# Patient Record
Sex: MALE | Race: WHITE | NOT HISPANIC OR LATINO
[De-identification: names, ages, dates, MRNs, and addresses within clinical notes are randomized per-mention and may not be internally consistent; named-entity substitution may affect disease eponyms.]

---

## 2017-03-29 ENCOUNTER — APPOINTMENT (OUTPATIENT)
Dept: ORTHOPEDIC SURGERY | Facility: CLINIC | Age: 72
End: 2017-03-29

## 2017-03-29 DIAGNOSIS — M75.52 BURSITIS OF LEFT SHOULDER: ICD-10-CM

## 2017-03-29 RX ORDER — HYALURONATE SODIUM 20 MG/2 ML
20 SYRINGE (ML) INTRAARTICULAR
Refills: 0 | Status: COMPLETED | OUTPATIENT
Start: 2017-03-29

## 2017-03-29 RX ADMIN — Medication 1 MG/2ML: at 00:00

## 2017-04-05 ENCOUNTER — APPOINTMENT (OUTPATIENT)
Dept: ORTHOPEDIC SURGERY | Facility: CLINIC | Age: 72
End: 2017-04-05

## 2017-04-05 RX ORDER — HYALURONATE SODIUM 20 MG/2 ML
20 SYRINGE (ML) INTRAARTICULAR
Refills: 0 | Status: COMPLETED | OUTPATIENT
Start: 2017-04-05

## 2017-04-05 RX ORDER — DEXAMETHASONE SODIUM PHOSPHATE 4 MG/ML
120 INJECTION, SOLUTION INTRA-ARTICULAR; INTRALESIONAL; INTRAMUSCULAR; INTRAVENOUS; SOFT TISSUE
Refills: 0 | Status: COMPLETED | OUTPATIENT
Start: 2017-04-05

## 2017-04-05 RX ADMIN — DEXAMETHASONE SODIUM PHOSPHATE 1 MG/30ML: 4 INJECTION, SOLUTION INTRA-ARTICULAR; INTRALESIONAL; INTRAMUSCULAR; INTRAVENOUS; SOFT TISSUE at 00:00

## 2017-04-05 RX ADMIN — Medication 1 MG/2ML: at 00:00

## 2017-04-12 ENCOUNTER — APPOINTMENT (OUTPATIENT)
Dept: ORTHOPEDIC SURGERY | Facility: CLINIC | Age: 72
End: 2017-04-12

## 2017-04-12 DIAGNOSIS — M25.551 PAIN IN RIGHT HIP: ICD-10-CM

## 2017-04-12 RX ORDER — HYALURONATE SODIUM 20 MG/2 ML
20 SYRINGE (ML) INTRAARTICULAR
Refills: 0 | Status: COMPLETED | OUTPATIENT
Start: 2017-04-12

## 2017-04-12 RX ADMIN — Medication 1 MG/2ML: at 00:00

## 2018-04-23 ENCOUNTER — FORM ENCOUNTER (OUTPATIENT)
Age: 73
End: 2018-04-23

## 2018-04-24 ENCOUNTER — OUTPATIENT (OUTPATIENT)
Dept: OUTPATIENT SERVICES | Facility: HOSPITAL | Age: 73
LOS: 1 days | End: 2018-04-24
Payer: MEDICARE

## 2018-04-24 ENCOUNTER — APPOINTMENT (OUTPATIENT)
Dept: ORTHOPEDIC SURGERY | Facility: CLINIC | Age: 73
End: 2018-04-24
Payer: MEDICARE

## 2018-04-24 VITALS — HEIGHT: 68 IN | BODY MASS INDEX: 25.76 KG/M2 | WEIGHT: 170 LBS

## 2018-04-24 PROCEDURE — 99213 OFFICE O/P EST LOW 20 MIN: CPT | Mod: 25

## 2018-04-24 PROCEDURE — 73564 X-RAY EXAM KNEE 4 OR MORE: CPT

## 2018-04-24 PROCEDURE — 73564 X-RAY EXAM KNEE 4 OR MORE: CPT | Mod: 26,LT

## 2018-04-24 PROCEDURE — 20610 DRAIN/INJ JOINT/BURSA W/O US: CPT | Mod: LT

## 2018-04-24 RX ADMIN — Medication 1 MG/2ML: at 00:00

## 2018-05-01 ENCOUNTER — APPOINTMENT (OUTPATIENT)
Dept: ORTHOPEDIC SURGERY | Facility: CLINIC | Age: 73
End: 2018-05-01
Payer: MEDICARE

## 2018-05-01 DIAGNOSIS — S76.112A STRAIN OF LEFT QUADRICEPS MUSCLE, FASCIA AND TENDON, INITIAL ENCOUNTER: ICD-10-CM

## 2018-05-01 PROCEDURE — 20610 DRAIN/INJ JOINT/BURSA W/O US: CPT | Mod: LT

## 2018-05-01 PROCEDURE — 99213 OFFICE O/P EST LOW 20 MIN: CPT | Mod: 25

## 2018-05-10 ENCOUNTER — APPOINTMENT (OUTPATIENT)
Dept: ORTHOPEDIC SURGERY | Facility: CLINIC | Age: 73
End: 2018-05-10
Payer: MEDICARE

## 2018-05-10 PROCEDURE — 20610 DRAIN/INJ JOINT/BURSA W/O US: CPT | Mod: LT

## 2018-05-11 RX ADMIN — Medication 1 MG/2ML: at 00:00

## 2019-04-18 ENCOUNTER — MEDICATION RENEWAL (OUTPATIENT)
Age: 74
End: 2019-04-18

## 2019-05-02 ENCOUNTER — APPOINTMENT (OUTPATIENT)
Dept: ORTHOPEDIC SURGERY | Facility: CLINIC | Age: 74
End: 2019-05-02

## 2019-05-09 ENCOUNTER — APPOINTMENT (OUTPATIENT)
Dept: ORTHOPEDIC SURGERY | Facility: CLINIC | Age: 74
End: 2019-05-09

## 2019-05-23 ENCOUNTER — APPOINTMENT (OUTPATIENT)
Dept: ORTHOPEDIC SURGERY | Facility: CLINIC | Age: 74
End: 2019-05-23

## 2019-05-27 ENCOUNTER — FORM ENCOUNTER (OUTPATIENT)
Age: 74
End: 2019-05-27

## 2019-05-28 ENCOUNTER — APPOINTMENT (OUTPATIENT)
Dept: ORTHOPEDIC SURGERY | Facility: CLINIC | Age: 74
End: 2019-05-28

## 2019-05-28 ENCOUNTER — APPOINTMENT (OUTPATIENT)
Dept: ORTHOPEDIC SURGERY | Facility: CLINIC | Age: 74
End: 2019-05-28
Payer: MEDICARE

## 2019-05-28 ENCOUNTER — OUTPATIENT (OUTPATIENT)
Dept: OUTPATIENT SERVICES | Facility: HOSPITAL | Age: 74
LOS: 1 days | End: 2019-05-28
Payer: MEDICARE
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
## 2019-05-28 PROCEDURE — 20610 DRAIN/INJ JOINT/BURSA W/O US: CPT | Mod: LT

## 2019-05-28 PROCEDURE — 73564 X-RAY EXAM KNEE 4 OR MORE: CPT

## 2019-05-28 PROCEDURE — 73564 X-RAY EXAM KNEE 4 OR MORE: CPT | Mod: 26,50

## 2019-05-28 PROCEDURE — 99214 OFFICE O/P EST MOD 30 MIN: CPT | Mod: 25

## 2019-05-28 NOTE — DISCUSSION/SUMMARY
[de-identified] : Mr. Blair is a 73 y/o M with left knee DJD. On physical exam, he has radicular pain with straight leg raise so I informed him that I believe some of his pain and symptoms come from his spine. I encouraged him to continue seeking therapy/mental health support.\par Follow up for 2/3 Eufflexa injection.

## 2019-05-28 NOTE — END OF VISIT
[FreeTextEntry3] : All medical record entries made by Alondra Patterson acting as a scribe for the performing provider (Daniel Retana MD and/or LISA Hector) on 05/28/2019. All entries were dictated to me by the performing medical provider. In signing this record, the medical provider affirms that they have personally performed the history, physical exam, assessment and plan and have also directed, reviewed and agreed to the documentation in the chart.
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
## 2019-05-28 NOTE — PHYSICAL EXAM
[de-identified] : Well appearing. NAD. Alert and oriented x 3. No respiratory distress.\par Bilateral upper extremities: Skin intact. No deformity. Painless active ROM without evident restriction.\par Cervical, thoracic and lumbar spine: No visible deformity. Painless active ROM without evident restriction. (+) radicular pain on passive straight leg raise on the left.\par \par Gait: Normal.\par Ambulatory assist devices: None.\par \par Bilateral Hips: No swelling or deformity. Painless and unrestricted range of motion. No crepitation. \par \par Right Knee:\par Skin intact. No surgical scars. No erythema or ecchymosis. No swelling or effusion. No deformity. No focal tenderness.\par Painless ROM from full extension to 135-140 degrees of flexion.\par Central patellar tracking. No crepitation. Good stability.\par \par Left Knee:\par Skin intact. No surgical scars. No erythema or ecchymosis. No swelling or effusion. No deformity.\par (+) mild tenderness.\par Painless ROM from full extension to 140 degrees of flexion.\par Central patellar tracking. No crepitation. No instability.\par \par Neurological: Intact distal crude touch sensation. Normal distal motor power. \par Cardiovascular: Lower extremities warm and well perfused. No peripheral edema. [de-identified] : X-ray imaging of the left knee done here today demonstrates mild DJD of the medial compartment, unchanged from prior imaging

## 2019-05-28 NOTE — PROCEDURE
[Injection] : Injection [Left] : of the left [Effusion] : Effusion [Knee Joint] : knee joint [Osteoarthritis] : Osteoarthritis [Inflammation] : Inflammation [Joint Pain] : Joint pain [Patient] : patient [Risk] : risk [Benefits] : benefits [Bleeding] : bleeding [Alternatives] : alternatives [Infection] : infection [Allergic Reaction] : allergic reaction [Verbal Consent Obtained] : verbal consent was obtained prior to the procedure [Alcohol] : Alcohol [Betadine] : Betadine [Ethyl Chloride Spray] : ethyl chloride spray was used as a topical anesthetic [Lateral] : lateral [1.5  inch] : 1.5 inch needle [25] : a 25-gauge [2 mL Euflexxa___(lot #)] : 2mL Euflexxa ~Ulot# [unfilled] [Bandage Applied] : a bandage [Tolerated Well] : The patient tolerated the procedure well [No Strenuous Activity___day(s)] : avoid strenuous activity for [unfilled] day(s) [None] : none [PRN] : as needed [de-identified] : Euflexxa # 1 of 3 \par left knee joint\par Lot.No: [id number]\par Exp.Date: 2020-04-28\par Man: Ferring\par
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
## 2019-05-28 NOTE — PHYSICAL EXAM
[de-identified] : Well appearing. NAD. Alert and oriented x 3. No respiratory distress.\par Bilateral upper extremities: Skin intact. No deformity. Painless active ROM without evident restriction.\par Cervical, thoracic and lumbar spine: No visible deformity. Painless active ROM without evident restriction. (+) radicular pain on passive straight leg raise on the left.\par \par Gait: Normal.\par Ambulatory assist devices: None.\par \par Bilateral Hips: No swelling or deformity. Painless and unrestricted range of motion. No crepitation. \par \par Right Knee:\par Skin intact. No surgical scars. No erythema or ecchymosis. No swelling or effusion. No deformity. No focal tenderness.\par Painless ROM from full extension to 135-140 degrees of flexion.\par Central patellar tracking. No crepitation. Good stability.\par \par Left Knee:\par Skin intact. No surgical scars. No erythema or ecchymosis. No swelling or effusion. No deformity.\par (+) mild tenderness.\par Painless ROM from full extension to 140 degrees of flexion.\par Central patellar tracking. No crepitation. No instability.\par \par Neurological: Intact distal crude touch sensation. Normal distal motor power. \par Cardiovascular: Lower extremities warm and well perfused. No peripheral edema. [de-identified] : X-ray imaging of the left knee done here today demonstrates mild DJD of the medial compartment, unchanged from prior imaging

## 2019-05-28 NOTE — DISCUSSION/SUMMARY
[de-identified] : Mr. Blair is a 73 y/o M with left knee DJD. On physical exam, he has radicular pain with straight leg raise so I informed him that I believe some of his pain and symptoms come from his spine. I encouraged him to continue seeking therapy/mental health support.\par Follow up for 2/3 Eufflexa injection.

## 2019-05-28 NOTE — END OF VISIT
[FreeTextEntry3] : All medical record entries made by Alondar Patterson acting as a scribe for the performing provider (Daniel Retana MD and/or LISA Hector) on 05/28/2019. All entries were dictated to me by the performing medical provider. In signing this record, the medical provider affirms that they have personally performed the history, physical exam, assessment and plan and have also directed, reviewed and agreed to the documentation in the chart.

## 2019-05-28 NOTE — PROCEDURE
[Injection] : Injection [Left] : of the left [Knee Joint] : knee joint [Effusion] : Effusion [Osteoarthritis] : Osteoarthritis [Inflammation] : Inflammation [Joint Pain] : Joint pain [Patient] : patient [Risk] : risk [Benefits] : benefits [Bleeding] : bleeding [Alternatives] : alternatives [Infection] : infection [Allergic Reaction] : allergic reaction [Verbal Consent Obtained] : verbal consent was obtained prior to the procedure [Alcohol] : Alcohol [Betadine] : Betadine [Ethyl Chloride Spray] : ethyl chloride spray was used as a topical anesthetic [Lateral] : lateral [25] : a 25-gauge [1.5  inch] : 1.5 inch needle [2 mL Euflexxa___(lot #)] : 2mL Euflexxa ~Ulot# [unfilled] [Bandage Applied] : a bandage [Tolerated Well] : The patient tolerated the procedure well [None] : none [No Strenuous Activity___day(s)] : avoid strenuous activity for [unfilled] day(s) [PRN] : as needed [de-identified] : Euflexxa # 1 of 3 \par left knee joint\par Lot.No: [id number]\par Exp.Date: 2020-04-28\par Man: Ferring\par

## 2019-06-04 ENCOUNTER — APPOINTMENT (OUTPATIENT)
Dept: ORTHOPEDIC SURGERY | Facility: CLINIC | Age: 74
End: 2019-06-04
Payer: MEDICARE

## 2019-06-04 PROCEDURE — 20610 DRAIN/INJ JOINT/BURSA W/O US: CPT | Mod: LT

## 2019-06-04 RX ADMIN — Medication 1 MG/2ML: at 00:00

## 2019-06-04 NOTE — ADDENDUM
[FreeTextEntry1] : Dr. Retana was physically present during the key portions the encounter, provided assistance as needed, and formulated the assessment and plan as documented above.\par \par

## 2019-06-04 NOTE — PROCEDURE
[Injection] : Injection [Left] : of the left [Knee Joint] : knee joint [Osteoarthritis] : Osteoarthritis [Patient] : patient [Joint Pain] : Joint pain [Benefits] : benefits [Risk] : risk [Alternatives] : alternatives [Bleeding] : bleeding [Infection] : infection [Allergic Reaction] : allergic reaction [Verbal Consent Obtained] : verbal consent was obtained prior to the procedure [Betadine] : Betadine [Alcohol] : Alcohol [Lateral] : lateral [Ethyl Chloride Spray] : ethyl chloride spray was used as a topical anesthetic [20] : a 20-gauge [2 mL Euflexxa___(lot #)] : 2mL Euflexxa ~Ulot# [unfilled] [1.5  inch] : 1.5 inch needle [Bandage Applied] : a bandage [Tolerated Well] : The patient tolerated the procedure well [None] : none [No Strenuous Activity___day(s)] : avoid strenuous activity for [unfilled] day(s) [___ Week(s)] : in [unfilled] week(s) [de-identified] : Euflexxa # 2 of 3\par Left knee joint\par Lot.No: [id number]\par Exp.Date: 2020-04-28

## 2019-06-11 ENCOUNTER — APPOINTMENT (OUTPATIENT)
Dept: ORTHOPEDIC SURGERY | Facility: CLINIC | Age: 74
End: 2019-06-11
Payer: MEDICARE

## 2019-06-11 PROCEDURE — 20610 DRAIN/INJ JOINT/BURSA W/O US: CPT | Mod: LT

## 2019-06-11 RX ADMIN — Medication 1 MG/2ML: at 00:00

## 2019-06-11 NOTE — PROCEDURE
[de-identified] : Euflexxa # 3 of 3\par Left knee joint\par Lot.No: [id number]\par Exp.Date: 2020-04-28\par \par \par Procedure: Injection of the left knee joint. \par Indication:  Osteoarthritis and Joint pain. \par Risk, benefits and alternatives were discussed with the patient. Potential complications include bleeding, infection and allergic reaction. Verbal consent was obtained prior to the procedure. \par Alcohol and Betadine was used to prep the area. ethyl chloride spray was used as a topical anesthetic. Using sterile technique, the aspiration/injection needle was then directed from a lateral aspect. A 20-gauge and 1.5 inch needle was used to inject 2mL Euflexxa lot# [id number]. A bandage was applied. The patient tolerated the procedure well. \par Complications: none. \par Patient instructed to avoid strenuous activity for 2 day(s). \par Follow-up in the office as needed.

## 2019-06-11 NOTE — ADDENDUM
[FreeTextEntry1] : Dr. Rteana was physically present during the key portions the encounter, provided assistance as needed, and formulated the assessment and plan as documented above.\par \par

## 2019-08-28 ENCOUNTER — FORM ENCOUNTER (OUTPATIENT)
Age: 74
End: 2019-08-28

## 2019-08-29 ENCOUNTER — OUTPATIENT (OUTPATIENT)
Dept: OUTPATIENT SERVICES | Facility: HOSPITAL | Age: 74
LOS: 1 days | End: 2019-08-29
Payer: MEDICARE

## 2019-08-29 ENCOUNTER — APPOINTMENT (OUTPATIENT)
Dept: ORTHOPEDIC SURGERY | Facility: CLINIC | Age: 74
End: 2019-08-29
Payer: MEDICARE

## 2019-08-29 PROCEDURE — 20610 DRAIN/INJ JOINT/BURSA W/O US: CPT | Mod: LT

## 2019-08-29 PROCEDURE — 99213 OFFICE O/P EST LOW 20 MIN: CPT | Mod: 25

## 2019-08-29 PROCEDURE — 73564 X-RAY EXAM KNEE 4 OR MORE: CPT

## 2019-08-29 PROCEDURE — 73564 X-RAY EXAM KNEE 4 OR MORE: CPT | Mod: 26,LT

## 2019-08-29 RX ORDER — DICLOFENAC SODIUM 10 MG/G
1 GEL TOPICAL
Qty: 1 | Refills: 2 | Status: ACTIVE | COMMUNITY
Start: 2017-04-12 | End: 1900-01-01

## 2019-08-29 RX ADMIN — TRIAMCINOLONE ACETONIDE 1 MG/ML: 40 INJECTION, SUSPENSION INTRA-ARTICULAR; INTRAMUSCULAR at 00:00

## 2019-08-29 NOTE — PROCEDURE
[Injection] : Injection [Osteoarthritis] : Osteoarthritis [Knee Joint] : knee joint [Risk] : risk [Joint Pain] : Joint pain [Patient] : patient [Alternatives] : alternatives [Benefits] : benefits [Infection] : infection [Bleeding] : bleeding [Verbal Consent Obtained] : verbal consent was obtained prior to the procedure [Allergic Reaction] : allergic reaction [Betadine] : Betadine [Alcohol] : Alcohol [Ethyl Chloride Spray] : ethyl chloride spray was used as a topical anesthetic [Lateral] : lateral [1% Lidocaine___(mL)] : [unfilled] mL of 1% Lidocaine [1.5  inch] : 1.5 inch needle [20] : a 20-gauge [Triamcinolone 40mg/mL___(mL)] : [unfilled] ~UmL of 40mg/mL triamcinolone [Tolerated Well] : The patient tolerated the procedure well [Bandage Applied] : a bandage [No Strenuous Activity___day(s)] : avoid strenuous activity for [unfilled] day(s) [None] : none [PRN] : as needed

## 2019-08-29 NOTE — HISTORY OF PRESENT ILLNESS
[de-identified] : Patient presents for follow up of left knee pain. He states his pain is severe and he is only able to walk with a walker. He has pain with standing and walking for prolonged periods. He follows with pain management and takes Norco for pain. Unable to take NSAIDs due to history of gastric ulcer. He notes wife passed away a few months ago and his daughter recently went away to college. He has a difficult time leaving the house due to pain and has been very sedentary in recent months.

## 2019-08-29 NOTE — PHYSICAL EXAM
[de-identified] : Constitutional: Well appearing.  No acute distress.\par Mental Status: Alert & oriented to person, place and time. Normal affect.\par Pulmonary: No respiratory distress. Normal chest excursion.\par Abdomen: Soft and not distended.\par Gait: Antalgic.\par Ambulatory assist devices: Walker.\par \par Cervical spine: Skin intact. No visible deformity.  Painless active ROM without evident restriction.\par Bilateral upper extremities: Skin intact. No deformity. Painless active ROM without evident restriction.\par Thoracolumbar spine: No deformity. No tenderness. No radicular pain on passive straight leg raise bilaterally.\par Pelvis: No pelvic obliquity. No tenderness.\par Leg lengths: Equal.\par \par Bilateral Hips: No swelling or deformity. No focal tenderness. Painless and unrestricted range of motion.  No crepitation. Hip flexor and abductor power 5/5.\par \par Left Knee:\par Skin intact.\par No surgical scars.\par No erythema or ecchymosis.\par No swelling or effusion.\par No deformity.\par Severe lateral joint line tenderness.\par Painful ROM from full extension to 120 degrees of flexion.\par Central patellar tracking.\par No crepitation.\par No instability.\par Quadriceps power 4/5.\par \par Neurological: Intact distal crude touch sensation. Normal distal motor power. Symmetric knee jerk and ankle jerk reflexes bilaterally.\par Cardiovascular: Palpable dorsalis pedis and posterior tibialis pulses. Brisk capillary refill. No peripheral edema.\par Lymphatics:  No peripheral adenopathy appreciated.\par \par  [de-identified] : X-rays taken today demonstrate left knee with moderate joint space narrowing, no acute fracture, no change from previous imaging in 5/19

## 2019-08-29 NOTE — DISCUSSION/SUMMARY
[de-identified] : Diagnosis, prognosis and treatment options discussed. Conservative management including activity modification, therapeutic exercise with PT, topical and oral antiinflammatories, steroid and HA injections discussed. Discussed importance of physical activity and leaving the home. Advised to ambulate with walker as much as possible. He was given a steroid injection today and referred for home PT to help increase his ambulation and mobility. He may follow up prn.\par

## 2019-11-01 ENCOUNTER — OTHER (OUTPATIENT)
Age: 74
End: 2019-11-01

## 2019-11-20 ENCOUNTER — APPOINTMENT (OUTPATIENT)
Dept: PHYSICAL MEDICINE AND REHAB | Facility: CLINIC | Age: 74
End: 2019-11-20
Payer: MEDICARE

## 2019-11-20 VITALS — HEIGHT: 67 IN | HEART RATE: 100 BPM | BODY MASS INDEX: 28.25 KG/M2 | OXYGEN SATURATION: 93 % | WEIGHT: 180 LBS

## 2019-11-20 DIAGNOSIS — M25.551 PAIN IN RIGHT HIP: ICD-10-CM

## 2019-11-20 DIAGNOSIS — M25.552 PAIN IN RIGHT HIP: ICD-10-CM

## 2019-11-20 PROCEDURE — 99203 OFFICE O/P NEW LOW 30 MIN: CPT

## 2019-11-26 PROBLEM — M25.551 BILATERAL HIP PAIN: Status: ACTIVE | Noted: 2019-11-01

## 2019-11-26 NOTE — HISTORY OF PRESENT ILLNESS
[FreeTextEntry1] : Mr. DOM CASTAÑEDA is a very pleasant 74 year male who comes in for evaluation of bilateral hip pain  that has been ongoing for 2-3 months without any specific injury or inciting event. The pain is located primarily on bilateral hips intermittent in nature and described as severe occasional pain . The pain is rated as 8/10 during today's visit, and ranges from 10/10. The patient's symptoms are aggravated by walking, sitting, and standing and alleviated by nothing . The patient denies any night pain, numbness/tingling, weakness, or bowel/bladder dysfunction. The patient has no other complaints at this time.\par \par \par

## 2019-11-26 NOTE — ASSESSMENT
[FreeTextEntry1] : Impression:\par 1. Gluteal Pain at Iliac Crest\par \par Plan: After review of the history, physical examination, and imaging, the patient's symptoms are consistent with gluteal pain at the iliac crest. The pain is focal to the iliac crests and there does not seem to be intrinsic hip or spine etiology. The imaging results and diagnosis were discussed in detail with the patient. We discussed all the potential treatment options including physical therapy, oral medication, ultrasound guided injections, and surgery; as well as alternative therapeutics such as acupuncture and massage. We also discussed the importance of weight loss, ergonomics, and posture in the long term management of the condition. At this time I am recommending that the patient work with his physical therapist to see if he can achieve improvement. The patient will return to the office for followup in 2 months. The patient expressed verbal understanding and is in agreement with the plan of care. All of the patient's questions and concerns were addressed during today's visit.

## 2019-11-26 NOTE — DATA REVIEWED
[FreeTextEntry1] : MR LS 6/2019: L5-S1 intradiscal fusion with anterior fusion hardware. The left-sided screw in S1 lies in close apposition to the left S1 nerve root with nerve root enlargement. L4-5 disc degeneration, retrolisthesis, facet osteoarthritis and bony impingement on the left L4 nerve root, with disc extrusion in the left foramen, compressing the left L4 nerve root. L3-4 central canal stenosis with impingement on the left L4 nerve root. L2-3 asymmetric bulge to the left with impingement on the left L3 nerve root. L1-2 disc degeneration, retrolisthesis and impingement on the right L2 nerve root by disc extrusion.\par \par XR Pelvis 6/2019: Mild Degenerative changes bilateral hips.

## 2019-11-26 NOTE — PHYSICAL EXAM
[FreeTextEntry1] : GEN: AAOx3, NAD.\par PSYCH: Depressed mood and affect. Responds appropriately to commands.\par EYES: Sclerae Anicteric. No discharge. EOMI.\par RESP: Breathing unlabored.\par CV: DP pulses 2+ and equal. No varicosities noted.\par EXT: No C/C/E.\par SKIN: No lesions noted.\par STRENGTH: 4/5 bilateral hip flexors, knee extensors, knee flexors, ankle dorsiflexors, long toe extensors, ankle plantar flexors, hip extensors, hip abductors.\par TONE: Normal, No clonus.\par REFLEXES: Symmetric patella, medial hamstring, achilles. Plantars downgoing bilaterally.\par SENS: Grossly intact to light touch bilateral lower extremities.\par INSP: Spine alignment is midline, with no evidence of scoliosis.\par STANCE: No Trendelenburg with single leg stance.\par GAIT: (+) antalgic, normal reciprocating heel to toe\par LUMBAR ROM: Flexion, extension, side-bending, rotation, oblique extension all limited with axial Sx. \par HIP ROM: Full and pain free bilaterally.\par PALP: (+) TTP B-Iliac Crests. There is no tenderness over the midline spinous processes, paravertebral muscles, SIJ, or greater trochanters bilaterally.\par SPECIAL: SLR and Slump test negative bilaterally. FADIR, HASEEB negative bilaterally.

## 2020-11-18 VITALS
DIASTOLIC BLOOD PRESSURE: 86 MMHG | HEIGHT: 68 IN | HEART RATE: 92 BPM | OXYGEN SATURATION: 97 % | SYSTOLIC BLOOD PRESSURE: 137 MMHG | WEIGHT: 160.06 LBS | TEMPERATURE: 98 F | RESPIRATION RATE: 18 BRPM

## 2020-11-18 LAB
ALBUMIN SERPL ELPH-MCNC: 3.8 G/DL — SIGNIFICANT CHANGE UP (ref 3.3–5)
ALP SERPL-CCNC: 116 U/L — SIGNIFICANT CHANGE UP (ref 40–120)
ALT FLD-CCNC: 9 U/L — LOW (ref 10–45)
ANION GAP SERPL CALC-SCNC: 11 MMOL/L — SIGNIFICANT CHANGE UP (ref 5–17)
AST SERPL-CCNC: 16 U/L — SIGNIFICANT CHANGE UP (ref 10–40)
BASOPHILS # BLD AUTO: 0.05 K/UL — SIGNIFICANT CHANGE UP (ref 0–0.2)
BASOPHILS NFR BLD AUTO: 0.4 % — SIGNIFICANT CHANGE UP (ref 0–2)
BILIRUB SERPL-MCNC: 0.5 MG/DL — SIGNIFICANT CHANGE UP (ref 0.2–1.2)
BUN SERPL-MCNC: 21 MG/DL — SIGNIFICANT CHANGE UP (ref 7–23)
CALCIUM SERPL-MCNC: 9.1 MG/DL — SIGNIFICANT CHANGE UP (ref 8.4–10.5)
CHLORIDE SERPL-SCNC: 98 MMOL/L — SIGNIFICANT CHANGE UP (ref 96–108)
CO2 SERPL-SCNC: 26 MMOL/L — SIGNIFICANT CHANGE UP (ref 22–31)
CREAT SERPL-MCNC: 0.83 MG/DL — SIGNIFICANT CHANGE UP (ref 0.5–1.3)
EOSINOPHIL # BLD AUTO: 0.08 K/UL — SIGNIFICANT CHANGE UP (ref 0–0.5)
EOSINOPHIL NFR BLD AUTO: 0.6 % — SIGNIFICANT CHANGE UP (ref 0–6)
GLUCOSE SERPL-MCNC: 144 MG/DL — HIGH (ref 70–99)
HCT VFR BLD CALC: 38.6 % — LOW (ref 39–50)
HGB BLD-MCNC: 11.8 G/DL — LOW (ref 13–17)
IMM GRANULOCYTES NFR BLD AUTO: 0.3 % — SIGNIFICANT CHANGE UP (ref 0–1.5)
LIDOCAIN IGE QN: 11 U/L — SIGNIFICANT CHANGE UP (ref 7–60)
LYMPHOCYTES # BLD AUTO: 1.57 K/UL — SIGNIFICANT CHANGE UP (ref 1–3.3)
LYMPHOCYTES # BLD AUTO: 11 % — LOW (ref 13–44)
MAGNESIUM SERPL-MCNC: 1.9 MG/DL — SIGNIFICANT CHANGE UP (ref 1.6–2.6)
MCHC RBC-ENTMCNC: 28.1 PG — SIGNIFICANT CHANGE UP (ref 27–34)
MCHC RBC-ENTMCNC: 30.6 GM/DL — LOW (ref 32–36)
MCV RBC AUTO: 91.9 FL — SIGNIFICANT CHANGE UP (ref 80–100)
MONOCYTES # BLD AUTO: 0.85 K/UL — SIGNIFICANT CHANGE UP (ref 0–0.9)
MONOCYTES NFR BLD AUTO: 6 % — SIGNIFICANT CHANGE UP (ref 2–14)
NEUTROPHILS # BLD AUTO: 11.64 K/UL — HIGH (ref 1.8–7.4)
NEUTROPHILS NFR BLD AUTO: 81.7 % — HIGH (ref 43–77)
NRBC # BLD: 0 /100 WBCS — SIGNIFICANT CHANGE UP (ref 0–0)
PLATELET # BLD AUTO: 310 K/UL — SIGNIFICANT CHANGE UP (ref 150–400)
POTASSIUM SERPL-MCNC: 4.7 MMOL/L — SIGNIFICANT CHANGE UP (ref 3.5–5.3)
POTASSIUM SERPL-SCNC: 4.7 MMOL/L — SIGNIFICANT CHANGE UP (ref 3.5–5.3)
PROT SERPL-MCNC: 7 G/DL — SIGNIFICANT CHANGE UP (ref 6–8.3)
RBC # BLD: 4.2 M/UL — SIGNIFICANT CHANGE UP (ref 4.2–5.8)
RBC # FLD: 15.3 % — HIGH (ref 10.3–14.5)
SODIUM SERPL-SCNC: 135 MMOL/L — SIGNIFICANT CHANGE UP (ref 135–145)
WBC # BLD: 14.23 K/UL — HIGH (ref 3.8–10.5)
WBC # FLD AUTO: 14.23 K/UL — HIGH (ref 3.8–10.5)

## 2020-11-18 RX ORDER — SODIUM CHLORIDE 9 MG/ML
1000 INJECTION INTRAMUSCULAR; INTRAVENOUS; SUBCUTANEOUS ONCE
Refills: 0 | Status: COMPLETED | OUTPATIENT
Start: 2020-11-18 | End: 2020-11-18

## 2020-11-18 RX ORDER — IOHEXOL 300 MG/ML
30 INJECTION, SOLUTION INTRAVENOUS ONCE
Refills: 0 | Status: COMPLETED | OUTPATIENT
Start: 2020-11-18 | End: 2020-11-18

## 2020-11-18 RX ORDER — ONDANSETRON 8 MG/1
4 TABLET, FILM COATED ORAL ONCE
Refills: 0 | Status: COMPLETED | OUTPATIENT
Start: 2020-11-18 | End: 2020-11-18

## 2020-11-18 RX ADMIN — IOHEXOL 30 MILLILITER(S): 300 INJECTION, SOLUTION INTRAVENOUS at 23:39

## 2020-11-18 RX ADMIN — ONDANSETRON 4 MILLIGRAM(S): 8 TABLET, FILM COATED ORAL at 23:31

## 2020-11-18 RX ADMIN — SODIUM CHLORIDE 1000 MILLILITER(S): 9 INJECTION INTRAMUSCULAR; INTRAVENOUS; SUBCUTANEOUS at 23:31

## 2020-11-18 NOTE — ED PROVIDER NOTE - PMH
Depression    High cholesterol     Depression    GERD (gastroesophageal reflux disease)    High cholesterol

## 2020-11-18 NOTE — ED PROVIDER NOTE - CROS ED CARDIOVAS ALL NEG
Daughter Jesús Whitehead for pt [place] [facility]: pt has finished antibioitics given 3/13/20 for facial cellulitis, but now the cellulitis is worse with underlying swelling. Calling to ask to see the doctor. After consulting with Dr Grzegorz Hart, pt was advised to return to ER. Daughter voiced understanding and said she would take her mother to ER.
Message noted.
negative...

## 2020-11-18 NOTE — ED PROVIDER NOTE - OBJECTIVE STATEMENT
75M hx high chol, depression, c/o loose stools for past few days. pt states also feeling nauseated and decreased appetite. pt recently had back surgery and has been home from rehab for past week. no fevers. no sick contacts. no recent travel. pt states he chronically takes dilaudid. states recently feels lightheaded and nauseous after taking dilaudid. 75M hx high chol, depression, GERD c/o loose stools for past few days. pt states also feeling nauseated and decreased appetite. pt recently had back surgery and has been home from rehab for past week. no fevers. no sick contacts. no recent travel. pt states he chronically takes dilaudid. states recently feels lightheaded and nauseous after taking dilaudid. 75M hx high chol, depression, GERD c/o loose stools for past few days. pt states also feeling nauseated and decreased appetite. pt recently had back surgery and has been home from rehab for past week. no fevers. no sick contacts. no recent travel. pt states he chronically takes dilaudid. states recently feels lightheaded and nauseous after taking dilaudid.  pt states he has chronic back pain, no new focal numbness/weakness. uses walker. 75M hx high chol, depression, GERD c/o loose stools for past few days. pt states also feeling nauseated and decreased appetite. pt recently had back surgery a month ago at [facility] and has been home from rehab for past week. no fevers. no sick contacts. no recent travel. pt states he chronically takes dilaudid. states recently feels lightheaded and nauseous after taking dilaudid.  pt states he has chronic back pain, no new focal numbness/weakness. uses walker.

## 2020-11-18 NOTE — ED PROVIDER NOTE - PROGRESS NOTE DETAILS
pt returned from CT, now with rigors - rectal temp 100.8. will give tylenol, send lactate and cultures preliminary CT results negative for intraabd path, pt with very elevated lactate - will continued ivf, will get US, given zosyn NSG consulted for spine no acute intervention by NSG.  repeat lactate greatly improved. will admit to medicine for FUO

## 2020-11-18 NOTE — ED PROVIDER NOTE - CLINICAL SUMMARY MEDICAL DECISION MAKING FREE TEXT BOX
diarrhea, nausea, decreased appetite, diffuse pain on exam  -check labs, ekg  -ivf, zofran  -CT a/p  -covid swab

## 2020-11-18 NOTE — ED ADULT NURSE NOTE - OBJECTIVE STATEMENT
pt received into spot 2 A&Ox2 to person and place not time ambulatory with walker arrives via walk in triage with family member for eval of abd pain and loose stools x 3 days with blood noted and generalized weakness dizziness. Denies CP SOB cough runny nose fever chills abd soft slightly distended denies pain burning with urination states he has been on Dilaudid for chronic back pain as of later. 20G placed to RAC labs drawn and sent pt in nad

## 2020-11-18 NOTE — ED ADULT TRIAGE NOTE - CHIEF COMPLAINT QUOTE
I'd been having loose stools  ( 4x ) since  this morning, with nausea, dizziness, and weakness ; S/P back surgery Oct 5th (still with back pain) denies abdominal pain or fever

## 2020-11-18 NOTE — ED ADULT NURSE NOTE - SUICIDE SCREENING QUESTION 1
History   Chief Complaint  Arm pain    The history is provided by the patient.      Pramod Harris is a 64 year old male who presents for evaluation of mod to severe nonrad left upper arm and shoulder pain that started yesterday after tripping and falling on the stairs. The pain is exacerbated by movement and alleviated by use of Tylenol. He was seen at [facility] where he was put in a sling and sent here for further evaluation. Here in the ED, he denies any loss of consciousness, headache, back pain, chest pain, shortness of breath, or leg pain. Of note, his last Tdap was in 2019.    Allergies:  No Known Drug Allergies    Medications:    Escitalopram  Ferrous sulfate  Metformin  Omeprazole    Past Medical History:    Subdural hematoma  Subarachnoid hemorrhage  Iron deficiency anemia  Color deficiency  Alcohol abuse    Past Surgical History:    Colonoscopy  Tonsillectomy and Adenoidectomy  EGD combined    Family History:    History reviewed. No pertinent family history.     Social History:  Smoking Status: Former smoker  Smokeless Tobacco: Never used  Alcohol Use: Yes, Beer 2-3 times a week  Drug Use: No  Marital Status:     Review of Systems   Respiratory: Negative for shortness of breath.    Cardiovascular: Negative for chest pain.   Musculoskeletal: Positive for arthralgias (left shoulder). Negative for back pain.   Neurological: Negative for syncope and headaches.   All other systems reviewed and are negative.    Physical Exam     Patient Vitals for the past 24 hrs:   BP Temp Temp src Pulse Resp SpO2   07/27/20 1304 114/79 98.2  F (36.8  C) Oral 76 18 100 %     Physical Exam  Vital signs reviewed.  Nursing note reviewed.  Constitutional: Well-developed, Well-nourished.  Non-diaphoretic.  Mild distress    HENT: No evidence of head trauma.  No pain or instability to palpation of bony orbits, mandible, maxilla.  Good jaw opening.  No malocclusion.  No nasal septal hematoma.  OP  clear and moist.    EYES:  PERRL.  EOMI.  NECK:  No Cspine tenderness.  Trachea midline.  Full ROM.  Supple. No stridor.  CARDIAC:  RRR.   CHEST:  No external evidence of chest trauma  PULM: Effort  Normal.  Breath sounds clear and equal bilat  ABD:  Soft, NT/ND,  No guarding, no rebound.  No external evidence of abdominal trauma  BACK:  No T or L spinous process tenderness.  Pelvis stable.  EXT:  Full ROM.  No tenderness, edema, crepitus or obvious deformity other than that noted below   LUE: limited ROM 2ndary to pain, no obvious deformity, tenderness to proximal humerus, distal CMS intact  NEURO:  Alert, Oriented.  5/5 UE and LE, proximal and distal.  Sensation intact to LT.   SKIN :  Warm.  Dry.   No erythema.  No rash  PSYCH.:  Normal judgment.  Normal affect.      Emergency Department Course     Imaging:  Radiology findings were communicated with the patient who voiced understanding of the findings.    XR Humerus 2 views, left:   1. Left shoulder: There is a transverse slightly comminuted fracture   of the proximal humeral neck. This may be associated with a greater   tuberosity fracture. There is up to 1 cm anterior displacement and   moderate dorsal angulation. Glenohumeral alignment remains normal.   Moderate hypertrophic degenerative change at the acromioclavicular   joint.     2. Left humerus: No additional bony abnormality. Small rounded soft   tissue calcifications in the mid to distal arm are likely phleboliths   within veins.  as per radiology.     XR Shoulder 3 views, left:   1. Left shoulder: There is a transverse slightly comminuted fracture   of the proximal humeral neck. This may be associated with a greater   tuberosity fracture. There is up to 1 cm anterior displacement and   moderate dorsal angulation. Glenohumeral alignment remains normal.   Moderate hypertrophic degenerative change at the acromioclavicular   joint.     2. Left humerus: No additional bony abnormality. Small rounded soft    tissue calcifications in the mid to distal arm are likely phleboliths   within veins.  as per radiology.     Interventions:  1419 Roxicodone 10 mg oral  1419 Tylenol 1000 mg oral    Emergency Department Course:  Past medical records, nursing notes, and vitals reviewed.    1355 I physically examined the patient as documented above.     The patient was sent for radiographs while in the emergency department, results above.     1508 I rechecked the patient and discussed the findings of their workup thus far.     Findings and plan explained to the Patient. Patient discharged home with instructions regarding supportive care, medications, and reasons to return. The importance of close follow-up was reviewed. The patient was prescribed Roxicodone and Senna S.     I personally reviewed the imaging results with the Patient and answered all related questions prior to discharge.     Impression & Plan     Medical Decision Makin year old male presenting w/ L shoulder pain s/p fall     DDx includes fracture, contusion, sprain, tendinitis, dislocation, extremity pain NOS.  Doubt vascular etiology, septic arthritis given history and physical exam.  No evidence of compartment syndrome on exam.  CMS is intact distally in the extremity.   The patients head to toe trauma exam is otherwise normal at this time and no further trauma workup is needed as I believe there is no signs of serious head, neck, chest, spinal, extremity or abdominal injuries warranting this.  Imaging sig for proximal humerus fracture.  Interventions as noted above with improvement in symptoms.  Given fracture morphology, I do not believe reduction is necessary at this time.  A shoulder immobiliizer was placed as noted above.  CMS intact s/p placement/  There is no indication for ortho consultation from the ED. Rather, close follow-up is indicated in the next days.  At this time I feel the patient is safe for discharge.  RICE instructions given for home  Recommendations given regarding follow up with Orthopedics and return to the emergency department as needed for new or worsening symptoms.  Pt counseled on all results, diagnosis and disposition.  They are understanding and agreeable to plan. Patient discharged in stable condition.      Diagnosis:    ICD-10-CM    1. Closed fracture of proximal end of left humerus, unspecified fracture morphology, initial encounter  S42.202A    2. Abrasion of left forearm, initial encounter  S50.812A        Disposition:  Discharged to home.    Discharge Medications:  New Prescriptions    OXYCODONE (ROXICODONE) 5 MG TABLET    Take 1-2 tablets (5-10 mg) by mouth every 6 hours as needed for pain    SENNA-DOCUSATE SODIUM (SENNA S) 8.6-50 MG TABLET    Take 1-2 tablets by mouth 2 times daily as needed (if taking oxycodone)       Scribe Disclosure:  I, Ayo Steel, am serving as a scribe at 1:32 PM on 7/27/2020 to document services personally performed by Maverick Howard MD based on my observations and the provider's statements to me.      Maverick Howard MD  07/28/20 1137     No

## 2020-11-19 ENCOUNTER — TRANSCRIPTION ENCOUNTER (OUTPATIENT)
Age: 75
End: 2020-11-19

## 2020-11-19 ENCOUNTER — INPATIENT (INPATIENT)
Facility: HOSPITAL | Age: 75
LOS: 4 days | Discharge: HOME HEALTH | DRG: 872 | End: 2020-11-24
Attending: HOSPITALIST | Admitting: HOSPITALIST
Payer: MEDICARE

## 2020-11-19 DIAGNOSIS — C67.9 MALIGNANT NEOPLASM OF BLADDER, UNSPECIFIED: ICD-10-CM

## 2020-11-19 DIAGNOSIS — A41.9 SEPSIS, UNSPECIFIED ORGANISM: ICD-10-CM

## 2020-11-19 DIAGNOSIS — R09.89 OTHER SPECIFIED SYMPTOMS AND SIGNS INVOLVING THE CIRCULATORY AND RESPIRATORY SYSTEMS: ICD-10-CM

## 2020-11-19 DIAGNOSIS — R63.8 OTHER SYMPTOMS AND SIGNS CONCERNING FOOD AND FLUID INTAKE: ICD-10-CM

## 2020-11-19 DIAGNOSIS — F32.9 MAJOR DEPRESSIVE DISORDER, SINGLE EPISODE, UNSPECIFIED: ICD-10-CM

## 2020-11-19 DIAGNOSIS — Z98.890 OTHER SPECIFIED POSTPROCEDURAL STATES: ICD-10-CM

## 2020-11-19 DIAGNOSIS — I82.629 ACUTE EMBOLISM AND THROMBOSIS OF DEEP VEINS OF UNSPECIFIED UPPER EXTREMITY: ICD-10-CM

## 2020-11-19 DIAGNOSIS — K21.9 GASTRO-ESOPHAGEAL REFLUX DISEASE WITHOUT ESOPHAGITIS: ICD-10-CM

## 2020-11-19 DIAGNOSIS — R31.9 HEMATURIA, UNSPECIFIED: ICD-10-CM

## 2020-11-19 DIAGNOSIS — R09.02 HYPOXEMIA: ICD-10-CM

## 2020-11-19 DIAGNOSIS — Z98.890 OTHER SPECIFIED POSTPROCEDURAL STATES: Chronic | ICD-10-CM

## 2020-11-19 LAB
ALBUMIN SERPL ELPH-MCNC: 2.7 G/DL — LOW (ref 3.3–5)
ALP SERPL-CCNC: 87 U/L — SIGNIFICANT CHANGE UP (ref 40–120)
ALT FLD-CCNC: 10 U/L — SIGNIFICANT CHANGE UP (ref 10–45)
ANION GAP SERPL CALC-SCNC: 10 MMOL/L — SIGNIFICANT CHANGE UP (ref 5–17)
ANISOCYTOSIS BLD QL: SLIGHT — SIGNIFICANT CHANGE UP
APPEARANCE UR: CLEAR — SIGNIFICANT CHANGE UP
APTT BLD: 36.1 SEC — HIGH (ref 27.5–35.5)
AST SERPL-CCNC: 20 U/L — SIGNIFICANT CHANGE UP (ref 10–40)
BACTERIA # UR AUTO: PRESENT /HPF
BASOPHILS # BLD AUTO: 0 K/UL — SIGNIFICANT CHANGE UP (ref 0–0.2)
BASOPHILS NFR BLD AUTO: 0 % — SIGNIFICANT CHANGE UP (ref 0–2)
BILIRUB SERPL-MCNC: 0.4 MG/DL — SIGNIFICANT CHANGE UP (ref 0.2–1.2)
BILIRUB UR-MCNC: NEGATIVE — SIGNIFICANT CHANGE UP
BUN SERPL-MCNC: 16 MG/DL — SIGNIFICANT CHANGE UP (ref 7–23)
CALCIUM SERPL-MCNC: 8 MG/DL — LOW (ref 8.4–10.5)
CHLORIDE SERPL-SCNC: 108 MMOL/L — SIGNIFICANT CHANGE UP (ref 96–108)
CK SERPL-CCNC: 68 U/L — SIGNIFICANT CHANGE UP (ref 30–200)
CO2 SERPL-SCNC: 22 MMOL/L — SIGNIFICANT CHANGE UP (ref 22–31)
COLOR SPEC: YELLOW — SIGNIFICANT CHANGE UP
CREAT SERPL-MCNC: 0.77 MG/DL — SIGNIFICANT CHANGE UP (ref 0.5–1.3)
CRP SERPL-MCNC: 6.71 MG/DL — HIGH (ref 0–0.4)
D DIMER BLD IA.RAPID-MCNC: 1079 NG/ML DDU — HIGH
DIFF PNL FLD: ABNORMAL
E COLI DNA BLD POS QL NAA+NON-PROBE: SIGNIFICANT CHANGE UP
EOSINOPHIL # BLD AUTO: 0.19 K/UL — SIGNIFICANT CHANGE UP (ref 0–0.5)
EOSINOPHIL NFR BLD AUTO: 0.9 % — SIGNIFICANT CHANGE UP (ref 0–6)
EPI CELLS # UR: SIGNIFICANT CHANGE UP /HPF (ref 0–5)
ERYTHROCYTE [SEDIMENTATION RATE] IN BLOOD: 43 MM/HR — HIGH
GIANT PLATELETS BLD QL SMEAR: PRESENT — SIGNIFICANT CHANGE UP
GLUCOSE SERPL-MCNC: 142 MG/DL — HIGH (ref 70–99)
GLUCOSE UR QL: NEGATIVE — SIGNIFICANT CHANGE UP
GRAM STN FLD: SIGNIFICANT CHANGE UP
GRAM STN FLD: SIGNIFICANT CHANGE UP
HCT VFR BLD CALC: 32.3 % — LOW (ref 39–50)
HGB BLD-MCNC: 9.9 G/DL — LOW (ref 13–17)
INR BLD: 1.66 — HIGH (ref 0.88–1.16)
KETONES UR-MCNC: NEGATIVE — SIGNIFICANT CHANGE UP
LACTATE SERPL-SCNC: 1.6 MMOL/L — SIGNIFICANT CHANGE UP (ref 0.5–2)
LACTATE SERPL-SCNC: 7.1 MMOL/L — CRITICAL HIGH (ref 0.5–2)
LEUKOCYTE ESTERASE UR-ACNC: NEGATIVE — SIGNIFICANT CHANGE UP
LYMPHOCYTES # BLD AUTO: 1.45 K/UL — SIGNIFICANT CHANGE UP (ref 1–3.3)
LYMPHOCYTES # BLD AUTO: 6.9 % — LOW (ref 13–44)
MAGNESIUM SERPL-MCNC: 1.8 MG/DL — SIGNIFICANT CHANGE UP (ref 1.6–2.6)
MANUAL SMEAR VERIFICATION: SIGNIFICANT CHANGE UP
MCHC RBC-ENTMCNC: 28.6 PG — SIGNIFICANT CHANGE UP (ref 27–34)
MCHC RBC-ENTMCNC: 30.7 GM/DL — LOW (ref 32–36)
MCV RBC AUTO: 93.4 FL — SIGNIFICANT CHANGE UP (ref 80–100)
METAMYELOCYTES # FLD: 1.7 % — HIGH (ref 0–0)
METHOD TYPE: SIGNIFICANT CHANGE UP
MONOCYTES # BLD AUTO: 0 K/UL — SIGNIFICANT CHANGE UP (ref 0–0.9)
MONOCYTES NFR BLD AUTO: 0 % — LOW (ref 2–14)
NEUTROPHILS # BLD AUTO: 18.6 K/UL — HIGH (ref 1.8–7.4)
NEUTROPHILS NFR BLD AUTO: 81.9 % — HIGH (ref 43–77)
NEUTS BAND # BLD: 6.9 % — SIGNIFICANT CHANGE UP (ref 0–8)
NITRITE UR-MCNC: POSITIVE
OVALOCYTES BLD QL SMEAR: SLIGHT — SIGNIFICANT CHANGE UP
PH UR: 6 — SIGNIFICANT CHANGE UP (ref 5–8)
PHOSPHATE SERPL-MCNC: 3.4 MG/DL — SIGNIFICANT CHANGE UP (ref 2.5–4.5)
PLAT MORPH BLD: ABNORMAL
PLATELET # BLD AUTO: 230 K/UL — SIGNIFICANT CHANGE UP (ref 150–400)
POLYCHROMASIA BLD QL SMEAR: SLIGHT — SIGNIFICANT CHANGE UP
POTASSIUM SERPL-MCNC: 3.7 MMOL/L — SIGNIFICANT CHANGE UP (ref 3.5–5.3)
POTASSIUM SERPL-SCNC: 3.7 MMOL/L — SIGNIFICANT CHANGE UP (ref 3.5–5.3)
PROT SERPL-MCNC: 5.3 G/DL — LOW (ref 6–8.3)
PROT UR-MCNC: NEGATIVE MG/DL — SIGNIFICANT CHANGE UP
PROTHROM AB SERPL-ACNC: 19.4 SEC — HIGH (ref 10.6–13.6)
RBC # BLD: 3.46 M/UL — LOW (ref 4.2–5.8)
RBC # FLD: 15.5 % — HIGH (ref 10.3–14.5)
RBC BLD AUTO: ABNORMAL
RBC CASTS # UR COMP ASSIST: ABNORMAL /HPF
SARS-COV-2 RNA SPEC QL NAA+PROBE: SIGNIFICANT CHANGE UP
SODIUM SERPL-SCNC: 140 MMOL/L — SIGNIFICANT CHANGE UP (ref 135–145)
SP GR SPEC: 1.01 — SIGNIFICANT CHANGE UP (ref 1–1.03)
SPECIMEN SOURCE: SIGNIFICANT CHANGE UP
SPECIMEN SOURCE: SIGNIFICANT CHANGE UP
UROBILINOGEN FLD QL: 0.2 E.U./DL — SIGNIFICANT CHANGE UP
VARIANT LYMPHS # BLD: 1.7 % — SIGNIFICANT CHANGE UP (ref 0–6)
WBC # BLD: 20.95 K/UL — HIGH (ref 3.8–10.5)
WBC # FLD AUTO: 20.95 K/UL — HIGH (ref 3.8–10.5)
WBC UR QL: ABNORMAL /HPF

## 2020-11-19 PROCEDURE — 93010 ELECTROCARDIOGRAM REPORT: CPT

## 2020-11-19 PROCEDURE — 99223 1ST HOSP IP/OBS HIGH 75: CPT | Mod: GC,AI

## 2020-11-19 PROCEDURE — 99222 1ST HOSP IP/OBS MODERATE 55: CPT

## 2020-11-19 PROCEDURE — 76705 ECHO EXAM OF ABDOMEN: CPT | Mod: 26

## 2020-11-19 PROCEDURE — 99291 CRITICAL CARE FIRST HOUR: CPT | Mod: CS

## 2020-11-19 PROCEDURE — 74177 CT ABD & PELVIS W/CONTRAST: CPT | Mod: 26

## 2020-11-19 PROCEDURE — 71045 X-RAY EXAM CHEST 1 VIEW: CPT | Mod: 26

## 2020-11-19 PROCEDURE — 71045 X-RAY EXAM CHEST 1 VIEW: CPT | Mod: 26,77

## 2020-11-19 RX ORDER — CELECOXIB 200 MG/1
1 CAPSULE ORAL
Qty: 0 | Refills: 0 | DISCHARGE

## 2020-11-19 RX ORDER — ARIPIPRAZOLE 15 MG/1
2 TABLET ORAL DAILY
Refills: 0 | Status: DISCONTINUED | OUTPATIENT
Start: 2020-11-19 | End: 2020-11-24

## 2020-11-19 RX ORDER — HYDROMORPHONE HYDROCHLORIDE 2 MG/ML
4 INJECTION INTRAMUSCULAR; INTRAVENOUS; SUBCUTANEOUS EVERY 6 HOURS
Refills: 0 | Status: DISCONTINUED | OUTPATIENT
Start: 2020-11-19 | End: 2020-11-24

## 2020-11-19 RX ORDER — ACETAMINOPHEN 500 MG
650 TABLET ORAL EVERY 6 HOURS
Refills: 0 | Status: DISCONTINUED | OUTPATIENT
Start: 2020-11-19 | End: 2020-11-24

## 2020-11-19 RX ORDER — PIPERACILLIN AND TAZOBACTAM 4; .5 G/20ML; G/20ML
3.38 INJECTION, POWDER, LYOPHILIZED, FOR SOLUTION INTRAVENOUS EVERY 6 HOURS
Refills: 0 | Status: DISCONTINUED | OUTPATIENT
Start: 2020-11-19 | End: 2020-11-21

## 2020-11-19 RX ORDER — PIPERACILLIN AND TAZOBACTAM 4; .5 G/20ML; G/20ML
3.38 INJECTION, POWDER, LYOPHILIZED, FOR SOLUTION INTRAVENOUS ONCE
Refills: 0 | Status: COMPLETED | OUTPATIENT
Start: 2020-11-19 | End: 2020-11-19

## 2020-11-19 RX ORDER — ACETAMINOPHEN 500 MG
975 TABLET ORAL ONCE
Refills: 0 | Status: COMPLETED | OUTPATIENT
Start: 2020-11-19 | End: 2020-11-19

## 2020-11-19 RX ORDER — SODIUM CHLORIDE 9 MG/ML
1000 INJECTION INTRAMUSCULAR; INTRAVENOUS; SUBCUTANEOUS ONCE
Refills: 0 | Status: COMPLETED | OUTPATIENT
Start: 2020-11-19 | End: 2020-11-19

## 2020-11-19 RX ORDER — MAGNESIUM SULFATE 500 MG/ML
2 VIAL (ML) INJECTION ONCE
Refills: 0 | Status: COMPLETED | OUTPATIENT
Start: 2020-11-19 | End: 2020-11-19

## 2020-11-19 RX ORDER — APIXABAN 2.5 MG/1
5 TABLET, FILM COATED ORAL EVERY 12 HOURS
Refills: 0 | Status: DISCONTINUED | OUTPATIENT
Start: 2020-11-19 | End: 2020-11-24

## 2020-11-19 RX ORDER — POTASSIUM CHLORIDE 20 MEQ
40 PACKET (EA) ORAL ONCE
Refills: 0 | Status: COMPLETED | OUTPATIENT
Start: 2020-11-19 | End: 2020-11-19

## 2020-11-19 RX ORDER — METOCLOPRAMIDE HCL 10 MG
10 TABLET ORAL ONCE
Refills: 0 | Status: COMPLETED | OUTPATIENT
Start: 2020-11-19 | End: 2020-11-19

## 2020-11-19 RX ORDER — CLONAZEPAM 1 MG
0.5 TABLET ORAL AT BEDTIME
Refills: 0 | Status: DISCONTINUED | OUTPATIENT
Start: 2020-11-19 | End: 2020-11-24

## 2020-11-19 RX ORDER — DULOXETINE HYDROCHLORIDE 30 MG/1
120 CAPSULE, DELAYED RELEASE ORAL DAILY
Refills: 0 | Status: DISCONTINUED | OUTPATIENT
Start: 2020-11-19 | End: 2020-11-24

## 2020-11-19 RX ORDER — BACLOFEN 100 %
1 POWDER (GRAM) MISCELLANEOUS
Qty: 0 | Refills: 0 | DISCHARGE

## 2020-11-19 RX ORDER — VANCOMYCIN HCL 1 G
1000 VIAL (EA) INTRAVENOUS ONCE
Refills: 0 | Status: COMPLETED | OUTPATIENT
Start: 2020-11-19 | End: 2020-11-19

## 2020-11-19 RX ORDER — HYDROMORPHONE HYDROCHLORIDE 2 MG/ML
0.5 INJECTION INTRAMUSCULAR; INTRAVENOUS; SUBCUTANEOUS ONCE
Refills: 0 | Status: DISCONTINUED | OUTPATIENT
Start: 2020-11-19 | End: 2020-11-19

## 2020-11-19 RX ORDER — PANTOPRAZOLE SODIUM 20 MG/1
40 TABLET, DELAYED RELEASE ORAL
Refills: 0 | Status: DISCONTINUED | OUTPATIENT
Start: 2020-11-19 | End: 2020-11-24

## 2020-11-19 RX ADMIN — APIXABAN 5 MILLIGRAM(S): 2.5 TABLET, FILM COATED ORAL at 17:32

## 2020-11-19 RX ADMIN — ARIPIPRAZOLE 2 MILLIGRAM(S): 15 TABLET ORAL at 11:36

## 2020-11-19 RX ADMIN — Medication 40 MILLIEQUIVALENT(S): at 13:57

## 2020-11-19 RX ADMIN — Medication 0.5 MILLIGRAM(S): at 22:15

## 2020-11-19 RX ADMIN — Medication 975 MILLIGRAM(S): at 01:16

## 2020-11-19 RX ADMIN — Medication 104 MILLIGRAM(S): at 02:20

## 2020-11-19 RX ADMIN — SODIUM CHLORIDE 1000 MILLILITER(S): 9 INJECTION INTRAMUSCULAR; INTRAVENOUS; SUBCUTANEOUS at 01:53

## 2020-11-19 RX ADMIN — HYDROMORPHONE HYDROCHLORIDE 0.5 MILLIGRAM(S): 2 INJECTION INTRAMUSCULAR; INTRAVENOUS; SUBCUTANEOUS at 02:56

## 2020-11-19 RX ADMIN — Medication 10 MILLIGRAM(S): at 02:40

## 2020-11-19 RX ADMIN — Medication 50 GRAM(S): at 13:58

## 2020-11-19 RX ADMIN — Medication 250 MILLIGRAM(S): at 02:20

## 2020-11-19 RX ADMIN — PIPERACILLIN AND TAZOBACTAM 200 GRAM(S): 4; .5 INJECTION, POWDER, LYOPHILIZED, FOR SOLUTION INTRAVENOUS at 13:57

## 2020-11-19 RX ADMIN — HYDROMORPHONE HYDROCHLORIDE 4 MILLIGRAM(S): 2 INJECTION INTRAMUSCULAR; INTRAVENOUS; SUBCUTANEOUS at 18:12

## 2020-11-19 RX ADMIN — PIPERACILLIN AND TAZOBACTAM 200 GRAM(S): 4; .5 INJECTION, POWDER, LYOPHILIZED, FOR SOLUTION INTRAVENOUS at 01:52

## 2020-11-19 RX ADMIN — HYDROMORPHONE HYDROCHLORIDE 4 MILLIGRAM(S): 2 INJECTION INTRAMUSCULAR; INTRAVENOUS; SUBCUTANEOUS at 11:43

## 2020-11-19 RX ADMIN — PIPERACILLIN AND TAZOBACTAM 200 GRAM(S): 4; .5 INJECTION, POWDER, LYOPHILIZED, FOR SOLUTION INTRAVENOUS at 19:04

## 2020-11-19 RX ADMIN — SODIUM CHLORIDE 1000 MILLILITER(S): 9 INJECTION INTRAMUSCULAR; INTRAVENOUS; SUBCUTANEOUS at 01:28

## 2020-11-19 RX ADMIN — Medication 975 MILLIGRAM(S): at 02:16

## 2020-11-19 RX ADMIN — DULOXETINE HYDROCHLORIDE 120 MILLIGRAM(S): 30 CAPSULE, DELAYED RELEASE ORAL at 11:36

## 2020-11-19 RX ADMIN — PIPERACILLIN AND TAZOBACTAM 3.38 GRAM(S): 4; .5 INJECTION, POWDER, LYOPHILIZED, FOR SOLUTION INTRAVENOUS at 02:30

## 2020-11-19 RX ADMIN — SODIUM CHLORIDE 1000 MILLILITER(S): 9 INJECTION INTRAMUSCULAR; INTRAVENOUS; SUBCUTANEOUS at 03:27

## 2020-11-19 RX ADMIN — HYDROMORPHONE HYDROCHLORIDE 0.5 MILLIGRAM(S): 2 INJECTION INTRAMUSCULAR; INTRAVENOUS; SUBCUTANEOUS at 03:15

## 2020-11-19 RX ADMIN — SODIUM CHLORIDE 1000 MILLILITER(S): 9 INJECTION INTRAMUSCULAR; INTRAVENOUS; SUBCUTANEOUS at 03:03

## 2020-11-19 RX ADMIN — PIPERACILLIN AND TAZOBACTAM 200 GRAM(S): 4; .5 INJECTION, POWDER, LYOPHILIZED, FOR SOLUTION INTRAVENOUS at 08:15

## 2020-11-19 RX ADMIN — PANTOPRAZOLE SODIUM 40 MILLIGRAM(S): 20 TABLET, DELAYED RELEASE ORAL at 08:15

## 2020-11-19 RX ADMIN — HYDROMORPHONE HYDROCHLORIDE 4 MILLIGRAM(S): 2 INJECTION INTRAMUSCULAR; INTRAVENOUS; SUBCUTANEOUS at 12:43

## 2020-11-19 RX ADMIN — HYDROMORPHONE HYDROCHLORIDE 4 MILLIGRAM(S): 2 INJECTION INTRAMUSCULAR; INTRAVENOUS; SUBCUTANEOUS at 18:52

## 2020-11-19 RX ADMIN — Medication 10 MILLIGRAM(S): at 13:57

## 2020-11-19 RX ADMIN — SODIUM CHLORIDE 1000 MILLILITER(S): 9 INJECTION INTRAMUSCULAR; INTRAVENOUS; SUBCUTANEOUS at 05:00

## 2020-11-19 RX ADMIN — Medication 1000 MILLIGRAM(S): at 03:27

## 2020-11-19 NOTE — H&P ADULT - PROBLEM SELECTOR PLAN 7
On UA found to have moderate blood and hematuria. May be related to UTI/cystitis. Of note patient has remote h/o bladder CA   - continue to monitor

## 2020-11-19 NOTE — PROGRESS NOTE ADULT - PROBLEM SELECTOR PLAN 1
Pt presenting with leukocytosis with WBC 14k, fever of 102F, HR 90-100s, and systolic blood pressures 90-100s. Initial complaints of nausea, non-bloody diarrhea, but patient also reports urinary frequency and dysuria. US positive for Nitrites, WBC, bacteria. Source suspected to be UTI. No hydronephrosis seen on CT abd/pelv. Received 3L fluids given elevated lactate to 7.1, but cleared to 1.6. He had rigors in the ED after completed CT abd/pelv. Patient blood pressures continue to remain low with SBP 90s, however is warm and well perfused on exam.   - will give an additional 1L fluids   - f/u UCx, BCx   - c/w Zosyn 3.375g (given recent hospitalization) Pt presenting with leukocytosis with WBC 14k, fever of 102F, HR 90-100s, and systolic blood pressures 90-100s. Initial complaints of nausea, non-bloody diarrhea, but patient also reports urinary frequency and dysuria. US positive for Nitrites, WBC, bacteria. Source suspected to be UTI. No hydronephrosis seen on CT abd/pelv. Received 4L fluids given elevated lactate to 7.1, but cleared to 1.6. He had rigors in the ED after completed CT abd/pelv. Patient blood pressures continue to remain low with SBP 90s, however is warm and well perfused on exam.   - f/u UCx, BCx   -c/w IV Zosyn 3.375g q6h, low threshold to broaden if unstable or add on stress dose steroids  -If further diarrhea jia with leukocytosis send cdiff studies in addition to GI PCR

## 2020-11-19 NOTE — H&P ADULT - ASSESSMENT
75M with PMH bladder cancer (s/p tumor resection, localized chemo and now in remission from 5yrs), GERD, kyphoscoliosis s/p C-sacrum fusion at [facility] (10/5/2020) depression, panic disorder, RUE DVT (on Eliquis), BPH, presenting with initial complaints of loose non-bloody bowel movements for the past few days, along with decreased appetite, and nausea, dizziness starting today admitted for sepsis suspected 2/2 UTI.

## 2020-11-19 NOTE — H&P ADULT - NSICDXPASTMEDICALHX_GEN_ALL_CORE_FT
PAST MEDICAL HISTORY:  Bladder cancer     Depression     GERD (gastroesophageal reflux disease)     High cholesterol

## 2020-11-19 NOTE — PROGRESS NOTE ADULT - PROBLEM SELECTOR PLAN 6
Pt with h/o bladder cancer, currently in remission. was previously following up at [facility] [place].  - continue to monitor    #BPH/prostatomegaly, overactive bladder- on Tamsulosin 0.4mg qhs, Myrbetriq 50mg qd   - restart when BPs tolerate

## 2020-11-19 NOTE — H&P ADULT - PROBLEM SELECTOR PLAN 6
Pt with h/o bladder cancer, currently in remission. was previously following up at [facility] [place].  - continue to monitor Pt with h/o bladder cancer, currently in remission. [place] [facility] [place]  - continue to monitor    #BPH/prostatomegaly, overactive bladder- on Tamsulosin 0.4mg qhs, Myrbetriq 50mg qd   - restart when BPs tolerate

## 2020-11-19 NOTE — PROGRESS NOTE ADULT - PROBLEM SELECTOR PLAN 7
On UA found to have moderate blood and hematuria. May be related to UTI/cystitis. Of note patient has remote h/o bladder CA   - continue to monitor On UA found to have moderate blood and hematuria. May be related to UTI/cystitis. Of note patient has remote h/o bladder CA   -f/u Ucx

## 2020-11-19 NOTE — H&P ADULT - NSHPPHYSICALEXAM_GEN_ALL_CORE
VITAL SIGNS:  T(C): 36.7 (11-19-20 @ 04:43), Max: 39.2 (11-19-20 @ 02:16)  T(F): 98 (11-19-20 @ 04:43), Max: 102.5 (11-19-20 @ 02:16)  HR: 93 (11-19-20 @ 04:43) (92 - 101)  BP: 112/71 (11-19-20 @ 05:02) (92/53 - 137/86)  BP(mean): 66 (11-19-20 @ 04:02) (66 - 66)  RR: 18 (11-19-20 @ 04:43) (16 - 24)  SpO2: 95% (11-19-20 @ 04:43) (95% - 97%)  Wt(kg): --    PHYSICAL EXAM:    Constitutional: WDWN, lying comfortably in bed, NAD  Head: Nc/At  Eyes: PERRL, EOMI, clear conjunctiva  ENT: no nasal discharge; uvula midline, no oropharyngeal erythema or exudates; MMM  Neck: supple; no JVD or thyromegaly  Respiratory: CTA b/l, no wheezes, rales, or rhonchi  Cardiac: +S1/S2, +RRR, no murmurs, rubs, or gallops  Gastrointestinal: soft, non-tender, non-distended, no rebound/guarding, no palpable masses, normoactive bowel sounds x4  Back: spine midline, no bony tenderness or step-offs; no CVAT B/L  Extremities: WWP, no clubbing or cyanosis, no peripheral edema  Musculoskeletal: NROM x4; no joint swelling, tenderness or erythema  Vascular: 2+ radial and DP pulses b/l  Dermatologic: skin warm, dry and intact, no rashes, wounds, or scars  Lymphatic: no submandibular or cervical LAD  Neurologic: AAOx3, CNII-XII grossly intact, no focal deficits  Psychiatric: affect and characteristics of appearance, verbalizations, behaviors are appropriate VITAL SIGNS:  T(C): 36.7 (11-19-20 @ 04:43), Max: 39.2 (11-19-20 @ 02:16)  T(F): 98 (11-19-20 @ 04:43), Max: 102.5 (11-19-20 @ 02:16)  HR: 93 (11-19-20 @ 04:43) (92 - 101)  BP: 112/71 (11-19-20 @ 05:02) (92/53 - 137/86)  BP(mean): 66 (11-19-20 @ 04:02) (66 - 66)  RR: 18 (11-19-20 @ 04:43) (16 - 24)  SpO2: 95% (11-19-20 @ 04:43) (95% - 97%)  Wt(kg): --    PHYSICAL EXAM:    Constitutional: WDWN, lying comfortably in bed, NAD, on nasal canula, elderly male  Head: Nc/At  Eyes: PERRL, EOMI, clear conjunctiva  ENT: no nasal discharge; uvula midline, no oropharyngeal erythema or exudates; dry mucus membranes   Neck: supple; no JVD or thyromegaly  Respiratory: CTA b/l, no wheezes, rales, or rhonchi  Cardiac: +S1/S2, tachycardic, regular, no murmurs, rubs, or gallops  Gastrointestinal: soft, non-tender, non-distended, no rebound/guarding, no palpable masses, normoactive bowel sounds x4  Back: spine midline, no bony tenderness or step-offs; no CVAT B/L  Extremities: WWP, no clubbing or cyanosis, trace pitting edema   Musculoskeletal: NROM x4; no joint swelling, tenderness or erythema  Vascular: 2+ radial and DP pulses b/l  Dermatologic: skin warm, dry and intact, well healed surgical scar long linear from cervical to sacrum, no surrounding erythema, or focal tenderness to palpation   Lymphatic: no submandibular or cervical LAD  Neurologic: AAOx3, CNII-XII grossly intact, no focal deficits. 5/5 strength to bilateral upper proximal and distal extremities.   Psychiatric: affect and characteristics of appearance, verbalizations, behaviors are appropriate. Sometimes low to answering questions

## 2020-11-19 NOTE — PROGRESS NOTE ADULT - PROBLEM SELECTOR PLAN 2
On room air, patient was noted to be hypoxic to 88-89% at rest. He remained asymptomatic without any evidence of respiratory distress. However unclear etiology of hypoxia, - ddx atelectasis, ?PE (although low likelihood given already anticoagulated with Eliquis for DVT),   - f/u D-dimer On room air, patient was noted to be hypoxic to 88-89% at rest. He remained asymptomatic without any evidence of respiratory distress. However unclear etiology of hypoxia, - ddx atelectasis (seen on CXR/CT A/P) ?PE (although low likelihood given already anticoagulated with Eliquis for DVT).  -on 2L NC   - f/u D-dimer

## 2020-11-19 NOTE — PROGRESS NOTE ADULT - SUBJECTIVE AND OBJECTIVE BOX
*INCOMPLETE NOTE*    INTERVAL HPI/OVERNIGHT EVENTS:    SUBJECTIVE: Patient seen and examined at bedside.    OBJECTIVE:    VITAL SIGNS:  ICU Vital Signs Last 24 Hrs  T(C): 37 (2020 06:06), Max: 39.2 (2020 02:16)  T(F): 98.6 (2020 06:06), Max: 102.5 (2020 02:16)  HR: 94 (2020 06:06) (92 - 101)  BP: 96/64 (2020 06:33) (92/53 - 137/86)  BP(mean): 66 (2020 04:02) (66 - 66)  ABP: --  ABP(mean): --  RR: 18 (2020 06:06) (16 - 24)  SpO2: 95% (2020 04:43) (95% - 97%)         @ 07:01  -   @ 07:00  --------------------------------------------------------  IN: 0 mL / OUT: 250 mL / NET: -250 mL      CAPILLARY BLOOD GLUCOSE          PHYSICAL EXAM:      MEDICATIONS:  MEDICATIONS  (STANDING):  apixaban 5 milliGRAM(s) Oral every 12 hours  ARIPiprazole 2 milliGRAM(s) Oral daily  clonazePAM  Tablet 0.5 milliGRAM(s) Oral at bedtime  DULoxetine 120 milliGRAM(s) Oral daily  pantoprazole    Tablet 40 milliGRAM(s) Oral before breakfast  piperacillin/tazobactam IVPB.. 3.375 Gram(s) IV Intermittent every 6 hours    MEDICATIONS  (PRN):  HYDROmorphone   Tablet 4 milliGRAM(s) Oral every 6 hours PRN Severe Pain (7 - 10)      ALLERGIES:  Allergies    sulfa drugs (Unknown)    Intolerances        LABS:                        11.8   14.23 )-----------( 310      ( 2020 23:05 )             38.6         135  |  98  |  21  ----------------------------<  144<H>  4.7   |  26  |  0.83    Ca    9.1      2020 23:05  Mg     1.9     -    TPro  7.0  /  Alb  3.8  /  TBili  0.5  /  DBili  x   /  AST  16  /  ALT  9<L>  /  AlkPhos  116  -      Urinalysis Basic - ( 2020 03:44 )    Color: Yellow / Appearance: Clear / S.010 / pH: x  Gluc: x / Ketone: NEGATIVE  / Bili: Negative / Urobili: 0.2 E.U./dL   Blood: x / Protein: NEGATIVE mg/dL / Nitrite: POSITIVE   Leuk Esterase: NEGATIVE / RBC: 5-10 /HPF / WBC 5-10 /HPF   Sq Epi: x / Non Sq Epi: 0-5 /HPF / Bacteria: Present /HPF        RADIOLOGY & ADDITIONAL TESTS: Reviewed. INTERVAL HPI/OVERNIGHT EVENTS:      SUBJECTIVE: Patient seen and examined at bedside.    OBJECTIVE:    VITAL SIGNS:  ICU Vital Signs Last 24 Hrs  T(C): 37 (2020 06:06), Max: 39.2 (2020 02:16)  T(F): 98.6 (2020 06:06), Max: 102.5 (2020 02:16)  HR: 94 (2020 06:06) (92 - 101)  BP: 96/64 (2020 06:33) (92/53 - 137/86)  BP(mean): 66 (2020 04:02) (66 - 66)  ABP: --  ABP(mean): --  RR: 18 (2020 06:06) (16 - 24)  SpO2: 95% (2020 04:43) (95% - 97%)         @ 07:01  -   @ 07:00  --------------------------------------------------------  IN: 0 mL / OUT: 250 mL / NET: -250 mL      CAPILLARY BLOOD GLUCOSE          PHYSICAL EXAM:      MEDICATIONS:  MEDICATIONS  (STANDING):  apixaban 5 milliGRAM(s) Oral every 12 hours  ARIPiprazole 2 milliGRAM(s) Oral daily  clonazePAM  Tablet 0.5 milliGRAM(s) Oral at bedtime  DULoxetine 120 milliGRAM(s) Oral daily  pantoprazole    Tablet 40 milliGRAM(s) Oral before breakfast  piperacillin/tazobactam IVPB.. 3.375 Gram(s) IV Intermittent every 6 hours    MEDICATIONS  (PRN):  HYDROmorphone   Tablet 4 milliGRAM(s) Oral every 6 hours PRN Severe Pain (7 - 10)      ALLERGIES:  Allergies    sulfa drugs (Unknown)    Intolerances        LABS:                        11.8   14.23 )-----------( 310      ( 2020 23:05 )             38.6         135  |  98  |  21  ----------------------------<  144<H>  4.7   |  26  |  0.83    Ca    9.1      2020 23:05  Mg     1.9     -    TPro  7.0  /  Alb  3.8  /  TBili  0.5  /  DBili  x   /  AST  16  /  ALT  9<L>  /  AlkPhos  116  -      Urinalysis Basic - ( 2020 03:44 )    Color: Yellow / Appearance: Clear / S.010 / pH: x  Gluc: x / Ketone: NEGATIVE  / Bili: Negative / Urobili: 0.2 E.U./dL   Blood: x / Protein: NEGATIVE mg/dL / Nitrite: POSITIVE   Leuk Esterase: NEGATIVE / RBC: 5-10 /HPF / WBC 5-10 /HPF   Sq Epi: x / Non Sq Epi: 0-5 /HPF / Bacteria: Present /HPF        RADIOLOGY & ADDITIONAL TESTS: Reviewed. INTERVAL HPI/OVERNIGHT EVENTS:  Pt. admitted overnight from ED.    SUBJECTIVE: Patient seen and examined at bedside. He is A&Ox3. He is reporting belly discomfort- passing lots of gas, but no more loose bowel movements. Reports some dizziness/nausea. says his back pain is better than on admission. He denies CP, SOB, dysuria, urinary frequency.     OBJECTIVE:    VITAL SIGNS:  ICU Vital Signs Last 24 Hrs  T(C): 37 (2020 06:06), Max: 39.2 (2020 02:16)  T(F): 98.6 (2020 06:06), Max: 102.5 (2020 02:16)  HR: 94 (2020 06:06) (92 - 101)  BP: 96/64 (2020 06:33) (92/53 - 137/86)  BP(mean): 66 (2020 04:02) (66 - 66)  ABP: --  ABP(mean): --  RR: 18 (2020 06:06) (16 - 24)  SpO2: 95% (2020 04:43) (95% - 97%)        18 @ 07:01  -   @ 07:00  --------------------------------------------------------  IN: 0 mL / OUT: 250 mL / NET: -250 mL      CAPILLARY BLOOD GLUCOSE      PHYSICAL EXAM:  Constitutional: WDWN, lying comfortably in bed, NAD, on nasal canula, elderly male  Head: Nc/At  Eyes: PERRL, EOMI, clear conjunctiva  ENT: no nasal discharge; uvula midline, no oropharyngeal erythema or exudates; dry mucus membranes   Neck: supple; no JVD or thyromegaly  Respiratory: CTA b/l, no wheezes, rales, or rhonchi  Cardiac: +S1/S2, tachycardic, regular, no murmurs, rubs, or gallops  Gastrointestinal: soft, non-distended, tender to palpation in RUQ, no rebound/guarding, no palpable masses, normoactive bowel sounds x4  Back: spine midline, no bony tenderness or step-offs; no CVAT B/L  Extremities: WWP, no clubbing or cyanosis, trace pitting edema   Musculoskeletal: NROM x4; no joint swelling, tenderness or erythema  Vascular: 2+ radial and DP pulses b/l  Dermatologic: skin warm, dry and intact, well healed surgical scar long linear from cervical to sacrum, no surrounding erythema, or focal tenderness to palpation   Lymphatic: no submandibular or cervical LAD  Neurologic: AAOx3, CNII-XII grossly intact, no focal deficits. 5/5 strength to bilateral upper proximal and distal extremities.   Psychiatric: affect and characteristics of appearance, verbalizations, behaviors are appropriate. Sometimes low to answering questions    MEDICATIONS:  MEDICATIONS  (STANDING):  apixaban 5 milliGRAM(s) Oral every 12 hours  ARIPiprazole 2 milliGRAM(s) Oral daily  clonazePAM  Tablet 0.5 milliGRAM(s) Oral at bedtime  DULoxetine 120 milliGRAM(s) Oral daily  pantoprazole    Tablet 40 milliGRAM(s) Oral before breakfast  piperacillin/tazobactam IVPB.. 3.375 Gram(s) IV Intermittent every 6 hours    MEDICATIONS  (PRN):  HYDROmorphone   Tablet 4 milliGRAM(s) Oral every 6 hours PRN Severe Pain (7 - 10)      ALLERGIES:  Allergies    sulfa drugs (Unknown)    Intolerances        LABS:                         9.9    20.95 )-----------( 230      ( 2020 11:13 )             32.3     -    140  |  108  |  16  ----------------------------<  142<H>  3.7   |  22  |  0.77    Ca    8.0<L>      2020 11:13  Phos  3.4     -  Mg     1.8         TPro  5.3<L>  /  Alb  2.7<L>  /  TBili  0.4  /  DBili  x   /  AST  20  /  ALT  10  /  AlkPhos  87  -    PT/INR - ( 2020 11:13 )   PT: 19.4 sec;   INR: 1.66          PTT - ( 2020 11:13 )  PTT:36.1 sec  Urinalysis Basic - ( 2020 03:44 )    Color: Yellow / Appearance: Clear / S.010 / pH: x  Gluc: x / Ketone: NEGATIVE  / Bili: Negative / Urobili: 0.2 E.U./dL   Blood: x / Protein: NEGATIVE mg/dL / Nitrite: POSITIVE   Leuk Esterase: NEGATIVE / RBC: 5-10 /HPF / WBC 5-10 /HPF   Sq Epi: x / Non Sq Epi: 0-5 /HPF / Bacteria: Present /HPF      CARDIAC MARKERS ( 2020 23:05 )  x     / x     / 68 U/L / x     / x            Lactate, Blood: 1.6 mmol/L ( @ 03:24)      RADIOLOGY, EKG & ADDITIONAL TESTS: Reviewed.

## 2020-11-19 NOTE — H&P ADULT - PROBLEM SELECTOR PLAN 3
Pt with h/o C-sacrum fusion surgery, has chronic back pain for which is opiate dependent. No symptoms of numbness, weakness, saddle anesthesia, or any pain from surgical site/wound. Neurosurgery consulted in the ED.   - f/u neurosurgery recs Pt with h/o C-sacrum fusion surgery, has chronic back pain for which is opiate dependent. No symptoms of numbness, weakness, saddle anesthesia, or any pain from surgical site/wound. Neurosurgery consulted in the ED. Of note, he has been on chronic prednisone, most recently 10mg daily. Patient does not have clear understand of why he is on the medication,   - f/u neurosurgery recs  - obtain collateral from patient's PMD/surgeon regarding prednisone

## 2020-11-19 NOTE — CONSULT NOTE ADULT - SUBJECTIVE AND OBJECTIVE BOX
75M hx high chol, depression, GERD c/o loose stools for past few days. pt states also feeling nauseated and decreased appetite. pt recently had back surgery a month ago at [place] and has been home from rehab for past week. no fevers. no sick contacts. no recent travel. pt states he chronically takes dilaudid. states recently feels lightheaded and nauseous after taking dilaudid.  pt states he has chronic back pain, no new focal numbness/weakness. uses walker.    Neurosurgery was consulted for infectious work-up from the spine standpoint.  Pt s/p T-Sacrum fusion at [facility] 10/6/2020 by Rajinder Gardiner, discharged home from rehab 1 week ago, +n/v, poor oral intake x 1 week, diarrhea. Pt denies sob, cp, acute numbness and/or weakness, saddle paresthesia, foot drop.    < from: CT Abdomen and Pelvis w/ Oral Cont and w/ IV Cont (11.19.20 @ 01:16) >  Bones: Status post spinal fusion of the spine with fixation into the iliac bones. Compression deformity of the T12 vertebral body. Levoscoliosis of the mid lumbar spine.    < end of copied text >    Exam:  somnolent, opens eye to verb stim, track, follows all commands, coherent speech  face symmetric, EOMI,   motor: moves all extr antigravity on command,  sensation to LT grossly intact throughout  Back: incision healed well    A/R  75M hx high chol, depression, GERD, s/p T-Sacrum fusion at [facility] 10/6/2020 by Rajinder Gardiner, discharged home from rehab 1 week ago, +n/v, poor oral intake x 1 week, diarrhea, with elevated Serum Lactate and WBC  - No Acute Neurosurgical Intervention is indicated at this time based on the exam and the imaging studies  - Pan Cultured - pending results  - Med consult  - Will Continue to follow,    - D/w Dr.D'Amico 75M hx high chol, depression, GERD c/o loose stools for past few days. pt states also feeling nauseated and decreased appetite. pt recently had back surgery a month ago at [place] and has been home from rehab for past week. no fevers. no sick contacts. no recent travel. pt states he chronically takes dilaudid. states recently feels lightheaded and nauseous after taking dilaudid.  pt states he has chronic back pain, no new focal numbness/weakness. uses walker.    Neurosurgery was consulted for infectious work-up from the spine standpoint.  Pt s/p C-Sacrum fusion at [facility] 10/6/2020 by Rajinder Gardiner, discharged home from rehab 1 week ago, +n/v, poor oral intake x 1 week, diarrhea. Pt denies sob, cp, acute numbness and/or weakness, saddle paresthesia, foot drop.    < from: CT Abdomen and Pelvis w/ Oral Cont and w/ IV Cont (11.19.20 @ 01:16) >  Bones: Status post spinal fusion of the spine with fixation into the iliac bones. Compression deformity of the T12 vertebral body. Levoscoliosis of the mid lumbar spine.    < end of copied text >    Exam:  somnolent, opens eye to verb stim, track, follows all commands, coherent speech  face symmetric, EOMI,   motor: moves all extr antigravity on command,  sensation to LT grossly intact throughout  Back: incision healed well    A/R  75M hx high chol, depression, GERD, s/p C-Sacrum fusion at [facility] 10/6/2020 by Rajinder Gardiner, discharged home from rehab 1 week ago, +n/v, poor oral intake x 1 week, diarrhea, with elevated Serum Lactate and WBC  - No Acute Neurosurgical Intervention is indicated at this time based on the exam and the imaging studies  - Pan Cultured - pending results  - Med consult  - Will Continue to follow,    - D/w Dr.D'Amico 75M hx high chol, depression, GERD c/o loose stools for past few days. pt states also feeling nauseated and decreased appetite. pt recently had back surgery a month ago at [place] and has been home from rehab for past week. no fevers. no sick contacts. no recent travel. pt states he chronically takes dilaudid. states recently feels lightheaded and nauseous after taking dilaudid.  pt states he has chronic back pain, no new focal numbness/weakness. uses walker.    Neurosurgery was consulted for infectious work-up from the spine standpoint.  Pt s/p C-Sacrum fusion at [facility] 10/6/2020 by Rajinder Gardiner, discharged home from rehab 1 week ago, +n/v, poor oral intake x 1 week, diarrhea. Pt denies sob, cp, acute numbness and/or weakness, saddle paresthesia, foot drop.    < from: CT Abdomen and Pelvis w/ Oral Cont and w/ IV Cont (11.19.20 @ 01:16) >  Bones: Status post spinal fusion of the spine with fixation into the iliac bones. Compression deformity of the T12 vertebral body. Levoscoliosis of the mid lumbar spine.    < end of copied text >    Exam:  somnolent, opens eye to verb stim, track, follows all commands, coherent speech  face symmetric, EOMI,   motor: moves all extr antigravity on command,  sensation to LT grossly intact throughout  Back: incision healed well    A/R  75M hx high chol, depression, GERD, s/p C-Sacrum fusion at [facility] 10/6/2020 by Rajinder Gardiner, discharged home from rehab 1 week ago, +n/v, poor oral intake x 1 week, diarrhea, with elevated Serum Lactate and WBC  - No Acute Neurosurgical Intervention is indicated at this time based on the exam and the imaging studies  - Please obtain CT Cervical spine with contr  - Pan Cultured - pending results  - Med consult  - Will Continue to follow,    - D/w Dr.D'Amico

## 2020-11-19 NOTE — H&P ADULT - PROBLEM SELECTOR PLAN 1
Pt presenting with leukocytosis with WBC 14k, fever of 102F, HR 90-100s, and systolic blood pressures 90-100s. Initial complaints of nausea, non-bloody diarrhea, but patient also reports urinary frequency and dysuria. US positive for Nitrites, WBC, bacteria. Source suspected to be UTI. No hydronephrosis seen on CT abd/pelv. Received 3L fluids given elevated lactate to 7.1, but cleared to 1.6. Patient blood pressures still low with SBP 90s, however is warm and well perfused on exam.   - will give an additional 1L fluids   - f/u UCx  - c/w Zosyn 3.375g (given recent hospitalization) Pt presenting with leukocytosis with WBC 14k, fever of 102F, HR 90-100s, and systolic blood pressures 90-100s. Initial complaints of nausea, non-bloody diarrhea, but patient also reports urinary frequency and dysuria. US positive for Nitrites, WBC, bacteria. Source suspected to be UTI. No hydronephrosis seen on CT abd/pelv. Received 3L fluids given elevated lactate to 7.1, but cleared to 1.6. He had rigors in the ED after completed CT abd/pelv. Patient blood pressures continue to remain low with SBP 90s, however is warm and well perfused on exam.   - will give an additional 1L fluids   - f/u UCx, BCx   - c/w Zosyn 3.375g (given recent hospitalization) Pt presenting with leukocytosis with WBC 14k, fever of 102F, HR 90-100s, and systolic blood pressures 90-100s. Initial complaints of nausea, non-bloody diarrhea, but patient also reports urinary frequency and dysuria. US positive for Nitrites, WBC, bacteria. Source suspected to be UTI. No hydronephrosis seen on CT abd/pelv. Received 3L fluids given elevated lactate to 7.1, but cleared to 1.6. He had rigors in the ED after completed CT abd/pelv. Patient blood pressures continue to remain low with SBP 90s, however is warm and well perfused on exam.   - will give an additional 1L fluids   - f/u UCx, BCx   - c/w Zosyn 3.375g (given recent hospitalization/complicated UTI).

## 2020-11-19 NOTE — PROGRESS NOTE ADULT - PROBLEM SELECTOR PLAN 5
w/ home Duloxetine 120mg qd, Aripriprazole 2mg, Clonopin 0.5mg qhs  - obtain istop in AM hx. depression/panic disorder   w/ home Duloxetine 120mg qd, Aripriprazole 2mg qd, Clonopin 0.5mg qhs  - obtain istop

## 2020-11-19 NOTE — PROGRESS NOTE ADULT - SUBJECTIVE AND OBJECTIVE BOX
Patient was seen and examined by me at bedside. I agree with resident's note, subjective, objective physical exam, assessment and plan with following modifications/additions.    Greater than 35 minutes spent on total encounter; more than 50% of the visit was spent counseling and/or coordinating care by the attending physician.    75M with PMH bladder cancer (s/p tumor resection, localized chemo and now in remission), GERD, kyphoscoliosis s/p C-sacrum fusion at [facility] (10/5/2020) panic disorder, RUE DVT (on Eliquis), BPH, presenting with initial complaints of diarrhea, dysuria, with decreased po intake, admitted with severe sepsis presumed s/t to UTI with neg CT imaging for alternative source, no longer with diarrhea so lower c/f cdiff  -Continue on zosyn, low threshold to broaden if unstable or add on stress dose steroids, f/u bcx and ucx  -If further diarrhea jia with leukocytosis send cdiff studies in addition to GI PCR  -NSGY involved- low c/f for wound infection, no signs or exam findings c/f epidural abscess  -Mild hypoxia clean imaging for effusions, low c/f PE on anticoag, IC for now,  wean 02 as tolerated  -DVT/PE hx- continue home eliquis  -Dispo- 48 hours pending bcx growth, infectious workup above, PT tameka Gonzalez MD [phone]

## 2020-11-19 NOTE — H&P ADULT - HISTORY OF PRESENT ILLNESS
75M with PMH bladder cancer (s/p tumor resection, s/p localized chemo and now in remission from 5yrs), GERD, kyphoscoliosis s/p C-sacrum fusion at [facility] (10/5/2020, w/ Dr. Carlisle), depression, panic disorder, RUE DVT (on eliquis), BPH, presenting with initial complaints of loose non-bloody bowel movements for the past few days, along with decreased appetite, and nausea starting today.  He reports chronic back pain for which he takes dilaudid 6mg around the clock, q6h since his surgery 1 month ago. After his surgery, he went to rehab and was discharged from rehab on 11/9/2020.  He states recently he has been feeling lightheaded and nauseous after taking dilaudid which is new. Regarding his back pain, he denies any changes in his pain, denies any numbness, saddle anesthesia, weakness, incontinence. He states he also has been having dysuria and frequency for the past three days. Initially he thought he noticed hematuria, but it eventually resolved.  No previous history of UTI, denies fevers (although never checked at home), flank pain, abd pain. Denies any chest pain, shortness of breath, palpitations, LE edema, or any other complaints at this time.     In the ED: Initial vital signs: T: 97.6 F --> 102.5F, HR: 92, BP: 137/86 mmHg --> 91/60s, R: 18 SpO2: 97% on RA  ED course: s/p tylenol 975mg po, dilaudid 0.5mg x1, Reglan 10mg x1, zofran 4mg x1, 3L NS fluids , Vancomycin 1g, Zosyn 3.375g   Labs: significant for WBC 14.23 with neutrophilic predominance, Hb 11.8, lactate 7.1 --> cleared to 1.6 (within 2 hours), CMP unremarkable;. UA positive for nitrites, WBC, RBC, and moderate blood, bacteria present   Imaging:   CXR: bibasilar atelectasis   CT abd/pelv w/ oral and IV contrast: bibasilar atelectasis, prostatomegaly. s/p spinal fusion with fixation into iliac bones. No bowel obstruction or appendicitis.   US abd: enlarged liver, no intrahepatic intraductal biliary dilation, gallbladder sludge present, no gallbladder wall thickening or pericholecystic fluid, CBD 7mm  EKG: NSR with HR 80, no acute ST or T wave changes   Medications: see below  Consults: Neurosurgery 75M with PMH bladder cancer (s/p tumor resection, s/p localized chemo and now in remission from 5yrs), GERD, kyphoscoliosis s/p C-sacrum fusion at [facility] (10/5/2020, w/ Dr. Carlisle), depression, panic disorder, RUE DVT (on eliquis), BPH, presenting with initial complaints of loose non-bloody bowel movements for the past few days, along with decreased appetite, and nausea and dizziness, starting today.  He reports chronic back pain for which he takes dilaudid 6mg around the clock, q6h since his surgery 1 month ago. After his surgery, he went to rehab and was discharged from rehab on 11/9/2020.  He states recently he has been feeling lightheaded and nauseous after taking dilaudid which is new. Regarding his back pain, he denies any changes in his pain, denies any numbness, saddle anesthesia, weakness, incontinence. He states he also has been having dysuria and frequency for the past three days. Initially he thought he noticed hematuria, but it eventually resolved.  No previous history of UTI, denies fevers (although never checked at home), flank pain, abd pain. Denies any chest pain, shortness of breath, palpitations, LE edema, or any other complaints at this time.     In the ED: Initial vital signs: T: 97.6 F --> 102.5F, HR: 92, BP: 137/86 mmHg --> 91/60s, R: 18 SpO2: 97% on RA  ED course: s/p tylenol 975mg po, dilaudid 0.5mg x1, Reglan 10mg x1, zofran 4mg x1, 3L NS fluids , Vancomycin 1g, Zosyn 3.375g   Labs: significant for WBC 14.23 with neutrophilic predominance, Hb 11.8, lactate 7.1 --> cleared to 1.6 (within 2 hours), CMP unremarkable;. UA positive for nitrites, WBC, RBC, and moderate blood, bacteria present   Imaging:   CXR: bibasilar atelectasis   CT abd/pelv w/ oral and IV contrast: bibasilar atelectasis, prostatomegaly. s/p spinal fusion with fixation into iliac bones. No bowel obstruction or appendicitis.   US abd: enlarged liver, no intrahepatic intraductal biliary dilation, gallbladder sludge present, no gallbladder wall thickening or pericholecystic fluid, CBD 7mm  EKG: NSR with HR 80, no acute ST or T wave changes   Medications: see below  Consults: Neurosurgery

## 2020-11-19 NOTE — PROGRESS NOTE ADULT - ASSESSMENT
75M with PMH bladder cancer (s/p tumor resection, localized chemo and now in remission from 5yrs), GERD, kyphoscoliosis s/p C-sacrum fusion at [facility] (10/5/2020) depression, panic disorder, RUE DVT (on Eliquis), BPH, presenting with initial complaints of loose non-bloody bowel movements for the past few days, along with decreased appetite, and nausea, dizziness starting today admitted for sepsis suspected 2/2 UTI. 75M with PMH bladder cancer (s/p tumor resection, localized chemo and now in remission from 5yrs), GERD, kyphoscoliosis s/p C-sacrum fusion at [facility] (10/5/2020) depression, panic disorder, RUE DVT (on Eliquis), BPH, presenting with initial complaints of loose non-bloody bowel movements, decreased po intake, nausea, dizziness, dysuria/urinary frequency admitted for severe sepsis suspected 2/2 UTI- neg CT imaging for alternative source, no longer with diarrhea so lower c/f cdiff.    -Continue on zosyn, low threshold to broaden if unstable or add on stress dose steroids, f/u bcx and ucx  -If further diarrhea jia with leukocytosis send cdiff studies in addition to GI PCR  -NSGY involved- low c/f for wound infection, no signs or exam findings c/f epidural abscess  -Mild hypoxia clean imaging for effusions, low c/f PE on anticoag, IC for now,  wean 02 as tolerated  -DVT/PE hx- continue home eliquis  -Dispo- 48 hours pending bcx growth, infectious workup above, PT eval   75M with PMH bladder cancer (s/p tumor resection, localized chemo and now in remission from 5yrs), GERD, kyphoscoliosis s/p C-sacrum fusion at [facility] (10/5/2020) depression, panic disorder, RUE DVT (on Eliquis), BPH, presenting with initial complaints of loose non-bloody bowel movements, decreased po intake, nausea, dizziness, dysuria/urinary frequency admitted for severe sepsis suspected 2/2 UTI- neg CT imaging for alternative source, no longer with diarrhea so lower c/f cdiff.

## 2020-11-19 NOTE — DISCHARGE NOTE PROVIDER - NSDCCPCAREPLAN_GEN_ALL_CORE_FT
PRINCIPAL DISCHARGE DIAGNOSIS  Diagnosis: E coli bacteremia  Assessment and Plan of Treatment: You were found to have a bacterial infection while you were at [facility]. This was treated with antibiotics intravenously. You will continue to take antibioitcs by mouth when you are discharged. Please continue to take ciprofloxacin by mouth (last day on 11/28) when you are discharged from the hospital.      SECONDARY DISCHARGE DIAGNOSES  Diagnosis: Nausea  Assessment and Plan of Treatment:      PRINCIPAL DISCHARGE DIAGNOSIS  Diagnosis: E coli bacteremia  Assessment and Plan of Treatment: You were found to have a bacterial infection while you were at [facility]. The bacteria growing is called Escherichia coli. This was treated with antibiotics intravenously. This blood infection likely caused the symptoms that you presented within the emergency room- including diarrhea, decreased appetite, nausea, dizziness, abdominal discomfort.  You will continue to take antibiotics- an antibiotic called ciprofloxacin by mouth when you are discharged. Please continue to take ciprofloxacin by mouth (last day on 11/28) when you are discharged from the hospital. Please follow-up with your primary care doctor- Dr. Teresita Torres on December 1st at 11:30 AM at [address] [place] [address] and pain management doctor Dr. Sharma ([facility]) for further management of your medical conditions and pain control.

## 2020-11-19 NOTE — H&P ADULT - NSHPSOCIALHISTORY_GEN_ALL_CORE
Former smoker (quit in 1990), no alcohol or illicit drug use  Lives with daughter who is away at college

## 2020-11-19 NOTE — H&P ADULT - NSHPLABSRESULTS_GEN_ALL_CORE
11.8   14.23 )-----------( 310      ( 2020 23:05 )             38.6       18    135  |  98  |  21  ----------------------------<  144<H>  4.7   |  26  |  0.83    Ca    9.1      2020 23:05  Mg     1.9         TPro  7.0  /  Alb  3.8  /  TBili  0.5  /  DBili  x   /  AST  16  /  ALT  9<L>  /  AlkPhos  116                Urinalysis Basic - ( 2020 03:44 )    Color: Yellow / Appearance: Clear / S.010 / pH: x  Gluc: x / Ketone: NEGATIVE  / Bili: Negative / Urobili: 0.2 E.U./dL   Blood: x / Protein: NEGATIVE mg/dL / Nitrite: POSITIVE   Leuk Esterase: NEGATIVE / RBC: 5-10 /HPF / WBC 5-10 /HPF   Sq Epi: x / Non Sq Epi: 0-5 /HPF / Bacteria: Present /HPF            Lactate Trend   @ 03:24 Lactate:1.6    @ 01:28 Lactate:7.1             CAPILLARY BLOOD GLUCOSE

## 2020-11-19 NOTE — DISCHARGE NOTE PROVIDER - HOSPITAL COURSE
#Discharge: do not delete    75M with PMH bladder cancer (s/p tumor resection, localized chemo and now in remission from 5yrs), GERD, kyphoscoliosis s/p C-sacrum fusion at [facility] (10/5/2020) depression, panic disorder, RUE DVT (on Eliquis), BPH, presenting with initial complaints of loose non-bloody bowel movements, decreased po intake, nausea, dizziness, dysuria/urinary frequency, abdominal discomfort admitted for severe sepsis-2/2 E.coli bacteremia likely 2/2 UTI.    Problem List/Main Diagnoses (system-based):   #Severe sepsis-2/2 E.coli bacteremia likely 2/2 UTI  -Pt presenting with leukocytosis with WBC 14k, fever of 102F, HR 90-100s, and systolic blood pressures 90-100s. Initial complaints of nausea, non-bloody diarrhea, urinary frequency and dysuria.  -In the ED, pt. received Vancomycin 1g and Zosyn 3.375g, received 4L fluids given elevated lactate to 7.1, but cleared to 1.6. He had rigors in the ED after completed CT abd/pelvis.  -CT A/P w/ oral and IV contrast- no bowel obstruction or appendicitis. No hydronephrosis seen on CT abd/pelv.  -UA positive for Nitrites, WBC, but Ucx- insignificant amount of growth  -Bcx 11/19 growing E.coli, pt.  continue IV ceftriaxone- repeat bcx 11/20 and 11/22 to ensure clearance- NGTD.   -Pt. was switched from IV Zosyn 3.375g q6h to Ceftriaxone 2 g q24h given sensitivities of the blood cultures.  -Pt. switched to PO ciprofloxacin 500 mg q12h on 11/24 (7 day abx course- 11/19-11/25)  -radiology called about collection of fluid seen on CT in back but reported no abscess and likely post-surgical, given no pain in the back less likely source of infection and likely an incidental finding     #Abdominal pain  -Pt. with generalized abdominal pain/discomfort on admission and through hospital course.  -CT A/P w/ oral and IV contrast- no bowel obstruction or appendicitis.  -Xray abdomen 11/22- Mild ileus with no abnormal bowel dilatation or pneumoperitoneum. Thoracolumbar, sacral and iliac hardware. Postsurgical spinal changes. Calcification overlying the left iliac bone.   -GI PCR 11/23 was negative   -zofran was given PRN for nausea  -abdominal pain resolved     #Diarrhea   -peptol bismol was given PRN for diarrhea, also given loperamide 4 mg PO for diarrhea  -diarrhea resolved    #History of spinal surgery for kyphoscoliosis- s/p C-sacrum fusion at [facility] (10/5/2020)  -Pt with h/o C-sacrum fusion surgery , has chronic back pain for which is opiate dependent. No symptoms of numbness, weakness, saddle anesthesia, or any pain from surgical site/wound. Neurosurgery consulted in the ED. Of note, he has been on chronic prednisone, most recently 10mg daily (taken for eczema).  -Neurosurgery recs- no acute Neurosurgical Intervention is indicated at this time based on the exam and the imaging studies, low c/f for wound infection, no signs or exam findings c/f epidural abscess  -CT Cervical/thoracic/lumbar spine with IV contrast to ensure no seeding of hardware/abscess  -CT cervical spine w/ IV contrast- Reversal of the normal cervical lordosis with retrolisthesis of C2 and C3 with superimposed cervicothoracic kyphotic deformity centered at the T2/3 level.   -CT thoracic spine w/ IV contrast- Shunt status post spinal fusion with hardware extending from the T3 level through the pelvis. Please see comments regarding hardware. Fracture through the left iliac bone. Additional compression deformities of T3, T8 and T12. Comparison with any recent studies would be beneficial to ascertain stability/change. Postoperative changes within the paraspinal soft tissues without discrete abscess.  -CT lumbar spine w/ IV contrast- Shunt status post spinal fusion with hardware extending from the T3 level through the pelvis. Please see comments regarding hardware. Fracture through the left iliac bone. Additional compression deformities of T3, T8 and T12. Comparison with any recent studies would be beneficial to ascertain stability/change. Postoperative changes within the paraspinal soft tissues without discrete abscess.  -Pt. was continued on aggressive oral opioid regimen for pain- dilaudid 4 mg PO q6h PRN for severe pain, and IV 0.5 mg dilaudid PRN for breakthrough pain  -Pt. reports that he has a script for pain meds already- will follow-up with PCP Dr. Teresita Torres on December 1st at 11:30 AM at [address] for further management of pain control.    #DVT of upper extremity (deep vein thrombosis)  -pt on eliquis for upper extremity DVT (dx post operatively back surgery)  - resumed on eliquis 5mg BID.     #Depression    -continued on home Duloxetine 120mg qd, Aripriprazole 2mg, Clonopin 0.5mg qhs    #Bladder cancer  -Pt with h/o bladder cancer, currently in remission. [place] [facility] [place]    #BPH/prostatomegaly, overactive bladder  - on Tamsulosin 0.4mg qhs, Myrbetriq 50mg qd   -held while inpatient, no issues with incontinence/retention      #GERD (gastroesophageal reflux disease)  -continued on protonix 40mg qd    New medications: Ciprofloxacin, loperamide  Labs to be followed outpatient: none  Exam to be followed outpatient: Pt. to follow-up with PCP Dr. Teresita Torres on December 1st at 11:30 AM at [address]    #Discharge: do not delete    75M with PMH bladder cancer (s/p tumor resection, localized chemo and now in remission from 5yrs), GERD, kyphoscoliosis s/p C-sacrum fusion at [facility] (10/5/2020) depression, panic disorder, RUE DVT (on Eliquis), BPH, presenting with initial complaints of loose non-bloody bowel movements, decreased po intake, nausea, dizziness, dysuria/urinary frequency, abdominal discomfort admitted for severe sepsis-2/2 E.coli bacteremia likely 2/2 UTI.    Problem List/Main Diagnoses (system-based):   #Severe sepsis-2/2 E.coli bacteremia likely 2/2 UTI  -Pt presenting with leukocytosis with WBC 14k, fever of 102F, HR 90-100s, and systolic blood pressures 90-100s. Initial complaints of nausea, non-bloody diarrhea, urinary frequency and dysuria.  -In the ED, pt. received Vancomycin 1g and Zosyn 3.375g, received 4L fluids given elevated lactate to 7.1, but cleared to 1.6. He had rigors in the ED after completed CT abd/pelvis.  -CT A/P w/ oral and IV contrast- no bowel obstruction or appendicitis. No hydronephrosis seen on CT abd/pelvis.  -UA positive for Nitrites, WBC, but Ucx- insignificant amount of growth  -Bcx 11/19 growing E.coli, pt. was initially treated on IV Zosyn 3.375g q6h, and then switched to Ceftriaxone 2 g q24h given sensitivities of the blood cultures.  -Repeat bcx 11/20 and 11/22 to ensure clearance were negative.  -Pt. switched to PO ciprofloxacin 500 mg q12h on 11/24 (10 day abx course- 11/19-11/28)  -radiology called about collection of fluid seen on CT in back but reported no abscess and likely post-surgical, given no pain in the back less likely source of infection and likely an incidental finding     #Abdominal pain  -Pt. with generalized abdominal pain/discomfort on admission and through hospital course.  -CT A/P w/ oral and IV contrast- no bowel obstruction or appendicitis.  -Xray abdomen 11/22- Mild ileus with no abnormal bowel dilatation or pneumoperitoneum. Thoracolumbar, sacral and iliac hardware. Postsurgical spinal changes. Calcification overlying the left iliac bone.   -GI PCR 11/23 was negative   -zofran was given PRN for nausea  -abdominal pain resolved     #Diarrhea   -Diarrhea this visit, noninfectious, loose not watery- GI PCR neg, low c/f cdiff with semiformed stool/afeb/no WBC, immodium PRN at home (has helped him here)  -peptol bismol was given PRN for diarrhea, also given loperamide 4 mg PO for diarrhea  -diarrhea resolved    #History of spinal surgery for kyphoscoliosis- s/p C-sacrum fusion at [facility] (10/5/2020)  -Pt with h/o C-sacrum fusion surgery , has chronic back pain for which is opiate dependent. No symptoms of numbness, weakness, saddle anesthesia, or any pain from surgical site/wound. Neurosurgery consulted in the ED. Of note, he has been on chronic prednisone, most recently 10mg daily (taken for eczema).  -Neurosurgery recs- no acute Neurosurgical Intervention is indicated at this time based on the exam and the imaging studies, low c/f for wound infection, no signs or exam findings c/f epidural abscess  -CT Cervical/thoracic/lumbar spine with IV contrast to ensure no seeding of hardware/abscess  -CT cervical spine w/ IV contrast- Reversal of the normal cervical lordosis with retrolisthesis of C2 and C3 with superimposed cervicothoracic kyphotic deformity centered at the T2/3 level.   -CT thoracic spine w/ IV contrast- Shunt status post spinal fusion with hardware extending from the T3 level through the pelvis. Please see comments regarding hardware. Fracture through the left iliac bone. Additional compression deformities of T3, T8 and T12. Comparison with any recent studies would be beneficial to ascertain stability/change. Postoperative changes within the paraspinal soft tissues without discrete abscess.  -CT lumbar spine w/ IV contrast- Shunt status post spinal fusion with hardware extending from the T3 level through the pelvis. Please see comments regarding hardware. Fracture through the left iliac bone. Additional compression deformities of T3, T8 and T12. Comparison with any recent studies would be beneficial to ascertain stability/change. Postoperative changes within the paraspinal soft tissues without discrete abscess.  -Pt. was continued on aggressive oral opioid regimen for pain- dilaudid 4 mg PO q6h PRN for severe pain, and IV 0.5 mg dilaudid PRN for breakthrough pain  -Pain management was reviewed with patient- he reports that he has a script for pain meds already- will follow-up with PCP Dr. Teresita Torres on December 1st at 11:30 AM at [address] and pain management doctor Dr. Sharma ([facility]) for further management of pain control.    #DVT of upper extremity (deep vein thrombosis)  -pt on eliquis for upper extremity DVT (dx post operatively back surgery)  - resumed on eliquis 5mg BID.     #Depression    -continued on home Duloxetine 120mg qd, Aripriprazole 2mg, Clonopin 0.5mg qhs    #Bladder cancer  -Pt with h/o bladder cancer, currently in remission. was previously following up at [facility] [place].    #BPH/prostatomegaly, overactive bladder  - on Tamsulosin 0.4mg qhs, Myrbetriq 50mg qd   -held while inpatient, no issues with incontinence/retention    #GERD (gastroesophageal reflux disease)  -continued on protonix 40mg qd    New medications: Ciprofloxacin, loperamide  Labs to be followed outpatient: none  Exam to be followed outpatient: Pt. to follow-up with PCP Dr. Teresita Torres on December 1st at 11:30 AM at [address]    #Discharge: do not delete    75M with PMH bladder cancer (s/p tumor resection, localized chemo and now in remission from 5yrs), GERD, kyphoscoliosis s/p C-sacrum fusion at [facility] (10/5/2020) depression, panic disorder, RUE DVT (on Eliquis), BPH, presenting with initial complaints of loose non-bloody bowel movements, decreased po intake, nausea, dizziness, dysuria/urinary frequency, abdominal discomfort admitted for severe sepsis-2/2 E.coli bacteremia likely 2/2 UTI.    Problem List/Main Diagnoses (system-based):   #Severe sepsis-2/2 E.coli bacteremia likely 2/2 UTI  -Pt presenting with leukocytosis with WBC 14k, fever of 102F, HR 90-100s, and systolic blood pressures 90-100s. Initial complaints of nausea, non-bloody diarrhea, urinary frequency and dysuria.  -In the ED, pt. received Vancomycin 1g and Zosyn 3.375g, received 4L fluids given elevated lactate to 7.1, but cleared to 1.6. He had rigors in the ED after completed CT abd/pelvis.  -CT A/P w/ oral and IV contrast- no bowel obstruction or appendicitis. No hydronephrosis seen on CT abd/pelvis.  -UA positive for Nitrites, WBC, but Ucx- insignificant amount of growth  -Bcx 11/19 growing E.coli, pt. was initially treated on IV Zosyn 3.375g q6h, and then switched to Ceftriaxone 2 g q24h given sensitivities of the blood cultures.  -Repeat bcx 11/20 and 11/22 to ensure clearance were negative.  -Pt. switched to PO ciprofloxacin 500 mg q12h on 11/24 (10 day abx course- 11/19-11/28)  -radiology called about collection of fluid seen on CT in back but reported no abscess and likely post-surgical, given no pain in the back less likely source of infection and likely an incidental finding     #Abdominal pain  -Pt. with generalized abdominal pain/discomfort on admission and through hospital course.  -CT A/P w/ oral and IV contrast- no bowel obstruction or appendicitis.  -Xray abdomen 11/22- Mild ileus with no abnormal bowel dilatation or pneumoperitoneum. Thoracolumbar, sacral and iliac hardware. Postsurgical spinal changes. Calcification overlying the left iliac bone.   -GI PCR 11/23 was negative   -zofran was given PRN for nausea  -abdominal pain resolved     #Diarrhea   -Diarrhea this visit, noninfectious, loose not watery- GI PCR neg, low c/f cdiff with semiformed stool/afeb/no WBC, immodium PRN at home (has helped him here)  -peptol bismol was given PRN for diarrhea, also given loperamide 4 mg PO for diarrhea  -diarrhea resolved    #History of spinal surgery for kyphoscoliosis- s/p C-sacrum fusion at [facility] (10/5/2020)  -Pt with h/o C-sacrum fusion surgery , has chronic back pain for which is opiate dependent. No symptoms of numbness, weakness, saddle anesthesia, or any pain from surgical site/wound. Neurosurgery consulted in the ED. Of note, he has been on chronic prednisone, most recently 10mg daily (taken for eczema).  -Neurosurgery recs- no acute Neurosurgical Intervention is indicated at this time based on the exam and the imaging studies, low c/f for wound infection, no signs or exam findings c/f epidural abscess  -CT Cervical/thoracic/lumbar spine with IV contrast to ensure no seeding of hardware/abscess  -CT cervical spine w/ IV contrast- Reversal of the normal cervical lordosis with retrolisthesis of C2 and C3 with superimposed cervicothoracic kyphotic deformity centered at the T2/3 level.   -CT thoracic spine w/ IV contrast- Shunt status post spinal fusion with hardware extending from the T3 level through the pelvis. Please see comments regarding hardware. Fracture through the left iliac bone. Additional compression deformities of T3, T8 and T12. Comparison with any recent studies would be beneficial to ascertain stability/change. Postoperative changes within the paraspinal soft tissues without discrete abscess.  -CT lumbar spine w/ IV contrast- Shunt status post spinal fusion with hardware extending from the T3 level through the pelvis. Please see comments regarding hardware. Fracture through the left iliac bone. Additional compression deformities of T3, T8 and T12. Comparison with any recent studies would be beneficial to ascertain stability/change. Postoperative changes within the paraspinal soft tissues without discrete abscess.  -Pt. was continued on aggressive oral opioid regimen for pain- dilaudid 4 mg PO q6h PRN for severe pain, and IV 0.5 mg dilaudid PRN for breakthrough pain  -Pain management was reviewed with patient- he reports that he has a script for pain meds already- will follow-up with PCP Dr. Teresita Torres on December 1st at 11:30 AM at [address] and pain management doctor Dr. Sharma ([facility]) for further management of pain control.    #DVT of upper extremity (deep vein thrombosis)  -pt on eliquis for upper extremity DVT (dx post operatively back surgery)  - resumed on eliquis 5mg BID.     #Depression    -continued on home Duloxetine 120mg qd, Aripriprazole 2mg, Clonopin 0.5mg qhs    #Bladder cancer  -Pt with h/o bladder cancer, currently in remission. was previously following up at [facility] [place].    #BPH/prostatomegaly, overactive bladder  - on Tamsulosin 0.4mg qhs, Myrbetriq 50mg qd   -held while inpatient, no issues with incontinence/retention    #GERD (gastroesophageal reflux disease)  -continued on protonix 40mg qd    New medications: Ciprofloxacin, loperamide  Labs to be followed outpatient: none  Exam to be followed outpatient: Pt. to follow-up with PCP Dr. Teresita Torres on December 1st at 11:30 AM at [address]     #Discharge: do not delete    75M with PMH bladder cancer (s/p tumor resection, localized chemo and now in remission from 5yrs), GERD, kyphoscoliosis s/p C-sacrum fusion at [facility] (10/5/2020) depression, panic disorder, RUE DVT (on Eliquis), BPH, presenting with initial complaints of loose non-bloody bowel movements, decreased po intake, nausea, dizziness, dysuria/urinary frequency, abdominal discomfort admitted for severe sepsis-2/2 E.coli bacteremia likely 2/2 UTI.    Problem List/Main Diagnoses (system-based):   #Severe sepsis-2/2 E.coli bacteremia likely 2/2 UTI  -Pt presenting with leukocytosis with WBC 14k, fever of 102F, HR 90-100s, and systolic blood pressures 90-100s. Initial complaints of nausea, non-bloody diarrhea, urinary frequency and dysuria.  -In the ED, pt. received Vancomycin 1g and Zosyn 3.375g, received 4L fluids given elevated lactate to 7.1, but cleared to 1.6. He had rigors in the ED after completed CT abd/pelvis.  -CT A/P w/ oral and IV contrast- no bowel obstruction or appendicitis. No hydronephrosis seen on CT abd/pelvis.  -UA positive for Nitrites, WBC, but Ucx- insignificant amount of growth  -Bcx 11/19 growing E.coli, pt. was initially treated on IV Zosyn 3.375g q6h, and then switched to Ceftriaxone 2 g q24h given sensitivities of the blood cultures.  -Repeat bcx 11/20 and 11/22 to ensure clearance were negative.  -Pt. switched to PO ciprofloxacin 500 mg q12h on 11/24 (10 day abx course- 11/19-11/28)  -radiology called about collection of fluid seen on CT in back but reported no abscess and likely post-surgical, given no pain in the back less likely source of infection and likely an incidental finding     #Abdominal pain  -Pt. with generalized abdominal pain/discomfort on admission and through hospital course.  -CT A/P w/ oral and IV contrast- no bowel obstruction or appendicitis.  -Xray abdomen 11/22- Mild ileus with no abnormal bowel dilatation or pneumoperitoneum. Thoracolumbar, sacral and iliac hardware. Postsurgical spinal changes. Calcification overlying the left iliac bone.   -GI PCR 11/23 was negative   -zofran was given PRN for nausea  -abdominal pain resolved     #Diarrhea   -Diarrhea this visit, noninfectious, loose not watery- GI PCR neg, low c/f cdiff with semiformed stool/afeb/no WBC, immodium PRN at home (has helped him here)  -peptol bismol was given PRN for diarrhea, also given loperamide 4 mg PO for diarrhea  -diarrhea resolved    #History of spinal surgery for kyphoscoliosis- s/p C-sacrum fusion at [facility] (10/5/2020)  -Pt with h/o C-sacrum fusion surgery , has chronic back pain for which is opiate dependent. No symptoms of numbness, weakness, saddle anesthesia, or any pain from surgical site/wound. Neurosurgery consulted in the ED. Of note, he has been on chronic prednisone, most recently 10mg daily (taken for eczema).  -Neurosurgery recs- no acute Neurosurgical Intervention is indicated at this time based on the exam and the imaging studies, low c/f for wound infection, no signs or exam findings c/f epidural abscess  -CT Cervical/thoracic/lumbar spine with IV contrast to ensure no seeding of hardware/abscess  -CT cervical spine w/ IV contrast- Reversal of the normal cervical lordosis with retrolisthesis of C2 and C3 with superimposed cervicothoracic kyphotic deformity centered at the T2/3 level.   -CT thoracic spine w/ IV contrast- Shunt status post spinal fusion with hardware extending from the T3 level through the pelvis. Please see comments regarding hardware. Fracture through the left iliac bone. Additional compression deformities of T3, T8 and T12. Comparison with any recent studies would be beneficial to ascertain stability/change. Postoperative changes within the paraspinal soft tissues without discrete abscess.  -CT lumbar spine w/ IV contrast- Shunt status post spinal fusion with hardware extending from the T3 level through the pelvis. Please see comments regarding hardware. Fracture through the left iliac bone. Additional compression deformities of T3, T8 and T12. Comparison with any recent studies would be beneficial to ascertain stability/change. Postoperative changes within the paraspinal soft tissues without discrete abscess.  -Pt. was continued on aggressive oral opioid regimen for pain- dilaudid 4 mg PO q6h PRN for severe pain, and IV 0.5 mg dilaudid PRN for breakthrough pain  -Pain management was reviewed with patient- he reports that he has a script for pain meds already- will follow-up with PCP Dr. Teresita Torres on December 1st at 11:30 AM [address] and pain management doctor Dr. Sharma ([facility]) for further management of pain control.    #DVT of upper extremity (deep vein thrombosis)  -pt on eliquis for upper extremity DVT (dx post operatively back surgery)  - resumed on eliquis 5mg BID.     #Depression    -continued on home Duloxetine 120mg qd, Aripriprazole 2mg, Clonopin 0.5mg qhs    #Bladder cancer  -Pt with h/o bladder cancer, currently in remission. was previously following up at [facility] [place].    #BPH/prostatomegaly, overactive bladder  - on Tamsulosin 0.4mg qhs, Myrbetriq 50mg qd   -held while inpatient, no issues with incontinence/retention    #GERD (gastroesophageal reflux disease)  -continued on protonix 40mg qd    New medications: Ciprofloxacin, loperamide  Labs to be followed outpatient: none  Exam to be followed outpatient: Pt. to follow-up with PCP Dr. Teresita Torres on December 1st at 11:30 AM at [address]

## 2020-11-19 NOTE — PROGRESS NOTE ADULT - PROBLEM SELECTOR PLAN 9
Fluids: s/p 4L NS, no more IVF, encourage PO intake   Electrolytes-replete as needed  Nutrition - DASH/TLC  Code- Full Code  DVT ppx: eliquis 5mg BID  GI ppx: none needed  DIspo: [facility] Fluids: s/p 4L NS, no more IVF, encourage PO intake   Electrolytes-replete as needed  Nutrition - DASH/TLC  Code- Full Code  DVT ppx: eliquis 5mg BID  GI ppx: 40 mg PO protonix qd  DIspo: [facility]

## 2020-11-19 NOTE — H&P ADULT - PROBLEM SELECTOR PLAN 2
On room air, patient was noted to be hypoxic to 88-89% at rest. He remained asymptomatic without any evidence of respiratory distress. However unclear etiology of hypoxia, - ddx atelectasis, ?PE (although low likelihood given already anticoagulated with Eliquis for DVT),   - f/u D-dimer On room air, patient was noted to be hypoxic to 88-89% at rest. He remained asymptomatic without any evidence of respiratory distress. However unclear etiology of hypoxia, - ddx atelectasis, ?PE (although low likelihood given already anticoagulated with Eliquis for DVT), fluid overload.   - f/u D-dimer  - incentive spirometry  - monitor volume status  - consider echocardiogram

## 2020-11-19 NOTE — H&P ADULT - PROBLEM SELECTOR PLAN 4
Progress Note, Physician


History of Present Illness: 


Sensorium still altered, oral intake held, switched to IV meds. 





- Current Medication List


Current Medications: 


Active Medications





Acetaminophen (Tylenol Suppository -)  650 mg NV Q6H PRN


   PRN Reason: FEVER


Atorvastatin Calcium (Lipitor -)  40 mg PO HS [facility]


   Last Admin: 10/31/18 21:44 Dose:  Not Given


Heparin Sodium (Porcine) (Heparin -)  1,000 unit IVPUSH PRN PRN


   PRN Reason: Heparin


Heparin Sodium (Porcine) (Heparin -)  5,000 unit IVPUSH PRN PRN


   PRN Reason: Heparin


Sodium Chloride (Normal Saline -)  1,000 mls @ 125 mls/hr IV ASDIR [facility]


   Last Admin: 11/01/18 00:01 Dose:  125 mls/hr


Ceftriaxone Sodium 1 gm/ (Dextrose)  50 mls @ 100 mls/hr IVPB DAILY [facility]; 

Protocol


   Last Admin: 10/31/18 12:35 Dose:  100 mls/hr


Vancomycin HCl (Vancomycin (Pre-Docked))  1,000 mg in 250 mls @ 166.667 mls/hr 

IVPB Q24H [facility]; Protocol


   Last Admin: 10/31/18 21:43 Dose:  166.667 mls/hr


Heparin Sodium/Dextrose (Heparin Infusion -)  25,000 units in 500 mls @ 16 mls/

hr IVPB TITR [facility]; Protocol


   Last Admin: 11/01/18 07:04 Dose:  800 unit/hr, 16 mls/hr


Metoprolol Tartrate (Lopressor Injection -)  5 mg IVPUSH Q8H [facility]


   Last Admin: 11/01/18 07:11 Dose:  5 mg


Thiamine HCl (Vitamin B1 -)  100 mg PO DAILY [facility]


   Last Admin: 10/31/18 11:36 Dose:  Not Given











- Objective


Vital Signs: 


 Vital Signs











Temperature  97.8 F   11/01/18 08:45


 


Pulse Rate  94 H  11/01/18 08:45


 


Respiratory Rate  20   11/01/18 08:45


 


Blood Pressure  112/53 L  11/01/18 08:45


 


O2 Sat by Pulse Oximetry (%)  94 L  10/31/18 21:00











Constitutional: Yes: No Distress, Calm, Thin


Neck: Yes: Supple


Cardiovascular: Yes: Pulse Irregular, Murmur (2/6 SM)


Respiratory: Yes: Regular, Diminished, On Nasal O2


Gastrointestinal: Yes: Soft, Hypoactive Bowel Sounds


Edema: No


Labs: 


 CBC, BMP





 11/01/18 06:00 





 INR, PTT











INR  1.33  (0.83-1.09)  H  10/30/18  17:02    














- ....Imaging


Ultrasound: Report Reviewed (No hydro)


EKG: Report Reviewed (Tele: Carla dumas YOVANA)





Problem List





- Problems


(1) Atrial fibrillation with RVR


Code(s): I48.91 - UNSPECIFIED ATRIAL FIBRILLATION   





(2) Acute-on-chronic kidney injury


Code(s): N17.9 - ACUTE KIDNEY FAILURE, UNSPECIFIED; N18.9 - CHRONIC KIDNEY 

DISEASE, UNSPECIFIED   


Qualifiers: 


   Acute renal failure type: unspecified   Chronic kidney disease stage: 

unspecified stage   Qualified Code(s): N17.9 - Acute kidney failure, unspecified

; N18.9 - Chronic kidney disease, unspecified   





(3) Demand ischemia


Code(s): I24.8 - OTHER FORMS OF ACUTE ISCHEMIC HEART DISEASE   





(4) Hypercalcemia


Code(s): E83.52 - HYPERCALCEMIA   





(5) Nonadherence to medication


Code(s): Z91.14 - PATIENT'S OTHER NONCOMPLIANCE WITH MEDICATION REGIMEN   





(6) Paraproteinemia


Code(s): D89.2 - HYPERGAMMAGLOBULINEMIA, UNSPECIFIED   





(7) Anticoagulant long-term use


Code(s): Z79.01 - LONG TERM (CURRENT) USE OF ANTICOAGULANTS   





(8) Chronic thromboembolic disease


Code(s): I74.9 - EMBOLISM AND THROMBOSIS OF UNSPECIFIED ARTERY   





(9) Coronary artery disease


Code(s): I25.10 - ATHSCL HEART DISEASE OF NATIVE CORONARY ARTERY W/O ANG PCTRS 

  


Qualifiers: 


   Coronary Disease-Associated Artery/Lesion type: native artery   Native vs. 

transplanted heart: native heart   Associated angina: without angina   

Qualified Code(s): I25.10 - Atherosclerotic heart disease of native coronary 

artery without angina pectoris   





(10) Diastolic dysfunction without heart failure


Code(s): I51.9 - HEART DISEASE, UNSPECIFIED   





(11) HTN (hypertension)


Code(s): I10 - ESSENTIAL (PRIMARY) HYPERTENSION   


Qualifiers: 


   Hypertension type: essential hypertension   Qualified Code(s): I10 - 

Essential (primary) hypertension   





(12) Hypertrophic cardiomyopathy


Code(s): I42.2 - OTHER HYPERTROPHIC CARDIOMYOPATHY   





(13) Hyperlipidemia


Code(s): E78.5 - HYPERLIPIDEMIA, UNSPECIFIED   


Qualifiers: 


   Hyperlipidemia type: pure hypercholesterolemia   Qualified Code(s): E78.00 - 

Pure hypercholesterolemia, unspecified; E78.0 - Pure hypercholesterolemia   





Assessment/Plan


R&LHc at [facility] 1/11/2017 showing nonobstructive CAD, severe LV apical 

hypertrophic cardiomyopathy, mildly elevated right sided pressures, Mynx 

deployed right CFA access site. Study is consistent with apical hypertrophy (

spade-like) variant of hypertrophic cardiomyopathy, planned for optimal medical 

therapy. 


Echocardiogram: 07/11/2018 Mod cLVH, severe DYANA, mod TR RVSP 50-60 mmHg, mod-

severe MR


Echocardiogram: 10/16/2018 Normal LV size with hyperdynamic LVEF 75%, mild BSH, 

normal RV size and fxn, severe LAE, mod-severe MR, mod TR





1. Toxic metabolic encephelopathy, r/o CVA while off NOAC, r/o sepsis


2. Acute on CKD (pre-renal) 


3. Hypercalcemia, paraproteinemia-> possible multiple myeloma


3. History of bilateral SFA occlusion post thrombectomy, referable to embolic 

disease related to persistent atrial fibrillation with [id number] score of 6, 

history of poor compliance with anticoagulation and medical F/U


4. Non obstructive CAD on R&LHc coronary angiography with demand ischemia


5. LV diastolic dysfunction related to apical hypertrophic cardiomyopathy, 

subendocardial ischemia, compensated/euvolemic


6. Persistent atrial fibrillation [id number] score of 6 on Xarelto 20 qd


7. Labile HTN


8. Poor compliance with medical therapy administration and medical F/U


9. Tobacco abuse


10. ETOH dependence





PLAN:





1. Heparin gtt for now while off Xarelto 15 qd (renal dosing)


2. IV Lopressor as hemodynamics tolerate


3. Hematology input, SPEP, UPEP pending


4. Judicious IVF with monitor renal recovery, renal input appreciated, check PTH

, ruled out obstructive uropathy


5. Empiric abx course pending C&S pt on eliquis for upper extremity DVT (dx post operatively back surgery)  - resume eliquis 5mg BID

## 2020-11-19 NOTE — PROGRESS NOTE ADULT - PROBLEM SELECTOR PLAN 3
Pt with h/o C-sacrum fusion surgery, has chronic back pain for which is opiate dependent. No symptoms of numbness, weakness, saddle anesthesia, or any pain from surgical site/wound. Neurosurgery consulted in the ED. Of note, he has been on chronic prednisone, most recently 10mg daily. Patient does not have clear understand of why he is on the medication,   - f/u neurosurgery recs  - obtain collateral from patient's PMD/surgeon regarding prednisone Pt with h/o C-sacrum fusion surgery, has chronic back pain for which is opiate dependent. No symptoms of numbness, weakness, saddle anesthesia, or any pain from surgical site/wound. Neurosurgery consulted in the ED. Of note, he has been on chronic prednisone, most recently 10mg daily. Patient does not have clear understand of why he is on the medication,   - f/u neurosurgery recs- No Acute Neurosurgical Intervention is indicated at this time based on the exam and the imaging studies, low c/f for wound infection, no signs or exam findings c/f epidural abscess  -f/u CT Cervical spine with contr  - obtain collateral from patient's PMD- Dr. Aj/surgeon regarding prednisone  -continue prednisone 10 mg PO q24h

## 2020-11-19 NOTE — DISCHARGE NOTE PROVIDER - NSDCMRMEDTOKEN_GEN_ALL_CORE_FT
acyclovir 400 mg oral tablet: 1 tab(s) orally 3 times a day  Ambien 10 mg oral tablet: 1 tab(s) orally once a day (at bedtime), As Needed  ARIPiprazole 2 mg oral tablet: 1 tab(s) orally once a day  baclofen 5 mg oral tablet: 1 tab(s) orally 3 times a day  celecoxib 100 mg oral capsule: 1 cap(s) orally 2 times a day  clonazePAM 0.5 mg oral tablet: 1 tab(s) orally once a day (at bedtime)  Dilaudid 2 mg oral tablet: 3 tab(s) orally every 6 hours  DULoxetine 60 mg oral delayed release capsule: 2 cap(s) orally once a day  Eliquis 5 mg oral tablet: 1 tab(s) orally 2 times a day  Myrbetriq 50 mg oral tablet, extended release: 1 tab(s) orally once a day  predniSONE 10 mg oral tablet: 1 tab(s) orally once a day  Protonix 40 mg oral delayed release tablet: 1 tab(s) orally once a day  tamsulosin 0.4 mg oral capsule: 1 cap(s) orally once a day   acyclovir 400 mg oral tablet: 1 tab(s) orally 3 times a day  Ambien 10 mg oral tablet: 1 tab(s) orally once a day (at bedtime), As Needed  ARIPiprazole 2 mg oral tablet: 1 tab(s) orally once a day  baclofen 5 mg oral tablet: 1 tab(s) orally 3 times a day  celecoxib 100 mg oral capsule: 1 cap(s) orally 2 times a day  Cipro 500 mg oral tablet: 1 tab(s) orally 2 times a day   clonazePAM 0.5 mg oral tablet: 1 tab(s) orally once a day (at bedtime)  Dilaudid 2 mg oral tablet: 3 tab(s) orally every 6 hours  DULoxetine 60 mg oral delayed release capsule: 2 cap(s) orally once a day  Eliquis 5 mg oral tablet: 1 tab(s) orally 2 times a day  loperamide 2 mg oral tablet: 1 tab(s) orally every 4 hours, As Needed  Myrbetriq 50 mg oral tablet, extended release: 1 tab(s) orally once a day  predniSONE 10 mg oral tablet: 1 tab(s) orally once a day  Protonix 40 mg oral delayed release tablet: 1 tab(s) orally once a day  tamsulosin 0.4 mg oral capsule: 1 cap(s) orally once a day

## 2020-11-19 NOTE — H&P ADULT - PROBLEM SELECTOR PLAN 9
Fluids: s/p 4L NS, no more IVF, encourage PO intake   Electrolytes-replete as needed  Nutrition - DASH/TLC  Code- Full Code  DVT ppx: eliquis 5mg BID  GI ppx: none needed  DIspo: [facility]

## 2020-11-19 NOTE — DISCHARGE NOTE PROVIDER - PROVIDER TOKENS
FREE:[LAST:[fernandez],FIRST:[tejas],PHONE:[(   )    -],FAX:[(   )    -],SCHEDULEDAPPT:[12/01/2020],SCHEDULEDAPPTTIME:[11:30 AM]]

## 2020-11-20 LAB
ALBUMIN SERPL ELPH-MCNC: 2.9 G/DL — LOW (ref 3.3–5)
ALP SERPL-CCNC: 110 U/L — SIGNIFICANT CHANGE UP (ref 40–120)
ALT FLD-CCNC: 10 U/L — SIGNIFICANT CHANGE UP (ref 10–45)
ANION GAP SERPL CALC-SCNC: 12 MMOL/L — SIGNIFICANT CHANGE UP (ref 5–17)
AST SERPL-CCNC: 22 U/L — SIGNIFICANT CHANGE UP (ref 10–40)
BASOPHILS # BLD AUTO: 0.04 K/UL — SIGNIFICANT CHANGE UP (ref 0–0.2)
BASOPHILS NFR BLD AUTO: 0.3 % — SIGNIFICANT CHANGE UP (ref 0–2)
BILIRUB SERPL-MCNC: 0.4 MG/DL — SIGNIFICANT CHANGE UP (ref 0.2–1.2)
BLD GP AB SCN SERPL QL: NEGATIVE — SIGNIFICANT CHANGE UP
BLD GP AB SCN SERPL QL: NEGATIVE — SIGNIFICANT CHANGE UP
BUN SERPL-MCNC: 18 MG/DL — SIGNIFICANT CHANGE UP (ref 7–23)
CALCIUM SERPL-MCNC: 8.4 MG/DL — SIGNIFICANT CHANGE UP (ref 8.4–10.5)
CHLORIDE SERPL-SCNC: 102 MMOL/L — SIGNIFICANT CHANGE UP (ref 96–108)
CO2 SERPL-SCNC: 24 MMOL/L — SIGNIFICANT CHANGE UP (ref 22–31)
CREAT SERPL-MCNC: 0.75 MG/DL — SIGNIFICANT CHANGE UP (ref 0.5–1.3)
CULTURE RESULTS: SIGNIFICANT CHANGE UP
EOSINOPHIL # BLD AUTO: 0.2 K/UL — SIGNIFICANT CHANGE UP (ref 0–0.5)
EOSINOPHIL NFR BLD AUTO: 1.4 % — SIGNIFICANT CHANGE UP (ref 0–6)
GLUCOSE SERPL-MCNC: 86 MG/DL — SIGNIFICANT CHANGE UP (ref 70–99)
GRAM STN FLD: SIGNIFICANT CHANGE UP
GRAM STN FLD: SIGNIFICANT CHANGE UP
HCT VFR BLD CALC: 33.5 % — LOW (ref 39–50)
HCV AB S/CO SERPL IA: 0.1 S/CO — SIGNIFICANT CHANGE UP
HCV AB SERPL-IMP: SIGNIFICANT CHANGE UP
HGB BLD-MCNC: 10.2 G/DL — LOW (ref 13–17)
IMM GRANULOCYTES NFR BLD AUTO: 0.5 % — SIGNIFICANT CHANGE UP (ref 0–1.5)
LYMPHOCYTES # BLD AUTO: 1.44 K/UL — SIGNIFICANT CHANGE UP (ref 1–3.3)
LYMPHOCYTES # BLD AUTO: 10.2 % — LOW (ref 13–44)
MAGNESIUM SERPL-MCNC: 2.2 MG/DL — SIGNIFICANT CHANGE UP (ref 1.6–2.6)
MCHC RBC-ENTMCNC: 28.5 PG — SIGNIFICANT CHANGE UP (ref 27–34)
MCHC RBC-ENTMCNC: 30.4 GM/DL — LOW (ref 32–36)
MCV RBC AUTO: 93.6 FL — SIGNIFICANT CHANGE UP (ref 80–100)
MONOCYTES # BLD AUTO: 0.93 K/UL — HIGH (ref 0–0.9)
MONOCYTES NFR BLD AUTO: 6.6 % — SIGNIFICANT CHANGE UP (ref 2–14)
NEUTROPHILS # BLD AUTO: 11.37 K/UL — HIGH (ref 1.8–7.4)
NEUTROPHILS NFR BLD AUTO: 81 % — HIGH (ref 43–77)
NRBC # BLD: 0 /100 WBCS — SIGNIFICANT CHANGE UP (ref 0–0)
PHOSPHATE SERPL-MCNC: 2.8 MG/DL — SIGNIFICANT CHANGE UP (ref 2.5–4.5)
PLATELET # BLD AUTO: 223 K/UL — SIGNIFICANT CHANGE UP (ref 150–400)
POTASSIUM SERPL-MCNC: 3.8 MMOL/L — SIGNIFICANT CHANGE UP (ref 3.5–5.3)
POTASSIUM SERPL-SCNC: 3.8 MMOL/L — SIGNIFICANT CHANGE UP (ref 3.5–5.3)
PROT SERPL-MCNC: 5.7 G/DL — LOW (ref 6–8.3)
RBC # BLD: 3.58 M/UL — LOW (ref 4.2–5.8)
RBC # FLD: 15.3 % — HIGH (ref 10.3–14.5)
RH IG SCN BLD-IMP: POSITIVE — SIGNIFICANT CHANGE UP
RH IG SCN BLD-IMP: POSITIVE — SIGNIFICANT CHANGE UP
SODIUM SERPL-SCNC: 138 MMOL/L — SIGNIFICANT CHANGE UP (ref 135–145)
SPECIMEN SOURCE: SIGNIFICANT CHANGE UP
WBC # BLD: 14.05 K/UL — HIGH (ref 3.8–10.5)
WBC # FLD AUTO: 14.05 K/UL — HIGH (ref 3.8–10.5)

## 2020-11-20 PROCEDURE — 99233 SBSQ HOSP IP/OBS HIGH 50: CPT | Mod: GC

## 2020-11-20 PROCEDURE — 99232 SBSQ HOSP IP/OBS MODERATE 35: CPT

## 2020-11-20 PROCEDURE — 72126 CT NECK SPINE W/DYE: CPT | Mod: 26

## 2020-11-20 PROCEDURE — 72132 CT LUMBAR SPINE W/DYE: CPT | Mod: 26

## 2020-11-20 PROCEDURE — 72129 CT CHEST SPINE W/DYE: CPT | Mod: 26

## 2020-11-20 RX ORDER — ONDANSETRON 8 MG/1
4 TABLET, FILM COATED ORAL ONCE
Refills: 0 | Status: COMPLETED | OUTPATIENT
Start: 2020-11-20 | End: 2020-11-20

## 2020-11-20 RX ORDER — HYDROMORPHONE HYDROCHLORIDE 2 MG/ML
0.5 INJECTION INTRAMUSCULAR; INTRAVENOUS; SUBCUTANEOUS EVERY 6 HOURS
Refills: 0 | Status: DISCONTINUED | OUTPATIENT
Start: 2020-11-20 | End: 2020-11-24

## 2020-11-20 RX ADMIN — Medication 0.5 MILLIGRAM(S): at 22:05

## 2020-11-20 RX ADMIN — HYDROMORPHONE HYDROCHLORIDE 4 MILLIGRAM(S): 2 INJECTION INTRAMUSCULAR; INTRAVENOUS; SUBCUTANEOUS at 01:14

## 2020-11-20 RX ADMIN — PIPERACILLIN AND TAZOBACTAM 200 GRAM(S): 4; .5 INJECTION, POWDER, LYOPHILIZED, FOR SOLUTION INTRAVENOUS at 07:39

## 2020-11-20 RX ADMIN — HYDROMORPHONE HYDROCHLORIDE 4 MILLIGRAM(S): 2 INJECTION INTRAMUSCULAR; INTRAVENOUS; SUBCUTANEOUS at 19:05

## 2020-11-20 RX ADMIN — APIXABAN 5 MILLIGRAM(S): 2.5 TABLET, FILM COATED ORAL at 06:25

## 2020-11-20 RX ADMIN — PIPERACILLIN AND TAZOBACTAM 200 GRAM(S): 4; .5 INJECTION, POWDER, LYOPHILIZED, FOR SOLUTION INTRAVENOUS at 14:23

## 2020-11-20 RX ADMIN — HYDROMORPHONE HYDROCHLORIDE 4 MILLIGRAM(S): 2 INJECTION INTRAMUSCULAR; INTRAVENOUS; SUBCUTANEOUS at 12:37

## 2020-11-20 RX ADMIN — APIXABAN 5 MILLIGRAM(S): 2.5 TABLET, FILM COATED ORAL at 19:05

## 2020-11-20 RX ADMIN — Medication 10 MILLIGRAM(S): at 14:23

## 2020-11-20 RX ADMIN — PANTOPRAZOLE SODIUM 40 MILLIGRAM(S): 20 TABLET, DELAYED RELEASE ORAL at 06:25

## 2020-11-20 RX ADMIN — DULOXETINE HYDROCHLORIDE 120 MILLIGRAM(S): 30 CAPSULE, DELAYED RELEASE ORAL at 12:38

## 2020-11-20 RX ADMIN — HYDROMORPHONE HYDROCHLORIDE 4 MILLIGRAM(S): 2 INJECTION INTRAMUSCULAR; INTRAVENOUS; SUBCUTANEOUS at 06:25

## 2020-11-20 RX ADMIN — ARIPIPRAZOLE 2 MILLIGRAM(S): 15 TABLET ORAL at 12:38

## 2020-11-20 RX ADMIN — HYDROMORPHONE HYDROCHLORIDE 4 MILLIGRAM(S): 2 INJECTION INTRAMUSCULAR; INTRAVENOUS; SUBCUTANEOUS at 19:45

## 2020-11-20 RX ADMIN — HYDROMORPHONE HYDROCHLORIDE 4 MILLIGRAM(S): 2 INJECTION INTRAMUSCULAR; INTRAVENOUS; SUBCUTANEOUS at 13:30

## 2020-11-20 RX ADMIN — ONDANSETRON 4 MILLIGRAM(S): 8 TABLET, FILM COATED ORAL at 10:06

## 2020-11-20 RX ADMIN — PIPERACILLIN AND TAZOBACTAM 200 GRAM(S): 4; .5 INJECTION, POWDER, LYOPHILIZED, FOR SOLUTION INTRAVENOUS at 00:14

## 2020-11-20 RX ADMIN — HYDROMORPHONE HYDROCHLORIDE 4 MILLIGRAM(S): 2 INJECTION INTRAMUSCULAR; INTRAVENOUS; SUBCUTANEOUS at 00:14

## 2020-11-20 RX ADMIN — PIPERACILLIN AND TAZOBACTAM 200 GRAM(S): 4; .5 INJECTION, POWDER, LYOPHILIZED, FOR SOLUTION INTRAVENOUS at 19:05

## 2020-11-20 NOTE — PROGRESS NOTE ADULT - PROBLEM SELECTOR PLAN 3
Pt with h/o C-sacrum fusion surgery, has chronic back pain for which is opiate dependent. No symptoms of numbness, weakness, saddle anesthesia, or any pain from surgical site/wound. Neurosurgery consulted in the ED. Of note, he has been on chronic prednisone, most recently 10mg daily. Patient does not have clear understand of why he is on the medication,   - f/u neurosurgery recs- No Acute Neurosurgical Intervention is indicated at this time based on the exam and the imaging studies, low c/f for wound infection, no signs or exam findings c/f epidural abscess  -f/u CT Cervical spine with contr  - obtain collateral from patient's PMD- Dr. Aj/surgeon regarding prednisone  -continue prednisone 10 mg PO q24h Pt with h/o C-sacrum fusion surgery, has chronic back pain for which is opiate dependent. No symptoms of numbness, weakness, saddle anesthesia, or any pain from surgical site/wound. Neurosurgery consulted in the ED. Of note, he has been on chronic prednisone, most recently 10mg daily (taken for eczema).  - f/u neurosurgery recs- no acute Neurosurgical Intervention is indicated at this time based on the exam and the imaging studies, low c/f for wound infection, no signs or exam findings c/f epidural abscess  -f/u CT Cervical/thoracic/lumbar spine with IV contrast to ensure no seeding of hardware/abscess, continue aggressive po opioid regimen for pain- dilaudid 4 mg PO q6h PRN for severe pain, (IV PRN for breakthrough pain)  -continue prednisone 10 mg PO q24h Pt with h/o C-sacrum fusion surgery, has chronic back pain for which is opiate dependent. No symptoms of numbness, weakness, saddle anesthesia, or any pain from surgical site/wound. Neurosurgery consulted in the ED. Of note, he has been on chronic prednisone, most recently 10mg daily (taken for eczema).  - f/u neurosurgery recs- no acute Neurosurgical Intervention is indicated at this time based on the exam and the imaging studies, low c/f for wound infection, no signs or exam findings c/f epidural abscess  -f/u CT Cervical/thoracic/lumbar spine with IV contrast to ensure no seeding of hardware/abscess  -CT cervical spine w/ IV contrast- Reversal of the normal cervical lordosis with retrolisthesis of C2 and C3 with superimposed cervicothoracic kyphotic deformity centered at the T2/3 level.   -CT thoracic spine w/ IV contrast- Shunt status post spinal fusion with hardware extending from the T3 level through the pelvis. Please see comments regarding hardware. Fracture through the left iliac bone. Additional compression deformities of T3, T8 and T12. Comparison with any recent studies would be beneficial to ascertain stability/change. Postoperative changes within the paraspinal soft tissues without discrete abscess.  -CT lumbar spine w/ IV contrast- Shunt status post spinal fusion with hardware extending from the T3 level through the pelvis. Please see comments regarding hardware. Fracture through the left iliac bone. Additional compression deformities of T3, T8 and T12. Comparison with any recent studies would be beneficial to ascertain stability/change. Postoperative changes within the paraspinal soft tissues without discrete abscess.  - continue aggressive po opioid regimen for pain- dilaudid 4 mg PO q6h PRN for severe pain, (IV 0.5 mg dilaudid PRN for breakthrough pain)  -continue prednisone 10 mg PO q24h

## 2020-11-20 NOTE — PROGRESS NOTE ADULT - PROBLEM SELECTOR PLAN 6
Pt with h/o bladder cancer, currently in remission. [place] [facility] [place]  - continue to monitor    #BPH/prostatomegaly, overactive bladder- on Tamsulosin 0.4mg qhs, Myrbetriq 50mg qd   - restart when BPs tolerate

## 2020-11-20 NOTE — PROGRESS NOTE ADULT - PROBLEM SELECTOR PLAN 5
hx. depression/panic disorder   w/ home Duloxetine 120mg qd, Aripriprazole 2mg qd, Clonopin 0.5mg qhs  - obtain istop

## 2020-11-20 NOTE — CONSULT NOTE ADULT - ASSESSMENT
per Internal Medicine    74 yo M with PMH bladder cancer (s/p tumor resection, localized chemo and now in remission from 5yrs), GERD, kyphoscoliosis s/p C-sacrum fusion at [facility] (10/5/2020) depression, panic disorder, RUE DVT (on Eliquis), BPH, presenting with initial complaints of loose non-bloody bowel movements, decreased po intake, nausea, dizziness, dysuria/urinary frequency admitted for severe sepsis suspected 2/2 UTI- neg CT imaging for alternative source, no longer with diarrhea so lower c/f cdiff.      Problem/Plan - 1:  ·  Problem: Sepsis secondary to UTI.  Plan: Pt presenting with leukocytosis with WBC 14k, fever of 102F, HR 90-100s, and systolic blood pressures 90-100s. Initial complaints of nausea, non-bloody diarrhea, but patient also reports urinary frequency and dysuria. US positive for Nitrites, WBC, bacteria. Source suspected to be UTI. No hydronephrosis seen on CT abd/pelv. Received 4L fluids given elevated lactate to 7.1, but cleared to 1.6. He had rigors in the ED after completed CT abd/pelv. Patient blood pressures continue to remain low with SBP 90s, however is warm and well perfused on exam.   - f/u UCx, BCx   -c/w IV Zosyn 3.375g q6h, low threshold to broaden if unstable or add on stress dose steroids  -If further diarrhea jia with leukocytosis send cdiff studies in addition to GI PCR.     Problem/Plan - 2:  ·  Problem: Hypoxia.  Plan: On room air, patient was noted to be hypoxic to 88-89% at rest. He remained asymptomatic without any evidence of respiratory distress. However unclear etiology of hypoxia, - ddx atelectasis (seen on CXR/CT A/P) ?PE (although low likelihood given already anticoagulated with Eliquis for DVT).  -on 2L NC   - f/u D-dimer.     Problem/Plan - 3:  ·  Problem: History of spinal surgery.  Plan: Pt with h/o C-sacrum fusion surgery, has chronic back pain for which is opiate dependent. No symptoms of numbness, weakness, saddle anesthesia, or any pain from surgical site/wound. Neurosurgery consulted in the ED. Of note, he has been on chronic prednisone, most recently 10mg daily. Patient does not have clear understand of why he is on the medication,   - f/u neurosurgery recs- No Acute Neurosurgical Intervention is indicated at this time based on the exam and the imaging studies, low c/f for wound infection, no signs or exam findings c/f epidural abscess  -f/u CT Cervical spine with contr  - obtain collateral from patient's PMD- Dr. Aj/surgeon regarding prednisone  -continue prednisone 10 mg PO q24h.     Problem/Plan - 4:  ·  Problem: DVT of upper extremity (deep vein thrombosis).  Plan: pt on eliquis for upper extremity DVT (dx post operatively back surgery)  - resume eliquis 5mg BID.     Problem/Plan - 5:  ·  Problem: Depression.  Plan: hx. depression/panic disorder   w/ home Duloxetine 120mg qd, Aripriprazole 2mg qd, Clonopin 0.5mg qhs  - obtain istop.     Problem/Plan - 6:  Problem: Bladder cancer. Plan: Pt with h/o bladder cancer, currently in remission. was previously following up at [facility] [place].  - continue to monitor    #BPH/prostatomegaly, overactive bladder- on Tamsulosin 0.4mg qhs, Myrbetriq 50mg qd   - restart when BPs tolerate.    Problem/Plan - 7:  ·  Problem: Hematuria.  Plan: On UA found to have moderate blood and hematuria. May be related to UTI/cystitis. Of note patient has remote h/o bladder CA   -f/u Ucx.     Problem/Plan - 8:  ·  Problem: GERD (gastroesophageal reflux disease).  Plan: -c/w protonix 40mg qd.     Problem/Plan - 9:  ·  Problem: Nutrition, metabolism, and development symptoms.  Plan: Fluids: s/p 4L NS, no more IVF, encourage PO intake   Electrolytes-replete as needed  Nutrition - DASH/TLC  Code- Full Code  DVT ppx: eliquis 5mg BID  GI ppx: 40 mg PO protonix qd  DIspo: [facility].

## 2020-11-20 NOTE — CONSULT NOTE ADULT - SUBJECTIVE AND OBJECTIVE BOX
Patient is a 75y old  Male who presents with a chief complaint of sepsis (2020 06:58)       HPI:  75M with PMH bladder cancer (s/p tumor resection, s/p localized chemo and now in remission from 5yrs), GERD, kyphoscoliosis s/p C-sacrum fusion at [facility] (10/5/2020, w/ Dr. Carlisle), depression, panic disorder, RUE DVT (on eliquis), BPH, presenting with initial complaints of loose non-bloody bowel movements for the past few days, along with decreased appetite, and nausea and dizziness, starting today.  He reports chronic back pain for which he takes dilaudid 6mg around the clock, q6h since his surgery 1 month ago. After his surgery, he went to rehab and was discharged from rehab on 2020.  He states recently he has been feeling lightheaded and nauseous after taking dilaudid which is new. Regarding his back pain, he denies any changes in his pain, denies any numbness, saddle anesthesia, weakness, incontinence. He states he also has been having dysuria and frequency for the past three days. Initially he thought he noticed hematuria, but it eventually resolved.  No previous history of UTI, denies fevers (although never checked at home), flank pain, abd pain. Denies any chest pain, shortness of breath, palpitations, LE edema, or any other complaints at this time.     In the ED: Initial vital signs: T: 97.6 F --> 102.5F, HR: 92, BP: 137/86 mmHg --> 91/60s, R: 18 SpO2: 97% on RA  ED course: s/p tylenol 975mg po, dilaudid 0.5mg x1, Reglan 10mg x1, zofran 4mg x1, 3L NS fluids , Vancomycin 1g, Zosyn 3.375g   Labs: significant for WBC 14.23 with neutrophilic predominance, Hb 11.8, lactate 7.1 --> cleared to 1.6 (within 2 hours), CMP unremarkable;. UA positive for nitrites, WBC, RBC, and moderate blood, bacteria present   Imaging:   CXR: bibasilar atelectasis   CT abd/pelv w/ oral and IV contrast: bibasilar atelectasis, prostatomegaly. s/p spinal fusion with fixation into iliac bones. No bowel obstruction or appendicitis.   US abd: enlarged liver, no intrahepatic intraductal biliary dilation, gallbladder sludge present, no gallbladder wall thickening or pericholecystic fluid, CBD 7mm  EKG: NSR with HR 80, no acute ST or T wave changes   Medications: see below  Consults: Neurosurgery   (2020 04:47)      PAST MEDICAL & SURGICAL HISTORY:  Bladder cancer    GERD (gastroesophageal reflux disease)    Depression    High cholesterol    History of back surgery        MEDICATIONS  (STANDING):  apixaban 5 milliGRAM(s) Oral every 12 hours  ARIPiprazole 2 milliGRAM(s) Oral daily  clonazePAM  Tablet 0.5 milliGRAM(s) Oral at bedtime  DULoxetine 120 milliGRAM(s) Oral daily  pantoprazole    Tablet 40 milliGRAM(s) Oral before breakfast  piperacillin/tazobactam IVPB.. 3.375 Gram(s) IV Intermittent every 6 hours  predniSONE   Tablet 10 milliGRAM(s) Oral every 24 hours    MEDICATIONS  (PRN):  acetaminophen   Tablet .. 650 milliGRAM(s) Oral every 6 hours PRN Temp greater or equal to 38C (100.4F), Mild Pain (1 - 3), Moderate Pain (4 - 6)  HYDROmorphone   Tablet 4 milliGRAM(s) Oral every 6 hours PRN Severe Pain (7 - 10)      Social History:           -  Home Living Status: lives alone [address] apartment, has a daughter who attends [employer], was at [facility] d/ced home last week           -  Prior Home Care Services:  has private hire aide 5-8 hrs/ day    Baseline Functional Level Prior to Admission:           - ADL's/ IADL's:    partial assistance           - ambulatory status PTA:  walked with a rolling walker    FAMILY HISTORY:      CBC Full  -  ( 2020 08:08 )  WBC Count : 14.05 K/uL  RBC Count : 3.58 M/uL  Hemoglobin : 10.2 g/dL  Hematocrit : 33.5 %  Platelet Count - Automated : 223 K/uL  Mean Cell Volume : 93.6 fl  Mean Cell Hemoglobin : 28.5 pg  Mean Cell Hemoglobin Concentration : 30.4 gm/dL  Auto Neutrophil # : 11.37 K/uL  Auto Lymphocyte # : 1.44 K/uL  Auto Monocyte # : 0.93 K/uL  Auto Eosinophil # : 0.20 K/uL  Auto Basophil # : 0.04 K/uL  Auto Neutrophil % : 81.0 %  Auto Lymphocyte % : 10.2 %  Auto Monocyte % : 6.6 %  Auto Eosinophil % : 1.4 %  Auto Basophil % : 0.3 %          140  |  108  |  16  ----------------------------<  142<H>  3.7   |  22  |  0.77    Ca    8.0<L>      2020 11:13  Phos  3.4       Mg     1.8         TPro  5.3<L>  /  Alb  2.7<L>  /  TBili  0.4  /  DBili  x   /  AST  20  /  ALT  10  /  AlkPhos  87        Urinalysis Basic - ( 2020 03:44 )    Color: Yellow / Appearance: Clear / S.010 / pH: x  Gluc: x / Ketone: NEGATIVE  / Bili: Negative / Urobili: 0.2 E.U./dL   Blood: x / Protein: NEGATIVE mg/dL / Nitrite: POSITIVE   Leuk Esterase: NEGATIVE / RBC: 5-10 /HPF / WBC 5-10 /HPF   Sq Epi: x / Non Sq Epi: 0-5 /HPF / Bacteria: Present /HPF          Radiology:    < from: Xray Chest 1 View- PORTABLE-Urgent (Xray Chest 1 View- PORTABLE-Urgent .) (20 @ 08:55) >  EXAM:  XR CHEST PORTABLE URGENT 1V                          PROCEDURE DATE:  2020          INTERPRETATION:  Clinical history/reason for exam: Sepsis.    Frontal view.    COMPARISON: 2020 time 1:33 AM.    Findings/  impression: Heart size within normal limits... Left basilar focal atelectasis, unchanged.. Stable bony structures. Thoracolumbar hardware.        < from: CT Abdomen and Pelvis w/ Oral Cont and w/ IV Cont (20 @ 01:16) >  EXAM:  CT ABDOMEN AND PELVIS OC IC                          PROCEDURE DATE:  2020          INTERPRETATION:  CT SCAN OF ABDOMEN AND PELVIS    History: Acute nonlocalized abdominal pain.    Technique: CT scan of abdomen and pelvis was performedfrom lung bases through symphysis pubis. Intravenous and oral contrast material were utilized. Axial, sagittal and coronal reformatted images were reviewed.    Comparison: None.    Findings:    Lower chest: Bibasilar subsegmental atelectasis. Eventration of the left hemidiaphragm.    Liver:  Mild hepatic steatosis.    Gallbladder: Motion artifact obscures evaluation at the gallbladder fossa. No calcified gallstones appreciated. No free fluid appreciated within this region.    Spleen:  Normal.    Pancreas:  Fatty replacement of the pancreatic head.    Adrenal glands:  Normal.    Kidneys: 3.3 cm right renal cyst, 1.7 cm parapelvic and 2.1 cm left renal simple cysts. Subcentimeter hypodense lesion in the left kidney is too small to characterize.    Adenopathy:  No lymphadenopathy in abdomen or pelvis.    Ascites: None.    Gastrointestinal tract: No bowel obstruction. Appendix is normal. No pneumoperitoneum or ascites.    Vessels: Calcific and noncalcific atherosclerosis of the aortoiliac system. 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    Pelvic organs: Prostatomegaly measuring 2.4 x 5.1 x 4.0 cm.    Soft tissues: Small fat-containing umbilical hernia and bilateral fat-containing inguinal hernias. Diastasis of the rectus abdominis. Left thigh lipoma measuring 2.1 cm.    Bones: Status post spinal fusion of the spine with fixation into the iliac bones. Compression deformity of the T12 vertebral body. Levoscoliosis of the mid lumbar spine.    Impression:    No bowel obstruction or appendicitis.            Vital Signs Last 24 Hrs  T(C): 37 (2020 06:13), Max: 37 (2020 06:13)  T(F): 98.6 (2020 06:13), Max: 98.6 (2020 06:13)  HR: 76 (2020 06:13) (76 - 81)  BP: 133/81 (2020 06:13) (99/60 - 133/81)  BP(mean): --  RR: 20 (2020 06:13) (16 - 20)  SpO2: 95% (2020 06:13) (95% - 97%)    REVIEW OF SYSTEMS: as per HPI        Physical Exam: WDWN 76 yo  gentleman lying in bed, no acute complaints    Head: normocephalic, atraumatic    Eyes: PERRLA, EOMI, no nystagmus, sclera anicteric    ENT: nasal discharge, uvula midline, no oropharyngeal erythema/exudate    Neck: supple, negative JVD, negative carotid bruits, no thyromegaly    Chest: CTA bilaterally, neg wheeze/ rhonchi/ rales/ crackles/ egophany    Cardiovascular: regular rate and rhythm, neg murmurs/rubs/gallops    Abdomen: soft, non distended, non tender to palpation in all 4 quadrants, negative rebound/guarding, normal bowel sounds    Extremities: WWP, neg cyanosis/clubbing/edema, negative calf tenderness to palpation, negative Angela's sign    Musculoskeletal:      :     Neurologic Exam:    Alert and oriented to person, place, date/year, speech fluent w/o dysarthria      Motor Exam:    Upper Extremities:     Right:   no focal weakness > 3+/5    Left:    no focal weakness > 3+/5    Lower Extremities:    Right:    no focal weakness > 3+/5    Left :    no focal weakness > 3+/5               Sensation: Intact to light touch x 4 extremities,                                        DTR:            = biceps/     triceps/     brachioradialis                      = patella/   medial hamstring/ankle                     Gait:  not tested        PM&R Impression:    1) deconditioned  2) no focal weakness  3) sepsis/ UTI    Plan:    1) Physical therapy focusing on therapeutic exercises, bed mobility/transfer out of bed evaluation, progressive ambulation with assistive devices prn.    2) Anticipated Disposition Plan/Recs:  pending functional progress

## 2020-11-20 NOTE — PROGRESS NOTE ADULT - PROBLEM SELECTOR PLAN 2
On room air, patient was noted to be hypoxic to 88-89% at rest. He remained asymptomatic without any evidence of respiratory distress. However unclear etiology of hypoxia, - ddx atelectasis (seen on CXR/CT A/P) ?PE (although low likelihood given already anticoagulated with Eliquis for DVT).  -on 2L NC   - f/u D-dimer On admission- pt. in the ED on room air was noted to be hypoxic to 88-89% at rest. He remained asymptomatic without any evidence of respiratory distress. However unclear etiology of hypoxia, - ddx atelectasis (seen on CXR/CT A/P)   -resolved, pt. now on room air

## 2020-11-20 NOTE — PHYSICAL THERAPY INITIAL EVALUATION ADULT - PERTINENT HX OF CURRENT PROBLEM, REHAB EVAL
75M with PMH bladder cancer (s/p tumor resection, s/p localized chemo and now in remission from 5yrs), GERD, kyphoscoliosis s/p C-sacrum fusion at [facility] (10/5/2020, w/ Dr. Carlisle), depression, panic disorder, RUE DVT (on eliquis), BPH, presenting with initial complaints of loose non-bloody bowel movements for the past few days, along with decreased appetite, and nausea and dizziness.

## 2020-11-20 NOTE — PROGRESS NOTE ADULT - SUBJECTIVE AND OBJECTIVE BOX
INTERVAL HPI/OVERNIGHT EVENTS:    SUBJECTIVE: Patient seen and examined at bedside.    OBJECTIVE:    VITAL SIGNS:  ICU Vital Signs Last 24 Hrs  T(C): 37 (2020 06:13), Max: 37 (2020 06:13)  T(F): 98.6 (2020 06:13), Max: 98.6 (2020 06:13)  HR: 76 (2020 06:13) (76 - 87)  BP: 133/81 (2020 06:13) (99/60 - 133/81)  BP(mean): --  ABP: --  ABP(mean): --  RR: 20 (2020 06:13) (16 - 20)  SpO2: 95% (:13) (95% - 97%)        18 @ 07:01  -   @ 07:00  --------------------------------------------------------  IN: 0 mL / OUT: 250 mL / NET: -250 mL     @ 07:01  -   @ 06:58  --------------------------------------------------------  IN: 0 mL / OUT: 1900 mL / NET: -1900 mL      CAPILLARY BLOOD GLUCOSE          PHYSICAL EXAM:      MEDICATIONS:  MEDICATIONS  (STANDING):  apixaban 5 milliGRAM(s) Oral every 12 hours  ARIPiprazole 2 milliGRAM(s) Oral daily  clonazePAM  Tablet 0.5 milliGRAM(s) Oral at bedtime  DULoxetine 120 milliGRAM(s) Oral daily  pantoprazole    Tablet 40 milliGRAM(s) Oral before breakfast  piperacillin/tazobactam IVPB.. 3.375 Gram(s) IV Intermittent every 6 hours  predniSONE   Tablet 10 milliGRAM(s) Oral every 24 hours    MEDICATIONS  (PRN):  acetaminophen   Tablet .. 650 milliGRAM(s) Oral every 6 hours PRN Temp greater or equal to 38C (100.4F), Mild Pain (1 - 3), Moderate Pain (4 - 6)  HYDROmorphone   Tablet 4 milliGRAM(s) Oral every 6 hours PRN Severe Pain (7 - 10)      ALLERGIES:  Allergies    sulfa drugs (Unknown)    Intolerances        LABS:                        9.9    20.95 )-----------( 230      ( 2020 11:13 )             32.3         140  |  108  |  16  ----------------------------<  142<H>  3.7   |  22  |  0.77    Ca    8.0<L>      2020 11:13  Phos  3.4       Mg     1.8         TPro  5.3<L>  /  Alb  2.7<L>  /  TBili  0.4  /  DBili  x   /  AST  20  /  ALT  10  /  AlkPhos  87  11-19    PT/INR - ( 2020 11:13 )   PT: 19.4 sec;   INR: 1.66          PTT - ( 2020 11:13 )  PTT:36.1 sec  Urinalysis Basic - ( 2020 03:44 )    Color: Yellow / Appearance: Clear / S.010 / pH: x  Gluc: x / Ketone: NEGATIVE  / Bili: Negative / Urobili: 0.2 E.U./dL   Blood: x / Protein: NEGATIVE mg/dL / Nitrite: POSITIVE   Leuk Esterase: NEGATIVE / RBC: 5-10 /HPF / WBC 5-10 /HPF   Sq Epi: x / Non Sq Epi: 0-5 /HPF / Bacteria: Present /HPF        RADIOLOGY & ADDITIONAL TESTS: Reviewed. INTERVAL HPI/OVERNIGHT EVENTS:    SUBJECTIVE: Patient seen and examined at bedside.    OBJECTIVE:    VITAL SIGNS:  ICU Vital Signs Last 24 Hrs  T(C): 37 (2020 06:13), Max: 37 (:13)  T(F): 98.6 (:13), Max: 98.6 (:13)  HR: 76 (:) (76 - 87)  BP: 133/81 (:13) (99/60 - 133/81)  BP(mean): --  ABP: --  ABP(mean): --  RR: 20 (:13) (16 - 20)  SpO2: 95% (:) (95% - 97%)        18 @ 07:01  -   @ 07:00  --------------------------------------------------------  IN: 0 mL / OUT: 250 mL / NET: -250 mL     @ 07:01  -   @ 06:58  --------------------------------------------------------  IN: 0 mL / OUT: 1900 mL / NET: -1900 mL      CAPILLARY BLOOD GLUCOSE          PHYSICAL EXAM:  Constitutional: WDWN, lying comfortably in bed, NAD, elderly male, breathing on room air  Head: Nc/At  Eyes: PERRL, EOMI, clear conjunctiva  ENT: no nasal discharge; uvula midline, no oropharyngeal erythema or exudates; dry mucus membranes   Neck: supple; no JVD or thyromegaly  Respiratory: CTA b/l, no wheezes, rales, or rhonchi  Cardiac: +S1/S2, tachycardic, regular, no murmurs, rubs, or gallops  Gastrointestinal: soft, non-distended, tender to palpation in RUQ, no rebound/guarding, no palpable masses, normoactive bowel sounds x4  Back: spine midline, no bony tenderness or step-offs; no CVAT B/L  Extremities: WWP, no clubbing or cyanosis, trace pitting edema   Musculoskeletal: NROM x4; no joint swelling, tenderness or erythema  Vascular: 2+ radial and DP pulses b/l  Dermatologic: skin warm, dry and intact, well healed surgical scar long linear from cervical to sacrum, no surrounding erythema, or focal tenderness to palpation   Lymphatic: no submandibular or cervical LAD  Neurologic: AAOx3, CNII-XII grossly intact, no focal deficits. 5/5 strength to bilateral upper proximal and distal extremities.   Psychiatric: affect and characteristics of appearance, verbalizations, behaviors are appropriate. Sometimes slow to answering questions      MEDICATIONS:  MEDICATIONS  (STANDING):  apixaban 5 milliGRAM(s) Oral every 12 hours  ARIPiprazole 2 milliGRAM(s) Oral daily  clonazePAM  Tablet 0.5 milliGRAM(s) Oral at bedtime  DULoxetine 120 milliGRAM(s) Oral daily  pantoprazole    Tablet 40 milliGRAM(s) Oral before breakfast  piperacillin/tazobactam IVPB.. 3.375 Gram(s) IV Intermittent every 6 hours  predniSONE   Tablet 10 milliGRAM(s) Oral every 24 hours    MEDICATIONS  (PRN):  acetaminophen   Tablet .. 650 milliGRAM(s) Oral every 6 hours PRN Temp greater or equal to 38C (100.4F), Mild Pain (1 - 3), Moderate Pain (4 - 6)  HYDROmorphone   Tablet 4 milliGRAM(s) Oral every 6 hours PRN Severe Pain (7 - 10)      ALLERGIES:  Allergies    sulfa drugs (Unknown)    Intolerances        LABS:                        9.9    20.95 )-----------( 230      ( 2020 11:13 )             32.3     -    140  |  108  |  16  ----------------------------<  142<H>  3.7   |  22  |  0.77    Ca    8.0<L>      2020 11:13  Phos  3.4     -  Mg     1.8         TPro  5.3<L>  /  Alb  2.7<L>  /  TBili  0.4  /  DBili  x   /  AST  20  /  ALT  10  /  AlkPhos  87  -    PT/INR - ( 2020 11:13 )   PT: 19.4 sec;   INR: 1.66          PTT - ( 2020 11:13 )  PTT:36.1 sec  Urinalysis Basic - ( 2020 03:44 )    Color: Yellow / Appearance: Clear / S.010 / pH: x  Gluc: x / Ketone: NEGATIVE  / Bili: Negative / Urobili: 0.2 E.U./dL   Blood: x / Protein: NEGATIVE mg/dL / Nitrite: POSITIVE   Leuk Esterase: NEGATIVE / RBC: 5-10 /HPF / WBC 5-10 /HPF   Sq Epi: x / Non Sq Epi: 0-5 /HPF / Bacteria: Present /HPF        RADIOLOGY & ADDITIONAL TESTS: Reviewed. INTERVAL HPI/OVERNIGHT EVENTS:  No acute events overnight.     SUBJECTIVE: Patient seen and examined at bedside. Pt. still noting nausea, but no vomiting. Reports that his pain control     OBJECTIVE:    VITAL SIGNS:  ICU Vital Signs Last 24 Hrs  T(C): 37 (2020 06:13), Max: 37 (:13)  T(F): 98.6 (:13), Max: 98.6 (:13)  HR: 76 (:) (76 - 87)  BP: 133/81 (:) (99/60 - 133/81)  BP(mean): --  ABP: --  ABP(mean): --  RR: 20 (:) (16 - 20)  SpO2: 95% (:) (95% - 97%)        18 @ 07:01  -   @ 07:00  --------------------------------------------------------  IN: 0 mL / OUT: 250 mL / NET: -250 mL     @ 07:01  -   @ 06:58  --------------------------------------------------------  IN: 0 mL / OUT: 1900 mL / NET: -1900 mL      CAPILLARY BLOOD GLUCOSE    PHYSICAL EXAM:  Constitutional: WDWN, lying comfortably in bed, NAD, elderly male, breathing on room air  Head: Nc/At  Eyes: PERRL, EOMI, clear conjunctiva  ENT: no nasal discharge; uvula midline, no oropharyngeal erythema or exudates; dry mucus membranes   Neck: supple; no JVD or thyromegaly  Respiratory: CTA b/l, no wheezes, rales, or rhonchi  Cardiac: +S1/S2, tachycardic, regular, no murmurs, rubs, or gallops  Gastrointestinal: soft, non-distended, tender to palpation in RUQ, no rebound/guarding, no palpable masses, normoactive bowel sounds x4  Back: spine midline, no bony tenderness or step-offs; no CVAT B/L  Extremities: WWP, no clubbing or cyanosis, trace pitting edema   Musculoskeletal: NROM x4; no joint swelling, tenderness or erythema  Vascular: 2+ radial and DP pulses b/l  Dermatologic: skin warm, dry and intact, well healed surgical scar long linear from cervical to sacrum, no surrounding erythema, or focal tenderness to palpation   Lymphatic: no submandibular or cervical LAD  Neurologic: AAOx3, CNII-XII grossly intact, no focal deficits. 5/5 strength to bilateral upper proximal and distal extremities.   Psychiatric: affect and characteristics of appearance, verbalizations, behaviors are appropriate. Sometimes slow to answering questions      MEDICATIONS:  MEDICATIONS  (STANDING):  apixaban 5 milliGRAM(s) Oral every 12 hours  ARIPiprazole 2 milliGRAM(s) Oral daily  clonazePAM  Tablet 0.5 milliGRAM(s) Oral at bedtime  DULoxetine 120 milliGRAM(s) Oral daily  pantoprazole    Tablet 40 milliGRAM(s) Oral before breakfast  piperacillin/tazobactam IVPB.. 3.375 Gram(s) IV Intermittent every 6 hours  predniSONE   Tablet 10 milliGRAM(s) Oral every 24 hours    MEDICATIONS  (PRN):  acetaminophen   Tablet .. 650 milliGRAM(s) Oral every 6 hours PRN Temp greater or equal to 38C (100.4F), Mild Pain (1 - 3), Moderate Pain (4 - 6)  HYDROmorphone   Tablet 4 milliGRAM(s) Oral every 6 hours PRN Severe Pain (7 - 10)      ALLERGIES:  Allergies    sulfa drugs (Unknown)    Intolerances        LABS:                        9.9    20.95 )-----------( 230      ( 2020 11:13 )             32.3     11-19    140  |  108  |  16  ----------------------------<  142<H>  3.7   |  22  |  0.77    Ca    8.0<L>      2020 11:13  Phos  3.4     11-19  Mg     1.8     11-19    TPro  5.3<L>  /  Alb  2.7<L>  /  TBili  0.4  /  DBili  x   /  AST  20  /  ALT  10  /  AlkPhos  87  11-19    PT/INR - ( 2020 11:13 )   PT: 19.4 sec;   INR: 1.66          PTT - ( 2020 11:13 )  PTT:36.1 sec  Urinalysis Basic - ( 2020 03:44 )    Color: Yellow / Appearance: Clear / S.010 / pH: x  Gluc: x / Ketone: NEGATIVE  / Bili: Negative / Urobili: 0.2 E.U./dL   Blood: x / Protein: NEGATIVE mg/dL / Nitrite: POSITIVE   Leuk Esterase: NEGATIVE / RBC: 5-10 /HPF / WBC 5-10 /HPF   Sq Epi: x / Non Sq Epi: 0-5 /HPF / Bacteria: Present /HPF        RADIOLOGY & ADDITIONAL TESTS: Reviewed.

## 2020-11-20 NOTE — PROGRESS NOTE ADULT - PROBLEM SELECTOR PLAN 1
Pt presenting with leukocytosis with WBC 14k, fever of 102F, HR 90-100s, and systolic blood pressures 90-100s. Initial complaints of nausea, non-bloody diarrhea, but patient also reports urinary frequency and dysuria. US positive for Nitrites, WBC, bacteria. Source suspected to be UTI. No hydronephrosis seen on CT abd/pelv. Received 4L fluids given elevated lactate to 7.1, but cleared to 1.6. He had rigors in the ED after completed CT abd/pelv. Patient blood pressures continue to remain low with SBP 90s, however is warm and well perfused on exam.   - f/u UCx, BCx   -c/w IV Zosyn 3.375g q6h, low threshold to broaden if unstable or add on stress dose steroids  -If further diarrhea jia with leukocytosis send cdiff studies in addition to GI PCR Pt presenting with leukocytosis with WBC 14k, fever of 102F, HR 90-100s, and systolic blood pressures 90-100s. Initial complaints of nausea, non-bloody diarrhea, but patient also reports urinary frequency and dysuria. US positive for Nitrites, WBC, bacteria. Source suspected to be UTI. No hydronephrosis seen on CT abd/pelv. Received 4L fluids given elevated lactate to 7.1, but cleared to 1.6. He had rigors in the ED after completed CT abd/pelv. Patient blood pressures continue to remain low with SBP 90s, however is warm and well perfused on exam.   -Ucx- insignificant amount of growth  -f/u BCx- growing E.coli, susceptibilities to follow, will repeat bcx today 11/20 to ensure clearance. Ff persistent bacteremia would involve ID jia given recent hardware   -c/w IV Zosyn 3.375g q6h, low threshold to broaden if unstable or add on stress dose steroids  -If further diarrhea jia with leukocytosis send cdiff studies in addition to GI PCR

## 2020-11-20 NOTE — PHYSICAL THERAPY INITIAL EVALUATION ADULT - PLANNED THERAPY INTERVENTIONS, PT EVAL
gait training/postural re-education/bed mobility training/strengthening/balance training/transfer training

## 2020-11-20 NOTE — PROGRESS NOTE ADULT - PROBLEM SELECTOR PLAN 9
Fluids: s/p 4L NS, no more IVF, encourage PO intake   Electrolytes-replete as needed  Nutrition - DASH/TLC  Code- Full Code  DVT ppx: eliquis 5mg BID  GI ppx: 40 mg PO protonix qd  DIspo: RMF Fluids: s/p 4L NS, no more IVF, encourage PO intake   Electrolytes-replete as needed  Nutrition - DASH/TLC  Code- Full Code  DVT ppx: eliquis 5mg BID  GI ppx: 40 mg PO protonix qd  DIspo: RMF, pending clearance of bacteremia and sensitivities likely monday, PT eval pending may need MCKENNA

## 2020-11-20 NOTE — PROGRESS NOTE ADULT - PROBLEM SELECTOR PLAN 7
On UA found to have moderate blood and hematuria. May be related to UTI/cystitis. Of note patient has remote h/o bladder CA   -f/u Ucx On UA found to have moderate blood and hematuria. May be related to UTI/cystitis. Of note patient has remote h/o bladder CA   -f/u Ucx- insignificant amount of growth

## 2020-11-20 NOTE — PHYSICAL THERAPY INITIAL EVALUATION ADULT - ADDITIONAL COMMENTS
Pt lives home alone but has helper for few hours per day. Pt was in rehab 2/2 recent spine Sx and was home for approx 1 week prior to admission. Pt has been using RW to ambulate but began weaning himself from device, as per pt.

## 2020-11-20 NOTE — PHYSICAL THERAPY INITIAL EVALUATION ADULT - TRANSFER SAFETY CONCERNS NOTED: SIT/STAND, REHAB EVAL
TODAY, please go to:     CHECK OUT    LAB    Please schedule the following appointments at [address] [place] [address]:  · Complete Physical Exam and lab follow up with Dr. Hannah Alexis in [facility]:  - likely benign    - will wait for urine tests and then update you. The tests can take several days to a week.    - let me know if symptoms are worsening losing balance/decreased weight-shifting ability

## 2020-11-20 NOTE — PROGRESS NOTE ADULT - ASSESSMENT
75M with PMH bladder cancer (s/p tumor resection, localized chemo and now in remission from 5yrs), GERD, kyphoscoliosis s/p C-sacrum fusion at [facility] (10/5/2020) depression, panic disorder, RUE DVT (on Eliquis), BPH, presenting with initial complaints of loose non-bloody bowel movements, decreased po intake, nausea, dizziness, dysuria/urinary frequency admitted for severe sepsis suspected 2/2 UTI- neg CT imaging for alternative source, no longer with diarrhea so lower c/f cdiff.

## 2020-11-20 NOTE — PROGRESS NOTE ADULT - SUBJECTIVE AND OBJECTIVE BOX
Patient was seen and examined by me at bedside. I agree with resident's note, subjective, objective physical exam, assessment and plan with following modifications/additions.    Greater than 35 minutes spent on total encounter; more than 50% of the visit was spent counseling and/or coordinating care by the attending physician.    75M with PMH bladder cancer (s/p tumor resection, localized chemo and now in remission), GERD, kyphoscoliosis s/p recent C-sacrum fusion at [facility] (10/5) panic disorder, RUE DVT (on Eliquis), BPH, presenting with initial complaints of diarrhea and dysuria, admitted with severe sepsis presumed s/t to UTI with ecoli bacteremia neg CT imaging for alternative source/collection   -Continue on zosyn pending sensitivites, repeat bcx today to ensure clearance, if persistent bacteremia would involve ID jia given recent hardware   -NSGY involved- low c/f for wound infection, CT spine C/T/L to ensure no seeding of hardware/abscess, continue aggressive po opioid regimen for pain (IV PRN)  -DVT/PE hx- continue home eliquis  -Dispo- pending clearance of bacteremia and sensitivities likely monday, PT eval pending may need MCKENNA Gonazlez MD [phone]

## 2020-11-21 LAB
-  AMPICILLIN/SULBACTAM: SIGNIFICANT CHANGE UP
-  AMPICILLIN/SULBACTAM: SIGNIFICANT CHANGE UP
-  AMPICILLIN: SIGNIFICANT CHANGE UP
-  AMPICILLIN: SIGNIFICANT CHANGE UP
-  CEFAZOLIN: SIGNIFICANT CHANGE UP
-  CEFAZOLIN: SIGNIFICANT CHANGE UP
-  CEFTRIAXONE: SIGNIFICANT CHANGE UP
-  CEFTRIAXONE: SIGNIFICANT CHANGE UP
-  CIPROFLOXACIN: SIGNIFICANT CHANGE UP
-  CIPROFLOXACIN: SIGNIFICANT CHANGE UP
-  GENTAMICIN: SIGNIFICANT CHANGE UP
-  GENTAMICIN: SIGNIFICANT CHANGE UP
-  PIPERACILLIN/TAZOBACTAM: SIGNIFICANT CHANGE UP
-  PIPERACILLIN/TAZOBACTAM: SIGNIFICANT CHANGE UP
-  TOBRAMYCIN: SIGNIFICANT CHANGE UP
-  TOBRAMYCIN: SIGNIFICANT CHANGE UP
-  TRIMETHOPRIM/SULFAMETHOXAZOLE: SIGNIFICANT CHANGE UP
-  TRIMETHOPRIM/SULFAMETHOXAZOLE: SIGNIFICANT CHANGE UP
ALBUMIN SERPL ELPH-MCNC: 3 G/DL — LOW (ref 3.3–5)
ALP SERPL-CCNC: 85 U/L — SIGNIFICANT CHANGE UP (ref 40–120)
ALT FLD-CCNC: 9 U/L — LOW (ref 10–45)
ANION GAP SERPL CALC-SCNC: 11 MMOL/L — SIGNIFICANT CHANGE UP (ref 5–17)
AST SERPL-CCNC: 16 U/L — SIGNIFICANT CHANGE UP (ref 10–40)
BASOPHILS # BLD AUTO: 0.04 K/UL — SIGNIFICANT CHANGE UP (ref 0–0.2)
BASOPHILS NFR BLD AUTO: 0.4 % — SIGNIFICANT CHANGE UP (ref 0–2)
BILIRUB SERPL-MCNC: 0.3 MG/DL — SIGNIFICANT CHANGE UP (ref 0.2–1.2)
BUN SERPL-MCNC: 14 MG/DL — SIGNIFICANT CHANGE UP (ref 7–23)
CALCIUM SERPL-MCNC: 9 MG/DL — SIGNIFICANT CHANGE UP (ref 8.4–10.5)
CHLORIDE SERPL-SCNC: 100 MMOL/L — SIGNIFICANT CHANGE UP (ref 96–108)
CO2 SERPL-SCNC: 26 MMOL/L — SIGNIFICANT CHANGE UP (ref 22–31)
CREAT SERPL-MCNC: 0.77 MG/DL — SIGNIFICANT CHANGE UP (ref 0.5–1.3)
CULTURE RESULTS: SIGNIFICANT CHANGE UP
CULTURE RESULTS: SIGNIFICANT CHANGE UP
EOSINOPHIL # BLD AUTO: 0.18 K/UL — SIGNIFICANT CHANGE UP (ref 0–0.5)
EOSINOPHIL NFR BLD AUTO: 1.7 % — SIGNIFICANT CHANGE UP (ref 0–6)
GLUCOSE SERPL-MCNC: 84 MG/DL — SIGNIFICANT CHANGE UP (ref 70–99)
HCT VFR BLD CALC: 34.5 % — LOW (ref 39–50)
HGB BLD-MCNC: 10.8 G/DL — LOW (ref 13–17)
IMM GRANULOCYTES NFR BLD AUTO: 0.6 % — SIGNIFICANT CHANGE UP (ref 0–1.5)
LYMPHOCYTES # BLD AUTO: 2.37 K/UL — SIGNIFICANT CHANGE UP (ref 1–3.3)
LYMPHOCYTES # BLD AUTO: 22.9 % — SIGNIFICANT CHANGE UP (ref 13–44)
MAGNESIUM SERPL-MCNC: 2 MG/DL — SIGNIFICANT CHANGE UP (ref 1.6–2.6)
MCHC RBC-ENTMCNC: 28.1 PG — SIGNIFICANT CHANGE UP (ref 27–34)
MCHC RBC-ENTMCNC: 31.3 GM/DL — LOW (ref 32–36)
MCV RBC AUTO: 89.6 FL — SIGNIFICANT CHANGE UP (ref 80–100)
METHOD TYPE: SIGNIFICANT CHANGE UP
METHOD TYPE: SIGNIFICANT CHANGE UP
MONOCYTES # BLD AUTO: 0.97 K/UL — HIGH (ref 0–0.9)
MONOCYTES NFR BLD AUTO: 9.4 % — SIGNIFICANT CHANGE UP (ref 2–14)
NEUTROPHILS # BLD AUTO: 6.73 K/UL — SIGNIFICANT CHANGE UP (ref 1.8–7.4)
NEUTROPHILS NFR BLD AUTO: 65 % — SIGNIFICANT CHANGE UP (ref 43–77)
NRBC # BLD: 0 /100 WBCS — SIGNIFICANT CHANGE UP (ref 0–0)
ORGANISM # SPEC MICROSCOPIC CNT: SIGNIFICANT CHANGE UP
PHOSPHATE SERPL-MCNC: 2.7 MG/DL — SIGNIFICANT CHANGE UP (ref 2.5–4.5)
PLATELET # BLD AUTO: 287 K/UL — SIGNIFICANT CHANGE UP (ref 150–400)
POTASSIUM SERPL-MCNC: 3.8 MMOL/L — SIGNIFICANT CHANGE UP (ref 3.5–5.3)
POTASSIUM SERPL-SCNC: 3.8 MMOL/L — SIGNIFICANT CHANGE UP (ref 3.5–5.3)
PROT SERPL-MCNC: 5.8 G/DL — LOW (ref 6–8.3)
RBC # BLD: 3.85 M/UL — LOW (ref 4.2–5.8)
RBC # FLD: 15.1 % — HIGH (ref 10.3–14.5)
SODIUM SERPL-SCNC: 137 MMOL/L — SIGNIFICANT CHANGE UP (ref 135–145)
SPECIMEN SOURCE: SIGNIFICANT CHANGE UP
SPECIMEN SOURCE: SIGNIFICANT CHANGE UP
WBC # BLD: 10.35 K/UL — SIGNIFICANT CHANGE UP (ref 3.8–10.5)
WBC # FLD AUTO: 10.35 K/UL — SIGNIFICANT CHANGE UP (ref 3.8–10.5)

## 2020-11-21 PROCEDURE — 99233 SBSQ HOSP IP/OBS HIGH 50: CPT | Mod: GC

## 2020-11-21 RX ORDER — CEFTRIAXONE 500 MG/1
2000 INJECTION, POWDER, FOR SOLUTION INTRAMUSCULAR; INTRAVENOUS ONCE
Refills: 0 | Status: COMPLETED | OUTPATIENT
Start: 2020-11-21 | End: 2020-11-21

## 2020-11-21 RX ORDER — CEFTRIAXONE 500 MG/1
INJECTION, POWDER, FOR SOLUTION INTRAMUSCULAR; INTRAVENOUS
Refills: 0 | Status: DISCONTINUED | OUTPATIENT
Start: 2020-11-21 | End: 2020-11-24

## 2020-11-21 RX ORDER — CEFTRIAXONE 500 MG/1
2000 INJECTION, POWDER, FOR SOLUTION INTRAMUSCULAR; INTRAVENOUS EVERY 24 HOURS
Refills: 0 | Status: DISCONTINUED | OUTPATIENT
Start: 2020-11-22 | End: 2020-11-24

## 2020-11-21 RX ADMIN — PIPERACILLIN AND TAZOBACTAM 200 GRAM(S): 4; .5 INJECTION, POWDER, LYOPHILIZED, FOR SOLUTION INTRAVENOUS at 06:31

## 2020-11-21 RX ADMIN — HYDROMORPHONE HYDROCHLORIDE 4 MILLIGRAM(S): 2 INJECTION INTRAMUSCULAR; INTRAVENOUS; SUBCUTANEOUS at 07:02

## 2020-11-21 RX ADMIN — Medication 0.5 MILLIGRAM(S): at 21:58

## 2020-11-21 RX ADMIN — HYDROMORPHONE HYDROCHLORIDE 4 MILLIGRAM(S): 2 INJECTION INTRAMUSCULAR; INTRAVENOUS; SUBCUTANEOUS at 14:01

## 2020-11-21 RX ADMIN — APIXABAN 5 MILLIGRAM(S): 2.5 TABLET, FILM COATED ORAL at 18:00

## 2020-11-21 RX ADMIN — HYDROMORPHONE HYDROCHLORIDE 4 MILLIGRAM(S): 2 INJECTION INTRAMUSCULAR; INTRAVENOUS; SUBCUTANEOUS at 06:47

## 2020-11-21 RX ADMIN — ARIPIPRAZOLE 2 MILLIGRAM(S): 15 TABLET ORAL at 13:08

## 2020-11-21 RX ADMIN — APIXABAN 5 MILLIGRAM(S): 2.5 TABLET, FILM COATED ORAL at 05:40

## 2020-11-21 RX ADMIN — HYDROMORPHONE HYDROCHLORIDE 4 MILLIGRAM(S): 2 INJECTION INTRAMUSCULAR; INTRAVENOUS; SUBCUTANEOUS at 00:31

## 2020-11-21 RX ADMIN — Medication 10 MILLIGRAM(S): at 13:10

## 2020-11-21 RX ADMIN — PIPERACILLIN AND TAZOBACTAM 200 GRAM(S): 4; .5 INJECTION, POWDER, LYOPHILIZED, FOR SOLUTION INTRAVENOUS at 13:08

## 2020-11-21 RX ADMIN — HYDROMORPHONE HYDROCHLORIDE 4 MILLIGRAM(S): 2 INJECTION INTRAMUSCULAR; INTRAVENOUS; SUBCUTANEOUS at 13:08

## 2020-11-21 RX ADMIN — HYDROMORPHONE HYDROCHLORIDE 4 MILLIGRAM(S): 2 INJECTION INTRAMUSCULAR; INTRAVENOUS; SUBCUTANEOUS at 20:07

## 2020-11-21 RX ADMIN — PIPERACILLIN AND TAZOBACTAM 200 GRAM(S): 4; .5 INJECTION, POWDER, LYOPHILIZED, FOR SOLUTION INTRAVENOUS at 18:00

## 2020-11-21 RX ADMIN — CEFTRIAXONE 100 MILLIGRAM(S): 500 INJECTION, POWDER, FOR SOLUTION INTRAMUSCULAR; INTRAVENOUS at 21:57

## 2020-11-21 RX ADMIN — DULOXETINE HYDROCHLORIDE 120 MILLIGRAM(S): 30 CAPSULE, DELAYED RELEASE ORAL at 13:09

## 2020-11-21 RX ADMIN — HYDROMORPHONE HYDROCHLORIDE 4 MILLIGRAM(S): 2 INJECTION INTRAMUSCULAR; INTRAVENOUS; SUBCUTANEOUS at 21:06

## 2020-11-21 RX ADMIN — PANTOPRAZOLE SODIUM 40 MILLIGRAM(S): 20 TABLET, DELAYED RELEASE ORAL at 05:40

## 2020-11-21 RX ADMIN — PIPERACILLIN AND TAZOBACTAM 200 GRAM(S): 4; .5 INJECTION, POWDER, LYOPHILIZED, FOR SOLUTION INTRAVENOUS at 01:11

## 2020-11-21 RX ADMIN — Medication 524 MILLIGRAM(S): at 23:08

## 2020-11-21 NOTE — PROGRESS NOTE ADULT - PROBLEM SELECTOR PLAN 1
-e.coli bacteremia 2/2 likely UTI, no other causes identified and urinary symptoms were presenting complaint  -leukocytosis improved on Zosyn, de-escalated to ceftriaxone today given sensitivities  -radiology called about collection of fluid seen on CT in back but reported no abscess and likely post-surgical, given no pain in the back less likely source of infection and likely an incidental finding

## 2020-11-21 NOTE — PROGRESS NOTE ADULT - ASSESSMENT
75M with PMH bladder cancer (s/p tumor resection, localized chemo and now in remission from 5yrs), GERD, kyphoscoliosis s/p C-sacrum fusion at [facility] (10/5/2020) depression, panic disorder, RUE DVT (on Eliquis), BPH, presenting with initial complaints of loose non-bloody bowel movements, decreased po intake, nausea, dizziness, dysuria/urinary frequency admitted for severe sepsis suspected 2/2 UTI- neg CT imaging for alternative source, no longer with diarrhea so lower c/f cdiff.

## 2020-11-21 NOTE — PROGRESS NOTE ADULT - SUBJECTIVE AND OBJECTIVE BOX
OVERNIGHT EVENTS: No complaints.     SUBJECTIVE / INTERVAL HPI: Patient seen and examined at bedside. Loose stool in AM. Denies urinary symptoms.     VITAL SIGNS:  Vital Signs Last 24 Hrs  T(C): 37 (21 Nov 2020 06:48), Max: 37 (21 Nov 2020 06:48)  T(F): 98.6 (21 Nov 2020 06:48), Max: 98.6 (21 Nov 2020 06:48)  HR: 67 (21 Nov 2020 06:48) (67 - 71)  BP: 136/89 (21 Nov 2020 06:48) (132/- - 136/89)  BP(mean): --  RR: 18 (21 Nov 2020 06:48) (17 - 18)  SpO2: 96% (21 Nov 2020 06:48) (94% - 96%)    PHYSICAL EXAM:    General: frail, elderly, pleasant  HEENT: MM  Cardiovascular: +S1/S2; RRR  Respiratory: R basilar crackles,   Gastrointestinal: soft, NT/ND; no suprapubic tenderness  Extremities: WWP;   Back: midline scar from cervical spine to pelvis, nontender, healed well    MEDICATIONS:  MEDICATIONS  (STANDING):  apixaban 5 milliGRAM(s) Oral every 12 hours  ARIPiprazole 2 milliGRAM(s) Oral daily  clonazePAM  Tablet 0.5 milliGRAM(s) Oral at bedtime  DULoxetine 120 milliGRAM(s) Oral daily  pantoprazole    Tablet 40 milliGRAM(s) Oral before breakfast  piperacillin/tazobactam IVPB.. 3.375 Gram(s) IV Intermittent every 6 hours  predniSONE   Tablet 10 milliGRAM(s) Oral every 24 hours    MEDICATIONS  (PRN):  acetaminophen   Tablet .. 650 milliGRAM(s) Oral every 6 hours PRN Temp greater or equal to 38C (100.4F), Mild Pain (1 - 3), Moderate Pain (4 - 6)  HYDROmorphone   Tablet 4 milliGRAM(s) Oral every 6 hours PRN Severe Pain (7 - 10)  HYDROmorphone  Injectable 0.5 milliGRAM(s) IV Push every 6 hours PRN breakthrough pain      ALLERGIES:  Allergies    sulfa drugs (Unknown)    Intolerances        LABS:                        10.8   10.35 )-----------( 287      ( 21 Nov 2020 07:58 )             34.5     11-21    137  |  100  |  14  ----------------------------<  84  3.8   |  26  |  0.77    Ca    9.0      21 Nov 2020 07:58  Phos  2.7     11-21  Mg     2.0     11-21    TPro  5.8<L>  /  Alb  3.0<L>  /  TBili  0.3  /  DBili  x   /  AST  16  /  ALT  9<L>  /  AlkPhos  85  11-21        CAPILLARY BLOOD GLUCOSE          RADIOLOGY & ADDITIONAL TESTS: Reviewed.    ASSESSMENT:    PLAN:

## 2020-11-22 DIAGNOSIS — R10.9 UNSPECIFIED ABDOMINAL PAIN: ICD-10-CM

## 2020-11-22 LAB
ALBUMIN SERPL ELPH-MCNC: 3.1 G/DL — LOW (ref 3.3–5)
ALP SERPL-CCNC: 81 U/L — SIGNIFICANT CHANGE UP (ref 40–120)
ALT FLD-CCNC: 8 U/L — LOW (ref 10–45)
ANION GAP SERPL CALC-SCNC: 13 MMOL/L — SIGNIFICANT CHANGE UP (ref 5–17)
AST SERPL-CCNC: 12 U/L — SIGNIFICANT CHANGE UP (ref 10–40)
BASOPHILS # BLD AUTO: 0.05 K/UL — SIGNIFICANT CHANGE UP (ref 0–0.2)
BASOPHILS NFR BLD AUTO: 0.7 % — SIGNIFICANT CHANGE UP (ref 0–2)
BILIRUB SERPL-MCNC: 0.2 MG/DL — SIGNIFICANT CHANGE UP (ref 0.2–1.2)
BUN SERPL-MCNC: 10 MG/DL — SIGNIFICANT CHANGE UP (ref 7–23)
CALCIUM SERPL-MCNC: 8.5 MG/DL — SIGNIFICANT CHANGE UP (ref 8.4–10.5)
CHLORIDE SERPL-SCNC: 102 MMOL/L — SIGNIFICANT CHANGE UP (ref 96–108)
CO2 SERPL-SCNC: 24 MMOL/L — SIGNIFICANT CHANGE UP (ref 22–31)
CREAT SERPL-MCNC: 0.77 MG/DL — SIGNIFICANT CHANGE UP (ref 0.5–1.3)
EOSINOPHIL # BLD AUTO: 0.35 K/UL — SIGNIFICANT CHANGE UP (ref 0–0.5)
EOSINOPHIL NFR BLD AUTO: 5 % — SIGNIFICANT CHANGE UP (ref 0–6)
GLUCOSE SERPL-MCNC: 82 MG/DL — SIGNIFICANT CHANGE UP (ref 70–99)
HCT VFR BLD CALC: 32.8 % — LOW (ref 39–50)
HGB BLD-MCNC: 10.4 G/DL — LOW (ref 13–17)
IMM GRANULOCYTES NFR BLD AUTO: 0.6 % — SIGNIFICANT CHANGE UP (ref 0–1.5)
LYMPHOCYTES # BLD AUTO: 2.18 K/UL — SIGNIFICANT CHANGE UP (ref 1–3.3)
LYMPHOCYTES # BLD AUTO: 31.3 % — SIGNIFICANT CHANGE UP (ref 13–44)
MAGNESIUM SERPL-MCNC: 1.9 MG/DL — SIGNIFICANT CHANGE UP (ref 1.6–2.6)
MCHC RBC-ENTMCNC: 27.9 PG — SIGNIFICANT CHANGE UP (ref 27–34)
MCHC RBC-ENTMCNC: 31.7 GM/DL — LOW (ref 32–36)
MCV RBC AUTO: 87.9 FL — SIGNIFICANT CHANGE UP (ref 80–100)
MONOCYTES # BLD AUTO: 0.86 K/UL — SIGNIFICANT CHANGE UP (ref 0–0.9)
MONOCYTES NFR BLD AUTO: 12.4 % — SIGNIFICANT CHANGE UP (ref 2–14)
NEUTROPHILS # BLD AUTO: 3.48 K/UL — SIGNIFICANT CHANGE UP (ref 1.8–7.4)
NEUTROPHILS NFR BLD AUTO: 50 % — SIGNIFICANT CHANGE UP (ref 43–77)
NRBC # BLD: 0 /100 WBCS — SIGNIFICANT CHANGE UP (ref 0–0)
PHOSPHATE SERPL-MCNC: 3.3 MG/DL — SIGNIFICANT CHANGE UP (ref 2.5–4.5)
PLATELET # BLD AUTO: 285 K/UL — SIGNIFICANT CHANGE UP (ref 150–400)
POTASSIUM SERPL-MCNC: 3.7 MMOL/L — SIGNIFICANT CHANGE UP (ref 3.5–5.3)
POTASSIUM SERPL-SCNC: 3.7 MMOL/L — SIGNIFICANT CHANGE UP (ref 3.5–5.3)
PROT SERPL-MCNC: 6 G/DL — SIGNIFICANT CHANGE UP (ref 6–8.3)
RBC # BLD: 3.73 M/UL — LOW (ref 4.2–5.8)
RBC # FLD: 14.9 % — HIGH (ref 10.3–14.5)
SODIUM SERPL-SCNC: 139 MMOL/L — SIGNIFICANT CHANGE UP (ref 135–145)
WBC # BLD: 6.96 K/UL — SIGNIFICANT CHANGE UP (ref 3.8–10.5)
WBC # FLD AUTO: 6.96 K/UL — SIGNIFICANT CHANGE UP (ref 3.8–10.5)

## 2020-11-22 PROCEDURE — 74019 RADEX ABDOMEN 2 VIEWS: CPT | Mod: 26

## 2020-11-22 PROCEDURE — 99233 SBSQ HOSP IP/OBS HIGH 50: CPT | Mod: GC

## 2020-11-22 RX ORDER — ONDANSETRON 8 MG/1
4 TABLET, FILM COATED ORAL EVERY 6 HOURS
Refills: 0 | Status: DISCONTINUED | OUTPATIENT
Start: 2020-11-22 | End: 2020-11-24

## 2020-11-22 RX ORDER — ONDANSETRON 8 MG/1
4 TABLET, FILM COATED ORAL EVERY 6 HOURS
Refills: 0 | Status: DISCONTINUED | OUTPATIENT
Start: 2020-11-22 | End: 2020-11-22

## 2020-11-22 RX ADMIN — Medication 0.5 MILLIGRAM(S): at 21:58

## 2020-11-22 RX ADMIN — APIXABAN 5 MILLIGRAM(S): 2.5 TABLET, FILM COATED ORAL at 09:05

## 2020-11-22 RX ADMIN — Medication 30 MILLILITER(S): at 20:00

## 2020-11-22 RX ADMIN — Medication 10 MILLIGRAM(S): at 12:41

## 2020-11-22 RX ADMIN — HYDROMORPHONE HYDROCHLORIDE 4 MILLIGRAM(S): 2 INJECTION INTRAMUSCULAR; INTRAVENOUS; SUBCUTANEOUS at 10:59

## 2020-11-22 RX ADMIN — HYDROMORPHONE HYDROCHLORIDE 4 MILLIGRAM(S): 2 INJECTION INTRAMUSCULAR; INTRAVENOUS; SUBCUTANEOUS at 17:49

## 2020-11-22 RX ADMIN — HYDROMORPHONE HYDROCHLORIDE 4 MILLIGRAM(S): 2 INJECTION INTRAMUSCULAR; INTRAVENOUS; SUBCUTANEOUS at 02:29

## 2020-11-22 RX ADMIN — ARIPIPRAZOLE 2 MILLIGRAM(S): 15 TABLET ORAL at 12:37

## 2020-11-22 RX ADMIN — APIXABAN 5 MILLIGRAM(S): 2.5 TABLET, FILM COATED ORAL at 17:23

## 2020-11-22 RX ADMIN — Medication 524 MILLIGRAM(S): at 12:29

## 2020-11-22 RX ADMIN — HYDROMORPHONE HYDROCHLORIDE 4 MILLIGRAM(S): 2 INJECTION INTRAMUSCULAR; INTRAVENOUS; SUBCUTANEOUS at 11:45

## 2020-11-22 RX ADMIN — HYDROMORPHONE HYDROCHLORIDE 4 MILLIGRAM(S): 2 INJECTION INTRAMUSCULAR; INTRAVENOUS; SUBCUTANEOUS at 17:22

## 2020-11-22 RX ADMIN — DULOXETINE HYDROCHLORIDE 120 MILLIGRAM(S): 30 CAPSULE, DELAYED RELEASE ORAL at 12:37

## 2020-11-22 RX ADMIN — PANTOPRAZOLE SODIUM 40 MILLIGRAM(S): 20 TABLET, DELAYED RELEASE ORAL at 09:04

## 2020-11-22 RX ADMIN — HYDROMORPHONE HYDROCHLORIDE 4 MILLIGRAM(S): 2 INJECTION INTRAMUSCULAR; INTRAVENOUS; SUBCUTANEOUS at 03:00

## 2020-11-22 NOTE — PROGRESS NOTE ADULT - PROBLEM SELECTOR PLAN 6
Pt with h/o bladder cancer, currently in remission. was previously following up at [facility].  - continue to monitor    #BPH/prostatomegaly, overactive bladder- on Tamsulosin 0.4mg qhs, Myrbetriq 50mg qd   - restart when BPs tolerate hx. depression/panic disorder   w/ home Duloxetine 120mg qd, Aripriprazole 2mg qd, Clonopin 0.5mg qhs  - obtain istop

## 2020-11-22 NOTE — PROGRESS NOTE ADULT - PROBLEM SELECTOR PLAN 3
Pt with h/o C-sacrum fusion surgery, has chronic back pain for which is opiate dependent. No symptoms of numbness, weakness, saddle anesthesia, or any pain from surgical site/wound. Neurosurgery consulted in the ED. Of note, he has been on chronic prednisone, most recently 10mg daily (taken for eczema).  - f/u neurosurgery recs- no acute Neurosurgical Intervention is indicated at this time based on the exam and the imaging studies, low c/f for wound infection, no signs or exam findings c/f epidural abscess  -f/u CT Cervical/thoracic/lumbar spine with IV contrast to ensure no seeding of hardware/abscess  -CT cervical spine w/ IV contrast- Reversal of the normal cervical lordosis with retrolisthesis of C2 and C3 with superimposed cervicothoracic kyphotic deformity centered at the T2/3 level.   -CT thoracic spine w/ IV contrast- Shunt status post spinal fusion with hardware extending from the T3 level through the pelvis. Please see comments regarding hardware. Fracture through the left iliac bone. Additional compression deformities of T3, T8 and T12. Comparison with any recent studies would be beneficial to ascertain stability/change. Postoperative changes within the paraspinal soft tissues without discrete abscess.  -CT lumbar spine w/ IV contrast- Shunt status post spinal fusion with hardware extending from the T3 level through the pelvis. Please see comments regarding hardware. Fracture through the left iliac bone. Additional compression deformities of T3, T8 and T12. Comparison with any recent studies would be beneficial to ascertain stability/change. Postoperative changes within the paraspinal soft tissues without discrete abscess.  - continue aggressive po opioid regimen for pain- dilaudid 4 mg PO q6h PRN for severe pain, (IV 0.5 mg dilaudid PRN for breakthrough pain)  -continue prednisone 10 mg PO q24h

## 2020-11-22 NOTE — PROGRESS NOTE ADULT - PROBLEM SELECTOR PLAN 4
pt on eliquis for upper extremity DVT (dx post operatively back surgery)  - resume eliquis 5mg BID On admission- pt. in the ED on room air was noted to be hypoxic to 88-89% at rest. He remained asymptomatic without any evidence of respiratory distress. However unclear etiology of hypoxia, - ddx atelectasis (seen on CXR/CT A/P)   -resolved, pt. now on room air

## 2020-11-22 NOTE — PROGRESS NOTE ADULT - PROBLEM SELECTOR PLAN 8
-c/w protonix 40mg qd On UA found to have moderate blood and hematuria. May be related to UTI/cystitis. Of note patient has remote h/o bladder CA   -f/u Ucx- insignificant amount of growth

## 2020-11-22 NOTE — PROGRESS NOTE ADULT - PROBLEM SELECTOR PLAN 10
Fluids: s/p 4L NS, no more IVF, encourage PO intake   Electrolytes-replete as needed  Nutrition - DASH/TLC  Code- Full Code  DVT ppx: eliquis 5mg BID  GI ppx: 40 mg PO protonix qd  DIspo: RMF, pending clearance of bacteremia and sensitivities likely monday, PT eval pending may need MCKENNA

## 2020-11-22 NOTE — PROGRESS NOTE ADULT - PROBLEM SELECTOR PLAN 9
Fluids: s/p 4L NS, no more IVF, encourage PO intake   Electrolytes-replete as needed  Nutrition - DASH/TLC  Code- Full Code  DVT ppx: eliquis 5mg BID  GI ppx: 40 mg PO protonix qd  DIspo: RMF, pending clearance of bacteremia and sensitivities likely monday, PT eval pending may need MCKENNA -c/w protonix 40mg qd

## 2020-11-22 NOTE — PROGRESS NOTE ADULT - SUBJECTIVE AND OBJECTIVE BOX
*INCOMPLETE NOTE*    INTERVAL HPI/OVERNIGHT EVENTS:    SUBJECTIVE: Patient seen and examined at bedside.    OBJECTIVE:    VITAL SIGNS:  ICU Vital Signs Last 24 Hrs  T(C): 37.1 (21 Nov 2020 14:00), Max: 37.1 (21 Nov 2020 14:00)  T(F): 98.7 (21 Nov 2020 14:00), Max: 98.7 (21 Nov 2020 14:00)  HR: 78 (21 Nov 2020 14:00) (67 - 78)  BP: 115/74 (21 Nov 2020 14:00) (115/74 - 136/89)  BP(mean): --  ABP: --  ABP(mean): --  RR: 18 (21 Nov 2020 14:00) (18 - 18)  SpO2: 94% (21 Nov 2020 14:00) (94% - 96%)        11-20 @ 07:01  -  11-21 @ 07:00  --------------------------------------------------------  IN: 0 mL / OUT: 800 mL / NET: -800 mL      CAPILLARY BLOOD GLUCOSE          PHYSICAL EXAM:        MEDICATIONS:  MEDICATIONS  (STANDING):  apixaban 5 milliGRAM(s) Oral every 12 hours  ARIPiprazole 2 milliGRAM(s) Oral daily  cefTRIAXone   IVPB      cefTRIAXone   IVPB 2000 milliGRAM(s) IV Intermittent every 24 hours  clonazePAM  Tablet 0.5 milliGRAM(s) Oral at bedtime  DULoxetine 120 milliGRAM(s) Oral daily  pantoprazole    Tablet 40 milliGRAM(s) Oral before breakfast  predniSONE   Tablet 10 milliGRAM(s) Oral every 24 hours    MEDICATIONS  (PRN):  acetaminophen   Tablet .. 650 milliGRAM(s) Oral every 6 hours PRN Temp greater or equal to 38C (100.4F), Mild Pain (1 - 3), Moderate Pain (4 - 6)  HYDROmorphone   Tablet 4 milliGRAM(s) Oral every 6 hours PRN Severe Pain (7 - 10)  HYDROmorphone  Injectable 0.5 milliGRAM(s) IV Push every 6 hours PRN breakthrough pain      ALLERGIES:  Allergies    sulfa drugs (Unknown)    Intolerances        LABS:                        10.4   6.96  )-----------( 285      ( 22 Nov 2020 05:56 )             32.8     11-21    137  |  100  |  14  ----------------------------<  84  3.8   |  26  |  0.77    Ca    9.0      21 Nov 2020 07:58  Phos  2.7     11-21  Mg     2.0     11-21    TPro  5.8<L>  /  Alb  3.0<L>  /  TBili  0.3  /  DBili  x   /  AST  16  /  ALT  9<L>  /  AlkPhos  85  11-21          RADIOLOGY & ADDITIONAL TESTS: Reviewed. INTERVAL HPI/OVERNIGHT EVENTS:  No acute events overnight. Pt. received Pepto Bismol o/n.    SUBJECTIVE: Patient seen and examined at bedside. Pt. reports he had ~10 episodes of diarrhea last night. Reporrting nausea and asking for pepto-bismol. Nausea started this AM. Abdominal exam w/ mild tenderness in epigastrum, will check abdominal x-ray. Anti-emetics- Zofran prn. Pepto-Bismol PRN for diarrhea.     OBJECTIVE:    VITAL SIGNS:  ICU Vital Signs Last 24 Hrs  T(C): 37.1 (21 Nov 2020 14:00), Max: 37.1 (21 Nov 2020 14:00)  T(F): 98.7 (21 Nov 2020 14:00), Max: 98.7 (21 Nov 2020 14:00)  HR: 78 (21 Nov 2020 14:00) (67 - 78)  BP: 115/74 (21 Nov 2020 14:00) (115/74 - 136/89)  BP(mean): --  ABP: --  ABP(mean): --  RR: 18 (21 Nov 2020 14:00) (18 - 18)  SpO2: 94% (21 Nov 2020 14:00) (94% - 96%)        11-20 @ 07:01  -  11-21 @ 07:00  --------------------------------------------------------  IN: 0 mL / OUT: 800 mL / NET: -800 mL      CAPILLARY BLOOD GLUCOSE    PHYSICAL EXAM:  Constitutional: WDWN, lying comfortably in bed, NAD, elderly male, breathing on room air  Head: Nc/At  Eyes: PERRL, EOMI, clear conjunctiva  ENT: no nasal discharge; uvula midline, no oropharyngeal erythema or exudates; dry mucus membranes   Neck: supple; no JVD or thyromegaly  Respiratory: CTA b/l, no wheezes, rales, or rhonchi  Cardiac: +S1/S2, tachycardic, regular, no murmurs, rubs, or gallops  Gastrointestinal: soft, non-distended, tender to palpation in RUQ, no rebound/guarding, no palpable masses, normoactive bowel sounds x4  Back: spine midline, no bony tenderness or step-offs; no CVAT B/L  Extremities: WWP, no clubbing or cyanosis, trace pitting edema   Musculoskeletal: NROM x4; no joint swelling, tenderness or erythema  Vascular: 2+ radial and DP pulses b/l  Dermatologic: skin warm, dry and intact, well healed surgical scar long linear from cervical to sacrum, no surrounding erythema, or focal tenderness to palpation   Lymphatic: no submandibular or cervical LAD  Neurologic: AAOx3, CNII-XII grossly intact, no focal deficits. 5/5 strength to bilateral upper proximal and distal extremities.   Psychiatric: affect and characteristics of appearance, verbalizations, behaviors are appropriate. Sometimes slow to answering questions      MEDICATIONS:  MEDICATIONS  (STANDING):  apixaban 5 milliGRAM(s) Oral every 12 hours  ARIPiprazole 2 milliGRAM(s) Oral daily  cefTRIAXone   IVPB      cefTRIAXone   IVPB 2000 milliGRAM(s) IV Intermittent every 24 hours  clonazePAM  Tablet 0.5 milliGRAM(s) Oral at bedtime  DULoxetine 120 milliGRAM(s) Oral daily  pantoprazole    Tablet 40 milliGRAM(s) Oral before breakfast  predniSONE   Tablet 10 milliGRAM(s) Oral every 24 hours    MEDICATIONS  (PRN):  acetaminophen   Tablet .. 650 milliGRAM(s) Oral every 6 hours PRN Temp greater or equal to 38C (100.4F), Mild Pain (1 - 3), Moderate Pain (4 - 6)  HYDROmorphone   Tablet 4 milliGRAM(s) Oral every 6 hours PRN Severe Pain (7 - 10)  HYDROmorphone  Injectable 0.5 milliGRAM(s) IV Push every 6 hours PRN breakthrough pain      ALLERGIES:  Allergies    sulfa drugs (Unknown)    Intolerances        LABS:                        10.4   6.96  )-----------( 285      ( 22 Nov 2020 05:56 )             32.8     11-21    137  |  100  |  14  ----------------------------<  84  3.8   |  26  |  0.77    Ca    9.0      21 Nov 2020 07:58  Phos  2.7     11-21  Mg     2.0     11-21    TPro  5.8<L>  /  Alb  3.0<L>  /  TBili  0.3  /  DBili  x   /  AST  16  /  ALT  9<L>  /  AlkPhos  85  11-21          RADIOLOGY & ADDITIONAL TESTS: Reviewed.

## 2020-11-22 NOTE — PROGRESS NOTE ADULT - PROBLEM SELECTOR PLAN 7
On UA found to have moderate blood and hematuria. May be related to UTI/cystitis. Of note patient has remote h/o bladder CA   -f/u Ucx- insignificant amount of growth Pt with h/o bladder cancer, currently in remission. was previously following up at [facility].  - continue to monitor    #BPH/prostatomegaly, overactive bladder- on Tamsulosin 0.4mg qhs, Myrbetriq 50mg qd   - restart when BPs tolerate

## 2020-11-22 NOTE — PROGRESS NOTE ADULT - PROBLEM SELECTOR PLAN 5
hx. depression/panic disorder   w/ home Duloxetine 120mg qd, Aripriprazole 2mg qd, Clonopin 0.5mg qhs  - obtain istop pt on eliquis for upper extremity DVT (dx post operatively back surgery)  - resume eliquis 5mg BID

## 2020-11-22 NOTE — PROGRESS NOTE ADULT - ATTENDING COMMENTS
recommended for MCKENNA
Patient seen and examined at bedside. Agree with exam, a/p as above with the following addendum/edits:     C/o nausea and asking for pepto-bismol. Nausea started this AM. Abdominal exam w/ mild tenderness in epigastrum, will check abdominal x-ray. Anti-emetics prn. Switched to ceftriaxone, pending MCKENNA. Repeat cultures negative to date.

## 2020-11-22 NOTE — PROGRESS NOTE ADULT - ASSESSMENT
75M with PMH bladder cancer (s/p tumor resection, localized chemo and now in remission from 5yrs), GERD, kyphoscoliosis s/p C-sacrum fusion at [facility] (10/5/2020) depression, panic disorder, RUE DVT (on Eliquis), BPH, presenting with initial complaints of loose non-bloody bowel movements, decreased po intake, nausea, dizziness, dysuria/urinary frequency admitted for severe sepsis suspected 2/2 UTI- neg CT imaging for alternative source, no longer with diarrhea so lower c/f cdiff.         75M with PMH bladder cancer (s/p tumor resection, localized chemo and now in remission from 5yrs), GERD, kyphoscoliosis s/p C-sacrum fusion at [facility] (10/5/2020) depression, panic disorder, RUE DVT (on Eliquis), BPH, presenting with initial complaints of loose non-bloody bowel movements, decreased po intake, nausea, dizziness, dysuria/urinary frequency admitted for severe sepsis-2/2 E.coli bacteremia likely 2/2 UTI- neg CT imaging for alternative source.

## 2020-11-23 ENCOUNTER — TRANSCRIPTION ENCOUNTER (OUTPATIENT)
Age: 75
End: 2020-11-23

## 2020-11-23 LAB
ALBUMIN SERPL ELPH-MCNC: 3.3 G/DL — SIGNIFICANT CHANGE UP (ref 3.3–5)
ALP SERPL-CCNC: 92 U/L — SIGNIFICANT CHANGE UP (ref 40–120)
ALT FLD-CCNC: 8 U/L — LOW (ref 10–45)
ANION GAP SERPL CALC-SCNC: 15 MMOL/L — SIGNIFICANT CHANGE UP (ref 5–17)
AST SERPL-CCNC: 13 U/L — SIGNIFICANT CHANGE UP (ref 10–40)
BASOPHILS # BLD AUTO: 0.09 K/UL — SIGNIFICANT CHANGE UP (ref 0–0.2)
BASOPHILS NFR BLD AUTO: 1 % — SIGNIFICANT CHANGE UP (ref 0–2)
BILIRUB SERPL-MCNC: 0.3 MG/DL — SIGNIFICANT CHANGE UP (ref 0.2–1.2)
BUN SERPL-MCNC: 9 MG/DL — SIGNIFICANT CHANGE UP (ref 7–23)
CALCIUM SERPL-MCNC: 9.1 MG/DL — SIGNIFICANT CHANGE UP (ref 8.4–10.5)
CHLORIDE SERPL-SCNC: 102 MMOL/L — SIGNIFICANT CHANGE UP (ref 96–108)
CO2 SERPL-SCNC: 23 MMOL/L — SIGNIFICANT CHANGE UP (ref 22–31)
CREAT SERPL-MCNC: 0.76 MG/DL — SIGNIFICANT CHANGE UP (ref 0.5–1.3)
CULTURE RESULTS: SIGNIFICANT CHANGE UP
EOSINOPHIL # BLD AUTO: 0.32 K/UL — SIGNIFICANT CHANGE UP (ref 0–0.5)
EOSINOPHIL NFR BLD AUTO: 3.7 % — SIGNIFICANT CHANGE UP (ref 0–6)
GLUCOSE SERPL-MCNC: 80 MG/DL — SIGNIFICANT CHANGE UP (ref 70–99)
HCT VFR BLD CALC: 40.2 % — SIGNIFICANT CHANGE UP (ref 39–50)
HGB BLD-MCNC: 12.7 G/DL — LOW (ref 13–17)
IMM GRANULOCYTES NFR BLD AUTO: 1 % — SIGNIFICANT CHANGE UP (ref 0–1.5)
LYMPHOCYTES # BLD AUTO: 3.79 K/UL — HIGH (ref 1–3.3)
LYMPHOCYTES # BLD AUTO: 43.3 % — SIGNIFICANT CHANGE UP (ref 13–44)
MAGNESIUM SERPL-MCNC: 2 MG/DL — SIGNIFICANT CHANGE UP (ref 1.6–2.6)
MCHC RBC-ENTMCNC: 28.3 PG — SIGNIFICANT CHANGE UP (ref 27–34)
MCHC RBC-ENTMCNC: 31.6 GM/DL — LOW (ref 32–36)
MCV RBC AUTO: 89.5 FL — SIGNIFICANT CHANGE UP (ref 80–100)
MONOCYTES # BLD AUTO: 0.97 K/UL — HIGH (ref 0–0.9)
MONOCYTES NFR BLD AUTO: 11.1 % — SIGNIFICANT CHANGE UP (ref 2–14)
NEUTROPHILS # BLD AUTO: 3.49 K/UL — SIGNIFICANT CHANGE UP (ref 1.8–7.4)
NEUTROPHILS NFR BLD AUTO: 39.9 % — LOW (ref 43–77)
NRBC # BLD: 0 /100 WBCS — SIGNIFICANT CHANGE UP (ref 0–0)
PHOSPHATE SERPL-MCNC: 3.8 MG/DL — SIGNIFICANT CHANGE UP (ref 2.5–4.5)
PLATELET # BLD AUTO: 373 K/UL — SIGNIFICANT CHANGE UP (ref 150–400)
POTASSIUM SERPL-MCNC: 3.7 MMOL/L — SIGNIFICANT CHANGE UP (ref 3.5–5.3)
POTASSIUM SERPL-SCNC: 3.7 MMOL/L — SIGNIFICANT CHANGE UP (ref 3.5–5.3)
PROT SERPL-MCNC: 7 G/DL — SIGNIFICANT CHANGE UP (ref 6–8.3)
RBC # BLD: 4.49 M/UL — SIGNIFICANT CHANGE UP (ref 4.2–5.8)
RBC # FLD: 14.6 % — HIGH (ref 10.3–14.5)
SODIUM SERPL-SCNC: 140 MMOL/L — SIGNIFICANT CHANGE UP (ref 135–145)
SPECIMEN SOURCE: SIGNIFICANT CHANGE UP
WBC # BLD: 8.75 K/UL — SIGNIFICANT CHANGE UP (ref 3.8–10.5)
WBC # FLD AUTO: 8.75 K/UL — SIGNIFICANT CHANGE UP (ref 3.8–10.5)

## 2020-11-23 PROCEDURE — 99233 SBSQ HOSP IP/OBS HIGH 50: CPT | Mod: GC

## 2020-11-23 RX ORDER — LOPERAMIDE HCL 2 MG
4 TABLET ORAL ONCE
Refills: 0 | Status: COMPLETED | OUTPATIENT
Start: 2020-11-23 | End: 2020-11-23

## 2020-11-23 RX ADMIN — Medication 30 MILLILITER(S): at 08:30

## 2020-11-23 RX ADMIN — CEFTRIAXONE 100 MILLIGRAM(S): 500 INJECTION, POWDER, FOR SOLUTION INTRAMUSCULAR; INTRAVENOUS at 17:57

## 2020-11-23 RX ADMIN — HYDROMORPHONE HYDROCHLORIDE 4 MILLIGRAM(S): 2 INJECTION INTRAMUSCULAR; INTRAVENOUS; SUBCUTANEOUS at 00:17

## 2020-11-23 RX ADMIN — PANTOPRAZOLE SODIUM 40 MILLIGRAM(S): 20 TABLET, DELAYED RELEASE ORAL at 07:25

## 2020-11-23 RX ADMIN — APIXABAN 5 MILLIGRAM(S): 2.5 TABLET, FILM COATED ORAL at 05:52

## 2020-11-23 RX ADMIN — HYDROMORPHONE HYDROCHLORIDE 4 MILLIGRAM(S): 2 INJECTION INTRAMUSCULAR; INTRAVENOUS; SUBCUTANEOUS at 09:25

## 2020-11-23 RX ADMIN — DULOXETINE HYDROCHLORIDE 120 MILLIGRAM(S): 30 CAPSULE, DELAYED RELEASE ORAL at 12:31

## 2020-11-23 RX ADMIN — HYDROMORPHONE HYDROCHLORIDE 0.5 MILLIGRAM(S): 2 INJECTION INTRAMUSCULAR; INTRAVENOUS; SUBCUTANEOUS at 13:53

## 2020-11-23 RX ADMIN — ARIPIPRAZOLE 2 MILLIGRAM(S): 15 TABLET ORAL at 12:31

## 2020-11-23 RX ADMIN — Medication 0.5 MILLIGRAM(S): at 22:17

## 2020-11-23 RX ADMIN — HYDROMORPHONE HYDROCHLORIDE 4 MILLIGRAM(S): 2 INJECTION INTRAMUSCULAR; INTRAVENOUS; SUBCUTANEOUS at 16:51

## 2020-11-23 RX ADMIN — HYDROMORPHONE HYDROCHLORIDE 4 MILLIGRAM(S): 2 INJECTION INTRAMUSCULAR; INTRAVENOUS; SUBCUTANEOUS at 08:30

## 2020-11-23 RX ADMIN — HYDROMORPHONE HYDROCHLORIDE 0.5 MILLIGRAM(S): 2 INJECTION INTRAMUSCULAR; INTRAVENOUS; SUBCUTANEOUS at 14:15

## 2020-11-23 RX ADMIN — Medication 10 MILLIGRAM(S): at 13:02

## 2020-11-23 RX ADMIN — HYDROMORPHONE HYDROCHLORIDE 4 MILLIGRAM(S): 2 INJECTION INTRAMUSCULAR; INTRAVENOUS; SUBCUTANEOUS at 23:12

## 2020-11-23 RX ADMIN — APIXABAN 5 MILLIGRAM(S): 2.5 TABLET, FILM COATED ORAL at 17:57

## 2020-11-23 RX ADMIN — Medication 4 MILLIGRAM(S): at 13:32

## 2020-11-23 NOTE — PROGRESS NOTE ADULT - PROBLEM SELECTOR PLAN 10
Fluids: s/p 4L NS, no more IVF, encourage PO intake   Electrolytes-replete as needed  Nutrition - DASH/TLC  Code- Full Code  DVT ppx: eliquis 5mg BID  GI ppx: 40 mg PO protonix qd  DIspo: RMF, pending clearance of bacteremia and sensitivities likely monday, PT eval pending may need MCKENNA Fluids: s/p 4L NS, no more IVF, encourage PO intake   Electrolytes-replete as needed  Nutrition - DASH/TLC, ensure high protein BID  Code- Full Code  DVT ppx: eliquis 5mg BID  GI ppx: 40 mg PO protonix qd  DIspo: RMF

## 2020-11-23 NOTE — CHART NOTE - TREATMENT: THE FOLLOWING DIET HAS BEEN RECOMMENDED
Diet, DASH/TLC:   Sodium & Cholesterol Restricted (11-19-20 @ 08:12) [Active]    Pt meets ASPEN criteria for mild PCM r/t reported sudden decrease in intake PTA and physical findings: muscle + fat loss.    Goal: prevent further s/s malnutrition to  preserve lean body mass.    Recommendations:  1. Liberalize diet to low sodium to optimize PO intake   2. Provide strong encouragement with PO intake.   3. Provide Ensure High protein BID (320 kcal, 32g pro) to optimize pro/ kcal intake   4. RD to continue to reinforce diet edu @ f/u

## 2020-11-23 NOTE — PROGRESS NOTE ADULT - PROBLEM SELECTOR PLAN 4
On admission- pt. in the ED on room air was noted to be hypoxic to 88-89% at rest. He remained asymptomatic without any evidence of respiratory distress. However unclear etiology of hypoxia, - ddx atelectasis (seen on CXR/CT A/P)   -resolved, pt. now on room air

## 2020-11-23 NOTE — DIETITIAN INITIAL EVALUATION ADULT. - PROBLEM SELECTOR PLAN 9
Fluids: s/p 4L NS, no more IVF, encourage PO intake   Electrolytes-replete as needed  Nutrition - DASH/TLC  Code- Full Code  DVT ppx: eliquis 5mg BID  GI ppx: none needed  DIspo: [facility]

## 2020-11-23 NOTE — DIETITIAN INITIAL EVALUATION ADULT. - ADD RECOMMEND
1. Liberalize diet to low sodium to optimize PO intake 2. Provide strong encouragement with PO intake. 3. Provide Ensure High protein BID (320 kcal, 32g pro) to optimize pro/ kcal intake 4. RD to continue to reinforce diet edu @ f/u

## 2020-11-23 NOTE — PROGRESS NOTE ADULT - PROBLEM SELECTOR PLAN 8
On UA found to have moderate blood and hematuria. May be related to UTI/cystitis. Of note patient has remote h/o bladder CA   -f/u Ucx- insignificant amount of growth

## 2020-11-23 NOTE — DIETITIAN INITIAL EVALUATION ADULT. - CALCULATED TO (ML/KG)
Message   Recorded as Task   Date: 06/26/2017 07:35 PM, Created By: Jaz Dobbins   Task Name: Miscellaneous   Assigned To: SANDRA BARRETT   Regarding Patient: MEI HEBERT, Status: Active   Comment:    Jaz Dobbins - 26 Jun 2017 7:35 PM     TASK CREATED  spoke with mom and advised Mei was scheduled for physical appt. at 6:20 with Dr. Barrett, mom was unaware of appointment. Advised to call back and reschedule when available.     Signatures   Electronically signed by : Jaz Dobbins R.N.; Jun 26 2017  7:35PM CST    
[id number]

## 2020-11-23 NOTE — DIETITIAN INITIAL EVALUATION ADULT. - PROBLEM SELECTOR PLAN 2
On room air, patient was noted to be hypoxic to 88-89% at rest. He remained asymptomatic without any evidence of respiratory distress. However unclear etiology of hypoxia, - ddx atelectasis, ?PE (although low likelihood given already anticoagulated with Eliquis for DVT), fluid overload.   - f/u D-dimer  - incentive spirometry  - monitor volume status  - consider echocardiogram

## 2020-11-23 NOTE — DISCHARGE NOTE NURSING/CASE MANAGEMENT/SOCIAL WORK - PATIENT PORTAL LINK FT
You can access the FollowMyHealth Patient Portal offered by [facility] by registering at the following website: [email or web address]. By joining [employer]’s FollowMyHealth portal, you will also be able to view your health information using other applications (apps) compatible with our system.

## 2020-11-23 NOTE — DIETITIAN INITIAL EVALUATION ADULT. - OTHER INFO
75M with PMH bladder cancer (s/p tumor resection, s/p localized chemo and now in remission from 5yrs), GERD, kyphoscoliosis s/p C-sacrum fusion at [facility] (10/5/2020, w/ Dr. Carlisle), depression, panic disorder, RUE DVT (on eliquis), BPH, presenting with initial complaints of loose non-bloody bowel movements for the past few days, along with decreased appetite, and nausea and dizziness, starting today.  He reports chronic back pain for which he takes dilaudid 6mg around the clock, q6h since his surgery 1 month ago. He states recently he has been feeling lightheaded and nauseous after taking dilaudid which is new.     On assessment, pt seen in room expressing anxiety. He reports decreased intake x 3 days r/t fear of diarrhea (pt reported diarrhea PTA and is anxious that eating will cause reoccurrence). He denies any n/v @ this time. Pt endorses normal theodore/ intake PTA however he reports being a "very picky eater." NKFA. Pt denies any wt changes x 1 year however he is unsure of his UBW @ this time. Skin: [facility] GI: last bm noted 11/21 per flowsheet. RD educated pt on the importance of adequate intake (with emphasis on protein) 2/2 infection and to prevent muscle loss with advanced age. Pt expressing anxiety and was minimally receptive toe education at this time. RD attempted to obtain food preferences to help pt find items on the menu that he likes, however pt was uninterested in RD's assistance @ this time. Pt agreeable to trying Ensure High Protein BID (320 kcal, 32g pro) to optimize intake-- will monitor tolerance. RD to f/u per protocol.

## 2020-11-23 NOTE — DIETITIAN INITIAL EVALUATION ADULT. - OTHER CALCULATIONS
IBW used to estimate needs due to pt's ABW is between 80 and 120% IBW (103%). [facility] standards of care for older adult used adjusted for increased protein needs 2/2 infection and mild PCM.

## 2020-11-23 NOTE — CHART NOTE - FINDINGS AS BASED ON:
Comprehensive nutrition assessment and consultation/Intervention secondary to interdisciplinary rounds

## 2020-11-23 NOTE — PROGRESS NOTE ADULT - PROBLEM SELECTOR PLAN 2
-Pt. with abdominal pain- f/u Xray abdomen 11/22  -abd TTP in RUQ  -If further watery diarrhea jia with leukocytosis send cdiff studies  -peptol bismol PRN for diarrhea   -zofran PRN for nausea -Pt. with abdominal pain- f/u Xray abdomen 11/22- Mild ileus with no abnormal bowel dilatation or pneumoperitoneum. Thoracolumbar, sacral and iliac hardware. Postsurgical spinal changes. Calcification overlying the left iliac bone.   -abd TTP in RUQ  -If further watery diarrhea jia with leukocytosis send cdiff studies  -f/u GI PCR 11/23  -peptol bismol PRN for diarrhea, can also give loperamide 4 mg PO  -zofran PRN for nausea

## 2020-11-23 NOTE — DIETITIAN INITIAL EVALUATION ADULT. - PROBLEM SELECTOR PLAN 6
Pt with h/o bladder cancer, currently in remission. was previously following up at [facility].  - continue to monitor    #BPH/prostatomegaly, overactive bladder- on Tamsulosin 0.4mg qhs, Myrbetriq 50mg qd   - restart when BPs tolerate

## 2020-11-23 NOTE — PROGRESS NOTE ADULT - SUBJECTIVE AND OBJECTIVE BOX
INTERVAL HPI/OVERNIGHT EVENTS:  No acute events. Pt. took peptol bismol for abdominal discomfort.     SUBJECTIVE: Patient seen and examined at bedside. Complains of diarrhea-requesting peptol bismol/a stronger agent for diarrhea treatment this AM. Requesting pain meds- PO dilaudid. Notes that he     OBJECTIVE:    VITAL SIGNS:  ICU Vital Signs Last 24 Hrs  T(C): 36.6 (23 Nov 2020 05:57), Max: 36.8 (22 Nov 2020 20:57)  T(F): 97.9 (23 Nov 2020 05:57), Max: 98.3 (22 Nov 2020 20:57)  HR: 72 (23 Nov 2020 05:57) (70 - 72)  BP: 164/81 (23 Nov 2020 05:57) (114/73 - 164/81)  BP(mean): --  ABP: --  ABP(mean): --  RR: 19 (23 Nov 2020 05:57) (19 - 20)  SpO2: 96% (23 Nov 2020 05:57) (91% - 96%)        CAPILLARY BLOOD GLUCOSE          PHYSICAL EXAM:  Constitutional: WDWN, lying comfortably in bed, NAD, elderly male, breathing on room air  Head: Nc/At  Eyes: PERRL, EOMI, clear conjunctiva  ENT: no nasal discharge; uvula midline, no oropharyngeal erythema or exudates; dry mucus membranes   Neck: supple; no JVD or thyromegaly  Respiratory: CTA b/l, no wheezes, rales, or rhonchi  Cardiac: +S1/S2, tachycardic, regular, no murmurs, rubs, or gallops  Gastrointestinal: soft, non-distended, tender to palpation in RUQ, no rebound/guarding, no palpable masses, normoactive bowel sounds x4  Back: spine midline, no bony tenderness or step-offs; no CVAT B/L  Extremities: WWP, no clubbing or cyanosis, trace pitting edema   Musculoskeletal: NROM x4; no joint swelling, tenderness or erythema  Vascular: 2+ radial and DP pulses b/l  Dermatologic: skin warm, dry and intact, well healed surgical scar long linear from cervical to sacrum, no surrounding erythema, or focal tenderness to palpation   Lymphatic: no submandibular or cervical LAD  Neurologic: AAOx3, CNII-XII grossly intact, no focal deficits. 5/5 strength to bilateral upper proximal and distal extremities.   Psychiatric: affect and characteristics of appearance, verbalizations, behaviors are appropriate. Sometimes slow to answering questions        MEDICATIONS:  MEDICATIONS  (STANDING):  apixaban 5 milliGRAM(s) Oral every 12 hours  ARIPiprazole 2 milliGRAM(s) Oral daily  cefTRIAXone   IVPB      cefTRIAXone   IVPB 2000 milliGRAM(s) IV Intermittent every 24 hours  clonazePAM  Tablet 0.5 milliGRAM(s) Oral at bedtime  DULoxetine 120 milliGRAM(s) Oral daily  pantoprazole    Tablet 40 milliGRAM(s) Oral before breakfast  predniSONE   Tablet 10 milliGRAM(s) Oral every 24 hours    MEDICATIONS  (PRN):  acetaminophen   Tablet .. 650 milliGRAM(s) Oral every 6 hours PRN Temp greater or equal to 38C (100.4F), Mild Pain (1 - 3), Moderate Pain (4 - 6)  bismuth subsalicylate Liquid 30 milliLiter(s) Oral every 6 hours PRN Diarrhea  HYDROmorphone   Tablet 4 milliGRAM(s) Oral every 6 hours PRN Severe Pain (7 - 10)  HYDROmorphone  Injectable 0.5 milliGRAM(s) IV Push every 6 hours PRN breakthrough pain  ondansetron Injectable 4 milliGRAM(s) IV Push every 6 hours PRN Nausea and/or Vomiting      ALLERGIES:  Allergies    sulfa drugs (Unknown)    Intolerances        LABS:                        12.7   8.75  )-----------( 373      ( 23 Nov 2020 06:46 )             40.2     11-23    140  |  102  |  9   ----------------------------<  80  3.7   |  23  |  0.76    Ca    9.1      23 Nov 2020 06:46  Phos  3.8     11-23  Mg     2.0     11-23    TPro  7.0  /  Alb  3.3  /  TBili  0.3  /  DBili  x   /  AST  13  /  ALT  8<L>  /  AlkPhos  92  11-23          RADIOLOGY & ADDITIONAL TESTS: Reviewed. INTERVAL HPI/OVERNIGHT EVENTS:  No acute events. Pt. took peptol bismol for abdominal discomfort.     SUBJECTIVE: Patient seen and examined at bedside. Complains of diarrhea-requesting peptol bismol/a stronger agent for diarrhea treatment this AM. Requesting pain meds- PO dilaudid. Notes that he     OBJECTIVE:    VITAL SIGNS:  ICU Vital Signs Last 24 Hrs  T(C): 36.6 (23 Nov 2020 05:57), Max: 36.8 (22 Nov 2020 20:57)  T(F): 97.9 (23 Nov 2020 05:57), Max: 98.3 (22 Nov 2020 20:57)  HR: 72 (23 Nov 2020 05:57) (70 - 72)  BP: 164/81 (23 Nov 2020 05:57) (114/73 - 164/81)  BP(mean): --  ABP: --  ABP(mean): --  RR: 19 (23 Nov 2020 05:57) (19 - 20)  SpO2: 96% (23 Nov 2020 05:57) (91% - 96%)        CAPILLARY BLOOD GLUCOSE      PHYSICAL EXAM:  Constitutional: WDWN, lying comfortably in bed, NAD, elderly male, breathing on room air  Head: Nc/At  Eyes: PERRL, EOMI, clear conjunctiva  ENT: no nasal discharge; uvula midline, no oropharyngeal erythema or exudates; dry mucus membranes   Neck: supple; no JVD or thyromegaly  Respiratory: CTA b/l, no wheezes, rales, or rhonchi  Cardiac: +S1/S2, tachycardic, regular, no murmurs, rubs, or gallops  Gastrointestinal: soft, non-distended, tender to palpation in RUQ, no rebound/guarding, no palpable masses, normoactive bowel sounds x4  Back: spine midline, no bony tenderness or step-offs; no CVAT B/L  Extremities: WWP, no clubbing or cyanosis, trace pitting edema   Musculoskeletal: NROM x4; no joint swelling, tenderness or erythema  Vascular: 2+ radial and DP pulses b/l  Dermatologic: skin warm, dry and intact, well healed surgical scar long linear from cervical to sacrum, no surrounding erythema, or focal tenderness to palpation   Lymphatic: no submandibular or cervical LAD  Neurologic: AAOx3, CNII-XII grossly intact, no focal deficits. 5/5 strength to bilateral upper proximal and distal extremities.   Psychiatric: affect and characteristics of appearance, verbalizations, behaviors are appropriate. Sometimes slow to answering questions        MEDICATIONS:  MEDICATIONS  (STANDING):  apixaban 5 milliGRAM(s) Oral every 12 hours  ARIPiprazole 2 milliGRAM(s) Oral daily  cefTRIAXone   IVPB      cefTRIAXone   IVPB 2000 milliGRAM(s) IV Intermittent every 24 hours  clonazePAM  Tablet 0.5 milliGRAM(s) Oral at bedtime  DULoxetine 120 milliGRAM(s) Oral daily  pantoprazole    Tablet 40 milliGRAM(s) Oral before breakfast  predniSONE   Tablet 10 milliGRAM(s) Oral every 24 hours    MEDICATIONS  (PRN):  acetaminophen   Tablet .. 650 milliGRAM(s) Oral every 6 hours PRN Temp greater or equal to 38C (100.4F), Mild Pain (1 - 3), Moderate Pain (4 - 6)  bismuth subsalicylate Liquid 30 milliLiter(s) Oral every 6 hours PRN Diarrhea  HYDROmorphone   Tablet 4 milliGRAM(s) Oral every 6 hours PRN Severe Pain (7 - 10)  HYDROmorphone  Injectable 0.5 milliGRAM(s) IV Push every 6 hours PRN breakthrough pain  ondansetron Injectable 4 milliGRAM(s) IV Push every 6 hours PRN Nausea and/or Vomiting      ALLERGIES:  Allergies    sulfa drugs (Unknown)    Intolerances        LABS:                        12.7   8.75  )-----------( 373      ( 23 Nov 2020 06:46 )             40.2     11-23    140  |  102  |  9   ----------------------------<  80  3.7   |  23  |  0.76    Ca    9.1      23 Nov 2020 06:46  Phos  3.8     11-23  Mg     2.0     11-23    TPro  7.0  /  Alb  3.3  /  TBili  0.3  /  DBili  x   /  AST  13  /  ALT  8<L>  /  AlkPhos  92  11-23          RADIOLOGY & ADDITIONAL TESTS: Reviewed. INTERVAL HPI/OVERNIGHT EVENTS:  No acute events. Pt. took peptol bismol for abdominal discomfort.     SUBJECTIVE: Patient seen and examined at bedside. Complains of diarrhea, generalized abdominal discomfort-requesting peptol bismol/a stronger agent for diarrhea treatment this AM. Requesting pain meds- PO dilaudid.     OBJECTIVE:    VITAL SIGNS:  ICU Vital Signs Last 24 Hrs  T(C): 36.6 (23 Nov 2020 05:57), Max: 36.8 (22 Nov 2020 20:57)  T(F): 97.9 (23 Nov 2020 05:57), Max: 98.3 (22 Nov 2020 20:57)  HR: 72 (23 Nov 2020 05:57) (70 - 72)  BP: 164/81 (23 Nov 2020 05:57) (114/73 - 164/81)  BP(mean): --  ABP: --  ABP(mean): --  RR: 19 (23 Nov 2020 05:57) (19 - 20)  SpO2: 96% (23 Nov 2020 05:57) (91% - 96%)        CAPILLARY BLOOD GLUCOSE      PHYSICAL EXAM:  Constitutional: WDWN, lying comfortably in bed, NAD, elderly male, breathing on room air  Head: Nc/At  Eyes: PERRL, EOMI, clear conjunctiva  ENT: no nasal discharge; uvula midline, no oropharyngeal erythema or exudates; dry mucus membranes   Neck: supple; no JVD or thyromegaly  Respiratory: CTA b/l, no wheezes, rales, or rhonchi  Cardiac: +S1/S2, tachycardic, regular, no murmurs, rubs, or gallops  Gastrointestinal: soft, non-distended, tender to palpation in RUQ, no rebound/guarding, no palpable masses, normoactive bowel sounds x4  Back: spine midline, no bony tenderness or step-offs; no CVAT B/L  Extremities: WWP, no clubbing or cyanosis, trace pitting edema   Musculoskeletal: NROM x4; no joint swelling, tenderness or erythema  Vascular: 2+ radial and DP pulses b/l  Dermatologic: skin warm, dry and intact, well healed surgical scar long linear from cervical to sacrum, no surrounding erythema, or focal tenderness to palpation   Lymphatic: no submandibular or cervical LAD  Neurologic: AAOx3, CNII-XII grossly intact, no focal deficits. 5/5 strength to bilateral upper proximal and distal extremities.   Psychiatric: affect and characteristics of appearance, verbalizations, behaviors are appropriate. Sometimes slow to answering questions        MEDICATIONS:  MEDICATIONS  (STANDING):  apixaban 5 milliGRAM(s) Oral every 12 hours  ARIPiprazole 2 milliGRAM(s) Oral daily  cefTRIAXone   IVPB      cefTRIAXone   IVPB 2000 milliGRAM(s) IV Intermittent every 24 hours  clonazePAM  Tablet 0.5 milliGRAM(s) Oral at bedtime  DULoxetine 120 milliGRAM(s) Oral daily  pantoprazole    Tablet 40 milliGRAM(s) Oral before breakfast  predniSONE   Tablet 10 milliGRAM(s) Oral every 24 hours    MEDICATIONS  (PRN):  acetaminophen   Tablet .. 650 milliGRAM(s) Oral every 6 hours PRN Temp greater or equal to 38C (100.4F), Mild Pain (1 - 3), Moderate Pain (4 - 6)  bismuth subsalicylate Liquid 30 milliLiter(s) Oral every 6 hours PRN Diarrhea  HYDROmorphone   Tablet 4 milliGRAM(s) Oral every 6 hours PRN Severe Pain (7 - 10)  HYDROmorphone  Injectable 0.5 milliGRAM(s) IV Push every 6 hours PRN breakthrough pain  ondansetron Injectable 4 milliGRAM(s) IV Push every 6 hours PRN Nausea and/or Vomiting      ALLERGIES:  Allergies    sulfa drugs (Unknown)    Intolerances        LABS:                        12.7   8.75  )-----------( 373      ( 23 Nov 2020 06:46 )             40.2     11-23    140  |  102  |  9   ----------------------------<  80  3.7   |  23  |  0.76    Ca    9.1      23 Nov 2020 06:46  Phos  3.8     11-23  Mg     2.0     11-23    TPro  7.0  /  Alb  3.3  /  TBili  0.3  /  DBili  x   /  AST  13  /  ALT  8<L>  /  AlkPhos  92  11-23          RADIOLOGY & ADDITIONAL TESTS: Reviewed.

## 2020-11-23 NOTE — PROGRESS NOTE ADULT - PROBLEM SELECTOR PLAN 7
Pt with h/o bladder cancer, currently in remission. was previously following up at [facility] [place].  - continue to monitor    #BPH/prostatomegaly, overactive bladder- on Tamsulosin 0.4mg qhs, Myrbetriq 50mg qd   - restart when BPs tolerate

## 2020-11-23 NOTE — DIETITIAN INITIAL EVALUATION ADULT. - PROBLEM SELECTOR PLAN 1
Pt presenting with leukocytosis with WBC 14k, fever of 102F, HR 90-100s, and systolic blood pressures 90-100s. Initial complaints of nausea, non-bloody diarrhea, but patient also reports urinary frequency and dysuria. US positive for Nitrites, WBC, bacteria. Source suspected to be UTI. No hydronephrosis seen on CT abd/pelv. Received 3L fluids given elevated lactate to 7.1, but cleared to 1.6. He had rigors in the ED after completed CT abd/pelv. Patient blood pressures continue to remain low with SBP 90s, however is warm and well perfused on exam.   - will give an additional 1L fluids   - f/u UCx, BCx   - c/w Zosyn 3.375g (given recent hospitalization/complicated UTI).

## 2020-11-23 NOTE — PROGRESS NOTE ADULT - PROBLEM SELECTOR PLAN 1
Pt presenting with leukocytosis with WBC 14k, fever of 102F, HR 90-100s, and systolic blood pressures 90-100s. Initial complaints of nausea, non-bloody diarrhea, but patient also reports urinary frequency and dysuria. US positive for Nitrites, WBC, bacteria. Source suspected to be UTI. No hydronephrosis seen on CT abd/pelv. Received 4L fluids given elevated lactate to 7.1, but cleared to 1.6. He had rigors in the ED after completed CT abd/pelv. Patient blood pressures continue to remain low with SBP 90s, however is warm and well perfused on exam.   -Ucx- insignificant amount of growth  -f/u BCx- growing E.coli, continue IV ceftriaxone- repeat bcx to ensure clearance- NGTD. If persistent bacteremia would involve ID jia given recent hardware   -switched IV Zosyn 3.375g q6h to Ceftriaxone 2 g q24h given sensitivities   -If further watery diarrhea jia with leukocytosis send cdiff studies  -radiology called about collection of fluid seen on CT in back but reported no abscess and likely post-surgical, given no pain in the back less likely source of infection and likely an incidental finding.   -radiology called about collection of fluid seen on CT in back but reported no abscess and likely post-surgical, given no pain in the back less likely source of infection and likely an incidental finding.   -radiology called about collection of fluid seen on CT in back but reported no abscess and likely post-surgical, given no pain in the back less likely source of infection and likely an incidental finding. Pt presenting with leukocytosis with WBC 14k, fever of 102F, HR 90-100s, and systolic blood pressures 90-100s. Initial complaints of nausea, non-bloody diarrhea, but patient also reports urinary frequency and dysuria. US positive for Nitrites, WBC, bacteria. Source suspected to be UTI. No hydronephrosis seen on CT abd/pelv. Received 4L fluids given elevated lactate to 7.1, but cleared to 1.6. He had rigors in the ED after completed CT abd/pelv. Patient blood pressures continue to remain low with SBP 90s, however is warm and well perfused on exam.   -Ucx- insignificant amount of growth  Bcx 11/19 growing E.coli, continue IV ceftriaxone- repeat bcx 11/20 and 11/22 to ensure clearance- NGTD.   -switched IV Zosyn 3.375g q6h to Ceftriaxone 2 g q24h given sensitivities   -will switch to PO ciprofloxacin 500 mg q12h on 11/24 (7 day abx course- 11/19-11/25)  -If further watery diarrhea jia with leukocytosis send cdiff studies  -radiology called about collection of fluid seen on CT in back but reported no abscess and likely post-surgical, given no pain in the back less likely source of infection and likely an incidental finding.   -radiology called about collection of fluid seen on CT in back but reported no abscess and likely post-surgical, given no pain in the back less likely source of infection and likely an incidental finding.   -radiology called about collection of fluid seen on CT in back but reported no abscess and likely post-surgical, given no pain in the back less likely source of infection and likely an incidental finding. Pt presenting with leukocytosis with WBC 14k, fever of 102F, HR 90-100s, and systolic blood pressures 90-100s. Initial complaints of nausea, non-bloody diarrhea, but patient also reports urinary frequency and dysuria. US positive for Nitrites, WBC, bacteria. Source suspected to be UTI. No hydronephrosis seen on CT abd/pelv. Received 4L fluids given elevated lactate to 7.1, but cleared to 1.6. He had rigors in the ED after completed CT abd/pelv. Patient blood pressures continue to remain low with SBP 90s, however is warm and well perfused on exam.   -Ucx- insignificant amount of growth  Bcx 11/19 growing E.coli, continue IV ceftriaxone- repeat bcx 11/20 and 11/22 to ensure clearance- NGTD.   -switched IV Zosyn 3.375g q6h to Ceftriaxone 2 g q24h given sensitivities   -will switch to PO ciprofloxacin 500 mg q12h on 11/24 (7 day abx course- 11/19-11/25)  -If further watery diarrhea jia with leukocytosis send cdiff studies  -radiology called about collection of fluid seen on CT in back but reported no abscess and likely post-surgical, given no pain in the back less likely source of infection and likely an incidental finding.   -radiology called about collection of fluid seen on CT in back but reported no abscess and likely post-surgical, given no pain in the back less likely source of infection and likely an incidental finding.

## 2020-11-23 NOTE — PROGRESS NOTE ADULT - ASSESSMENT
75M with PMH bladder cancer (s/p tumor resection, localized chemo and now in remission from 5yrs), GERD, kyphoscoliosis s/p C-sacrum fusion at [facility] (10/5/2020) depression, panic disorder, RUE DVT (on Eliquis), BPH, presenting with initial complaints of loose non-bloody bowel movements, decreased po intake, nausea, dizziness, dysuria/urinary frequency admitted for severe sepsis-2/2 E.coli bacteremia likely 2/2 UTI- neg CT imaging for alternative source.

## 2020-11-23 NOTE — DIETITIAN INITIAL EVALUATION ADULT. - PROBLEM SELECTOR PLAN 3
Pt with h/o C-sacrum fusion surgery, has chronic back pain for which is opiate dependent. No symptoms of numbness, weakness, saddle anesthesia, or any pain from surgical site/wound. Neurosurgery consulted in the ED. Of note, he has been on chronic prednisone, most recently 10mg daily. Patient does not have clear understand of why he is on the medication,   - f/u neurosurgery recs  - obtain collateral from patient's PMD/surgeon regarding prednisone

## 2020-11-24 VITALS
DIASTOLIC BLOOD PRESSURE: 81 MMHG | RESPIRATION RATE: 18 BRPM | SYSTOLIC BLOOD PRESSURE: 137 MMHG | OXYGEN SATURATION: 92 % | TEMPERATURE: 98 F | HEART RATE: 68 BPM

## 2020-11-24 LAB
ALBUMIN SERPL ELPH-MCNC: 3.1 G/DL — LOW (ref 3.3–5)
ALP SERPL-CCNC: 86 U/L — SIGNIFICANT CHANGE UP (ref 40–120)
ALT FLD-CCNC: 7 U/L — LOW (ref 10–45)
ANION GAP SERPL CALC-SCNC: 11 MMOL/L — SIGNIFICANT CHANGE UP (ref 5–17)
AST SERPL-CCNC: 10 U/L — SIGNIFICANT CHANGE UP (ref 10–40)
BASOPHILS # BLD AUTO: 0.08 K/UL — SIGNIFICANT CHANGE UP (ref 0–0.2)
BASOPHILS NFR BLD AUTO: 0.9 % — SIGNIFICANT CHANGE UP (ref 0–2)
BILIRUB SERPL-MCNC: 0.2 MG/DL — SIGNIFICANT CHANGE UP (ref 0.2–1.2)
BUN SERPL-MCNC: 15 MG/DL — SIGNIFICANT CHANGE UP (ref 7–23)
BURR CELLS BLD QL SMEAR: PRESENT — SIGNIFICANT CHANGE UP
CALCIUM SERPL-MCNC: 8.9 MG/DL — SIGNIFICANT CHANGE UP (ref 8.4–10.5)
CHLORIDE SERPL-SCNC: 102 MMOL/L — SIGNIFICANT CHANGE UP (ref 96–108)
CO2 SERPL-SCNC: 26 MMOL/L — SIGNIFICANT CHANGE UP (ref 22–31)
CREAT SERPL-MCNC: 0.85 MG/DL — SIGNIFICANT CHANGE UP (ref 0.5–1.3)
EOSINOPHIL # BLD AUTO: 0.47 K/UL — SIGNIFICANT CHANGE UP (ref 0–0.5)
EOSINOPHIL NFR BLD AUTO: 5.4 % — SIGNIFICANT CHANGE UP (ref 0–6)
GIANT PLATELETS BLD QL SMEAR: PRESENT — SIGNIFICANT CHANGE UP
GLUCOSE SERPL-MCNC: 78 MG/DL — SIGNIFICANT CHANGE UP (ref 70–99)
HCT VFR BLD CALC: 38.1 % — LOW (ref 39–50)
HGB BLD-MCNC: 11.9 G/DL — LOW (ref 13–17)
LYMPHOCYTES # BLD AUTO: 3.05 K/UL — SIGNIFICANT CHANGE UP (ref 1–3.3)
LYMPHOCYTES # BLD AUTO: 34.8 % — SIGNIFICANT CHANGE UP (ref 13–44)
MAGNESIUM SERPL-MCNC: 2 MG/DL — SIGNIFICANT CHANGE UP (ref 1.6–2.6)
MANUAL SMEAR VERIFICATION: SIGNIFICANT CHANGE UP
MCHC RBC-ENTMCNC: 27.7 PG — SIGNIFICANT CHANGE UP (ref 27–34)
MCHC RBC-ENTMCNC: 31.2 GM/DL — LOW (ref 32–36)
MCV RBC AUTO: 88.8 FL — SIGNIFICANT CHANGE UP (ref 80–100)
MONOCYTES # BLD AUTO: 0.62 K/UL — SIGNIFICANT CHANGE UP (ref 0–0.9)
MONOCYTES NFR BLD AUTO: 7.1 % — SIGNIFICANT CHANGE UP (ref 2–14)
NEUTROPHILS # BLD AUTO: 4.15 K/UL — SIGNIFICANT CHANGE UP (ref 1.8–7.4)
NEUTROPHILS NFR BLD AUTO: 47.3 % — SIGNIFICANT CHANGE UP (ref 43–77)
OVALOCYTES BLD QL SMEAR: SLIGHT — SIGNIFICANT CHANGE UP
PHOSPHATE SERPL-MCNC: 3.8 MG/DL — SIGNIFICANT CHANGE UP (ref 2.5–4.5)
PLAT MORPH BLD: NORMAL — SIGNIFICANT CHANGE UP
PLATELET # BLD AUTO: 384 K/UL — SIGNIFICANT CHANGE UP (ref 150–400)
POTASSIUM SERPL-MCNC: 3.9 MMOL/L — SIGNIFICANT CHANGE UP (ref 3.5–5.3)
POTASSIUM SERPL-SCNC: 3.9 MMOL/L — SIGNIFICANT CHANGE UP (ref 3.5–5.3)
PROT SERPL-MCNC: 6.2 G/DL — SIGNIFICANT CHANGE UP (ref 6–8.3)
RBC # BLD: 4.29 M/UL — SIGNIFICANT CHANGE UP (ref 4.2–5.8)
RBC # FLD: 14.9 % — HIGH (ref 10.3–14.5)
RBC BLD AUTO: ABNORMAL
SODIUM SERPL-SCNC: 139 MMOL/L — SIGNIFICANT CHANGE UP (ref 135–145)
VARIANT LYMPHS # BLD: 4.5 % — SIGNIFICANT CHANGE UP (ref 0–6)
WBC # BLD: 8.77 K/UL — SIGNIFICANT CHANGE UP (ref 3.8–10.5)
WBC # FLD AUTO: 8.77 K/UL — SIGNIFICANT CHANGE UP (ref 3.8–10.5)

## 2020-11-24 PROCEDURE — 74177 CT ABD & PELVIS W/CONTRAST: CPT

## 2020-11-24 PROCEDURE — 76705 ECHO EXAM OF ABDOMEN: CPT

## 2020-11-24 PROCEDURE — 71045 X-RAY EXAM CHEST 1 VIEW: CPT

## 2020-11-24 PROCEDURE — 96368 THER/DIAG CONCURRENT INF: CPT

## 2020-11-24 PROCEDURE — 96361 HYDRATE IV INFUSION ADD-ON: CPT

## 2020-11-24 PROCEDURE — 82550 ASSAY OF CK (CPK): CPT

## 2020-11-24 PROCEDURE — 86901 BLOOD TYPING SEROLOGIC RH(D): CPT

## 2020-11-24 PROCEDURE — 87635 SARS-COV-2 COVID-19 AMP PRB: CPT

## 2020-11-24 PROCEDURE — 84100 ASSAY OF PHOSPHORUS: CPT

## 2020-11-24 PROCEDURE — 83605 ASSAY OF LACTIC ACID: CPT

## 2020-11-24 PROCEDURE — 72129 CT CHEST SPINE W/DYE: CPT

## 2020-11-24 PROCEDURE — 86140 C-REACTIVE PROTEIN: CPT

## 2020-11-24 PROCEDURE — 80053 COMPREHEN METABOLIC PANEL: CPT

## 2020-11-24 PROCEDURE — 83735 ASSAY OF MAGNESIUM: CPT

## 2020-11-24 PROCEDURE — 85730 THROMBOPLASTIN TIME PARTIAL: CPT

## 2020-11-24 PROCEDURE — 85379 FIBRIN DEGRADATION QUANT: CPT

## 2020-11-24 PROCEDURE — 36415 COLL VENOUS BLD VENIPUNCTURE: CPT

## 2020-11-24 PROCEDURE — 93005 ELECTROCARDIOGRAM TRACING: CPT

## 2020-11-24 PROCEDURE — 99239 HOSP IP/OBS DSCHRG MGMT >30: CPT

## 2020-11-24 PROCEDURE — 86803 HEPATITIS C AB TEST: CPT

## 2020-11-24 PROCEDURE — 85652 RBC SED RATE AUTOMATED: CPT

## 2020-11-24 PROCEDURE — 72132 CT LUMBAR SPINE W/DYE: CPT

## 2020-11-24 PROCEDURE — 74019 RADEX ABDOMEN 2 VIEWS: CPT

## 2020-11-24 PROCEDURE — 83690 ASSAY OF LIPASE: CPT

## 2020-11-24 PROCEDURE — 72126 CT NECK SPINE W/DYE: CPT

## 2020-11-24 PROCEDURE — 87150 DNA/RNA AMPLIFIED PROBE: CPT

## 2020-11-24 PROCEDURE — 86900 BLOOD TYPING SEROLOGIC ABO: CPT

## 2020-11-24 PROCEDURE — 97116 GAIT TRAINING THERAPY: CPT

## 2020-11-24 PROCEDURE — 87040 BLOOD CULTURE FOR BACTERIA: CPT

## 2020-11-24 PROCEDURE — 99285 EMERGENCY DEPT VISIT HI MDM: CPT | Mod: 25

## 2020-11-24 PROCEDURE — 87186 SC STD MICRODIL/AGAR DIL: CPT

## 2020-11-24 PROCEDURE — 85025 COMPLETE CBC W/AUTO DIFF WBC: CPT

## 2020-11-24 PROCEDURE — 86850 RBC ANTIBODY SCREEN: CPT

## 2020-11-24 PROCEDURE — 96367 TX/PROPH/DG ADDL SEQ IV INF: CPT

## 2020-11-24 PROCEDURE — 85610 PROTHROMBIN TIME: CPT

## 2020-11-24 PROCEDURE — 96365 THER/PROPH/DIAG IV INF INIT: CPT | Mod: XU

## 2020-11-24 PROCEDURE — 97161 PT EVAL LOW COMPLEX 20 MIN: CPT

## 2020-11-24 PROCEDURE — 87507 IADNA-DNA/RNA PROBE TQ 12-25: CPT

## 2020-11-24 PROCEDURE — 87086 URINE CULTURE/COLONY COUNT: CPT

## 2020-11-24 PROCEDURE — 81001 URINALYSIS AUTO W/SCOPE: CPT

## 2020-11-24 PROCEDURE — 99232 SBSQ HOSP IP/OBS MODERATE 35: CPT

## 2020-11-24 PROCEDURE — 96375 TX/PRO/DX INJ NEW DRUG ADDON: CPT

## 2020-11-24 RX ORDER — LOPERAMIDE HCL 2 MG
1 TABLET ORAL
Qty: 20 | Refills: 0
Start: 2020-11-24

## 2020-11-24 RX ORDER — LOPERAMIDE HCL 2 MG
1 TABLET ORAL
Qty: 0 | Refills: 0 | DISCHARGE

## 2020-11-24 RX ORDER — CIPROFLOXACIN LACTATE 400MG/40ML
1 VIAL (ML) INTRAVENOUS
Qty: 9 | Refills: 0
Start: 2020-11-24

## 2020-11-24 RX ADMIN — HYDROMORPHONE HYDROCHLORIDE 4 MILLIGRAM(S): 2 INJECTION INTRAMUSCULAR; INTRAVENOUS; SUBCUTANEOUS at 12:06

## 2020-11-24 RX ADMIN — APIXABAN 5 MILLIGRAM(S): 2.5 TABLET, FILM COATED ORAL at 05:38

## 2020-11-24 RX ADMIN — PANTOPRAZOLE SODIUM 40 MILLIGRAM(S): 20 TABLET, DELAYED RELEASE ORAL at 05:38

## 2020-11-24 RX ADMIN — DULOXETINE HYDROCHLORIDE 120 MILLIGRAM(S): 30 CAPSULE, DELAYED RELEASE ORAL at 12:06

## 2020-11-24 RX ADMIN — HYDROMORPHONE HYDROCHLORIDE 4 MILLIGRAM(S): 2 INJECTION INTRAMUSCULAR; INTRAVENOUS; SUBCUTANEOUS at 00:12

## 2020-11-24 RX ADMIN — HYDROMORPHONE HYDROCHLORIDE 4 MILLIGRAM(S): 2 INJECTION INTRAMUSCULAR; INTRAVENOUS; SUBCUTANEOUS at 13:02

## 2020-11-24 RX ADMIN — HYDROMORPHONE HYDROCHLORIDE 4 MILLIGRAM(S): 2 INJECTION INTRAMUSCULAR; INTRAVENOUS; SUBCUTANEOUS at 06:46

## 2020-11-24 RX ADMIN — Medication 10 MILLIGRAM(S): at 13:25

## 2020-11-24 RX ADMIN — HYDROMORPHONE HYDROCHLORIDE 4 MILLIGRAM(S): 2 INJECTION INTRAMUSCULAR; INTRAVENOUS; SUBCUTANEOUS at 05:46

## 2020-11-24 RX ADMIN — ARIPIPRAZOLE 2 MILLIGRAM(S): 15 TABLET ORAL at 12:06

## 2020-11-24 NOTE — PROGRESS NOTE ADULT - PROBLEM SELECTOR PLAN 7
Pt with h/o bladder cancer, currently in remission. [place] [facility] [place]  - continue to monitor    #BPH/prostatomegaly, overactive bladder- on Tamsulosin 0.4mg qhs, Myrbetriq 50mg qd   - restart when BPs tolerate

## 2020-11-24 NOTE — PROGRESS NOTE ADULT - ASSESSMENT
per Internal Medicine    74 yo M with PMH bladder cancer (s/p tumor resection, localized chemo and now in remission), GERD, kyphoscoliosis s/p recent C-sacrum fusion at [facility] (10/5) panic disorder, RUE DVT (on Eliquis), BPH, presenting with initial complaints of diarrhea and dysuria, admitted with severe sepsis presumed s/t to UTI with ecoli bacteremia neg CT imaging for alternative source/collection in spine  -Bcx cleared, complete 10 day total abx course for marked bacteremia from presumed UTI dc on cipro (bioavailable, renally dosed)  -NSGY involved- low c/f for wound infection, CT spine C/T/L to ensure no seeding of hardware/abscess, pain management reviewed with patient- has 1 week supply and will f/u with new pain management doctor and PCP after (will setup followup)  -Diarrhea this visit, noninfectious, loose not watery- GI PCR neg, low c/f cdiff with semiformed stool/afeb/no WBC, immodium PRN at home (has helped him here)  -L cluteal pimple, low c/f cellulitis- abx as above and keep area clean and warm compresses  -DVT/PE hx- continue home eliquis  -Psych hx- continue home pysch meds  -Dermatitis- continue home steroids, derm followup

## 2020-11-24 NOTE — PROGRESS NOTE ADULT - PROBLEM SELECTOR PLAN 1
Pt presenting with leukocytosis with WBC 14k, fever of 102F, HR 90-100s, and systolic blood pressures 90-100s. Initial complaints of nausea, non-bloody diarrhea, but patient also reports urinary frequency and dysuria. US positive for Nitrites, WBC, bacteria. Source suspected to be UTI. No hydronephrosis seen on CT abd/pelv. Received 4L fluids given elevated lactate to 7.1, but cleared to 1.6. He had rigors in the ED after completed CT abd/pelv. Patient blood pressures continue to remain low with SBP 90s, however is warm and well perfused on exam.   -Ucx- insignificant amount of growth  Bcx 11/19 growing E.coli, continue IV ceftriaxone- repeat bcx 11/20 and 11/22 to ensure clearance- NGTD.   -switched IV Zosyn 3.375g q6h to Ceftriaxone 2 g q24h given sensitivities   -will switch to PO ciprofloxacin 500 mg q12h on 11/24 (7 day abx course- 11/19-11/25)  -If further watery diarrhea jia with leukocytosis send cdiff studies  -radiology called about collection of fluid seen on CT in back but reported no abscess and likely post-surgical, given no pain in the back less likely source of infection and likely an incidental finding.   -radiology called about collection of fluid seen on CT in back but reported no abscess and likely post-surgical, given no pain in the back less likely source of infection and likely an incidental finding.

## 2020-11-24 NOTE — PROGRESS NOTE ADULT - PROBLEM SELECTOR PROBLEM 2
CARE AFTER TOENAIL PROCEDURE  Dr. Pathak    1) The first 24-48 hours, elevate your foot to decrease bleeding, throbbing & swelling.  2) Leave the bandage clean & dry until tomorrow morning.  3) If you have bleeding through the bandage, do not remove it. Apply more gauze & tape.  4) In the morning remove the bandage. If it sticks, soak until it comes loose.  5) Soap & water cleanse the surgical toe in the shower.  6) Dry foot & apply one to 2 drops of Cortisporin otic solution & a bandage. Apply the Cortisporin otic twice daily. Repeat routine until you see me in 2 weeks.  7) Take Tylenol or ibuprofen as needed for pain.    It is important to allow the nail border to drain. You may see heavier drainage for 1 week. It will slowly decrease. Some pain, redness, and mild swelling are expected. This can be controlled with either Tylenol or ibuprofen. Should you experience a great amount of pain, redness, swelling, or pus (milky colored drainage) before your follow up appointment, contact our office.   Abdominal pain

## 2020-11-24 NOTE — PROGRESS NOTE ADULT - PROBLEM SELECTOR PLAN 10
Fluids: s/p 4L NS, no more IVF, encourage PO intake   Electrolytes-replete as needed  Nutrition - DASH/TLC, ensure high protein BID  Code- Full Code  DVT ppx: eliquis 5mg BID  GI ppx: 40 mg PO protonix qd  DIspo: RMF

## 2020-11-24 NOTE — PROGRESS NOTE ADULT - NUTRITIONAL ASSESSMENT
This patient has been assessed with a concern for Malnutrition and has been determined to have a diagnosis/diagnoses of Mild protein-calorie malnutrition.    This patient is being managed with:   Diet DASH/TLC-  Sodium & Cholesterol Restricted  Supplement Feeding Modality:  Oral  Ensure High Protein Cans or Servings Per Day:  1       Frequency:  Two Times a day  Entered: Nov 23 2020  2:25PM    
This patient has been assessed with a concern for Malnutrition and has been determined to have a diagnosis/diagnoses of Mild protein-calorie malnutrition.    This patient is being managed with:   Diet DASH/TLC-  Sodium & Cholesterol Restricted  Supplement Feeding Modality:  Oral  Ensure High Protein Cans or Servings Per Day:  1       Frequency:  Two Times a day  Entered: Nov 23 2020  2:25PM    
This patient has been assessed with a concern for Malnutrition and has been determined to have a diagnosis/diagnoses of Mild protein-calorie malnutrition.    This patient is being managed with:   Diet DASH/TLC-  Sodium & Cholesterol Restricted  Entered: Nov 19 2020  8:11AM    
This patient has been assessed with a concern for Malnutrition and has been determined to have a diagnosis/diagnoses of Mild protein-calorie malnutrition.    This patient is being managed with:   Diet DASH/TLC-  Sodium & Cholesterol Restricted  Supplement Feeding Modality:  Oral  Ensure High Protein Cans or Servings Per Day:  1       Frequency:  Two Times a day  Entered: Nov 23 2020  2:25PM

## 2020-11-24 NOTE — PROGRESS NOTE ADULT - SUBJECTIVE AND OBJECTIVE BOX
Physical Medicine and Rehabilitation Progress Note:    Patient is a 75y old  Male who presents with a chief complaint of sepsis (24 Nov 2020 12:33)      HPI:  75M with PMH bladder cancer (s/p tumor resection, s/p localized chemo and now in remission from 5yrs), GERD, kyphoscoliosis s/p C-sacrum fusion at [facility] (10/5/2020, w/ Dr. Carlisle), depression, panic disorder, RUE DVT (on eliquis), BPH, presenting with initial complaints of loose non-bloody bowel movements for the past few days, along with decreased appetite, and nausea and dizziness, starting today.  He reports chronic back pain for which he takes dilaudid 6mg around the clock, q6h since his surgery 1 month ago. After his surgery, he went to rehab and was discharged from rehab on 11/9/2020.  He states recently he has been feeling lightheaded and nauseous after taking dilaudid which is new. Regarding his back pain, he denies any changes in his pain, denies any numbness, saddle anesthesia, weakness, incontinence. He states he also has been having dysuria and frequency for the past three days. Initially he thought he noticed hematuria, but it eventually resolved.  No previous history of UTI, denies fevers (although never checked at home), flank pain, abd pain. Denies any chest pain, shortness of breath, palpitations, LE edema, or any other complaints at this time.     In the ED: Initial vital signs: T: 97.6 F --> 102.5F, HR: 92, BP: 137/86 mmHg --> 91/60s, R: 18 SpO2: 97% on RA  ED course: s/p tylenol 975mg po, dilaudid 0.5mg x1, Reglan 10mg x1, zofran 4mg x1, 3L NS fluids , Vancomycin 1g, Zosyn 3.375g   Labs: significant for WBC 14.23 with neutrophilic predominance, Hb 11.8, lactate 7.1 --> cleared to 1.6 (within 2 hours), CMP unremarkable;. UA positive for nitrites, WBC, RBC, and moderate blood, bacteria present   Imaging:   CXR: bibasilar atelectasis   CT abd/pelv w/ oral and IV contrast: bibasilar atelectasis, prostatomegaly. s/p spinal fusion with fixation into iliac bones. No bowel obstruction or appendicitis.   US abd: enlarged liver, no intrahepatic intraductal biliary dilation, gallbladder sludge present, no gallbladder wall thickening or pericholecystic fluid, CBD 7mm  EKG: NSR with HR 80, no acute ST or T wave changes   Medications: see below  Consults: Neurosurgery   (19 Nov 2020 04:47)                            11.9   8.77  )-----------( 384      ( 24 Nov 2020 06:24 )             38.1       11-24    139  |  102  |  15  ----------------------------<  78  3.9   |  26  |  0.85    Ca    8.9      24 Nov 2020 06:24  Phos  3.8     11-24  Mg     2.0     11-24    TPro  6.2  /  Alb  3.1<L>  /  TBili  0.2  /  DBili  x   /  AST  10  /  ALT  7<L>  /  AlkPhos  86  11-24    Vital Signs Last 24 Hrs  T(C): 36.8 (24 Nov 2020 13:41), Max: 37.2 (24 Nov 2020 05:44)  T(F): 98.2 (24 Nov 2020 13:41), Max: 99 (24 Nov 2020 05:44)  HR: 68 (24 Nov 2020 13:41) (62 - 77)  BP: 137/81 (24 Nov 2020 13:41) (98/62 - 148/79)  BP(mean): --  RR: 18 (24 Nov 2020 13:41) (18 - 18)  SpO2: 92% (24 Nov 2020 13:41) (92% - 94%)    MEDICATIONS  (STANDING):  apixaban 5 milliGRAM(s) Oral every 12 hours  ARIPiprazole 2 milliGRAM(s) Oral daily  cefTRIAXone   IVPB      cefTRIAXone   IVPB 2000 milliGRAM(s) IV Intermittent every 24 hours  clonazePAM  Tablet 0.5 milliGRAM(s) Oral at bedtime  DULoxetine 120 milliGRAM(s) Oral daily  pantoprazole    Tablet 40 milliGRAM(s) Oral before breakfast  predniSONE   Tablet 10 milliGRAM(s) Oral every 24 hours    MEDICATIONS  (PRN):  acetaminophen   Tablet .. 650 milliGRAM(s) Oral every 6 hours PRN Temp greater or equal to 38C (100.4F), Mild Pain (1 - 3), Moderate Pain (4 - 6)  bismuth subsalicylate Liquid 30 milliLiter(s) Oral every 6 hours PRN Diarrhea  HYDROmorphone   Tablet 4 milliGRAM(s) Oral every 6 hours PRN Severe Pain (7 - 10)  HYDROmorphone  Injectable 0.5 milliGRAM(s) IV Push every 6 hours PRN breakthrough pain  ondansetron Injectable 4 milliGRAM(s) IV Push every 6 hours PRN Nausea and/or Vomiting    Currently Undergoing Physical/ Occupational Therapy at bedside.    Functional Status Assessment:   11/23/2020          PM&R Impression: as above    Current Disposition Plan Recommendations:   d/c home with home physical therapy

## 2020-11-24 NOTE — PROGRESS NOTE ADULT - SUBJECTIVE AND OBJECTIVE BOX
*INCOMPLETE NOTE*    INTERVAL HPI/OVERNIGHT EVENTS:    SUBJECTIVE: Patient seen and examined at bedside.    OBJECTIVE:    VITAL SIGNS:  ICU Vital Signs Last 24 Hrs  T(C): 37.2 (24 Nov 2020 05:44), Max: 37.2 (24 Nov 2020 05:44)  T(F): 99 (24 Nov 2020 05:44), Max: 99 (24 Nov 2020 05:44)  HR: 62 (24 Nov 2020 05:44) (62 - 77)  BP: 148/79 (24 Nov 2020 05:44) (98/62 - 148/79)  BP(mean): --  ABP: --  ABP(mean): --  RR: 18 (24 Nov 2020 05:44) (18 - 18)  SpO2: 94% (24 Nov 2020 05:44) (94% - 95%)        CAPILLARY BLOOD GLUCOSE          PHYSICAL EXAM:    MEDICATIONS:  MEDICATIONS  (STANDING):  apixaban 5 milliGRAM(s) Oral every 12 hours  ARIPiprazole 2 milliGRAM(s) Oral daily  cefTRIAXone   IVPB 2000 milliGRAM(s) IV Intermittent every 24 hours  cefTRIAXone   IVPB      clonazePAM  Tablet 0.5 milliGRAM(s) Oral at bedtime  DULoxetine 120 milliGRAM(s) Oral daily  pantoprazole    Tablet 40 milliGRAM(s) Oral before breakfast  predniSONE   Tablet 10 milliGRAM(s) Oral every 24 hours    MEDICATIONS  (PRN):  acetaminophen   Tablet .. 650 milliGRAM(s) Oral every 6 hours PRN Temp greater or equal to 38C (100.4F), Mild Pain (1 - 3), Moderate Pain (4 - 6)  bismuth subsalicylate Liquid 30 milliLiter(s) Oral every 6 hours PRN Diarrhea  HYDROmorphone   Tablet 4 milliGRAM(s) Oral every 6 hours PRN Severe Pain (7 - 10)  HYDROmorphone  Injectable 0.5 milliGRAM(s) IV Push every 6 hours PRN breakthrough pain  ondansetron Injectable 4 milliGRAM(s) IV Push every 6 hours PRN Nausea and/or Vomiting      ALLERGIES:  Allergies    sulfa drugs (Unknown)    Intolerances        LABS:                        11.9   8.77  )-----------( 384      ( 24 Nov 2020 06:24 )             38.1     11-23    140  |  102  |  9   ----------------------------<  80  3.7   |  23  |  0.76    Ca    9.1      23 Nov 2020 06:46  Phos  3.8     11-23  Mg     2.0     11-23    TPro  7.0  /  Alb  3.3  /  TBili  0.3  /  DBili  x   /  AST  13  /  ALT  8<L>  /  AlkPhos  92  11-23          RADIOLOGY & ADDITIONAL TESTS: Reviewed.

## 2020-11-24 NOTE — PROGRESS NOTE ADULT - PROBLEM SELECTOR PLAN 2
-Pt. with abdominal pain- f/u Xray abdomen 11/22- Mild ileus with no abnormal bowel dilatation or pneumoperitoneum. Thoracolumbar, sacral and iliac hardware. Postsurgical spinal changes. Calcification overlying the left iliac bone.   -abd TTP in RUQ  -If further watery diarrhea jia with leukocytosis send cdiff studies  -f/u GI PCR 11/23  -peptol bismol PRN for diarrhea, can also give loperamide 4 mg PO  -zofran PRN for nausea

## 2020-11-24 NOTE — PROGRESS NOTE ADULT - ASSESSMENT
75M with PMH bladder cancer (s/p tumor resection, localized chemo and now in remission from 5yrs), GERD, kyphoscoliosis s/p C-sacrum fusion at [facility] (10/5/2020) depression, panic disorder, RUE DVT (on Eliquis), BPH, presenting with initial complaints of loose non-bloody bowel movements, decreased po intake, nausea, dizziness, dysuria/urinary frequency admitted for severe sepsis-2/2 E.coli bacteremia likely 2/2 UTI- neg CT imaging for alternative source.

## 2020-11-24 NOTE — PROGRESS NOTE ADULT - SUBJECTIVE AND OBJECTIVE BOX
Patient seen and examined. Agree with resident's findings, assessment and plan. Agree with discharge diagnoses, as well as with plan of care and goals.  Time spent: 35 minutes for evaluation, plan of care, patient education, medication reconciliation, coordination of care, follow ups and transition to outpatient.     PE: Aox3, lungs clear, CV RRR, abd soft, nt ext wwp no sig le edema, small pustule on buttocks no associated sig erythema/purulence from being in bed     75M with PMH bladder cancer (s/p tumor resection, localized chemo and now in remission), GERD, kyphoscoliosis s/p recent C-sacrum fusion at [facility] (10/5) panic disorder, RUE DVT (on Eliquis), BPH, presenting with initial complaints of diarrhea and dysuria, admitted with severe sepsis presumed s/t to UTI with ecoli bacteremia neg CT imaging for alternative source/collection in spine  -Bcx cleared, complete 10 day total abx course for marked bacteremia from presumed UTI dc on cipro (bioavailable, renally dosed)  -NSGY involved- low c/f for wound infection, CT spine C/T/L to ensure no seeding of hardware/abscess, pain management reviewed with patient- has 1 week supply and will f/u with new pain management doctor and PCP after (will setup followup)  -Diarrhea this visit, noninfectious, loose not watery- GI PCR neg, low c/f cdiff with semiformed stool/afeb/no WBC, immodium PRN at home (has helped him here)  -L cluteal pimple, low c/f cellulitis- abx as above and keep area clean and warm compresses  -DVT/PE hx- continue home eliquis  -Psych hx- continue home pysch meds  -Dermatitis- continue home steroids, derm followup  -Dispo- Home today- refused MCKENNA.       Ryland Gonzalez MD [phone]

## 2020-11-25 LAB
CULTURE RESULTS: SIGNIFICANT CHANGE UP
SPECIMEN SOURCE: SIGNIFICANT CHANGE UP

## 2020-11-27 LAB
CULTURE RESULTS: SIGNIFICANT CHANGE UP
SPECIMEN SOURCE: SIGNIFICANT CHANGE UP

## 2020-11-28 DIAGNOSIS — F41.0 PANIC DISORDER [EPISODIC PAROXYSMAL ANXIETY]: ICD-10-CM

## 2020-11-28 DIAGNOSIS — Z86.718 PERSONAL HISTORY OF OTHER VENOUS THROMBOSIS AND EMBOLISM: ICD-10-CM

## 2020-11-28 DIAGNOSIS — Z85.51 PERSONAL HISTORY OF MALIGNANT NEOPLASM OF BLADDER: ICD-10-CM

## 2020-11-28 DIAGNOSIS — Z92.21 PERSONAL HISTORY OF ANTINEOPLASTIC CHEMOTHERAPY: ICD-10-CM

## 2020-11-28 DIAGNOSIS — E44.1 MILD PROTEIN-CALORIE MALNUTRITION: ICD-10-CM

## 2020-11-28 DIAGNOSIS — M41.9 SCOLIOSIS, UNSPECIFIED: ICD-10-CM

## 2020-11-28 DIAGNOSIS — E78.5 HYPERLIPIDEMIA, UNSPECIFIED: ICD-10-CM

## 2020-11-28 DIAGNOSIS — Z79.891 LONG TERM (CURRENT) USE OF OPIATE ANALGESIC: ICD-10-CM

## 2020-11-28 DIAGNOSIS — K56.7 ILEUS, UNSPECIFIED: ICD-10-CM

## 2020-11-28 DIAGNOSIS — F32.9 MAJOR DEPRESSIVE DISORDER, SINGLE EPISODE, UNSPECIFIED: ICD-10-CM

## 2020-11-28 DIAGNOSIS — N40.0 BENIGN PROSTATIC HYPERPLASIA WITHOUT LOWER URINARY TRACT SYMPTOMS: ICD-10-CM

## 2020-11-28 DIAGNOSIS — A41.51 SEPSIS DUE TO ESCHERICHIA COLI [E. COLI]: ICD-10-CM

## 2020-11-28 DIAGNOSIS — R19.7 DIARRHEA, UNSPECIFIED: ICD-10-CM

## 2020-11-28 DIAGNOSIS — M54.9 DORSALGIA, UNSPECIFIED: ICD-10-CM

## 2020-11-28 DIAGNOSIS — J98.11 ATELECTASIS: ICD-10-CM

## 2020-11-28 DIAGNOSIS — N39.0 URINARY TRACT INFECTION, SITE NOT SPECIFIED: ICD-10-CM

## 2020-11-28 DIAGNOSIS — G89.29 OTHER CHRONIC PAIN: ICD-10-CM

## 2020-11-28 DIAGNOSIS — Z88.2 ALLERGY STATUS TO SULFONAMIDES: ICD-10-CM

## 2020-11-28 DIAGNOSIS — R31.9 HEMATURIA, UNSPECIFIED: ICD-10-CM

## 2020-11-28 DIAGNOSIS — R65.20 SEVERE SEPSIS WITHOUT SEPTIC SHOCK: ICD-10-CM

## 2020-11-28 DIAGNOSIS — K21.9 GASTRO-ESOPHAGEAL REFLUX DISEASE WITHOUT ESOPHAGITIS: ICD-10-CM

## 2021-02-05 NOTE — DISCHARGE NOTE PROVIDER - NSCORESITESY/N_GEN_A_CORE_RD
Emergency Department SIGN OUT NOTE     Patient: Theresa Duque Age: 69 year old Sex: female   MRN: [id number] : 1951 Encounter Date: 2021     Received Sign-Out From:Dr Lucas   Dispo: adm to [facility] with [place] on consult, likely lower gi bleed   History & Course: 69 year old is a female presented with maroon stool, hgb 9.5. She is [place], refuses blood even in case of emergency. Given protonix and ivf. Vitals stable  Pending items: nothing pending, awaiting bed         The patient's evaluation and treatment has been initiated and/or completed by prior team. I have only participated in the care of this patient as noted. Please refer to their documentation for further details regarding their care.     [] This patient's care has been transitioned to the primary admitting or observation team. The patient is currently boarding in the ED, awaiting bed placement in the hospital. Unless otherwise noted, I have not evaluated the patient personally or had any active involvement in the patient's care.         Vitals with min/max:    Vital Last Value 24 Hour Range   Temperature 97.3 °F (36.3 °C) (21 1345) Temp  Min: 97.3 °F (36.3 °C)  Max: 97.3 °F (36.3 °C)   Pulse 80 (21 1345) Pulse  Min: 80  Max: 80   Respiratory 16 (21 1345) Resp  Min: 16  Max: 16   Non-Invasive  Blood Pressure (!) 168/52 (21 1345) BP  Min: 168/52  Max: 168/52   Pulse Oximetry 97 % (21 1345) SpO2  Min: 97 %  Max: 97 %   Arterial   Blood Pressure   No data recorded     Labs:   Results for orders placed or performed during the hospital encounter of 21   Comprehensive Metabolic Panel   Result Value Ref Range    Fasting Status      Sodium 140 135 - 145 mmol/L    Potassium 4.7 3.4 - 5.1 mmol/L    Chloride 110 (H) 98 - 107 mmol/L    Carbon Dioxide 24 21 - 32 mmol/L    Anion Gap 11 10 - 20 mmol/L    Glucose 87 65 - 99 mg/dL    BUN 33 (H) 6 - 20 mg/dL    Creatinine 1.28 (H) 0.51 - 0.95 mg/dL    Glomerular  Filtration Rate 43 (L) >90 mL/min/1.73m2    BUN/ Creatinine Ratio 26 (H) 7 - 25    Calcium 9.5 8.4 - 10.2 mg/dL    Bilirubin, Total 0.3 0.2 - 1.0 mg/dL    GOT/AST 21 <=37 Units/L    GPT/ALT 30 <64 Units/L    Alkaline Phosphatase 66 45 - 117 Units/L    Albumin 3.8 3.6 - 5.1 g/dL    Protein, Total 6.8 6.4 - 8.2 g/dL    Globulin 3.0 2.0 - 4.0 g/dL    A/G Ratio 1.3 1.0 - 2.4   Magnesium   Result Value Ref Range    Magnesium 1.7 1.7 - 2.4 mg/dL   Prothrombin Time   Result Value Ref Range    Prothrombin Time 10.7 9.7 - 11.8 sec    INR 1.0 <=5.0   Partial Thromboplastin Time   Result Value Ref Range    PTT 22 22 - 30 sec   CBC with Automated Differential (performable only)   Result Value Ref Range    WBC 5.1 4.2 - 11.0 K/mcL    RBC 3.38 (L) 4.00 - 5.20 mil/mcL    HGB 9.5 (L) 12.0 - 15.5 g/dL    HCT 30.9 (L) 36.0 - 46.5 %    MCV 91.4 78.0 - 100.0 fl    MCH 28.1 26.0 - 34.0 pg    MCHC 30.7 (L) 32.0 - 36.5 g/dL    RDW-CV 14.0 11.0 - 15.0 %    RDW-SD 46.5 39.0 - 50.0 fL     140 - 450 K/mcL    NRBC 0 <=0 /100 WBC    Neutrophil, Percent 65 %    Lymphocytes, Percent 26 %    Mono, Percent 7 %    Eosinophils, Percent 2 %    Basophils, Percent 0 %    Immature Granulocytes 0 %    Absolute Neutrophils 3.2 1.8 - 7.7 K/mcL    Absolute Lymphocytes 1.3 1.0 - 4.0 K/mcL    Absolute Monocytes 0.4 0.3 - 0.9 K/mcL    Absolute Eosinophils  0.1 0.0 - 0.5 K/mcL    Absolute Basophils 0.0 0.0 - 0.3 K/mcL    Absolute Immmature Granulocytes 0.0 0.0 - 0.2 K/mcL   Rapid SARS-CoV-2 by PCR    Specimen: Nasopharyngeal; Swab   Result Value Ref Range    Rapid SARS-COV-2 by PCR Not Detected Not Detected / Detected / Presumptive Positive / Inhibitors present    Isolation Guidelines      Procedural Comment       Imaging:   XR CHEST PA OR AP 1 VIEW   Final Result      No acute cardiopulmonary process.          Electronically Signed by: DENNIS LAND M.D.    Signed on: 2/5/2021 2:51 PM            EKG:   Encounter Date: 02/05/21   Electrocardiogram  12-Lead   Result Value    Ventricular Rate EKG/Min (BPM) 79    Atrial Rate (BPM) 79    AR-Interval (MSEC) 170    QRS-Interval (MSEC) 128    QT-Interval (MSEC) 422    QTc 484    P Axis (Degrees) 49    R Axis (Degrees) -50    T Axis (Degrees) 105    REPORT TEXT      Normal sinus rhythm  Left axis deviation  Left bundle branch block  Abnormal ECG  No previous ECGs available  Confirmed by LEAH QUICK, DERIC ([id number]) on 2/5/2021 4:07:07 PM          Medications received in the department:   ED Medication Orders (From admission, onward)    Ordered Start     Status Ordering Provider    02/05/21 1408 02/05/21 1409  pantoprazole (PROTONIX INJECT) injection 40 mg  ONCE      Last MAR action: Given KIRSTEN GOOD    02/05/21 1408 02/05/21 1409  lactated ringers bolus 500 mL  ONCE      Last MAR action: New Bag KIRSTEN GOOD,   02/05/21 4620     No

## 2021-10-01 PROBLEM — F32.9 MAJOR DEPRESSIVE DISORDER, SINGLE EPISODE, UNSPECIFIED: Chronic | Status: ACTIVE | Noted: 2020-11-18

## 2021-10-01 PROBLEM — C67.9 MALIGNANT NEOPLASM OF BLADDER, UNSPECIFIED: Chronic | Status: ACTIVE | Noted: 2020-11-19

## 2021-10-01 PROBLEM — E78.00 PURE HYPERCHOLESTEROLEMIA, UNSPECIFIED: Chronic | Status: ACTIVE | Noted: 2020-11-18

## 2021-10-01 PROBLEM — K21.9 GASTRO-ESOPHAGEAL REFLUX DISEASE WITHOUT ESOPHAGITIS: Chronic | Status: ACTIVE | Noted: 2020-11-18

## 2021-10-06 ENCOUNTER — APPOINTMENT (OUTPATIENT)
Dept: PHYSICAL MEDICINE AND REHAB | Facility: CLINIC | Age: 76
End: 2021-10-06
Payer: MEDICARE

## 2021-10-06 VITALS — HEIGHT: 67 IN | BODY MASS INDEX: 25.11 KG/M2 | RESPIRATION RATE: 16 BRPM | WEIGHT: 160 LBS

## 2021-10-06 PROCEDURE — 99213 OFFICE O/P EST LOW 20 MIN: CPT

## 2021-10-07 NOTE — PHYSICAL EXAM
[FreeTextEntry1] : GEN:  NAD, AAOx3.\par INSPECTION:  No effusion, discoloration. Normal patellar tracking. \par GAIT: Non-antalgic.\par Knee AROM: Full (+) pain throughout range LEFT\par PALPATION:  No effusion, warmth or crepitus. (+) TTP L-MJL. No TTP patellar facets, lateral joint line, popliteal fossa, patellar tendon, quad tendon, hamstrings, ITB.  \par MOTOR:  5/5 bilateral lower limbs. No spasticity, tremor, atrophy or contractures.\par SPECIAL:  Hyperflexion (-) bilaterally, Chidi (-) bilaterally, Medial compression (+) LEFT, Lateral compression (-) bilaterally, Varus/Valgus stress (-) bilaterally, Lachman (-) bilaterally.

## 2021-10-07 NOTE — ASSESSMENT
[FreeTextEntry1] : Impression:\par 1. Left Knee OA\par \par Plan: The history, physical examination, and imaging were reviewed. The imaging results and diagnosis were discussed with the patient along with treatment options including physical therapy, oral medication, ultrasound guided injections, and surgery; as well as alternative therapeutics such as acupuncture and massage. The patient has exhausted conservative treatment options as of this point. I am recommending that the patient go for new XR and have referred him to see Dr. Mcgovern for TKA consideration. The patient expressed verbal understanding and is in agreement with the plan of care. All of the patient's questions and concerns were addressed during today's visit.\par

## 2021-10-07 NOTE — HISTORY OF PRESENT ILLNESS
[FreeTextEntry1] : Mr. DOM CASTAÑEDA is a very pleasant 76 year male who comes in for evaluation of LEFT KNEE pain that has been ongoing for years without any specific injury or inciting event. Patient has tried PT which aggravated symptoms, CSI/Gel Injections and Knee Ablations which only helped temporarily. The pain is located primarily on his LEFT KNEE non-radiating, intermittent and described as sharp. The pain is rated as 8/10 and ranges from 7-10/10. The patient's symptoms are aggravated by PT and alleviated by Ablations.The patient feels weakness but denies any night pain, numbness/tingling,  or bowel/bladder dysfunction. The patient has no other complaints at this time.

## 2021-10-07 NOTE — DATA REVIEWED
[Plain X-Rays] : plain X-Rays [FreeTextEntry1] : XR KNEE LEFT 08/2019: Medial tibiofemoral compartment joint space narrowing.

## 2021-11-09 ENCOUNTER — RESULT REVIEW (OUTPATIENT)
Age: 76
End: 2021-11-09

## 2021-11-09 ENCOUNTER — APPOINTMENT (OUTPATIENT)
Dept: ORTHOPEDIC SURGERY | Facility: CLINIC | Age: 76
End: 2021-11-09
Payer: MEDICARE

## 2021-11-09 ENCOUNTER — OUTPATIENT (OUTPATIENT)
Dept: OUTPATIENT SERVICES | Facility: HOSPITAL | Age: 76
LOS: 1 days | End: 2021-11-09
Payer: MEDICARE

## 2021-11-09 VITALS
SYSTOLIC BLOOD PRESSURE: 108 MMHG | DIASTOLIC BLOOD PRESSURE: 60 MMHG | WEIGHT: 160 LBS | HEIGHT: 67 IN | BODY MASS INDEX: 25.11 KG/M2 | HEART RATE: 72 BPM | OXYGEN SATURATION: 93 %

## 2021-11-09 DIAGNOSIS — Z98.890 OTHER SPECIFIED POSTPROCEDURAL STATES: Chronic | ICD-10-CM

## 2021-11-09 PROCEDURE — 73564 X-RAY EXAM KNEE 4 OR MORE: CPT

## 2021-11-09 PROCEDURE — 73564 X-RAY EXAM KNEE 4 OR MORE: CPT | Mod: 26,50

## 2021-11-09 PROCEDURE — 72170 X-RAY EXAM OF PELVIS: CPT | Mod: 26

## 2021-11-09 PROCEDURE — 99214 OFFICE O/P EST MOD 30 MIN: CPT

## 2021-11-09 PROCEDURE — 72170 X-RAY EXAM OF PELVIS: CPT

## 2021-11-09 RX ORDER — HYDROMORPHONE HCL 3 MG
SUPPOSITORY, RECTAL RECTAL
Refills: 0 | Status: ACTIVE | COMMUNITY

## 2021-11-09 NOTE — DISCUSSION/SUMMARY
[de-identified] : 77y/o male with moderately severe left knee osteoarthritis in setting of severe chronic pain syndrome\par - Discussed the full range of treatment options for knee OA with him. Conservative measures have not provided very much pain relief over the years. However, my impression is that his pain might not meaningfully improve with TKA. I have my doubts that he would be able to participate in the necessary postoperative PT and would run a high chance for postop arthrofibrosis which could leave him worse off than he is now. I discussed this all with him; he understands and agrees.\par - Recommended HEP as tolerated\par - Cont current multimodal pain regimen\par - He will follow up in about 4-6 weeks, which should allow him to quantify the degree of improvement from the recent RFA. May consider re-initiating Euflexxa at that time. No new XRs needed next visit.

## 2021-11-09 NOTE — REVIEW OF SYSTEMS
[Joint Pain] : joint pain [Recent Weight Gain (___ Lbs)] : recent ~M [unfilled] lbs weight gain [Lower Ext Edema] : lower extremity edema [Urinary Frequency] : urinary frequency [Urinary Urgency] : urinary urgency [Anxiety] : anxiety [Depression] : depression [Muscle Weakness] : muscle weakness [Negative] : Heme/Lymph

## 2021-11-09 NOTE — PHYSICAL EXAM
[de-identified] : General appearance: well nourished and hydrated, pleasant, alert and oriented x 3, cooperative.  Flat affect.\par HEENT: normocephalic, EOM intact, wearing mask, external auditory canal clear.  \par Cardiovascular: no lower leg edema, no varicosities, dorsalis pedis pulses palpable and symmetric.  \par Lymphatics: no palpable lymphadenopathy, no lymphedema.  \par Neurologic: sensation is normal, no muscle weakness in upper or lower extremities, patella tendon reflexes present and symmetric.  \par Dermatologic: skin moist, warm, no rash.  \par Spine: cervical spine with normal lordosis and painless range of motion, thoracic spine with normal kyphosis and painless range of motion, lumbosacral spine with normal lordosis and restricted very painful range of motion.  \par Gait: slow to stand and get started. Mild left antalgia with cane. Slow shuffling gait.\par \par Left knee:\par - Soft tissue swelling: none\par - Ecchymosis: none\par - Erythema: none\par - Effusion: none\par - Wounds: healed arthroscopic portals\par - Alignment: normal\par - Tenderness: diffuse circumferential\par - ROM: 0-135\par - Collateral laxity: none\par - Cruciate laxity: increased A/P translation on flexion drawers\par - Popliteal angle (degrees): 60\par - Quad strength: 4+/5, poor effort [de-identified] : AP pelvis and 4 views of the bilateral knees (weightbearing AP, weightbearing Ocasio, weightbearing lateral, and Sunrise) were obtained today, interpreted by me, and reviewed with the patient.\par \par Pelvic alignment: normal. Lumbopelvic fusion hardware in place\par \par Right hip --\par Alignment: normal\par Arthritis: none\par Deformity: cam\par Osteonecrosis: none\par \par Left hip --\par Alignment: normal\par Arthritis: none\par Deformity: cam\par Osteonecrosis: none\par \par Right knee --\par Alignment: normal\par Arthritis: minimal tricompartmental, KL1\par Patellar height: normal\par Patellar tracking: central\par \par Left knee --\par Alignment: normal\par Arthritis: moderate tricompartmental, mostly medial, KL3\par Patellar height: normal\par Patellar tracking:central

## 2021-11-09 NOTE — HISTORY OF PRESENT ILLNESS
[6] : a current pain level of 6/10 [Constant] : ~He/She~ states the symptoms seem to be constant [Ice] : relieved by ice [NSAIDs] : relieved by nonsteroidal anti-inflammatory drugs [de-identified] : 11/9/21: 75y/o male p/w left knee pain persistent for many years. Treatment thus far has included PT, HEP, Dilaudid and Celebrex, multiple rounds of CSI and HA (reports CSI never helped, HA initially helped a lot and gradually lost efficacy), and RF ablations. Last ablation was done by an outside pain management physician about 2 weeks ago. Has history of severe chronic multifocal pain associated with multiple lumbar spine surgeries. Most recent lumbar surgery was performed in October 2020 at [facility]. The low back pain is overall worse than the left knee pain. Ambulates with a cane, exercise tolerance less than a block. \par \par Has a stated allergy to sulfa meds but this does not apparently cross-react with his Celebrex. [Bending] : worsened by bending [Walking] : worsened by walking [Opioid Analgesics] : relieved by opioid analgesics [de-identified] : patient describes pain as localize and throbbing

## 2021-12-27 ENCOUNTER — TRANSCRIPTION ENCOUNTER (OUTPATIENT)
Age: 76
End: 2021-12-27

## 2022-03-08 NOTE — PROGRESS NOTE ADULT - PROBLEM SELECTOR PLAN 6
hx. depression/panic disorder   w/ home Duloxetine 120mg qd, Aripriprazole 2mg qd, Clonopin 0.5mg qhs  - obtain istop 06-Mar-2022

## 2022-05-13 ENCOUNTER — APPOINTMENT (OUTPATIENT)
Dept: ORTHOPEDIC SURGERY | Facility: CLINIC | Age: 77
End: 2022-05-13
Payer: MEDICARE

## 2022-05-13 VITALS
DIASTOLIC BLOOD PRESSURE: 75 MMHG | HEART RATE: 79 BPM | SYSTOLIC BLOOD PRESSURE: 110 MMHG | BODY MASS INDEX: 24.33 KG/M2 | HEIGHT: 67 IN | WEIGHT: 155 LBS | OXYGEN SATURATION: 92 %

## 2022-05-13 PROCEDURE — 20610 DRAIN/INJ JOINT/BURSA W/O US: CPT

## 2022-05-13 NOTE — PROCEDURE
[de-identified] : Hasn't noted any relief from spinal ablations. Continues to complain of left knee pain, mostly infrapatellar. Would like to resume gel injections today. Affirms that CSI were completely ineffective in the past and is not interested in repeating those.\par \par Euflexxa # 1 of 3\par Left knee joint\par Lot.No: [id number]\par Exp.Date: 2023-02-15. \par \par Procedure: Knee joint injection\par Laterality: left \par Indication: Osteoarthritis - discussed with patient\par Skin prep: alcohol and chlorhexidine\par Anesthesia: ethyl chloride spray\par Needle: 20-gauge\par Portal: inferolateral\par Aspiration: none\par Injectate: Euflexxa\par Dressing: Band-aid\par Complications: None\par \par RTC next week to continue with L knee Euflexxa series. New referral given for PT; also for Pain Mgmt as he has been traveling to [place] for Dilaudid refills and the cost is getting prohibitive.

## 2022-05-13 NOTE — PROCEDURE
[de-identified] : Hasn't noted any relief from spinal ablations. Continues to complain of left knee pain, mostly infrapatellar. Would like to resume gel injections today. Affirms that CSI were completely ineffective in the past and is not interested in repeating those.\par \par Euflexxa # 1 of 3\par Left knee joint\par Lot.No: [id number]\par Exp.Date: 2023-02-15. \par \par Procedure: Knee joint injection\par Laterality: left \par Indication: Osteoarthritis - discussed with patient\par Skin prep: alcohol and chlorhexidine\par Anesthesia: ethyl chloride spray\par Needle: 20-gauge\par Portal: inferolateral\par Aspiration: none\par Injectate: Euflexxa\par Dressing: Band-aid\par Complications: None\par \par RTC next week to continue with L knee Euflexxa series. New referral given for PT; also for Pain Mgmt as he has been traveling to [place] for Dilaudid refills and the cost is getting prohibitive.

## 2022-05-20 ENCOUNTER — APPOINTMENT (OUTPATIENT)
Dept: ORTHOPEDIC SURGERY | Facility: CLINIC | Age: 77
End: 2022-05-20
Payer: MEDICARE

## 2022-05-20 PROCEDURE — 20610 DRAIN/INJ JOINT/BURSA W/O US: CPT | Mod: LT

## 2022-05-23 NOTE — PROCEDURE
[de-identified] : Euflexxa # 2 of 3\par Left knee joint\par Lot.No: [id number]\par Exp.Date: 2023-02-15. \par \par Procedure: Knee joint injection\par Laterality: left \par Indication: Osteoarthritis - discussed with patient\par Skin prep: alcohol and chlorhexidine\par Anesthesia: ethyl chloride spray\par Needle: 20-gauge\par Portal: superolateral\par Aspiration: none\par Injectate: Euflexxa\par Dressing: Band-aid\par Complications: None\par \par

## 2022-05-26 ENCOUNTER — APPOINTMENT (OUTPATIENT)
Dept: ORTHOPEDIC SURGERY | Facility: CLINIC | Age: 77
End: 2022-05-26
Payer: MEDICARE

## 2022-05-26 VITALS — DIASTOLIC BLOOD PRESSURE: 76 MMHG | HEART RATE: 77 BPM | SYSTOLIC BLOOD PRESSURE: 120 MMHG | OXYGEN SATURATION: 94 %

## 2022-05-26 PROCEDURE — 20610 DRAIN/INJ JOINT/BURSA W/O US: CPT | Mod: LT

## 2022-05-26 NOTE — PROCEDURE
[de-identified] : Euflexxa # 3 of 3\par Left knee joint\par Lot.No: [id number]\par Exp.Date: 2023-02-15. \par \par Procedure: Knee joint injection\par Laterality: left \par Indication: Osteoarthritis - discussed with patient\par Skin prep: alcohol and chlorhexidine\par Anesthesia: ethyl chloride spray\par Needle: 20-gauge\par Portal: superolateral\par Aspiration: none\par Injectate: Euflexxa\par Dressing: Band-aid\par Complications: None\par \par . \par

## 2022-08-09 NOTE — ED PROVIDER NOTE - WR ORDER NAME 1
Implemented All Universal Safety Interventions:  [place] to call system. Call bell, personal items and telephone within reach. Instruct patient to call for assistance. Room bathroom lighting operational. Non-slip footwear when patient is off stretcher. Physically safe environment: no spills, clutter or unnecessary equipment. Stretcher in lowest position, wheels locked, appropriate side rails in place. Xray Chest 1 View-PORTABLE IMMEDIATE

## 2022-12-14 ENCOUNTER — OUTPATIENT (OUTPATIENT)
Dept: OUTPATIENT SERVICES | Facility: HOSPITAL | Age: 77
LOS: 1 days | End: 2022-12-14
Payer: MEDICARE

## 2022-12-14 ENCOUNTER — RESULT REVIEW (OUTPATIENT)
Age: 77
End: 2022-12-14

## 2022-12-14 ENCOUNTER — APPOINTMENT (OUTPATIENT)
Dept: ORTHOPEDIC SURGERY | Facility: CLINIC | Age: 77
End: 2022-12-14

## 2022-12-14 VITALS
BODY MASS INDEX: 24.33 KG/M2 | HEIGHT: 67 IN | SYSTOLIC BLOOD PRESSURE: 106 MMHG | OXYGEN SATURATION: 90 % | DIASTOLIC BLOOD PRESSURE: 65 MMHG | WEIGHT: 155 LBS | HEART RATE: 82 BPM

## 2022-12-14 PROCEDURE — 20610 DRAIN/INJ JOINT/BURSA W/O US: CPT | Mod: LT

## 2022-12-14 PROCEDURE — 73564 X-RAY EXAM KNEE 4 OR MORE: CPT | Mod: 26,50

## 2022-12-14 PROCEDURE — 73564 X-RAY EXAM KNEE 4 OR MORE: CPT

## 2022-12-14 RX ORDER — BACITRACIN 500 [IU]/G
500 OINTMENT TOPICAL DAILY
Qty: 1 | Refills: 2 | Status: ACTIVE | COMMUNITY
Start: 2022-12-14 | End: 1900-01-01

## 2022-12-19 NOTE — PROCEDURE
[de-identified] : Patient reports good relief from his last left knee Euflexxa series which he completed in May 2022. He would like to repeat left knee Euflexxa series starting today. He does note incidentally that he sustained a fall on the left knee with a subsequent prepatellar abrasion, but he was able to immediately resume walking again. He has been ambulating with use of a cane. He complains of persistent low back pain and limited lumbar mobility which is not significantly different from prior visit. \par \par EUFLEXXA INJECTION - \par LOT -[id number]\par EXP- 2023-09-05\par MAN - ELISA \par NDC- [address]-5. \par \par Procedure: Knee joint injection\par Laterality: left \par Indication: Osteoarthritis - discussed with patient\par Skin prep: alcohol and chlorhexidine\par Anesthesia: ethyl chloride spray\par Needle: 20-gauge\par Portal: inferolateral\par Aspiration: none\par Injectate: Euflexxa\par Dressing: Band-aid\par Complications: None \par \par Imaging: \par 4 radiographic views were taken of the bilateral knees which demonstrate normal alignment, no significant arthritis. Patella sits at normal height and tracks centrally. Left knee demonstrates normal alignment, mild to moderate tricompartmental OA, most pronounced medially, KL 2. Patella sits at normal height and tracks centrally. \par \par PLAN: \par - He will f/u next week to continue the Euflexxa series. \par - We prescribed Bacitracin to be applied topically to the left knee prepatellar abrasion.

## 2022-12-19 NOTE — PROCEDURE
[de-identified] : Patient reports good relief from his last left knee Euflexxa series which he completed in May 2022. He would like to repeat left knee Euflexxa series starting today. He does note incidentally that he sustained a fall on the left knee with a subsequent prepatellar abrasion, but he was able to immediately resume walking again. He has been ambulating with use of a cane. He complains of persistent low back pain and limited lumbar mobility which is not significantly different from prior visit. \par \par EUFLEXXA INJECTION - \par LOT -[id number]\par EXP- 2023-09-05\par MAN - ELISA \par NDC- [address]-0. \par \par Procedure: Knee joint injection\par Laterality: left \par Indication: Osteoarthritis - discussed with patient\par Skin prep: alcohol and chlorhexidine\par Anesthesia: ethyl chloride spray\par Needle: 20-gauge\par Portal: inferolateral\par Aspiration: none\par Injectate: Euflexxa\par Dressing: Band-aid\par Complications: None \par \par Imaging: \par 4 radiographic views were taken of the bilateral knees which demonstrate normal alignment, no significant arthritis. Patella sits at normal height and tracks centrally. Left knee demonstrates normal alignment, mild to moderate tricompartmental OA, most pronounced medially, KL 2. Patella sits at normal height and tracks centrally. \par \par PLAN: \par - He will f/u next week to continue the Euflexxa series. \par - We prescribed Bacitracin to be applied topically to the left knee prepatellar abrasion.

## 2022-12-19 NOTE — ADDENDUM
[FreeTextEntry1] : Documented by Chhaya Florentino acting as a scribe for Dr. Dwayne Mcgovern on 12/15/2022.

## 2022-12-19 NOTE — END OF VISIT
[FreeTextEntry3] : All medical record entries made by the Scribe were at my, Dr. Dwayne Mcgovern, direction and personally dictated by me on 12/15/2022. I have reviewed the chart and agree that the record accurately reflects my personal performance of the history, physical exam, assessment and plan. I have also personally directed, reviewed, and agreed with the chart.

## 2022-12-21 ENCOUNTER — APPOINTMENT (OUTPATIENT)
Dept: ORTHOPEDIC SURGERY | Facility: CLINIC | Age: 77
End: 2022-12-21

## 2022-12-21 VITALS
DIASTOLIC BLOOD PRESSURE: 84 MMHG | HEART RATE: 79 BPM | SYSTOLIC BLOOD PRESSURE: 132 MMHG | OXYGEN SATURATION: 91 % | BODY MASS INDEX: 24.33 KG/M2 | HEIGHT: 67 IN | WEIGHT: 155 LBS

## 2022-12-21 PROCEDURE — 20610 DRAIN/INJ JOINT/BURSA W/O US: CPT | Mod: LT

## 2022-12-23 NOTE — END OF VISIT
[FreeTextEntry3] : All medical record entries made by the Scribe were at my, Dr. Dwayne Mcgovern, direction and personally dictated by me on 12/21/2022. I have reviewed the chart and agree that the record accurately reflects my personal performance of the history, physical exam, assessment and plan. I have also personally directed, reviewed, and agreed with the chart.

## 2022-12-23 NOTE — PROCEDURE
[de-identified] : 12/21/2022: 76 y/o male f/u for continued serial Euflexxa injections (2/3). He states he has been applying bacitracin ointment about once a day to the left patella abrasion he suffered. The wound has been gradually improving.\par \par EUFLEXXA INJECTION - LEFT KNEE JOINT \par LOT -[id number]\par EXP- 2023-09-05\par ,AM - ELISA \par NDC - [address]-1\par \par Procedure: Knee joint injection\par Laterality: Left\par Indication: Osteoarthritis - discussed with patient\par Skin prep: alcohol and chlorhexidine\par Anesthesia: ethyl chloride spray\par Needle: 20-gauge\par Portal: inferolateral\par Aspiration: none\par Injectate: Euflexxa 2/3\par Dressing: Band-aid\par Complications: None\par \par Followup in 1 week to continue course of Euflexxa injections

## 2022-12-23 NOTE — PROCEDURE
[de-identified] : 12/21/2022: 76 y/o male f/u for continued serial Euflexxa injections (2/3). He states he has been applying bacitracin ointment about once a day to the left patella abrasion he suffered. The wound has been gradually improving.\par \par EUFLEXXA INJECTION - LEFT KNEE JOINT \par LOT -[id number]\par EXP- 2023-09-05\par ,AM - ELISA \par NDC - [address]-0\par \par Procedure: Knee joint injection\par Laterality: Left\par Indication: Osteoarthritis - discussed with patient\par Skin prep: alcohol and chlorhexidine\par Anesthesia: ethyl chloride spray\par Needle: 20-gauge\par Portal: inferolateral\par Aspiration: none\par Injectate: Euflexxa 2/3\par Dressing: Band-aid\par Complications: None\par \par Followup in 1 week to continue course of Euflexxa injections

## 2022-12-23 NOTE — ADDENDUM
[FreeTextEntry1] : Documented by Chhaya Florentino acting as a scribe for Dr. Dwayne Mcgovern on 12/21/2022. 
[FreeTextEntry1] : Documented by Chhaya Florentino acting as a scribe for Dr. Dwayne Mcgovern on 12/21/2022. 
X Size Of Lesion In Cm (Optional): 0
Medical Necessity Clause: This procedure was medically necessary because the lesions that were treated were:
Administered By (Optional): Dr. Roque
Total Volume Injected (Ccs- Only Use Numbers And Decimals): 1.0
Consent: The risks of atrophy were reviewed with the patient.
Concentration Of Solution Injected (Mg/Ml): 4.0
Kenalog Preparation: Kenalog
Include Z78.9 (Other Specified Conditions Influencing Health Status) As An Associated Diagnosis?: No
Detail Level: Detailed
Validate Note Data When Using Inventory: Yes

## 2022-12-28 ENCOUNTER — APPOINTMENT (OUTPATIENT)
Dept: ORTHOPEDIC SURGERY | Facility: CLINIC | Age: 77
End: 2022-12-28
Payer: MEDICARE

## 2022-12-28 DIAGNOSIS — M17.12 UNILATERAL PRIMARY OSTEOARTHRITIS, LEFT KNEE: ICD-10-CM

## 2022-12-28 PROCEDURE — 20610 DRAIN/INJ JOINT/BURSA W/O US: CPT | Mod: LT

## 2023-01-03 NOTE — PROCEDURE
[de-identified] : EUFLEXXA INJECTION - LEFT KNEE JOINT \par LOT -[id number]\par EXP- 2023-09-05\par man  - ELISA \par NDC - [address]-8\par \par Procedure: Knee joint injection\par Laterality: left\par Indication: Osteoarthritis - discussed with patient\par Skin prep: alcohol and chlorhexidine\par Anesthesia: ethyl chloride spray\par Needle: 20-gauge\par Portal: inferolateral\par Aspiration: none\par Injectate: Euflexxa\par Dressing: Band-aid\par Complications: None\par

## 2023-02-06 ENCOUNTER — APPOINTMENT (OUTPATIENT)
Dept: VASCULAR SURGERY | Facility: CLINIC | Age: 78
End: 2023-02-06

## 2023-02-13 ENCOUNTER — APPOINTMENT (OUTPATIENT)
Dept: VASCULAR SURGERY | Facility: CLINIC | Age: 78
End: 2023-02-13
Payer: MEDICARE

## 2023-02-13 VITALS
HEART RATE: 76 BPM | BODY MASS INDEX: 25.76 KG/M2 | WEIGHT: 170 LBS | HEIGHT: 68 IN | DIASTOLIC BLOOD PRESSURE: 89 MMHG | SYSTOLIC BLOOD PRESSURE: 133 MMHG

## 2023-02-13 PROCEDURE — 99204 OFFICE O/P NEW MOD 45 MIN: CPT

## 2023-02-14 NOTE — ASSESSMENT
[FreeTextEntry1] : 78-year-old male with past medical history of multiple musculoskeletal issues spine surgery with complications.  Patient now has lymphedema And bilateral lower extremity edema.  He attempted a course of compression and elevation without relief of his symptoms is an excellent candidate for lymphedema pump and states he would like to proceed with lymphedema.  He will continue leg compression and elevation.\par \par Follow-up in 12 months or sooner as needed.\par \par Time 48 minutes

## 2023-02-14 NOTE — DATA REVIEWED
[FreeTextEntry1] : Patient previously underwent venous ultrasound demonstrating no evidence of DVTs.

## 2023-02-14 NOTE — HISTORY OF PRESENT ILLNESS
[FreeTextEntry1] : 78-year-old male with past medical history of spine stenosis and complications of spine surgery, surgery, neuropathy, tendinitis, hyperlipidemia, bilateral lower extremity edema and lymphedema presented for an evaluation.  Patient has lymphedema tarda of bilateral lower extremities with hyperpigmentation.  Symptoms has been present for years and got progressively worse over time.  Patient attempted a prolonged course of greater than 4 weeks of bilateral lower extremity compression with 20 to 30 mmHg compressive stockings, simple exercises, dietary modification without relief of his symptoms.  Symptoms but progressively worse despite full compliance with nonoperative management.  Patient complains of heaviness, pain and discomfort in bilateral lower extremities due to severe edema.\par \par ROS as per HPI.  Positive for back pain, neuropathy, tendinitis. \par \par  PHYSICAL EXAM: \par General: NAD, awake alert and oriented.\par HEENT: Normocephalic atraumatic.  Extraocular muscles intact.\par Neck: No JVD.  Normal thyroid.\par Abdomen: Soft, NT, ND. No guarding. No palpable masses. No HSM.\par : No CVA tenderness.\par Vascular: 2+ bilateral carotid, radial, femoral, popliteal, DP and PT pulses.\par Bilateral lower extremity 2+ edema with lymphedema tarda and discoloration.\par Varicose veins absent bilaterally.\par Extremity: Bilateral lower extremity warm and dry.  No cyanosis.  + edema.\par Integument: No rashes or lesions\par MS: Normal muscle bulk and tone. FROM in all extremities.\par Neuro: AAOx3, CN II-XII intact. Strength 5/5 in all 4 extremities. Sensation intact throughout.\par Psych: Calm.  Appropriate affect.\par

## 2023-05-06 ENCOUNTER — INPATIENT (INPATIENT)
Facility: HOSPITAL | Age: 78
LOS: 0 days | Discharge: HOME HEALTH | DRG: 607 | End: 2023-05-07
Attending: STUDENT IN AN ORGANIZED HEALTH CARE EDUCATION/TRAINING PROGRAM | Admitting: STUDENT IN AN ORGANIZED HEALTH CARE EDUCATION/TRAINING PROGRAM
Payer: COMMERCIAL

## 2023-05-06 VITALS
OXYGEN SATURATION: 92 % | WEIGHT: 169.98 LBS | HEART RATE: 89 BPM | DIASTOLIC BLOOD PRESSURE: 53 MMHG | SYSTOLIC BLOOD PRESSURE: 96 MMHG | RESPIRATION RATE: 18 BRPM | TEMPERATURE: 98 F

## 2023-05-06 DIAGNOSIS — M54.50 LOW BACK PAIN, UNSPECIFIED: ICD-10-CM

## 2023-05-06 DIAGNOSIS — Z29.9 ENCOUNTER FOR PROPHYLACTIC MEASURES, UNSPECIFIED: ICD-10-CM

## 2023-05-06 DIAGNOSIS — Z87.898 PERSONAL HISTORY OF OTHER SPECIFIED CONDITIONS: ICD-10-CM

## 2023-05-06 DIAGNOSIS — Z87.438 PERSONAL HISTORY OF OTHER DISEASES OF MALE GENITAL ORGANS: ICD-10-CM

## 2023-05-06 DIAGNOSIS — Z98.890 OTHER SPECIFIED POSTPROCEDURAL STATES: Chronic | ICD-10-CM

## 2023-05-06 DIAGNOSIS — E78.5 HYPERLIPIDEMIA, UNSPECIFIED: ICD-10-CM

## 2023-05-06 DIAGNOSIS — R60.0 LOCALIZED EDEMA: ICD-10-CM

## 2023-05-06 DIAGNOSIS — F39 UNSPECIFIED MOOD [AFFECTIVE] DISORDER: ICD-10-CM

## 2023-05-06 LAB
ALBUMIN SERPL ELPH-MCNC: 3.9 G/DL — SIGNIFICANT CHANGE UP (ref 3.3–5)
ALP SERPL-CCNC: 57 U/L — SIGNIFICANT CHANGE UP (ref 40–120)
ALT FLD-CCNC: 28 U/L — SIGNIFICANT CHANGE UP (ref 10–45)
ANION GAP SERPL CALC-SCNC: 9 MMOL/L — SIGNIFICANT CHANGE UP (ref 5–17)
APTT BLD: 34 SEC — SIGNIFICANT CHANGE UP (ref 27.5–35.5)
AST SERPL-CCNC: 33 U/L — SIGNIFICANT CHANGE UP (ref 10–40)
BASOPHILS # BLD AUTO: 0.06 K/UL — SIGNIFICANT CHANGE UP (ref 0–0.2)
BASOPHILS NFR BLD AUTO: 0.9 % — SIGNIFICANT CHANGE UP (ref 0–2)
BILIRUB SERPL-MCNC: 0.3 MG/DL — SIGNIFICANT CHANGE UP (ref 0.2–1.2)
BUN SERPL-MCNC: 29 MG/DL — HIGH (ref 7–23)
CALCIUM SERPL-MCNC: 8.5 MG/DL — SIGNIFICANT CHANGE UP (ref 8.4–10.5)
CHLORIDE SERPL-SCNC: 103 MMOL/L — SIGNIFICANT CHANGE UP (ref 96–108)
CO2 SERPL-SCNC: 29 MMOL/L — SIGNIFICANT CHANGE UP (ref 22–31)
CREAT SERPL-MCNC: 1 MG/DL — SIGNIFICANT CHANGE UP (ref 0.5–1.3)
EGFR: 77 ML/MIN/1.73M2 — SIGNIFICANT CHANGE UP
EOSINOPHIL # BLD AUTO: 0.23 K/UL — SIGNIFICANT CHANGE UP (ref 0–0.5)
EOSINOPHIL NFR BLD AUTO: 3.5 % — SIGNIFICANT CHANGE UP (ref 0–6)
GLUCOSE SERPL-MCNC: 99 MG/DL — SIGNIFICANT CHANGE UP (ref 70–99)
HCT VFR BLD CALC: 43.1 % — SIGNIFICANT CHANGE UP (ref 39–50)
HGB BLD-MCNC: 14.1 G/DL — SIGNIFICANT CHANGE UP (ref 13–17)
IMM GRANULOCYTES NFR BLD AUTO: 0.5 % — SIGNIFICANT CHANGE UP (ref 0–0.9)
INR BLD: 1.11 — SIGNIFICANT CHANGE UP (ref 0.88–1.16)
LACTATE SERPL-SCNC: 1.2 MMOL/L — SIGNIFICANT CHANGE UP (ref 0.5–2)
LYMPHOCYTES # BLD AUTO: 1.15 K/UL — SIGNIFICANT CHANGE UP (ref 1–3.3)
LYMPHOCYTES # BLD AUTO: 17.4 % — SIGNIFICANT CHANGE UP (ref 13–44)
MCHC RBC-ENTMCNC: 31 PG — SIGNIFICANT CHANGE UP (ref 27–34)
MCHC RBC-ENTMCNC: 32.7 GM/DL — SIGNIFICANT CHANGE UP (ref 32–36)
MCV RBC AUTO: 94.7 FL — SIGNIFICANT CHANGE UP (ref 80–100)
MONOCYTES # BLD AUTO: 0.69 K/UL — SIGNIFICANT CHANGE UP (ref 0–0.9)
MONOCYTES NFR BLD AUTO: 10.4 % — SIGNIFICANT CHANGE UP (ref 2–14)
NEUTROPHILS # BLD AUTO: 4.45 K/UL — SIGNIFICANT CHANGE UP (ref 1.8–7.4)
NEUTROPHILS NFR BLD AUTO: 67.3 % — SIGNIFICANT CHANGE UP (ref 43–77)
NRBC # BLD: 0 /100 WBCS — SIGNIFICANT CHANGE UP (ref 0–0)
NT-PROBNP SERPL-SCNC: 22 PG/ML — SIGNIFICANT CHANGE UP (ref 0–300)
PLATELET # BLD AUTO: 177 K/UL — SIGNIFICANT CHANGE UP (ref 150–400)
POTASSIUM SERPL-MCNC: 4.2 MMOL/L — SIGNIFICANT CHANGE UP (ref 3.5–5.3)
POTASSIUM SERPL-SCNC: 4.2 MMOL/L — SIGNIFICANT CHANGE UP (ref 3.5–5.3)
PROT SERPL-MCNC: 6.5 G/DL — SIGNIFICANT CHANGE UP (ref 6–8.3)
PROTHROM AB SERPL-ACNC: 13.2 SEC — SIGNIFICANT CHANGE UP (ref 10.5–13.4)
RBC # BLD: 4.55 M/UL — SIGNIFICANT CHANGE UP (ref 4.2–5.8)
RBC # FLD: 13.2 % — SIGNIFICANT CHANGE UP (ref 10.3–14.5)
SODIUM SERPL-SCNC: 141 MMOL/L — SIGNIFICANT CHANGE UP (ref 135–145)
WBC # BLD: 6.61 K/UL — SIGNIFICANT CHANGE UP (ref 3.8–10.5)
WBC # FLD AUTO: 6.61 K/UL — SIGNIFICANT CHANGE UP (ref 3.8–10.5)

## 2023-05-06 PROCEDURE — 99285 EMERGENCY DEPT VISIT HI MDM: CPT

## 2023-05-06 PROCEDURE — 93970 EXTREMITY STUDY: CPT | Mod: 26

## 2023-05-06 PROCEDURE — 71045 X-RAY EXAM CHEST 1 VIEW: CPT | Mod: 26

## 2023-05-06 PROCEDURE — 99223 1ST HOSP IP/OBS HIGH 75: CPT

## 2023-05-06 RX ORDER — CLONAZEPAM 1 MG
1 TABLET ORAL
Qty: 0 | Refills: 0 | DISCHARGE

## 2023-05-06 RX ORDER — NALOXONE HYDROCHLORIDE 4 MG/.1ML
0.4 SPRAY NASAL ONCE
Refills: 0 | Status: DISCONTINUED | OUTPATIENT
Start: 2023-05-06 | End: 2023-05-07

## 2023-05-06 RX ORDER — ENOXAPARIN SODIUM 100 MG/ML
40 INJECTION SUBCUTANEOUS EVERY 24 HOURS
Refills: 0 | Status: DISCONTINUED | OUTPATIENT
Start: 2023-05-07 | End: 2023-05-07

## 2023-05-06 RX ORDER — ZOLPIDEM TARTRATE 10 MG/1
5 TABLET ORAL AT BEDTIME
Refills: 0 | Status: DISCONTINUED | OUTPATIENT
Start: 2023-05-06 | End: 2023-05-07

## 2023-05-06 RX ORDER — CLONAZEPAM 1 MG
2 TABLET ORAL AT BEDTIME
Refills: 0 | Status: DISCONTINUED | OUTPATIENT
Start: 2023-05-06 | End: 2023-05-07

## 2023-05-06 RX ORDER — ATORVASTATIN CALCIUM 80 MG/1
40 TABLET, FILM COATED ORAL AT BEDTIME
Refills: 0 | Status: DISCONTINUED | OUTPATIENT
Start: 2023-05-07 | End: 2023-05-07

## 2023-05-06 RX ORDER — ARIPIPRAZOLE 15 MG/1
5 TABLET ORAL DAILY
Refills: 0 | Status: DISCONTINUED | OUTPATIENT
Start: 2023-05-06 | End: 2023-05-07

## 2023-05-06 RX ORDER — ACYCLOVIR SODIUM 500 MG
1 VIAL (EA) INTRAVENOUS
Qty: 0 | Refills: 0 | DISCHARGE

## 2023-05-06 RX ORDER — TAMSULOSIN HYDROCHLORIDE 0.4 MG/1
0.4 CAPSULE ORAL AT BEDTIME
Refills: 0 | Status: DISCONTINUED | OUTPATIENT
Start: 2023-05-06 | End: 2023-05-07

## 2023-05-06 RX ORDER — TAMSULOSIN HYDROCHLORIDE 0.4 MG/1
1 CAPSULE ORAL
Qty: 0 | Refills: 0 | DISCHARGE

## 2023-05-06 RX ORDER — DIAZEPAM 5 MG
5 TABLET ORAL AT BEDTIME
Refills: 0 | Status: DISCONTINUED | OUTPATIENT
Start: 2023-05-06 | End: 2023-05-06

## 2023-05-06 RX ORDER — PANTOPRAZOLE SODIUM 20 MG/1
1 TABLET, DELAYED RELEASE ORAL
Qty: 0 | Refills: 0 | DISCHARGE

## 2023-05-06 RX ORDER — MIRABEGRON 50 MG/1
1 TABLET, EXTENDED RELEASE ORAL
Qty: 0 | Refills: 0 | DISCHARGE

## 2023-05-06 RX ORDER — HYDROMORPHONE HYDROCHLORIDE 2 MG/ML
4 INJECTION INTRAMUSCULAR; INTRAVENOUS; SUBCUTANEOUS EVERY 6 HOURS
Refills: 0 | Status: DISCONTINUED | OUTPATIENT
Start: 2023-05-06 | End: 2023-05-07

## 2023-05-06 RX ORDER — SODIUM CHLORIDE 9 MG/ML
500 INJECTION INTRAMUSCULAR; INTRAVENOUS; SUBCUTANEOUS ONCE
Refills: 0 | Status: COMPLETED | OUTPATIENT
Start: 2023-05-06 | End: 2023-05-06

## 2023-05-06 RX ORDER — DIAZEPAM 5 MG
5 TABLET ORAL DAILY
Refills: 0 | Status: DISCONTINUED | OUTPATIENT
Start: 2023-05-06 | End: 2023-05-06

## 2023-05-06 RX ORDER — SENNA PLUS 8.6 MG/1
2 TABLET ORAL AT BEDTIME
Refills: 0 | Status: DISCONTINUED | OUTPATIENT
Start: 2023-05-06 | End: 2023-05-07

## 2023-05-06 RX ORDER — DIAZEPAM 5 MG
1 TABLET ORAL
Refills: 0 | DISCHARGE

## 2023-05-06 RX ORDER — ACETAMINOPHEN 500 MG
650 TABLET ORAL EVERY 6 HOURS
Refills: 0 | Status: DISCONTINUED | OUTPATIENT
Start: 2023-05-06 | End: 2023-05-07

## 2023-05-06 RX ORDER — OXYBUTYNIN CHLORIDE 5 MG
5 TABLET ORAL DAILY
Refills: 0 | Status: DISCONTINUED | OUTPATIENT
Start: 2023-05-06 | End: 2023-05-07

## 2023-05-06 RX ORDER — CYCLOBENZAPRINE HYDROCHLORIDE 10 MG/1
5 TABLET, FILM COATED ORAL
Refills: 0 | Status: DISCONTINUED | OUTPATIENT
Start: 2023-05-06 | End: 2023-05-07

## 2023-05-06 RX ORDER — BACLOFEN 100 %
1 POWDER (GRAM) MISCELLANEOUS
Qty: 0 | Refills: 0 | DISCHARGE

## 2023-05-06 RX ORDER — CELECOXIB 200 MG/1
1 CAPSULE ORAL
Qty: 0 | Refills: 0 | DISCHARGE

## 2023-05-06 RX ORDER — POLYETHYLENE GLYCOL 3350 17 G/17G
17 POWDER, FOR SOLUTION ORAL
Refills: 0 | Status: DISCONTINUED | OUTPATIENT
Start: 2023-05-06 | End: 2023-05-07

## 2023-05-06 RX ORDER — FINASTERIDE 5 MG/1
5 TABLET, FILM COATED ORAL DAILY
Refills: 0 | Status: DISCONTINUED | OUTPATIENT
Start: 2023-05-06 | End: 2023-05-07

## 2023-05-06 RX ORDER — DULOXETINE HYDROCHLORIDE 30 MG/1
120 CAPSULE, DELAYED RELEASE ORAL DAILY
Refills: 0 | Status: DISCONTINUED | OUTPATIENT
Start: 2023-05-07 | End: 2023-05-07

## 2023-05-06 RX ORDER — APIXABAN 2.5 MG/1
1 TABLET, FILM COATED ORAL
Qty: 0 | Refills: 0 | DISCHARGE

## 2023-05-06 RX ORDER — HYDROMORPHONE HYDROCHLORIDE 2 MG/ML
3 INJECTION INTRAMUSCULAR; INTRAVENOUS; SUBCUTANEOUS
Qty: 0 | Refills: 0 | DISCHARGE

## 2023-05-06 RX ORDER — HYDROMORPHONE HYDROCHLORIDE 2 MG/ML
4 INJECTION INTRAMUSCULAR; INTRAVENOUS; SUBCUTANEOUS EVERY 6 HOURS
Refills: 0 | Status: DISCONTINUED | OUTPATIENT
Start: 2023-05-06 | End: 2023-05-06

## 2023-05-06 RX ORDER — CLONAZEPAM 1 MG
2 TABLET ORAL DAILY
Refills: 0 | Status: DISCONTINUED | OUTPATIENT
Start: 2023-05-06 | End: 2023-05-06

## 2023-05-06 RX ORDER — CEFTRIAXONE 500 MG/1
1000 INJECTION, POWDER, FOR SOLUTION INTRAMUSCULAR; INTRAVENOUS ONCE
Refills: 0 | Status: COMPLETED | OUTPATIENT
Start: 2023-05-06 | End: 2023-05-06

## 2023-05-06 RX ADMIN — ZOLPIDEM TARTRATE 5 MILLIGRAM(S): 10 TABLET ORAL at 22:33

## 2023-05-06 RX ADMIN — FINASTERIDE 5 MILLIGRAM(S): 5 TABLET, FILM COATED ORAL at 22:24

## 2023-05-06 RX ADMIN — CEFTRIAXONE 100 MILLIGRAM(S): 500 INJECTION, POWDER, FOR SOLUTION INTRAMUSCULAR; INTRAVENOUS at 18:51

## 2023-05-06 RX ADMIN — SODIUM CHLORIDE 500 MILLILITER(S): 9 INJECTION INTRAMUSCULAR; INTRAVENOUS; SUBCUTANEOUS at 15:18

## 2023-05-06 RX ADMIN — SENNA PLUS 2 TABLET(S): 8.6 TABLET ORAL at 22:24

## 2023-05-06 RX ADMIN — TAMSULOSIN HYDROCHLORIDE 0.4 MILLIGRAM(S): 0.4 CAPSULE ORAL at 22:24

## 2023-05-06 NOTE — H&P ADULT - PROBLEM SELECTOR PLAN 7
F: PO  E: replete PRN  N: regular  DVT ppx: lovenox  GI PPx: None    FULL CODE    Dispo: [facility] F: PO  E: replete PRN  N: dash/tlc  DVT ppx: lovenox  GI PPx: None    FULL CODE    Dispo: [place]

## 2023-05-06 NOTE — ED PROVIDER NOTE - DISPOSITION TYPE
This note was copied from a baby's chart. Initial Lactation Consultation:  Mom is 42yo  delivered today via repeat C/S at 1146 gestation 40 2/7 weeks. Infant has questionable torticolis. Mom has short nipples and was unable to get her 13 month old to latch resulting in torn and bleeding nipples prior to trying a shield; mom exclusively pumped combined with formula for the first 3 months. Discussed with parents: positioning and alternating positions, using pillows to support nursing couplet, characteristics deep v shallow latch, and feeding cues. Demonstrated breast massage and hand expression with drops of colostrum easily expressed. Discussed use and care of nipple shield. Infant latched in biologic position with nipple shield. Baby nursing well after delivery, deep latch obtained, mother is comfortable, baby feeding vigorously with rhythmic suck, swallow, breathe pattern, both breasts offered, baby is skin to skin for feeding. Biological Nurturing breastfeeding principles taught. How Biological Nurturing (BN) promotes optimal breastfeeding (BF) sessions discussed. Mother encouraged to seek comfortable semi-reclining breastfeeding positions. Infant placed frontally along maternal contour. Primitive innate feeding reflexes/behaviors of the  discussed. BN tips and techniques shared; assisted with comfortable breastfeeding positioning.  behaviors reviewed, Very sleepy in first 24 hours, mother must wake baby for feedings, offer hand expressed drops, baby usually will respond and feed vigorously 6-8 times in the first day, but is important to offer 8-12 times,  After baby wakes from deep sleep usually on the 2nd or 3rd day a new behavior pattern follows. Frequent feeding during this brief behavioral phase preceeding milk transition is called cluster feeding. Typical  behavior: baby becomes vigorous at the breast and wants to feed frequently- every 1-2 hours for several feedings. This is the normal process by which the baby demands his/her supply. This type of frequent feeding is the stimulation which causes lactogenesis II (milk coming in). Feeding Plan: Mother will keep baby skin to skin as often as possible, feed on demand, 8-12x/day , respond to feeding cues, obtain latch, listen for audible swallowing, be aware of signs of oxytocin release/ cramping,thirst,sleepiness while breastfeeding, offer both breasts,and will not limit feedings. Mother agrees to utilize breast massage while nursing to facilitate lactogenesis. ADMIT

## 2023-05-06 NOTE — PATIENT PROFILE ADULT - DO YOU LACK THE NECESSARY SUPPORT TO HELP YOU COPE WITH LIFE CHALLENGES?
TRANSFER - IN REPORT:    Verbal report received from [facility] [employer]) on [employer]  being received from ED(unit) for routine progression of care      Report consisted of patients Situation, Background, Assessment and   Recommendations(SBAR). Information from the following report(s) SBAR, Kardex and ED Summary was reviewed with the receiving nurse. Opportunity for questions and clarification was provided. Assessment completed upon patients arrival to unit and care assumed. Dual skin assessment completed with KEYANNA Vega. Skin is warm, dry, and intact. no

## 2023-05-06 NOTE — H&P ADULT - ASSESSMENT
78M with PMH bladder cancer (s/p tumor resection, localized chemo and now in remission), GERD, kyphoscoliosis s/p C-sacrum fusion at [facility] (10/5/2020) depression, panic disorder, RUE DVT, BPH, presenting with LLE swelling admitted for placement.

## 2023-05-06 NOTE — H&P ADULT - PROBLEM SELECTOR PLAN 4
- c/w home flomax and dutasteride  - c/w home oxybutynin - c/w home flomax and dutasteride  - c/w home oxybutynin    #L knee OA  reports taking 5mg prednisone daily

## 2023-05-06 NOTE — ED PROVIDER NOTE - CLINICAL SUMMARY MEDICAL DECISION MAKING FREE TEXT BOX
lower ext swelling/edema/redness, ddx DVT/cellulitis PMH bladder cancer (s/p tumor resection, localized chemo and now in remission from 5yrs), GERD, kyphoscoliosis s/p C-sacrum fusion at [facility] (10/5/2020) depression, panic disorder, RUE DVT (on Eliquis), BPH w swelling to b/l ext and redness, pt somewhat poor historian, states non compliance with medications including eliquis. Swelling ongoing for approx one mth, L>R. Denies active sob, chest pain, cough, f/c. no upper ext swelling  lower ext swelling/edema/redness, ddx DVT/cellulitis/HF, peripheral edema, other  labs/doppler negative, pt stating inability to take care of self, lives alone, chronic back pain/weakness, one dose ceftriaxone given for cellulitis, likely needs pt/rehab.

## 2023-05-06 NOTE — H&P ADULT - PROBLEM SELECTOR PLAN 3
- c/w home aripiprazole 5mg daily and cymbalta 120mg daily  - istop  -  reports taking daily klonopin 2mg and sometimes also takes valium 5mg daily - will continue klonopin 2mg prn - c/w home aripiprazole 5mg daily and cymbalta 120mg daily  - istop  -  reports taking daily klonopin 2mg and sometimes also takes valium 5mg daily - will continue klonopin 2mg prn to avoid withdrawal   - PCP f/u to address polypharmacy

## 2023-05-06 NOTE — CHART NOTE - NSCHARTNOTEFT_GEN_A_CORE
ISTOP from [facility]    05/03/2023	05/04/2023	hydromorphone 4 mg tablet	140	28	Adis Iyer MD	[id number]	[facility]  B	Y		04/21/2023	04/21/2023	zolpidem tartrate 10 mg tablet	30	30	Torres, Teresita	[id number]	[facility]  B	N	O	04/05/2023	04/07/2023	hydromorphone 4 mg tablet	140	28	Pinky Goyal	[id number]	[facility]  B	Y	B	03/23/2023	04/18/2023	diazepam 5 mg tablet	30	30	Dianelys Garcia MD	[id number]	[facility]  A	N	B	03/20/2023	03/20/2023	diazepam 5 mg tablet	30	30	Dianelys Garcia MD	[id number]	[facility]  B	Y	B	03/20/2023	04/06/2023	clonazepam 2 mg tablet	60	30	Torres, Teresita	[id number]	[facility]  B	Y		03/16/2023	04/06/2023	zolpidem tartrate 10 mg tablet	30	30	Torres, Teresita	[id number]	[facility]  B	N	O	03/06/2023	04/06/2023	hydromorphone 4 mg tablet	140	28	Adis Iyer MD	[id number]	[facility]  A	N	O	02/06/2023	02/14/2023	hydromorphone 4 mg tablet	140	24	Pinky Goyal	[id number]	[facility]  A	N		02/01/2023	02/02/2023	zolpidem tartrate 10 mg tablet	30	30	Torres, Teresita	[id number]	[facility]  A	N	B	02/01/2023	02/02/2023	diazepam 5 mg tablet	30	30	Dianelys Garcia MD	[id number]	[facility]  A	N	B	01/12/2023	01/14/2023	clonazepam 2 mg tablet	60	30	Torres, Teresita	[id number]	[facility]  A	N	O	01/04/2023	01/14/2023	hydromorphone 4 mg tablet	140	24	Pinky Goyal	[id number]	[facility]  A	N		12/20/2022	12/20/2022	zolpidem tartrate 10 mg tablet	30	30	Torres, Teresita	[id number]	[facility]  A	N	B	12/19/2022	12/20/2022	diazepam 5 mg tablet	30	30	Dianelys Garcia MD	[id number]	[facility]  A	N	O	12/06/2022	12/14/2022	hydromorphone 4 mg tablet	150	25	Pinky Goyal	[id number]	[facility]  A	N	B	11/14/2022	11/14/2022	clonazepam 2 mg tablet	60	30	Torres, Teresita	[id number]	[facility]  A	N	O	11/08/2022	11/12/2022	hydromorphone 4 mg tablet	150	25	Pinky Goyal	[id number]	[facility]  A	N	B	11/04/2022	11/04/2022	diazepam 5 mg tablet	30	30	GarciaDianelys rebolledo MD	[id number]	[facility]  A	N		11/04/2022	11/04/2022	zolpidem tartrate 10 mg tablet	30	30	Torres, Teresita	[id number]	[facility]  A	N	O	10/12/2022	10/13/2022	hydromorphone 4 mg tablet	150	25	Adis Iyer MD	[id number]	[facility]  A	N		10/03/2022	10/06/2022	zolpidem tartrate 10 mg tablet	30	30	Torres, Teresita	[id number]	[facility]  A	N	B	10/03/2022	10/06/2022	diazepam 5 mg tablet	30	30	Dianelys Garcia MD	[id number]	[facility]  A	N	O	09/13/2022	09/15/2022	hydromorphone 4 mg tablet	150	25	Pinky Goyal	[id number]	[facility]  A	N	B	09/13/2022	09/13/2022	clonazepam 2 mg tablet	60	30	Torres, Teresita	[id number]	[facility]  A	N		09/01/2022	09/02/2022	zolpidem tartrate 10 mg tablet	30	30	Torres, Teresita	[id number]	[facility]  A	N	B	09/01/2022	09/02/2022	diazepam 5 mg tablet	30	30	GarciaDianelys rebolledo MD	[id number]	[facility]

## 2023-05-06 NOTE — H&P ADULT - NSHPPHYSICALEXAM_GEN_ALL_CORE
.  VITAL SIGNS:  T(C): 36.7 (05-06-23 @ 20:30), Max: 36.7 (05-06-23 @ 20:30)  T(F): 98 (05-06-23 @ 20:30), Max: 98 (05-06-23 @ 20:30)  HR: 77 (05-06-23 @ 20:30) (61 - 89)  BP: 110/72 (05-06-23 @ 20:30) (96/53 - 127/79)  BP(mean): --  RR: 18 (05-06-23 @ 20:30) (18 - 18)  SpO2: 92% (05-06-23 @ 20:30) (92% - 95%)  Wt(kg): --    PHYSICAL EXAM:    Constitutional: resting comfortably in bed; NAD  Head: NC/AT  Eyes: EOMI, anicteric sclera  ENT: no nasal discharge; uvula midline, no oropharyngeal erythema or exudates; MMM  Neck: supple; no JVD or thyromegaly  Respiratory: CTA B/L; no W/R/R, no retractions  Cardiac: +S1/S2; RRR; no M/R/G; PMI non-displaced  Gastrointestinal: abdomen soft, NT/ND; no rebound or guarding; +BSx4  Extremities: WWP, b/l 2+ LE edema; +chronic venous stasis changes  Musculoskeletal: NROM x4  Vascular: 2+ radial, DP/PT pulses B/L  Dermatologic: skin warm, dry and intact; no rashes, wounds, or scars  Lymphatic: no submandibular or cervical LAD  Neurologic: AAOx3; no focal deficits  Psychiatric: poor insight

## 2023-05-06 NOTE — H&P ADULT - NSHPLABSRESULTS_GEN_ALL_CORE
14.1   6.61  )-----------( 177      ( 06 May 2023 14:50 )             43.1       05-06    141  |  103  |  29<H>  ----------------------------<  99  4.2   |  29  |  1.00    Ca    8.5      06 May 2023 14:50    TPro  6.5  /  Alb  3.9  /  TBili  0.3  /  DBili  x   /  AST  33  /  ALT  28  /  AlkPhos  57  05-06                  PT/INR - ( 06 May 2023 14:50 )   PT: 13.2 sec;   INR: 1.11          PTT - ( 06 May 2023 14:50 )  PTT:34.0 sec    Lactate Trend  05-06 @ 14:50 Lactate:1.2             CAPILLARY BLOOD GLUCOSE

## 2023-05-06 NOTE — H&P ADULT - ATTENDING COMMENTS
78M PMH bladder cancer in remission, kyphoscoliosis s/p C-sacrum fusion 10/2020, depression, panic disorder, RUE DVT no longer on AC, BPH, presenting with b/l LE swelling    -appears to have chronic lymphedema without convincing evidence of cellulitis at this time - monitor off abx  -sees vascular who recently set patient up with lymphedema pump -- patient does not like using due to discomfort and thought it was making swelling worse although explained to patient that the fluid moving up the leg is part of the process and not e/o worsening edema  -PT eval  -ace wraps, elevation while inpatient   -polypharmacy -- ISTOP performed - patient with recent rx for dilaudid, valium, klonopin, ambien - recommend discussing with patient and outpt providers ways to reduce this high-risk combination of medications, for now will continue meds prn to avoid withdrawal or pain that might prevent patient from working with PT    Dispo: pending PT eval 78M PMH bladder cancer in remission, kyphoscoliosis s/p C-sacrum fusion 10/2020, depression, panic disorder, RUE DVT no longer on AC, BPH, presenting with b/l LE swelling    -appears to have chronic lymphedema without convincing evidence of cellulitis at this time - monitor off abx, dopplers neg for DVT  -sees vascular who recently set patient up with lymphedema pump -- patient does not like using due to discomfort and thought it was making swelling worse although explained to patient that the fluid moving up the leg is part of the process and not e/o worsening edema  -PT eval  -ace wraps, elevation while inpatient   -polypharmacy -- ISTOP performed - patient with recent rx for dilaudid, valium, klonopin, ambien - recommend discussing with patient and outpt providers ways to reduce this high-risk combination of medications, for now will continue meds prn to avoid withdrawal or pain that might prevent patient from working with PT    Dispo: pending PT eval

## 2023-05-06 NOTE — H&P ADULT - BIRTH SEX
Received fax from [facility]varez at [facility], requesting completion of Adoptive Parent Health Report.    Paperwork completed, signed, and faxed back.  
Male

## 2023-05-06 NOTE — H&P ADULT - PROBLEM SELECTOR PLAN 1
Has history of LE edema and lymphedema after spinal surgery. Saw vascular Dr. Moreno in February who recommended lymphedema pump. He has not been using it because he feels it makes his swelling worse. Also cannot wear compression socks on his own. WBC WNL. BNP WNL. LE with chronic venous stasis changes. LE dopplers negative for DVT.  - outpatient f/u with vascular  - f/u TTE Has history of LE edema and lymphedema after spinal surgery. Saw vascular Dr. Moreno in February who recommended lymphedema pump. He has not been using it because he feels it makes his swelling worse. Also cannot wear compression socks on his own. No SOB, PND, orthopnea. WBC WNL. BNP WNL. LE with chronic venous stasis changes. LE dopplers negative for DVT.   - outpatient f/u with vascular Has history of LE edema and lymphedema after spinal surgery. Saw vascular Dr. Moreno in February who recommended lymphedema pump. He has not been using it because he feels it makes his swelling worse. Also cannot wear compression socks on his own. No SOB, PND, orthopnea. WBC WNL. BNP WNL. LE with chronic venous stasis changes. LE dopplers negative for DVT.   - outpatient f/u with vascular  - ace wraps and LE elevation

## 2023-05-06 NOTE — ED ADULT NURSE NOTE - OBJECTIVE STATEMENT
78y M denies PMH, endorses several orthopedic surgeries, p/w bilateral LE swelling and r/o infection LE. Pt states his legs began swelling several weeks ago, pt seen by vascular MD and given compression device for relief. Pt states swelling got worse, he went to outside  and was told his LLE was infected and referred to [facility] ED.

## 2023-05-06 NOTE — ED ADULT NURSE NOTE - NSIMPLEMENTINTERV_GEN_ALL_ED
Implemented All Fall Risk Interventions:  [place] to call system. Call bell, personal items and telephone within reach. Instruct patient to call for assistance. Room bathroom lighting operational. Non-slip footwear when patient is off stretcher. Physically safe environment: no spills, clutter or unnecessary equipment. Stretcher in lowest position, wheels locked, appropriate side rails in place. Provide visual cue, wrist band, yellow gown, etc. Monitor gait and stability. Monitor for mental status changes and reorient to person, place, and time. Review medications for side effects contributing to fall risk. Reinforce activity limits and safety measures with patient and family.

## 2023-05-06 NOTE — PATIENT PROFILE ADULT - FALL HARM RISK - UNIVERSAL INTERVENTIONS
Bed in lowest position, wheels locked, appropriate side rails in place/Call bell, personal items and telephone in reach/Instruct patient to call for assistance before getting out of bed or chair/Non-slip footwear when patient is out of bed/[place] to call system/Physically safe environment - no spills, clutter or unnecessary equipment/Purposeful Proactive Rounding/Room/bathroom lighting operational, light cord in reach

## 2023-05-06 NOTE — ED PROVIDER NOTE - PHYSICAL EXAMINATION

## 2023-05-06 NOTE — ED PROVIDER NOTE - OBJECTIVE STATEMENT
PMH bladder cancer (s/p tumor resection, localized chemo and now in remission from 5yrs), GERD, kyphoscoliosis s/p C-sacrum fusion at [facility] (10/5/2020) depression, panic disorder, RUE DVT (on Eliquis), BPH w swelling to b/l ext and redness, pt somewhat poor historian, states non compliance with medications. PMH bladder cancer (s/p tumor resection, localized chemo and now in remission from 5yrs), GERD, kyphoscoliosis s/p C-sacrum fusion at [facility] (10/5/2020) depression, panic disorder, RUE DVT (on Eliquis), BPH w swelling to b/l ext and redness, pt somewhat poor historian, states non compliance with medications including eliquis. Swelling ongoing for approx one mth, L>R. Denies active sob, chest pain, cough, f/c.

## 2023-05-06 NOTE — H&P ADULT - PROBLEM SELECTOR PLAN 6
Has history of spinal surgery c/b chronic back pain  - iSTOP  - takes tizanidine 4mg BID and reports taking dilaudid PO 4mg 5 times a day  - bowel regimen    #Hx of UE DVT  is not taking eliquis any more Has history of spinal surgery c/b chronic back pain.   - iSTOP  - takes tizanidine 4mg BID and reports taking dilaudid PO 4mg 5 times a day - will continue as flexeril 5mg BID prn and dilaudid 4mg q6 PRN  - narcan prn  - bowel regimen    #Hx of UE DVT  is not taking eliquis any more Has history of spinal surgery c/b chronic back pain.   - iSTOP  - takes tizanidine 4mg BID and reports taking dilaudid PO 4mg 5 times a day - will continue as flexeril 5mg BID prn and dilaudid 4mg q6 PRN  - narcan prn  - bowel regimen    #Hx of UE DVT  is not taking eliquis any more.

## 2023-05-06 NOTE — ED ADULT NURSE NOTE - NURSING NEURO ORIENTATION
Pt arrived to ED with family with CC RLQ abdominal pain over past couple days. +N, +HA, + dizzy, + chills, abdominal distension, ARORA. Pale, diaphoretic. Last BM yesterday, soft stool. Pt still has appendix and gall bladder. Last drink March 23rd and family reports taking medication for rehab purposes. PMH: ETOH, heart murmur.      Pt denies fever and CP
oriented to person, place and time

## 2023-05-07 ENCOUNTER — TRANSCRIPTION ENCOUNTER (OUTPATIENT)
Age: 78
End: 2023-05-07

## 2023-05-07 VITALS
HEART RATE: 76 BPM | OXYGEN SATURATION: 94 % | RESPIRATION RATE: 18 BRPM | SYSTOLIC BLOOD PRESSURE: 122 MMHG | TEMPERATURE: 98 F | DIASTOLIC BLOOD PRESSURE: 64 MMHG

## 2023-05-07 LAB
ANION GAP SERPL CALC-SCNC: 9 MMOL/L — SIGNIFICANT CHANGE UP (ref 5–17)
BLD GP AB SCN SERPL QL: NEGATIVE — SIGNIFICANT CHANGE UP
BUN SERPL-MCNC: 20 MG/DL — SIGNIFICANT CHANGE UP (ref 7–23)
CALCIUM SERPL-MCNC: 8.7 MG/DL — SIGNIFICANT CHANGE UP (ref 8.4–10.5)
CHLORIDE SERPL-SCNC: 106 MMOL/L — SIGNIFICANT CHANGE UP (ref 96–108)
CO2 SERPL-SCNC: 26 MMOL/L — SIGNIFICANT CHANGE UP (ref 22–31)
CREAT SERPL-MCNC: 0.9 MG/DL — SIGNIFICANT CHANGE UP (ref 0.5–1.3)
EGFR: 87 ML/MIN/1.73M2 — SIGNIFICANT CHANGE UP
GLUCOSE SERPL-MCNC: 90 MG/DL — SIGNIFICANT CHANGE UP (ref 70–99)
PHOSPHATE SERPL-MCNC: 3.9 MG/DL — SIGNIFICANT CHANGE UP (ref 2.5–4.5)
POTASSIUM SERPL-MCNC: 4 MMOL/L — SIGNIFICANT CHANGE UP (ref 3.5–5.3)
POTASSIUM SERPL-SCNC: 4 MMOL/L — SIGNIFICANT CHANGE UP (ref 3.5–5.3)
RH IG SCN BLD-IMP: POSITIVE — SIGNIFICANT CHANGE UP
SODIUM SERPL-SCNC: 141 MMOL/L — SIGNIFICANT CHANGE UP (ref 135–145)

## 2023-05-07 PROCEDURE — 84100 ASSAY OF PHOSPHORUS: CPT

## 2023-05-07 PROCEDURE — 85025 COMPLETE CBC W/AUTO DIFF WBC: CPT

## 2023-05-07 PROCEDURE — 36415 COLL VENOUS BLD VENIPUNCTURE: CPT

## 2023-05-07 PROCEDURE — 99232 SBSQ HOSP IP/OBS MODERATE 35: CPT

## 2023-05-07 PROCEDURE — 83880 ASSAY OF NATRIURETIC PEPTIDE: CPT

## 2023-05-07 PROCEDURE — 86900 BLOOD TYPING SEROLOGIC ABO: CPT

## 2023-05-07 PROCEDURE — 97161 PT EVAL LOW COMPLEX 20 MIN: CPT

## 2023-05-07 PROCEDURE — 96374 THER/PROPH/DIAG INJ IV PUSH: CPT

## 2023-05-07 PROCEDURE — 93970 EXTREMITY STUDY: CPT

## 2023-05-07 PROCEDURE — 80053 COMPREHEN METABOLIC PANEL: CPT

## 2023-05-07 PROCEDURE — 83605 ASSAY OF LACTIC ACID: CPT

## 2023-05-07 PROCEDURE — 86850 RBC ANTIBODY SCREEN: CPT

## 2023-05-07 PROCEDURE — 85730 THROMBOPLASTIN TIME PARTIAL: CPT

## 2023-05-07 PROCEDURE — 86901 BLOOD TYPING SEROLOGIC RH(D): CPT

## 2023-05-07 PROCEDURE — 99285 EMERGENCY DEPT VISIT HI MDM: CPT | Mod: 25

## 2023-05-07 PROCEDURE — 85610 PROTHROMBIN TIME: CPT

## 2023-05-07 PROCEDURE — 80048 BASIC METABOLIC PNL TOTAL CA: CPT

## 2023-05-07 PROCEDURE — 97165 OT EVAL LOW COMPLEX 30 MIN: CPT

## 2023-05-07 PROCEDURE — 87040 BLOOD CULTURE FOR BACTERIA: CPT

## 2023-05-07 PROCEDURE — 71045 X-RAY EXAM CHEST 1 VIEW: CPT

## 2023-05-07 RX ORDER — POLYETHYLENE GLYCOL 3350 17 G/17G
17 POWDER, FOR SOLUTION ORAL
Qty: 0 | Refills: 0 | DISCHARGE
Start: 2023-05-07

## 2023-05-07 RX ORDER — CYCLOBENZAPRINE HYDROCHLORIDE 10 MG/1
1 TABLET, FILM COATED ORAL
Qty: 0 | Refills: 0 | DISCHARGE
Start: 2023-05-07

## 2023-05-07 RX ORDER — SENNA PLUS 8.6 MG/1
2 TABLET ORAL
Qty: 0 | Refills: 0 | DISCHARGE
Start: 2023-05-07

## 2023-05-07 RX ORDER — OXYBUTYNIN CHLORIDE 5 MG
1 TABLET ORAL
Refills: 0 | DISCHARGE

## 2023-05-07 RX ADMIN — ARIPIPRAZOLE 5 MILLIGRAM(S): 15 TABLET ORAL at 12:59

## 2023-05-07 RX ADMIN — DULOXETINE HYDROCHLORIDE 120 MILLIGRAM(S): 30 CAPSULE, DELAYED RELEASE ORAL at 12:59

## 2023-05-07 RX ADMIN — POLYETHYLENE GLYCOL 3350 17 GRAM(S): 17 POWDER, FOR SOLUTION ORAL at 06:48

## 2023-05-07 RX ADMIN — Medication 5 MILLIGRAM(S): at 06:48

## 2023-05-07 RX ADMIN — Medication 5 MILLIGRAM(S): at 12:59

## 2023-05-07 NOTE — PROGRESS NOTE ADULT - ASSESSMENT
78M with PMH bladder cancer (s/p tumor resection, localized chemo and now in remission), GERD, kyphoscoliosis s/p C-sacrum fusion at [facility] (10/5/2020) depression, panic disorder, RUE DVT, BPH, presenting with LLE swelling admitted for placement.

## 2023-05-07 NOTE — DISCHARGE NOTE PROVIDER - NSDCFUSCHEDAPPT_GEN_ALL_CORE_FT
Kassie Camara  [place] Physician [facility]  VASCULAR [address]  Scheduled Appointment: 06/02/2023    Dwayne Mcgovern  [place] Physician [facility]  ORTHOSURG [address]  Scheduled Appointment: 06/07/2023

## 2023-05-07 NOTE — DISCHARGE NOTE PROVIDER - HOSPITAL COURSE
#Discharge: do not delete    Patient is a 79 yo M with a past medical Hx of bladder Ca (s/p tumor resection, localized chemoTx and now in remission), GERD, kyphoscoliosis s/p C-sacrum fusion at [facility] (10/5/2020), depression, panic disorder, RUE DVT, BPH, presenting with LLE swelling    #Lower extremity edema  Has history of LE edema and lymphedema after spinal surgery. Saw vascular Dr. Moreno in February who recommended lymphedema pump.   He has not been using it because he feels it makes his swelling worse. Also cannot wear compression socks on his own.   No SOB, PND, orthopnea. WBC WNL. BNP WNL. LE with chronic venous stasis changes. LE dopplers negative for DVT.   - f/u outpatient Vascular surgery  - ace wraps and LE elevation    #L knee OA  -c/w prednisone 5 mg PO QD    New medications/therapies: none  Exam to be followed outpatient: Vascular surgery    Discharge plan: discharge to home    Physical Exam Upon Discharge  Constitutional: resting comfortably in bed; NAD  Head: NC/AT  Eyes: EOMI, anicteric sclera  ENT: no nasal discharge; uvula midline, no oropharyngeal erythema or exudates; MMM  Neck: supple; no JVD or thyromegaly  Respiratory: CTA B/L; no W/R/R, no retractions  Cardiac: +S1/S2; RRR; no M/R/G; PMI non-displaced  Gastrointestinal: abdomen soft, NT/ND; no rebound or guarding; +BSx4  Extremities: WWP, b/l 2+ LE edema; +chronic venous stasis changes  Musculoskeletal: NROM x4  Vascular: 2+ radial, DP/PT pulses B/L  Neurologic: AAOx3; no focal deficits

## 2023-05-07 NOTE — DISCHARGE NOTE NURSING/CASE MANAGEMENT/SOCIAL WORK - NSDCPEFALRISK_GEN_ALL_CORE
For information on Fall & Injury Prevention, visit: [email or web address] OR  [email or web address] OR  https://www.cdc.gov/steadi/patient.html

## 2023-05-07 NOTE — PROGRESS NOTE ADULT - PROBLEM SELECTOR PLAN 4
- c/w home flomax and dutasteride  - c/w home oxybutynin    #L knee OA  reports taking 5mg prednisone daily

## 2023-05-07 NOTE — PHYSICAL THERAPY INITIAL EVALUATION ADULT - PERTINENT HX OF CURRENT PROBLEM, REHAB EVAL
78M presenting for LE swelling. He has had chronic lymphedema from prior spinal surgery.  Saw vascular Dr. Moreno in February who recommended lymphedema pump. He has not been using it because he feels it makes his swelling worse. Also cannot wear compression socks on his own. Also c/o L knee pain. Denies chest pain, SOB, fevers, chills.

## 2023-05-07 NOTE — DISCHARGE NOTE PROVIDER - NSDCMRMEDTOKEN_GEN_ALL_CORE_FT
Ambien 10 mg oral tablet: 1 tab(s) orally once a day (at bedtime), As Needed  ARIPiprazole 2 mg oral tablet: 1 tab(s) orally once a day  cholecalciferol 62.5 mcg (2500 intl units) oral capsule: 2 orally once a day  Crestor 10 mg oral tablet: 1 orally once a day  cyclobenzaprine 5 mg oral tablet: 1 tab(s) orally 2 times a day As needed Muscle Spasm  diazePAM 5 mg oral tablet: 1 orally once a day as needed for  anxiety  Dilaudid 4 mg oral tablet: 1 orally 5 times a day  DULoxetine 60 mg oral delayed release capsule: 2 cap(s) orally once a day  dutasteride 0.5 mg oral capsule: 1 orally once a day  KlonoPIN 2 mg oral tablet: 1 orally once a day  polyethylene glycol 3350 oral powder for reconstitution: 17 gram(s) orally 2 times a day  predniSONE 5 mg oral tablet: 1 orally once a day  senna leaf extract oral tablet: 2 tab(s) orally once a day (at bedtime)  tamsulosin 0.4 mg oral capsule: 1 cap(s) orally once a day  tiZANidine 4 mg oral capsule: 2 orally 2 times a day

## 2023-05-07 NOTE — OCCUPATIONAL THERAPY INITIAL EVALUATION ADULT - PERTINENT HX OF CURRENT PROBLEM, REHAB EVAL
8M with PMH bladder cancer (s/p tumor resection, s/p localized chemo and now in remission), GERD, L knee OA, chronic lymphedema, kyphoscoliosis s/p C-sacrum fusion at [facility] (10/5/2020, w/ Dr. Carlisle), depression, panic disorder, RUE DVT, BPH, presenting for LE swelling. He has had chronic lymphedema from prior spinal surgery.  Saw vascular Dr. Moreno in February who recommended lymphedema pump. He has not been using it because he feels it makes his swelling worse. Also cannot wear compression socks on his own. Also c/o L knee pain. Denies chest pain, SOB, fevers, chills. Does not report drainage from legs. His brother is a cardiologist who recommended he come to the ED for diuretics and antibiotics. He was going to be discharged from ED but felt uncomfortable going home.

## 2023-05-07 NOTE — PROGRESS NOTE ADULT - PROBLEM SELECTOR PLAN 7
F: PO  E: replete PRN  N: dash/tlc  DVT ppx: lovenox  GI PPx: None    FULL CODE    Dispo: pending PT recs for Dispo.

## 2023-05-07 NOTE — PROGRESS NOTE ADULT - SUBJECTIVE AND OBJECTIVE BOX
Pt seen and examined by me this AM  states her outpatient vascular surgeon left the practice     VSS   comfortable   NAD  +S1/S2   CTA b/l  +bilateral ACE bandage in place    labs reviewed

## 2023-05-07 NOTE — PROGRESS NOTE ADULT - PROBLEM SELECTOR PLAN 6
Has history of spinal surgery c/b chronic back pain.   - iSTOP reviewed.  - takes tizanidine 4mg BID and reports taking dilaudid PO 4mg 5 times a day - will continue as flexeril 5mg BID prn and dilaudid 4mg q6 PRN  - narcan prn  - bowel regimen

## 2023-05-07 NOTE — DISCHARGE NOTE NURSING/CASE MANAGEMENT/SOCIAL WORK - PATIENT PORTAL LINK FT
You can access the FollowMyHealth Patient Portal offered by [facility] by registering at the following website: [email or web address]. By joining [employer]’s FollowMyHealth portal, you will also be able to view your health information using other applications (apps) compatible with our system.

## 2023-05-07 NOTE — DISCHARGE NOTE PROVIDER - NSDCCPCAREPLAN_GEN_ALL_CORE_FT
Hospital Medicine Discharge Summary    Patient ID: Olman Vickers      Patient's PCP: Kashmir Perez MD    Admit Date: 3/5/2022     Discharge Date:   03/08/22    Admitting Physician: Danish Weir MD     Discharge Physician: Yessica Arteaga MD     Discharge Diagnoses: Active Hospital Problems    Diagnosis Date Noted    Cellulitis [L03.90] 03/05/2022       The patient was seen and examined on day of discharge and this discharge summary is in conjunction with any daily progress note from day of discharge. Hospital Course:   Pt was admitted and treated for following:    #MDRO UTI, sepsis ruled out  #Hypothermia requiring Sharyn hugger. Recently admitted for similar symptoms of sepsis due to UTI, hypothermia.  CT abdomen and pelvis without acute abnormalities.  Patient was discharged on cefuroxime for 5 days at discharge from his previous hospitalization as there were no history of resistant UTIs.  But his urine cultures from 2/26/2022 grew Morganella morganii, strep agalactiae which are resistant to most antibiotics except Zosyn, meropenem and ertapenem. Urine culture was sent. Patient was started on IV antibiotic. Received IV fluids and was placed on Sharyn hugger. Temperature improved. Urine culture grew mixed urogenital braeden but given patient recent hospitalization and readmission secondary to MDRO UTI,  Merrem to be continued for total 10 days. Midline was placed and patient to complete antibiotic course on discharge. Patient is being discharged in stable condition to nursing facility.     #Acute metabolic encephalopathy ruled out  CT head-with no acute abnormalities Patient was at baseline mental status. Wheelchair-bound at nursing home     #Cellulitis of the scrotum   Most likely fungal.  Nystatin powder topically was continued.     #Elevated LFTs-mild  Statin and esterase was held. Hepatitis panel was sent. LFTs were monitored. Hepatitis panel was negative.   Valproic acid level was not elevated and ammonia was normal.  Statin and estrace was discontinued on discharge with recommendation to repeat LFTs in 2 weeks and follow-up with GI/hepatologist if no improvement.     #CKD stage III  Serum creatinine was monitored and remained at baseline     #Mild elevation in troponin  Patient denied chest pain. Was chronic.     #DM-2  Blood glucose was monitored. Patient was placed on carb controlled diet with sliding scale insulin. Blood glucose went with acceptable range. Lantus dose reduced on discharge with recommendation to monitor blood glucose and adjust insulin as needed at the facility      #Chronic venous stasis of legs   Wound care was continued     #Dementia/schizophrenia  Home medication will continue    Physical Exam Performed:     BP (!) 154/69   Pulse 65   Temp 98.2 °F (36.8 °C) (Oral)   Resp 18   Ht 5' 8\" (1.727 m)   Wt 215 lb 13.3 oz (97.9 kg)   SpO2 93%   BMI 32.82 kg/m²       General appearance:  No apparent distress, appears stated age and cooperative. HEENT:  Normal cephalic, atraumatic without obvious deformity. Pupils equal, round, and reactive to light. Extra ocular muscles intact. Conjunctivae/corneas clear. Neck: Supple, with full range of motion. No jugular venous distention. Trachea midline. Respiratory:  Normal respiratory effort. Clear to auscultation, bilaterally without Rales/Wheezes/Rhonchi. Cardiovascular:  Regular rate and rhythm with normal S1/S2 without murmurs, rubs or gallops. Abdomen: Soft, non-tender, non-distended with normal bowel sounds. Musculoskeletal: Bilateral lower extremity with chronic venous stasis changes. Scrotal erythema improving  Skin: Skin color, texture, turgor normal.  No rashes or lesions. Neurologic:  Grossly non-focal.  Psychiatric:  Alert and oriented X3  Capillary Refill: Brisk,< 3 seconds   Peripheral Pulses: +2 palpable, equal bilaterally       Labs:  For convenience and continuity at follow-up the following most recent labs are provided:      CBC:    Lab Results   Component Value Date    WBC 10.1 03/06/2022    HGB 9.8 03/06/2022    HCT 30.3 03/06/2022     03/06/2022       Renal:    Lab Results   Component Value Date     03/06/2022    K 4.4 03/06/2022     03/06/2022    CO2 24 03/06/2022    BUN 26 03/06/2022    CREATININE 1.2 03/06/2022    CALCIUM 9.3 03/06/2022    PHOS 2.0 03/01/2022         Significant Diagnostic Studies    Radiology:   XR CHEST PORTABLE   Final Result      No acute chest process. CT ABDOMEN PELVIS W IV CONTRAST Additional Contrast? None   Final Result      No acute intra-abdominopelvic abnormality. New trace pleural effusions with associated bibasilar atelectasis. XR FOOT RIGHT (MIN 3 VIEWS)   Final Result      No acute injury. XR HIP 2-3 VW W PELVIS LEFT   Final Result      No acute injury. CT HEAD WO CONTRAST   Final Result      No acute intracranial process. Consults:     IP CONSULT TO HOSPITALIST  IP CONSULT TO CASE MANAGEMENT    Disposition:  SNF     Condition at Discharge: Stable    Discharge Instructions/Follow-up:  Monitor blood glucose and adjust insulin as needed. Repeat LFT's in 2 weeks (statin and estrace discontinued). If LFT's not improved, schedule an appt with GI/hepatologist. Follow up with PCP. PT/OT. Complete abx course  Merrem 1g Q8 hr through 03/14/21 (last day of treatment)    Code Status:  Full Code     Activity: activity as tolerated    Diet: diabetic diet      Discharge Medications:     Current Discharge Medication List           Details   miconazole (MICOTIN) 2 % powder Apply topically 2 times daily.   Qty: 45 g, Refills: 1              Details   insulin detemir (LEVEMIR) 100 UNIT/ML injection vial Inject 5 Units into the skin nightly  Qty: 10 mL, Refills: 1      !! insulin aspart (NOVOLOG) 100 UNIT/ML injection vial Inject 3 Units into the skin 3 times daily (before meals)  Qty: 10 mL, Refills: 3       !! - Potential duplicate medications found. Please discuss with provider. Details   nystatin (MYCOSTATIN) 129708 UNIT/GM powder Apply topically 4 times daily Apply topically 4 times daily. !! polyethylene glycol (MIRALAX) 17 g PACK packet Take 17 g by mouth every 48 hours      !! divalproex (DEPAKOTE SPRINKLES) 125 MG capsule Take 250 mg by mouth at bedtime      !! divalproex (DEPAKOTE SPRINKLES) 125 MG capsule Take 125 mg by mouth every morning      furosemide (LASIX) 20 MG tablet 20 mg daily       melatonin 1 MG tablet Take 1 mg by mouth nightly       latanoprost (XALATAN) 0.005 % ophthalmic solution Place 1 drop into the right eye nightly       Skin Protectants, Misc.  (HYDROCERIN) CREA cream Apply topically 2 times daily  Qty: 1 Container, Refills: 0      acetaminophen (TYLENOL) 500 MG tablet Take 500 mg by mouth every 6 hours as needed for Pain      allopurinol (ZYLOPRIM) 100 MG tablet Take 100 mg by mouth daily      amLODIPine (NORVASC) 5 MG tablet Take 5 mg by mouth daily Hold for SBP <110 or Pulse <60      ARIPiprazole (ABILIFY) 15 MG tablet Take 15 mg by mouth daily      aspirin 81 MG EC tablet Take 81 mg by mouth daily      atorvastatin (LIPITOR) 40 MG tablet Take 40 mg by mouth nightly      bisacodyl (DULCOLAX) 10 MG suppository Place 10 mg rectally daily as needed for Constipation      Cranberry 425 MG CAPS Take 425 mg by mouth daily      docusate sodium (COLACE) 100 MG capsule Take 100 mg by mouth 2 times daily      finasteride (PROSCAR) 5 MG tablet Take 5 mg by mouth daily      FLUoxetine (PROZAC) 20 MG capsule Take 40 mg by mouth daily      Multiple Vitamins-Minerals (THERAPEUTIC MULTIVITAMIN-MINERALS) tablet Take 1 tablet by mouth daily      !! insulin aspart (NOVOLOG) 100 UNIT/ML injection vial Inject 0-21 Units into the skin 3 times daily (before meals) Sliding scale   201-250 = 5 units  251-300 = 8 units  301-350 = 12 units  351-400 = 15 units  401-450 = 18 units  451-500 = 21 units  500+ notify MD      ondansetron (ZOFRAN-ODT) 4 MG disintegrating tablet Take 4 mg by mouth every 8 hours as needed for Nausea or Vomiting      ! ! polyethylene glycol (GLYCOLAX) 17 g packet Take 17 g by mouth daily as needed for Constipation      !! risperiDONE (RISPERDAL) 0.5 MG tablet Take 0.5 mg by mouth every morning (before breakfast)       !! risperiDONE (RISPERDAL) 1 MG tablet Take 1.5 mg by mouth nightly       tamsulosin (FLOMAX) 0.4 MG capsule Take 0.8 mg by mouth nightly       !! - Potential duplicate medications found. Please discuss with provider. Time Spent on discharge is more than 30 minutes in the examination, evaluation, counseling and review of medications and discharge plan. Signed:    Mike Leal MD   3/8/2022      Thank you Radha Medrano MD for the opportunity to be involved in this patient's care. If you have any questions or concerns please feel free to contact me at [phone]. PRINCIPAL DISCHARGE DIAGNOSIS  Diagnosis: Swelling of lower extremity  Assessment and Plan of Treatment: You have lower extremity swelling in the setting of your known lymphedema. Please follow up with a Vascular surgeon for further care of your lymphedema. You will need to wrap your legs daily to help avoid swelling.      SECONDARY DISCHARGE DIAGNOSES  Diagnosis: Weakness  Assessment and Plan of Treatment:

## 2023-05-07 NOTE — OCCUPATIONAL THERAPY INITIAL EVALUATION ADULT - GENERAL OBSERVATIONS, REHAB EVAL
OT IE complete. RN Radha clearing pt. for OOB. Pt. received with PT Buddy, +[place], +daniela in NAD agreeable to therapy session

## 2023-05-07 NOTE — PHYSICAL THERAPY INITIAL EVALUATION ADULT - RISK REDUCTION/PREVENTION, PT EVAL
" [place] [facility] Initial Evaluation  Subjective:  The history is provided by the patient. No  was used.   Patient Health History  Zackery Chakraborty being seen for left ankle rehab following ankle fracture with ORIF.     Problem began: 1/5/2021 (date of surgery).   Problem occurred: 12/26/20:  patient was cleaning out some trash from his girlfriend's car.  Slipped on the ice when he turned to put the garbage bag into the garbage bin     General health as reported by patient is good.  Pertinent medical history includes: asthma, smoking and history of fractures (reports \"several\" past foot and ankle injuries & fractures bilaterally from playing competitive soccer).   Red flags:  None as reported by patient.  Medical allergies: Augmentin, Sulfa.   Surgeries include:  Orthopedic surgery (left ankle ORIF).    Current medications:  None.    Current occupation is works in the kitchen at [employer].   Primary job tasks include:  Prolonged standing.                  Therapist Generated HPI Evaluation         Type of problem:  Left ankle.    This is a new condition.  Condition occurred with:  A fall/slip.  Where condition occurred: in the community.  Patient reports pain:  Medial.  Pain is described as sharp and aching and is intermittent (rated as 1/10).  Pain radiates to:  No radiation.   Since onset symptoms are gradually improving.  Associated symptoms:  Loss of motion/stiffness, loss of strength and numbness (dorsum of foot over distal tibia, T-C joint, and mid foot). Exacerbated by: unable to identify since he has been NWB'ing until recently.  Relieved by: elevating foot.  Special tests included:  X-ray.    Restrictions due to condition include:  Working in an alternate job (light duty at [employer] -- primarily sitting and doing desk work).  Barriers include:  Stairs (4-5 steps leading up to front door).      Patient's Goals:  To be able to walk normally again                           "                                  Objective:  System  Gait:    Weight Bearing Status:  PWB   Assistive Devices:  crutches and CAM.  Patient was initially NWB'ing on crutches in the walking boot for ~ 1 month. Since MD visit 2/18/21, he has been advised to progress weight bearing in boot, beginning with 50% for 1 week, 75% the following week,  then 100%    Ankle/Foot Evaluation  ROM:    AROM:    Dorsiflexion:  Left:   -5  Right:   7  Plantarflexion:  Left:  47    Right:  70  Inversion:  Left:  15     Right:  22  Eversion:  10     Right:  15  Great toe flexion:  Left:  WNL     Right:  WNL  Great Toe Extension:  Left:  WNL     Right: WNL          PALPATION: Palpation of ankle: tenderness over medial malleolus; remainder of ankle and shaft of fibula fracture site unremarkable.  Scar mobility is good.  Decreased sensation to light touch just superior to talocrural joint over distal tibia, anterior talocrural joint, and mid foot.    EDEMA: Edema ankle: very minimal left ankle.                                                          Strength:  Deferred on left ankle    General     ROS    Assessment/Plan:    Patient is a 19 year old male with left ankle complaints.  Currently 8 weeks S/P ORIF.  Patient has the following significant findings with corresponding treatment plan.                Diagnosis 1:  Left Ankle Pain   Pain -  self management, education and home program  Decreased ROM/flexibility - manual therapy and therapeutic exercise  Decreased strength - therapeutic exercise  Impaired gait - gait training, assistive devices and home program  Decreased function - therapeutic activities and home program    Cumulative Therapy Evaluation is: Low complexity.    Previous and current functional limitations:  (See Goal Flow Sheet for this information)    Short term and Long term goals: (See Goal Flow Sheet for this information)     Communication ability:  Patient appears to be able to clearly communicate and understand  verbal and written communication and follow directions correctly.  Treatment Explanation - The following has been discussed with the patient:    RX ordered/plan of care  Anticipated outcomes  Possible risks and side effects  This patient would benefit from PT intervention to resume normal activities.   Rehab potential is good.    Frequency:  1 X week, once daily  Duration:  for 6 weeks tapering to 2 X a month over 1 month  Discharge Plan:  Achieve all LTG.  Independent in home treatment program.  Reach maximal therapeutic benefit.    Please refer to the daily flowsheet for treatment today, total treatment time and time spent performing 1:1 timed codes.        risk factors

## 2023-05-07 NOTE — PHYSICAL THERAPY INITIAL EVALUATION ADULT - GAIT DEVIATIONS NOTED, PT EVAL
sig dec gait speed, shuffling gait, slight LOB laterally/decreased juve/decreased step length/decreased weight-shifting ability

## 2023-05-07 NOTE — PHYSICAL THERAPY INITIAL EVALUATION ADULT - PLANNED THERAPY INTERVENTIONS, PT EVAL
Pt presents with c/o cp starting at work around 2130, causing her tightness in chest with mild SOB. Pt had stents placed in December and has had intermittent pain afterwards. She usually takes SL NTG but couldn't find it tonight. Pt ambulates with steady gait, A&Ox4. Denies fever, cough, exposure to any illnesses.        Christal Vasquez, RN  04/20/20 0104     stair training/balance training/bed mobility training/gait training/strengthening/transfer training

## 2023-05-07 NOTE — OCCUPATIONAL THERAPY INITIAL EVALUATION ADULT - DIAGNOSIS, OT EVAL
Pt. admitted to [facility] after presenting to ED w. LLE swelling. Upon assessment, pt. demo impairments in BUE/BLE strength, activity tolerance, balance and postural control impacting how the pt. engages safely in ADLs and functional mob/transfers.

## 2023-05-07 NOTE — OCCUPATIONAL THERAPY INITIAL EVALUATION ADULT - ADDITIONAL COMMENTS
Per pt, he lives alone in an apartment on the 2nd floor, 1 FOS to enter. Pt. reports being I in ADLs and functional mob w. walking stick and RW. He goes himself to the grocery store, neighbors sometimes assist in bringing in mail. BR with tub shower, +GBs and shower chair

## 2023-05-07 NOTE — OCCUPATIONAL THERAPY INITIAL EVALUATION ADULT - MODIFIED CLINICAL TEST OF SENSORY INTEGRATION IN BALANCE TEST
Pt. performed functional mob in hallway with walking stick and Mod Ax1, noted unsteady, decreased weight shifting, decreased juve

## 2023-05-11 LAB
CULTURE RESULTS: SIGNIFICANT CHANGE UP
CULTURE RESULTS: SIGNIFICANT CHANGE UP
SPECIMEN SOURCE: SIGNIFICANT CHANGE UP
SPECIMEN SOURCE: SIGNIFICANT CHANGE UP

## 2023-05-12 ENCOUNTER — APPOINTMENT (OUTPATIENT)
Dept: VASCULAR SURGERY | Facility: CLINIC | Age: 78
End: 2023-05-12
Payer: MEDICARE

## 2023-05-12 PROCEDURE — 99213 OFFICE O/P EST LOW 20 MIN: CPT

## 2023-05-15 RX ORDER — CIPROFLOXACIN HYDROCHLORIDE 500 MG/1
500 TABLET, FILM COATED ORAL
Qty: 10 | Refills: 1 | Status: COMPLETED | COMMUNITY
Start: 2023-05-15 | End: 2023-05-20

## 2023-05-15 NOTE — HISTORY OF PRESENT ILLNESS
[FreeTextEntry1] : 78yoM w/previous smoking hx (quit 30y prior), PMHx of severe DJD of the L-S spine requiring spine surgery/fixation 1y prior at [facility] 1y prior, presents for evaluation of his b/l LE swelling that was first noted and evaluated in February 2023.  Pt was seen by Dr. Moreno who diagnosed lymphedema and ordered a lymphedema pump for the pt for daily use, w/which he has been compliant.  Pt is unable to don compression garments on his LEs due to his limited ROM, but does elevate his legs whenever he is resting.\par \par Pt was seen in the [facility] ED 1wk prior for suspected LLE infection, but was ruled out for cellulitis and d/c'd home.  Mr. Blair denies h/o CAD, CHF, abdominal masses/CA, and walks w/cane assistance.

## 2023-05-15 NOTE — ASSESSMENT
[FreeTextEntry1] : 78yoM w/previous smoking hx (quit 30y prior), PMHx of severe DJD of the L-S spine requiring spine surgery/fixation 1y prior at [facility] 1y prior, presents for evaluation of his b/l LE swelling that was first noted and evaluated in February 2023.  Pt was seen by Dr. Moreno who diagnosed lymphedema and ordered a lymphedema pump for the pt for daily use, w/which he has been compliant.  Pt is unable to don compression garments on his LEs due to his limited ROM, but does elevate his legs whenever he is resting.

## 2023-05-17 DIAGNOSIS — F39 UNSPECIFIED MOOD [AFFECTIVE] DISORDER: ICD-10-CM

## 2023-05-17 DIAGNOSIS — M17.12 UNILATERAL PRIMARY OSTEOARTHRITIS, LEFT KNEE: ICD-10-CM

## 2023-05-17 DIAGNOSIS — N40.0 BENIGN PROSTATIC HYPERPLASIA WITHOUT LOWER URINARY TRACT SYMPTOMS: ICD-10-CM

## 2023-05-17 DIAGNOSIS — Z91.148 PATIENT'S OTHER NONCOMPLIANCE WITH MEDICATION REGIMEN FOR OTHER REASON: ICD-10-CM

## 2023-05-17 DIAGNOSIS — Z87.891 PERSONAL HISTORY OF NICOTINE DEPENDENCE: ICD-10-CM

## 2023-05-17 DIAGNOSIS — M54.50 LOW BACK PAIN, UNSPECIFIED: ICD-10-CM

## 2023-05-17 DIAGNOSIS — Z86.718 PERSONAL HISTORY OF OTHER VENOUS THROMBOSIS AND EMBOLISM: ICD-10-CM

## 2023-05-17 DIAGNOSIS — F41.0 PANIC DISORDER [EPISODIC PAROXYSMAL ANXIETY]: ICD-10-CM

## 2023-05-17 DIAGNOSIS — G89.29 OTHER CHRONIC PAIN: ICD-10-CM

## 2023-05-17 DIAGNOSIS — M79.89 OTHER SPECIFIED SOFT TISSUE DISORDERS: ICD-10-CM

## 2023-05-17 DIAGNOSIS — I87.8 OTHER SPECIFIED DISORDERS OF VEINS: ICD-10-CM

## 2023-05-17 DIAGNOSIS — K21.9 GASTRO-ESOPHAGEAL REFLUX DISEASE WITHOUT ESOPHAGITIS: ICD-10-CM

## 2023-05-17 DIAGNOSIS — E78.5 HYPERLIPIDEMIA, UNSPECIFIED: ICD-10-CM

## 2023-05-17 DIAGNOSIS — I89.0 LYMPHEDEMA, NOT ELSEWHERE CLASSIFIED: ICD-10-CM

## 2023-05-17 DIAGNOSIS — T45.516A UNDERDOSING OF ANTICOAGULANTS, INITIAL ENCOUNTER: ICD-10-CM

## 2023-05-17 DIAGNOSIS — Z85.51 PERSONAL HISTORY OF MALIGNANT NEOPLASM OF BLADDER: ICD-10-CM

## 2023-05-17 DIAGNOSIS — Z88.2 ALLERGY STATUS TO SULFONAMIDES: ICD-10-CM

## 2023-05-17 DIAGNOSIS — Z92.21 PERSONAL HISTORY OF ANTINEOPLASTIC CHEMOTHERAPY: ICD-10-CM
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
## 2023-06-07 ENCOUNTER — APPOINTMENT (OUTPATIENT)
Dept: ORTHOPEDIC SURGERY | Facility: CLINIC | Age: 78
End: 2023-06-07

## 2023-06-09 ENCOUNTER — APPOINTMENT (OUTPATIENT)
Dept: VASCULAR SURGERY | Facility: CLINIC | Age: 78
End: 2023-06-09
Payer: MEDICARE

## 2023-06-09 DIAGNOSIS — G89.29 OTHER CHRONIC PAIN: ICD-10-CM

## 2023-06-09 DIAGNOSIS — I89.0 LYMPHEDEMA, NOT ELSEWHERE CLASSIFIED: ICD-10-CM

## 2023-06-09 PROCEDURE — 99213 OFFICE O/P EST LOW 20 MIN: CPT

## 2023-06-09 NOTE — HISTORY OF PRESENT ILLNESS
[FreeTextEntry1] : history of dvt of lower extrmities and lives at home alone but has a neighbor who assista him with using his lympha-pump. He comes in today to find out how oftern he should ude the machine and at what pressure the machine should be placed [de-identified] : history of dvt and now needs guidance for the placement of the lymhapress that he has

## 2023-07-07 NOTE — PATIENT PROFILE ADULT - NSASFALLATTEMPTOOB_GEN_A_NUR
POSTOPERATIVE DAY #1, PHACOEMULSIFICATION WITH INTRAOCULAR LENS, RIGHT EYE:    SUBJECTIVE:  comfortable, had a good night, no pain or H/A and eating okay.                                                                                                                             OBJECTIVE:   SLIT LAMP EXAM, OPERATIVE EYE:                   TA:  20         LIDS, LASHES AND LACRIMAL:  dermatochalasis         CONJUNCTIVA AND SCLERA:   clear            CORNEA:   clear and temporal clear cornea wound, stable, negative seidels        ANTERIOR CHAMBER:  deep and quiet         IRIS:  round and regular without neovascularization         LENS:  PC IOL, well centered, stable       ASSESSMENT: POSTOPERATIVE DAY #1, PHACOEMULSIFICATION WITH INTRAOCULAR LENS, RIGHT EYE:  doing great, no problems.    PLAN:   1. Outlined all postoperative restrictions/limitations--all his  questions were answered.  2. Postoperative eye medications prescribed with written instructions--see orders.  3. Proceed with phaco other eye as scheduled in several weeks        PREOPERATIVE PHACOEMULSIFICATION  LEFT EYE:    INTRAOCULAR LENS (IOL) CALCULATION/SELECTION DISCUSSION:  The IOL Master, and the IOL calculations were reviewed in detail.  I had a thorough discussion with Mr. cShwartz on the postoperative refraction options, goals, and expectations.  I also had a detailed discussion with him on all the available premium IOL's including a thorough discussion on the pros/cons, risks, benefits and cost.  All his questions were answered. he understands his refraction issues/options well.  The IOL was then selected:  +14.00 diopters.    INFORMED CONSENT DISCUSSION:  CATARACT,OS, consistent with his visual acuity and multiple visual/lifestyle complaints. Mr. Schwartz is very symptomatic and wants/needs to see better.  I had a detailed discussion with him on the natural history of cataracts, treatment options, and surgical risks including:  death, blindness, loss of  eye, endophthalmitis, hemorrhage, retinal detachment, cystic macular edema, elevated eye pressure, need for more surgery, worsening of diabetic retinopathy, anisometropia, diplopia, pupil problems, IOL related problems as discussed, dislocation/retention of lens material; multiple other potential complications were discussed.  All his questions were answered, and the consent forms were reviewed, previously signed, and witnessed.  I will proceed with phacoemulsification with IOL as scheduled.       no

## 2023-10-28 ENCOUNTER — INPATIENT (INPATIENT)
Facility: HOSPITAL | Age: 78
LOS: 5 days | Discharge: NURSING, REHAB OR LONG-TERM CARE | DRG: 521 | End: 2023-11-03
Attending: ORTHOPAEDIC SURGERY | Admitting: ORTHOPAEDIC SURGERY
Payer: MEDICARE

## 2023-10-28 VITALS
HEART RATE: 75 BPM | RESPIRATION RATE: 18 BRPM | SYSTOLIC BLOOD PRESSURE: 125 MMHG | DIASTOLIC BLOOD PRESSURE: 76 MMHG | TEMPERATURE: 98 F | OXYGEN SATURATION: 98 % | HEIGHT: 68 IN | WEIGHT: 149.91 LBS

## 2023-10-28 DIAGNOSIS — K21.9 GASTRO-ESOPHAGEAL REFLUX DISEASE WITHOUT ESOPHAGITIS: ICD-10-CM

## 2023-10-28 DIAGNOSIS — F32.9 MAJOR DEPRESSIVE DISORDER, SINGLE EPISODE, UNSPECIFIED: ICD-10-CM

## 2023-10-28 DIAGNOSIS — E78.00 PURE HYPERCHOLESTEROLEMIA, UNSPECIFIED: ICD-10-CM

## 2023-10-28 DIAGNOSIS — Z98.890 OTHER SPECIFIED POSTPROCEDURAL STATES: Chronic | ICD-10-CM

## 2023-10-28 DIAGNOSIS — C67.9 MALIGNANT NEOPLASM OF BLADDER, UNSPECIFIED: ICD-10-CM

## 2023-10-28 LAB
ANION GAP SERPL CALC-SCNC: 9 MMOL/L — SIGNIFICANT CHANGE UP (ref 5–17)
ANION GAP SERPL CALC-SCNC: 9 MMOL/L — SIGNIFICANT CHANGE UP (ref 5–17)
APTT BLD: 33.7 SEC — SIGNIFICANT CHANGE UP (ref 24.5–35.6)
APTT BLD: 33.7 SEC — SIGNIFICANT CHANGE UP (ref 24.5–35.6)
BASOPHILS # BLD AUTO: 0.07 K/UL — SIGNIFICANT CHANGE UP (ref 0–0.2)
BASOPHILS # BLD AUTO: 0.07 K/UL — SIGNIFICANT CHANGE UP (ref 0–0.2)
BASOPHILS NFR BLD AUTO: 1.2 % — SIGNIFICANT CHANGE UP (ref 0–2)
BASOPHILS NFR BLD AUTO: 1.2 % — SIGNIFICANT CHANGE UP (ref 0–2)
BLD GP AB SCN SERPL QL: NEGATIVE — SIGNIFICANT CHANGE UP
BLD GP AB SCN SERPL QL: NEGATIVE — SIGNIFICANT CHANGE UP
BUN SERPL-MCNC: 35 MG/DL — HIGH (ref 7–23)
BUN SERPL-MCNC: 35 MG/DL — HIGH (ref 7–23)
CALCIUM SERPL-MCNC: 9 MG/DL — SIGNIFICANT CHANGE UP (ref 8.4–10.5)
CALCIUM SERPL-MCNC: 9 MG/DL — SIGNIFICANT CHANGE UP (ref 8.4–10.5)
CHLORIDE SERPL-SCNC: 101 MMOL/L — SIGNIFICANT CHANGE UP (ref 96–108)
CHLORIDE SERPL-SCNC: 101 MMOL/L — SIGNIFICANT CHANGE UP (ref 96–108)
CO2 SERPL-SCNC: 26 MMOL/L — SIGNIFICANT CHANGE UP (ref 22–31)
CO2 SERPL-SCNC: 26 MMOL/L — SIGNIFICANT CHANGE UP (ref 22–31)
CREAT SERPL-MCNC: 0.85 MG/DL — SIGNIFICANT CHANGE UP (ref 0.5–1.3)
CREAT SERPL-MCNC: 0.85 MG/DL — SIGNIFICANT CHANGE UP (ref 0.5–1.3)
EGFR: 89 ML/MIN/1.73M2 — SIGNIFICANT CHANGE UP
EGFR: 89 ML/MIN/1.73M2 — SIGNIFICANT CHANGE UP
EOSINOPHIL # BLD AUTO: 0.41 K/UL — SIGNIFICANT CHANGE UP (ref 0–0.5)
EOSINOPHIL # BLD AUTO: 0.41 K/UL — SIGNIFICANT CHANGE UP (ref 0–0.5)
EOSINOPHIL NFR BLD AUTO: 6.8 % — HIGH (ref 0–6)
EOSINOPHIL NFR BLD AUTO: 6.8 % — HIGH (ref 0–6)
GLUCOSE SERPL-MCNC: 93 MG/DL — SIGNIFICANT CHANGE UP (ref 70–99)
GLUCOSE SERPL-MCNC: 93 MG/DL — SIGNIFICANT CHANGE UP (ref 70–99)
HCG SERPL-ACNC: 0 MIU/ML — SIGNIFICANT CHANGE UP
HCG SERPL-ACNC: 0 MIU/ML — SIGNIFICANT CHANGE UP
HCT VFR BLD CALC: 40.5 % — SIGNIFICANT CHANGE UP (ref 39–50)
HCT VFR BLD CALC: 40.5 % — SIGNIFICANT CHANGE UP (ref 39–50)
HGB BLD-MCNC: 12.9 G/DL — LOW (ref 13–17)
HGB BLD-MCNC: 12.9 G/DL — LOW (ref 13–17)
IMM GRANULOCYTES NFR BLD AUTO: 1 % — HIGH (ref 0–0.9)
IMM GRANULOCYTES NFR BLD AUTO: 1 % — HIGH (ref 0–0.9)
INR BLD: 1.04 — SIGNIFICANT CHANGE UP (ref 0.85–1.18)
INR BLD: 1.04 — SIGNIFICANT CHANGE UP (ref 0.85–1.18)
LYMPHOCYTES # BLD AUTO: 2.56 K/UL — SIGNIFICANT CHANGE UP (ref 1–3.3)
LYMPHOCYTES # BLD AUTO: 2.56 K/UL — SIGNIFICANT CHANGE UP (ref 1–3.3)
LYMPHOCYTES # BLD AUTO: 42.5 % — SIGNIFICANT CHANGE UP (ref 13–44)
LYMPHOCYTES # BLD AUTO: 42.5 % — SIGNIFICANT CHANGE UP (ref 13–44)
MCHC RBC-ENTMCNC: 29.6 PG — SIGNIFICANT CHANGE UP (ref 27–34)
MCHC RBC-ENTMCNC: 29.6 PG — SIGNIFICANT CHANGE UP (ref 27–34)
MCHC RBC-ENTMCNC: 31.9 GM/DL — LOW (ref 32–36)
MCHC RBC-ENTMCNC: 31.9 GM/DL — LOW (ref 32–36)
MCV RBC AUTO: 92.9 FL — SIGNIFICANT CHANGE UP (ref 80–100)
MCV RBC AUTO: 92.9 FL — SIGNIFICANT CHANGE UP (ref 80–100)
MONOCYTES # BLD AUTO: 0.54 K/UL — SIGNIFICANT CHANGE UP (ref 0–0.9)
MONOCYTES # BLD AUTO: 0.54 K/UL — SIGNIFICANT CHANGE UP (ref 0–0.9)
MONOCYTES NFR BLD AUTO: 9 % — SIGNIFICANT CHANGE UP (ref 2–14)
MONOCYTES NFR BLD AUTO: 9 % — SIGNIFICANT CHANGE UP (ref 2–14)
NEUTROPHILS # BLD AUTO: 2.39 K/UL — SIGNIFICANT CHANGE UP (ref 1.8–7.4)
NEUTROPHILS # BLD AUTO: 2.39 K/UL — SIGNIFICANT CHANGE UP (ref 1.8–7.4)
NEUTROPHILS NFR BLD AUTO: 39.5 % — LOW (ref 43–77)
NEUTROPHILS NFR BLD AUTO: 39.5 % — LOW (ref 43–77)
NRBC # BLD: 0 /100 WBCS — SIGNIFICANT CHANGE UP (ref 0–0)
NRBC # BLD: 0 /100 WBCS — SIGNIFICANT CHANGE UP (ref 0–0)
PHOSPHATE SERPL-MCNC: 4 MG/DL — SIGNIFICANT CHANGE UP (ref 2.5–4.5)
PHOSPHATE SERPL-MCNC: 4 MG/DL — SIGNIFICANT CHANGE UP (ref 2.5–4.5)
PLATELET # BLD AUTO: 207 K/UL — SIGNIFICANT CHANGE UP (ref 150–400)
PLATELET # BLD AUTO: 207 K/UL — SIGNIFICANT CHANGE UP (ref 150–400)
POTASSIUM SERPL-MCNC: 3.9 MMOL/L — SIGNIFICANT CHANGE UP (ref 3.5–5.3)
POTASSIUM SERPL-MCNC: 3.9 MMOL/L — SIGNIFICANT CHANGE UP (ref 3.5–5.3)
POTASSIUM SERPL-SCNC: 3.9 MMOL/L — SIGNIFICANT CHANGE UP (ref 3.5–5.3)
POTASSIUM SERPL-SCNC: 3.9 MMOL/L — SIGNIFICANT CHANGE UP (ref 3.5–5.3)
PROTHROM AB SERPL-ACNC: 11.8 SEC — SIGNIFICANT CHANGE UP (ref 9.5–13)
PROTHROM AB SERPL-ACNC: 11.8 SEC — SIGNIFICANT CHANGE UP (ref 9.5–13)
RBC # BLD: 4.36 M/UL — SIGNIFICANT CHANGE UP (ref 4.2–5.8)
RBC # BLD: 4.36 M/UL — SIGNIFICANT CHANGE UP (ref 4.2–5.8)
RBC # FLD: 15.9 % — HIGH (ref 10.3–14.5)
RBC # FLD: 15.9 % — HIGH (ref 10.3–14.5)
RH IG SCN BLD-IMP: POSITIVE — SIGNIFICANT CHANGE UP
RH IG SCN BLD-IMP: POSITIVE — SIGNIFICANT CHANGE UP
SODIUM SERPL-SCNC: 136 MMOL/L — SIGNIFICANT CHANGE UP (ref 135–145)
SODIUM SERPL-SCNC: 136 MMOL/L — SIGNIFICANT CHANGE UP (ref 135–145)
T4 AB SER-ACNC: 4.15 UG/DL — LOW (ref 4.5–11.7)
T4 AB SER-ACNC: 4.15 UG/DL — LOW (ref 4.5–11.7)
TSH SERPL-MCNC: 7.31 UIU/ML — HIGH (ref 0.27–4.2)
TSH SERPL-MCNC: 7.31 UIU/ML — HIGH (ref 0.27–4.2)
WBC # BLD: 6.03 K/UL — SIGNIFICANT CHANGE UP (ref 3.8–10.5)
WBC # BLD: 6.03 K/UL — SIGNIFICANT CHANGE UP (ref 3.8–10.5)
WBC # FLD AUTO: 6.03 K/UL — SIGNIFICANT CHANGE UP (ref 3.8–10.5)
WBC # FLD AUTO: 6.03 K/UL — SIGNIFICANT CHANGE UP (ref 3.8–10.5)

## 2023-10-28 PROCEDURE — 73502 X-RAY EXAM HIP UNI 2-3 VIEWS: CPT | Mod: 26,LT

## 2023-10-28 PROCEDURE — 71045 X-RAY EXAM CHEST 1 VIEW: CPT | Mod: 26

## 2023-10-28 PROCEDURE — 70450 CT HEAD/BRAIN W/O DYE: CPT | Mod: 26

## 2023-10-28 PROCEDURE — 72100 X-RAY EXAM L-S SPINE 2/3 VWS: CPT | Mod: 26

## 2023-10-28 PROCEDURE — 99223 1ST HOSP IP/OBS HIGH 75: CPT

## 2023-10-28 PROCEDURE — 73110 X-RAY EXAM OF WRIST: CPT | Mod: 26,LT

## 2023-10-28 PROCEDURE — 93010 ELECTROCARDIOGRAM REPORT: CPT

## 2023-10-28 PROCEDURE — 73552 X-RAY EXAM OF FEMUR 2/>: CPT | Mod: 26,LT

## 2023-10-28 PROCEDURE — 99285 EMERGENCY DEPT VISIT HI MDM: CPT | Mod: FS

## 2023-10-28 RX ORDER — ZOLPIDEM TARTRATE 10 MG/1
5 TABLET ORAL AT BEDTIME
Refills: 0 | Status: DISCONTINUED | OUTPATIENT
Start: 2023-10-28 | End: 2023-11-03

## 2023-10-28 RX ORDER — DIAZEPAM 5 MG
5 TABLET ORAL DAILY
Refills: 0 | Status: DISCONTINUED | OUTPATIENT
Start: 2023-10-28 | End: 2023-10-31

## 2023-10-28 RX ORDER — ENOXAPARIN SODIUM 100 MG/ML
40 INJECTION SUBCUTANEOUS EVERY 24 HOURS
Refills: 0 | Status: COMPLETED | OUTPATIENT
Start: 2023-10-28 | End: 2023-10-29

## 2023-10-28 RX ORDER — HYDROMORPHONE HYDROCHLORIDE 2 MG/ML
4 INJECTION INTRAMUSCULAR; INTRAVENOUS; SUBCUTANEOUS EVERY 4 HOURS
Refills: 0 | Status: DISCONTINUED | OUTPATIENT
Start: 2023-10-28 | End: 2023-10-28

## 2023-10-28 RX ORDER — ATORVASTATIN CALCIUM 80 MG/1
40 TABLET, FILM COATED ORAL AT BEDTIME
Refills: 0 | Status: DISCONTINUED | OUTPATIENT
Start: 2023-10-28 | End: 2023-11-03

## 2023-10-28 RX ORDER — SODIUM CHLORIDE 9 MG/ML
1000 INJECTION INTRAMUSCULAR; INTRAVENOUS; SUBCUTANEOUS
Refills: 0 | Status: DISCONTINUED | OUTPATIENT
Start: 2023-10-28 | End: 2023-10-28

## 2023-10-28 RX ORDER — POLYETHYLENE GLYCOL 3350 17 G/17G
17 POWDER, FOR SOLUTION ORAL
Refills: 0 | Status: DISCONTINUED | OUTPATIENT
Start: 2023-10-28 | End: 2023-11-03

## 2023-10-28 RX ORDER — CLONAZEPAM 1 MG
2 TABLET ORAL DAILY
Refills: 0 | Status: DISCONTINUED | OUTPATIENT
Start: 2023-10-28 | End: 2023-11-03

## 2023-10-28 RX ORDER — HYDROMORPHONE HYDROCHLORIDE 2 MG/ML
2 INJECTION INTRAMUSCULAR; INTRAVENOUS; SUBCUTANEOUS EVERY 4 HOURS
Refills: 0 | Status: DISCONTINUED | OUTPATIENT
Start: 2023-10-28 | End: 2023-10-28

## 2023-10-28 RX ORDER — OMEPRAZOLE 10 MG/1
1 CAPSULE, DELAYED RELEASE ORAL
Refills: 0 | DISCHARGE

## 2023-10-28 RX ORDER — SENNA PLUS 8.6 MG/1
2 TABLET ORAL AT BEDTIME
Refills: 0 | Status: DISCONTINUED | OUTPATIENT
Start: 2023-10-28 | End: 2023-11-03

## 2023-10-28 RX ORDER — ZOLPIDEM TARTRATE 10 MG/1
1 TABLET ORAL
Qty: 0 | Refills: 0 | DISCHARGE

## 2023-10-28 RX ORDER — TAMSULOSIN HYDROCHLORIDE 0.4 MG/1
0.4 CAPSULE ORAL AT BEDTIME
Refills: 0 | Status: DISCONTINUED | OUTPATIENT
Start: 2023-10-28 | End: 2023-11-03

## 2023-10-28 RX ORDER — MORPHINE SULFATE 50 MG/1
4 CAPSULE, EXTENDED RELEASE ORAL ONCE
Refills: 0 | Status: DISCONTINUED | OUTPATIENT
Start: 2023-10-28 | End: 2023-10-28

## 2023-10-28 RX ORDER — HYDROMORPHONE HYDROCHLORIDE 2 MG/ML
0.5 INJECTION INTRAMUSCULAR; INTRAVENOUS; SUBCUTANEOUS EVERY 4 HOURS
Refills: 0 | Status: DISCONTINUED | OUTPATIENT
Start: 2023-10-28 | End: 2023-11-03

## 2023-10-28 RX ORDER — POVIDONE-IODINE 5 %
1 AEROSOL (ML) TOPICAL ONCE
Refills: 0 | Status: COMPLETED | OUTPATIENT
Start: 2023-10-28 | End: 2023-10-30

## 2023-10-28 RX ORDER — CHLORHEXIDINE GLUCONATE 213 G/1000ML
1 SOLUTION TOPICAL
Refills: 0 | Status: COMPLETED | OUTPATIENT
Start: 2023-10-28 | End: 2023-11-01

## 2023-10-28 RX ORDER — OXYCODONE HYDROCHLORIDE 5 MG/1
10 TABLET ORAL EVERY 4 HOURS
Refills: 0 | Status: DISCONTINUED | OUTPATIENT
Start: 2023-10-28 | End: 2023-10-28

## 2023-10-28 RX ORDER — OXYCODONE HYDROCHLORIDE 5 MG/1
5 TABLET ORAL EVERY 4 HOURS
Refills: 0 | Status: DISCONTINUED | OUTPATIENT
Start: 2023-10-28 | End: 2023-10-28

## 2023-10-28 RX ORDER — DUTASTERIDE 0.5 MG/1
1 CAPSULE, LIQUID FILLED ORAL
Refills: 0 | DISCHARGE

## 2023-10-28 RX ORDER — OXYCODONE HYDROCHLORIDE 5 MG/1
10 TABLET ORAL EVERY 4 HOURS
Refills: 0 | Status: DISCONTINUED | OUTPATIENT
Start: 2023-10-28 | End: 2023-10-31

## 2023-10-28 RX ORDER — DULOXETINE HYDROCHLORIDE 30 MG/1
60 CAPSULE, DELAYED RELEASE ORAL DAILY
Refills: 0 | Status: DISCONTINUED | OUTPATIENT
Start: 2023-10-28 | End: 2023-11-01

## 2023-10-28 RX ORDER — OXYCODONE HYDROCHLORIDE 5 MG/1
5 TABLET ORAL EVERY 4 HOURS
Refills: 0 | Status: DISCONTINUED | OUTPATIENT
Start: 2023-10-28 | End: 2023-10-31

## 2023-10-28 RX ADMIN — ATORVASTATIN CALCIUM 40 MILLIGRAM(S): 80 TABLET, FILM COATED ORAL at 22:47

## 2023-10-28 RX ADMIN — OXYCODONE HYDROCHLORIDE 10 MILLIGRAM(S): 5 TABLET ORAL at 19:34

## 2023-10-28 RX ADMIN — ENOXAPARIN SODIUM 40 MILLIGRAM(S): 100 INJECTION SUBCUTANEOUS at 17:09

## 2023-10-28 RX ADMIN — MORPHINE SULFATE 4 MILLIGRAM(S): 50 CAPSULE, EXTENDED RELEASE ORAL at 09:01

## 2023-10-28 RX ADMIN — OXYCODONE HYDROCHLORIDE 10 MILLIGRAM(S): 5 TABLET ORAL at 23:47

## 2023-10-28 RX ADMIN — SENNA PLUS 2 TABLET(S): 8.6 TABLET ORAL at 22:49

## 2023-10-28 RX ADMIN — MORPHINE SULFATE 4 MILLIGRAM(S): 50 CAPSULE, EXTENDED RELEASE ORAL at 10:00

## 2023-10-28 RX ADMIN — TAMSULOSIN HYDROCHLORIDE 0.4 MILLIGRAM(S): 0.4 CAPSULE ORAL at 22:49

## 2023-10-28 RX ADMIN — OXYCODONE HYDROCHLORIDE 10 MILLIGRAM(S): 5 TABLET ORAL at 18:34

## 2023-10-28 NOTE — ED PROVIDER NOTE - OBJECTIVE STATEMENT
The pt is a 77 y/o M, BIBA, c/o L hip pain s/p fall this am -- states that his medicine cap fell on the floor and he was picking it up when lost balance and fell. Unable to weight bare since the fall. Pain is 9/10, constant, dull, non radiating, has not taken any pain meds. PMH osteoarthritis (r hip replacement), GERD, BPH, hx of bladder ca (was tx'd yrs ago). Denies dizziness prior to fall, head trauma, loc, blood thinners, cp, sob, n/v/d, abd pain, numbness or tingling to toes, any other injuries.

## 2023-10-28 NOTE — ED ADULT TRIAGE NOTE - CHIEF COMPLAINT QUOTE
Patient with PMH right hip replacement to ED s/p GLF this AM after tripping, landing on left hip. Reports right hip pain from previous fall earlier this week. No obvious shortening or rotation of LLE, patient endorses relief with bending leg. No other signs of trauma. AAOx4, NAD.

## 2023-10-28 NOTE — H&P ADULT - HISTORY OF PRESENT ILLNESS
Consult Note Adult-Orthopedics Resident [Charted Location: [facility] 04] [Authored: 28-Oct-2023 12:01]- for Visit: [id number], In Progress, Entered, Signed w/additional Signatures Pending, Available to Patient    Consult Note:    Provider Specialty:  Orthopedics.    Referral/Consultation:    Initial Consult:  · Requested by Name:	ED  · Date/Time:	28-Oct-2023 12:01  · Reason for Referral/Consultation:	L fnf      · Subjective and Objective:   Orthopedics Consult Note:    This is a 78y Male pmh bladder cancer (s/p tumor resection, s/p localized chemo and now in remission), GERD, L knee OA, chronic lymphedema, kyphoscoliosis s/p C-sacrum fusion at [facility] (10/5/2020, w/ Dr. Carlisle), depression, panic disorder, RUE DVT, BPH who presents to the ED today with c/o L left hip pain, LROM and inability to ambulate s/p fall today. Pt denies any other injuries. Pt denies head trauma or LOC. Pt denies any numbness, tingling or paresthesias. Pt denies any fever or chills. Pt is a community ambulator who uses a cane at baseline, walks approx 4-5 block a day, somewhat somnolent during exam. Pt denies any taking any anticoagulation medications.    Unwitnessed falls    Past Medical and Surgical History:  HIP FRACTURE LEFT    Handoff    MEWS Score    High cholesterol    Depression    GERD (gastroesophageal reflux disease)    Bladder cancer    Hip fracture, left    History of back surgery    FALL    90+    [email or web address]        Allergies:  sulfa drugs (Unknown)      Labs:  CBC ( 10-28-23 @ 09:25 )                   12.9  6.03 )-----------( 207                40.5      Chem ( 10-28-23 @ 09:25 )  136  |  101  |  35  ----------------------------<  93  3.9   |  26  |  0.85        PT/INR ( 10-28-23 @ 09:25 )   PT: 11.8;   INR: 1.04      PTT ( 10-28-23 @ 09:25 )  PTT: 33.7          Imaging:  X-rays of the pelvis, rightleft hip and femur demonstrate a displaced femoral neck hip fractrure.  R hip hemiarthroplasty slightvarus alignmnent, no subsidence     Vitals:  T(C): 36.9 (10-28-23 @ 11:40), Max: 36.9 (10-28-23 @ 11:40)  HR: 79 (10-28-23 @ 11:40) (75 - 79)  BP: 112/61 (10-28-23 @ 11:40) (112/61 - 125/76)  RR: 20 (10-28-23 @ 11:49) (18 - 20)  SpO2: 92% (10-28-23 @ 11:49) (92% - 98%)    Physical Exam:  General:  AAOx3, no acute distress, cachetic. Somnolent during exam appearing**//**well-nourished.  Musculoskeletal:  Left hip:  +mild swelling and ecchymosis, no erythema, skin intact, normothermic. +TTP over L hip. LROM limited 2/2 2' pain. Unable to SLR. + pain with axial loading or log roll. Moving ankle and all toes, +EHL/FHL/TA/GS. SILT throughout. DP and PT pulses 2+.  Shortened ER L side    Secondary Survey:  Right**//**left knee, right**//**left tib-fib, right**//**left ankle, RLE**//**LLE and B/L UEs with no swelling, no ecchymoses, no abrasions, no lacerations or any other signs of injury with full painless baseline ROM and no bony TTP. Able to SLR RLE. No pain with axial loading or log roll of RLE. Sensation intact distally, moving all digits. Distal pulses intact.      ribs with no bony TTP.   some pain with l wrist rom, some pain with palpation lumbar back    A   Consult Note Adult-Orthopedics Resident [Charted Location: [facility] 04] [Authored: 28-Oct-2023 12:01]- for Visit: [id number], In Progress, Entered, Signed w/additional Signatures Pending, Available to Patient    Consult Note:    Provider Specialty:  Orthopedics.    Referral/Consultation:    Initial Consult:  · Requested by Name:	ED  · Date/Time:	28-Oct-2023 12:01  · Reason for Referral/Consultation:	L fnf      · Subjective and Objective:   Orthopedics Consult Note:    This is a 78y Male pmh bladder cancer (s/p tumor resection, s/p localized chemo and now in remission), GERD, L knee OA, chronic lymphedema, kyphoscoliosis s/p C-sacrum fusion at [facility] (10/5/2020, w/ Dr. Carlisle), depression, panic disorder, RUE DVT, BPH who presents to the ED today with c/o L left hip pain, LROM and inability to ambulate s/p fall today. Pt denies any other injuries. Pt denies head trauma or LOC. Pt denies any numbness, tingling or paresthesias. Pt denies any fever or chills. Pt is a community ambulator who uses a cane at baseline, walks approx 4-5 block a day, somewhat somnolent during exam. Pt denies any taking any anticoagulation medications.    Unwitnessed falls    Past Medical and Surgical History:  HIP FRACTURE LEFT    Handoff    MEWS Score    High cholesterol    Depression    GERD (gastroesophageal reflux disease)    Bladder cancer    Hip fracture, left    History of back surgery    FALL    90+    [email or web address]        Allergies:  sulfa drugs (Unknown)      Labs:  CBC ( 10-28-23 @ 09:25 )                   12.9  6.03 )-----------( 207                40.5      Chem ( 10-28-23 @ 09:25 )  136  |  101  |  35  ----------------------------<  93  3.9   |  26  |  0.85        PT/INR ( 10-28-23 @ 09:25 )   PT: 11.8;   INR: 1.04      PTT ( 10-28-23 @ 09:25 )  PTT: 33.7          Imaging:  X-rays of the pelvis, rightleft hip and femur demonstrate a displaced femoral neck hip fractrure.  R hip hemiarthroplasty slight varus alignmnent, no subsidence   lumbar psf extends above imaging level with anterior sp l5/s1, sig osteolysis t11 with iliac screws  Ct head neg for epidural vs subdural hemorrhage  exostosis scaphoid wrist, diffuse degenerative changes including CMC arthritis   (wet read)      Vitals:  T(C): 36.9 (10-28-23 @ 11:40), Max: 36.9 (10-28-23 @ 11:40)  HR: 79 (10-28-23 @ 11:40) (75 - 79)  BP: 112/61 (10-28-23 @ 11:40) (112/61 - 125/76)  RR: 20 (10-28-23 @ 11:49) (18 - 20)  SpO2: 92% (10-28-23 @ 11:49) (92% - 98%)    Physical Exam:  General:  AAOx3, no acute distress, cachetic. Somnolent during exam appearing**//**well-nourished.  Musculoskeletal:  Left hip:  +mild swelling and ecchymosis, no erythema, skin intact, normothermic. +TTP over L hip. LROM limited 2/2 2' pain. Unable to SLR. + pain with axial loading or log roll. Moving ankle and all toes, +EHL/FHL/TA/GS. SILT throughout. DP and PT pulses 2+.  Shortened ER L side    Secondary Survey:  Right**//**left knee, right**//**left tib-fib, right**//**left ankle, RLE**//**LLE and B/L UEs with no swelling, no ecchymoses, no abrasions, no lacerations or any other signs of injury with full painless baseline ROM and no bony TTP. Able to SLR RLE. No pain with axial loading or log roll of RLE. Sensation intact distally, moving all digits. Distal pulses intact.      ribs with no bony TTP.   some pain with l wrist rom, some pain with palpation lumbar back    A

## 2023-10-28 NOTE — H&P ADULT - ASSESSMENT
Assessment:  78y Male pmh bladder cancer (s/p tumor resection, s/p localized chemo and now in remission), GERD, L knee OA, chronic lymphedema, kyphoscoliosis s/p C-sacrum fusion at [facility] (10/5/2020, w/ Dr. Carlisle), depression, panic disorder, RUE DVT, BPH with a left displaced femoral neck hip fracture s/p fall home today pending OR with Dr. Mcgovern, goal early this week    -Patient advised that surgical intervention for Left hip fracture with Hemiarthroplasty versus Total Hip Arthroplasty will be necessary.  -Pend CT head r/o epidural vs subdural hemorrhage  pend lumbar spine xray and wrist xray for 2/2 survey review  - Admit to Ortho.  - Medicine consult for medical optimization and clearance.  Please document medical clearance   - f/u labs/imaging.  - Complete bedrest.   - Pain control.  - Ice application.  - DVT prophylaxis. **with SCDs, lovenox 40 for 2 days         Case discussed with Dr. Mcgovern who agrees with plan.       Assessment:  78y Male pmh bladder cancer (s/p tumor resection, s/p localized chemo and now in remission), GERD, L knee OA, chronic lymphedema, kyphoscoliosis s/p C-sacrum fusion at [facility] (10/5/2020, w/ Dr. Carlisle), depression, panic disorder, RUE DVT, BPH with a left displaced femoral neck hip fracture s/p fall home today pending OR with Dr. Mcgovern, goal early this week    -Patient advised that surgical intervention for Left hip fracture with Hemiarthroplasty versus Total Hip Arthroplasty will be necessary.  -Pend CT head r/o epidural vs subdural hemorrhage  pend lumbar spine xray and wrist xray for 2/2 survey final reads  - Admit to Ortho.  - Medicine consult for medical optimization and clearance.  Please document medical clearance   - f/u labs/imaging.  - Complete bedrest.   - Pain control.  - Ice application.  - DVT prophylaxis. **with SCDs, lovenox 40 for 2 days         Case discussed with Dr. Mcgovern who agrees with plan.       Assessment:  78y Male pmh bladder cancer (s/p tumor resection, s/p localized chemo and now in remission), GERD, L knee OA, chronic lymphedema, kyphoscoliosis s/p C-sacrum fusion at [facility] (10/5/2020, w/ Dr. Carlisle), depression, panic disorder, RUE DVT, BPH with a left displaced femoral neck hip fracture s/p fall home today pending OR with Dr. Mcgovern, goal early this week    -Patient advised that surgical intervention for Left hip fracture with Hemiarthroplasty versus Total Hip Arthroplasty will be necessary.  -Pend CT head r/o epidural vs subdural hemorrhage  pend lumbar spine xray and wrist xray for 2/2 survey final reads  - Admit to Ortho.  - Medicine consult for medical optimization and clearance.  Please document medical clearance   - f/u labs/imaging.  - Complete bedrest.   - Pain control.  - Ice application.  - DVT prophylaxis. **with SCDs, lovenox 40 for 2 days   thumb spica with nonurgent outpatient MR L hand in setting CMC arthritis        Case discussed with Dr. Mcgovern who agrees with plan.       Assessment:  78y Male pmh bladder cancer (s/p tumor resection, s/p localized chemo and now in remission), GERD, L knee OA, chronic lymphedema, kyphoscoliosis s/p C-sacrum fusion at [facility] (10/5/2020, w/ Dr. Carlisle), depression, panic disorder, RUE DVT, BPH with a left displaced femoral neck hip fracture s/p fall home today pending OR with Dr. Mcgovern, goal early this week    -Patient advised that surgical intervention for Left hip fracture with Hemiarthroplasty versus Total Hip Arthroplasty will be necessary.  -Pend CT head r/o epidural vs subdural hemorrhage  pend lumbar spine xray and wrist xray for 2/2 survey final reads  - Admit to Ortho.  - Medicine consult for medical optimization and clearance.  Please document medical clearance   - f/u labs/imaging.  - Complete bedrest.   - Pain control.  - Ice application.  - DVT prophylaxis. **with SCDs, lovenox 40 for 2 days   thumb spica with nonurgent outpatient MR [place] wrist         Case discussed with Dr. Mcgovern who agrees with plan.

## 2023-10-28 NOTE — PATIENT PROFILE ADULT - FALL HARM RISK - HARM RISK INTERVENTIONS
Assistance with ambulation/Assistance OOB with selected safe patient handling equipment/Communicate Risk of Fall with Harm to all staff/Discuss with provider need for PT consult/Monitor gait and stability/Provide patient with walking aids - walker, cane, crutches/Reinforce activity limits and safety measures with patient and family/Review medications for side effects contributing to fall risk/Sit up slowly, dangle for a short time, stand at bedside before walking/Tailored Fall Risk Interventions/Toileting schedule using arm’s reach rule for commode and bathroom/Use of alarms - bed, chair and/or voice tab/Visual Cue: Yellow wristband and red socks/Bed in lowest position, wheels locked, appropriate side rails in place/Call bell, personal items and telephone in reach/Instruct patient to call for assistance before getting out of bed or chair/Non-slip footwear when patient is out of bed/[place] to call system/Physically safe environment - no spills, clutter or unnecessary equipment/Purposeful Proactive Rounding/Room/bathroom lighting operational, light cord in reach

## 2023-10-28 NOTE — CONSULT NOTE ADULT - ASSESSMENT
78y Male pmh bladder cancer (s/p tumor resection, s/p localized chemo and now in remission), GERD, L knee OA, chronic lymphedema, kyphoscoliosis s/p C-sacrum fusion at [facility] (10/5/2020, w/ Dr. Carlisle), depression, panic disorder, remote hx DVT not on AC, BPH admitted after mechanical fall resulting in left hip fx. Patient developed hypoxemia in ED. For OR on Monday    #pre-op eval  difficult to obtain thorough history today 2/2  somnolence  given new onset hypoxemia with unclear etiology, would recommend further work-up prior to OR  will re-interview tomorrow for further risk stratification, per ortho documentation METs are sufficient, no hx cardiac disease  will confirm tomorrow hx of anesthesia problems, hx bleeding/clotting disorders, and hopefully more information on remote DVT -- documented as RUE but patient said in leg  EKG to be reviewed  RCRI 0, pending EKG review    #hypoxemia  unclear etiology  possibly 2/2 opioids - please use with caution  no infectious signs/symptoms  CXR reviewed without clear abnormality -- some RLL crackles on exam not corresponding to a finding on imaging  not tachycardic (and not on BB) to suggest PE  wean O2 as able  if hypoxemia persists without clear etiology would consider CT chest   recommend continuous O2 monitoring    #hx DVT  unclear history, not on AC    #hx depression/anxiety/insomnia  c/w home meds --- need thorough med rec (appears to be on duloxetine and abilify?)  per  patient on klonopin 2mg bid as well as diazepam 5mg daily (prn??) and ambien 10mg daily (prn??)  given somnolence would hold sedating meds however monitor closely for e/o benzo withdrawal    #hx chronic pain  per  on dilaudid PO 4mg q4 hours    #BPH  c/w flomax    #use of prednisone --- clarify reason/ if still using -- appears to be on 5mg daily    DVT ppx: lovenox

## 2023-10-28 NOTE — H&P ADULT - PROBLEM SELECTOR PLAN 5
H&P reviewed. The patient was examined and there are no changes to the H&P.        
continue home regiment

## 2023-10-28 NOTE — ED PROVIDER NOTE - NS ED ATTENDING STATEMENT MOD
This was a shared visit with the ROGER. I reviewed and verified the documentation and independently performed the documented:

## 2023-10-28 NOTE — H&P ADULT - PROBLEM SELECTOR PLAN 3
patient has been in remission as per chart review, will followup with pharmacy in terms of active meds patient

## 2023-10-28 NOTE — ED ADULT NURSE REASSESSMENT NOTE - NS ED NURSE REASSESS COMMENT FT1
PT noted to be hypoxic at 82% o9n RA. Ortho team paged. Dr. Austin resident MD at bedside. Pt switched to non rebreather at 5lpm sat increased to 92%. Assessment ongoing.

## 2023-10-28 NOTE — ED PROVIDER NOTE - ATTENDING APP SHARED VISIT CONTRIBUTION OF CARE
The pt is a 77 y/o M, BIBA, c/o L hip pain s/p fall this am -- states that his medicine cap fell on the floor and he was picking it up when lost balance and fell. Unable to weight bare since the fall, ttp on exam, will obtain imaging, labs, reassess.

## 2023-10-28 NOTE — PATIENT PROFILE ADULT - NSPROGENOTHERPROVIDER_GEN_A_NUR
[facility] comes to my home/home care/rehabilitation therapy [facility] comes to my home/home care/mental health services/rehabilitation therapy

## 2023-10-28 NOTE — ED ADULT NURSE NOTE - NSFALLHARMRISKINTERV_ED_ALL_ED

## 2023-10-28 NOTE — ED PROVIDER NOTE - CLINICAL SUMMARY MEDICAL DECISION MAKING FREE TEXT BOX
pt s/p mechanical fall - landed on L hip, no head strike or dizziness prior to fall, exam consistent w/L hip fx -- pre op labs sent, pain controlled, xrays None pt s/p mechanical fall - landed on L hip, no head strike or dizziness prior to fall, exam consistent w/L hip fx -- pre op labs sent, pain controlled, xrays + femoral neck fx hence ortho consulted - will admit for ORIF

## 2023-10-28 NOTE — PATIENT PROFILE ADULT - FUNCTIONAL ASSESSMENT - BASIC MOBILITY 6.
1-calculated by average/Not able to assess (calculate score using [facility] averaging method)

## 2023-10-28 NOTE — ED ADULT NURSE NOTE - OBJECTIVE STATEMENT
78 M / bibems from home s/o GLF  endorsing right hip pain  and no weight bearing . No noted bruising / shortening . Patient has hx of left hip replacement  last July, denies HS/LOC/AC . Patient  is alert and oriented x 4, NAD, VSS.

## 2023-10-28 NOTE — PATIENT PROFILE ADULT - NSPROIMPLANTSMEDDEV_GEN_A_NUR
complains of pain/discomfort
5 titanium rods and screws in back  Right hip replacement/Artificial joint

## 2023-10-28 NOTE — ED PROVIDER NOTE - MUSCULOSKELETAL, MLM
Spine appears normal, range of motion is not limited, no muscle or joint tenderness; L LE + shortened, + externally rotated, unable to straight leg raise, + light touch, pedal pulse 2+, well perfused, no knee or ankle tend, no pelvic instability

## 2023-10-28 NOTE — ED PROVIDER NOTE - DATE/TIME 1
Assessment/Plan:  Debbie Ding is a 89 y.o. female with history of DVT/bilateral PE (on Xarelto), HTN, HLD, DM2, asthma, JASE (not on CPAP), who presents for a follow up appointment.    1. SOB- Etiology unclear.  Now improved.  Likely related to pathology inherent to the underlying lung parenchyma  based of current workup.  CTA Chest on 7/25/2022 revealed no CT evidence of pulmonary embolus to the subsegmental branches.  There is dependent small airways disease, consider aspiration versus noninfectious small airways disease; findings appears stable since prior exam.  Mild scarring within the right middle lobe and lingula, possibly related to previous atypical infection.   A Baker's cyst measuring 4.55 cm x 0.92 cm was seen in the right extremity.  Nuclear Stress Test on 8/11/2022 demonstrated normal myocardial perfusion with no evidence of myocardial ischemia or infarction.  Pt referred to Pulmonary for evaluation.      2. History of RLE DVT/Bilateral PE- CTA Chest on 7/25/2022 revealed no CT evidence of pulmonary embolus to the subsegmental branches.  BLE Venous Ultrasound on 7/29/2022 revealed no evidence of a right or left lower extremity DVT. Continue Xarelto 10 mg daily.    3. HTN- Continue current medications.    4. HLD- LDL 64 on 4/18/2023.  Continue atorvastatin 40 mg daily.    5. JASE- Encourage CPAP usage.     Follow up in 6 months   
28-Oct-2023 10:50

## 2023-10-28 NOTE — ED PROVIDER NOTE - ENMT, MLM
Airway patent, Nasal mucosa clear. Mouth with normal mucosa. Throat has no vesicles, no oropharyngeal exudates and uvula is midline. no step offs, no mclaughlin signs b/l, no raccoon eyes b/l
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
## 2023-10-28 NOTE — PATIENT PROFILE ADULT - STATED REASON FOR ADMISSION
I fell and broke my hip I fell and broke my hip. I have life alert and was on the floor and couldn't get up.

## 2023-10-29 ENCOUNTER — TRANSCRIPTION ENCOUNTER (OUTPATIENT)
Age: 78
End: 2023-10-29

## 2023-10-29 LAB
24R-OH-CALCIDIOL SERPL-MCNC: 39.4 NG/ML — SIGNIFICANT CHANGE UP (ref 30–80)
24R-OH-CALCIDIOL SERPL-MCNC: 39.4 NG/ML — SIGNIFICANT CHANGE UP (ref 30–80)
ALBUMIN SERPL ELPH-MCNC: 3.4 G/DL — SIGNIFICANT CHANGE UP (ref 3.3–5)
ALBUMIN SERPL ELPH-MCNC: 3.4 G/DL — SIGNIFICANT CHANGE UP (ref 3.3–5)
ALP SERPL-CCNC: 79 U/L — SIGNIFICANT CHANGE UP (ref 40–120)
ALP SERPL-CCNC: 79 U/L — SIGNIFICANT CHANGE UP (ref 40–120)
ALT FLD-CCNC: 17 U/L — SIGNIFICANT CHANGE UP (ref 10–45)
ALT FLD-CCNC: 17 U/L — SIGNIFICANT CHANGE UP (ref 10–45)
ANION GAP SERPL CALC-SCNC: 9 MMOL/L — SIGNIFICANT CHANGE UP (ref 5–17)
ANION GAP SERPL CALC-SCNC: 9 MMOL/L — SIGNIFICANT CHANGE UP (ref 5–17)
AST SERPL-CCNC: 23 U/L — SIGNIFICANT CHANGE UP (ref 10–40)
AST SERPL-CCNC: 23 U/L — SIGNIFICANT CHANGE UP (ref 10–40)
BASOPHILS # BLD AUTO: 0.04 K/UL — SIGNIFICANT CHANGE UP (ref 0–0.2)
BASOPHILS # BLD AUTO: 0.04 K/UL — SIGNIFICANT CHANGE UP (ref 0–0.2)
BASOPHILS NFR BLD AUTO: 0.5 % — SIGNIFICANT CHANGE UP (ref 0–2)
BASOPHILS NFR BLD AUTO: 0.5 % — SIGNIFICANT CHANGE UP (ref 0–2)
BILIRUB SERPL-MCNC: 0.6 MG/DL — SIGNIFICANT CHANGE UP (ref 0.2–1.2)
BILIRUB SERPL-MCNC: 0.6 MG/DL — SIGNIFICANT CHANGE UP (ref 0.2–1.2)
BLD GP AB SCN SERPL QL: NEGATIVE — SIGNIFICANT CHANGE UP
BLD GP AB SCN SERPL QL: NEGATIVE — SIGNIFICANT CHANGE UP
BUN SERPL-MCNC: 23 MG/DL — SIGNIFICANT CHANGE UP (ref 7–23)
BUN SERPL-MCNC: 23 MG/DL — SIGNIFICANT CHANGE UP (ref 7–23)
CALCIUM SERPL-MCNC: 8.6 MG/DL — SIGNIFICANT CHANGE UP (ref 8.4–10.5)
CALCIUM SERPL-MCNC: 8.6 MG/DL — SIGNIFICANT CHANGE UP (ref 8.4–10.5)
CALCIUM SERPL-MCNC: 9 MG/DL — SIGNIFICANT CHANGE UP (ref 8.4–10.5)
CALCIUM SERPL-MCNC: 9 MG/DL — SIGNIFICANT CHANGE UP (ref 8.4–10.5)
CHLORIDE SERPL-SCNC: 106 MMOL/L — SIGNIFICANT CHANGE UP (ref 96–108)
CHLORIDE SERPL-SCNC: 106 MMOL/L — SIGNIFICANT CHANGE UP (ref 96–108)
CO2 SERPL-SCNC: 25 MMOL/L — SIGNIFICANT CHANGE UP (ref 22–31)
CO2 SERPL-SCNC: 25 MMOL/L — SIGNIFICANT CHANGE UP (ref 22–31)
CREAT SERPL-MCNC: 0.74 MG/DL — SIGNIFICANT CHANGE UP (ref 0.5–1.3)
CREAT SERPL-MCNC: 0.74 MG/DL — SIGNIFICANT CHANGE UP (ref 0.5–1.3)
EGFR: 93 ML/MIN/1.73M2 — SIGNIFICANT CHANGE UP
EGFR: 93 ML/MIN/1.73M2 — SIGNIFICANT CHANGE UP
EOSINOPHIL # BLD AUTO: 0.38 K/UL — SIGNIFICANT CHANGE UP (ref 0–0.5)
EOSINOPHIL # BLD AUTO: 0.38 K/UL — SIGNIFICANT CHANGE UP (ref 0–0.5)
EOSINOPHIL NFR BLD AUTO: 4.9 % — SIGNIFICANT CHANGE UP (ref 0–6)
EOSINOPHIL NFR BLD AUTO: 4.9 % — SIGNIFICANT CHANGE UP (ref 0–6)
GLUCOSE SERPL-MCNC: 116 MG/DL — HIGH (ref 70–99)
GLUCOSE SERPL-MCNC: 116 MG/DL — HIGH (ref 70–99)
HCT VFR BLD CALC: 40.4 % — SIGNIFICANT CHANGE UP (ref 39–50)
HCT VFR BLD CALC: 40.4 % — SIGNIFICANT CHANGE UP (ref 39–50)
HGB BLD-MCNC: 13.1 G/DL — SIGNIFICANT CHANGE UP (ref 13–17)
HGB BLD-MCNC: 13.1 G/DL — SIGNIFICANT CHANGE UP (ref 13–17)
IMM GRANULOCYTES NFR BLD AUTO: 0.5 % — SIGNIFICANT CHANGE UP (ref 0–0.9)
IMM GRANULOCYTES NFR BLD AUTO: 0.5 % — SIGNIFICANT CHANGE UP (ref 0–0.9)
LYMPHOCYTES # BLD AUTO: 1.37 K/UL — SIGNIFICANT CHANGE UP (ref 1–3.3)
LYMPHOCYTES # BLD AUTO: 1.37 K/UL — SIGNIFICANT CHANGE UP (ref 1–3.3)
LYMPHOCYTES # BLD AUTO: 17.8 % — SIGNIFICANT CHANGE UP (ref 13–44)
LYMPHOCYTES # BLD AUTO: 17.8 % — SIGNIFICANT CHANGE UP (ref 13–44)
MCHC RBC-ENTMCNC: 29.6 PG — SIGNIFICANT CHANGE UP (ref 27–34)
MCHC RBC-ENTMCNC: 29.6 PG — SIGNIFICANT CHANGE UP (ref 27–34)
MCHC RBC-ENTMCNC: 32.4 GM/DL — SIGNIFICANT CHANGE UP (ref 32–36)
MCHC RBC-ENTMCNC: 32.4 GM/DL — SIGNIFICANT CHANGE UP (ref 32–36)
MCV RBC AUTO: 91.4 FL — SIGNIFICANT CHANGE UP (ref 80–100)
MCV RBC AUTO: 91.4 FL — SIGNIFICANT CHANGE UP (ref 80–100)
MONOCYTES # BLD AUTO: 0.49 K/UL — SIGNIFICANT CHANGE UP (ref 0–0.9)
MONOCYTES # BLD AUTO: 0.49 K/UL — SIGNIFICANT CHANGE UP (ref 0–0.9)
MONOCYTES NFR BLD AUTO: 6.4 % — SIGNIFICANT CHANGE UP (ref 2–14)
MONOCYTES NFR BLD AUTO: 6.4 % — SIGNIFICANT CHANGE UP (ref 2–14)
NEUTROPHILS # BLD AUTO: 5.37 K/UL — SIGNIFICANT CHANGE UP (ref 1.8–7.4)
NEUTROPHILS # BLD AUTO: 5.37 K/UL — SIGNIFICANT CHANGE UP (ref 1.8–7.4)
NEUTROPHILS NFR BLD AUTO: 69.9 % — SIGNIFICANT CHANGE UP (ref 43–77)
NEUTROPHILS NFR BLD AUTO: 69.9 % — SIGNIFICANT CHANGE UP (ref 43–77)
NRBC # BLD: 0 /100 WBCS — SIGNIFICANT CHANGE UP (ref 0–0)
NRBC # BLD: 0 /100 WBCS — SIGNIFICANT CHANGE UP (ref 0–0)
PLATELET # BLD AUTO: 188 K/UL — SIGNIFICANT CHANGE UP (ref 150–400)
PLATELET # BLD AUTO: 188 K/UL — SIGNIFICANT CHANGE UP (ref 150–400)
POTASSIUM SERPL-MCNC: 3.6 MMOL/L — SIGNIFICANT CHANGE UP (ref 3.5–5.3)
POTASSIUM SERPL-MCNC: 3.6 MMOL/L — SIGNIFICANT CHANGE UP (ref 3.5–5.3)
POTASSIUM SERPL-SCNC: 3.6 MMOL/L — SIGNIFICANT CHANGE UP (ref 3.5–5.3)
POTASSIUM SERPL-SCNC: 3.6 MMOL/L — SIGNIFICANT CHANGE UP (ref 3.5–5.3)
PROT SERPL-MCNC: 6.3 G/DL — SIGNIFICANT CHANGE UP (ref 6–8.3)
PROT SERPL-MCNC: 6.3 G/DL — SIGNIFICANT CHANGE UP (ref 6–8.3)
PTH-INTACT FLD-MCNC: 21 PG/ML — SIGNIFICANT CHANGE UP (ref 15–65)
PTH-INTACT FLD-MCNC: 21 PG/ML — SIGNIFICANT CHANGE UP (ref 15–65)
RBC # BLD: 4.42 M/UL — SIGNIFICANT CHANGE UP (ref 4.2–5.8)
RBC # BLD: 4.42 M/UL — SIGNIFICANT CHANGE UP (ref 4.2–5.8)
RBC # FLD: 15.9 % — HIGH (ref 10.3–14.5)
RBC # FLD: 15.9 % — HIGH (ref 10.3–14.5)
RH IG SCN BLD-IMP: POSITIVE — SIGNIFICANT CHANGE UP
RH IG SCN BLD-IMP: POSITIVE — SIGNIFICANT CHANGE UP
SODIUM SERPL-SCNC: 140 MMOL/L — SIGNIFICANT CHANGE UP (ref 135–145)
SODIUM SERPL-SCNC: 140 MMOL/L — SIGNIFICANT CHANGE UP (ref 135–145)
T3 SERPL-MCNC: 127 NG/DL — SIGNIFICANT CHANGE UP (ref 80–200)
T3 SERPL-MCNC: 127 NG/DL — SIGNIFICANT CHANGE UP (ref 80–200)
WBC # BLD: 7.69 K/UL — SIGNIFICANT CHANGE UP (ref 3.8–10.5)
WBC # BLD: 7.69 K/UL — SIGNIFICANT CHANGE UP (ref 3.8–10.5)
WBC # FLD AUTO: 7.69 K/UL — SIGNIFICANT CHANGE UP (ref 3.8–10.5)
WBC # FLD AUTO: 7.69 K/UL — SIGNIFICANT CHANGE UP (ref 3.8–10.5)

## 2023-10-29 PROCEDURE — 99233 SBSQ HOSP IP/OBS HIGH 50: CPT

## 2023-10-29 PROCEDURE — 93970 EXTREMITY STUDY: CPT | Mod: 26,59

## 2023-10-29 RX ORDER — SODIUM CHLORIDE 9 MG/ML
1000 INJECTION, SOLUTION INTRAVENOUS
Refills: 0 | Status: DISCONTINUED | OUTPATIENT
Start: 2023-10-29 | End: 2023-10-30

## 2023-10-29 RX ADMIN — OXYCODONE HYDROCHLORIDE 10 MILLIGRAM(S): 5 TABLET ORAL at 23:23

## 2023-10-29 RX ADMIN — ENOXAPARIN SODIUM 40 MILLIGRAM(S): 100 INJECTION SUBCUTANEOUS at 17:02

## 2023-10-29 RX ADMIN — OXYCODONE HYDROCHLORIDE 10 MILLIGRAM(S): 5 TABLET ORAL at 06:43

## 2023-10-29 RX ADMIN — OXYCODONE HYDROCHLORIDE 10 MILLIGRAM(S): 5 TABLET ORAL at 00:47

## 2023-10-29 RX ADMIN — OXYCODONE HYDROCHLORIDE 10 MILLIGRAM(S): 5 TABLET ORAL at 19:10

## 2023-10-29 RX ADMIN — OXYCODONE HYDROCHLORIDE 10 MILLIGRAM(S): 5 TABLET ORAL at 19:45

## 2023-10-29 RX ADMIN — POLYETHYLENE GLYCOL 3350 17 GRAM(S): 17 POWDER, FOR SOLUTION ORAL at 05:43

## 2023-10-29 RX ADMIN — TAMSULOSIN HYDROCHLORIDE 0.4 MILLIGRAM(S): 0.4 CAPSULE ORAL at 22:00

## 2023-10-29 RX ADMIN — ATORVASTATIN CALCIUM 40 MILLIGRAM(S): 80 TABLET, FILM COATED ORAL at 21:59

## 2023-10-29 RX ADMIN — DULOXETINE HYDROCHLORIDE 60 MILLIGRAM(S): 30 CAPSULE, DELAYED RELEASE ORAL at 13:06

## 2023-10-29 RX ADMIN — CHLORHEXIDINE GLUCONATE 1 APPLICATION(S): 213 SOLUTION TOPICAL at 05:48

## 2023-10-29 RX ADMIN — OXYCODONE HYDROCHLORIDE 10 MILLIGRAM(S): 5 TABLET ORAL at 15:03

## 2023-10-29 RX ADMIN — OXYCODONE HYDROCHLORIDE 10 MILLIGRAM(S): 5 TABLET ORAL at 15:49

## 2023-10-29 RX ADMIN — OXYCODONE HYDROCHLORIDE 10 MILLIGRAM(S): 5 TABLET ORAL at 10:30

## 2023-10-29 RX ADMIN — Medication 1 TABLET(S): at 13:06

## 2023-10-29 RX ADMIN — POLYETHYLENE GLYCOL 3350 17 GRAM(S): 17 POWDER, FOR SOLUTION ORAL at 17:02

## 2023-10-29 RX ADMIN — OXYCODONE HYDROCHLORIDE 10 MILLIGRAM(S): 5 TABLET ORAL at 05:43

## 2023-10-29 RX ADMIN — OXYCODONE HYDROCHLORIDE 10 MILLIGRAM(S): 5 TABLET ORAL at 09:52

## 2023-10-29 NOTE — PROGRESS NOTE ADULT - SUBJECTIVE AND OBJECTIVE BOX
Ortho Note    Pt comfortable without complaints, pain controlled  Denies CP, SOB, N/V, new numbness/tingling     Vital Signs Last 24 Hrs  T(C): 37.3 (10-29-23 @ 05:54), Max: 37.3 (10-29-23 @ 05:54)  T(F): 99.1 (10-29-23 @ 05:54), Max: 99.1 (10-29-23 @ 05:54)  HR: 87 (10-29-23 @ 05:54) (87 - 87)  BP: 108/71 (10-29-23 @ 05:54) (108/71 - 108/71)  BP(mean): --  RR: 18 (10-29-23 @ 05:54) (18 - 18)  SpO2: 98% (10-29-23 @ 05:54) (98% - 98%)  I&O's Summary    28 Oct 2023 07:01  -  29 Oct 2023 07:00  --------------------------------------------------------  IN: 0 mL / OUT: 1450 mL / NET: -1450 mL        General: Pt Alert and oriented, NAD  B/L LE: sensation intact, DP 2+, brisk cap refill, EHL/FHL/TA/GS 5/5  Shortened, ER l side                          12.9   6.03  )-----------( 207      ( 28 Oct 2023 09:25 )             40.5     10-28    136  |  101  |  35<H>  ----------------------------<  93  3.9   |  26  |  0.85    Ca    9.0      28 Oct 2023 09:25  Phos  4.0     10-28        78y Male pmh bladder cancer (s/p tumor resection, s/p localized chemo and now in remission), GERD, L knee OA, chronic lymphedema, kyphoscoliosis s/p C-sacrum fusion at [facility] (10/5/2020, w/ Dr. Carlisle), depression, panic disorder, RUE DVT, BPH with L FNF pending OR for david vs total hip  - Stable  - Pain Control  - DVT ppx: lovenox 40  - PT, WBS: bedrest strict  please document medical clearance  preop for OR  -pend MR wrist   - Dispo: pend OR      Ortho Pager [phone]

## 2023-10-29 NOTE — PROGRESS NOTE ADULT - ASSESSMENT
78y Male pmh bladder cancer (s/p tumor resection, s/p localized chemo and now in remission), GERD, L knee OA, chronic lymphedema, kyphoscoliosis s/p C-sacrum fusion at [facility] (10/5/2020, w/ Dr. Carlisle), depression, panic disorder, remote hx DVT not on AC, BPH admitted after mechanical fall resulting in left hip fx. Patient developed hypoxemia in ED. For OR on Monday    #pre-op eval  difficult to obtain thorough history today 2/2  somnolence  given new onset hypoxemia with unclear etiology, would recommend further work-up prior to OR  will re-interview tomorrow for further risk stratification, per ortho documentation METs are sufficient, no hx cardiac disease  will confirm tomorrow hx of anesthesia problems, hx bleeding/clotting disorders, and hopefully more information on remote DVT -- documented as RUE but patient said in leg  EKG to be reviewed  RCRI 0, pending EKG review    #hypoxemia  unclear etiology  possibly 2/2 opioids - please use with caution  no infectious signs/symptoms  CXR reviewed without clear abnormality -- some RLL crackles on exam not corresponding to a finding on imaging  not tachycardic (and not on BB) to suggest PE  wean O2 as able  if hypoxemia persists without clear etiology would consider CT chest   recommend continuous O2 monitoring    #hx DVT  unclear history, not on AC    #hx depression/anxiety/insomnia  c/w home meds --- need thorough med rec (appears to be on duloxetine and abilify?)  per  patient on klonopin 2mg bid as well as diazepam 5mg daily (prn??) and ambien 10mg daily (prn??)  given somnolence would hold sedating meds however monitor closely for e/o benzo withdrawal    #hx chronic pain  per  on dilaudid PO 4mg q4 hours    #BPH  c/w flomax    #use of prednisone --- clarify reason/ if still using -- appears to be on 5mg daily    DVT ppx: lovenox       78y Male pmh bladder cancer (s/p tumor resection, s/p localized chemo and now in remission), GERD, L knee OA, chronic lymphedema, kyphoscoliosis s/p C-sacrum fusion at [facility] (10/5/2020, w/ Dr. Carlisle), right hip repair July 2023, depression, panic disorder, remote hx DVT not on AC, BPH admitted after mechanical fall resulting in left hip fx. Patient developed hypoxemia in ED. For OR on Monday    #pre-op eval  no personal or family history of intolerance to anesthesia or bleeding/clotting disorder  denies hx of pulmonary or cardiac disease  can do 2 flights of stairs without CP/SOB  RCRI 1 due to pathologic Q wave in V1  due to persistent hypoxemia without clear etiology recommend CT chest to rule out PE  if CT unrevealing and O2 requirements remain minimal patient considered optimized for surgery and would consider patient low risk for intermediate risk procedure  if O2 requirements increase or significant/abnormal findings on CT, please note the above statement no longer applies    #hypoxemia  unclear etiology  no infectious signs/symptoms  CXR clear  pending CT to r/o PE  wean O2 as able    #hx DVT  LE DVT, provoked 2/2 back surgery  no longer on AC as not indicated    #hx depression/anxiety/insomnia  c/w home meds  per  patient on klonopin 2mg bid as well as diazepam 5mg daily  and ambien 10mg daily  restart with caution given somnolence yesterday in ED, caution mixing with IV opiates    #hx chronic pain  per  on dilaudid PO 4mg q4 hours    #BPH  c/w flomax    #daily prednisone use -- ??    DVT ppx: lovenox       78y Male pmh bladder cancer (s/p tumor resection, s/p localized chemo and now in remission), GERD, L knee OA, chronic lymphedema, kyphoscoliosis s/p C-sacrum fusion at [facility] (10/5/2020, w/ Dr. Carlisle), right hip repair July 2023, depression, panic disorder, remote hx DVT not on AC, BPH admitted after mechanical fall resulting in left hip fx. Patient developed hypoxemia in ED. For OR on Monday    #pre-op eval  no personal or family history of intolerance to anesthesia or bleeding/clotting disorder  denies hx of pulmonary or cardiac disease  can do 2 flights of stairs without CP/SOB  RCRI 1 due to pathologic Q wave in V1  due to persistent hypoxemia without clear etiology recommend CT chest to rule out PE  if CT unrevealing and O2 requirements remain minimal patient considered optimized for surgery and would consider patient low risk for intermediate risk procedure  if O2 requirements increase or significant/abnormal findings on CT, please note the above statement no longer applies    #hypoxemia  unclear etiology  no infectious signs/symptoms  CXR clear  pending CT to r/o PE  wean O2 as able    #hx DVT  LE DVT, provoked 2/2 back surgery  no longer on AC as not indicated    #hx depression/anxiety/insomnia  c/w home meds  per  patient on klonopin 2mg bid as well as diazepam 5mg daily  and ambien 10mg daily  restart with caution given somnolence yesterday in ED, caution mixing with IV opiates    #hx chronic pain  per  on dilaudid PO 4mg q4 hours    #BPH  c/w flomax    #LE rash  no obvious rash on exam and patient unable to identify specific diagnosis  says he takes prednisone 5mg daily for this condition  continue prednisone, obtain collateral if possible    DVT ppx: lovenox

## 2023-10-29 NOTE — PROGRESS NOTE ADULT - SUBJECTIVE AND OBJECTIVE BOX
OVERNIGHT EVENTS:    SUBJECTIVE / INTERVAL HPI: Patient seen and examined at bedside.     VITAL SIGNS:  Vital Signs Last 24 Hrs  T(C): 37.3 (29 Oct 2023 05:54), Max: 37.3 (29 Oct 2023 05:54)  T(F): 99.1 (29 Oct 2023 05:54), Max: 99.1 (29 Oct 2023 05:54)  HR: 87 (29 Oct 2023 05:54) (69 - 87)  BP: 108/71 (29 Oct 2023 05:54) (108/71 - 130/77)  BP(mean): 96 (28 Oct 2023 13:15) (96 - 96)  RR: 18 (29 Oct 2023 05:54) (18 - 20)  SpO2: 98% (29 Oct 2023 05:54) (92% - 98%)    Parameters below as of 29 Oct 2023 05:54  Patient On (Oxygen Delivery Method): room air        10-28-23 @ 07:01  -  10-29-23 @ 07:00  --------------------------------------------------------  IN: 0 mL / OUT: 1450 mL / NET: -1450 mL        PHYSICAL EXAM:    General: WDWN  HEENT: NC/AT; PERRL, anicteric sclera; MMM  Neck: supple  Cardiovascular: +S1/S2; RRR  Respiratory: CTA B/L; no W/R/R  Gastrointestinal: soft, NT/ND; +BSx4  Extremities: WWP; no edema, clubbing or cyanosis  Vascular: 2+ radial, DP/PT pulses B/L  Neurological: AAOx3; no focal deficits    MEDICATIONS:  MEDICATIONS  (STANDING):  atorvastatin 40 milliGRAM(s) Oral at bedtime  chlorhexidine 2% Cloths 1 Application(s) Topical <User Schedule>  DULoxetine 60 milliGRAM(s) Oral daily  enoxaparin Injectable 40 milliGRAM(s) SubCutaneous every 24 hours  multivitamin 1 Tablet(s) Oral daily  polyethylene glycol 3350 17 Gram(s) Oral two times a day  povidone iodine 5% Nasal Swab 1 Application(s) Both Nostrils once  senna 2 Tablet(s) Oral at bedtime  tamsulosin 0.4 milliGRAM(s) Oral at bedtime    MEDICATIONS  (PRN):  clonazePAM  Tablet 2 milliGRAM(s) Oral daily PRN anxiety  diazepam    Tablet 5 milliGRAM(s) Oral daily PRN for anxiety  HYDROmorphone  Injectable 0.5 milliGRAM(s) IV Push every 4 hours PRN breakthrough pain  oxyCODONE    IR 10 milliGRAM(s) Oral every 4 hours PRN Severe Pain (7 - 10)  oxyCODONE    IR 5 milliGRAM(s) Oral every 4 hours PRN Moderate Pain (4 - 6)  zolpidem 5 milliGRAM(s) Oral at bedtime PRN Insomnia  zolpidem 5 milliGRAM(s) Oral at bedtime PRN Insomnia      ALLERGIES:  Allergies    sulfa drugs (Unknown)    Intolerances        LABS:                        13.1   7.69  )-----------( 188      ( 29 Oct 2023 08:02 )             40.4     10-29    140  |  106  |  23  ----------------------------<  116<H>  3.6   |  25  |  0.74    Ca    8.6      29 Oct 2023 08:02  Phos  4.0     10-28    TPro  6.3  /  Alb  3.4  /  TBili  0.6  /  DBili  x   /  AST  23  /  ALT  17  /  AlkPhos  79  10-29    PT/INR - ( 28 Oct 2023 09:25 )   PT: 11.8 sec;   INR: 1.04          PTT - ( 28 Oct 2023 09:25 )  PTT:33.7 sec  Urinalysis Basic - ( 29 Oct 2023 08:02 )    Color: x / Appearance: x / SG: x / pH: x  Gluc: 116 mg/dL / Ketone: x  / Bili: x / Urobili: x   Blood: x / Protein: x / Nitrite: x   Leuk Esterase: x / RBC: x / WBC x   Sq Epi: x / Non Sq Epi: x / Bacteria: x      CAPILLARY BLOOD GLUCOSE            RADIOLOGY & ADDITIONAL TESTS: Reviewed.    ASSESSMENT:    PLAN:  HOSP COMT PROGRESS NOTE    SUBJECTIVE / INTERVAL HPI: Patient seen and examined at bedside. Expressing some concerns over right hip -- had right hip repair earlier this year at [facility] and had a fall and wondering if right hip was damaged. Also says the rehab he went to after that surgery he really didn't like and wondering how he can get help choosing a different rehab. Lastly says he has a rash on both his legs which is chronic and itchy. Says he was prescribed "something" for it by his dermatologist who recently moved out of state. Unable to give more details of diagnosis or treatment. Says he thinks his legs are red.     VITAL SIGNS:  Vital Signs Last 24 Hrs  T(C): 37.3 (29 Oct 2023 05:54), Max: 37.3 (29 Oct 2023 05:54)  T(F): 99.1 (29 Oct 2023 05:54), Max: 99.1 (29 Oct 2023 05:54)  HR: 87 (29 Oct 2023 05:54) (69 - 87)  BP: 108/71 (29 Oct 2023 05:54) (108/71 - 130/77)  BP(mean): 96 (28 Oct 2023 13:15) (96 - 96)  RR: 18 (29 Oct 2023 05:54) (18 - 20)  SpO2: 98% (29 Oct 2023 05:54) (92% - 98%)    Parameters below as of 29 Oct 2023 05:54  Patient On (Oxygen Delivery Method): room air        10-28-23 @ 07:01  -  10-29-23 @ 07:00  --------------------------------------------------------  IN: 0 mL / OUT: 1450 mL / NET: -1450 mL        PHYSICAL EXAM:    GENERAL:  gentleman, resting in bed, nad  ENMT: no oral lesions  HEAD:  Atraumatic, Normocephalic  NECK: Supple  NERVOUS SYSTEM: awake, alery  CHEST/LUNG: no increased wob, on NC, lungs ctab  HEART: Regular rate and rhythm; No murmurs, rubs, or gallops  ABDOMEN: Soft, Nontender, Nondistended  SKIN: b/l LE slightly hyperpigmented but no obvious rash/ erythema/ lesions    MEDICATIONS:  MEDICATIONS  (STANDING):  atorvastatin 40 milliGRAM(s) Oral at bedtime  chlorhexidine 2% Cloths 1 Application(s) Topical <User Schedule>  DULoxetine 60 milliGRAM(s) Oral daily  enoxaparin Injectable 40 milliGRAM(s) SubCutaneous every 24 hours  multivitamin 1 Tablet(s) Oral daily  polyethylene glycol 3350 17 Gram(s) Oral two times a day  povidone iodine 5% Nasal Swab 1 Application(s) Both Nostrils once  senna 2 Tablet(s) Oral at bedtime  tamsulosin 0.4 milliGRAM(s) Oral at bedtime    MEDICATIONS  (PRN):  clonazePAM  Tablet 2 milliGRAM(s) Oral daily PRN anxiety  diazepam    Tablet 5 milliGRAM(s) Oral daily PRN for anxiety  HYDROmorphone  Injectable 0.5 milliGRAM(s) IV Push every 4 hours PRN breakthrough pain  oxyCODONE    IR 10 milliGRAM(s) Oral every 4 hours PRN Severe Pain (7 - 10)  oxyCODONE    IR 5 milliGRAM(s) Oral every 4 hours PRN Moderate Pain (4 - 6)  zolpidem 5 milliGRAM(s) Oral at bedtime PRN Insomnia  zolpidem 5 milliGRAM(s) Oral at bedtime PRN Insomnia      ALLERGIES:  Allergies    sulfa drugs (Unknown)    Intolerances        LABS:                        13.1   7.69  )-----------( 188      ( 29 Oct 2023 08:02 )             40.4     10-29    140  |  106  |  23  ----------------------------<  116<H>  3.6   |  25  |  0.74    Ca    8.6      29 Oct 2023 08:02  Phos  4.0     10-28    TPro  6.3  /  Alb  3.4  /  TBili  0.6  /  DBili  x   /  AST  23  /  ALT  17  /  AlkPhos  79  10-29    PT/INR - ( 28 Oct 2023 09:25 )   PT: 11.8 sec;   INR: 1.04          PTT - ( 28 Oct 2023 09:25 )  PTT:33.7 sec  Urinalysis Basic - ( 29 Oct 2023 08:02 )    Color: x / Appearance: x / SG: x / pH: x  Gluc: 116 mg/dL / Ketone: x  / Bili: x / Urobili: x   Blood: x / Protein: x / Nitrite: x   Leuk Esterase: x / RBC: x / WBC x   Sq Epi: x / Non Sq Epi: x / Bacteria: x      CAPILLARY BLOOD GLUCOSE            RADIOLOGY & ADDITIONAL TESTS: Reviewed.    ASSESSMENT:    PLAN:

## 2023-10-30 ENCOUNTER — APPOINTMENT (OUTPATIENT)
Dept: ORTHOPEDIC SURGERY | Facility: HOSPITAL | Age: 78
End: 2023-10-30

## 2023-10-30 ENCOUNTER — TRANSCRIPTION ENCOUNTER (OUTPATIENT)
Age: 78
End: 2023-10-30

## 2023-10-30 LAB
ANION GAP SERPL CALC-SCNC: 8 MMOL/L — SIGNIFICANT CHANGE UP (ref 5–17)
ANION GAP SERPL CALC-SCNC: 8 MMOL/L — SIGNIFICANT CHANGE UP (ref 5–17)
APPEARANCE UR: CLEAR — SIGNIFICANT CHANGE UP
APPEARANCE UR: CLEAR — SIGNIFICANT CHANGE UP
BACTERIA # UR AUTO: NEGATIVE /HPF — SIGNIFICANT CHANGE UP
BACTERIA # UR AUTO: NEGATIVE /HPF — SIGNIFICANT CHANGE UP
BILIRUB UR-MCNC: NEGATIVE — SIGNIFICANT CHANGE UP
BILIRUB UR-MCNC: NEGATIVE — SIGNIFICANT CHANGE UP
BLD GP AB SCN SERPL QL: NEGATIVE — SIGNIFICANT CHANGE UP
BLD GP AB SCN SERPL QL: NEGATIVE — SIGNIFICANT CHANGE UP
BUN SERPL-MCNC: 20 MG/DL — SIGNIFICANT CHANGE UP (ref 7–23)
BUN SERPL-MCNC: 20 MG/DL — SIGNIFICANT CHANGE UP (ref 7–23)
CALCIUM SERPL-MCNC: 8.5 MG/DL — SIGNIFICANT CHANGE UP (ref 8.4–10.5)
CALCIUM SERPL-MCNC: 8.5 MG/DL — SIGNIFICANT CHANGE UP (ref 8.4–10.5)
CHLORIDE SERPL-SCNC: 102 MMOL/L — SIGNIFICANT CHANGE UP (ref 96–108)
CHLORIDE SERPL-SCNC: 102 MMOL/L — SIGNIFICANT CHANGE UP (ref 96–108)
CO2 SERPL-SCNC: 25 MMOL/L — SIGNIFICANT CHANGE UP (ref 22–31)
CO2 SERPL-SCNC: 25 MMOL/L — SIGNIFICANT CHANGE UP (ref 22–31)
COLOR SPEC: YELLOW — SIGNIFICANT CHANGE UP
COLOR SPEC: YELLOW — SIGNIFICANT CHANGE UP
CREAT SERPL-MCNC: 0.7 MG/DL — SIGNIFICANT CHANGE UP (ref 0.5–1.3)
CREAT SERPL-MCNC: 0.7 MG/DL — SIGNIFICANT CHANGE UP (ref 0.5–1.3)
DIFF PNL FLD: ABNORMAL
DIFF PNL FLD: ABNORMAL
EGFR: 94 ML/MIN/1.73M2 — SIGNIFICANT CHANGE UP
EGFR: 94 ML/MIN/1.73M2 — SIGNIFICANT CHANGE UP
GLUCOSE SERPL-MCNC: 107 MG/DL — HIGH (ref 70–99)
GLUCOSE SERPL-MCNC: 107 MG/DL — HIGH (ref 70–99)
GLUCOSE UR QL: NEGATIVE MG/DL — SIGNIFICANT CHANGE UP
GLUCOSE UR QL: NEGATIVE MG/DL — SIGNIFICANT CHANGE UP
HCT VFR BLD CALC: 39.4 % — SIGNIFICANT CHANGE UP (ref 39–50)
HCT VFR BLD CALC: 39.4 % — SIGNIFICANT CHANGE UP (ref 39–50)
HGB BLD-MCNC: 12.7 G/DL — LOW (ref 13–17)
HGB BLD-MCNC: 12.7 G/DL — LOW (ref 13–17)
KETONES UR-MCNC: NEGATIVE MG/DL — SIGNIFICANT CHANGE UP
KETONES UR-MCNC: NEGATIVE MG/DL — SIGNIFICANT CHANGE UP
LEUKOCYTE ESTERASE UR-ACNC: NEGATIVE — SIGNIFICANT CHANGE UP
LEUKOCYTE ESTERASE UR-ACNC: NEGATIVE — SIGNIFICANT CHANGE UP
MCHC RBC-ENTMCNC: 29.7 PG — SIGNIFICANT CHANGE UP (ref 27–34)
MCHC RBC-ENTMCNC: 29.7 PG — SIGNIFICANT CHANGE UP (ref 27–34)
MCHC RBC-ENTMCNC: 32.2 GM/DL — SIGNIFICANT CHANGE UP (ref 32–36)
MCHC RBC-ENTMCNC: 32.2 GM/DL — SIGNIFICANT CHANGE UP (ref 32–36)
MCV RBC AUTO: 92.1 FL — SIGNIFICANT CHANGE UP (ref 80–100)
MCV RBC AUTO: 92.1 FL — SIGNIFICANT CHANGE UP (ref 80–100)
NITRITE UR-MCNC: NEGATIVE — SIGNIFICANT CHANGE UP
NITRITE UR-MCNC: NEGATIVE — SIGNIFICANT CHANGE UP
NRBC # BLD: 0 /100 WBCS — SIGNIFICANT CHANGE UP (ref 0–0)
NRBC # BLD: 0 /100 WBCS — SIGNIFICANT CHANGE UP (ref 0–0)
PH UR: 6.5 — SIGNIFICANT CHANGE UP (ref 5–8)
PH UR: 6.5 — SIGNIFICANT CHANGE UP (ref 5–8)
PLATELET # BLD AUTO: 176 K/UL — SIGNIFICANT CHANGE UP (ref 150–400)
PLATELET # BLD AUTO: 176 K/UL — SIGNIFICANT CHANGE UP (ref 150–400)
POTASSIUM SERPL-MCNC: 3.9 MMOL/L — SIGNIFICANT CHANGE UP (ref 3.5–5.3)
POTASSIUM SERPL-MCNC: 3.9 MMOL/L — SIGNIFICANT CHANGE UP (ref 3.5–5.3)
POTASSIUM SERPL-SCNC: 3.9 MMOL/L — SIGNIFICANT CHANGE UP (ref 3.5–5.3)
POTASSIUM SERPL-SCNC: 3.9 MMOL/L — SIGNIFICANT CHANGE UP (ref 3.5–5.3)
PROT UR-MCNC: NEGATIVE MG/DL — SIGNIFICANT CHANGE UP
PROT UR-MCNC: NEGATIVE MG/DL — SIGNIFICANT CHANGE UP
RBC # BLD: 4.28 M/UL — SIGNIFICANT CHANGE UP (ref 4.2–5.8)
RBC # BLD: 4.28 M/UL — SIGNIFICANT CHANGE UP (ref 4.2–5.8)
RBC # FLD: 15.9 % — HIGH (ref 10.3–14.5)
RBC # FLD: 15.9 % — HIGH (ref 10.3–14.5)
RBC CASTS # UR COMP ASSIST: 4 /HPF — SIGNIFICANT CHANGE UP (ref 0–4)
RBC CASTS # UR COMP ASSIST: 4 /HPF — SIGNIFICANT CHANGE UP (ref 0–4)
RH IG SCN BLD-IMP: POSITIVE — SIGNIFICANT CHANGE UP
RH IG SCN BLD-IMP: POSITIVE — SIGNIFICANT CHANGE UP
SODIUM SERPL-SCNC: 135 MMOL/L — SIGNIFICANT CHANGE UP (ref 135–145)
SODIUM SERPL-SCNC: 135 MMOL/L — SIGNIFICANT CHANGE UP (ref 135–145)
SP GR SPEC: >1.03 — HIGH (ref 1–1.03)
SP GR SPEC: >1.03 — HIGH (ref 1–1.03)
SQUAMOUS # UR AUTO: 0 /HPF — SIGNIFICANT CHANGE UP (ref 0–5)
SQUAMOUS # UR AUTO: 0 /HPF — SIGNIFICANT CHANGE UP (ref 0–5)
UROBILINOGEN FLD QL: 0.2 MG/DL — SIGNIFICANT CHANGE UP (ref 0.2–1)
UROBILINOGEN FLD QL: 0.2 MG/DL — SIGNIFICANT CHANGE UP (ref 0.2–1)
WBC # BLD: 7 K/UL — SIGNIFICANT CHANGE UP (ref 3.8–10.5)
WBC # BLD: 7 K/UL — SIGNIFICANT CHANGE UP (ref 3.8–10.5)
WBC # FLD AUTO: 7 K/UL — SIGNIFICANT CHANGE UP (ref 3.8–10.5)
WBC # FLD AUTO: 7 K/UL — SIGNIFICANT CHANGE UP (ref 3.8–10.5)
WBC UR QL: 0 /HPF — SIGNIFICANT CHANGE UP (ref 0–5)
WBC UR QL: 0 /HPF — SIGNIFICANT CHANGE UP (ref 0–5)

## 2023-10-30 PROCEDURE — 72170 X-RAY EXAM OF PELVIS: CPT | Mod: 26

## 2023-10-30 PROCEDURE — 99233 SBSQ HOSP IP/OBS HIGH 50: CPT

## 2023-10-30 PROCEDURE — 71275 CT ANGIOGRAPHY CHEST: CPT | Mod: 26

## 2023-10-30 PROCEDURE — 27236 TREAT THIGH FRACTURE: CPT | Mod: LT

## 2023-10-30 DEVICE — IMPLANTABLE DEVICE: Type: IMPLANTABLE DEVICE | Site: LEFT | Status: FUNCTIONAL

## 2023-10-30 DEVICE — CELLERATE SURGICAL POWDER RX 5GM: Type: IMPLANTABLE DEVICE | Site: LEFT | Status: FUNCTIONAL

## 2023-10-30 RX ORDER — ACETAMINOPHEN 500 MG
1000 TABLET ORAL EVERY 8 HOURS
Refills: 0 | Status: DISCONTINUED | OUTPATIENT
Start: 2023-10-30 | End: 2023-11-03

## 2023-10-30 RX ORDER — POLYETHYLENE GLYCOL 3350 17 G/17G
17 POWDER, FOR SOLUTION ORAL AT BEDTIME
Refills: 0 | Status: DISCONTINUED | OUTPATIENT
Start: 2023-10-30 | End: 2023-10-31

## 2023-10-30 RX ORDER — ONDANSETRON 8 MG/1
4 TABLET, FILM COATED ORAL EVERY 4 HOURS
Refills: 0 | Status: DISCONTINUED | OUTPATIENT
Start: 2023-10-30 | End: 2023-11-01

## 2023-10-30 RX ORDER — OXYCODONE HYDROCHLORIDE 5 MG/1
5 TABLET ORAL EVERY 4 HOURS
Refills: 0 | Status: DISCONTINUED | OUTPATIENT
Start: 2023-10-30 | End: 2023-10-31

## 2023-10-30 RX ORDER — HYDROMORPHONE HYDROCHLORIDE 2 MG/ML
0.5 INJECTION INTRAMUSCULAR; INTRAVENOUS; SUBCUTANEOUS
Refills: 0 | Status: COMPLETED | OUTPATIENT
Start: 2023-10-30 | End: 2023-11-06

## 2023-10-30 RX ORDER — MAGNESIUM HYDROXIDE 400 MG/1
30 TABLET, CHEWABLE ORAL DAILY
Refills: 0 | Status: DISCONTINUED | OUTPATIENT
Start: 2023-10-30 | End: 2023-11-03

## 2023-10-30 RX ORDER — HYDROMORPHONE HYDROCHLORIDE 2 MG/ML
0.5 INJECTION INTRAMUSCULAR; INTRAVENOUS; SUBCUTANEOUS
Refills: 0 | Status: DISCONTINUED | OUTPATIENT
Start: 2023-10-30 | End: 2023-11-03

## 2023-10-30 RX ORDER — CEFAZOLIN SODIUM 1 G
2000 VIAL (EA) INJECTION EVERY 8 HOURS
Refills: 0 | Status: COMPLETED | OUTPATIENT
Start: 2023-10-30 | End: 2023-10-31

## 2023-10-30 RX ORDER — SODIUM CHLORIDE 9 MG/ML
1000 INJECTION INTRAMUSCULAR; INTRAVENOUS; SUBCUTANEOUS
Refills: 0 | Status: DISCONTINUED | OUTPATIENT
Start: 2023-10-30 | End: 2023-11-01

## 2023-10-30 RX ORDER — SENNA PLUS 8.6 MG/1
2 TABLET ORAL AT BEDTIME
Refills: 0 | Status: DISCONTINUED | OUTPATIENT
Start: 2023-10-30 | End: 2023-10-31

## 2023-10-30 RX ORDER — ENOXAPARIN SODIUM 100 MG/ML
40 INJECTION SUBCUTANEOUS EVERY 24 HOURS
Refills: 0 | Status: DISCONTINUED | OUTPATIENT
Start: 2023-10-31 | End: 2023-11-03

## 2023-10-30 RX ORDER — PANTOPRAZOLE SODIUM 20 MG/1
40 TABLET, DELAYED RELEASE ORAL
Refills: 0 | Status: DISCONTINUED | OUTPATIENT
Start: 2023-10-30 | End: 2023-11-03

## 2023-10-30 RX ORDER — HYDROMORPHONE HYDROCHLORIDE 2 MG/ML
0.5 INJECTION INTRAMUSCULAR; INTRAVENOUS; SUBCUTANEOUS EVERY 4 HOURS
Refills: 0 | Status: DISCONTINUED | OUTPATIENT
Start: 2023-10-30 | End: 2023-11-03

## 2023-10-30 RX ORDER — OXYCODONE HYDROCHLORIDE 5 MG/1
10 TABLET ORAL EVERY 4 HOURS
Refills: 0 | Status: DISCONTINUED | OUTPATIENT
Start: 2023-10-30 | End: 2023-10-31

## 2023-10-30 RX ADMIN — OXYCODONE HYDROCHLORIDE 10 MILLIGRAM(S): 5 TABLET ORAL at 23:39

## 2023-10-30 RX ADMIN — OXYCODONE HYDROCHLORIDE 10 MILLIGRAM(S): 5 TABLET ORAL at 22:31

## 2023-10-30 RX ADMIN — OXYCODONE HYDROCHLORIDE 10 MILLIGRAM(S): 5 TABLET ORAL at 03:25

## 2023-10-30 RX ADMIN — TAMSULOSIN HYDROCHLORIDE 0.4 MILLIGRAM(S): 0.4 CAPSULE ORAL at 22:32

## 2023-10-30 RX ADMIN — OXYCODONE HYDROCHLORIDE 10 MILLIGRAM(S): 5 TABLET ORAL at 07:30

## 2023-10-30 RX ADMIN — Medication 1 APPLICATION(S): at 14:15

## 2023-10-30 RX ADMIN — OXYCODONE HYDROCHLORIDE 10 MILLIGRAM(S): 5 TABLET ORAL at 00:20

## 2023-10-30 RX ADMIN — Medication 1000 MILLIGRAM(S): at 22:31

## 2023-10-30 RX ADMIN — OXYCODONE HYDROCHLORIDE 10 MILLIGRAM(S): 5 TABLET ORAL at 11:40

## 2023-10-30 RX ADMIN — DULOXETINE HYDROCHLORIDE 60 MILLIGRAM(S): 30 CAPSULE, DELAYED RELEASE ORAL at 11:40

## 2023-10-30 RX ADMIN — ATORVASTATIN CALCIUM 40 MILLIGRAM(S): 80 TABLET, FILM COATED ORAL at 22:32

## 2023-10-30 RX ADMIN — Medication 1 TABLET(S): at 11:40

## 2023-10-30 RX ADMIN — CHLORHEXIDINE GLUCONATE 1 APPLICATION(S): 213 SOLUTION TOPICAL at 06:49

## 2023-10-30 RX ADMIN — Medication 5 MILLIGRAM(S): at 06:36

## 2023-10-30 RX ADMIN — OXYCODONE HYDROCHLORIDE 10 MILLIGRAM(S): 5 TABLET ORAL at 04:25

## 2023-10-30 NOTE — PACU DISCHARGE NOTE - NS MD DISCHARGE NOTE DISCHARGE
Cardiac Tele [General Appearance - Alert] : alert [Respiration, Rhythm And Depth] : normal respiratory rhythm and effort [Heart Rate And Rhythm] : heart rate was normal and rhythm regular [Nonpitting Edema] : nonpitting edema present [___ +] : [unfilled]+ pitting edema to L ankle [Bowel Sounds] : normal bowel sounds [Abnormal Walk] : normal gait [Skin Color & Pigmentation] : normal skin color and pigmentation [Oriented To Time, Place, And Person] : oriented to person, place, and time [Scleral Icterus] : No Scleral Icterus [Spider Angioma] : No spider angioma(s) were observed [Abdominal  Ascites] : no ascites [Ascites Fluid Wave] : no ascites fluid wave [Asterixis] : no asterixis observed [Jaundice] : No jaundice [Hallucinations] : ~T no ~M hallucinations [Delusions] : no ~T delusions [FreeTextEntry1] : bilateral gynecomastia

## 2023-10-30 NOTE — BRIEF OPERATIVE NOTE - NSICDXBRIEFPROCEDURE_GEN_ALL_CORE_FT
PROCEDURES:  Arthroplasty of left hip 30-Oct-2023 14:41:01  Camden Reyes  
EKG - nsr, rate 71, QTc 423, no ectopy, no ischemic changes   labs - trop negative, hyperglycemia w/o DKA  Will dc with symptomatic support and PCP fu.  Discussed indications for patient return to ED. Patient understood.

## 2023-10-30 NOTE — PROGRESS NOTE ADULT - SUBJECTIVE AND OBJECTIVE BOX
SUBJECTIVE: NAEON. This morning, patient endorses pain in left hip but otherwise no complaints. Denies any chest pain, pleuritic pain, palpitations, SOB, orthopnea, PND. States has LE edema on/off - was better when he was at rehab after R hip surgery in 7/2023.     MEDICATIONS:  MEDICATIONS  (STANDING):  atorvastatin 40 milliGRAM(s) Oral at bedtime  chlorhexidine 2% Cloths 1 Application(s) Topical <User Schedule>  DULoxetine 60 milliGRAM(s) Oral daily  lactated ringers. 1000 milliLiter(s) (80 mL/Hr) IV Continuous <Continuous>  multivitamin 1 Tablet(s) Oral daily  polyethylene glycol 3350 17 Gram(s) Oral two times a day  povidone iodine 5% Nasal Swab 1 Application(s) Both Nostrils once  predniSONE   Tablet 5 milliGRAM(s) Oral daily  senna 2 Tablet(s) Oral at bedtime  tamsulosin 0.4 milliGRAM(s) Oral at bedtime    MEDICATIONS  (PRN):  bisacodyl Suppository 10 milliGRAM(s) Rectal daily PRN If no bowel movement by POD#2  clonazePAM  Tablet 2 milliGRAM(s) Oral daily PRN anxiety  diazepam    Tablet 5 milliGRAM(s) Oral daily PRN for anxiety  HYDROmorphone  Injectable 0.5 milliGRAM(s) IV Push every 4 hours PRN breakthrough pain  oxyCODONE    IR 10 milliGRAM(s) Oral every 4 hours PRN Severe Pain (7 - 10)  oxyCODONE    IR 5 milliGRAM(s) Oral every 4 hours PRN Moderate Pain (4 - 6)  zolpidem 5 milliGRAM(s) Oral at bedtime PRN Insomnia  zolpidem 5 milliGRAM(s) Oral at bedtime PRN Insomnia      Allergies    sulfa drugs (Unknown)    Intolerances        OBJECTIVE:  Vital Signs Last 24 Hrs  T(C): 36.9 (30 Oct 2023 08:35), Max: 37.5 (30 Oct 2023 05:38)  T(F): 98.5 (30 Oct 2023 08:35), Max: 99.5 (30 Oct 2023 05:38)  HR: 77 (30 Oct 2023 08:35) (72 - 86)  BP: 116/72 (30 Oct 2023 08:35) (97/66 - 116/72)  BP(mean): --  RR: 14 (30 Oct 2023 08:35) (14 - 18)  SpO2: 92% (30 Oct 2023 08:35) (92% - 94%)    Parameters below as of 30 Oct 2023 08:35  Patient On (Oxygen Delivery Method): nasal cannula  O2 Flow (L/min): 2    I&O's Summary      PHYSICAL EXAM:  Gen: appears stated age, resting comfortably, NAD  HEENT: NCAT, MMM  Neck: supple  CV: RRR, no m/r/g, peripheral pulses 2+  Pulm: CTAB, no increased work of breathing, no rales/rhonchi  Abd: soft, ND, NT, no rebound or guarding  Skin: warm and dry  Ext: L hip ROM limited 2/2 pain, LLE shortened and externally rotated, L wrist in splint  Neuro: AOx3, speaking in full sentences  Psych: affect and behavior appropriate, pleasant at time of interview    LABS:                        12.7   7.00  )-----------( 176      ( 30 Oct 2023 05:30 )             39.4     10-30    135  |  102  |  20  ----------------------------<  107<H>  3.9   |  25  |  0.70    Ca    8.5      30 Oct 2023 05:30  Phos  4.0     10-28    TPro  6.3  /  Alb  3.4  /  TBili  0.6  /  DBili  x   /  AST  23  /  ALT  17  /  AlkPhos  79  10-29    LIVER FUNCTIONS - ( 29 Oct 2023 08:02 )  Alb: 3.4 g/dL / Pro: 6.3 g/dL / ALK PHOS: 79 U/L / ALT: 17 U/L / AST: 23 U/L / GGT: x             CAPILLARY BLOOD GLUCOSE        Urinalysis Basic - ( 30 Oct 2023 05:30 )    Color: x / Appearance: x / SG: x / pH: x  Gluc: 107 mg/dL / Ketone: x  / Bili: x / Urobili: x   Blood: x / Protein: x / Nitrite: x   Leuk Esterase: x / RBC: x / WBC x   Sq Epi: x / Non Sq Epi: x / Bacteria: x        MICRODATA:      RADIOLOGY/OTHER STUDIES:    CXR 10/28:  IMPRESSION: No acute disease.    CTH 10/28:  IMPRESSION: No acute intracranial hemorrhage, mass effect, or shift of   the midline structures.  Mild chronic white matter microvascular type changes.    Venous duplex BLE and BUE 10/29:  IMPRESSION:  No evidence of deep venous thrombosis in either lower extremity.  IMPRESSION:  No evidence of deep venous thrombosis in either upper extremity.

## 2023-10-30 NOTE — PROGRESS NOTE ADULT - SUBJECTIVE AND OBJECTIVE BOX
Ortho Post Op Check    Procedure: L hip david  Surgeon: Oh    Pt comfortable without complaints, pain controlled  Denies CP, SOB, N/V, numbness/tingling     Vital Signs Last 24 Hrs  T(C): 36.4 (10-30-23 @ 20:57), Max: 36.5 (10-30-23 @ 16:22)  T(F): 97.6 (10-30-23 @ 20:57), Max: 97.7 (10-30-23 @ 16:22)  HR: 74 (10-30-23 @ 21:12) (74 - 84)  BP: 132/65 (10-30-23 @ 21:12) (126/85 - 155/74)  BP(mean): 92 (10-30-23 @ 21:12) (86 - 107)  RR: 13 (10-30-23 @ 21:12) (11 - 21)  SpO2: 96% (10-30-23 @ 21:12) (94% - 100%)  I&O's Summary    30 Oct 2023 07:01  -  30 Oct 2023 21:36  --------------------------------------------------------  IN: 130 mL / OUT: 0 mL / NET: 130 mL        Physical Exam:  General: Pt Alert and oriented, NAD  L Hip with DSG C/D/I  Pulses: 2+ dp, pt pulses, toes wwp, cap refill <3 sec  Sensation: SILT in sural/saph/sp/dp/tibial distributions b/l LE  Motor: EHL/FHL/TA/GS  firing equally b/l LE                          12.7   7.00  )-----------( 176      ( 30 Oct 2023 05:30 )             39.4     10-30    135  |  102  |  20  ----------------------------<  107<H>  3.9   |  25  |  0.70    Ca    8.5      30 Oct 2023 05:30    TPro  6.3  /  Alb  3.4  /  TBili  0.6  /  DBili  x   /  AST  23  /  ALT  17  /  AlkPhos  79  10-29      Post-op X-Ray:  Xray L Hip: Hardware in place in appropriate alignment, no intra-op fx noted.    A/P: 78yMale POD#0 s/p L Hip david, on 10/30  - Stable  - Pain Control  - f/u AM labs  - DVT ppx: LVX POD1  - Post op abx: periop ancef  - PT, WBS: WBAT  - Dispo: pending PT    Sadiq Faria, PGY-3  Ortho Pager [phone]

## 2023-10-30 NOTE — PROGRESS NOTE ADULT - ASSESSMENT
78 YOM with PMH bladder cancer in remission (s/p tumor resection, localized chemo), BPH, GERD, [facility]oscol[facility] lymphedema, provoked DVT s/p AC, right hip hemiarthroplasty (7/2023), depression, panic disorder admitted for L hip fracture 2/2 mechanical fall pending OR for repair.     L femoral neck fracture  History or right hip hemiarthroplasty   Post operative state  - management per ortho, pending OR for L hip david vs total arthroplasty   - pain control with bowel regimen  - frequent perioperative incentive spirometer use  - early ambulation and OOBTC as tolerated  - consider chemical DVT ppx with LMWH or DOAC    L wrist injury  - management per ortho  - currently in splint  - pending L wrist MRI    Pre-op risk stratification and medical optimization  - elevated risk procedure: hip arthroplasty   - METS >4  - ECG: NSR 63, normal axis, normal interval, non specific ST-T wave changes, ?Q wave V1  - CXR NAD, CTA negative for PE  - RCRI score of 1: class II risk (6% 30-day risk of MACE)  - CALLOWAY with 0.4% 30-day risk of MACE  - patient is at low-intermediate risk for intermediate risk procedure, can proceed to OR without further testing    Acute hypoxic respiratory failure  - unclear etiology, req 2-3L NC  - CXR without acute pathology, CTA negative for PE   - cont pulmonary hygiene with frequent IS  - wean O2 as tolerated to maintain SpO2 >94%  - pulm consult if unable to wean given evidence of empyhsema     Bladder cancer in remission  BPH  - s/p tumor resection, localized chemo  - cont home tamsulosin 0.4mg    History of provoked DVT s/p AC  - provoked in setting back surgery  - no longer on AC    Depression  Panic disorder   - home: duloxetine 60mg daily, clonazepam 2mg BID, diazepam 5mg daily, zolpidem 10mg daily   - cont with caution (somnolent in ED) especially if also receiving opioids     LE rash  - ?takes prednisone 5mg daily for rash  - obtain collateral     Dispo: pending OR    Plan discussed with primary team. 78 YOM with PMH bladder cancer in remission (s/p tumor resection, localized chemo), BPH, GERD, [facility]os[facility] lymphedema, provoked DVT s/p AC, right hip hemiarthroplasty (7/2023), depression, panic disorder admitted for L hip fracture 2/2 mechanical fall pending OR for repair.     L femoral neck fracture  History or right hip hemiarthroplasty   - management per ortho, pending OR today for L hip david vs total arthroplasty   - pain control with bowel regimen  - frequent perioperative incentive spirometer use  - early ambulation and OOBTC as tolerated  - consider chemical DVT ppx with LMWH or DOAC    L wrist injury  - management per ortho  - currently in splint  - pending L wrist MRI    Pre-op risk stratification and medical optimization  - elevated risk procedure: hip arthroplasty   - METS >4  - ECG: NSR 63, normal axis, normal interval, non specific ST-T wave changes, ?Q wave V1  - CXR NAD, CTA negative for PE  - RCRI score of 1: class II risk (6% 30-day risk of MACE)  - CALLOWAY with 0.4% 30-day risk of MACE  - patient is at low-intermediate risk for intermediate risk procedure, can proceed to OR without further testing    Acute hypoxic respiratory failure  - unclear etiology, req 2-3L NC  - CXR without acute pathology, CTA negative for PE   - cont pulmonary hygiene with frequent IS  - wean O2 as tolerated to maintain SpO2 >94%  - pulm consult if unable to wean given evidence of empyhsema     Bladder cancer in remission  BPH  - s/p tumor resection, localized chemo  - cont home tamsulosin 0.4mg    History of provoked DVT s/p AC  - provoked in setting back surgery  - no longer on AC    Depression  Panic disorder   - home: duloxetine 60mg daily, clonazepam 2mg BID, diazepam 5mg daily, zolpidem 10mg daily   - cont with caution (somnolent in ED) especially if also receiving opioids     LE rash  - ?takes prednisone 5mg daily for rash  - obtain collateral     Dispo: pending OR    Plan discussed with primary team.

## 2023-10-30 NOTE — PROGRESS NOTE ADULT - SUBJECTIVE AND OBJECTIVE BOX
Orthopaedic Surgery Progress Note    Patient seen and examined. LAKIA. Patient reports pain that is moderately controlled with current regimen. Denies CP, SOB, N/V, tactile fevers, calf pain. Pt is NPO and added on for left hip david vs total arthroplasty today pending results of CT chest to r/o PE.       Vital Signs Last 24 Hrs  T(C): 36.9 (30 Oct 2023 08:35), Max: 37.5 (30 Oct 2023 05:38)  T(F): 98.5 (30 Oct 2023 08:35), Max: 99.5 (30 Oct 2023 05:38)  HR: 77 (30 Oct 2023 08:35) (72 - 86)  BP: 116/72 (30 Oct 2023 08:35) (97/66 - 116/72)  BP(mean): --  RR: 14 (30 Oct 2023 08:35) (14 - 18)  SpO2: 92% (30 Oct 2023 08:35) (92% - 94%)    Parameters below as of 30 Oct 2023 08:35  Patient On (Oxygen Delivery Method): nasal cannula  O2 Flow (L/min): 2        Physical Exam:  Pt laying comfortably in bed, NAD.  Skin warm and well perfused, no visible erythema/ecchymoses.  Left lower extremity shortened and externally rotated   Firing EHL/FHL/TA/GS bilateral lower extremities  SLT in tact to distal bilateral lower extremities  Calves soft and nontender to palpation   Brisk capillary refill distal BLE     LABS                        12.7   7.00  )-----------( 176      ( 30 Oct 2023 05:30 )             39.4                                10-30    135  |  102  |  20  ----------------------------<  107<H>  3.9   |  25  |  0.70    Ca    8.5      30 Oct 2023 05:30  Phos  4.0     10-28    TPro  6.3  /  Alb  3.4  /  TBili  0.6  /  DBili  x   /  AST  23  /  ALT  17  /  AlkPhos  79  10-29        A/P: 78M with left femoral neck fracture     CONTINUE:        1. PT: NWB LLE   2. DVT prophylaxis: SCDs   3. Pain Control as needed   4. Dispo: OR today pending results CT chest

## 2023-10-30 NOTE — PROGRESS NOTE ADULT - SUBJECTIVE AND OBJECTIVE BOX
Ortho Note    Pt comfortable without complaints, pain controlled  Denies CP, SOB, N/V, new numbness/tingling     Refused CT chest yesterday, will reorder today after communicated with patient  Endorses chronic R hip pain tho distractible in setting of L hip pain    Vital Signs Last 24 Hrs  T(C): 37.5 (10-30-23 @ 05:38), Max: 37.5 (10-30-23 @ 05:38)  T(F): 99.5 (10-30-23 @ 05:38), Max: 99.5 (10-30-23 @ 05:38)  HR: 86 (10-30-23 @ 05:38) (86 - 86)  BP: 110/71 (10-30-23 @ 05:38) (110/71 - 110/71)  BP(mean): --  RR: 17 (10-30-23 @ 05:38) (17 - 17)  SpO2: --  I&O's Summary    28 Oct 2023 07:01  -  29 Oct 2023 07:00  --------------------------------------------------------  IN: 0 mL / OUT: 1450 mL / NET: -1450 mL        General: Pt Alert and oriented, NAD  B/L LE: sensation intact, DP 2+, brisk cap refill, EHL/FHL/TA/GS 5/5                          13.1   7.69  )-----------( 188      ( 29 Oct 2023 08:02 )             40.4     10-29    140  |  106  |  23  ----------------------------<  116<H>  3.6   |  25  |  0.74    Ca    8.6      29 Oct 2023 08:02  Phos  4.0     10-28    TPro  6.3  /  Alb  3.4  /  TBili  0.6  /  DBili  x   /  AST  23  /  ALT  17  /  AlkPhos  79  10-29        78y Male pmh bladder cancer (s/p tumor resection, s/p localized chemo and now in remission), GERD, L knee OA, chronic lymphedema, kyphoscoliosis s/p C-sacrum fusion at [facility] (10/5/2020, w/ Dr. Carlisle), depression, panic disorder, RUE DVT, R hip david (summer 2023) BPH with L FNF pending OR for david vs total hip  - Stable  - Pain Control  -Chronic R hip pain, pos in setting of HO, f/u w/ original orthopedist at [facility]  2nd type and screen pending   - DVT ppx: lovenox 40 held for OR  - PT, WBS: bedrest strict  please document medical clearance  preop for OR  -pend MR wrist   -pend CT chest r/o PE in setting new 02 requriemnt   - Dispo: pend OR  Ortho Pager [phone]

## 2023-10-31 ENCOUNTER — TRANSCRIPTION ENCOUNTER (OUTPATIENT)
Age: 78
End: 2023-10-31

## 2023-10-31 LAB
ANION GAP SERPL CALC-SCNC: 11 MMOL/L — SIGNIFICANT CHANGE UP (ref 5–17)
ANION GAP SERPL CALC-SCNC: 11 MMOL/L — SIGNIFICANT CHANGE UP (ref 5–17)
BUN SERPL-MCNC: 18 MG/DL — SIGNIFICANT CHANGE UP (ref 7–23)
BUN SERPL-MCNC: 18 MG/DL — SIGNIFICANT CHANGE UP (ref 7–23)
CALCIUM SERPL-MCNC: 8.1 MG/DL — LOW (ref 8.4–10.5)
CALCIUM SERPL-MCNC: 8.1 MG/DL — LOW (ref 8.4–10.5)
CHLORIDE SERPL-SCNC: 102 MMOL/L — SIGNIFICANT CHANGE UP (ref 96–108)
CHLORIDE SERPL-SCNC: 102 MMOL/L — SIGNIFICANT CHANGE UP (ref 96–108)
CO2 SERPL-SCNC: 22 MMOL/L — SIGNIFICANT CHANGE UP (ref 22–31)
CO2 SERPL-SCNC: 22 MMOL/L — SIGNIFICANT CHANGE UP (ref 22–31)
CREAT SERPL-MCNC: 0.65 MG/DL — SIGNIFICANT CHANGE UP (ref 0.5–1.3)
CREAT SERPL-MCNC: 0.65 MG/DL — SIGNIFICANT CHANGE UP (ref 0.5–1.3)
EGFR: 96 ML/MIN/1.73M2 — SIGNIFICANT CHANGE UP
EGFR: 96 ML/MIN/1.73M2 — SIGNIFICANT CHANGE UP
GLUCOSE SERPL-MCNC: 135 MG/DL — HIGH (ref 70–99)
GLUCOSE SERPL-MCNC: 135 MG/DL — HIGH (ref 70–99)
HCT VFR BLD CALC: 31.5 % — LOW (ref 39–50)
HCT VFR BLD CALC: 31.5 % — LOW (ref 39–50)
HGB BLD-MCNC: 10.3 G/DL — LOW (ref 13–17)
HGB BLD-MCNC: 10.3 G/DL — LOW (ref 13–17)
MCHC RBC-ENTMCNC: 29.8 PG — SIGNIFICANT CHANGE UP (ref 27–34)
MCHC RBC-ENTMCNC: 29.8 PG — SIGNIFICANT CHANGE UP (ref 27–34)
MCHC RBC-ENTMCNC: 32.7 GM/DL — SIGNIFICANT CHANGE UP (ref 32–36)
MCHC RBC-ENTMCNC: 32.7 GM/DL — SIGNIFICANT CHANGE UP (ref 32–36)
MCV RBC AUTO: 91 FL — SIGNIFICANT CHANGE UP (ref 80–100)
MCV RBC AUTO: 91 FL — SIGNIFICANT CHANGE UP (ref 80–100)
NRBC # BLD: 0 /100 WBCS — SIGNIFICANT CHANGE UP (ref 0–0)
NRBC # BLD: 0 /100 WBCS — SIGNIFICANT CHANGE UP (ref 0–0)
PLATELET # BLD AUTO: 160 K/UL — SIGNIFICANT CHANGE UP (ref 150–400)
PLATELET # BLD AUTO: 160 K/UL — SIGNIFICANT CHANGE UP (ref 150–400)
POTASSIUM SERPL-MCNC: SIGNIFICANT CHANGE UP (ref 3.5–5.3)
POTASSIUM SERPL-MCNC: SIGNIFICANT CHANGE UP (ref 3.5–5.3)
POTASSIUM SERPL-SCNC: SIGNIFICANT CHANGE UP (ref 3.5–5.3)
POTASSIUM SERPL-SCNC: SIGNIFICANT CHANGE UP (ref 3.5–5.3)
RBC # BLD: 3.46 M/UL — LOW (ref 4.2–5.8)
RBC # BLD: 3.46 M/UL — LOW (ref 4.2–5.8)
RBC # FLD: 15.3 % — HIGH (ref 10.3–14.5)
RBC # FLD: 15.3 % — HIGH (ref 10.3–14.5)
SODIUM SERPL-SCNC: 135 MMOL/L — SIGNIFICANT CHANGE UP (ref 135–145)
SODIUM SERPL-SCNC: 135 MMOL/L — SIGNIFICANT CHANGE UP (ref 135–145)
WBC # BLD: 8.44 K/UL — SIGNIFICANT CHANGE UP (ref 3.8–10.5)
WBC # BLD: 8.44 K/UL — SIGNIFICANT CHANGE UP (ref 3.8–10.5)
WBC # FLD AUTO: 8.44 K/UL — SIGNIFICANT CHANGE UP (ref 3.8–10.5)
WBC # FLD AUTO: 8.44 K/UL — SIGNIFICANT CHANGE UP (ref 3.8–10.5)

## 2023-10-31 PROCEDURE — 99232 SBSQ HOSP IP/OBS MODERATE 35: CPT

## 2023-10-31 RX ORDER — OXYCODONE HYDROCHLORIDE 5 MG/1
5 TABLET ORAL EVERY 4 HOURS
Refills: 0 | Status: DISCONTINUED | OUTPATIENT
Start: 2023-10-31 | End: 2023-11-03

## 2023-10-31 RX ORDER — OXYCODONE HYDROCHLORIDE 5 MG/1
10 TABLET ORAL EVERY 4 HOURS
Refills: 0 | Status: DISCONTINUED | OUTPATIENT
Start: 2023-10-31 | End: 2023-11-03

## 2023-10-31 RX ORDER — METHOCARBAMOL 500 MG/1
500 TABLET, FILM COATED ORAL THREE TIMES A DAY
Refills: 0 | Status: DISCONTINUED | OUTPATIENT
Start: 2023-10-31 | End: 2023-11-03

## 2023-10-31 RX ADMIN — OXYCODONE HYDROCHLORIDE 10 MILLIGRAM(S): 5 TABLET ORAL at 17:49

## 2023-10-31 RX ADMIN — HYDROMORPHONE HYDROCHLORIDE 0.5 MILLIGRAM(S): 2 INJECTION INTRAMUSCULAR; INTRAVENOUS; SUBCUTANEOUS at 06:04

## 2023-10-31 RX ADMIN — Medication 500 MILLIGRAM(S): at 17:49

## 2023-10-31 RX ADMIN — DULOXETINE HYDROCHLORIDE 60 MILLIGRAM(S): 30 CAPSULE, DELAYED RELEASE ORAL at 11:35

## 2023-10-31 RX ADMIN — Medication 100 MILLIGRAM(S): at 03:39

## 2023-10-31 RX ADMIN — OXYCODONE HYDROCHLORIDE 10 MILLIGRAM(S): 5 TABLET ORAL at 08:32

## 2023-10-31 RX ADMIN — METHOCARBAMOL 500 MILLIGRAM(S): 500 TABLET, FILM COATED ORAL at 14:18

## 2023-10-31 RX ADMIN — Medication 5 MILLIGRAM(S): at 06:22

## 2023-10-31 RX ADMIN — Medication 1000 MILLIGRAM(S): at 06:21

## 2023-10-31 RX ADMIN — Medication 1000 MILLIGRAM(S): at 13:29

## 2023-10-31 RX ADMIN — OXYCODONE HYDROCHLORIDE 10 MILLIGRAM(S): 5 TABLET ORAL at 03:32

## 2023-10-31 RX ADMIN — OXYCODONE HYDROCHLORIDE 10 MILLIGRAM(S): 5 TABLET ORAL at 13:04

## 2023-10-31 RX ADMIN — Medication 1000 MILLIGRAM(S): at 21:48

## 2023-10-31 RX ADMIN — TAMSULOSIN HYDROCHLORIDE 0.4 MILLIGRAM(S): 0.4 CAPSULE ORAL at 23:45

## 2023-10-31 RX ADMIN — ENOXAPARIN SODIUM 40 MILLIGRAM(S): 100 INJECTION SUBCUTANEOUS at 06:21

## 2023-10-31 RX ADMIN — ATORVASTATIN CALCIUM 40 MILLIGRAM(S): 80 TABLET, FILM COATED ORAL at 21:45

## 2023-10-31 RX ADMIN — OXYCODONE HYDROCHLORIDE 10 MILLIGRAM(S): 5 TABLET ORAL at 12:04

## 2023-10-31 RX ADMIN — Medication 500 MILLIGRAM(S): at 07:34

## 2023-10-31 RX ADMIN — OXYCODONE HYDROCHLORIDE 10 MILLIGRAM(S): 5 TABLET ORAL at 02:33

## 2023-10-31 RX ADMIN — Medication 1 TABLET(S): at 11:35

## 2023-10-31 RX ADMIN — HYDROMORPHONE HYDROCHLORIDE 0.5 MILLIGRAM(S): 2 INJECTION INTRAMUSCULAR; INTRAVENOUS; SUBCUTANEOUS at 06:28

## 2023-10-31 RX ADMIN — METHOCARBAMOL 500 MILLIGRAM(S): 500 TABLET, FILM COATED ORAL at 21:45

## 2023-10-31 RX ADMIN — OXYCODONE HYDROCHLORIDE 10 MILLIGRAM(S): 5 TABLET ORAL at 07:32

## 2023-10-31 RX ADMIN — OXYCODONE HYDROCHLORIDE 10 MILLIGRAM(S): 5 TABLET ORAL at 18:49

## 2023-10-31 RX ADMIN — Medication 100 MILLIGRAM(S): at 11:35

## 2023-10-31 RX ADMIN — PANTOPRAZOLE SODIUM 40 MILLIGRAM(S): 20 TABLET, DELAYED RELEASE ORAL at 06:22

## 2023-10-31 NOTE — OCCUPATIONAL THERAPY INITIAL EVALUATION ADULT - MODALITIES TREATMENT COMMENTS
Pt able to perform bed mobility with Max Ax2, and tolerated sitting EOB for ~10 min, however unable to fully shift weight onto L side 2/2 hip pain, with inability to tolerate functional transfers at this time. Sit<->stand deferred.

## 2023-10-31 NOTE — CONSULT NOTE ADULT - SUBJECTIVE AND OBJECTIVE BOX
ORTHO PAIN MANAGEMENT CONSULT NOTE    Chief Complaint: left hip pain    HPI:  "This is a 78y Male pmh bladder cancer (s/p tumor resection, s/p localized chemo and now in remission), GERD, L knee OA, chronic lymphedema, kyphoscoliosis s/p C-sacrum fusion at [facility] (10/5/2020, w/ Dr. Carlisle), depression, panic disorder, RUE DVT, BPH who presents to the ED today with c/o L left hip pain, LROM and inability to ambulate s/p fall today. Pt denies any other injuries. Pt denies head trauma or LOC. Pt denies any numbness, tingling or paresthesias. Pt denies any fever or chills. Pt is a community ambulator who uses a cane at baseline, walks approx 4-5 block a day, somewhat somnolent during exam. Pt denies any taking any anticoagulation medications."    I-stop checked and confirmed. Patient takes Dilaudid 4mg every 4 hours at home with minimal pain relief. He also reports taking gabapentin 300 TID and robaxin 500 mg TID. Denies history of substance use or abuse. Today, complains of bilateral hip pain (left greater than right).    Past Medical and Surgical History:  HIP FRACTURE LEFT    Handoff    MEWS Score    High cholesterol    Depression    GERD (gastroesophageal reflux disease)    Bladder cancer    Hip fracture, left    History of back surgery    FALL    90+    SysAdmin_VisitLink        Allergies:  sulfa drugs (Unknown)      Labs:  CBC ( 10-28-23 @ 09:25 )                   12.9  6.03 )-----------( 207                40.5      Chem ( 10-28-23 @ 09:25 )  136  |  101  |  35  ----------------------------<  93  3.9   |  26  |  0.85        PT/INR ( 10-28-23 @ 09:25 )   PT: 11.8;   INR: 1.04      PTT ( 10-28-23 @ 09:25 )  PTT: 33.7          Imaging:  X-rays of the pelvis, rightleft hip and femur demonstrate a displaced femoral neck hip fractrure.  R hip hemiarthroplasty slight varus alignmnent, no subsidence   lumbar psf extends above imaging level with anterior sp l5/s1, sig osteolysis t11 with iliac screws  Ct head neg for epidural vs subdural hemorrhage  exostosis scaphoid wrist, diffuse degenerative changes including CMC arthritis   (wet read)      Vitals:  T(C): 36.9 (10-28-23 @ 11:40), Max: 36.9 (10-28-23 @ 11:40)  HR: 79 (10-28-23 @ 11:40) (75 - 79)  BP: 112/61 (10-28-23 @ 11:40) (112/61 - 125/76)  RR: 20 (10-28-23 @ 11:49) (18 - 20)  SpO2: 92% (10-28-23 @ 11:49) (92% - 98%)    Physical Exam:  General:  AAOx3, no acute distress, cachetic. Somnolent during exam appearing**//**well-nourished.  Musculoskeletal:  Left hip:  +mild swelling and ecchymosis, no erythema, skin intact, normothermic. +TTP over L hip. LROM limited 2/2 2' pain. Unable to SLR. + pain with axial loading or log roll. Moving ankle and all toes, +EHL/FHL/TA/GS. SILT throughout. DP and PT pulses 2+.  Shortened ER L side        PAST MEDICAL & SURGICAL HISTORY:  High cholesterol      Depression      GERD (gastroesophageal reflux disease)      Bladder cancer      History of back surgery          FAMILY HISTORY:      SOCIAL HISTORY:  [ ] Denies Smoking, Alcohol, or Drug Use    HOME MEDICATIONS:   Please refer to initial HNP    PAIN HOME MEDICATIONS:    Allergies    sulfa drugs (Unknown)    Intolerances        PAIN MEDICATIONS:  acetaminophen     Tablet .. 1000 milliGRAM(s) Oral every 8 hours  clonazePAM  Tablet 2 milliGRAM(s) Oral daily PRN  DULoxetine 60 milliGRAM(s) Oral daily  HYDROmorphone  Injectable 0.5 milliGRAM(s) IV Push every 4 hours PRN  HYDROmorphone  Injectable 0.5 milliGRAM(s) IV Push every 15 minutes PRN  HYDROmorphone  Injectable 0.5 milliGRAM(s) IV Push every 4 hours PRN  methocarbamol 500 milliGRAM(s) Oral three times a day  naproxen 500 milliGRAM(s) Oral every 12 hours  ondansetron Injectable 4 milliGRAM(s) IV Push every 4 hours PRN  oxyCODONE    IR 10 milliGRAM(s) Oral every 4 hours PRN  oxyCODONE    IR 5 milliGRAM(s) Oral every 4 hours PRN  zolpidem 5 milliGRAM(s) Oral at bedtime PRN  zolpidem 5 milliGRAM(s) Oral at bedtime PRN    Heme:  enoxaparin Injectable 40 milliGRAM(s) SubCutaneous every 24 hours    Antibiotics:    Cardiovascular:    GI:  bisacodyl 5 milliGRAM(s) Oral every 12 hours  bisacodyl Suppository 10 milliGRAM(s) Rectal daily PRN  magnesium hydroxide Suspension 30 milliLiter(s) Oral daily PRN  pantoprazole    Tablet 40 milliGRAM(s) Oral before breakfast  polyethylene glycol 3350 17 Gram(s) Oral two times a day  senna 2 Tablet(s) Oral at bedtime    Endocrine:  atorvastatin 40 milliGRAM(s) Oral at bedtime  predniSONE   Tablet 5 milliGRAM(s) Oral daily    All Other Medications:  chlorhexidine 2% Cloths 1 Application(s) Topical <User Schedule>  multivitamin 1 Tablet(s) Oral daily  sodium chloride 0.9%. 1000 milliLiter(s) IV Continuous <Continuous>  tamsulosin 0.4 milliGRAM(s) Oral at bedtime      Vital Signs Last 24 Hrs  T(C): 36.9 (31 Oct 2023 09:12), Max: 37.5 (31 Oct 2023 05:00)  T(F): 98.4 (31 Oct 2023 09:12), Max: 99.5 (31 Oct 2023 05:00)  HR: 71 (31 Oct 2023 12:40) (71 - 91)  BP: 108/52 (31 Oct 2023 12:40) (91/57 - 155/74)  BP(mean): 73 (31 Oct 2023 12:40) (73 - 107)  RR: 17 (31 Oct 2023 12:40) (11 - 21)  SpO2: 93% (31 Oct 2023 12:40) (93% - 100%)    Parameters below as of 31 Oct 2023 12:40  Patient On (Oxygen Delivery Method): room air        LABS:                        10.3   8.44  )-----------( 160      ( 31 Oct 2023 11:20 )             31.5     10-31    135  |  102  |  18  ----------------------------<  135<H>  See Note   |  22  |  0.65    Ca    8.1<L>      31 Oct 2023 07:55          REVIEW OF SYSTEMS:  CONSTITUTIONAL: No fever or fatigue O/N.   EYES: No eye pain, visual disturbances  ENMT:  No difficulty hearing. No throat pain  NECK: No pain or stiffness  RESPIRATORY: No cough, wheezing; No shortness of breath  CARDIOVASCULAR: No chest pain, palpitations.   GASTROINTESTINAL: Pt reports passing gas. No bowel movements. No abdominal or epigastric pain. No nausea, vomiting. GENITOURINARY: No dysuria, frequency, or incontinence  NEUROLOGICAL: No headaches, loss of strength, numbness, or tremors. No dizzinesss or lightheadedness with pain medications.   MUSCULOSKELETAL: Incisional back pain. No joint pain or swelling; No muscle, or extremity pain      PHYSICAL EXAM  GENERAL: Laying in bed, NAD  Neuro: CN II-XII PERRRL, EOMI  Cranial nerves grossly intact  Motor exam:  Sensation intact to LT in UE/LE in 3 dermatomes  CHEST/LUNG: Clear to auscultation bilaterally; No rales, rhonchi, wheezing, or rubs  HEART: Regular rate and rhythm; No murmurs, rubs, or gallops  ABDOMEN: Soft, Nontender, Nondistended; Bowel sounds present  EXTREMITIES:  2+ Peripheral Pulses, No clubbing, cyanosis, or edema  SKIN: No rashes or lesions      ASSESSMENT:   78 YOM with PMH bladder cancer in remission (s/p tumor resection, localized chemo), BPH, GERD, kyphoscoliosis s/p C-sacrum fusion ([facility] 10/2022), lymphedema, provoked DVT s/p AC, right hip hemiarthroplasty (7/2023), depression, panic disorder admitted for L hip fracture 2/2 mechanical fall. POD#1 s/p left hip hemiarthroplasty with Dr Mcgovern    PLAN:   - Pain:  1) acetaminophen 1000mg PO q8h  2) pregabalin 75 mg PO q8h  3) methocarbamol 500  mg Q8h  5) oxycodone 5mg to 10 mg PO q4h prn for moderate to severe pain       - Bowel regimen: Senna    - Nausea ppx: Zofran standing  - Functional Goals: Pt will get OOB with PT today. Pt will resume previous level of activity without impairment from surgery.       - Follow up, Discharge Planning:     Discharge is pending: medical and PT clearance  - Pain Management follow up plan: Dr Arenas and team will continue to follow     Plan discussed with Dr. Arenas  
Orthopedics Consult Note:    This is a 78y Male pmh bladder cancer (s/p tumor resection, s/p localized chemo and now in remission), GERD, L knee OA, chronic lymphedema, kyphoscoliosis s/p C-sacrum fusion at [facility] (10/5/2020, w/ Dr. Carlisle), depression, panic disorder, RUE DVT, BPH who presents to the ED today with c/o L left hip pain, LROM and inability to ambulate s/p fall today. Pt denies any other injuries. Pt denies head trauma or LOC. Pt denies any numbness, tingling or paresthesias. Pt denies any fever or chills. Pt is a community ambulator who uses a cane at baseline, walks approx 4-5 block a day, somewhat somnolent during exam. Pt denies any taking any anticoagulation medications.    Unwitnessed falls    Past Medical and Surgical History:  HIP FRACTURE LEFT    Handoff    MEWS Score    High cholesterol    Depression    GERD (gastroesophageal reflux disease)    Bladder cancer    Hip fracture, left    History of back surgery    FALL    90+    SysAdmin_VisitLink        Allergies:  sulfa drugs (Unknown)      Labs:  CBC ( 10-28-23 @ 09:25 )                   12.9  6.03 )-----------( 207                40.5      Chem ( 10-28-23 @ 09:25 )  136  |  101  |  35  ----------------------------<  93  3.9   |  26  |  0.85        PT/INR ( 10-28-23 @ 09:25 )   PT: 11.8;   INR: 1.04      PTT ( 10-28-23 @ 09:25 )  PTT: 33.7          Imaging:  X-rays of the pelvis, rightleft hip and femur demonstrate a displaced femoral neck hip fractrure.  R hip hemiarthroplasty slightvarus alignmnent, no subsidence     Vitals:  T(C): 36.9 (10-28-23 @ 11:40), Max: 36.9 (10-28-23 @ 11:40)  HR: 79 (10-28-23 @ 11:40) (75 - 79)  BP: 112/61 (10-28-23 @ 11:40) (112/61 - 125/76)  RR: 20 (10-28-23 @ 11:49) (18 - 20)  SpO2: 92% (10-28-23 @ 11:49) (92% - 98%)    Physical Exam:  General:  AAOx3, no acute distress, cachetic. Somnolent during exam appearing**//**well-nourished.  Musculoskeletal:  Left hip:  +mild swelling and ecchymosis, no erythema, skin intact, normothermic. +TTP over L hip. LROM limited 2/2 2' pain. Unable to SLR. + pain with axial loading or log roll. Moving ankle and all toes, +EHL/FHL/TA/GS. SILT throughout. DP and PT pulses 2+.  Shortened ER L side    Secondary Survey:  Right**//**left knee, right**//**left tib-fib, right**//**left ankle, RLE**//**LLE and B/L UEs with no swelling, no ecchymoses, no abrasions, no lacerations or any other signs of injury with full painless baseline ROM and no bony TTP. Able to SLR RLE. No pain with axial loading or log roll of RLE. Sensation intact distally, moving all digits. Distal pulses intact.      ribs with no bony TTP.   some pain with l wrist rom, some pain with palpation lumbar back    Assessment:  78y Male with a left displaced femoral neck hip fracture s/p fall home today pending OR with Dr. Mcgovern, goal early this week    -Patient advised that surgical intervention for Left hip fracture with Hemiarthroplasty versus Total Hip Arthroplasty will be necessary.    - Admit to Ortho.  - Medicine consult for medical optimization and clearance.  Please document medical clearance   - f/u Medical clearance.  - f/u labs/imaging.  - Complete bedrest.   - Pain control.  - Ice application.  - DVT prophylaxis. **with SCDs for now lovenox 40 for 2 days       Case discussed with Dr. Mcgovern who agrees with plan.          Total Encounter Time: 30, 50 or 70 min vs 15, 25 or 35 min    Encounter time included reviewing the history, patient records, speaking with pt (and their family member or caretaker at bedside), discussing the nature of the injury, **reviewing imaging, interpreting labs, **providing wound care, Suturing, Fracture Reduction, casting or splinting,  **discussing possible surgery, **obtaining surgical consent, ordering additional tests, and coordinating plan w Attending and relaying plan to **ED or ** Medical Team
DOM CASTAÑEDA  78y  Male      Patient is a 78y old  Male who presents with a chief complaint of fall    HPI per ortho: 78y Male pmh bladder cancer (s/p tumor resection, s/p localized chemo and now in remission), GERD, L knee OA, chronic lymphedema, kyphoscoliosis s/p C-sacrum fusion at [facility] (10/5/2020, w/ Dr. Carlisle), depression, panic disorder, RUE DVT, BPH who presents to the ED today with c/o L left hip pain, LROM and inability to ambulate s/p fall today. Pt denies any other injuries. Pt denies head trauma or LOC. Pt denies any numbness, tingling or paresthesias. Pt denies any fever or chills. Pt is a community ambulator who uses a cane at baseline, walks approx 4-5 block a day, somewhat somnolent during exam. Pt denies any taking any anticoagulation medications.    When I went down the ED patient had just become hypoxemic to mid 80s wit excellent wave form. On 3L NC O2 did not improve but patient noted to be mouth breathing. O2 sat normalized with NRB.   Attempted to interview patient but very difficult due to somnolence and hypoxemia requiring NRB    Patient was able to deny fever, shortness of breath, cough, throat, runny nose, sick contacts  Mentions remote hx of DVT, no longer on AC  Further hx unable to be accurately obtained at this time        PAST MEDICAL/SURGICAL HISTORY  PAST MEDICAL & SURGICAL HISTORY:  High cholesterol      Depression      GERD (gastroesophageal reflux disease)      Bladder cancer      History of back surgery      T(C): 36.9 (10-28-23 @ 11:40), Max: 36.9 (10-28-23 @ 11:40)  HR: 79 (10-28-23 @ 11:40) (75 - 79)  BP: 112/61 (10-28-23 @ 11:40) (112/61 - 125/76)  RR: 20 (10-28-23 @ 12:05) (18 - 20)  SpO2: 95% (10-28-23 @ 12:44) (92% - 98%)  Wt(kg): --Vital Signs Last 24 Hrs  T(C): 36.9 (28 Oct 2023 11:40), Max: 36.9 (28 Oct 2023 11:40)  T(F): 98.5 (28 Oct 2023 11:40), Max: 98.5 (28 Oct 2023 11:40)  HR: 79 (28 Oct 2023 11:40) (75 - 79)  BP: 112/61 (28 Oct 2023 11:40) (112/61 - 125/76)  BP(mean): --  RR: 20 (28 Oct 2023 12:05) (18 - 20)  SpO2: 95% (28 Oct 2023 12:44) (92% - 98%)    Parameters below as of 28 Oct 2023 12:44  Patient On (Oxygen Delivery Method): nasal cannula  O2 Flow (L/min): 3      PHYSICAL EXAM:  GENERAL:  gentleman, resting in bed, nad  ENMT: no oral lesions  HEAD:  Atraumatic, Normocephalic  NECK: Supple  NERVOUS SYSTEM:  lethargic but arousable  CHEST/LUNG: no increased wob, mouth breathing noted, some rales noted in RLL  HEART: Regular rate and rhythm; No murmurs, rubs, or gallops  ABDOMEN: Soft, Nontender, Nondistended  SKIN: No rashes or lesions    Consultant(s) Notes Reviewed:  [x ] YES  [ ] NO  Care Discussed with Consultants/Other Providers [ x] YES  [ ] NO    LABS:  CBC   10-28-23 @ 09:25  Hematcorit 40.5  Hemoglobin 12.9  Mean Cell Hemoglobin 29.6  Platelet Count-Automated 207  RBC Count 4.36  Red Cell Distrib Width 15.9  Wbc Count 6.03      BMP  10-28-23 @ 09:25  Anion Gap. Serum 9  Blood Urea Nitrogen,Serm 35  Calcium, Total Serum 9.0  Carbon Dioxide, Serum 26  Chloride, Serum 101  Creatinine, Serum 0.85  eGFR in  --  eGFR in Non Afican American --  Gloucose, serum 93  Potassium, Serum 3.9  Sodium, Serum 136        CMP  10-28-23 @ 09:25  Mohini Aminotransferase(ALT/SGPT)--  Albumin, Serum --  Alkaline Phosphatase, Serum --  Anion Gap, Serum 9  Aspartate Aminotransferase (AST/SGOT)--  Bilirubin Total, Serum --  Blood Urea Nitrogen, Serum 35  Calcium,Total Serum 9.0  Carbon Dioxide, Serum 26  Chloride, Serum 101  Creatinine, Serum 0.85  eGFR if  --  eGFR if Non African American --  Glucose, Serum 93  Potassium, Serum 3.9  Protein Total, Serum --  Sodium, Serum 136                PT/INR  PT/INR  10-28-23 @ 09:25  INR 1.04  Prothrombin Time Comment --  Prothrobin Time, [id number]      Amylase/Lipase            RADIOLOGY & ADDITIONAL TESTS:    Imaging Personally Reviewed:  [x ] YES  [ ] NO

## 2023-10-31 NOTE — PROGRESS NOTE ADULT - SUBJECTIVE AND OBJECTIVE BOX
Ortho Note    Pt seen and examined. Reports that his pain is better controlled with oxycodone, and feels  the dilaudid he takes at home at baseline does not really help him (4mg dose)  Denies CP, SOB, N/V, numbness/tingling. Was on 2LNC overnight, but is saturating 92%-95% on room air which seems baseline.  Pt with evidence of emphysema on chest CT.    Vital Signs Last 24 Hrs  T(C): 36.9 (10-31-23 @ 09:12), Max: 36.9 (10-31-23 @ 09:12)  T(F): 98.4 (10-31-23 @ 09:12), Max: 98.4 (10-31-23 @ 09:12)  HR: 91 (10-31-23 @ 09:12) (87 - 91)  BP: 91/57 (10-31-23 @ 09:12) (91/57 - 129/60)  BP(mean): --  RR: 16 (10-31-23 @ 09:12) (16 - 18)  SpO2: 94% (10-31-23 @ 09:12) (94% - 96%)  AVSS    General: Pt Alert and oriented, NAD  DSG- arturo C/D/I  Pulses: +2DP, WWP feet  Sensation: SILT BLE  Motor: 5/5 EHL/FHL/TA/GS  BLE                          10.3   8.44  )-----------( 160      ( 31 Oct 2023 11:20 )             31.5     10-31    135  |  102  |  18  ----------------------------<  135<H>  See Note   |  22  |  0.65    Ca    8.1<L>      31 Oct 2023 07:55        A/P: 78yMale POD#1 s/p left hip hemiarthroplasty, with possible left triquetral fracture  - VSS, Labs WNL- continue to monitor and appreciate internal medicine recs  - mildy hypoxia- currently saturating well on room air, I/S reinforced; chest CT 10/30 negative for PE; BLE dopplers negative for DVT  - Pain Control- appreciate pain management recommendations  - DVT ppx: LVX 40mg subq daily  - OOB for meals as tolerated, bowel regimen  - f/u MRI left wrist to r/o triquetral fracture, continue NWB on LUE  - PT, WBS: WBAT BLE, NWB LUE; OT consult  - dispo: pending PT eval, likely will require MCKENNA placement    Ortho Pager [phone]

## 2023-10-31 NOTE — PROGRESS NOTE ADULT - TIME BILLING
chart review including preop/outpatient records, patient interview/exam, review of labs/imaging, management of medical conditions, discussion with consultants, documentation
chart review including preop/outpatient records, patient interview/exam, review of labs/imaging, management of medical conditions, discussion with consultants, documentation

## 2023-10-31 NOTE — PHYSICAL THERAPY INITIAL EVALUATION ADULT - ADDITIONAL COMMENTS
Household ambulator who recently returned home from rehab who lives alone in 2nd floor walk up apartment, +2 FOS to enter. Ambulates with RW and SC and has HHA to assist with ADLs as needed. Of note, patient cannot recall how many days/hours his HHA is around.

## 2023-10-31 NOTE — DISCHARGE NOTE PROVIDER - NSDCMRMEDTOKEN_GEN_ALL_CORE_FT
Ambien 5 mg oral tablet: 1 tab(s) orally  ARIPiprazole 2 mg oral tablet: 1 tab(s) orally once a day  cholecalciferol 62.5 mcg (2500 intl units) oral capsule: 2 orally once a day  Crestor 10 mg oral tablet: 1 orally once a day  diazePAM 5 mg oral tablet: 1 orally once a day as needed for  anxiety  Dilaudid 4 mg oral tablet: 1 orally 5 times a day  DULoxetine 60 mg oral delayed release capsule: 2 cap(s) orally once a day  HYDROmorphone 4 mg oral tablet: 1 tab(s) orally every 6 hours as needed for  moderate pain  KlonoPIN 2 mg oral tablet: 1 orally once a day  methocarbamol 500 mg oral tablet: 1 tab(s) orally every 8 hours  omeprazole 20 mg oral delayed release tablet: 1 tab(s) orally once a day  polyethylene glycol 3350 oral powder for reconstitution: 17 gram(s) orally 2 times a day  predniSONE 5 mg oral tablet: 1 orally once a day  senna leaf extract oral tablet: 2 tab(s) orally once a day (at bedtime)  tamsulosin 0.4 mg oral capsule: 1 cap(s) orally once a day  tiZANidine 4 mg oral capsule: 2 orally 2 times a day   Abilify 2 mg oral tablet: 1 tab(s) orally once a day  Ambien 5 mg oral tablet: 1 tab(s) orally once a day (at bedtime) as needed for  insomnia  Crestor 10 mg oral tablet: 1 orally once a day  diazePAM 5 mg oral tablet: 1 orally once a day as needed for  anxiety  DULoxetine 60 mg oral delayed release capsule: 2 cap(s) orally once a day  gabapentin 300 mg oral capsule: 1 cap(s) orally 3 times a day  HYDROmorphone 4 mg oral tablet: 1 tab(s) orally every 6 hours as needed for  moderate pain  KlonoPIN 2 mg oral tablet: 1 tablet orally 2 times a day as needed for  anxiety  methocarbamol 500 mg oral tablet: 1 tab(s) orally as needed for  muscle spasm  omeprazole 20 mg oral delayed release tablet: 1 tab(s) orally once a day  predniSONE 2.5 mg oral tablet: 1 tab(s) orally once a day  tamsulosin 0.4 mg oral capsule: 1 cap(s) orally once a day   Abilify 2 mg oral tablet: 1 tab(s) orally once a day  acetaminophen 500 mg oral tablet: 2 tab(s) orally every 8 hours  Ambien 5 mg oral tablet: 1 tab(s) orally once a day (at bedtime) as needed for  insomnia  Crestor 10 mg oral tablet: 1 orally once a day  diazePAM 5 mg oral tablet: 1 orally once a day as needed for  anxiety  DULoxetine 60 mg oral delayed release capsule: 2 cap(s) orally once a day  gabapentin 300 mg oral capsule: 1 cap(s) orally 3 times a day  HYDROmorphone 4 mg oral tablet: 1 tab(s) orally every 6 hours as needed for  moderate pain  KlonoPIN 2 mg oral tablet: 1 tablet orally 2 times a day as needed for  anxiety  methocarbamol 500 mg oral tablet: 1 tab(s) orally 3 times a day as needed for  muscle spasm  omeprazole 20 mg oral delayed release tablet: 1 tab(s) orally once a day  oxyCODONE 10 mg oral tablet: 1 tab(s) orally every 4 hours As needed Severe Pain (7 - 10)  oxyCODONE 5 mg oral tablet: 1 tab(s) orally every 4 hours As needed Moderate Pain (4 - 6)  polyethylene glycol 3350 oral powder for reconstitution: 17 gram(s) orally 2 times a day  predniSONE 2.5 mg oral tablet: 1 tab(s) orally once a day  pregabalin 75 mg oral capsule: 1 cap(s) orally 3 times a day  tamsulosin 0.4 mg oral capsule: 1 cap(s) orally once a day   acetaminophen 500 mg oral tablet: 2 tab(s) orally every 8 hours  Ambien 5 mg oral tablet: 1 tab(s) orally once a day (at bedtime) as needed for  insomnia  bisacodyl 10 mg rectal suppository: 1 suppository(ies) rectal once a day As needed If no bowel movement by POD#2  bisacodyl 5 mg oral delayed release tablet: 1 tab(s) orally every 12 hours  Crestor 10 mg oral tablet: 1 orally once a day  diazePAM 5 mg oral tablet: 1 orally once a day as needed for  anxiety  DULoxetine 60 mg oral delayed release capsule: 2 cap(s) orally once a day  KlonoPIN 2 mg oral tablet: 1 tablet orally 2 times a day as needed for  anxiety  methocarbamol 500 mg oral tablet: 1 tab(s) orally 3 times a day as needed for  muscle spasm  omeprazole 20 mg oral delayed release tablet: 1 tab(s) orally once a day  oxyCODONE 10 mg oral tablet: 1 tab(s) orally every 4 hours As needed Severe Pain (7 - 10)  oxyCODONE 5 mg oral tablet: 1 tab(s) orally every 4 hours As needed Moderate Pain (4 - 6)  polyethylene glycol 3350 oral powder for reconstitution: 17 gram(s) orally 2 times a day  pregabalin 75 mg oral capsule: 1 cap(s) orally 3 times a day  senna leaf extract oral tablet: 2 tab(s) orally once a day (at bedtime)  tamsulosin 0.4 mg oral capsule: 1 cap(s) orally once a day

## 2023-10-31 NOTE — DISCHARGE NOTE PROVIDER - CARE PROVIDER_API CALL
Dwayne Mcgovern  Orthopaedic Surgery  [address]  [place] [address]  Phone: [phone]  Fax: [phone]  Established Patient  Follow Up Time: 2 weeks   [facility]  [facility]  [address]  [place] [address]  Phone: [phone]  Fax: [phone]  Established Patient  Follow Up Time: 2 weeks    JOYA FONTAINE  Phone: [phone]  Follow Up Time: Routine

## 2023-10-31 NOTE — DISCHARGE NOTE PROVIDER - NSDCFUADDAPPT_GEN_ALL_CORE_FT
Follow up with Dr. Sierra Mccauley (dermatology at [facility]) after discharge to discuss management of your chronic rash now that you are off prednisone.

## 2023-10-31 NOTE — PROGRESS NOTE ADULT - SUBJECTIVE AND OBJECTIVE BOX
Ortho Note    Pt comfortable without complaints, pain controlled  Denies CP, SOB, N/V, new numbness/tingling     Vital Signs Last 24 Hrs  T(C): 37.5 (10-31-23 @ 05:00), Max: 37.5 (10-31-23 @ 05:00)  T(F): 99.5 (10-31-23 @ 05:00), Max: 99.5 (10-31-23 @ 05:00)  HR: 87 (10-31-23 @ 06:03) (86 - 90)  BP: 129/60 (10-31-23 @ 06:03) (115/53 - 129/60)  BP(mean): --  RR: 18 (10-31-23 @ 06:03) (16 - 18)  SpO2: 96% (10-31-23 @ 06:03) (95% - 97%)  I&O's Summary    30 Oct 2023 07:01  -  31 Oct 2023 06:34  --------------------------------------------------------  IN: 130 mL / OUT: 500 mL / NET: -370 mL        General: Pt Alert and oriented, NAD  B/L LE: sensation intact, DP 2+, brisk cap refill, EHL/FHL/TA/GS 5/5  NAHED c/d/i                          12.7   7.00  )-----------( 176      ( 30 Oct 2023 05:30 )             39.4     10-30    135  |  102  |  20  ----------------------------<  107<H>  3.9   |  25  |  0.70    Ca    8.5      30 Oct 2023 05:30    TPro  6.3  /  Alb  3.4  /  TBili  0.6  /  DBili  x   /  AST  23  /  ALT  17  /  AlkPhos  79  10-29      A/P: 78yMale s/p L Hip david anterior, on 10/30  - Stable  - Pain Control  - f/u AM labs  - DVT ppx: LVX POD1  - Post op abx: periop ancef  - PT, WBS: WBAT  - Dispo: pending PT/OT  -pend pain consult  -pend MR wrist    Ortho Pager [phone]

## 2023-10-31 NOTE — DISCHARGE NOTE PROVIDER - NSDCACTIVITY_GEN_ALL_CORE
Do not make important decisions/Stairs allowed/Walking - Indoors allowed/No heavy lifting/straining/Follow Instructions Provided by your Surgical Team

## 2023-10-31 NOTE — DISCHARGE NOTE PROVIDER - NSDCCPCAREPLAN_GEN_ALL_CORE_FT
PRINCIPAL DISCHARGE DIAGNOSIS  Diagnosis: Hip fracture, left  Assessment and Plan of Treatment:

## 2023-10-31 NOTE — OCCUPATIONAL THERAPY INITIAL EVALUATION ADULT - GENERAL OBSERVATIONS, REHAB EVAL
OT IE completed. Orders received, chart reviewed, pt cleared for OT by DAYAN Garcia. Pt received semi supine in bed, NAD, +heplock, +L wrist brace, +NAHED, +L hip dressing C/D/I. Pt A&Ox4, agreeable to OT, and tolerated session well.

## 2023-10-31 NOTE — PHYSICAL THERAPY INITIAL EVALUATION ADULT - ACTIVE RANGE OF MOTION EXAMINATION, REHAB EVAL
L wrist ROM not assessed 2/2 splint and NWB precautions/bilateral upper extremity Active ROM was WFL (within functional limits)/bilateral  lower extremity Active ROM was WFL (within functional limits)

## 2023-10-31 NOTE — OCCUPATIONAL THERAPY INITIAL EVALUATION ADULT - ADDITIONAL COMMENTS
Pt lives alone in 2nd floor walk up apartment, +2 FOS to enter. Ambulates with RW and SC and has HHA to assist with ADLs as needed. Of note, patient cannot recall how many days/hours his HHA is around. Pt reporting that he has a tub bench at home that he does not use.

## 2023-10-31 NOTE — DISCHARGE NOTE PROVIDER - HOSPITAL COURSE
Admitted to Orthopedic service on 10/28   Surgery- Left hip hemiarthroplasty  Evelin-op Antibiotics  Pain control  DVT prophylaxis  OOB/Physical Therapy Admitted to Orthopedic service on 10/28 with left hip fracture and possible left wrist fracture  Medicine consult for pre-op clearance and optimization  Surgery- Left hip hemiarthroplasty  Evelin-op Antibiotics  Pain control- pain management consult  DVT prophylaxis- lovenox 40mg subq daily  OOB/Physical Therapy- non weight-bearing on LUE, weight bearing as tolerated BLE    Inpatient Events:  Increasing O2 needs pre-op, BLE/BUE dopplers negative for DVT. Chest CT negative for PE, showing emphysema.  Post-op day 1- Pain management consulted PO#1 given patient's h/o chronic opioid use; recommendations appreciated  MRI left wrist for possible fx showing _______________________     Admitted to Orthopedic service on 10/28 with left hip fracture and possible left wrist fracture  Medicine consult for pre-op clearance and optimization  Surgery- Left hip hemiarthroplasty  Evelin-op Antibiotics  Pain control- pain management consult  DVT prophylaxis- lovenox 40mg subq daily  OOB/Physical Therapy- non weight-bearing on LUE, weight bearing as tolerated BLE    Inpatient Events:  Increasing O2 needs pre-op, BLE/BUE dopplers negative for DVT. Chest CT negative for PE, showing emphysema.  Post-op day 1- Pain management consulted PO#1 given patient's h/o chronic opioid use; recommendations appreciated  MRI left wrist for possible fx showing _______________________  history of chronic rash for which takes prednisone. Prednisone likely leading to bone fragility and discontinued. Plan for outpatient follow-up with Dr. Sierra Mccauley (dermatology) at [facility] for rash.   Admitted to Orthopedic service on 10/28 with left hip fracture and possible left wrist fracture  Medicine consult for pre-op clearance and optimization  Surgery- Left hip hemiarthroplasty  Evelin-op Antibiotics  Pain control- pain management consult  DVT prophylaxis- lovenox 40mg subq daily  OOB/Physical Therapy- non weight-bearing on LUE, weight bearing as tolerated BLE    Inpatient Events:  Increasing O2 needs pre-op, BLE/BUE dopplers negative for DVT. Chest CT negative for PE, showing emphysema.  Post-op day 1- Pain management consulted PO#1 given patient's h/o chronic opioid use; recommendations appreciated  MRI left wrist for possible fx- kept non weight-bearing.  history of chronic rash for which takes prednisone. Prednisone likely leading to bone fragility and discontinued. Plan for outpatient follow-up with Dr. Sierra Mccauley (dermatology) at [facility] for rash.   Admitted to Orthopedic service on 10/28 with left hip fracture and possible left wrist fracture  Medicine consult for pre-op clearance and optimization  Surgery- Left hip hemiarthroplasty  Evelin-op Antibiotics  Pain control- pain management consult  DVT prophylaxis- lovenox 40mg subq daily  OOB/Physical Therapy- non weight-bearing on LUE, weight bearing as tolerated BLE    Inpatient Events:  Increasing O2 needs pre-op, BLE/BUE dopplers negative for DVT. Chest CT negative for PE, showing emphysema.  Post-op day 1- Pain management consulted PO#1 given patient's h/o chronic opioid use; recommendations appreciated  MRI left wrist negative for fracture and patient can be weight bearing as tolerated with rolling walker.  History of chronic rash for which takes prednisone. Prednisone likely leading to bone fragility and discontinued. Plan for outpatient follow-up with Dr. Sierra Mccauley (dermatology) at [facility] for rash.

## 2023-10-31 NOTE — DISCHARGE NOTE PROVIDER - PROVIDER TOKENS
PROVIDER:[TOKEN:[[id number]],FOLLOWUP:[2 weeks],ESTABLISHEDPATIENT:[T]] PROVIDER:[TOKEN:[[id number]],FOLLOWUP:[2 weeks],ESTABLISHEDPATIENT:[T]],PROVIDER:[TOKEN:[[id number]],FOLLOWUP:[Routine]]

## 2023-10-31 NOTE — DISCHARGE NOTE PROVIDER - NSDCFUSCHEDAPPT_GEN_ALL_CORE_FT
improved
unchanged
Dwayne Mcgovern  [facility]  ORTHOSURG [address]  Scheduled Appointment: 11/14/2023    
unchanged
admitted (see in patient flowsheets for further documentation)
unchanged

## 2023-10-31 NOTE — PROGRESS NOTE ADULT - ASSESSMENT
78 YOM with PMH bladder cancer in remission (s/p tumor resection, localized chemo), BPH, GERD, kyphoscoliosis [facility] lymphedema, provoked DVT s/p AC, right hip hemiarthroplasty (7/2023), depression, panic disorder admitted for L hip fracture 2/2 mechanical fall s/p OR with Dr. Mcgovern on 10/30 for L hip hemiarthroplasty.     L femoral neck fracture s/p L hip hemiarthroplasty  History of right hip hemiarthroplasty   Post operative state  - management per ortho, s/p OR with Dr. Mcgovern 10/30   - pain control with bowel regimen  - frequent perioperative incentive spirometer use  - early ambulation and OOBTC as tolerated  - chemical DVT ppx with LMWH     L wrist injury  - management per ortho  - currently in splint  - pending L wrist MRI    Acute hypoxic respiratory failure, resolved  Centrilobar emphysema  - req 2L NC on admission --> weaned to RA, maintain SpO2 >92%  - CTA neg for PE but shows moderate centrilobular emphysema and bibasilar atelectasis.  - recommend strong pulmonary hygeine: frequent IS + chest PT  - remote history of TUD, advised outpatient follow up with pulm for PFTs    Bladder cancer in remission  BPH  - s/p tumor resection, localized chemo  - cont home tamsulosin 0.4mg    History of provoked DVT s/p AC  - provoked in setting back surgery  - venous duplex BLE and BUE neg  - no longer on therapeutic AC    Depression  Panic disorder   - home: duloxetine 60mg daily, clonazepam 2mg BID, diazepam 5mg daily, zolpidem 10mg daily   - cont with caution (somnolent in ED) especially if also receiving opioids     LE rash  - ?takes prednisone 5mg daily for rash  - obtain collateral     Dispo: pending [facility] acceptance    Plan discussed with primary team. 78 YOM with PMH bladder cancer in remission (s/p tumor resection, localized chemo), BPH, GERD, [facility]coliosis [facility] lymphedema, provoked DVT s/p AC, right hip hemiarthroplasty (7/2023), depression, panic disorder admitted for L hip fracture 2/2 mechanical fall s/p OR with Dr. Mcgovern on 10/30 for L hip hemiarthroplasty.     L femoral neck fracture s/p L hip hemiarthroplasty  History of right hip hemiarthroplasty   Post operative state  - management per ortho, s/p OR with Dr. Mcgovern 10/30   - pain control with bowel regimen  - frequent perioperative incentive spirometer use  - early ambulation and OOBTC as tolerated  - chemical DVT ppx with LMWH     Acute blood loss anemia  - Hgb 13.1 --> 10.3, HDS  - asymptomatic w/o active s/s bleeding  - partially 2/2 operative losses    L wrist injury  - management per ortho  - currently in splint  - pending L wrist MRI    Acute hypoxic respiratory failure, resolved  Centrilobar emphysema  - req 2L NC on admission --> weaned to RA, maintain SpO2 >92%  - CTA neg for PE but shows moderate centrilobular emphysema and bibasilar atelectasis.  - recommend strong pulmonary hygeine: frequent IS + chest PT  - remote history of TUD, advised outpatient follow up with pulm for PFTs    Bladder cancer in remission  BPH  - s/p tumor resection, localized chemo  - cont home tamsulosin 0.4mg    History of provoked DVT s/p AC  - provoked in setting back surgery  - venous duplex BLE and BUE neg  - no longer on therapeutic AC    Depression  Panic disorder   - home: duloxetine 60mg daily, clonazepam 2mg BID, diazepam 5mg daily, zolpidem 10mg daily   - cont with caution (somnolent in ED) especially if also receiving opioids     LE rash  - ?takes prednisone 5mg daily for rash  - obtain collateral     Dispo: pending [facility] acceptance    Plan discussed with primary team.

## 2023-10-31 NOTE — OCCUPATIONAL THERAPY INITIAL EVALUATION ADULT - LEVEL OF INDEPENDENCE: DRESS LOWER BODY, OT EVAL
don B socks sitting EOB, requiring Max Ax1 2/2 impaired dynamic balance and functional reach./maximum assist (25% patients effort)

## 2023-10-31 NOTE — OCCUPATIONAL THERAPY INITIAL EVALUATION ADULT - RANGE OF MOTION EXAMINATION, UPPER EXTREMITY
L wrist ROM not assessed 2/2 splint and NWB precautions/bilateral UE Active ROM was WFL  (within functional limits)

## 2023-10-31 NOTE — DISCHARGE NOTE PROVIDER - NSDCCPTREATMENT_GEN_ALL_CORE_FT
PRINCIPAL PROCEDURE  Procedure: Arthroplasty of left hip  Findings and Treatment: david arthroplasty

## 2023-10-31 NOTE — PHYSICAL THERAPY INITIAL EVALUATION ADULT - BED MOBILITY LIMITATIONS, REHAB EVAL
Tolerated sitting EOB for ~10 minutes. Reporting significant L hip pain, requesting to defer further OOB mobility until next session./decreased ability to use arms for pushing/pulling/decreased ability to use legs for bridging/pushing/impaired ability to control trunk for mobility

## 2023-10-31 NOTE — PHYSICAL THERAPY INITIAL EVALUATION ADULT - GENERAL OBSERVATIONS, REHAB EVAL
PT IE completed. Patient received semi supine in bed +tele, +heplock IV, +L wrist brace, +L hip dressing + NAHED C/D/I, NAD, willing to work with PT.

## 2023-10-31 NOTE — PROGRESS NOTE ADULT - SUBJECTIVE AND OBJECTIVE BOX
SUBJECTIVE: NAEON. Patient feels well this morning, pain controlled. Weaned off NC - SpO2 92-95% on room air. Reports remote history of smoking - discussed CT results of emphysematous changes and atelectasis with patient. No other acute complaints.     MEDICATIONS:  MEDICATIONS  (STANDING):  acetaminophen     Tablet .. 1000 milliGRAM(s) Oral every 8 hours  atorvastatin 40 milliGRAM(s) Oral at bedtime  bisacodyl 5 milliGRAM(s) Oral every 12 hours  chlorhexidine 2% Cloths 1 Application(s) Topical <User Schedule>  DULoxetine 60 milliGRAM(s) Oral daily  enoxaparin Injectable 40 milliGRAM(s) SubCutaneous every 24 hours  methocarbamol 500 milliGRAM(s) Oral three times a day  multivitamin 1 Tablet(s) Oral daily  naproxen 500 milliGRAM(s) Oral every 12 hours  pantoprazole    Tablet 40 milliGRAM(s) Oral before breakfast  polyethylene glycol 3350 17 Gram(s) Oral two times a day  predniSONE   Tablet 5 milliGRAM(s) Oral daily  senna 2 Tablet(s) Oral at bedtime  sodium chloride 0.9%. 1000 milliLiter(s) (100 mL/Hr) IV Continuous <Continuous>  tamsulosin 0.4 milliGRAM(s) Oral at bedtime    MEDICATIONS  (PRN):  bisacodyl Suppository 10 milliGRAM(s) Rectal daily PRN If no bowel movement by POD#2  clonazePAM  Tablet 2 milliGRAM(s) Oral daily PRN anxiety  HYDROmorphone  Injectable 0.5 milliGRAM(s) IV Push every 4 hours PRN breakthrough pain  HYDROmorphone  Injectable 0.5 milliGRAM(s) IV Push every 4 hours PRN Breakthrough pain floors only  HYDROmorphone  Injectable 0.5 milliGRAM(s) IV Push every 15 minutes PRN breakthrough pain pacu only  magnesium hydroxide Suspension 30 milliLiter(s) Oral daily PRN Constipation  ondansetron Injectable 4 milliGRAM(s) IV Push every 4 hours PRN Nausea and/or Vomiting  oxyCODONE    IR 5 milliGRAM(s) Oral every 4 hours PRN Moderate Pain (4 - 6)  oxyCODONE    IR 10 milliGRAM(s) Oral every 4 hours PRN Severe Pain (7 - 10)  zolpidem 5 milliGRAM(s) Oral at bedtime PRN Insomnia  zolpidem 5 milliGRAM(s) Oral at bedtime PRN Insomnia      Allergies    sulfa drugs (Unknown)    Intolerances        OBJECTIVE:  Vital Signs Last 24 Hrs  T(C): 36.9 (31 Oct 2023 09:12), Max: 37.5 (31 Oct 2023 05:00)  T(F): 98.4 (31 Oct 2023 09:12), Max: 99.5 (31 Oct 2023 05:00)  HR: 71 (31 Oct 2023 12:40) (71 - 91)  BP: 108/52 (31 Oct 2023 12:40) (91/57 - 155/74)  BP(mean): 73 (31 Oct 2023 12:40) (73 - 107)  RR: 17 (31 Oct 2023 12:40) (11 - 21)  SpO2: 93% (31 Oct 2023 12:40) (93% - 100%)    Parameters below as of 31 Oct 2023 12:40  Patient On (Oxygen Delivery Method): room air      I&O's Summary    30 Oct 2023 07:01  -  31 Oct 2023 07:00  --------------------------------------------------------  IN: 130 mL / OUT: 500 mL / NET: -370 mL    31 Oct 2023 07:01  -  31 Oct 2023 14:53  --------------------------------------------------------  IN: 490 mL / OUT: 100 mL / NET: 390 mL        PHYSICAL EXAM:  Gen: appears stated age, resting comfortably, NAD  HEENT: NCAT, MMM  Neck: supple  CV: RRR, no m/r/g, peripheral pulses 2+  Pulm: CTAB, no increased work of breathing, no rales/rhonchi  Abd: soft, ND, NT, no rebound or guarding  Skin: warm and dry  Ext: L hip dressing CDI, no LE edema  Neuro: AOx3, speaking in full sentences  Psych: affect and behavior appropriate, pleasant at time of interview    LABS:                        10.3   8.44  )-----------( 160      ( 31 Oct 2023 11:20 )             31.5     10-31    135  |  102  |  18  ----------------------------<  135<H>  See Note   |  22  |  0.65    Ca    8.1<L>      31 Oct 2023 07:55          CAPILLARY BLOOD GLUCOSE        Urinalysis Basic - ( 31 Oct 2023 07:55 )    Color: x / Appearance: x / SG: x / pH: x  Gluc: 135 mg/dL / Ketone: x  / Bili: x / Urobili: x   Blood: x / Protein: x / Nitrite: x   Leuk Esterase: x / RBC: x / WBC x   Sq Epi: x / Non Sq Epi: x / Bacteria: x        MICRODATA:      RADIOLOGY/OTHER STUDIES:    CTA chest 10/30:  FINDINGS:  PULMONARY ARTERY: No embolus  LUNGS AND AIRWAYS: Patent central airways.  Upper lobe predominant   moderate centrilobular emphysema. Bibasilar atelectasis.  PLEURA: No pleural effusion.  MEDIASTINUM AND MIKE: No lymphadenopathy.  VESSELS: Marked coronary calcifications  HEART: Heart size is normal. No pericardial effusion.  CHEST WALL AND LOWER NECK: Within normal limits.  VISUALIZED UPPER ABDOMEN: Pneumobilia. Cholelithiasis. Left renal cyst.   Probable pill in lower thoracic esophagus.  BONES: No suspicious lesion. Since November 20, 2020, new    anterolisthesis T2 on T3 by approximately 8 mm and increased kyphosis at   this level (11, 90). Again chronic fracture superior endplate of   T3.Unchanged chronic severe compression fracture of T12. Incompletely   imaged posterior spinal fusion, again seen. Old fracture deformity   posterior right third rib. SUBJECTIVE: NAEON. Patient feels well this morning, pain controlled. Weaned off NC - SpO2 92-95% on room air. Reports remote history of smoking - discussed CT results of emphysematous changes and atelectasis with patient. No other acute complaints.     MEDICATIONS:  MEDICATIONS  (STANDING):  acetaminophen     Tablet .. 1000 milliGRAM(s) Oral every 8 hours  atorvastatin 40 milliGRAM(s) Oral at bedtime  bisacodyl 5 milliGRAM(s) Oral every 12 hours  chlorhexidine 2% Cloths 1 Application(s) Topical <User Schedule>  DULoxetine 60 milliGRAM(s) Oral daily  enoxaparin Injectable 40 milliGRAM(s) SubCutaneous every 24 hours  methocarbamol 500 milliGRAM(s) Oral three times a day  multivitamin 1 Tablet(s) Oral daily  naproxen 500 milliGRAM(s) Oral every 12 hours  pantoprazole    Tablet 40 milliGRAM(s) Oral before breakfast  polyethylene glycol 3350 17 Gram(s) Oral two times a day  predniSONE   Tablet 5 milliGRAM(s) Oral daily  senna 2 Tablet(s) Oral at bedtime  sodium chloride 0.9%. 1000 milliLiter(s) (100 mL/Hr) IV Continuous <Continuous>  tamsulosin 0.4 milliGRAM(s) Oral at bedtime    MEDICATIONS  (PRN):  bisacodyl Suppository 10 milliGRAM(s) Rectal daily PRN If no bowel movement by POD#2  clonazePAM  Tablet 2 milliGRAM(s) Oral daily PRN anxiety  HYDROmorphone  Injectable 0.5 milliGRAM(s) IV Push every 4 hours PRN breakthrough pain  HYDROmorphone  Injectable 0.5 milliGRAM(s) IV Push every 4 hours PRN Breakthrough pain floors only  HYDROmorphone  Injectable 0.5 milliGRAM(s) IV Push every 15 minutes PRN breakthrough pain pacu only  magnesium hydroxide Suspension 30 milliLiter(s) Oral daily PRN Constipation  ondansetron Injectable 4 milliGRAM(s) IV Push every 4 hours PRN Nausea and/or Vomiting  oxyCODONE    IR 5 milliGRAM(s) Oral every 4 hours PRN Moderate Pain (4 - 6)  oxyCODONE    IR 10 milliGRAM(s) Oral every 4 hours PRN Severe Pain (7 - 10)  zolpidem 5 milliGRAM(s) Oral at bedtime PRN Insomnia  zolpidem 5 milliGRAM(s) Oral at bedtime PRN Insomnia      Allergies    sulfa drugs (Unknown)    Intolerances        OBJECTIVE:  Vital Signs Last 24 Hrs  T(C): 36.9 (31 Oct 2023 09:12), Max: 37.5 (31 Oct 2023 05:00)  T(F): 98.4 (31 Oct 2023 09:12), Max: 99.5 (31 Oct 2023 05:00)  HR: 71 (31 Oct 2023 12:40) (71 - 91)  BP: 108/52 (31 Oct 2023 12:40) (91/57 - 155/74)  BP(mean): 73 (31 Oct 2023 12:40) (73 - 107)  RR: 17 (31 Oct 2023 12:40) (11 - 21)  SpO2: 93% (31 Oct 2023 12:40) (93% - 100%)    Parameters below as of 31 Oct 2023 12:40  Patient On (Oxygen Delivery Method): room air      I&O's Summary    30 Oct 2023 07:01  -  31 Oct 2023 07:00  --------------------------------------------------------  IN: 130 mL / OUT: 500 mL / NET: -370 mL    31 Oct 2023 07:01  -  31 Oct 2023 14:53  --------------------------------------------------------  IN: 490 mL / OUT: 100 mL / NET: 390 mL        PHYSICAL EXAM:  Gen: appears stated age, resting comfortably, NAD  HEENT: NCAT, MMM  Neck: supple  CV: RRR, no m/r/g, peripheral pulses 2+  Pulm: CTAB, no increased work of breathing, no rales/rhonchi  Abd: soft, ND, NT, no rebound or guarding  Skin: warm and dry  Ext: L hip dressing CDI, no LE edema, L wrist in splint  Neuro: AOx3, speaking in full sentences  Psych: affect and behavior appropriate, pleasant at time of interview    LABS:                        10.3   8.44  )-----------( 160      ( 31 Oct 2023 11:20 )             31.5     10-31    135  |  102  |  18  ----------------------------<  135<H>  See Note   |  22  |  0.65    Ca    8.1<L>      31 Oct 2023 07:55          CAPILLARY BLOOD GLUCOSE        Urinalysis Basic - ( 31 Oct 2023 07:55 )    Color: x / Appearance: x / SG: x / pH: x  Gluc: 135 mg/dL / Ketone: x  / Bili: x / Urobili: x   Blood: x / Protein: x / Nitrite: x   Leuk Esterase: x / RBC: x / WBC x   Sq Epi: x / Non Sq Epi: x / Bacteria: x        MICRODATA:      RADIOLOGY/OTHER STUDIES:    CTA chest 10/30:  FINDINGS:  PULMONARY ARTERY: No embolus  LUNGS AND AIRWAYS: Patent central airways.  Upper lobe predominant   moderate centrilobular emphysema. Bibasilar atelectasis.  PLEURA: No pleural effusion.  MEDIASTINUM AND MIKE: No lymphadenopathy.  VESSELS: Marked coronary calcifications  HEART: Heart size is normal. No pericardial effusion.  CHEST WALL AND LOWER NECK: Within normal limits.  VISUALIZED UPPER ABDOMEN: Pneumobilia. Cholelithiasis. Left renal cyst.   Probable pill in lower thoracic esophagus.  BONES: No suspicious lesion. Since November 20, 2020, new    anterolisthesis T2 on T3 by approximately 8 mm and increased kyphosis at   this level (11, 90). Again chronic fracture superior endplate of   T3.Unchanged chronic severe compression fracture of T12. Incompletely   imaged posterior spinal fusion, again seen. Old fracture deformity   posterior right third rib.

## 2023-10-31 NOTE — DISCHARGE NOTE PROVIDER - NSDCFUADDINST_GEN_ALL_CORE_FT
Please follow Dr. Sosa**’s instruction sheets (IF APPLICABLE)   ACTIVITY:   - Weight bear as tolerated with assistive device. No strenuous activity, heavy lifting, driving or returning to work until cleared by MD.   - Apply a cold compress to the surgical site several times daily to reduce pain and swelling. For icing, twenty-minute sessions followed by an hour off is recommended. You should ice as frequently as possible. Ice should NEVER be placed directly on the skin. Wearing compression stockings during the first week after surgery can help reduce swelling in your knee, calf and foot, but is not required.      (KNEE REPLACEMENTS)   DO place a pillow under your heel when elevating the leg, to encourage full extension of the knee. Do NOT place a pillow behind your knee for comfort, as this can lead to permanent difficulty straightening your leg. It is normal to develop some swelling in the leg, ankle, and foot because of gravity.   DRESSING/SHOWERING:   (NAHED – incisional wound vac)   - You have an incisional wound vac dressing with tubing to attached canister/battery pack. You may shower but must keep battery pack dry at all times. The battery dies in 7 days then can remove dressing and leave open to air. Keep your incision clean and dry. Do not pick at your incision. Do not apply creams, ointments or oils to your incision until cleared by your surgeon. Do not soak your incision in sitting water (ie tubs, pools, lakes, etc.) until cleared by your surgeon. Do not scrub the incision – instead, allow soap and water to flow over the incision and then pat it dry with a clean towel.     MEDICATION/ANTICOAGULATION:   -You have been prescribed lovenox, as a preventative to help prevent postoperative blood clots. Please take this medication as prescribed.    - You have been prescribed medications for pain:     - Tylenol for mild to moderate pain. Do not exceed 3,000mg daily.     - For more severe pain, take Tylenol with the addition of narcotic pain medication. Take this medication as prescribed. This medication may cause drowsiness or dizziness. Do not operate machinery. This medication may cause constipation.   - For any additional medications, follow instructions on the bottle.    -Try to have regular bowel movements. Take stool softener or laxative if necessary. You may wish to take Miralax daily until you have regular bowel movements.    - If you have been prescribed Aspirin or an anti-inflammatory, please take prilosec (omeprazole) once a day, before breakfast, until no longer taking Aspirin or anti-inflammatory. This will help protect your stomach.   - If you have a pain management physician, please follow-up with them postoperatively.    - If you experience any negative side effects of your medications, please call your surgeon's office to discuss.      FOLLOW-UP:   - Call to schedule an appt with Dr. Mcgovern for follow up.   - Please follow-up with your primary care physician or any other specialist you see postoperatively, if needed.    - Contact your doctor or go to the emergency room if you experience: fever greater than 101.5, chills, chest pain, difficulty breathing, redness or excessive drainage around the incision, other concerns.     ACTIVITY:   - Weight bear as tolerated on your operative leg. No strenuous activity, heavy lifting, driving or returning to work until cleared by MD.   - Do not  any weight (non weight-bearing) with your left wrist due to fracture.    DRESSING/SHOWERING:   (NAHED – incisional wound vac)   - You have an incisional wound vac dressing with tubing to attached canister/battery pack. You may shower but must keep battery pack dry at all times. The battery dies in 7 days then can remove dressing and leave open to air. Keep your incision clean and dry. Do not pick at your incision. Do not apply creams, ointments or oils to your incision until cleared by your surgeon. Do not soak your incision in sitting water (ie tubs, pools, lakes, etc.) until cleared by your surgeon. Do not scrub the incision – instead, allow soap and water to flow over the incision and then pat it dry with a clean towel.     MEDICATION/ANTICOAGULATION:   -You have been prescribed lovenox, as a preventative to help prevent postoperative blood clots. Please take this medication as prescribed.    - You have been prescribed medications for pain:     - Tylenol for mild to moderate pain. Do not exceed 3,000mg daily.     - For more severe pain, take Tylenol with the addition of narcotic pain medication. Take this medication as prescribed. This medication may cause drowsiness or dizziness. Do not operate machinery. This medication may cause constipation.   - For any additional medications, follow instructions on the bottle.    -Try to have regular bowel movements. Take stool softener or laxative if necessary. You may wish to take Miralax daily until you have regular bowel movements.    - If you have been prescribed Aspirin or an anti-inflammatory, please take prilosec (omeprazole) once a day, before breakfast, until no longer taking Aspirin or anti-inflammatory. This will help protect your stomach.   - If you have a pain management physician, please follow-up with them postoperatively.    - If you experience any negative side effects of your medications, please call your surgeon's office to discuss.      FOLLOW-UP:   - Call to schedule an appt with Dr. Mcgovern for follow up.   - Please follow-up with your primary care physician or any other specialist you see postoperatively, if needed.    - Contact your doctor or go to the emergency room if you experience: fever greater than 101.5, chills, chest pain, difficulty breathing, redness or excessive drainage around the incision, other concerns.     ACTIVITY:   - Weight bear as tolerated on your operative leg. No strenuous activity, heavy lifting, driving or returning to work until cleared by MD.   - Do not  any weight (non weight-bearing) with your left wrist due to fracture.    DRESSING/SHOWERING:   (NAHED – incisional wound vac)   - You have an incisional wound vac dressing with tubing to attached canister/battery pack. You may shower but must keep battery pack dry at all times. The battery dies in 7 days then can remove dressing and leave open to air. Keep your incision clean and dry. Do not pick at your incision. Do not apply creams, ointments or oils to your incision until cleared by your surgeon. Do not soak your incision in sitting water (ie tubs, pools, lakes, etc.) until cleared by your surgeon. Do not scrub the incision – instead, allow soap and water to flow over the incision and then pat it dry with a clean towel.     MEDICATION/ANTICOAGULATION:   -You have been prescribed lovenox, as a preventative to help prevent postoperative blood clots. Please take this medication as prescribed.    - You have been prescribed medications for pain:     - Tylenol for mild to moderate pain. Do not exceed 3,000mg daily.     - For more severe pain, take Tylenol with the addition of narcotic pain medication. Take this medication as prescribed. This medication may cause drowsiness or dizziness. Do not operate machinery. This medication may cause constipation.   - For any additional medications, follow instructions on the bottle.    -Try to have regular bowel movements. Take stool softener or laxative if necessary. You may wish to take Miralax daily until you have regular bowel movements.    - If you have been prescribed Aspirin or an anti-inflammatory, please take prilosec (omeprazole) once a day, before breakfast, until no longer taking Aspirin or anti-inflammatory. This will help protect your stomach.   - If you have a pain management physician, please follow-up with them postoperatively.    - If you experience any negative side effects of your medications, please call your surgeon's office to discuss.      FOLLOW-UP:   - Call to schedule an appt with Dr. Mcgovern for follow up.   Follow up with Dr. Sierra Mccauley (dermatology at [facility]) after discharge to discuss management of your chronic rash now that you are off prednisone.  - Please follow-up with your primary care physician or any other specialist you see postoperatively, if needed.    - Contact your doctor or go to the emergency room if you experience: fever greater than 101.5, chills, chest pain, difficulty breathing, redness or excessive drainage around the incision, other concerns.   Due to osteoperosis and multiple recent fractures (likely related to chronic steroid use), your prednisone was stopped. You should follow-up with dermatologist Dr. Mccauley regarding your chronic rash and other treatments available. You should also follow-up with your primary care doctor to obtain DEXA scan and consider initiation of PO bisphosphanates.   Your oxygen was also on the low side of normal at some points during your hospital stay and you should follow-up for outpatient for pulmonary function tests.        ACTIVITY:   - Weight bear as tolerated on your operative leg. No strenuous activity, heavy lifting, driving or returning to work until cleared by MD.   - You are also weight-bearing as tolerated with your left arm. Your wrist was evaluated by xray and MRI and there is no fracture.    DRESSING/SHOWERING:   (NAHED – incisional wound vac)   - You have an incisional wound vac dressing with tubing to attached canister/battery pack. You may shower but must keep battery pack dry at all times. The battery dies in 7 days then can remove dressing and leave open to air. Keep your incision clean and dry. Do not pick at your incision. Do not apply creams, ointments or oils to your incision until cleared by your surgeon. Do not soak your incision in sitting water (ie tubs, pools, lakes, etc.) until cleared by your surgeon. Do not scrub the incision – instead, allow soap and water to flow over the incision and then pat it dry with a clean towel.     (AQUACEL)  You also have an aquacel dressing. This is also water-resistant. You may shower. Remove dressing in 1 week after surgery and keep incision open to air.    MEDICATION/ANTICOAGULATION:   -You have been prescribed lovenox, as a preventative to help prevent postoperative blood clots. Please take this medication as prescribed.    - You have been prescribed medications for pain:     - Tylenol for mild to moderate pain. Do not exceed 3,000mg daily.     - For more severe pain, take Tylenol with the addition of narcotic pain medication. Take this medication as prescribed. This medication may cause drowsiness or dizziness. Do not operate machinery. This medication may cause constipation.   - For any additional medications, follow instructions on the bottle.    -Try to have regular bowel movements. Take stool softener or laxative if necessary. You may wish to take Miralax daily until you have regular bowel movements.    - If you have been prescribed Aspirin or an anti-inflammatory, please take prilosec (omeprazole) once a day, before breakfast, until no longer taking Aspirin or anti-inflammatory. This will help protect your stomach.   - If you have a pain management physician, please follow-up with them postoperatively.    - If you experience any negative side effects of your medications, please call your surgeon's office to discuss.      FOLLOW-UP:   - Call to schedule an appt with Dr. Mcgovern for follow up.   Follow up with Dr. Sierra Mccauley (dermatology at [facility]) after discharge to discuss management of your chronic rash now that you are off prednisone.  - Please follow-up with your primary care physician or any other specialist you see postoperatively, if needed.    - Contact your doctor or go to the emergency room if you experience: fever greater than 101.5, chills, chest pain, difficulty breathing, redness or excessive drainage around the incision, other concerns.

## 2023-11-01 LAB
ANION GAP SERPL CALC-SCNC: 7 MMOL/L — SIGNIFICANT CHANGE UP (ref 5–17)
ANION GAP SERPL CALC-SCNC: 7 MMOL/L — SIGNIFICANT CHANGE UP (ref 5–17)
BUN SERPL-MCNC: 22 MG/DL — SIGNIFICANT CHANGE UP (ref 7–23)
BUN SERPL-MCNC: 22 MG/DL — SIGNIFICANT CHANGE UP (ref 7–23)
CALCIUM SERPL-MCNC: 8.1 MG/DL — LOW (ref 8.4–10.5)
CALCIUM SERPL-MCNC: 8.1 MG/DL — LOW (ref 8.4–10.5)
CHLORIDE SERPL-SCNC: 106 MMOL/L — SIGNIFICANT CHANGE UP (ref 96–108)
CHLORIDE SERPL-SCNC: 106 MMOL/L — SIGNIFICANT CHANGE UP (ref 96–108)
CO2 SERPL-SCNC: 27 MMOL/L — SIGNIFICANT CHANGE UP (ref 22–31)
CO2 SERPL-SCNC: 27 MMOL/L — SIGNIFICANT CHANGE UP (ref 22–31)
CREAT SERPL-MCNC: 0.86 MG/DL — SIGNIFICANT CHANGE UP (ref 0.5–1.3)
CREAT SERPL-MCNC: 0.86 MG/DL — SIGNIFICANT CHANGE UP (ref 0.5–1.3)
EGFR: 89 ML/MIN/1.73M2 — SIGNIFICANT CHANGE UP
EGFR: 89 ML/MIN/1.73M2 — SIGNIFICANT CHANGE UP
GLUCOSE SERPL-MCNC: 116 MG/DL — HIGH (ref 70–99)
GLUCOSE SERPL-MCNC: 116 MG/DL — HIGH (ref 70–99)
HCT VFR BLD CALC: 31.6 % — LOW (ref 39–50)
HCT VFR BLD CALC: 31.6 % — LOW (ref 39–50)
HGB BLD-MCNC: 10 G/DL — LOW (ref 13–17)
HGB BLD-MCNC: 10 G/DL — LOW (ref 13–17)
MCHC RBC-ENTMCNC: 29.9 PG — SIGNIFICANT CHANGE UP (ref 27–34)
MCHC RBC-ENTMCNC: 29.9 PG — SIGNIFICANT CHANGE UP (ref 27–34)
MCHC RBC-ENTMCNC: 31.6 GM/DL — LOW (ref 32–36)
MCHC RBC-ENTMCNC: 31.6 GM/DL — LOW (ref 32–36)
MCV RBC AUTO: 94.3 FL — SIGNIFICANT CHANGE UP (ref 80–100)
MCV RBC AUTO: 94.3 FL — SIGNIFICANT CHANGE UP (ref 80–100)
NRBC # BLD: 0 /100 WBCS — SIGNIFICANT CHANGE UP (ref 0–0)
NRBC # BLD: 0 /100 WBCS — SIGNIFICANT CHANGE UP (ref 0–0)
PLATELET # BLD AUTO: 177 K/UL — SIGNIFICANT CHANGE UP (ref 150–400)
PLATELET # BLD AUTO: 177 K/UL — SIGNIFICANT CHANGE UP (ref 150–400)
POTASSIUM SERPL-MCNC: 4.5 MMOL/L — SIGNIFICANT CHANGE UP (ref 3.5–5.3)
POTASSIUM SERPL-MCNC: 4.5 MMOL/L — SIGNIFICANT CHANGE UP (ref 3.5–5.3)
POTASSIUM SERPL-SCNC: 4.5 MMOL/L — SIGNIFICANT CHANGE UP (ref 3.5–5.3)
POTASSIUM SERPL-SCNC: 4.5 MMOL/L — SIGNIFICANT CHANGE UP (ref 3.5–5.3)
RBC # BLD: 3.35 M/UL — LOW (ref 4.2–5.8)
RBC # BLD: 3.35 M/UL — LOW (ref 4.2–5.8)
RBC # FLD: 15.4 % — HIGH (ref 10.3–14.5)
RBC # FLD: 15.4 % — HIGH (ref 10.3–14.5)
SODIUM SERPL-SCNC: 140 MMOL/L — SIGNIFICANT CHANGE UP (ref 135–145)
SODIUM SERPL-SCNC: 140 MMOL/L — SIGNIFICANT CHANGE UP (ref 135–145)
WBC # BLD: 9.68 K/UL — SIGNIFICANT CHANGE UP (ref 3.8–10.5)
WBC # BLD: 9.68 K/UL — SIGNIFICANT CHANGE UP (ref 3.8–10.5)
WBC # FLD AUTO: 9.68 K/UL — SIGNIFICANT CHANGE UP (ref 3.8–10.5)
WBC # FLD AUTO: 9.68 K/UL — SIGNIFICANT CHANGE UP (ref 3.8–10.5)

## 2023-11-01 PROCEDURE — 99232 SBSQ HOSP IP/OBS MODERATE 35: CPT

## 2023-11-01 RX ORDER — CHOLECALCIFEROL (VITAMIN D3) 125 MCG
2 CAPSULE ORAL
Refills: 0 | DISCHARGE

## 2023-11-01 RX ORDER — DULOXETINE HYDROCHLORIDE 30 MG/1
120 CAPSULE, DELAYED RELEASE ORAL DAILY
Refills: 0 | Status: DISCONTINUED | OUTPATIENT
Start: 2023-11-01 | End: 2023-11-03

## 2023-11-01 RX ORDER — ARIPIPRAZOLE 15 MG/1
1 TABLET ORAL
Qty: 0 | Refills: 0 | DISCHARGE

## 2023-11-01 RX ORDER — ZOLPIDEM TARTRATE 10 MG/1
1 TABLET ORAL
Refills: 0 | DISCHARGE

## 2023-11-01 RX ORDER — FINASTERIDE 5 MG/1
5 TABLET, FILM COATED ORAL DAILY
Refills: 0 | Status: DISCONTINUED | OUTPATIENT
Start: 2023-11-01 | End: 2023-11-01

## 2023-11-01 RX ORDER — HYDROMORPHONE HYDROCHLORIDE 2 MG/ML
1 INJECTION INTRAMUSCULAR; INTRAVENOUS; SUBCUTANEOUS
Refills: 0 | DISCHARGE

## 2023-11-01 RX ORDER — ARIPIPRAZOLE 15 MG/1
2 TABLET ORAL DAILY
Refills: 0 | Status: DISCONTINUED | OUTPATIENT
Start: 2023-11-01 | End: 2023-11-03

## 2023-11-01 RX ORDER — TIZANIDINE 4 MG/1
2 TABLET ORAL
Refills: 0 | DISCHARGE

## 2023-11-01 RX ORDER — DULOXETINE HYDROCHLORIDE 30 MG/1
2 CAPSULE, DELAYED RELEASE ORAL
Refills: 0 | DISCHARGE

## 2023-11-01 RX ORDER — ONDANSETRON 8 MG/1
4 TABLET, FILM COATED ORAL EVERY 6 HOURS
Refills: 0 | Status: DISCONTINUED | OUTPATIENT
Start: 2023-11-01 | End: 2023-11-03

## 2023-11-01 RX ORDER — FINASTERIDE 5 MG/1
1 TABLET, FILM COATED ORAL
Refills: 0 | DISCHARGE

## 2023-11-01 RX ORDER — DULOXETINE HYDROCHLORIDE 30 MG/1
2 CAPSULE, DELAYED RELEASE ORAL
Qty: 0 | Refills: 0 | DISCHARGE

## 2023-11-01 RX ORDER — SODIUM CHLORIDE 9 MG/ML
1000 INJECTION INTRAMUSCULAR; INTRAVENOUS; SUBCUTANEOUS ONCE
Refills: 0 | Status: COMPLETED | OUTPATIENT
Start: 2023-11-01 | End: 2023-11-01

## 2023-11-01 RX ADMIN — METHOCARBAMOL 500 MILLIGRAM(S): 500 TABLET, FILM COATED ORAL at 22:11

## 2023-11-01 RX ADMIN — Medication 500 MILLIGRAM(S): at 18:24

## 2023-11-01 RX ADMIN — Medication 1000 MILLIGRAM(S): at 22:11

## 2023-11-01 RX ADMIN — OXYCODONE HYDROCHLORIDE 10 MILLIGRAM(S): 5 TABLET ORAL at 21:55

## 2023-11-01 RX ADMIN — Medication 75 MILLIGRAM(S): at 13:24

## 2023-11-01 RX ADMIN — PANTOPRAZOLE SODIUM 40 MILLIGRAM(S): 20 TABLET, DELAYED RELEASE ORAL at 06:35

## 2023-11-01 RX ADMIN — Medication 500 MILLIGRAM(S): at 05:29

## 2023-11-01 RX ADMIN — Medication 1000 MILLIGRAM(S): at 05:29

## 2023-11-01 RX ADMIN — ENOXAPARIN SODIUM 40 MILLIGRAM(S): 100 INJECTION SUBCUTANEOUS at 05:30

## 2023-11-01 RX ADMIN — METHOCARBAMOL 500 MILLIGRAM(S): 500 TABLET, FILM COATED ORAL at 05:39

## 2023-11-01 RX ADMIN — OXYCODONE HYDROCHLORIDE 10 MILLIGRAM(S): 5 TABLET ORAL at 20:56

## 2023-11-01 RX ADMIN — Medication 75 MILLIGRAM(S): at 09:03

## 2023-11-01 RX ADMIN — ATORVASTATIN CALCIUM 40 MILLIGRAM(S): 80 TABLET, FILM COATED ORAL at 22:11

## 2023-11-01 RX ADMIN — POLYETHYLENE GLYCOL 3350 17 GRAM(S): 17 POWDER, FOR SOLUTION ORAL at 05:30

## 2023-11-01 RX ADMIN — METHOCARBAMOL 500 MILLIGRAM(S): 500 TABLET, FILM COATED ORAL at 13:24

## 2023-11-01 RX ADMIN — TAMSULOSIN HYDROCHLORIDE 0.4 MILLIGRAM(S): 0.4 CAPSULE ORAL at 22:11

## 2023-11-01 RX ADMIN — CHLORHEXIDINE GLUCONATE 1 APPLICATION(S): 213 SOLUTION TOPICAL at 06:34

## 2023-11-01 RX ADMIN — OXYCODONE HYDROCHLORIDE 10 MILLIGRAM(S): 5 TABLET ORAL at 07:53

## 2023-11-01 RX ADMIN — Medication 75 MILLIGRAM(S): at 22:11

## 2023-11-01 RX ADMIN — Medication 5 MILLIGRAM(S): at 05:29

## 2023-11-01 RX ADMIN — SODIUM CHLORIDE 1000 MILLILITER(S): 9 INJECTION INTRAMUSCULAR; INTRAVENOUS; SUBCUTANEOUS at 06:55

## 2023-11-01 RX ADMIN — OXYCODONE HYDROCHLORIDE 10 MILLIGRAM(S): 5 TABLET ORAL at 08:53

## 2023-11-01 RX ADMIN — Medication 5 MILLIGRAM(S): at 05:28

## 2023-11-01 RX ADMIN — Medication 1 TABLET(S): at 13:23

## 2023-11-01 RX ADMIN — Medication 1000 MILLIGRAM(S): at 13:24

## 2023-11-01 NOTE — PROVIDER CONTACT NOTE (OTHER) - BACKGROUND
patient watching tv and suddenly had an episode of PSVT for 3 secs with HR of 170 and then back to NSR  and after 4 secs PSVT again with .

## 2023-11-01 NOTE — PROGRESS NOTE ADULT - ASSESSMENT
78M w provoked DVT s/p AC, recent falls w R hip hemiarthropasty 7/2023, depression, panic d/o, bladder CA in remission (s/p resection, localized chemo), BPH, GERD, Spinal fusion for kyphoscoliosis 10/2022, here for fall found to have L hip fx s/p L hip hemiarthroplasty w Dr. Mcgovern 10/30, for [facility], pending wrist MRI    #Post op state - pain controlled. PPx: SQL. On bowel regimen and incentive spirometer  #L femoral neck fx   #Osteoporosis    #Acute blood loss anemia - hgb 10 from 10.3 Was 13 on admission.   #L wrist injury - mgmt per ortho. Pending L wrist MR. In splint    #Acute hypoxic respiratory failure, resolved. Found to have centrilobar emphysema. Neg CTA/PE.   -- outpatient PFTs due to above and h/o tobacco use d/o    #Bladder cancer in remission  #BPH - c/w flomax 0.4 daily  #History of provoked DVT comleted AC. In setting of spinal surgery.     #Depression  #Panic disorder   - home: duloxetine 60mg daily, clonazepam 2mg BID, diazepam 5mg daily, zolpidem 10mg daily   - cont with caution (somnolent in ED) especially if also receiving opioids     #BLE rash  - pt reports taking 2.5mg daily PO for pruritic rash.    Plan  Overall, pt has osteoporosis now w 2 fragility fractures in 3 months - R hip followed by L hip. He is 25,OH-D replete. Risk factors include prolonged glucocorticoids for pruritic skin rash. I recommended discontinuation of PO steroids at this time and clinical follow up of his leg rash. Initially this was prescribed by Dr. Perkins who is no longer practicing in [place] -- but who kindly recommended the patient to follow-up with Dr. Sierra Mccauley at [facility] -- please include this in follow-up instructions.   Outpatient - also  on obtaining DEXA scan and initiation of PO bisphosphanates.     Dispo: pending [facility] acceptance    Plan discussed with primary team.

## 2023-11-01 NOTE — PROGRESS NOTE ADULT - SUBJECTIVE AND OBJECTIVE BOX
Ortho Note    Pt comfortable without complaints, pain controlled  Denies CP, SOB, N/V, new numbness/tingling     Vital Signs Last 24 Hrs  T(C): 36.6 (11-01-23 @ 04:46), Max: 37 (11-01-23 @ 01:10)  T(F): 97.9 (11-01-23 @ 04:46), Max: 98.6 (11-01-23 @ 01:10)  HR: 61 (11-01-23 @ 04:46) (61 - 80)  BP: 106/52 (11-01-23 @ 04:46) (99/51 - 106/52)  BP(mean): --  RR: 18 (11-01-23 @ 04:46) (18 - 18)  SpO2: 96% (11-01-23 @ 04:46) (96% - 96%)  I&O's Summary    31 Oct 2023 07:01  -  01 Nov 2023 07:00  --------------------------------------------------------  IN: 740 mL / OUT: 600 mL / NET: 140 mL        General: Pt Alert and oriented, NAD  DSG C/D/I- arturo functional  B/L LE: sensation intact, DP 2+, brisk cap refill, EHL/FHL/TA/GS 5/5                          10.3   8.44  )-----------( 160      ( 31 Oct 2023 11:20 )             31.5     10-31    135  |  102  |  18  ----------------------------<  135<H>  See Note   |  22  |  0.65    Ca    8.1<L>      31 Oct 2023 07:55        A/P: 78yMale s/p L Hip david anterior, on 10/30  - Stable  - Pain Control  - f/u AM labs  - DVT ppx: LVX POD1  - Post op abx: periop ancef  - PT, WBS: WBAT  -pend pain consult  -pend MR wrist  - Dispo: pending [facility]    Ortho Pager [phone]      Ortho Pager [phone] Ortho Note    Pt comfortable without complaints, pain controlled  Denies CP, SOB, N/V, new numbness/tingling     Vital Signs Last 24 Hrs  T(C): 36.6 (11-01-23 @ 04:46), Max: 37 (11-01-23 @ 01:10)  T(F): 97.9 (11-01-23 @ 04:46), Max: 98.6 (11-01-23 @ 01:10)  HR: 61 (11-01-23 @ 04:46) (61 - 80)  BP: 106/52 (11-01-23 @ 04:46) (99/51 - 106/52)  BP(mean): --  RR: 18 (11-01-23 @ 04:46) (18 - 18)  SpO2: 96% (11-01-23 @ 04:46) (96% - 96%)  I&O's Summary    31 Oct 2023 07:01  -  01 Nov 2023 07:00  --------------------------------------------------------  IN: 740 mL / OUT: 600 mL / NET: 140 mL        General: Pt Alert and oriented, NAD  DSG C/D/I- arturo functional  B/L LE: sensation intact, DP 2+, brisk cap refill, EHL/FHL/TA/GS 5/5                          10.3   8.44  )-----------( 160      ( 31 Oct 2023 11:20 )             31.5     10-31    135  |  102  |  18  ----------------------------<  135<H>  See Note   |  22  |  0.65    Ca    8.1<L>      31 Oct 2023 07:55        A/P: 78yMale s/p L Hip david anterior, on 10/30  - Stable  - Pain Control  - f/u AM labs  - DVT ppx: LVX POD1  - Post op abx: periop ancef  - PT, WBS: WBAT  Pain recs appreciated  -pend MR wrist  - Dispo: pending [facility]    Ortho Pager [phone]      Ortho Pager [phone]

## 2023-11-01 NOTE — PROGRESS NOTE ADULT - SUBJECTIVE AND OBJECTIVE BOX
HPI:  "This is a 78y Male pmh bladder cancer (s/p tumor resection, s/p localized chemo and now in remission), GERD, L knee OA, chronic lymphedema, kyphoscoliosis s/p C-sacrum fusion at [facility] (10/5/2020, w/ Dr. Carlisle), depression, panic disorder, RUE DVT, BPH who presents to the ED today with c/o L left hip pain, LROM and inability to ambulate s/p fall today. Pt denies any other injuries. Pt denies head trauma or LOC. Pt denies any numbness, tingling or paresthesias. Pt denies any fever or chills. Pt is a community ambulator who uses a cane at baseline, walks approx 4-5 block a day, somewhat somnolent during exam. Pt denies any taking any anticoagulation medications."    I-stop checked and confirmed. Patient takes Dilaudid 4mg every 4 hours at home with minimal pain relief. He also reports taking gabapentin 300 TID and robaxin 500 mg TID. Denies history of substance use or abuse.       Patient reports dilaudid and gabapentin do not relieve his pain at home. States oxycodone is working "much better for him" and current regimen is overall effective.

## 2023-11-01 NOTE — PROGRESS NOTE ADULT - SUBJECTIVE AND OBJECTIVE BOX
Ortho Note    Pt comfortable without complaints, pain controlled by oxycodone at this time.   O2 saturation has been adequate on room air with deep breathing. CT/PE protocol negative on 10/30.  Denies CP, SOB, N/V, numbness/tingling     Vital Signs Last 24 Hrs  T(C): --  T(F): --  HR: 62 (11-01-23 @ 07:40) (60 - 64)  BP: 116/58 (11-01-23 @ 07:40) (94/51 - 116/58)  BP(mean): --  RR: --  SpO2: --  AVSS    General: Pt Alert and oriented, NAD  DSG- arturo C/D/I  Pulses: +2DP, WWP feet  Sensation: SILT BLE  Motor: 5/5 EHL/FHL/TA/GS  BLE                        10.3   8.44  )-----------( 160      ( 31 Oct 2023 11:20 )             31.5     10-31    135  |  102  |  18  ----------------------------<  135<H>  See Note   |  22  |  0.65    Ca    8.1<L>      31 Oct 2023 07:55      A/P: 78yMale POD#2 s/p left hip hemiarthroplasty, with possible left triquetral fracture  - VSS, Labs WNL- continue to monitor and appreciate internal medicine recs  - Pt on prednisone 2.5mg daily at home, now with 2 hip fractures within months, discontinue prednisone as per medicine recommendations  - mildy hypoxia- currently saturating well on room air, I/S reinforced; chest CT 10/30 negative for PE; BLE dopplers negative for DVT  - Pain Control- appreciate pain management recommendations  - DVT ppx: LVX 40mg subq daily  - OOB for meals as tolerated, bowel regimen  - f/u MRI left wrist to r/o triquetral fracture, continue NWB on LUE  - PT, WBS: WBAT BLE, NWB LUE; OT consult  - dispo: [facility] pending L wrist MRI    Ortho Pager [phone]

## 2023-11-01 NOTE — PROGRESS NOTE ADULT - SUBJECTIVE AND OBJECTIVE BOX
INTERVAL HPI/OVERNIGHT EVENTS: shaunna o/n    SUBJECTIVE: Patient seen and examined at bedside.   Feels well. Does not remember exact events of fall leading to presentation except he was trying to reach for something.  States he has been on prednisone 2.5mg daily for some time now d/t rash on his legs - prescribed by Dr. Perkins - Dermatologist.   Otherwise eating wo N/V/Abd pain. Denies any chest pain, dyspnea, LH/dizziness. Voiding and passing BM.    OBJECTIVE:    VITAL SIGNS:  ICU Vital Signs Last 24 Hrs  T(C): 36.9 (01 Nov 2023 12:15), Max: 37 (31 Oct 2023 17:38)  T(F): 98.5 (01 Nov 2023 12:15), Max: 98.6 (31 Oct 2023 17:38)  HR: 76 (01 Nov 2023 12:15) (60 - 80)  BP: 104/54 (01 Nov 2023 12:15) (94/51 - 116/58)  BP(mean): --  ABP: --  ABP(mean): --  RR: 17 (01 Nov 2023 12:15) (17 - 18)  SpO2: 98% (01 Nov 2023 12:15) (95% - 99%)    O2 Parameters below as of 01 Nov 2023 04:46  Patient On (Oxygen Delivery Method): nasal cannula              10-31 @ 07:01 - 11-01 @ 07:00  --------------------------------------------------------  IN: 740 mL / OUT: 600 mL / NET: 140 mL    11-01 @ 07:01  -  11-01 @ 14:37  --------------------------------------------------------  IN: 300 mL / OUT: 100 mL / NET: 200 mL      CAPILLARY BLOOD GLUCOSE          PHYSICAL EXAM:  GEN: Male in NAD on RA  HEENT: NC/AT, MMM  CV: RRR, nml S1S2, no murmurs  PULM: nml effort, CTAB  ABD: Soft, non-distended, NABS, non-tender  NEURO  A/O x3, moving all extremities, Sensation intact  L hip dressing c/d/i. 5/5 in b/l plantarflex/ext  PSYCH: Appropriate      MEDICATIONS:  MEDICATIONS  (STANDING):  acetaminophen     Tablet .. 1000 milliGRAM(s) Oral every 8 hours  ARIPiprazole 2 milliGRAM(s) Oral daily  atorvastatin 40 milliGRAM(s) Oral at bedtime  bisacodyl 5 milliGRAM(s) Oral every 12 hours  DULoxetine 120 milliGRAM(s) Oral daily  enoxaparin Injectable 40 milliGRAM(s) SubCutaneous every 24 hours  methocarbamol 500 milliGRAM(s) Oral three times a day  multivitamin 1 Tablet(s) Oral daily  naproxen 500 milliGRAM(s) Oral every 12 hours  ondansetron Injectable 4 milliGRAM(s) IV Push every 6 hours  pantoprazole    Tablet 40 milliGRAM(s) Oral before breakfast  polyethylene glycol 3350 17 Gram(s) Oral two times a day  pregabalin 75 milliGRAM(s) Oral three times a day  senna 2 Tablet(s) Oral at bedtime  tamsulosin 0.4 milliGRAM(s) Oral at bedtime    MEDICATIONS  (PRN):  bisacodyl Suppository 10 milliGRAM(s) Rectal daily PRN If no bowel movement by POD#2  clonazePAM  Tablet 2 milliGRAM(s) Oral daily PRN anxiety  HYDROmorphone  Injectable 0.5 milliGRAM(s) IV Push every 4 hours PRN Breakthrough pain floors only  HYDROmorphone  Injectable 0.5 milliGRAM(s) IV Push every 4 hours PRN breakthrough pain  HYDROmorphone  Injectable 0.5 milliGRAM(s) IV Push every 15 minutes PRN breakthrough pain pacu only  magnesium hydroxide Suspension 30 milliLiter(s) Oral daily PRN Constipation  oxyCODONE    IR 10 milliGRAM(s) Oral every 4 hours PRN Severe Pain (7 - 10)  oxyCODONE    IR 5 milliGRAM(s) Oral every 4 hours PRN Moderate Pain (4 - 6)  zolpidem 5 milliGRAM(s) Oral at bedtime PRN Insomnia  zolpidem 5 milliGRAM(s) Oral at bedtime PRN Insomnia      ALLERGIES:  Allergies    sulfa drugs (Unknown)    Intolerances        LABS:                        10.0   9.68  )-----------( 177      ( 01 Nov 2023 10:00 )             31.6     11-01    140  |  106  |  22  ----------------------------<  116<H>  4.5   |  27  |  0.86    Ca    8.1<L>      01 Nov 2023 10:00        Urinalysis Basic - ( 01 Nov 2023 10:00 )    Color: x / Appearance: x / SG: x / pH: x  Gluc: 116 mg/dL / Ketone: x  / Bili: x / Urobili: x   Blood: x / Protein: x / Nitrite: x   Leuk Esterase: x / RBC: x / WBC x   Sq Epi: x / Non Sq Epi: x / Bacteria: x        RADIOLOGY & ADDITIONAL TESTS: Reviewed.

## 2023-11-01 NOTE — PROGRESS NOTE ADULT - ASSESSMENT
Assessment:  78 YOM with PMH bladder cancer in remission (s/p tumor resection, localized chemo), BPH, GERD, kyphoscoliosis s/p C-sacrum fusion ([facility] 10/2022), lymphedema, provoked DVT s/p AC, right hip hemiarthroplasty (7/2023), depression, panic disorder admitted for L hip fracture 2/2 mechanical fall. POD#2 s/p left hip hemiarthroplasty with Dr Mcgovern    Current pain management recommendations:  1) Continue acetaminophen 1000mg PO q8h (standing)  2) pregabalin 75 mg PO q8h (standing)  3) methocarbamol 500 mg Q8h (standing  5) Continue oxycodone 5mg to 10 mg PO q4h prn for moderate to severe pain   6) Continue hydromorphone 0.5mg IV q4h prn breakthrough pain    Continue aggressive bowel regimen; Pt reports + BM yesterday.   Monitor for s+s of oversedation.  Plan discussed with Dr. Arenas.

## 2023-11-02 LAB
ANION GAP SERPL CALC-SCNC: 8 MMOL/L — SIGNIFICANT CHANGE UP (ref 5–17)
ANION GAP SERPL CALC-SCNC: 8 MMOL/L — SIGNIFICANT CHANGE UP (ref 5–17)
APPEARANCE UR: CLEAR — SIGNIFICANT CHANGE UP
APPEARANCE UR: CLEAR — SIGNIFICANT CHANGE UP
BACTERIA # UR AUTO: NEGATIVE /HPF — SIGNIFICANT CHANGE UP
BACTERIA # UR AUTO: NEGATIVE /HPF — SIGNIFICANT CHANGE UP
BILIRUB UR-MCNC: ABNORMAL
BILIRUB UR-MCNC: ABNORMAL
BUN SERPL-MCNC: 22 MG/DL — SIGNIFICANT CHANGE UP (ref 7–23)
BUN SERPL-MCNC: 22 MG/DL — SIGNIFICANT CHANGE UP (ref 7–23)
CALCIUM SERPL-MCNC: 8.4 MG/DL — SIGNIFICANT CHANGE UP (ref 8.4–10.5)
CALCIUM SERPL-MCNC: 8.4 MG/DL — SIGNIFICANT CHANGE UP (ref 8.4–10.5)
CAST: 0 /LPF — SIGNIFICANT CHANGE UP (ref 0–4)
CAST: 0 /LPF — SIGNIFICANT CHANGE UP (ref 0–4)
CHLORIDE SERPL-SCNC: 107 MMOL/L — SIGNIFICANT CHANGE UP (ref 96–108)
CHLORIDE SERPL-SCNC: 107 MMOL/L — SIGNIFICANT CHANGE UP (ref 96–108)
CO2 SERPL-SCNC: 26 MMOL/L — SIGNIFICANT CHANGE UP (ref 22–31)
CO2 SERPL-SCNC: 26 MMOL/L — SIGNIFICANT CHANGE UP (ref 22–31)
COLOR SPEC: SIGNIFICANT CHANGE UP
COLOR SPEC: SIGNIFICANT CHANGE UP
CREAT SERPL-MCNC: 0.7 MG/DL — SIGNIFICANT CHANGE UP (ref 0.5–1.3)
CREAT SERPL-MCNC: 0.7 MG/DL — SIGNIFICANT CHANGE UP (ref 0.5–1.3)
DIFF PNL FLD: NEGATIVE — SIGNIFICANT CHANGE UP
DIFF PNL FLD: NEGATIVE — SIGNIFICANT CHANGE UP
EGFR: 94 ML/MIN/1.73M2 — SIGNIFICANT CHANGE UP
EGFR: 94 ML/MIN/1.73M2 — SIGNIFICANT CHANGE UP
GLUCOSE SERPL-MCNC: 91 MG/DL — SIGNIFICANT CHANGE UP (ref 70–99)
GLUCOSE SERPL-MCNC: 91 MG/DL — SIGNIFICANT CHANGE UP (ref 70–99)
GLUCOSE UR QL: NEGATIVE MG/DL — SIGNIFICANT CHANGE UP
GLUCOSE UR QL: NEGATIVE MG/DL — SIGNIFICANT CHANGE UP
HCT VFR BLD CALC: 32.1 % — LOW (ref 39–50)
HCT VFR BLD CALC: 32.1 % — LOW (ref 39–50)
HGB BLD-MCNC: 10.3 G/DL — LOW (ref 13–17)
HGB BLD-MCNC: 10.3 G/DL — LOW (ref 13–17)
KETONES UR-MCNC: NEGATIVE MG/DL — SIGNIFICANT CHANGE UP
KETONES UR-MCNC: NEGATIVE MG/DL — SIGNIFICANT CHANGE UP
LEUKOCYTE ESTERASE UR-ACNC: ABNORMAL
LEUKOCYTE ESTERASE UR-ACNC: ABNORMAL
MCHC RBC-ENTMCNC: 30.3 PG — SIGNIFICANT CHANGE UP (ref 27–34)
MCHC RBC-ENTMCNC: 30.3 PG — SIGNIFICANT CHANGE UP (ref 27–34)
MCHC RBC-ENTMCNC: 32.1 GM/DL — SIGNIFICANT CHANGE UP (ref 32–36)
MCHC RBC-ENTMCNC: 32.1 GM/DL — SIGNIFICANT CHANGE UP (ref 32–36)
MCV RBC AUTO: 94.4 FL — SIGNIFICANT CHANGE UP (ref 80–100)
MCV RBC AUTO: 94.4 FL — SIGNIFICANT CHANGE UP (ref 80–100)
NITRITE UR-MCNC: NEGATIVE — SIGNIFICANT CHANGE UP
NITRITE UR-MCNC: NEGATIVE — SIGNIFICANT CHANGE UP
NRBC # BLD: 0 /100 WBCS — SIGNIFICANT CHANGE UP (ref 0–0)
NRBC # BLD: 0 /100 WBCS — SIGNIFICANT CHANGE UP (ref 0–0)
PH UR: 7 — SIGNIFICANT CHANGE UP (ref 5–8)
PH UR: 7 — SIGNIFICANT CHANGE UP (ref 5–8)
PLATELET # BLD AUTO: 203 K/UL — SIGNIFICANT CHANGE UP (ref 150–400)
PLATELET # BLD AUTO: 203 K/UL — SIGNIFICANT CHANGE UP (ref 150–400)
POTASSIUM SERPL-MCNC: 3.7 MMOL/L — SIGNIFICANT CHANGE UP (ref 3.5–5.3)
POTASSIUM SERPL-MCNC: 3.7 MMOL/L — SIGNIFICANT CHANGE UP (ref 3.5–5.3)
POTASSIUM SERPL-SCNC: 3.7 MMOL/L — SIGNIFICANT CHANGE UP (ref 3.5–5.3)
POTASSIUM SERPL-SCNC: 3.7 MMOL/L — SIGNIFICANT CHANGE UP (ref 3.5–5.3)
PROT UR-MCNC: 100 MG/DL
PROT UR-MCNC: 100 MG/DL
RBC # BLD: 3.4 M/UL — LOW (ref 4.2–5.8)
RBC # BLD: 3.4 M/UL — LOW (ref 4.2–5.8)
RBC # FLD: 15.4 % — HIGH (ref 10.3–14.5)
RBC # FLD: 15.4 % — HIGH (ref 10.3–14.5)
RBC CASTS # UR COMP ASSIST: 6 /HPF — HIGH (ref 0–4)
RBC CASTS # UR COMP ASSIST: 6 /HPF — HIGH (ref 0–4)
SARS-COV-2 RNA SPEC QL NAA+PROBE: NEGATIVE — SIGNIFICANT CHANGE UP
SARS-COV-2 RNA SPEC QL NAA+PROBE: NEGATIVE — SIGNIFICANT CHANGE UP
SODIUM SERPL-SCNC: 141 MMOL/L — SIGNIFICANT CHANGE UP (ref 135–145)
SODIUM SERPL-SCNC: 141 MMOL/L — SIGNIFICANT CHANGE UP (ref 135–145)
SP GR SPEC: 1.03 — SIGNIFICANT CHANGE UP (ref 1–1.03)
SP GR SPEC: 1.03 — SIGNIFICANT CHANGE UP (ref 1–1.03)
SQUAMOUS # UR AUTO: 0 /HPF — SIGNIFICANT CHANGE UP (ref 0–5)
SQUAMOUS # UR AUTO: 0 /HPF — SIGNIFICANT CHANGE UP (ref 0–5)
UROBILINOGEN FLD QL: 2 MG/DL (ref 0.2–1)
UROBILINOGEN FLD QL: 2 MG/DL (ref 0.2–1)
WBC # BLD: 10.33 K/UL — SIGNIFICANT CHANGE UP (ref 3.8–10.5)
WBC # BLD: 10.33 K/UL — SIGNIFICANT CHANGE UP (ref 3.8–10.5)
WBC # FLD AUTO: 10.33 K/UL — SIGNIFICANT CHANGE UP (ref 3.8–10.5)
WBC # FLD AUTO: 10.33 K/UL — SIGNIFICANT CHANGE UP (ref 3.8–10.5)
WBC UR QL: 0 /HPF — SIGNIFICANT CHANGE UP (ref 0–5)
WBC UR QL: 0 /HPF — SIGNIFICANT CHANGE UP (ref 0–5)

## 2023-11-02 PROCEDURE — 73221 MRI JOINT UPR EXTREM W/O DYE: CPT | Mod: 26,LT

## 2023-11-02 RX ORDER — METHOCARBAMOL 500 MG/1
1 TABLET, FILM COATED ORAL
Qty: 0 | Refills: 0 | DISCHARGE
Start: 2023-11-02

## 2023-11-02 RX ORDER — ACETAMINOPHEN 500 MG
2 TABLET ORAL
Qty: 0 | Refills: 0 | DISCHARGE
Start: 2023-11-02

## 2023-11-02 RX ORDER — POLYETHYLENE GLYCOL 3350 17 G/17G
17 POWDER, FOR SOLUTION ORAL
Qty: 0 | Refills: 0 | DISCHARGE
Start: 2023-11-02

## 2023-11-02 RX ORDER — OXYCODONE HYDROCHLORIDE 5 MG/1
1 TABLET ORAL
Qty: 0 | Refills: 0 | DISCHARGE
Start: 2023-11-02

## 2023-11-02 RX ORDER — METHOCARBAMOL 500 MG/1
1 TABLET, FILM COATED ORAL
Refills: 0 | DISCHARGE

## 2023-11-02 RX ADMIN — ENOXAPARIN SODIUM 40 MILLIGRAM(S): 100 INJECTION SUBCUTANEOUS at 06:44

## 2023-11-02 RX ADMIN — Medication 1000 MILLIGRAM(S): at 21:18

## 2023-11-02 RX ADMIN — OXYCODONE HYDROCHLORIDE 10 MILLIGRAM(S): 5 TABLET ORAL at 17:13

## 2023-11-02 RX ADMIN — METHOCARBAMOL 500 MILLIGRAM(S): 500 TABLET, FILM COATED ORAL at 13:17

## 2023-11-02 RX ADMIN — Medication 1000 MILLIGRAM(S): at 13:16

## 2023-11-02 RX ADMIN — Medication 1 TABLET(S): at 15:26

## 2023-11-02 RX ADMIN — POLYETHYLENE GLYCOL 3350 17 GRAM(S): 17 POWDER, FOR SOLUTION ORAL at 17:23

## 2023-11-02 RX ADMIN — PANTOPRAZOLE SODIUM 40 MILLIGRAM(S): 20 TABLET, DELAYED RELEASE ORAL at 06:44

## 2023-11-02 RX ADMIN — METHOCARBAMOL 500 MILLIGRAM(S): 500 TABLET, FILM COATED ORAL at 05:20

## 2023-11-02 RX ADMIN — Medication 75 MILLIGRAM(S): at 06:44

## 2023-11-02 RX ADMIN — Medication 1000 MILLIGRAM(S): at 14:38

## 2023-11-02 RX ADMIN — Medication 500 MILLIGRAM(S): at 05:20

## 2023-11-02 RX ADMIN — Medication 75 MILLIGRAM(S): at 21:19

## 2023-11-02 RX ADMIN — Medication 1000 MILLIGRAM(S): at 05:20

## 2023-11-02 RX ADMIN — Medication 75 MILLIGRAM(S): at 13:17

## 2023-11-02 RX ADMIN — OXYCODONE HYDROCHLORIDE 10 MILLIGRAM(S): 5 TABLET ORAL at 22:23

## 2023-11-02 RX ADMIN — METHOCARBAMOL 500 MILLIGRAM(S): 500 TABLET, FILM COATED ORAL at 21:18

## 2023-11-02 RX ADMIN — OXYCODONE HYDROCHLORIDE 10 MILLIGRAM(S): 5 TABLET ORAL at 21:23

## 2023-11-02 RX ADMIN — Medication 500 MILLIGRAM(S): at 17:24

## 2023-11-02 RX ADMIN — OXYCODONE HYDROCHLORIDE 10 MILLIGRAM(S): 5 TABLET ORAL at 16:14

## 2023-11-02 RX ADMIN — OXYCODONE HYDROCHLORIDE 10 MILLIGRAM(S): 5 TABLET ORAL at 11:18

## 2023-11-02 RX ADMIN — DULOXETINE HYDROCHLORIDE 120 MILLIGRAM(S): 30 CAPSULE, DELAYED RELEASE ORAL at 13:17

## 2023-11-02 RX ADMIN — ARIPIPRAZOLE 2 MILLIGRAM(S): 15 TABLET ORAL at 13:16

## 2023-11-02 RX ADMIN — ATORVASTATIN CALCIUM 40 MILLIGRAM(S): 80 TABLET, FILM COATED ORAL at 21:18

## 2023-11-02 RX ADMIN — OXYCODONE HYDROCHLORIDE 10 MILLIGRAM(S): 5 TABLET ORAL at 12:32

## 2023-11-02 RX ADMIN — TAMSULOSIN HYDROCHLORIDE 0.4 MILLIGRAM(S): 0.4 CAPSULE ORAL at 21:18

## 2023-11-02 NOTE — PROGRESS NOTE ADULT - SUBJECTIVE AND OBJECTIVE BOX
Ortho Note    Pt comfortable without complaints, pain controlled  Denies CP, SOB, N/V, new numbness/tingling     Vital Signs Last 24 Hrs  T(C): 37.3 (11-02-23 @ 05:03), Max: 37.3 (11-02-23 @ 05:03)  T(F): 99.2 (11-02-23 @ 05:03), Max: 99.2 (11-02-23 @ 05:03)  HR: 75 (11-02-23 @ 05:03) (71 - 75)  BP: 121/60 (11-02-23 @ 05:03) (117/58 - 121/60)  BP(mean): --  RR: 18 (11-02-23 @ 05:03) (18 - 18)  SpO2: 95% (11-02-23 @ 05:03) (94% - 95%)  I&O's Summary    31 Oct 2023 07:01  -  01 Nov 2023 07:00  --------------------------------------------------------  IN: 740 mL / OUT: 600 mL / NET: 140 mL    01 Nov 2023 07:01  -  02 Nov 2023 06:30  --------------------------------------------------------  IN: 450 mL / OUT: 1650 mL / NET: -1200 mL          General: Pt Alert and oriented, NAD  DSG C/D/I- arturo functional  B/L LE: sensation intact, DP 2+, brisk cap refill, EHL/FHL/TA/GS 5/5                        10.0   9.68  )-----------( 177      ( 01 Nov 2023 10:00 )             31.6     11-01    140  |  106  |  22  ----------------------------<  116<H>  4.5   |  27  |  0.86    Ca    8.1<L>      01 Nov 2023 10:00        A/P: 78yMale s/p L Hip david anterior, on 10/30  - Stable  - Pain Control  - f/u AM labs  - DVT ppx: LVX POD1  - Post op abx: periop ancef  - PT, WBS: WBAT  Pain recs appreciated  -pend MR wrist  - Dispo: pending [facility], bed confirmed      Ortho Pager [phone]    Ortho Pager [phone]

## 2023-11-02 NOTE — CHART NOTE - NSCHARTNOTEFT_GEN_A_CORE
Patient MR reviewed by attending, can be weight bearing as tolerated. No additional orthopedic intervention for wrist, goal is for mirian sen AM

## 2023-11-02 NOTE — DIETITIAN INITIAL EVALUATION ADULT - OTHER INFO
78y Male s/p L hip david (anterior) 10/30 w/ pos triquetrum fx (pending MR)  PMHx: BPH, prostate cancer, anx/dep, hld, htn, gerd    Pt seen in room for nutrition assessment. Pt reports fair appetite PTA and during hospital stay. As per diet recall PTA: pt endorsed he eats "regular foods", various meals. Currently on regular diet, tolerating fairly, noted with ~50% PO intakes overall. No cultural, Congregational, or ethnic food preferences noted. No known food allergies. Pt endorsed he had some weight loss in the summer when he was at his rehabilitation facility, and per electronic medical records data, pt noted with ~11-12% wt loss in the past ~2 months. Pt stated he has regained some weight. Dosing wt: 150 pounds, Ideal body weight: 154 pounds, pt is 97% of ideal body weight. Denies nausea, vomiting, diarrhea, constipation, last BM on 11/1/23. No edema. Skin: Left hip surgical incision (NAHED device). Luis: 20. No issues chewing or swallowing noted. Denies pain. Labs reviewed: elevated serum Glucose (116); RD to continue to monitor trends. Nutritionally pertinent medications/supplements:  zofran, protonix, bisacodyl, senna, MIRALAX, multivitamin. RD observed pt with no overt signs of muscle or fat wasting. Based on ASPEN guidelines, pt does not meet criteria for malnutrition at this time. Pt amenable to education; RD provided education in regards to the importance of adequate macro and micronutrients, as well as hydration to support ADLs, maintain energy levels and overall functional/nutritional status. General healthful education provided. Nutrient-dense foods promoted. Pt was receptive and verbalized understanding. No additional nutrition-related concerns. Will continue to follow per RD protocol. Additional nutrition recommendations below to follow.

## 2023-11-02 NOTE — PROGRESS NOTE ADULT - SUBJECTIVE AND OBJECTIVE BOX
Pain management update:    HPI:  "This is a 78y Male pmh bladder cancer (s/p tumor resection, s/p localized chemo and now in remission), GERD, L knee OA, chronic lymphedema, kyphoscoliosis s/p C-sacrum fusion at [facility] (10/5/2020, w/ Dr. Carlisle), depression, panic disorder, RUE DVT, BPH who presents to the ED today with c/o L left hip pain, LROM and inability to ambulate s/p fall today. Pt denies any other injuries. Pt denies head trauma or LOC. Pt denies any numbness, tingling or paresthesias. Pt denies any fever or chills. Pt is a community ambulator who uses a cane at baseline, walks approx 4-5 block a day, somewhat somnolent during exam. Pt denies any taking any anticoagulation medications."    I-stop checked and confirmed. Patient takes Dilaudid 4mg every 4 hours at home with minimal pain relief. He also reports taking gabapentin 300 TID and robaxin 500 mg TID. Denies history of substance use or abuse.       Patient reports dilaudid and gabapentin do not relieve his pain at home. States oxycodone is working "much better for him" and current regimen is overall effective.

## 2023-11-02 NOTE — PROGRESS NOTE ADULT - ASSESSMENT
Assessment:  78 YOM with PMH bladder cancer in remission (s/p tumor resection, localized chemo), BPH, GERD, kyphoscoliosis s/p C-sacrum fusion ([facility] 10/2022), lymphedema, provoked DVT s/p AC, right hip hemiarthroplasty (7/2023), depression, panic disorder admitted for L hip fracture 2/2 mechanical fall. POD#3 s/p left hip hemiarthroplasty with Dr Mcgovern    Current pain management recommendations:  1) Continue acetaminophen 1000mg PO q8h (standing)  2) pregabalin 75 mg PO q8h (standing)  3) methocarbamol 500 mg Q8h (standing  5) Continue oxycodone 5mg to 10 mg PO q4h prn for moderate to severe pain   6) Continue hydromorphone 0.5mg IV q4h prn breakthrough pain    Continue aggressive bowel regimen; Pt reports + BM yesterday.   Monitor for s+s of oversedation.  Plan discussed with Dr. Arenas.

## 2023-11-02 NOTE — DIETITIAN INITIAL EVALUATION ADULT - SIGNS/SYMPTOMS
as evidenced by ~11-12% wt loss in the past ~2 months per pt statements & chart/medical records data
today

## 2023-11-02 NOTE — DIETITIAN INITIAL EVALUATION ADULT - NUTRITIONGOAL OUTCOME1
Pt to consistently meet at least 75% of EEE via tolerated route that is consistent with goals of care during hospital stay;

## 2023-11-02 NOTE — PROGRESS NOTE ADULT - SUBJECTIVE AND OBJECTIVE BOX
Ortho Note    Pt comfortable without complaints, pain controlled  Denies CP, SOB, N/V, numbness/tingling     Vital Signs Last 24 Hrs  T(C): --  T(F): --  HR: --  BP: --  BP(mean): --  RR: --  SpO2: --  AVSS    General: Pt Alert and oriented, NAD  DSG- arturo C/D/I  Pulses: +2DP, WWP feet  Sensation: SILT BLE  Motor: 5/5 EHL/FHL/TA/GS  BLE                          10.3   10.33 )-----------( 203      ( 02 Nov 2023 11:00 )             32.1     11-02    141  |  107  |  22  ----------------------------<  91  3.7   |  26  |  0.70    Ca    8.4      02 Nov 2023 11:00        A/P: 78yMale POD#3 s/p left hip hemiarthroplasty, with possible left triquetral fracture  - VSS, Labs WNL- continue to monitor and appreciate internal medicine recs  - Pt on prednisone 2.5mg daily at home, now with 2 hip fractures within months, discontinue prednisone as per medicine recommendations  - mildy hypoxia- currently saturating well on room air, I/S reinforced; chest CT 10/30 negative for PE; BLE dopplers negative for DVT  - Pain Control- appreciate pain management recommendations  - DVT ppx: LVX 40mg subq daily  - OOB for meals as tolerated, bowel regimen  - f/u MRI left wrist to r/o triquetral fracture, continue NWB on LUE  - PT, WBS: WBAT BLE, NWB LUE; OT consult  - dispo: [facility] pending L wrist MRI    Ortho Pager [phone]

## 2023-11-02 NOTE — DIETITIAN INITIAL EVALUATION ADULT - OTHER CALCULATIONS
Based on Standards of Care pt within % ideal body weight, thus actual body weight used for all calculations. Needs adjusted for advanced age and clinical condition/status.

## 2023-11-02 NOTE — DIETITIAN INITIAL EVALUATION ADULT - PERTINENT LABORATORY DATA
11-02    141  |  107  |  22  ----------------------------<  91  3.7   |  26  |  0.70    Ca    8.4      02 Nov 2023 11:00

## 2023-11-02 NOTE — DIETITIAN INITIAL EVALUATION ADULT - PERTINENT MEDS FT
MEDICATIONS  (STANDING):  acetaminophen     Tablet .. 1000 milliGRAM(s) Oral every 8 hours  ARIPiprazole 2 milliGRAM(s) Oral daily  atorvastatin 40 milliGRAM(s) Oral at bedtime  bisacodyl 5 milliGRAM(s) Oral every 12 hours  DULoxetine 120 milliGRAM(s) Oral daily  enoxaparin Injectable 40 milliGRAM(s) SubCutaneous every 24 hours  methocarbamol 500 milliGRAM(s) Oral three times a day  multivitamin 1 Tablet(s) Oral daily  naproxen 500 milliGRAM(s) Oral every 12 hours  ondansetron Injectable 4 milliGRAM(s) IV Push every 6 hours  pantoprazole    Tablet 40 milliGRAM(s) Oral before breakfast  polyethylene glycol 3350 17 Gram(s) Oral two times a day  pregabalin 75 milliGRAM(s) Oral three times a day  senna 2 Tablet(s) Oral at bedtime  tamsulosin 0.4 milliGRAM(s) Oral at bedtime    MEDICATIONS  (PRN):  bisacodyl Suppository 10 milliGRAM(s) Rectal daily PRN If no bowel movement by POD#2  clonazePAM  Tablet 2 milliGRAM(s) Oral daily PRN anxiety  HYDROmorphone  Injectable 0.5 milliGRAM(s) IV Push every 4 hours PRN breakthrough pain  HYDROmorphone  Injectable 0.5 milliGRAM(s) IV Push every 4 hours PRN Breakthrough pain floors only  HYDROmorphone  Injectable 0.5 milliGRAM(s) IV Push every 15 minutes PRN breakthrough pain pacu only  magnesium hydroxide Suspension 30 milliLiter(s) Oral daily PRN Constipation  oxyCODONE    IR 10 milliGRAM(s) Oral every 4 hours PRN Severe Pain (7 - 10)  oxyCODONE    IR 5 milliGRAM(s) Oral every 4 hours PRN Moderate Pain (4 - 6)  zolpidem 5 milliGRAM(s) Oral at bedtime PRN Insomnia  zolpidem 5 milliGRAM(s) Oral at bedtime PRN Insomnia

## 2023-11-02 NOTE — DIETITIAN INITIAL EVALUATION ADULT - PERSON TAUGHT/METHOD
Pt amenable to education; RD provided education in regards to the importance of adequate macro and micronutrients, as well as hydration to support ADLs, maintain energy levels and overall functional/nutritional status. General healthful education provided. Nutrient-dense foods promoted. Pt was receptive and verbalized understanding./verbal instruction/patient instructed Cheek-To-Nose Interpolation Flap Text: A decision was made to reconstruct the defect utilizing an interpolation axial flap and a staged reconstruction.  A telfa template was made of the defect.  This telfa template was then used to outline the Cheek-To-Nose Interpolation flap.  The donor area for the pedicle flap was then injected with anesthesia.  The flap was excised through the skin and subcutaneous tissue down to the layer of the underlying musculature.  The interpolation flap was carefully excised within this deep plane to maintain its blood supply.  The edges of the donor site were undermined.   The donor site was closed in a primary fashion.  The pedicle was then rotated into position and sutured.  Once the tube was sutured into place, adequate blood supply was confirmed with blanching and refill.  The pedicle was then wrapped with xeroform gauze and dressed appropriately with a telfa and gauze bandage to ensure continued blood supply and protect the attached pedicle.

## 2023-11-02 NOTE — DIETITIAN INITIAL EVALUATION ADULT - ADD RECOMMEND
1. Continue with current diet order (regular)  >>provide nourishments/ oral nutrition supplements per pt preference if pt's PO intakes are consistently </=50%  2. Encourage pt to meet nutritional needs as able  3. Monitor PO intakes, trend weights (biweekly), monitor skin integrity, monitor labs (electrolytes, CMP), monitor GI function  4. Encourage adherence to diet education (reinforce as able)  5. Continue multivitamin supplementation  6. Pain and bowel regimen per team  7. Will continue to assess/honor preferences as able   8. Align nutrition interventions with goals of care at all times

## 2023-11-03 ENCOUNTER — TRANSCRIPTION ENCOUNTER (OUTPATIENT)
Age: 78
End: 2023-11-03

## 2023-11-03 VITALS
DIASTOLIC BLOOD PRESSURE: 57 MMHG | SYSTOLIC BLOOD PRESSURE: 123 MMHG | RESPIRATION RATE: 17 BRPM | HEART RATE: 67 BPM | OXYGEN SATURATION: 95 %

## 2023-11-03 LAB
ANION GAP SERPL CALC-SCNC: 9 MMOL/L — SIGNIFICANT CHANGE UP (ref 5–17)
ANION GAP SERPL CALC-SCNC: 9 MMOL/L — SIGNIFICANT CHANGE UP (ref 5–17)
BUN SERPL-MCNC: 22 MG/DL — SIGNIFICANT CHANGE UP (ref 7–23)
BUN SERPL-MCNC: 22 MG/DL — SIGNIFICANT CHANGE UP (ref 7–23)
CALCIUM SERPL-MCNC: 8.5 MG/DL — SIGNIFICANT CHANGE UP (ref 8.4–10.5)
CALCIUM SERPL-MCNC: 8.5 MG/DL — SIGNIFICANT CHANGE UP (ref 8.4–10.5)
CHLORIDE SERPL-SCNC: 103 MMOL/L — SIGNIFICANT CHANGE UP (ref 96–108)
CHLORIDE SERPL-SCNC: 103 MMOL/L — SIGNIFICANT CHANGE UP (ref 96–108)
CO2 SERPL-SCNC: 25 MMOL/L — SIGNIFICANT CHANGE UP (ref 22–31)
CO2 SERPL-SCNC: 25 MMOL/L — SIGNIFICANT CHANGE UP (ref 22–31)
CREAT SERPL-MCNC: 0.69 MG/DL — SIGNIFICANT CHANGE UP (ref 0.5–1.3)
CREAT SERPL-MCNC: 0.69 MG/DL — SIGNIFICANT CHANGE UP (ref 0.5–1.3)
EGFR: 95 ML/MIN/1.73M2 — SIGNIFICANT CHANGE UP
EGFR: 95 ML/MIN/1.73M2 — SIGNIFICANT CHANGE UP
GLUCOSE SERPL-MCNC: 130 MG/DL — HIGH (ref 70–99)
GLUCOSE SERPL-MCNC: 130 MG/DL — HIGH (ref 70–99)
HCT VFR BLD CALC: 32.3 % — LOW (ref 39–50)
HCT VFR BLD CALC: 32.3 % — LOW (ref 39–50)
HGB BLD-MCNC: 10.3 G/DL — LOW (ref 13–17)
HGB BLD-MCNC: 10.3 G/DL — LOW (ref 13–17)
MCHC RBC-ENTMCNC: 29.5 PG — SIGNIFICANT CHANGE UP (ref 27–34)
MCHC RBC-ENTMCNC: 29.5 PG — SIGNIFICANT CHANGE UP (ref 27–34)
MCHC RBC-ENTMCNC: 31.9 GM/DL — LOW (ref 32–36)
MCHC RBC-ENTMCNC: 31.9 GM/DL — LOW (ref 32–36)
MCV RBC AUTO: 92.6 FL — SIGNIFICANT CHANGE UP (ref 80–100)
MCV RBC AUTO: 92.6 FL — SIGNIFICANT CHANGE UP (ref 80–100)
NRBC # BLD: 0 /100 WBCS — SIGNIFICANT CHANGE UP (ref 0–0)
NRBC # BLD: 0 /100 WBCS — SIGNIFICANT CHANGE UP (ref 0–0)
PLATELET # BLD AUTO: 224 K/UL — SIGNIFICANT CHANGE UP (ref 150–400)
PLATELET # BLD AUTO: 224 K/UL — SIGNIFICANT CHANGE UP (ref 150–400)
POTASSIUM SERPL-MCNC: 4.3 MMOL/L — SIGNIFICANT CHANGE UP (ref 3.5–5.3)
POTASSIUM SERPL-MCNC: 4.3 MMOL/L — SIGNIFICANT CHANGE UP (ref 3.5–5.3)
POTASSIUM SERPL-SCNC: 4.3 MMOL/L — SIGNIFICANT CHANGE UP (ref 3.5–5.3)
POTASSIUM SERPL-SCNC: 4.3 MMOL/L — SIGNIFICANT CHANGE UP (ref 3.5–5.3)
RBC # BLD: 3.49 M/UL — LOW (ref 4.2–5.8)
RBC # BLD: 3.49 M/UL — LOW (ref 4.2–5.8)
RBC # FLD: 15.7 % — HIGH (ref 10.3–14.5)
RBC # FLD: 15.7 % — HIGH (ref 10.3–14.5)
SODIUM SERPL-SCNC: 137 MMOL/L — SIGNIFICANT CHANGE UP (ref 135–145)
SODIUM SERPL-SCNC: 137 MMOL/L — SIGNIFICANT CHANGE UP (ref 135–145)
WBC # BLD: 11.52 K/UL — HIGH (ref 3.8–10.5)
WBC # BLD: 11.52 K/UL — HIGH (ref 3.8–10.5)
WBC # FLD AUTO: 11.52 K/UL — HIGH (ref 3.8–10.5)
WBC # FLD AUTO: 11.52 K/UL — HIGH (ref 3.8–10.5)

## 2023-11-03 PROCEDURE — 99232 SBSQ HOSP IP/OBS MODERATE 35: CPT

## 2023-11-03 RX ORDER — GABAPENTIN 400 MG/1
1 CAPSULE ORAL
Refills: 0 | DISCHARGE

## 2023-11-03 RX ORDER — HYDROMORPHONE HYDROCHLORIDE 2 MG/ML
1 INJECTION INTRAMUSCULAR; INTRAVENOUS; SUBCUTANEOUS
Refills: 0 | DISCHARGE

## 2023-11-03 RX ORDER — ARIPIPRAZOLE 15 MG/1
1 TABLET ORAL
Refills: 0 | DISCHARGE

## 2023-11-03 RX ORDER — SENNA PLUS 8.6 MG/1
2 TABLET ORAL
Qty: 0 | Refills: 0 | DISCHARGE
Start: 2023-11-03

## 2023-11-03 RX ADMIN — METHOCARBAMOL 500 MILLIGRAM(S): 500 TABLET, FILM COATED ORAL at 05:36

## 2023-11-03 RX ADMIN — OXYCODONE HYDROCHLORIDE 10 MILLIGRAM(S): 5 TABLET ORAL at 02:06

## 2023-11-03 RX ADMIN — Medication 500 MILLIGRAM(S): at 05:37

## 2023-11-03 RX ADMIN — POLYETHYLENE GLYCOL 3350 17 GRAM(S): 17 POWDER, FOR SOLUTION ORAL at 05:40

## 2023-11-03 RX ADMIN — Medication 1000 MILLIGRAM(S): at 15:36

## 2023-11-03 RX ADMIN — Medication 1 TABLET(S): at 12:47

## 2023-11-03 RX ADMIN — OXYCODONE HYDROCHLORIDE 10 MILLIGRAM(S): 5 TABLET ORAL at 13:36

## 2023-11-03 RX ADMIN — OXYCODONE HYDROCHLORIDE 10 MILLIGRAM(S): 5 TABLET ORAL at 01:06

## 2023-11-03 RX ADMIN — DULOXETINE HYDROCHLORIDE 120 MILLIGRAM(S): 30 CAPSULE, DELAYED RELEASE ORAL at 12:42

## 2023-11-03 RX ADMIN — Medication 75 MILLIGRAM(S): at 15:37

## 2023-11-03 RX ADMIN — Medication 75 MILLIGRAM(S): at 05:37

## 2023-11-03 RX ADMIN — Medication 1000 MILLIGRAM(S): at 16:36

## 2023-11-03 RX ADMIN — ENOXAPARIN SODIUM 40 MILLIGRAM(S): 100 INJECTION SUBCUTANEOUS at 05:36

## 2023-11-03 RX ADMIN — METHOCARBAMOL 500 MILLIGRAM(S): 500 TABLET, FILM COATED ORAL at 15:37

## 2023-11-03 RX ADMIN — OXYCODONE HYDROCHLORIDE 10 MILLIGRAM(S): 5 TABLET ORAL at 12:42

## 2023-11-03 RX ADMIN — PANTOPRAZOLE SODIUM 40 MILLIGRAM(S): 20 TABLET, DELAYED RELEASE ORAL at 05:37

## 2023-11-03 RX ADMIN — Medication 1000 MILLIGRAM(S): at 05:36

## 2023-11-03 NOTE — DISCHARGE NOTE NURSING/CASE MANAGEMENT/SOCIAL WORK - NSDPFAC_GEN_ALL_CORE
[facility]/ [address] [place] [address], [place] [address]/ Phone: [phone]

## 2023-11-03 NOTE — PROGRESS NOTE ADULT - ASSESSMENT
78M w provoked DVT s/p AC, recent falls w R hip hemiarthropasty 7/2023, depression, panic d/o, bladder CA in remission (s/p resection, localized chemo), BPH, GERD, Spinal fusion for kyphoscoliosis 10/2022, here for fall found to have L hip fx s/p L hip hemiarthroplasty w Dr. Mcgovern 10/30, for [facility], pending wrist MRI    #Post op state - pain controlled. PPx: SQL. On bowel regimen and incentive spirometer  #L femoral neck fx   #Osteoporosis  --Overall, pt has osteoporosis now w 2 fragility fractures in 3 months - R hip followed by L hip. He is 25,OH-D replete. Risk factors include prolonged glucocorticoids for pruritic skin rash. I recommended discontinuation of PO steroids at this time and clinical follow up of his leg rash. Initially this was prescribed by Dr. Perkins who is no longer practicing in [place]     #Acute blood loss anemia - hgb 10.3 today. Was 13 on admission.   #L wrist injury - mgmt per ortho. L Wrist MR negative for fracture. Mgmt per orthopedics    #Acute hypoxic respiratory failure, resolved. Found to have centrilobar emphysema. Neg CTA/PE.   -- outpatient PFTs due to above and h/o tobacco use d/o    #Bladder cancer in remission  #BPH - c/w flomax 0.4 daily  #History of provoked DVT comleted AC. In setting of spinal surgery.     #Depression  #Panic disorder   - home: duloxetine 60mg daily, clonazepam 2mg BID, diazepam 5mg daily, zolpidem 10mg daily   - cont with caution (somnolent in ED) especially if also receiving opioids     #BLE rash  - pt reports taking 2.5mg daily PO for pruritic rash.    Plan  Optimized for [facility] today  Follow-up with outpatient dermatology: Dr. Sierra Mccauley at [facility] -- please include this in follow-up instructions.   Outpatient - also  on obtaining DEXA scan and initiation of PO bisphosphanates.     Dispo: [facility]    Plan discussed with primary team.

## 2023-11-03 NOTE — DISCHARGE NOTE NURSING/CASE MANAGEMENT/SOCIAL WORK - NSDCPEFALRISK_GEN_ALL_CORE
For information on Fall & Injury Prevention, visit: [email or web address] OR  [email or web address] OR  https://www.cdc.gov/steadi/patient.html

## 2023-11-03 NOTE — PROGRESS NOTE ADULT - SUBJECTIVE AND OBJECTIVE BOX
Ortho Note    Pt comfortable without complaints, pain controlled  Denies CP, SOB, N/V, new numbness/tingling     Vital Signs Last 24 Hrs  T(C): 37.3 (11-03-23 @ 00:02), Max: 37.3 (11-03-23 @ 00:02)  T(F): 99.1 (11-03-23 @ 00:02), Max: 99.1 (11-03-23 @ 00:02)  HR: 65 (11-03-23 @ 00:02) (65 - 74)  BP: 119/58 (11-03-23 @ 00:02) (103/55 - 119/58)  BP(mean): --  RR: 18 (11-03-23 @ 00:02) (18 - 20)  SpO2: 93% (11-03-23 @ 00:02) (93% - 95%)  I&O's Summary    01 Nov 2023 07:01  -  02 Nov 2023 07:00  --------------------------------------------------------  IN: 450 mL / OUT: 1650 mL / NET: -1200 mL    02 Nov 2023 07:01  -  03 Nov 2023 02:17  --------------------------------------------------------  IN: 1420 mL / OUT: 300 mL / NET: 1120 mL    General: Pt Alert and oriented, NAD  DSG C/D/I- arturo functional  B/L LE: sensation intact, DP 2+, brisk cap refill, EHL/FHL/TA/GS 5/5                            10.3   10.33 )-----------( 203      ( 02 Nov 2023 11:00 )             32.1     11-02    141  |  107  |  22  ----------------------------<  91  3.7   |  26  |  0.70    Ca    8.4      02 Nov 2023 11:00      A/P: 78yMale s/p L Hip david anterior, on 10/30  - Stable  - Pain Control  - f/u AM labs  - DVT ppx: LVX POD1  - Post op abx: periop ancef  - PT, WBS: WBAT  Pain recs appreciated  pend am labs   MR wrist reviewed, wbat lue   - Dispo: pending [facility], bed confirmed      Ortho Pager [phone]    Ortho Pager [phone]

## 2023-11-03 NOTE — PROGRESS NOTE ADULT - ASSESSMENT
Assessment:  78 YOM with PMH bladder cancer in remission (s/p tumor resection, localized chemo), BPH, GERD, kyphoscoliosis s/p C-sacrum fusion ([facility] 10/2022), lymphedema, provoked DVT s/p AC, right hip hemiarthroplasty (7/2023), depression, panic disorder admitted for L hip fracture 2/2 mechanical fall. POD#3 s/p left hip hemiarthroplasty with Dr Mcgovern    Current pain management recommendations:  1) Continue acetaminophen 1000mg PO q8h (standing)  2) pregabalin 75 mg PO q8h (standing)  3) methocarbamol 500 mg Q8h (standing  5) Continue oxycodone 5mg to 10 mg PO q4h prn for moderate to severe pain   6) Continue hydromorphone 0.5mg IV q4h prn breakthrough pain    Continue aggressive bowel regimen; Pt reports + BM since surgery   Monitor for s+s of oversedation.  Can be discharged to rehab on current regimen (continue to hold home dilaudid and gabapentin)  Plan discussed with Dr. Arenas. Assessment:  78 YOM with PMH bladder cancer in remission (s/p tumor resection, localized chemo), BPH, GERD, kyphoscoliosis s/p C-sacrum fusion ([facility] 10/2022), lymphedema, provoked DVT s/p AC, right hip hemiarthroplasty (7/2023), depression, panic disorder admitted for L hip fracture 2/2 mechanical fall. POD#3 s/p left hip hemiarthroplasty with Dr Mcgovern    Current pain management recommendations:  1) Continue acetaminophen 1000mg PO q8h (standing)  2) pregabalin 75 mg PO q8h (standing)  3) methocarbamol 500 mg Q8h (standing  5) Continue oxycodone 5mg to 10 mg PO q4h prn for moderate to severe pain   6) Continue hydromorphone 0.5mg IV q4h prn breakthrough pain    Continue aggressive bowel regimen; Pt reports + BM since surgery   Monitor for s+s of oversedation.  Can be discharged to rehab on current regimen (continue to hold home dilaudid and gabapentin)  Should follow-up upon discharge from rehab with his pain management doctor.  Plan discussed with Dr. Arenas.

## 2023-11-03 NOTE — PROGRESS NOTE ADULT - SUBJECTIVE AND OBJECTIVE BOX
HPI:  "This is a 78y Male pmh bladder cancer (s/p tumor resection, s/p localized chemo and now in remission), GERD, L knee OA, chronic lymphedema, kyphoscoliosis s/p C-sacrum fusion at [facility] (10/5/2020, w/ Dr. Carlisle), depression, panic disorder, RUE DVT, BPH who presents to the ED today with c/o L left hip pain, LROM and inability to ambulate s/p fall today. Pt denies any other injuries. Pt denies head trauma or LOC. Pt denies any numbness, tingling or paresthesias. Pt denies any fever or chills. Pt is a community ambulator who uses a cane at baseline, walks approx 4-5 block a day, somewhat somnolent during exam. Pt denies any taking any anticoagulation medications."    I-stop checked and confirmed. Patient takes Dilaudid 4mg every 4 hours at home with minimal pain relief. He also reports taking gabapentin 300 TID and robaxin 500 mg TID. Denies history of substance use or abuse.       Patient reports dilaudid and gabapentin do not relieve his pain at home. States oxycodone is working "much better for him" and current regimen is overall effective. Patient is for discharge to rehab today as per primary team. Patient wants to be discharged on current regimen.

## 2023-11-03 NOTE — PROGRESS NOTE ADULT - PROVIDER SPECIALTY LIST ADULT
Hospitalist
Hospitalist
Orthopedics
Pain Medicine
Hospitalist
Orthopedics
Pain Medicine
Pain Medicine
Hospitalist
Hospitalist

## 2023-11-03 NOTE — PROGRESS NOTE ADULT - SUBJECTIVE AND OBJECTIVE BOX
INTERVAL HPI/OVERNIGHT EVENTS: shaunna o/n    SUBJECTIVE: Patient seen and examined at bedside.   Doing well. Pt reports no recurrence of rash. MRI results discussed. Eager to go to rehab.   Reports pain is controlled. Eating wo issues. +BM and voiding. Denies any fever, chest pain, dyspnea.     OBJECTIVE:    VITAL SIGNS:  ICU Vital Signs Last 24 Hrs  T(C): 37.2 (03 Nov 2023 08:47), Max: 37.3 (03 Nov 2023 00:02)  T(F): 98.9 (03 Nov 2023 08:47), Max: 99.1 (03 Nov 2023 00:02)  HR: 67 (03 Nov 2023 13:20) (65 - 75)  BP: 123/57 (03 Nov 2023 13:20) (100/62 - 137/67)  BP(mean): --  ABP: --  ABP(mean): --  RR: 17 (03 Nov 2023 13:20) (17 - 20)  SpO2: 95% (03 Nov 2023 13:20) (93% - 95%)    O2 Parameters below as of 03 Nov 2023 13:20  Patient On (Oxygen Delivery Method): room air              11-02 @ 07:01  -  11-03 @ 07:00  --------------------------------------------------------  IN: 1420 mL / OUT: 300 mL / NET: 1120 mL      CAPILLARY BLOOD GLUCOSE          PHYSICAL EXAM:  GEN: Male in NAD on RA  HEENT: NC/AT, MMM  CV: RRR, nml S1S2, no murmurs  PULM: nml effort, CTAB  ABD: Soft, non-distended, NABS, non-tender  NEURO  A/O x3, moving all extremities, Sensation intact  L hip dressing c/d/i. 5/5 in b/l plantarflex/ext  PSYCH: Appropriate      MEDICATIONS:  MEDICATIONS  (STANDING):  acetaminophen     Tablet .. 1000 milliGRAM(s) Oral every 8 hours  ARIPiprazole 2 milliGRAM(s) Oral daily  atorvastatin 40 milliGRAM(s) Oral at bedtime  bisacodyl 5 milliGRAM(s) Oral every 12 hours  DULoxetine 120 milliGRAM(s) Oral daily  enoxaparin Injectable 40 milliGRAM(s) SubCutaneous every 24 hours  methocarbamol 500 milliGRAM(s) Oral three times a day  multivitamin 1 Tablet(s) Oral daily  naproxen 500 milliGRAM(s) Oral every 12 hours  ondansetron Injectable 4 milliGRAM(s) IV Push every 6 hours  pantoprazole    Tablet 40 milliGRAM(s) Oral before breakfast  polyethylene glycol 3350 17 Gram(s) Oral two times a day  pregabalin 75 milliGRAM(s) Oral three times a day  senna 2 Tablet(s) Oral at bedtime  tamsulosin 0.4 milliGRAM(s) Oral at bedtime    MEDICATIONS  (PRN):  bisacodyl Suppository 10 milliGRAM(s) Rectal daily PRN If no bowel movement by POD#2  clonazePAM  Tablet 2 milliGRAM(s) Oral daily PRN anxiety  HYDROmorphone  Injectable 0.5 milliGRAM(s) IV Push every 4 hours PRN Breakthrough pain floors only  HYDROmorphone  Injectable 0.5 milliGRAM(s) IV Push every 4 hours PRN breakthrough pain  HYDROmorphone  Injectable 0.5 milliGRAM(s) IV Push every 15 minutes PRN breakthrough pain pacu only  magnesium hydroxide Suspension 30 milliLiter(s) Oral daily PRN Constipation  oxyCODONE    IR 10 milliGRAM(s) Oral every 4 hours PRN Severe Pain (7 - 10)  oxyCODONE    IR 5 milliGRAM(s) Oral every 4 hours PRN Moderate Pain (4 - 6)  zolpidem 5 milliGRAM(s) Oral at bedtime PRN Insomnia  zolpidem 5 milliGRAM(s) Oral at bedtime PRN Insomnia      ALLERGIES:  Allergies    sulfa drugs (Unknown)    Intolerances        LABS:                        10.3   11.52 )-----------( 224      ( 03 Nov 2023 06:40 )             32.3     11-03    137  |  103  |  22  ----------------------------<  130<H>  4.3   |  25  |  0.69    Ca    8.5      03 Nov 2023 06:40        Urinalysis Basic - ( 03 Nov 2023 06:40 )    Color: x / Appearance: x / SG: x / pH: x  Gluc: 130 mg/dL / Ketone: x  / Bili: x / Urobili: x   Blood: x / Protein: x / Nitrite: x   Leuk Esterase: x / RBC: x / WBC x   Sq Epi: x / Non Sq Epi: x / Bacteria: x        RADIOLOGY & ADDITIONAL TESTS: Reviewed.

## 2023-11-03 NOTE — DISCHARGE NOTE NURSING/CASE MANAGEMENT/SOCIAL WORK - PATIENT PORTAL LINK FT
You can access the FollowMyHealth Patient Portal offered by [facility] by registering at the following website: [email or web address]. By joining [employer]’s FollowMyHealth portal, you will also be able to view your health information using other applications (apps) compatible with our system.

## 2023-11-08 DIAGNOSIS — D62 ACUTE POSTHEMORRHAGIC ANEMIA: ICD-10-CM

## 2023-11-08 DIAGNOSIS — T38.0X5A ADVERSE EFFECT OF GLUCOCORTICOIDS AND SYNTHETIC ANALOGUES, INITIAL ENCOUNTER: ICD-10-CM

## 2023-11-08 DIAGNOSIS — N40.0 BENIGN PROSTATIC HYPERPLASIA WITHOUT LOWER URINARY TRACT SYMPTOMS: ICD-10-CM

## 2023-11-08 DIAGNOSIS — S69.82XA OTHER SPECIFIED INJURIES OF LEFT WRIST, HAND AND FINGER(S), INITIAL ENCOUNTER: ICD-10-CM

## 2023-11-08 DIAGNOSIS — F32.A DEPRESSION, UNSPECIFIED: ICD-10-CM
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
## 2023-11-08 DIAGNOSIS — M81.8 OTHER OSTEOPOROSIS WITHOUT CURRENT PATHOLOGICAL FRACTURE: ICD-10-CM

## 2023-11-08 DIAGNOSIS — Z92.21 PERSONAL HISTORY OF ANTINEOPLASTIC CHEMOTHERAPY: ICD-10-CM

## 2023-11-08 DIAGNOSIS — Z79.52 LONG TERM (CURRENT) USE OF SYSTEMIC STEROIDS: ICD-10-CM

## 2023-11-08 DIAGNOSIS — K21.9 GASTRO-ESOPHAGEAL REFLUX DISEASE WITHOUT ESOPHAGITIS: ICD-10-CM

## 2023-11-08 DIAGNOSIS — J43.2 CENTRILOBULAR EMPHYSEMA: ICD-10-CM

## 2023-11-08 DIAGNOSIS — Z98.1 ARTHRODESIS STATUS: ICD-10-CM

## 2023-11-08 DIAGNOSIS — Z85.51 PERSONAL HISTORY OF MALIGNANT NEOPLASM OF BLADDER: ICD-10-CM

## 2023-11-08 DIAGNOSIS — J98.11 ATELECTASIS: ICD-10-CM

## 2023-11-08 DIAGNOSIS — G92.9 UNSPECIFIED TOXIC ENCEPHALOPATHY: ICD-10-CM

## 2023-11-08 DIAGNOSIS — E78.5 HYPERLIPIDEMIA, UNSPECIFIED: ICD-10-CM

## 2023-11-08 DIAGNOSIS — S72.002A FRACTURE OF UNSPECIFIED PART OF NECK OF LEFT FEMUR, INITIAL ENCOUNTER FOR CLOSED FRACTURE: ICD-10-CM

## 2023-11-08 DIAGNOSIS — Z96.641 PRESENCE OF RIGHT ARTIFICIAL HIP JOINT: ICD-10-CM

## 2023-11-08 DIAGNOSIS — F41.0 PANIC DISORDER [EPISODIC PAROXYSMAL ANXIETY]: ICD-10-CM

## 2023-11-08 DIAGNOSIS — Z86.718 PERSONAL HISTORY OF OTHER VENOUS THROMBOSIS AND EMBOLISM: ICD-10-CM
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
## 2023-11-08 DIAGNOSIS — G47.00 INSOMNIA, UNSPECIFIED: ICD-10-CM

## 2023-11-08 DIAGNOSIS — R21 RASH AND OTHER NONSPECIFIC SKIN ERUPTION: ICD-10-CM

## 2023-11-08 DIAGNOSIS — Z88.2 ALLERGY STATUS TO SULFONAMIDES: ICD-10-CM

## 2023-11-08 DIAGNOSIS — G89.29 OTHER CHRONIC PAIN: ICD-10-CM

## 2023-11-08 DIAGNOSIS — J96.01 ACUTE RESPIRATORY FAILURE WITH HYPOXIA: ICD-10-CM

## 2023-11-13 PROCEDURE — 71045 X-RAY EXAM CHEST 1 VIEW: CPT

## 2023-11-13 PROCEDURE — 97161 PT EVAL LOW COMPLEX 20 MIN: CPT

## 2023-11-13 PROCEDURE — 84480 ASSAY TRIIODOTHYRONINE (T3): CPT

## 2023-11-13 PROCEDURE — 99285 EMERGENCY DEPT VISIT HI MDM: CPT

## 2023-11-13 PROCEDURE — 84443 ASSAY THYROID STIM HORMONE: CPT

## 2023-11-13 PROCEDURE — 72170 X-RAY EXAM OF PELVIS: CPT

## 2023-11-13 PROCEDURE — 36415 COLL VENOUS BLD VENIPUNCTURE: CPT

## 2023-11-13 PROCEDURE — 83970 ASSAY OF PARATHORMONE: CPT

## 2023-11-13 PROCEDURE — 93970 EXTREMITY STUDY: CPT

## 2023-11-13 PROCEDURE — 80048 BASIC METABOLIC PNL TOTAL CA: CPT

## 2023-11-13 PROCEDURE — 85025 COMPLETE CBC W/AUTO DIFF WBC: CPT

## 2023-11-13 PROCEDURE — 84436 ASSAY OF TOTAL THYROXINE: CPT

## 2023-11-13 PROCEDURE — 80053 COMPREHEN METABOLIC PANEL: CPT

## 2023-11-13 PROCEDURE — 86901 BLOOD TYPING SEROLOGIC RH(D): CPT

## 2023-11-13 PROCEDURE — 70450 CT HEAD/BRAIN W/O DYE: CPT

## 2023-11-13 PROCEDURE — 72100 X-RAY EXAM L-S SPINE 2/3 VWS: CPT

## 2023-11-13 PROCEDURE — 82306 VITAMIN D 25 HYDROXY: CPT

## 2023-11-13 PROCEDURE — 93005 ELECTROCARDIOGRAM TRACING: CPT

## 2023-11-13 PROCEDURE — 71275 CT ANGIOGRAPHY CHEST: CPT

## 2023-11-13 PROCEDURE — 85610 PROTHROMBIN TIME: CPT

## 2023-11-13 PROCEDURE — 97165 OT EVAL LOW COMPLEX 30 MIN: CPT

## 2023-11-13 PROCEDURE — 81001 URINALYSIS AUTO W/SCOPE: CPT

## 2023-11-13 PROCEDURE — 84702 CHORIONIC GONADOTROPIN TEST: CPT

## 2023-11-13 PROCEDURE — 96374 THER/PROPH/DIAG INJ IV PUSH: CPT

## 2023-11-13 PROCEDURE — 76000 FLUOROSCOPY <1 HR PHYS/QHP: CPT

## 2023-11-13 PROCEDURE — 87635 SARS-COV-2 COVID-19 AMP PRB: CPT

## 2023-11-13 PROCEDURE — C1889: CPT

## 2023-11-13 PROCEDURE — 85730 THROMBOPLASTIN TIME PARTIAL: CPT

## 2023-11-13 PROCEDURE — 73110 X-RAY EXAM OF WRIST: CPT

## 2023-11-13 PROCEDURE — 85027 COMPLETE CBC AUTOMATED: CPT

## 2023-11-13 PROCEDURE — 73502 X-RAY EXAM HIP UNI 2-3 VIEWS: CPT

## 2023-11-13 PROCEDURE — 73552 X-RAY EXAM OF FEMUR 2/>: CPT

## 2023-11-13 PROCEDURE — 82310 ASSAY OF CALCIUM: CPT

## 2023-11-13 PROCEDURE — C1776: CPT

## 2023-11-13 PROCEDURE — 86900 BLOOD TYPING SEROLOGIC ABO: CPT

## 2023-11-13 PROCEDURE — 84100 ASSAY OF PHOSPHORUS: CPT

## 2023-11-13 PROCEDURE — 73221 MRI JOINT UPR EXTREM W/O DYE: CPT

## 2023-11-13 PROCEDURE — 86850 RBC ANTIBODY SCREEN: CPT

## 2023-11-13 PROCEDURE — 97116 GAIT TRAINING THERAPY: CPT

## 2023-11-29 ENCOUNTER — APPOINTMENT (OUTPATIENT)
Dept: ORTHOPEDIC SURGERY | Facility: CLINIC | Age: 78
End: 2023-11-29
Payer: MEDICARE

## 2023-11-29 VITALS
SYSTOLIC BLOOD PRESSURE: 104 MMHG | BODY MASS INDEX: 25.76 KG/M2 | HEART RATE: 80 BPM | OXYGEN SATURATION: 94 % | DIASTOLIC BLOOD PRESSURE: 63 MMHG | WEIGHT: 170 LBS | HEIGHT: 68 IN

## 2023-11-29 DIAGNOSIS — M81.8 OTHER OSTEOPOROSIS W/OUT CURRENT PATHOLOGICAL FRACTURE: ICD-10-CM

## 2023-11-29 PROCEDURE — 99024 POSTOP FOLLOW-UP VISIT: CPT

## 2023-12-13 ENCOUNTER — EMERGENCY (EMERGENCY)
Facility: HOSPITAL | Age: 78
LOS: 1 days | Discharge: HOME | End: 2023-12-13
Attending: EMERGENCY MEDICINE | Admitting: EMERGENCY MEDICINE
Payer: MEDICARE

## 2023-12-13 VITALS
OXYGEN SATURATION: 99 % | SYSTOLIC BLOOD PRESSURE: 114 MMHG | RESPIRATION RATE: 16 BRPM | DIASTOLIC BLOOD PRESSURE: 68 MMHG | HEART RATE: 65 BPM | TEMPERATURE: 99 F

## 2023-12-13 VITALS
DIASTOLIC BLOOD PRESSURE: 72 MMHG | RESPIRATION RATE: 18 BRPM | HEIGHT: 68 IN | OXYGEN SATURATION: 97 % | TEMPERATURE: 98 F | WEIGHT: 199.96 LBS | SYSTOLIC BLOOD PRESSURE: 119 MMHG | HEART RATE: 69 BPM

## 2023-12-13 DIAGNOSIS — R11.0 NAUSEA: ICD-10-CM

## 2023-12-13 DIAGNOSIS — Z98.890 OTHER SPECIFIED POSTPROCEDURAL STATES: Chronic | ICD-10-CM

## 2023-12-13 DIAGNOSIS — Z88.2 ALLERGY STATUS TO SULFONAMIDES: ICD-10-CM

## 2023-12-13 DIAGNOSIS — K59.00 CONSTIPATION, UNSPECIFIED: ICD-10-CM

## 2023-12-13 LAB
ALBUMIN SERPL ELPH-MCNC: 3.7 G/DL — SIGNIFICANT CHANGE UP (ref 3.3–5)
ALBUMIN SERPL ELPH-MCNC: 3.7 G/DL — SIGNIFICANT CHANGE UP (ref 3.3–5)
ALP SERPL-CCNC: 122 U/L — HIGH (ref 40–120)
ALP SERPL-CCNC: 122 U/L — HIGH (ref 40–120)
ALT FLD-CCNC: 9 U/L — LOW (ref 10–45)
ALT FLD-CCNC: 9 U/L — LOW (ref 10–45)
ANION GAP SERPL CALC-SCNC: 13 MMOL/L — SIGNIFICANT CHANGE UP (ref 5–17)
ANION GAP SERPL CALC-SCNC: 13 MMOL/L — SIGNIFICANT CHANGE UP (ref 5–17)
AST SERPL-CCNC: 18 U/L — SIGNIFICANT CHANGE UP (ref 10–40)
AST SERPL-CCNC: 18 U/L — SIGNIFICANT CHANGE UP (ref 10–40)
BASOPHILS # BLD AUTO: 0.07 K/UL — SIGNIFICANT CHANGE UP (ref 0–0.2)
BASOPHILS # BLD AUTO: 0.07 K/UL — SIGNIFICANT CHANGE UP (ref 0–0.2)
BASOPHILS NFR BLD AUTO: 0.8 % — SIGNIFICANT CHANGE UP (ref 0–2)
BASOPHILS NFR BLD AUTO: 0.8 % — SIGNIFICANT CHANGE UP (ref 0–2)
BILIRUB SERPL-MCNC: 0.5 MG/DL — SIGNIFICANT CHANGE UP (ref 0.2–1.2)
BILIRUB SERPL-MCNC: 0.5 MG/DL — SIGNIFICANT CHANGE UP (ref 0.2–1.2)
BUN SERPL-MCNC: 25 MG/DL — HIGH (ref 7–23)
BUN SERPL-MCNC: 25 MG/DL — HIGH (ref 7–23)
CALCIUM SERPL-MCNC: 9.2 MG/DL — SIGNIFICANT CHANGE UP (ref 8.4–10.5)
CALCIUM SERPL-MCNC: 9.2 MG/DL — SIGNIFICANT CHANGE UP (ref 8.4–10.5)
CHLORIDE SERPL-SCNC: 103 MMOL/L — SIGNIFICANT CHANGE UP (ref 96–108)
CHLORIDE SERPL-SCNC: 103 MMOL/L — SIGNIFICANT CHANGE UP (ref 96–108)
CO2 SERPL-SCNC: 26 MMOL/L — SIGNIFICANT CHANGE UP (ref 22–31)
CO2 SERPL-SCNC: 26 MMOL/L — SIGNIFICANT CHANGE UP (ref 22–31)
CREAT SERPL-MCNC: 0.73 MG/DL — SIGNIFICANT CHANGE UP (ref 0.5–1.3)
CREAT SERPL-MCNC: 0.73 MG/DL — SIGNIFICANT CHANGE UP (ref 0.5–1.3)
EGFR: 93 ML/MIN/1.73M2 — SIGNIFICANT CHANGE UP
EGFR: 93 ML/MIN/1.73M2 — SIGNIFICANT CHANGE UP
EOSINOPHIL # BLD AUTO: 0.57 K/UL — HIGH (ref 0–0.5)
EOSINOPHIL # BLD AUTO: 0.57 K/UL — HIGH (ref 0–0.5)
EOSINOPHIL NFR BLD AUTO: 6.6 % — HIGH (ref 0–6)
EOSINOPHIL NFR BLD AUTO: 6.6 % — HIGH (ref 0–6)
GLUCOSE SERPL-MCNC: 100 MG/DL — HIGH (ref 70–99)
GLUCOSE SERPL-MCNC: 100 MG/DL — HIGH (ref 70–99)
HCT VFR BLD CALC: 40.8 % — SIGNIFICANT CHANGE UP (ref 39–50)
HCT VFR BLD CALC: 40.8 % — SIGNIFICANT CHANGE UP (ref 39–50)
HGB BLD-MCNC: 13.2 G/DL — SIGNIFICANT CHANGE UP (ref 13–17)
HGB BLD-MCNC: 13.2 G/DL — SIGNIFICANT CHANGE UP (ref 13–17)
IMM GRANULOCYTES NFR BLD AUTO: 0.2 % — SIGNIFICANT CHANGE UP (ref 0–0.9)
IMM GRANULOCYTES NFR BLD AUTO: 0.2 % — SIGNIFICANT CHANGE UP (ref 0–0.9)
LYMPHOCYTES # BLD AUTO: 3.02 K/UL — SIGNIFICANT CHANGE UP (ref 1–3.3)
LYMPHOCYTES # BLD AUTO: 3.02 K/UL — SIGNIFICANT CHANGE UP (ref 1–3.3)
LYMPHOCYTES # BLD AUTO: 34.9 % — SIGNIFICANT CHANGE UP (ref 13–44)
LYMPHOCYTES # BLD AUTO: 34.9 % — SIGNIFICANT CHANGE UP (ref 13–44)
MCHC RBC-ENTMCNC: 29 PG — SIGNIFICANT CHANGE UP (ref 27–34)
MCHC RBC-ENTMCNC: 29 PG — SIGNIFICANT CHANGE UP (ref 27–34)
MCHC RBC-ENTMCNC: 32.4 GM/DL — SIGNIFICANT CHANGE UP (ref 32–36)
MCHC RBC-ENTMCNC: 32.4 GM/DL — SIGNIFICANT CHANGE UP (ref 32–36)
MCV RBC AUTO: 89.7 FL — SIGNIFICANT CHANGE UP (ref 80–100)
MCV RBC AUTO: 89.7 FL — SIGNIFICANT CHANGE UP (ref 80–100)
MONOCYTES # BLD AUTO: 0.74 K/UL — SIGNIFICANT CHANGE UP (ref 0–0.9)
MONOCYTES # BLD AUTO: 0.74 K/UL — SIGNIFICANT CHANGE UP (ref 0–0.9)
MONOCYTES NFR BLD AUTO: 8.6 % — SIGNIFICANT CHANGE UP (ref 2–14)
MONOCYTES NFR BLD AUTO: 8.6 % — SIGNIFICANT CHANGE UP (ref 2–14)
NEUTROPHILS # BLD AUTO: 4.23 K/UL — SIGNIFICANT CHANGE UP (ref 1.8–7.4)
NEUTROPHILS # BLD AUTO: 4.23 K/UL — SIGNIFICANT CHANGE UP (ref 1.8–7.4)
NEUTROPHILS NFR BLD AUTO: 48.9 % — SIGNIFICANT CHANGE UP (ref 43–77)
NEUTROPHILS NFR BLD AUTO: 48.9 % — SIGNIFICANT CHANGE UP (ref 43–77)
NRBC # BLD: 0 /100 WBCS — SIGNIFICANT CHANGE UP (ref 0–0)
NRBC # BLD: 0 /100 WBCS — SIGNIFICANT CHANGE UP (ref 0–0)
PLATELET # BLD AUTO: 240 K/UL — SIGNIFICANT CHANGE UP (ref 150–400)
PLATELET # BLD AUTO: 240 K/UL — SIGNIFICANT CHANGE UP (ref 150–400)
POTASSIUM SERPL-MCNC: 4 MMOL/L — SIGNIFICANT CHANGE UP (ref 3.5–5.3)
POTASSIUM SERPL-MCNC: 4 MMOL/L — SIGNIFICANT CHANGE UP (ref 3.5–5.3)
POTASSIUM SERPL-SCNC: 4 MMOL/L — SIGNIFICANT CHANGE UP (ref 3.5–5.3)
POTASSIUM SERPL-SCNC: 4 MMOL/L — SIGNIFICANT CHANGE UP (ref 3.5–5.3)
PROT SERPL-MCNC: 7.4 G/DL — SIGNIFICANT CHANGE UP (ref 6–8.3)
PROT SERPL-MCNC: 7.4 G/DL — SIGNIFICANT CHANGE UP (ref 6–8.3)
RBC # BLD: 4.55 M/UL — SIGNIFICANT CHANGE UP (ref 4.2–5.8)
RBC # BLD: 4.55 M/UL — SIGNIFICANT CHANGE UP (ref 4.2–5.8)
RBC # FLD: 14.2 % — SIGNIFICANT CHANGE UP (ref 10.3–14.5)
RBC # FLD: 14.2 % — SIGNIFICANT CHANGE UP (ref 10.3–14.5)
SODIUM SERPL-SCNC: 142 MMOL/L — SIGNIFICANT CHANGE UP (ref 135–145)
SODIUM SERPL-SCNC: 142 MMOL/L — SIGNIFICANT CHANGE UP (ref 135–145)
WBC # BLD: 8.65 K/UL — SIGNIFICANT CHANGE UP (ref 3.8–10.5)
WBC # BLD: 8.65 K/UL — SIGNIFICANT CHANGE UP (ref 3.8–10.5)
WBC # FLD AUTO: 8.65 K/UL — SIGNIFICANT CHANGE UP (ref 3.8–10.5)
WBC # FLD AUTO: 8.65 K/UL — SIGNIFICANT CHANGE UP (ref 3.8–10.5)

## 2023-12-13 PROCEDURE — 80053 COMPREHEN METABOLIC PANEL: CPT

## 2023-12-13 PROCEDURE — 85025 COMPLETE CBC W/AUTO DIFF WBC: CPT

## 2023-12-13 PROCEDURE — 99284 EMERGENCY DEPT VISIT MOD MDM: CPT

## 2023-12-13 PROCEDURE — 99284 EMERGENCY DEPT VISIT MOD MDM: CPT | Mod: 25

## 2023-12-13 PROCEDURE — 74019 RADEX ABDOMEN 2 VIEWS: CPT

## 2023-12-13 PROCEDURE — 96360 HYDRATION IV INFUSION INIT: CPT

## 2023-12-13 PROCEDURE — 36415 COLL VENOUS BLD VENIPUNCTURE: CPT

## 2023-12-13 PROCEDURE — 74019 RADEX ABDOMEN 2 VIEWS: CPT | Mod: 26

## 2023-12-13 RX ORDER — POLYETHYLENE GLYCOL 3350 17 G/17G
17 POWDER, FOR SOLUTION ORAL ONCE
Refills: 0 | Status: COMPLETED | OUTPATIENT
Start: 2023-12-13 | End: 2023-12-13

## 2023-12-13 RX ORDER — SODIUM CHLORIDE 9 MG/ML
500 INJECTION INTRAMUSCULAR; INTRAVENOUS; SUBCUTANEOUS ONCE
Refills: 0 | Status: COMPLETED | OUTPATIENT
Start: 2023-12-13 | End: 2023-12-13

## 2023-12-13 RX ADMIN — POLYETHYLENE GLYCOL 3350 17 GRAM(S): 17 POWDER, FOR SOLUTION ORAL at 11:13

## 2023-12-13 RX ADMIN — SODIUM CHLORIDE 500 MILLILITER(S): 9 INJECTION INTRAMUSCULAR; INTRAVENOUS; SUBCUTANEOUS at 11:55

## 2023-12-13 RX ADMIN — SODIUM CHLORIDE 1000 MILLILITER(S): 9 INJECTION INTRAMUSCULAR; INTRAVENOUS; SUBCUTANEOUS at 11:13

## 2023-12-13 NOTE — ED PROVIDER NOTE - NSFOLLOWUPINSTRUCTIONS_ED_ALL_ED_FT
Constipation is when a person has fewer than three bowel movements a week, has difficulty having a bowel movement, or has stools that are dry, hard, or larger than normal. Other symptoms can include abdominal pain or bloating. As people grow older, constipation is more common. A low-fiber diet, not taking in enough fluids, and taking certain medicines, including opioid painkillers, may make constipation worse. Treatment varies but may include dietary modifications (more fiber-rich foods), lifestyle modifications, and possible medications.     Try the following medications as a "bowel regimen:"  Take one tab of colace twice daily for 5 days.  Take one capful of miralax nightly for 5 days.  Drink plenty of water.  May use over the counter enemas     Additional meds:  senna leaf extract oral tablet: 2 tab(s) orally once a day (at bedtime)  bisacodyl 10 mg rectal suppository: 1 suppository(ies) rectal once a day As needed If no bowel movement by POD#2    SEEK IMMEDIATE MEDICAL CARE IF YOU HAVE ANY OF THE FOLLOWING SYMPTOMS: bright red blood in your stool, constipation for longer than 4 days, abdominal or rectal pain, unexplained weight loss, or inability to pass gas.

## 2023-12-13 NOTE — ED PROVIDER NOTE - CHIEF COMPLAINT
Height (cm): 177.8 (12-11)  Weight (kg): 82.6 (12-11)  BMI (kg/m2): 26.1 (12-11)  IBW: 75.2 kg   % IBW: 109%    UBW: 86.1 kg          %UBW: 95%
The patient is a 78y Male complaining of

## 2023-12-13 NOTE — ED PROVIDER NOTE - PHYSICAL EXAMINATION

## 2023-12-13 NOTE — ED ADULT NURSE NOTE - CHPI ED NUR DURATION
Subjective   Patient ID: Blair is a 64 year old male.    No chief complaint on file.      History of Present Illness:  Blair is a 64-year-old patient of Dr. Harrell without prior cardiac history who was recently admitted to [facility] ER with chest pain and suspected of acute inferior wall infarct.  The patient started having chest pain around 10:30 PM which she described as pressure tightness without radiation but associated with some shortness of breath.  Discomfort had actually been occurring off and on for the past 2 weeks with exertion more in the past and had been relieved quickly with rest.  When he arrived at the ER his pain was unremitting.  He has no prior history of MI but has multiple coronary risk factors including hypertension, prediabetes and hypercholesteremia in particular hypertriglyceridemia.  Aspirin was administered and IV heparin was initiated.  Patient was taken to the Cath Lab emergently for possible PCI.    Chest x-ray was completed which was within normal limits.  No acute disease.    Cardiac catheterization was completed emergently which indicated 100% stenosis of the mid vessel lesion of the LAD.  It also indicated 90% stenosis of the proximal vessel lesion of the first diagonal which was felt to be the culprit for the patient's clinical presentation.  Stent placement was performed following intervention there is residual 0% stenosis with IRINEO grade 3 flow.    Echocardiogram was completed during hospitalization which indicated normal left ventricular systolic function with an estimated EF of 55-65%.  Wall motion was normal.  Doppler parameters were consistent with abnormal left ventricular relaxation indicating grade 1 diastolic dysfunction.  His pulmonary artery systolic pressure was mildly increased estimated to be 35 mmHg.    Patient was discharged on same cardiac medications with the exception of aspirin and clopidogrel which importance of compliance was  discussed with patient.  Cardiac rehab was discussed for consideration as outpatient.    Overall, patient's feeling well.  He denies exertional chest pain, shortness of breath, dizziness and palpitations.  He rarely has an episode where he feels lightheaded from changing positions from seated to standing.  He has been compliant with Plavix and has not missed a dose.  He is back to baseline with his walking. Patient's groin site is clean dry and intact with mild ecchymosis but without hematoma.  Patient denies pain to his lower back or pain down his leg.    No past medical history on file.    No past surgical history on file.    No family history on file.    Social History     Socioeconomic History   • Marital status: Not on file     Spouse name: Not on file   • Number of children: Not on file   • Years of education: Not on file   • Highest education level: Not on file   Social Needs   • Financial resource strain: Not on file   • Food insecurity - worry: Not on file   • Food insecurity - inability: Not on file   • Transportation needs - medical: Not on file   • Transportation needs - non-medical: Not on file   Occupational History   • Not on file   Tobacco Use   • Smoking status: Not on file   Substance and Sexual Activity   • Alcohol use: Not on file   • Drug use: Not on file   • Sexual activity: Not on file   Other Topics Concern   • Not on file   Social History Narrative   • Not on file       has no allergies on file.    ROS    Physical Exam   Constitutional: He appears healthy. No distress.   Eyes: Conjunctivae are normal.   Neck: Normal range of motion. Neck supple. No JVD present.   Cardiovascular: Normal rate, regular rhythm, S1 normal, S2 normal, normal heart sounds, intact distal pulses and normal pulses.   Pulmonary/Chest: Effort normal and breath sounds normal. He has no wheezes. He has no rales. He exhibits no tenderness.   Abdominal: Soft. Bowel sounds are normal.   Musculoskeletal: Normal range of  motion. He exhibits no edema.   Neurological: He is alert and oriented to person, place, and time.   Skin: Skin is warm and dry.   Cardiac cath site clean, dry and intact       There were no vitals taken for this visit.    LABS:  Labs 3/5/2019    CBC indicated WBC of 5.5, H&H 13.3 and 38.1    CMP indicated sodium 135, potassium 3.8, BUN 11 and creatinine 0.84, AST 56 and ALT 68.    Troponin peaked at 5.82 and trended downwards following.    Assessment   Problem List Items Addressed This Visit        Circulatory    Chest pain    CAD (coronary artery disease), native coronary artery      Other Visit Diagnoses     Hospital discharge follow-up    -  Primary    S/P cardiac cath              Plan:  At this time, recommend the patient continue present management.  I have given him permission to drive and return to work on Monday, 3/18/2019.  I have asked him to wait 10-14 days until he increases his exercise from walking to a faster pace or any heavy lifting.  His lifting will continue to be restricted to less than 10 pounds until he is reached his 14-day post PCI sonam.  Patient is declining cardiac rehab at this time as he has returned to baseline.  We will follow-up with Dr. Harrell as scheduled in 2 weeks    currently has no medications in their medication list.       constipationx2 weeks per pt statement/week(s)

## 2023-12-13 NOTE — ED ADULT NURSE NOTE - OBJECTIVE STATEMENT
alert and orientedx4, endorsing he is in rehab for right hip replacement, per pt he is scheduled to be d/c from rehab tomorrow, but went to gi appointment yesterday due to constipation, per pt he was manually disimpacted by gi provider, but was told to come to the er because his impaction was "severe" and gi told him he needed scans to rule out an obstruction. Patient patients he has only been eating sandwiches due to the food being terrible at the rehab facility and is taking po dilaudid for his pain. Patient states he doesn't ambulate much at the rehab facility. Patient denies vomiting but states he has nausea. Patient states he doesn't have much of an appetite because he has not been passing stool. Since being disimpacted by gi provider pt states he had two large bowel movement formed and brown without blood noted in stool. Pt abdomen is distended and hard. patient states he has no pain.

## 2023-12-13 NOTE — ED PROVIDER NOTE - CLINICAL SUMMARY MEDICAL DECISION MAKING FREE TEXT BOX
Pt w constipation, abd soft, no vomiting, XR +stool, rectal exam soft stool, pt given enema/miralax w more stool output, will dc with bowel regimen instructions.

## 2023-12-13 NOTE — ED ADULT NURSE REASSESSMENT NOTE - NS ED NURSE REASSESS COMMENT FT1
soap suds enema given, patient noted to have soft brown stool in diaper prior to enema. Patient cleaned and enema administered, pt has call bell if he needs assistance. Patient denies pain or nausea. No s/s of distress noted.

## 2023-12-13 NOTE — ED PROVIDER NOTE - PATIENT PORTAL LINK FT
You can access the FollowMyHealth Patient Portal offered by [facility] by registering at the following website: [email or web address]. By joining [employer]’s FollowMyHealth portal, you will also be able to view your health information using other applications (apps) compatible with our system. You can access the FollowMyHealth Patient Portal offered by [facility] by registering at the following website: [email or web address]. By joining [employer]’s FollowMyHealth portal, you will also be able to view your health information using other applications (apps) compatible with our system.

## 2023-12-13 NOTE — ED PROVIDER NOTE - OBJECTIVE STATEMENT
78-year-old presenting with constipation, patient states that he last had bowel movement this morning.  Patient states how he went to see his GI doctor this past Tuesday and was told to come to the ED.  Patient states he feels nausea but no vomiting, denies any abdominal pain.  States he is not sure what he has been using at home for constipation, per last discharge summary on meds including miralax.  Denies any fever or any other acute complaints.

## 2023-12-13 NOTE — ED ADULT NURSE NOTE - NSFALLRISKINTERV_ED_ALL_ED
Assistance OOB with selected safe patient handling equipment if applicable/Assistance with ambulation/Communicate fall risk and risk factors to all staff, patient, and family/Monitor gait and stability/Provide patient with walking aids/Provide visual cue: yellow wristband, yellow gown, etc/Reinforce activity limits and safety measures with patient and family/Call bell, personal items and telephone in reach/Instruct patient to call for assistance before getting out of bed/chair/stretcher/Non-slip footwear applied when patient is off stretcher/[place] to call system/Physically safe environment - no spills, clutter or unnecessary equipment/Purposeful Proactive Rounding/Room/bathroom lighting operational, light cord in reach Assistance OOB with selected safe patient handling equipment if applicable/Assistance with ambulation/Communicate fall risk and risk factors to all staff, patient, and family/Monitor gait and stability/Provide patient with walking aids/Provide visual cue: yellow wristband, yellow gown, etc/Reinforce activity limits and safety measures with patient and family/Call bell, personal items and telephone in reach/Instruct patient to call for assistance before getting out of bed/chair/stretcher/Non-slip footwear applied when patient is off stretcher/[place] to call system/Physically safe environment - no spills, clutter or unnecessary equipment/Purposeful Proactive Rounding/Room/bathroom lighting operational, light cord in reach

## 2023-12-13 NOTE — ED ADULT NURSE NOTE - CHIEF COMPLAINT QUOTE
Pt presents to from [facility] by EMS ED C/O constipation, went to GI on Monday. States, " The GI doctor broke up some of the stool he said I was badly impacted and he couldn't reach some of the stool so to go to the ER but I've gone 3 times since then". Reports formed stool, denies bleeding. Pt presents to from [facility] by EMS ED C/O constipation, went to GI on Monday. States, " The GI doctor broke up some of the stool he said I was badly impacted and he couldn't reach some of the stool so to go to the ER but I've gone 3 times since then". Reports formed stool, denies bleeding.

## 2023-12-13 NOTE — ED ADULT NURSE REASSESSMENT NOTE - NS ED NURSE REASSESS COMMENT FT1
patient had two additional large bowel movement, perineal care performed, absorbant pad changed, pt used fractionated bed pan with assistance to have bowel movement.

## 2023-12-13 NOTE — ED ADULT NURSE REASSESSMENT NOTE - NS ED NURSE REASSESS COMMENT FT1
patient drinking miralax at this time, denies pain or discomfort, no nausea or vomiting at this time, safety precautions maintained.

## 2023-12-13 NOTE — ED ADULT NURSE REASSESSMENT NOTE - NS ED NURSE REASSESS COMMENT FT1
patient has had two large brown formed bowel movements since enema was given. Patient re-assessed by ed provider, plan of care reviewed with patient, patient educated he will be discharged back to his facility as pt is passing stool and tolerating po foods. No nausea or vomiting. Patient noted with pink skin break down noted on his buttox. Patient denies pain at this time. Perineal care and bed linen changed. No s/s of distress noted. Safety precautions maintained.

## 2023-12-13 NOTE — ED ADULT NURSE NOTE - PAIN RATING/NUMBER SCALE (0-10): ACTIVITY
AVS reviewed with patient. Verbalized understanding. AVS was printed and given to patient.       Keith Bruce RN  11/15/21 [id number] (no pain/absence of nonverbal indicators of pain)

## 2023-12-13 NOTE — ED ADULT TRIAGE NOTE - CHIEF COMPLAINT QUOTE
Pt presents to from [facility] by EMS ED C/O constipation, went to GI on Monday. States, " The GI doctor broke up some of the stool he said I was badly impacted and he couldn't reach some of the stool so to go to the ER but I've gone 3 times since then". Reports formed stool, denies bleeding. Pt presents to from [facility] by EMS ED C/O constipation, went to GI on Monday. States, " The GI doctor broke up some of the stool he said I was badly impacted and he couldn't reach some of the stool so to go to the ER but I've gone 3 times since then". Reports formed stool, denies bleeding.

## 2024-01-03 ENCOUNTER — APPOINTMENT (OUTPATIENT)
Dept: VASCULAR SURGERY | Facility: CLINIC | Age: 79
End: 2024-01-03
Payer: MEDICARE

## 2024-01-03 ENCOUNTER — APPOINTMENT (OUTPATIENT)
Dept: ORTHOPEDIC SURGERY | Facility: CLINIC | Age: 79
End: 2024-01-03
Payer: MEDICARE

## 2024-01-03 ENCOUNTER — OUTPATIENT (OUTPATIENT)
Dept: OUTPATIENT SERVICES | Facility: HOSPITAL | Age: 79
LOS: 1 days | End: 2024-01-03
Payer: MEDICARE

## 2024-01-03 ENCOUNTER — RESULT REVIEW (OUTPATIENT)
Age: 79
End: 2024-01-03

## 2024-01-03 VITALS
HEIGHT: 68 IN | HEART RATE: 81 BPM | DIASTOLIC BLOOD PRESSURE: 72 MMHG | BODY MASS INDEX: 25.76 KG/M2 | WEIGHT: 170 LBS | SYSTOLIC BLOOD PRESSURE: 116 MMHG | OXYGEN SATURATION: 96 %

## 2024-01-03 DIAGNOSIS — Z98.890 OTHER SPECIFIED POSTPROCEDURAL STATES: Chronic | ICD-10-CM

## 2024-01-03 PROCEDURE — 29580 STRAPPING UNNA BOOT: CPT | Mod: 50

## 2024-01-03 PROCEDURE — 99214 OFFICE O/P EST MOD 30 MIN: CPT | Mod: 25

## 2024-01-03 PROCEDURE — 99024 POSTOP FOLLOW-UP VISIT: CPT

## 2024-01-03 PROCEDURE — 73521 X-RAY EXAM HIPS BI 2 VIEWS: CPT

## 2024-01-03 PROCEDURE — 73521 X-RAY EXAM HIPS BI 2 VIEWS: CPT | Mod: 26

## 2024-01-03 RX ORDER — CEFADROXIL 500 MG/1
500 CAPSULE ORAL TWICE DAILY
Qty: 20 | Refills: 0 | Status: ACTIVE | COMMUNITY
Start: 2024-01-03 | End: 1900-01-01

## 2024-01-03 NOTE — ASSESSMENT
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 Mcgovern, presented today for post-op check and reported 24h h/o weeping ulcers at the L ankle.  Dr. Mcgovern appreciated steady fluid drainage from the wounds, no erythema or purulent drainage, but opted to treat pt w/Cefadroxil x 10d for prevention of infection.  Pt denies fevers/chills at home, and reports no change in baseline LE swelling.  No erythema noted in legs but subcentimeter ulcers of the L medial malleolus, healing abrasions of the R pretibia and R prepatella noted.  Both legs dressed w/unna boot/ACE, instructed pt to RTO in 1wk for wound reevaluation and dressing change.

## 2024-01-03 NOTE — REASON FOR VISIT
[Acute] : an acute visit [Formal Caregiver] : formal caregiver [FreeTextEntry1] : Venous stasis ulcers of the L ankle

## 2024-01-03 NOTE — HISTORY OF PRESENT ILLNESS
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 Mcgovern, presented today for post-op check and reported 24h h/o weeping ulcers at the L ankle.  Dr. Mcgovern appreciated steady fluid drainage from the wounds, no erythema or purulent drainage, but opted to treat pt w/Cefadroxil x 10d for prevention of infection.  Pt denies fevers/chills at home, and reports no change in baseline LE swelling.

## 2024-01-03 NOTE — REVIEW OF SYSTEMS
[As noted in HPI] : as noted in HPI [Lower Ext Edema] : lower extremity edema [As Noted in HPI] : as noted in HPI [Skin Wound] : skin wound [Negative] : Heme/Lymph

## 2024-01-03 NOTE — PHYSICAL EXAM
[JVD] : no jugular venous distention  [Respiratory Effort] : normal respiratory effort [Normal Rate and Rhythm] : normal rate and rhythm [2+] : left 2+ [1+] : left 1+ [Ankle Swelling (On Exam)] : present [Ankle Swelling Bilaterally] : bilaterally  [Ankle Swelling On The Right] : mild [Varicose Veins Of Lower Extremities] : not present [] : bilaterally [Abdomen Masses] : No abdominal masses [Abdomen Tenderness] : ~T ~M No abdominal tenderness [Purpura] : no purpura  [Petechiae] : no petechiae [Skin Ulcer] : ulcer [Skin Induration] : no induration [Alert] : alert [Calm] : calm [de-identified] : Frail, NAD [de-identified] : NC/AT, anicteric [de-identified] : Limited AROM at lower back, strength 5/5x4, no palpable cords in LEs b/l [de-identified] : No erythema noted in legs but subcentimeter ulcers of the L medial malleolus, healing abrasions of the R pretibia and R prepatella noted

## 2024-01-12 NOTE — PRE-ANESTHESIA EVALUATION ADULT - NSANTHAIRWAYFT_ENT_ALL_CORE
Inter-incisor distance: > 3cm  HMD: > 6cm  Submandibular space: normal compliance  Neck:  normal length and thickness  Head/Neck:  FROM  Beard
Alert and oriented to person, place and time

## 2024-01-13 NOTE — HISTORY OF PRESENT ILLNESS
[Procedure: ___] : status post [unfilled] [___ Weeks Post Op] : [unfilled] weeks post op [___ Months Post Op] : [unfilled] months post op [de-identified] : General appearance: well nourished and hydrated, pleasant, alert and oriented x 3, cooperative. HEENT: normocephalic, EOM intact, wearing mask, external auditory canal clear. Cardiovascular: bilateral 2+ pitting edema to the lower extremities, distal to the tibial tubercles with associated venous stasis changes and open wounds worst on the left than the right, particularly on the medial aspect of the bilateral lower legs and ankles with a large amount of serous drainage, particularly on the left side, which is actively oozing through the course of today's examination. There do not appear to be any plantar foot wounds or really any forefoot lesions or any lesion distal to the malleolus. The skin lesions are entirely contained within the distal third of the legs bilaterally. no varicosities, dorsalis pedis pulses palpable and symmetric. Lymphatics: no palpable lymphadenopathy, no lymphedema. Neurologic: sensation is normal, no muscle weakness in upper or lower extremities, patella tendon reflexes present and symmetric. Dermatologic: skin moist, warm, no rash. Spine: cervical spine with normal lordosis and painless range of motion, thoracic spine with normal kyphosis and painless range of motion, lumbosacral spine with normal lordosis and painless range of motion. No tenderness to palpation along midline spine and paraspinal musculature. Sacroiliac joints nontender bilaterally. Negative SLR and crossed SLR tests bilaterally. Gait: Does present with a walker, normal gait.  Limb lengths: clinically equal   Left hip: - Focal soft tissue swelling: none - Ecchymosis: none - Erythema: none - Wounds: well healed anterior surgical incision, benign appearing - Tenderness: none - ROM: - Flexion: 95 - Extension:  - Adduction: 10 - Abduction: 40 - Internal rotation in 90 degrees of hip flexion: 5 - External rotation in 90 degrees of hip flexion: 10 - HASEEB: Mildly uncomfortable - FADIR: Mildly uncomfortable - Tamiko: negative - Stinchfield: positive - Flexor power: 4+/5 - Abductor power: 5/5 [de-identified] : AP pelvis and bilateral hip x-rays were taken today, January 3rd, 2024 at [facility]. - These demonstrate stable appearance of bilateral hip hemiarthroplasties as compared to prior imaging. Without evidence of any new mechanical complication. 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 - There remains no significant evidence of heterotopic ossification on the left. - There is stable appearance noted to the extensive lumbopelvic fixation hardware as compared to prior imaging without significant change over time. - Pelvic alignment remains relatively outlet. - Overall imaging has remained stable. [de-identified] : 1/3/24 77 y/o M now approximately two months S/P left hemiarthroplasty overall doing well at this time, as well as approximately six months status post right hip hemiarthroplasty with ongoing pain associated with heterotopic ossification. Also with new finding of bilateral lower extremity venous stasis wounds and possible early cellulitis.  - He seems to be doing well with respect to the left hip. However, I find the appearance of his bilateral leg open wounds to be very concerning. - We will initiate a new course of home physical therapy at this time via the [facility] program for which a referral was placed today. - I prescribed him with a 10 day course of cefadroxil to address the bilateral lower extremity wounds. - And we also gave him a referral to vascular surgery to consider unna boot therapy or any other localized wound care for his venous stasis disease. - I provided him with a letter per his request for provision of a hospital bed at home - His wounds were dressed today with gauze and cling wrap. - We will follow up next in two months with repeat bilateral hip x-rays earlier as needed. [de-identified] : Second Post op visit. [de-identified] : Date of surgery: 10.30.2023 Left hip arthroplasty

## 2024-01-13 NOTE — HISTORY OF PRESENT ILLNESS
[Procedure: ___] : status post [unfilled] [___ Weeks Post Op] : [unfilled] weeks post op [___ Months Post Op] : [unfilled] months post op [de-identified] : General appearance: well nourished and hydrated, pleasant, alert and oriented x 3, cooperative. HEENT: normocephalic, EOM intact, wearing mask, external auditory canal clear. Cardiovascular: bilateral 2+ pitting edema to the lower extremities, distal to the tibial tubercles with associated venous stasis changes and open wounds worst on the left than the right, particularly on the medial aspect of the bilateral lower legs and ankles with a large amount of serous drainage, particularly on the left side, which is actively oozing through the course of today's examination. There do not appear to be any plantar foot wounds or really any forefoot lesions or any lesion distal to the malleolus. The skin lesions are entirely contained within the distal third of the legs bilaterally. no varicosities, dorsalis pedis pulses palpable and symmetric. Lymphatics: no palpable lymphadenopathy, no lymphedema. Neurologic: sensation is normal, no muscle weakness in upper or lower extremities, patella tendon reflexes present and symmetric. Dermatologic: skin moist, warm, no rash. Spine: cervical spine with normal lordosis and painless range of motion, thoracic spine with normal kyphosis and painless range of motion, lumbosacral spine with normal lordosis and painless range of motion. No tenderness to palpation along midline spine and paraspinal musculature. Sacroiliac joints nontender bilaterally. Negative SLR and crossed SLR tests bilaterally. Gait: Does present with a walker, normal gait.  Limb lengths: clinically equal   Left hip: - Focal soft tissue swelling: none - Ecchymosis: none - Erythema: none - Wounds: well healed anterior surgical incision, benign appearing - Tenderness: none - ROM: - Flexion: 95 - Extension:  - Adduction: 10 - Abduction: 40 - Internal rotation in 90 degrees of hip flexion: 5 - External rotation in 90 degrees of hip flexion: 10 - HASEEB: Mildly uncomfortable - FADIR: Mildly uncomfortable - Tamiko: negative - Stinchfield: positive - Flexor power: 4+/5 - Abductor power: 5/5 [de-identified] : AP pelvis and bilateral hip x-rays were taken today, January 3rd, 2024 at [facility]. - These demonstrate stable appearance of bilateral hip hemiarthroplasties as compared to prior imaging. Without evidence of any new mechanical complication. 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 - There remains no significant evidence of heterotopic ossification on the left. - There is stable appearance noted to the extensive lumbopelvic fixation hardware as compared to prior imaging without significant change over time. - Pelvic alignment remains relatively outlet. - Overall imaging has remained stable. [de-identified] : 1/3/24 79 y/o M now approximately two months S/P left hemiarthroplasty overall doing well at this time, as well as approximately six months status post right hip hemiarthroplasty with ongoing pain associated with heterotopic ossification. Also with new finding of bilateral lower extremity venous stasis wounds and possible early cellulitis.  - He seems to be doing well with respect to the left hip. However, I find the appearance of his bilateral leg open wounds to be very concerning. - We will initiate a new course of home physical therapy at this time via the [facility] program for which a referral was placed today. - I prescribed him with a 10 day course of cefadroxil to address the bilateral lower extremity wounds. - And we also gave him a referral to vascular surgery to consider unna boot therapy or any other localized wound care for his venous stasis disease. - I provided him with a letter per his request for provision of a hospital bed at home - His wounds were dressed today with gauze and cling wrap. - We will follow up next in two months with repeat bilateral hip x-rays earlier as needed. [de-identified] : Second Post op visit. [de-identified] : Date of surgery: 10.30.2023 Left hip arthroplasty

## 2024-01-13 NOTE — END OF VISIT
[FreeTextEntry3] : All medical record entries made by the Scribe were at my, Dr. Dwayne Mcgovern's, direction and personally dictated by me on 01/03/2024. I have reviewed the chart and agree that the record accurately reflects my personal performance of the history, physical exam, assessment and plan. I have also personally directed, reviewed, and agreed with the chart.

## 2024-01-18 ENCOUNTER — APPOINTMENT (OUTPATIENT)
Dept: VASCULAR SURGERY | Facility: CLINIC | Age: 79
End: 2024-01-18
Payer: MEDICARE

## 2024-01-18 VITALS
HEIGHT: 68 IN | WEIGHT: 170 LBS | DIASTOLIC BLOOD PRESSURE: 81 MMHG | BODY MASS INDEX: 25.76 KG/M2 | OXYGEN SATURATION: 97 % | SYSTOLIC BLOOD PRESSURE: 126 MMHG | HEART RATE: 81 BPM

## 2024-01-18 DIAGNOSIS — M48.9 SPONDYLOPATHY, UNSPECIFIED: ICD-10-CM

## 2024-01-18 PROCEDURE — 99213 OFFICE O/P EST LOW 20 MIN: CPT

## 2024-01-18 NOTE — HISTORY OF PRESENT ILLNESS
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 Mcgovern, presented today for f/u evaluation of his weeping ulcers at the L ankle.  Dr. Mcgovern appreciated steady fluid drainage from the wounds at the time of his last visit, no erythema or purulent drainage, but opted to treat pt w/Cefadroxil x 10d for prevention of infection.  Pt denies fevers/chills at home, pt treated w/unna boot in this office 1wk prior. [de-identified] : patient fell on his left knee and there is a wound there that is clean, measures ~ 1cm and has hyper-granulation tissue on it. Silver nitrate sticks were placed on it followed by bacitracin. The knee was then wrapped with a 4" ace wrap. There were no other leg ulcers, all having healed with the Unna boot placement.

## 2024-01-18 NOTE — END OF VISIT
[Time Spent: ___ minutes] : I have spent [unfilled] minutes of time on the encounter. [>50% of the face to face encounter time was spent on counseling and/or coordination of care for ___] : Greater than 50% of the face to face encounter time was spent on counseling and/or coordination of care for [unfilled] [FreeTextEntry3] : [id number]

## 2024-01-18 NOTE — PHYSICAL EXAM
[Respiratory Effort] : normal respiratory effort [Normal Rate and Rhythm] : normal rate and rhythm [2+] : left 2+ [1+] : left 1+ [Ankle Swelling (On Exam)] : present [Ankle Swelling Bilaterally] : bilaterally  [Ankle Swelling On The Right] : mild [] : bilaterally [Skin Ulcer] : ulcer [Alert] : alert [Calm] : calm [JVD] : no jugular venous distention  [Varicose Veins Of Lower Extremities] : not present [Abdomen Masses] : No abdominal masses [Abdomen Tenderness] : ~T ~M No abdominal tenderness [Purpura] : no purpura  [Petechiae] : no petechiae [Skin Induration] : no induration [de-identified] : Frail, NAD [de-identified] : NC/AT, anicteric [de-identified] : Limited AROM at lower back, strength 5/5x4, no palpable cords in LEs b/l [de-identified] : No erythema noted in legs but subcentimeter ulcers of the L medial malleolus, healing abrasions of the R pretibia and R prepatella noted

## 2024-01-18 NOTE — ASSESSMENT
[FreeTextEntry1] : 78yoM w/chronic phlebolymphedema in both. The swelling has abated, and the only wound is from a fall that he experienced. It is a 1 cm wound on the left knee measuring one cm. It was treated with silver nitrate sticks and bacitracin. The aid that was with him was advised to treat it daily with bacitracin and she was advised to give the patient a shower to get rid of the residual Unna boot that remained on the leg.

## 2024-01-18 NOTE — PLAN
[TextEntry] : cleanse area daily and place bacitracin on the knee followed by a clean 4x4 and an ace bandage.

## 2024-01-18 NOTE — DIETITIAN INITIAL EVALUATION ADULT. - NUTRITON FOCUSED PHYSICAL EXAM
[General Appearance - Well Developed] : well developed [Normal Appearance] : normal appearance [General Appearance - In No Acute Distress] : no acute distress [Respiration, Rhythm And Depth] : normal respiratory rhythm and effort [] : no rash [Oriented To Time, Place, And Person] : oriented to person, place, and time yes...

## 2024-02-01 ENCOUNTER — APPOINTMENT (OUTPATIENT)
Dept: VASCULAR SURGERY | Facility: CLINIC | Age: 79
End: 2024-02-01
Payer: MEDICARE

## 2024-02-01 VITALS
BODY MASS INDEX: 25.76 KG/M2 | HEIGHT: 68 IN | WEIGHT: 170 LBS | SYSTOLIC BLOOD PRESSURE: 86 MMHG | HEART RATE: 77 BPM | DIASTOLIC BLOOD PRESSURE: 55 MMHG

## 2024-02-01 DIAGNOSIS — Z80.9 FAMILY HISTORY OF MALIGNANT NEOPLASM, UNSPECIFIED: ICD-10-CM

## 2024-02-01 DIAGNOSIS — M79.89 OTHER SPECIFIED SOFT TISSUE DISORDERS: ICD-10-CM

## 2024-02-01 DIAGNOSIS — L97.301 VENOUS INSUFFICIENCY (CHRONIC) (PERIPHERAL): ICD-10-CM

## 2024-02-01 DIAGNOSIS — L97.921 NON-PRESSURE CHRONIC ULCER OF UNSPECIFIED PART OF LEFT LOWER LEG LIMITED TO BREAKDOWN OF SKIN: ICD-10-CM

## 2024-02-01 DIAGNOSIS — Z87.891 PERSONAL HISTORY OF NICOTINE DEPENDENCE: ICD-10-CM

## 2024-02-01 DIAGNOSIS — M75.51 BURSITIS OF RIGHT SHOULDER: ICD-10-CM

## 2024-02-01 DIAGNOSIS — Z85.9 PERSONAL HISTORY OF MALIGNANT NEOPLASM, UNSPECIFIED: ICD-10-CM

## 2024-02-01 DIAGNOSIS — Z87.39 PERSONAL HISTORY OF OTHER DISEASES OF THE MUSCULOSKELETAL SYSTEM AND CONNECTIVE TISSUE: ICD-10-CM

## 2024-02-01 DIAGNOSIS — Z60.2 PROBLEMS RELATED TO LIVING ALONE: ICD-10-CM

## 2024-02-01 DIAGNOSIS — Z98.890 OTHER SPECIFIED POSTPROCEDURAL STATES: ICD-10-CM

## 2024-02-01 DIAGNOSIS — F32.A DEPRESSION, UNSPECIFIED: ICD-10-CM

## 2024-02-01 DIAGNOSIS — Z87.19 PERSONAL HISTORY OF OTHER DISEASES OF THE DIGESTIVE SYSTEM: ICD-10-CM

## 2024-02-01 DIAGNOSIS — Z78.9 OTHER SPECIFIED HEALTH STATUS: ICD-10-CM

## 2024-02-01 DIAGNOSIS — G89.29 LUMBAGO WITH SCIATICA, LEFT SIDE: ICD-10-CM

## 2024-02-01 DIAGNOSIS — M48.9 SPONDYLOPATHY, UNSPECIFIED: ICD-10-CM

## 2024-02-01 DIAGNOSIS — M54.42 LUMBAGO WITH SCIATICA, LEFT SIDE: ICD-10-CM

## 2024-02-01 DIAGNOSIS — T14.8XXA OTHER INJURY OF UNSPECIFIED BODY REGION, INITIAL ENCOUNTER: ICD-10-CM

## 2024-02-01 DIAGNOSIS — M19.90 UNSPECIFIED OSTEOARTHRITIS, UNSPECIFIED SITE: ICD-10-CM

## 2024-02-01 DIAGNOSIS — Z82.49 FAMILY HISTORY OF ISCHEMIC HEART DISEASE AND OTHER DISEASES OF THE CIRCULATORY SYSTEM: ICD-10-CM

## 2024-02-01 DIAGNOSIS — Z63.4 DISAPPEARANCE AND DEATH OF FAMILY MEMBER: ICD-10-CM

## 2024-02-01 DIAGNOSIS — F11.90 OPIOID USE, UNSPECIFIED, UNCOMPLICATED: ICD-10-CM

## 2024-02-01 DIAGNOSIS — I87.2 VENOUS INSUFFICIENCY (CHRONIC) (PERIPHERAL): ICD-10-CM

## 2024-02-01 DIAGNOSIS — K21.9 GASTRO-ESOPHAGEAL REFLUX DISEASE W/OUT ESOPHAGITIS: ICD-10-CM

## 2024-02-01 PROCEDURE — 99213 OFFICE O/P EST LOW 20 MIN: CPT

## 2024-02-01 SDOH — SOCIAL STABILITY - SOCIAL INSECURITY: DISSAPEARANCE AND DEATH OF FAMILY MEMBER: Z63.4

## 2024-02-01 SDOH — SOCIAL STABILITY - SOCIAL INSECURITY: PROBLEMS RELATED TO LIVING ALONE: Z60.2

## 2024-02-01 NOTE — DATA REVIEWED
Pain Medicine Follow-Up Note  Scribe Attestation    I,:  PAULINA De La O am acting as a scribe while in the presence of the attending physician :       I,:  Indu Grigsby MD personally performed the services described in this documentation    as scribed in my presence :           Assessment:  1  Chronic pain syndrome    2  Neck pain    3  Cervical disc disorder with radiculopathy of mid-cervical region        Plan:  Orders Placed This Encounter   Procedures    EMG 2 Limb Upper Extremity     Standing Status:   Future     Standing Expiration Date:   5/24/2022     Order Specific Question:   Possible Diagnosis: Answer:   median neuropathy (carpal tunnel syndrome)       New Medications Ordered This Visit   Medications    oxyCODONE-acetaminophen (PERCOCET) 5-325 mg per tablet     Sig: Take 1 tablet by mouth 2 (two) times a day as needed for severe painMax Daily Amount: 2 tablets     Dispense:  60 tablet     Refill:  0    oxyCODONE-acetaminophen (PERCOCET) 5-325 mg per tablet     Sig: Take 1 tablet by mouth 2 (two) times a day as needed for severe painMax Daily Amount: 2 tablets     Dispense:  60 tablet     Refill:  0    gabapentin (NEURONTIN) 600 MG tablet     Sig: Take 1 tablet (600 mg total) by mouth 3 (three) times a day     Dispense:  90 tablet     Refill:  1    cyclobenzaprine (FLEXERIL) 10 mg tablet     Sig: Take 1 tablet (10 mg total) by mouth 3 (three) times a day as needed for muscle spasms     Dispense:  90 tablet     Refill:  1     My impressions and treatment recommendations were discussed in detail with the patient who verbalized understanding and had no further questions  The patient had trigger point injections on 04/30/2021 and she states that these were very helpful in getting her to regain her hand strength  She states that she is no longer dropping objects  I had another detailed discussion with the patient today regarding her chronic pain and her level of functioning    She states that after she left here the last time she spoke with her PCP and she decided that she thinks she needs to increase her Percocet 5/325 mg from half a tablet twice a day to 1 tablet twice a day  I felt that this was a reasonable request since she has been on such a low dose  Side effects were reviewed with the patient and she states that she does not have any side effects from this medication  Prescriptions were sent to the pharmacy on file with fill dates of 05/24/2021 and 06/23/2021  Refills were also sent to the pharmacy for Flexeril 10 mg up to 3 times a day as needed, and gabapentin 600 mg 3 times a day  Patient reports no side effects from these medications other than drowsiness  And she states that she does not drive while taking this medication  At the last office visit we discussed her being a good candidate for spinal cord stimulator trial and she was given the information to go home and read about  She states that she reviewed this with her PCP and he told her that before moving forward with this he would like to have her evaluated by a neurologist   She states that she is unable to get an appointment until September  At this time I will order an EMG for evaluation of bilateral upper extremities  The patient also reports she suffers from migraines with little relief from her migraine medication  We will also have patient make an appointment for evaluation of her migraines and possible Botox injections  [place] Prescription Drug Monitoring Program report was reviewed and was appropriate     There are risks associated with opioid medications, including dependence, addiction and tolerance  The patient understands and agrees to use these medications only as prescribed  Potential side effects of the medications include, but are not limited to, constipation, drowsiness, addiction, impaired judgment and risk of fatal overdose if not taken as prescribed   The patient was warned against [No studies available for review at this time.] : No studies available for review at this time. driving while taking sedation medications  Sharing medications is a felony  At this point in time, the patient is showing no signs of addiction, abuse, diversion or suicidal ideation  Follow-up is planned in 8 weeks time or sooner as warranted  Discharge instructions were provided  I personally saw and examined the patient and I agree with the above discussed plan of care  History of Present Illness:    Brianna Caldwell is a 47 y o  female who presents to [facility] for interval re-evaluation of the above stated pain complaints  The patient has a past medical and chronic pain history as outlined in the assessment section  She was last seen on   04/30/2021 for trigger point injections  At today's visit the patient reports a pain score of 7/10  She states that her pain is constant to some degree and worse in the morning  The quality of her pain is burning, sharp, throbbing, pressure-like with numbness and pins and needles  She currently takes half a tablet of Percocet 5/325 mg BID, gabapentin 600 mg 3 times a day, and Flexeril 10 mg up to twice a day for pain  She states that this helps her pain 30-40%  Pain Contract Signed:    03/22/2021  Last Urine Drug Screen:   03/22/2021    Other than as stated above, the patient denies any interval changes in medications, medical condition, mental condition, symptoms, or allergies since the last office visit  Review of Systems:    Review of Systems   Respiratory: Negative for shortness of breath  Cardiovascular: Negative for chest pain  Gastrointestinal: Negative for constipation, diarrhea, nausea and vomiting  Musculoskeletal: Positive for gait problem  Negative for arthralgias, joint swelling and myalgias  Decreased ROM   Muscle weakness  Joint stiffness  Swelling in shoulder area  Pain in l leg    Skin: Negative for rash  Neurological: Negative for dizziness, seizures and weakness     All other systems reviewed and are negative  Patient Active Problem List   Diagnosis    Chronic pain syndrome    Acute muscle stiffness of neck    Cervical disc disorder with radiculopathy of mid-cervical region    Neck pain    Chronic left-sided low back pain with left-sided sciatica    Lumbar radiculopathy    Cervicalgia    Right hip pain    Trochanteric bursitis of right hip    Pain in right hip    Myofascial pain syndrome       Past Medical History:   Diagnosis Date    Anxiety     Asthma     exercise induced asthma    Cervical disc disorder     Chronic pain     Chronic pain disorder     Depression     Dysuria     Low back pain     Lung abnormality     nodules    Lung nodule     Neck pain     Peripheral neuropathy     Pituitary abnormality (HCC)     tumor    Right hip pain 10/29/2019    Thrombocytopenia ([address] )     Thyroid disease        Past Surgical History:   Procedure Laterality Date    APPENDECTOMY      BREAST BIOPSY Right 1995    CHOLECYSTECTOMY      COLECTOMY      COLON SURGERY      EPIDURAL BLOCK INJECTION N/A 6/23/2016    Procedure: BLOCK / INJECTION EPIDURAL STEROID CERVICAL  C7-T1;  Surgeon: Elizabeth Quinonez MD;  Location: [facility] MAIN OR;  Service:     EPIDURAL BLOCK INJECTION N/A 3/31/2016    Procedure: BLOCK / INJECTION EPIDURAL STEROID CERVICAL C7-T1  (C-ARM); Surgeon: Elizabeth Quinonez MD;  Location: [facility] MAIN OR;  Service:     EPIDURAL BLOCK INJECTION N/A 8/8/2018    Procedure: C6 C7 Cervical Epidural Steroid Injection (59911); Surgeon: Elizabeth Quinonez MD;  Location: [facility] MAIN OR;  Service: Pain Management     HYSTERECTOMY  1996    OOPHORECTOMY Bilateral 1996    PITUITARY SURGERY      AR ARTHROCENTESIS ASPIR&/INJ MAJOR JT/BURSA W/O US Right 11/8/2019    Procedure: Trochanteric Bursa Injection (41329);   Surgeon: Elizabeth Quinonez MD;  Location: [facility] MAIN OR;  Service: Pain Management     AR ARTHROCENTESIS ASPIR&/INJ MAJOR JT/BURSA W/O US Right 1/23/2020    Procedure: Trochanteric Bursa Injection (32939); Surgeon: Lori Copeland MD;  Location: [facility] MAIN OR;  Service: Pain Management     MI NJX DX/THER SBST EPIDURAL/SUBRACH CERV/THORACIC N/A 1/21/2016    Procedure: BLOCK / INJECTION EPIDURAL STEROID CERVICAL C7-T1 (C-ARM); Surgeon: Lori Copeland MD;  Location: [facility] MAIN OR;  Service: Pain Management     REDUCTION MAMMAPLASTY Bilateral 2000       Family History   Problem Relation Age of Onset    Thyroid disease Mother     Hyperlipidemia Mother     Depression Mother     Thyroid disease Father     Hyperlipidemia Father     Depression Father     Ovarian cancer Sister 39    No Known Problems Brother     No Known Problems Maternal Uncle     No Known Problems Paternal Aunt     No Known Problems Paternal Uncle     No Known Problems Maternal Grandmother     No Known Problems Maternal Grandfather     Breast cancer Paternal Grandmother 36    No Known Problems Paternal Grandfather     No Known Problems Daughter     No Known Problems Son     ADD / ADHD Neg Hx     Anesthesia problems Neg Hx     Cancer Neg Hx     Clotting disorder Neg Hx     Collagen disease Neg Hx     Diabetes Neg Hx     Dislocations Neg Hx     Learning disabilities Neg Hx     Neurological problems Neg Hx     Osteoporosis Neg Hx     Rheumatologic disease Neg Hx     Scoliosis Neg Hx     Vascular Disease Neg Hx        Social History     Occupational History    Not on file   Tobacco Use    Smoking status: Never Smoker    Smokeless tobacco: Never Used   Substance and Sexual Activity    Alcohol use: No    Drug use: No    Sexual activity: Not on file         Current Outpatient Medications:     albuterol (VENTOLIN HFA) 90 mcg/act inhaler, Inhale, Disp: , Rfl:     aspirin 81 MG tablet, Take 81 mg by mouth daily  , Disp: , Rfl:     cholecalciferol (VITAMIN D3) 1,000 units tablet, Take 2 tablets by mouth daily, Disp: , Rfl:     cyclobenzaprine (FLEXERIL) 10 mg tablet, Take 1 tablet (10 mg total) by mouth 3 (three) times a day as needed for muscle spasms, Disp: 90 tablet, Rfl: 1    diclofenac sodium (VOLTAREN) 1 %, Apply 2 g topically 4 (four) times a day as needed (pain), Disp: 100 g, Rfl: 0    gabapentin (NEURONTIN) 600 MG tablet, Take 1 tablet (600 mg total) by mouth 3 (three) times a day, Disp: 90 tablet, Rfl: 1    hydrocortisone (ANUSOL-HC) 25 mg suppository, , Disp: , Rfl: 0    ibuprofen (MOTRIN) 600 mg tablet, Take 1 tablet (600 mg total) by mouth every 6 (six) hours as needed for mild pain, Disp: 30 tablet, Rfl: 0    levothyroxine 50 mcg tablet, , Disp: , Rfl:     metoprolol succinate (TOPROL-XL) 25 mg 24 hr tablet, , Disp: , Rfl:     mupirocin (BACTROBAN) 2 % ointment, , Disp: , Rfl: 0    oxyCODONE-acetaminophen (PERCOCET) 5-325 mg per tablet, Take 1 tablet by mouth 2 (two) times a day as needed for severe painMax Daily Amount: 2 tablets, Disp: 60 tablet, Rfl: 0    [START ON 6/23/2021] oxyCODONE-acetaminophen (PERCOCET) 5-325 mg per tablet, Take 1 tablet by mouth 2 (two) times a day as needed for severe painMax Daily Amount: 2 tablets, Disp: 60 tablet, Rfl: 0    traZODone (DESYREL) 50 mg tablet, , Disp: , Rfl: 0    Allergies   Allergen Reactions    Levaquin [Levofloxacin In D5w] Shortness Of Breath     Also hives      Amitiza [Lubiprostone] Other (See Comments) and Hives     Reaction Date: 10Aug2011;   Migraines and vomiting    Biaxin [Clarithromycin] Hives    Cephradine     Erythromycin Hives     Reaction Date: 10Aug2011;     Macrobid [Nitrofurantoin Monohyd Macro] Other (See Comments)     C/o passing out    Morphine     Penicillins Hives     Reaction Date: 10Aug2011;     Sulfa Antibiotics     Tetracycline     Tetracyclines & Related Hives    Morphine And Related Rash    Valium [Diazepam] Rash and Vomiting     Reaction Date: 14Jun2012;        Physical Exam:    /74   Pulse 89   Ht 5' 3" (1 6 m)   Wt 87 5 kg (193 lb)   BMI 34 19 kg/m² Constitutional:obese  Eyes:anicteric  HEENT:grossly intact  Neck:supple, symmetric, trachea midline and no masses   Pulmonary:even and unlabored  Cardiovascular:No edema or pitting edema present  Skin:Normal without rashes or lesions and well hydrated  Psychiatric:Mood and affect appropriate  Neurologic:Cranial Nerves II-XII grossly intact  Musculoskeletal:normal    Orders Placed This Encounter   Procedures    EMG 2 Limb Upper Extremity

## 2024-02-05 DIAGNOSIS — I87.2 VENOUS INSUFFICIENCY (CHRONIC) (PERIPHERAL): ICD-10-CM

## 2024-02-05 RX ORDER — HYDROCORTISONE ACETATE, PRAMOXINE HCL 2.5; 1 G/100G; G/100G
2.5-1 CREAM TOPICAL 3 TIMES DAILY
Qty: 2 | Refills: 0 | Status: ACTIVE | COMMUNITY
Start: 2024-02-05 | End: 1900-01-01

## 2024-02-08 NOTE — ASSESSMENT
[FreeTextEntry1] : all of his ulcers have healed with the Unna boots, and I do not feel that they need to be re-applied.  We do, however, recommend that pt obtain a hospital bed allowing him to position himself w/head of bed elevated and legs elevated, as well.  Specifically, he needs a bed to elevate the head of bed to 30degrees and/or allow for frequent repositioning to alleviate pain in ways not feasible in an ordinary bed.  Pt will develop recurrent edema and subsequent wounds in the LEs if he is not able to elevate his legs routinely.  Elevation of his legs is also difficult when laying supine as increased pressure is applied to his abdomen and chest inducing orthopnea at times.  Use of a hospital bed will promote fluid mobilization out of his limbs and improve respiration while supine.

## 2024-02-08 NOTE — PLAN
[TextEntry] : The patient is an old man who lives at home with his caregiver. He is cantankerous and wants things done whenever he asks for it. Presently, he wants a hospital bed, and I advised him that because he has no ulcerations, it is not something that I would prescribe, and that he should speak with his PCP> Kang had suggestions for him.

## 2024-02-08 NOTE — ADDENDUM
[FreeTextEntry1] : He does not need to see me again and was told to contact his PCP for any follow up.

## 2024-02-08 NOTE — PHYSICAL EXAM
[Normal Breath Sounds] : Normal breath sounds [Normal Heart Sounds] : normal heart sounds [2+] : left 2+ [Ankle Swelling Bilaterally] : bilaterally  [Alert] : alert [Oriented to Person] : oriented to person [Oriented to Place] : oriented to place [Oriented to Time] : oriented to time [Calm] : calm [JVD] : no jugular venous distention  [Right Carotid Bruit] : no bruit heard over the right carotid [Right Femoral Bruit] : no bruit heard over the right femoral artery [Ankle Swelling (On Exam)] : not present [Varicose Veins Of Lower Extremities] : not present [] : not present [de-identified] : zoraida ann nad [FreeTextEntry1] : all leg ulcers are healed [de-identified] : has scabs at the knee area where the unna boot stopped

## 2024-02-08 NOTE — HISTORY OF PRESENT ILLNESS
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 Mcgovern, presented today for f/u evaluation of his weeping ulcers at the L ankle.  Dr. Mcgovern appreciated steady fluid drainage from the wounds at the time of his last visit, no erythema or purulent drainage, but opted to treat pt w/Cefadroxil x 10d for prevention of infection.  Pt denies fevers/chills at home, pt treated w/unna boot weekly for the past 2wks w/improvement in symptoms, as per pt.  He is still awaiting a hospital bed at home but is having difficulty w/insurance. [de-identified] : patient fell on his left knee and there is a wound there that is clean, measures ~ 1cm and has hyper-granulation tissue on it. Silver nitrate sticks were placed on it followed by bacitracin. The knee was then wrapped with a 4" ace wrap. There were no other leg ulcers, all having healed with the Unna boot placement.

## 2024-02-14 ENCOUNTER — EMERGENCY (EMERGENCY)
Facility: HOSPITAL | Age: 79
LOS: 1 days | Discharge: HOME | End: 2024-02-14
Attending: EMERGENCY MEDICINE | Admitting: EMERGENCY MEDICINE
Payer: MEDICARE

## 2024-02-14 VITALS
TEMPERATURE: 98 F | DIASTOLIC BLOOD PRESSURE: 78 MMHG | HEIGHT: 68 IN | HEART RATE: 66 BPM | WEIGHT: 154.98 LBS | SYSTOLIC BLOOD PRESSURE: 132 MMHG | OXYGEN SATURATION: 96 % | RESPIRATION RATE: 16 BRPM

## 2024-02-14 VITALS
OXYGEN SATURATION: 97 % | RESPIRATION RATE: 18 BRPM | HEART RATE: 68 BPM | DIASTOLIC BLOOD PRESSURE: 72 MMHG | SYSTOLIC BLOOD PRESSURE: 117 MMHG | TEMPERATURE: 98 F

## 2024-02-14 DIAGNOSIS — Z96.643 PRESENCE OF ARTIFICIAL HIP JOINT, BILATERAL: ICD-10-CM

## 2024-02-14 DIAGNOSIS — G89.11 ACUTE PAIN DUE TO TRAUMA: ICD-10-CM

## 2024-02-14 DIAGNOSIS — M54.50 LOW BACK PAIN, UNSPECIFIED: ICD-10-CM

## 2024-02-14 DIAGNOSIS — Z98.890 OTHER SPECIFIED POSTPROCEDURAL STATES: Chronic | ICD-10-CM

## 2024-02-14 DIAGNOSIS — I71.43 INFRARENAL ABDOMINAL AORTIC ANEURYSM, WITHOUT RUPTURE: ICD-10-CM

## 2024-02-14 DIAGNOSIS — M54.9 DORSALGIA, UNSPECIFIED: ICD-10-CM

## 2024-02-14 DIAGNOSIS — Z88.2 ALLERGY STATUS TO SULFONAMIDES: ICD-10-CM

## 2024-02-14 DIAGNOSIS — R40.0 SOMNOLENCE: ICD-10-CM

## 2024-02-14 PROCEDURE — 99284 EMERGENCY DEPT VISIT MOD MDM: CPT | Mod: FS

## 2024-02-14 PROCEDURE — 72131 CT LUMBAR SPINE W/O DYE: CPT | Mod: 26,MG

## 2024-02-14 PROCEDURE — 82962 GLUCOSE BLOOD TEST: CPT

## 2024-02-14 PROCEDURE — G1004: CPT

## 2024-02-14 PROCEDURE — 72131 CT LUMBAR SPINE W/O DYE: CPT | Mod: MG

## 2024-02-14 PROCEDURE — 72192 CT PELVIS W/O DYE: CPT | Mod: 26,MG

## 2024-02-14 PROCEDURE — 72192 CT PELVIS W/O DYE: CPT | Mod: MG

## 2024-02-14 PROCEDURE — 99284 EMERGENCY DEPT VISIT MOD MDM: CPT | Mod: 25

## 2024-02-14 NOTE — ED PROVIDER NOTE - NSFOLLOWUPINSTRUCTIONS_ED_ALL_ED_FT
nothing was found to be broken or out of place on the CTs you had today  be careful when taking sedating (narcotic pain medications) to prevent future falls  follow up with your doctor   return to ed for any worsening or concerning symptoms

## 2024-02-14 NOTE — ED ADULT NURSE NOTE - OBJECTIVE STATEMENT
Pt presents to ED c/o fall this morning. Pt states "I was using my walker and lost my balance."  Pt also reports taking a sleeping pill, which made him very "groggy" and contributes this to losing balance Reports lower back pain and landing on buttox. Pmhx back sx and hip replacement x 2, lymphoma. Denies CP, SOB, abdominal pain, numbness, weakness, tingling, dizziness.

## 2024-02-14 NOTE — ED ADULT NURSE NOTE - CHIEF COMPLAINT QUOTE
Pt presents via EMS, s/p fall at home this AM. Pt states "I was using my walker and lost my balance. Hx of back sx and hip replacement x 2. C/O "back pain". Pt also reports taking a sleeping pill, which made him very "groggy" and contributes this to losing balance. Pt denies any other hx, states "I take a lot of pills for pain". Presents A&O x 3, states recently was at [facility] for rehab and wanted to be seen in ER to "make sure everything was ok". Abrasion to rt knee per EMS.

## 2024-02-14 NOTE — ED PROVIDER NOTE - PATIENT PORTAL LINK FT
You can access the FollowMyHealth Patient Portal offered by [facility] by registering at the following website: [email or web address]. By joining [employer]’s FollowMyHealth portal, you will also be able to view your health information using other applications (apps) compatible with our system.

## 2024-02-14 NOTE — ED ADULT NURSE NOTE - NSFALLRISKINTERV_ED_ALL_ED

## 2024-02-14 NOTE — ED PROVIDER NOTE - OBJECTIVE STATEMENT
The pt is a 78 y/o M, BIBA, c/o LBP after a fall earlier. Pt states that took pain meds that typically make him sleepy, then walked w/his walker and tripped, fell and hit his lower back, was able to get up and walk after, here wanting to "make sure everything is ok". Denies head trauma, loc, neck pain, hip pain, cp, sob, dizziness prior to fall, any other injuries.

## 2024-02-14 NOTE — ED PROVIDER NOTE - ATTENDING APP SHARED VISIT CONTRIBUTION OF CARE
The pt is a 80 y/o M, BIBA, c/o LBP after a fall earlier. Pt states that took pain meds that typically make him sleepy, then walked w/his walker and tripped, fell and hit his lower back, was able to get up and walk after, here wanting to "make sure everything is ok". Denies head trauma, loc, neck pain, hip pain, cp, sob, dizziness prior to fall, any other injuries.    pt s/p mechanical fall due to taking pain meds, c/o lbp, ambulatory since the fall,no signs of head trauma, nad but somnolent, CT to r/o fx neg , stable for dc, pt understands and agrees w/plan, strict return precautions given    Addendum to attending statement: I have reviewed the ACP note and agree with the history, exam, and plan of care. I  was available to PA   as a supervising provider if needed. PA given opportunity to ask questions and request further evaluation / care.    Pt with mechanical fall - took extra pain meds at home.  Sleepy in ED but arousable and AAO x 3,  Imaging neg for fracture.  Pt able to ambulate in ED.  Transport arranged for home.  Pt aware of safety precautions regarding use of pain medication.

## 2024-02-14 NOTE — ED ADULT TRIAGE NOTE - CHIEF COMPLAINT QUOTE
Pt presents via EMS, s/p fall at home this AM. Pt states "I was using my walker and lost my balance. Hx of back sx and hip replacement x 2. C/O "back pain". Pt also reports taking a sleeping pill, which made him very "groggy" and contributes this to losing balance. Pt denies any other hx, states "I take a lot of pills for pain". Presents A&O x 3, states recently was at [facility] for rehab and wanted to be seen in ER to "make sure everything was ok". Abrasion to rt knee per EMS.

## 2024-02-14 NOTE — ED PROVIDER NOTE - CLINICAL SUMMARY MEDICAL DECISION MAKING FREE TEXT BOX
pt s/p mechanical fall due to taking pain meds, c/o lbp, ambulatory since the fall,no signs of head trauma, nad but somnolent, CT to r/o fx        , stable for dc, pt understands and agrees w/plan, strict return precautions given pt s/p mechanical fall due to taking pain meds, c/o lbp, ambulatory since the fall,no signs of head trauma, nad but somnolent, CT to r/o fx neg , stable for dc, pt understands and agrees w/plan, strict return precautions given

## 2024-02-14 NOTE — ED PROVIDER NOTE - MUSCULOSKELETAL, MLM
no direct C/T/LS spine tend, no ecchymosis or discolorations, FROM, LE no shortening or rotation b/l, no bony tend b/l, ambulatory

## 2024-02-14 NOTE — ED ADULT TRIAGE NOTE - HISTORY OF COVID-19 VACCINATION
[facility]  [id number] [facility]  [address] [address]  Phone: [phone]  Fax: [phone]           Asha Peraza MD      November 29, 2022     Patient: Glenda Hutson   MR Number: [id number]   YOB: 1990   Date of Visit: 11/29/2022          Dear Dr. Fox Jered: Thank you for referring Glenda Hutson to me for evaluation/treatment. Below are the relevant portions of my assessment and plan of care. Visit Diagnoses and Associated Orders        Hypersomnia   (New Problem)  -  Primary    needs work-up    Home Sleep Study (HST) [35033 Custom]   - Future Order         Snoring   (New Problem)      needs work-up    Home Sleep Study (HST) [81549 Custom]   - Future Order         Moderate persistent asthma without complication   (Stable)           Shift work sleep disorder   (New Problem)           Anxiety   (Stable)                      One or more undiagnosed new problem with uncertain prognosis till final diagnosis is made. Differential diagnosis includes but not limited to: ROSALINDA, PLMD's, narcolepsy, parasomnias. Reviewed ROSALINDA (highest likelihood Dx): pathophysiology, diagnosis, complications and treatment. Instructed him not to drive if drowsy. Continue medications per her PCP and other physicians. Limit caffeine use after 3pm. Standard of care is to do in-lab PSG but insurance is mandating an inferior HST. 1 wk follow up after study to review his results. The chronic medical conditions listed are directly related to the primary diagnosis listed above. The management of the primary diagnosis affects the secondary diagnosis and vice versa. This information was analyzed to assess complexity and medical decision making in regards to further testing and management. Continue meds for: asthma and MAURILIO. Encouraged to quit tobacco in all forms, discussed different techniques to quit.     Orders Placed This Encounter    Procedures    Home Sleep Study (HST)         If you have questions, please do not hesitate to call me. I look forward to following Fartun Thornton along with you.     Sincerely,        Alana Lopez MD    CC providers:  Radha Leavitt DO  [address]  [facility] [phone]  Via In [facility] APRN - CNP  Via [address] [facility] [place] [address]  Via Fax: [phone] Yes

## 2024-02-14 NOTE — ED ADULT NURSE REASSESSMENT NOTE - NS ED NURSE REASSESS COMMENT FT1
Pt handoff received from DAYAN Darling. Pt resting in stretcher at this time. Pt VSS and in NAD. Pt pending imaging. Will continue to monitor.

## 2024-02-16 ENCOUNTER — EMERGENCY (EMERGENCY)
Facility: HOSPITAL | Age: 79
LOS: 1 days | Discharge: HOME | End: 2024-02-16
Attending: EMERGENCY MEDICINE | Admitting: EMERGENCY MEDICINE
Payer: MEDICARE

## 2024-02-16 VITALS
SYSTOLIC BLOOD PRESSURE: 127 MMHG | HEIGHT: 68 IN | RESPIRATION RATE: 18 BRPM | WEIGHT: 149.91 LBS | OXYGEN SATURATION: 97 % | DIASTOLIC BLOOD PRESSURE: 67 MMHG | HEART RATE: 64 BPM | TEMPERATURE: 98 F

## 2024-02-16 VITALS
OXYGEN SATURATION: 100 % | DIASTOLIC BLOOD PRESSURE: 77 MMHG | SYSTOLIC BLOOD PRESSURE: 125 MMHG | RESPIRATION RATE: 17 BRPM | HEART RATE: 70 BPM | TEMPERATURE: 99 F

## 2024-02-16 DIAGNOSIS — S80.812A ABRASION, LEFT LOWER LEG, INITIAL ENCOUNTER: ICD-10-CM

## 2024-02-16 DIAGNOSIS — Z88.2 ALLERGY STATUS TO SULFONAMIDES: ICD-10-CM

## 2024-02-16 DIAGNOSIS — Z98.890 OTHER SPECIFIED POSTPROCEDURAL STATES: Chronic | ICD-10-CM

## 2024-02-16 DIAGNOSIS — F32.A DEPRESSION, UNSPECIFIED: ICD-10-CM

## 2024-02-16 DIAGNOSIS — K21.9 GASTRO-ESOPHAGEAL REFLUX DISEASE WITHOUT ESOPHAGITIS: ICD-10-CM
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
## 2024-02-16 PROCEDURE — 99284 EMERGENCY DEPT VISIT MOD MDM: CPT

## 2024-02-16 PROCEDURE — 99283 EMERGENCY DEPT VISIT LOW MDM: CPT

## 2024-02-16 RX ORDER — HYDROCORTISONE 1 %
1 OINTMENT (GRAM) TOPICAL ONCE
Refills: 0 | Status: COMPLETED | OUTPATIENT
Start: 2024-02-16 | End: 2024-02-16

## 2024-02-16 RX ADMIN — Medication 1 APPLICATION(S): at 13:07

## 2024-02-16 NOTE — ED ADULT NURSE NOTE - OBJECTIVE STATEMENT
Received via stretcher BIBEMS with chief complaints of bl leg itching. Pt states "my legs are usually wrapped because I have lymphedema, a nurse comes weekly to change it, it was last changed Monday, today it was itching and now it is bleeding." +2 swelling to BLE, +vascular ulcer to L leg.     Patient AOX4, speaking full sentences. Patient denies chest pain, shortness of breath, difficulty breathing and any form of distress not noted. Resps even and nonlabored. Moves all extremities. Patient oriented to ED area. All needs attended. POC reviewed. Fall risk precautions maintained. Yellow gown and red socks in place. Pt was instructed to ask for help to go to the bathroom, patient verbalized understanding. Purposeful proactive hourly rounding in progress.

## 2024-02-16 NOTE — ED ADULT TRIAGE NOTE - CHIEF COMPLAINT QUOTE
Pt presents to the ED with complaints of bilateral leg wounds. As per pt, "my legs are usually wrapped because I have lymphedema, a nurse comes weekly to change it, it was last changed Monday, today it was itching and now it is bleeding".

## 2024-02-16 NOTE — ED PROVIDER NOTE - NSFOLLOWUPINSTRUCTIONS_ED_ALL_ED_FT
Avoid excessive pain medication.  Apply hydrocortisone to rash on abd/trunk.    Follow up with your PMD.  Return to ED with any worsening symptoms or other concerns.    Rash, Adult  Heat rash on a person's hand.   A rash is a change in the color of your skin. A rash can also change the way your skin feels. There are many different conditions and factors that can cause a rash. Some rashes may disappear after a few days, but some may last for a few weeks. Common causes of rashes include:  Viral infections, such as:  Colds.  Measles.  Hand, foot, and mouth disease.  Bacterial infections, such as:  Scarlet fever.  Impetigo.  Fungal infections, such as Candida.  Allergic reactions to food, medicines, or skin care products.  Follow these instructions at home:  The goal of treatment is to stop the itching and keep the rash from spreading. Pay attention to any changes in your symptoms. Follow these instructions to help with your condition:    Medicine    A tube of ointment.  Take or apply over-the-counter and prescription medicines only as told by your health care provider. These may include:  Corticosteroid creams to treat red or swollen skin.  Anti-itch lotions.  Oral allergy medicines (antihistamines).  Oral corticosteroids for severe symptoms.  Skin care    Apply cool compresses to the affected areas.  Do not scratch or rub your skin.  Avoid covering the rash. Make sure the rash is exposed to air as much as possible.  Managing itching and discomfort    Avoid hot showers or baths, which can make itching worse. A cold shower may help.  Try taking a bath with:  Epsom salts. Follow  instructions on the packaging. You can get these at your local pharmacy or grocery store.  Baking soda. Pour a small amount into the bath as told by your health care provider.  Colloidal oatmeal. Follow  instructions on the packaging. You can get this at your local pharmacy or grocery store.  Try applying baking soda paste to your skin. Stir water into baking soda until it reaches a paste-like consistency.  Try applying calamine lotion. This is an over-the-counter lotion that helps to relieve itchiness.  Keep cool and out of the sun. Sweating and being hot can make itching worse.  General instructions    A person writing in a journal.  Rest as needed.  Drink enough fluid to keep your urine pale yellow.  Wear loose-fitting clothing.  Avoid scented soaps, detergents, and perfumes. Use gentle soaps, detergents, perfumes, and other cosmetic products.  Avoid any substance that causes your rash. Keep a journal to help track what causes your rash. Write down:  What you eat.  What cosmetic products you use.  What you drink.  What you wear. This includes jewelry.  Keep all follow-up visits as told by your health care provider. This is important.  Contact a health care provider if:  You sweat at night.  You lose weight.  You urinate more than normal.  You urinate less than normal, or you notice that your urine is a darker color than usual.  You feel weak.  You vomit.  Your skin or the whites of your eyes look yellow (jaundice).  Your skin:  Tingles.  Is numb.  Your rash:  Does not go away after several days.  Gets worse.  You are:  Unusually thirsty.  More tired than normal.  You have:  New symptoms.  Pain in your abdomen.  A fever.  Diarrhea.  Get help right away if you:  Have a fever and your symptoms suddenly get worse.  Develop confusion.  Have a severe headache or a stiff neck.  Have severe joint pains or stiffness.  Have a seizure.  Develop a rash that covers all or most of your body. The rash may or may not be painful.  Develop blisters that:  Are on top of the rash.  Grow larger or grow together.  Are painful.  Are inside your nose or mouth.  Develop a rash that:  Looks like purple pinprick-sized spots all over your body.  Has a "bull's eye" or looks like a target.  Is not related to sun exposure, is red and painful, and causes your skin to peel.  Summary  A rash is a change in the color of your skin. Some rashes disappear after a few days, but some may last for a few weeks.  The goal of treatment is to stop the itching and keep the rash from spreading.  Take or apply over-the-counter and prescription medicines only as told by your health care provider.  Contact a health care provider if you have new or worsening symptoms.  Keep all follow-up visits as told by your health care provider. This is important.  This information is not intended to replace advice given to you by your health care provider. Make sure you discuss any questions you have with your health care provider.    Document Revised: 06/20/2023 Document Reviewed: 09/29/2022  [employer] Patient Education © 2023 [employer]  [employer] logo  Terms and Conditions  Privacy Policy  Editorial Policy  All content on this site: Copyright © 2024 Elsevier, its licensors, and contributors. All rights are reserved, including those for text and data mining, AI training, and similar technologies. For all open access content, the Creative Commons licensing terms apply.  Cookies are used by this site. To decline or learn more, visit our Co

## 2024-02-16 NOTE — ED PROVIDER NOTE - OBJECTIVE STATEMENT
78 y/o m with PMH of back surgery, depression, gerd on hydromorphone presents to ED with left lower ext bleeding/wound.  Pt states he has wound care nurse come to house and change dressing at least once per week.  He noticed left lower ext was bleeding today after it became itchy and he was scratching.  Pt seen by myself on 2/14 after a fall from taking extra pain medication.  Pt did not have any injury.  Pt appears very sleepy on my interview and states he has been taking hydromorphone at home for pain.

## 2024-02-16 NOTE — ED PROVIDER NOTE - PATIENT PORTAL LINK FT
You can access the FollowMyHealth Patient Portal offered by [facility] by registering at the following website: [email or web address]. By joining [employer]’s FollowMyHealth portal, you will also be able to view your health information using other applications (apps) compatible with our system.

## 2024-02-16 NOTE — ED ADULT NURSE NOTE - NSFALLHARMRISKINTERV_ED_ALL_ED

## 2024-02-16 NOTE — ED PROVIDER NOTE - CLINICAL SUMMARY MEDICAL DECISION MAKING FREE TEXT BOX
80 y/o m with PMH of back surgery, depression, gerd on hydromorphone presents to ED with left lower ext bleeding/wound.  Pt states he has wound care nurse come to house and change dressing at least once per week.  He noticed left lower ext was bleeding today after it became itchy and he was scratching.  Pt seen by myself on 2/14 after a fall from taking extra pain medication.  Pt did not have any injury.  Pt appears very sleepy on my interview and states he has been taking hydromorphone at home for pain.    VSS  Pt sleepy and somnolent from pain meds  Left lower ext - no evidence of infection, will wash out and apply bacitracin and dressing  CM to see 78 y/o m with PMH of back surgery, depression, gerd on hydromorphone presents to ED with left lower ext bleeding/wound.  Pt states he has wound care nurse come to house and change dressing at least once per week.  He noticed left lower ext was bleeding today after it became itchy and he was scratching.  Pt seen by myself on 2/14 after a fall from taking extra pain medication.  Pt did not have any injury.  Pt appears very sleepy on my interview and states he has been taking hydromorphone at home for pain.    VSS  Pt sleepy and somnolent from pain meds  Left lower ext - no evidence of infection, will wash out and apply bacitracin and dressing  CM to see  SW ok for discharge -  Aid presented to ER to accompany pt home  See social work note

## 2024-02-26 ENCOUNTER — NON-APPOINTMENT (OUTPATIENT)
Age: 79
End: 2024-02-26

## 2024-03-15 ENCOUNTER — APPOINTMENT (OUTPATIENT)
Dept: ORTHOPEDIC SURGERY | Facility: CLINIC | Age: 79
End: 2024-03-15

## 2024-03-19 NOTE — PROGRESS NOTE ADULT - PROBLEM SELECTOR PLAN 2
Physical Therapy    Physical Therapy Treatment/Re-check    Patient Name: Donald Mcknight  MRN: [id number]  Today's Date: 3/19/2024  Time Calculation  Start Time: 1250  Stop Time: 1315  Time Calculation (min): 25 min       Assessment/Plan   PT Assessment  PT Assessment Results: Decreased strength, Decreased endurance, Decreased mobility, Impaired balance  Rehab Prognosis: Fair  Barriers to Discharge: cognition, cooperatiion  Medical Staff Made Aware: Yes  End of Session Communication: Bedside nurse  Assessment Comment: pt continues to present with impaired cognition, balance, strength and inability to ambulate. Recommend continued skilled PT to address impairments; Re-check completed to update goals.  End of Session Patient Position: Bed, 3 rail up, Alarm off, not on at start of session  PT Plan  Inpatient/Swing Bed or Outpatient: Inpatient  PT Plan  Treatment/Interventions: Bed mobility, Transfer training, Balance training, Strengthening  PT Plan: Skilled PT  PT Frequency: 3 times per week  PT Discharge Recommendations: Moderate intensity level of continued care  Equipment Recommended upon Discharge: Wheeled walker  PT Recommended Transfer Status: Assist x2  PT - OK to Discharge: Yes (per PT POC)      General Visit Information:   PT  Visit  PT Received On: 03/19/24  Response to Previous Treatment: Patient with no complaints from previous session., Patient reporting fatigue but able to participate.  General  Patient Position Received: Bed, 3 rail up, Alarm off, not on at start of session (Nsg aide at bedside)  General Comment: pt cleared for session per RN; pt agreeable to participate with mod/max encouragement. Consistent re-direction and encouragement to continue participation throughout session. Incontinent of urine, assist from floor staff to complete pericare. Pt  is s/p cholecystectomy, incision intact.    Subjective   Precautions:  Precautions  Medical Precautions: Fall precautions  Post-Surgical Precautions:  Abdominal surgery precautions  Vital Signs:       Objective   Pain:  Pain Assessment  Pain Interventions: Medication (See MAR)  Cognition:  Cognition  Overall Cognitive Status: Impaired at baseline  Postural Control:  Dynamic Sitting Balance  Dynamic Sitting-Balance Support: Bilateral upper extremity supported  Dynamic Sitting-Balance:  (fwd/retro leaning with Hilda to maintainn balance and correct to midline)    Activity Tolerance:     Treatments:  Therapeutic Exercise  Therapeutic Exercise Performed: Yes  Therapeutic Exercise Activity 1: x10: SAQs with mod to maxA, heel slides, hip add       Bed Mobility  Bed Mobility: Yes  Bed Mobility 1  Bed Mobility 1: Supine to sitting, Sitting to supine  Level of Assistance 1: Moderate assistance  Bed Mobility 2  Bed Mobility  2: Scooting (lateral along EOB, x3 with min to modA)    Ambulation/Gait Training  Ambulation/Gait Training Performed: No  Transfers  Transfer: No    Outcome Measures:  [facility] Basic Mobility  Turning from your back to your side while in a flat bed without using bedrails: A lot  Moving from lying on your back to sitting on the side of a flat bed without using bedrails: A lot  Moving to and from bed to chair (including a wheelchair): Total  Standing up from a chair using your arms (e.g. wheelchair or bedside chair): Total  To walk in hospital room: Total  Climbing 3-5 steps with railing: Total  Basic Mobility - Total Score: 8    Education Documentation  Body Mechanics, taught by Ellen Malave, PT at 3/19/2024  2:17 PM.  Learner: Patient  Readiness: Acceptance  Method: Explanation  Response: Needs Reinforcement, Verbalizes Understanding    Mobility Training, taught by Ellen Malave, PT at 3/19/2024  2:17 PM.  Learner: Patient  Readiness: Acceptance  Method: Explanation  Response: Needs Reinforcement, Verbalizes Understanding    Education Comments  No comments found.        OP EDUCATION:       Encounter Problems       Encounter Problems (Active)        Balance       Pt will demo dynamic sitting balance x5 minutes with no UE support and S/I to improve stability and decrease falls  (Progressing)       Start:  03/05/24    Expected End:  03/25/24            Pt will demo static/dynamic standing balance x5 minutes with B UE support and S/I to improve stability and decrease falls  (Not Progressing)       Start:  03/05/24    Expected End:  03/25/24               Mobility       X15 reps ther ex to increase general strength and improve functional independence   (Progressing)       Start:  03/05/24    Expected End:  03/25/24            X150 feet with/without use of DME and S/I assist  necessary to initiate return to PLOF  (Not met)       Start:  03/05/24    Expected End:  03/25/24    Resolved:  03/19/24    Updated to: X25 feet with/without use of DME and Hilda assist  necessary to initiate return to PLOF    Update reason: not progressing         pt will tolerate 30 minutes with 1 rest breaks and maintaining O2 sats >88% on baseline 3L O2 necessary for improved functional endurance   (Progressing)       Start:  03/05/24    Expected End:  03/25/24            X25 feet with/without use of DME and Hilda assist  necessary to initiate return to PLOF       Start:  03/19/24    Expected End:  03/25/24                   Transfers       Pt will complete all functional transfers with S/I necessary to initiate return to PLOF   (Not Progressing)       Start:  03/05/24    Expected End:  03/25/24                    On admission- pt. in the ED on room air was noted to be hypoxic to 88-89% at rest. He remained asymptomatic without any evidence of respiratory distress. However unclear etiology of hypoxia, - ddx atelectasis (seen on CXR/CT A/P)   -resolved, pt. now on room air -Pt. with abdominal pain- f/u Xray abdomen 11/22  -abd TTP in RUQ  -If further watery diarrhea jia with leukocytosis send cdiff studies  -peptol bismol PRN for diarrhea   -zofran PRN for nausea

## 2024-03-28 NOTE — PATIENT PROFILE ADULT - PUBLIC BENEFITS - CHOOSE ALL APPLY
Interval H&P    No change to H&P. Proceed with surgery.       Mil Rea PA-C  [address]  [address], [address] [facility],[address]  [facility] [email or web address]  [phone] (office)  [phone]([facility])    Collaborating physician:  Xiomara Gaston MD
food stamps (SNAP)

## 2024-04-22 ENCOUNTER — APPOINTMENT (OUTPATIENT)
Dept: INTERNAL MEDICINE | Facility: CLINIC | Age: 79
End: 2024-04-22

## 2024-04-26 ENCOUNTER — APPOINTMENT (OUTPATIENT)
Dept: ORTHOPEDIC SURGERY | Facility: CLINIC | Age: 79
End: 2024-04-26
Payer: MEDICARE

## 2024-04-26 ENCOUNTER — OUTPATIENT (OUTPATIENT)
Dept: OUTPATIENT SERVICES | Facility: HOSPITAL | Age: 79
LOS: 1 days | End: 2024-04-26
Payer: MEDICARE

## 2024-04-26 ENCOUNTER — RESULT REVIEW (OUTPATIENT)
Age: 79
End: 2024-04-26

## 2024-04-26 VITALS
BODY MASS INDEX: 25.76 KG/M2 | WEIGHT: 170 LBS | DIASTOLIC BLOOD PRESSURE: 74 MMHG | OXYGEN SATURATION: 95 % | HEART RATE: 72 BPM | HEIGHT: 68 IN | SYSTOLIC BLOOD PRESSURE: 118 MMHG

## 2024-04-26 DIAGNOSIS — Z98.890 OTHER SPECIFIED POSTPROCEDURAL STATES: Chronic | ICD-10-CM

## 2024-04-26 DIAGNOSIS — Z47.1 AFTERCARE FOLLOWING JOINT REPLACEMENT SURGERY: ICD-10-CM

## 2024-04-26 DIAGNOSIS — Z96.641 AFTERCARE FOLLOWING JOINT REPLACEMENT SURGERY: ICD-10-CM

## 2024-04-26 DIAGNOSIS — Z96.642 AFTERCARE FOLLOWING JOINT REPLACEMENT SURGERY: ICD-10-CM

## 2024-04-26 PROCEDURE — 99214 OFFICE O/P EST MOD 30 MIN: CPT

## 2024-04-26 PROCEDURE — 73521 X-RAY EXAM HIPS BI 2 VIEWS: CPT

## 2024-04-26 PROCEDURE — 73521 X-RAY EXAM HIPS BI 2 VIEWS: CPT | Mod: 26

## 2024-04-30 NOTE — ADDENDUM
[FreeTextEntry1] : I, Javier Valdez, documented this note as a scribe on behalf of Dr. Dwayne Mcgovern on 04/26/2024.

## 2024-04-30 NOTE — END OF VISIT
[FreeTextEntry3] : All medical record entries made by the Scribe were at my, Dr. Dwayne Mcgovern, direction and personally dictated by me on 04/26/2024. I have reviewed the chart and agree that the record accurately reflects my personal performance of the history, physical exam, assessment and plan. I have also personally directed, reviewed, and agreed with the chart.

## 2024-04-30 NOTE — PHYSICAL EXAM
[de-identified] : General appearance: well nourished and hydrated, pleasant, alert and oriented x 3, cooperative.   HEENT: normocephalic, EOM intact, wearing mask, external auditory canal clear.   Cardiovascular: no lower leg edema, no varicosities, dorsalis pedis pulses palpable and symmetric.   Lymphatics: previously described lymphedema and venous stasis has dramatically improved as compared to last visit Neurologic: sensation is normal, no muscle weakness in upper or lower extremities, patella tendon reflexes present and symmetric.   Dermatologic: skin moist, warm, no rash. Some dry abrasions present which are nearly healed with no drainage or signs of cellulitis. Spine: cervical spine with normal lordosis and painless range of motion, thoracic spine with normal kyphosis and painless range of motion, lumbosacral spine with normal lordosis and painless range of motion.  No tenderness to palpation along midline spine and paraspinal musculature.  Sacroiliac joints nontender bilaterally. Negative SLR and crossed SLR tests bilaterally. Gait: presents with a rollator, ambulates with bilateral caution and antalgia.    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 - There has been mild progression of the bilateral hip heterotopic ossification as compared to January 2024 XRs, now graded Pimento 3 on the right, Pimento 1 on the left

## 2024-04-30 NOTE — DISCUSSION/SUMMARY
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 - Overall, he is recovering well from the left side. However, he has right hip pain secondary to postoperative heterotopic ossification, also with chronic opioid dependency. - I provided a referral for home PT via the [facility] program - I encouraged to continue with his HEP - Pain Mgmt referral provided - will reach out to Dr. Gonzales. - Follow up in November 2024 with repeat bilateral hip XRs.

## 2024-04-30 NOTE — HISTORY OF PRESENT ILLNESS
[de-identified] : L hip hemiarthroplasty 10/02/2023 R hip hemiarthroplasty  04/26/2024: 79 year old male 6.5 months s/p left hemiarthroplasty. He had been doing well postoperatively until last month, at which time he began to have left anterior groin and iliopsoas pain along with some continued right-sided hip pain. He has been doing home PT twice weekly, with eft hip benefitting more than right. He presents with a rollator today and uses a cane while at home. His venous stasis ulcers are well-healed today, and he is still applying cream onto them and following up with dermatology.  His most pressing concern today is that he is on the verge of losing access to his longtime pain management physician. He does not think he can manage without Dilaudid and is requesting referral to another medical pain  today.  11/9/21: 77y/o male p/w left knee pain persistent for many years. Treatment thus far has included PT, HEP, Dilaudid and Celebrex, multiple rounds of CSI and HA (reports CSI never helped, HA initially helped a lot and gradually lost efficacy), and RF ablations. Last ablation was done by an outside pain management physician about 2 weeks ago. Has history of severe chronic multifocal pain associated with multiple lumbar spine surgeries. Most recent lumbar surgery was performed in October 2020 at [place]. The low back pain is overall worse than the left knee pain. Ambulates with a cane, exercise tolerance less than a block. Has a stated allergy to sulfa meds but this does not apparently cross-react with his Celebrex. Pain levels include a current pain level of 6/10 and patient describes pain as localize and throbbing. He states the symptoms seem to be constant.  Patient indicates that exercise and physical therapy makes symptoms worse.  Modifying factors - worsened by bending and worsened by walking. Relieving factors include relieved by ice, relieved by nonsteroidal anti-inflammatory drugs and relieved by opioid analgesics.

## 2024-06-29 ENCOUNTER — INPATIENT (INPATIENT)
Facility: HOSPITAL | Age: 79
LOS: 1 days | Discharge: HOME HEALTH | DRG: 92 | End: 2024-07-01
Attending: STUDENT IN AN ORGANIZED HEALTH CARE EDUCATION/TRAINING PROGRAM | Admitting: STUDENT IN AN ORGANIZED HEALTH CARE EDUCATION/TRAINING PROGRAM
Payer: MEDICARE

## 2024-06-29 VITALS
RESPIRATION RATE: 18 BRPM | OXYGEN SATURATION: 96 % | DIASTOLIC BLOOD PRESSURE: 76 MMHG | HEART RATE: 68 BPM | TEMPERATURE: 99 F | SYSTOLIC BLOOD PRESSURE: 119 MMHG

## 2024-06-29 DIAGNOSIS — Z98.890 OTHER SPECIFIED POSTPROCEDURAL STATES: Chronic | ICD-10-CM

## 2024-06-29 LAB
ANION GAP SERPL CALC-SCNC: 8 MMOL/L — SIGNIFICANT CHANGE UP (ref 5–17)
BASOPHILS # BLD AUTO: 0.05 K/UL — SIGNIFICANT CHANGE UP (ref 0–0.2)
BASOPHILS NFR BLD AUTO: 0.7 % — SIGNIFICANT CHANGE UP (ref 0–2)
BUN SERPL-MCNC: 15 MG/DL — SIGNIFICANT CHANGE UP (ref 7–23)
CALCIUM SERPL-MCNC: 8.3 MG/DL — LOW (ref 8.4–10.5)
CHLORIDE SERPL-SCNC: 102 MMOL/L — SIGNIFICANT CHANGE UP (ref 96–108)
CO2 SERPL-SCNC: 27 MMOL/L — SIGNIFICANT CHANGE UP (ref 22–31)
CREAT SERPL-MCNC: 0.84 MG/DL — SIGNIFICANT CHANGE UP (ref 0.5–1.3)
EGFR: 89 ML/MIN/1.73M2 — SIGNIFICANT CHANGE UP
EOSINOPHIL # BLD AUTO: 0.39 K/UL — SIGNIFICANT CHANGE UP (ref 0–0.5)
EOSINOPHIL NFR BLD AUTO: 5.3 % — SIGNIFICANT CHANGE UP (ref 0–6)
GLUCOSE SERPL-MCNC: 96 MG/DL — SIGNIFICANT CHANGE UP (ref 70–99)
HCT VFR BLD CALC: 34.2 % — LOW (ref 39–50)
HGB BLD-MCNC: 11.2 G/DL — LOW (ref 13–17)
IMM GRANULOCYTES NFR BLD AUTO: 0.4 % — SIGNIFICANT CHANGE UP (ref 0–0.9)
LYMPHOCYTES # BLD AUTO: 2.05 K/UL — SIGNIFICANT CHANGE UP (ref 1–3.3)
LYMPHOCYTES # BLD AUTO: 27.7 % — SIGNIFICANT CHANGE UP (ref 13–44)
MCHC RBC-ENTMCNC: 28.6 PG — SIGNIFICANT CHANGE UP (ref 27–34)
MCHC RBC-ENTMCNC: 32.7 GM/DL — SIGNIFICANT CHANGE UP (ref 32–36)
MCV RBC AUTO: 87.5 FL — SIGNIFICANT CHANGE UP (ref 80–100)
MONOCYTES # BLD AUTO: 0.64 K/UL — SIGNIFICANT CHANGE UP (ref 0–0.9)
MONOCYTES NFR BLD AUTO: 8.6 % — SIGNIFICANT CHANGE UP (ref 2–14)
NEUTROPHILS # BLD AUTO: 4.24 K/UL — SIGNIFICANT CHANGE UP (ref 1.8–7.4)
NEUTROPHILS NFR BLD AUTO: 57.3 % — SIGNIFICANT CHANGE UP (ref 43–77)
NRBC # BLD: 0 /100 WBCS — SIGNIFICANT CHANGE UP (ref 0–0)
PLATELET # BLD AUTO: 254 K/UL — SIGNIFICANT CHANGE UP (ref 150–400)
POTASSIUM SERPL-MCNC: 4 MMOL/L — SIGNIFICANT CHANGE UP (ref 3.5–5.3)
POTASSIUM SERPL-SCNC: 4 MMOL/L — SIGNIFICANT CHANGE UP (ref 3.5–5.3)
RBC # BLD: 3.91 M/UL — LOW (ref 4.2–5.8)
RBC # FLD: 14.3 % — SIGNIFICANT CHANGE UP (ref 10.3–14.5)
SODIUM SERPL-SCNC: 137 MMOL/L — SIGNIFICANT CHANGE UP (ref 135–145)
WBC # BLD: 7.4 K/UL — SIGNIFICANT CHANGE UP (ref 3.8–10.5)
WBC # FLD AUTO: 7.4 K/UL — SIGNIFICANT CHANGE UP (ref 3.8–10.5)

## 2024-06-29 PROCEDURE — 72128 CT CHEST SPINE W/O DYE: CPT | Mod: 26,MC

## 2024-06-29 PROCEDURE — 72125 CT NECK SPINE W/O DYE: CPT | Mod: 26,MC

## 2024-06-29 PROCEDURE — 99223 1ST HOSP IP/OBS HIGH 75: CPT

## 2024-06-29 PROCEDURE — 70450 CT HEAD/BRAIN W/O DYE: CPT | Mod: 26,MC

## 2024-06-29 PROCEDURE — 72131 CT LUMBAR SPINE W/O DYE: CPT | Mod: 26,MC

## 2024-06-29 PROCEDURE — 99285 EMERGENCY DEPT VISIT HI MDM: CPT

## 2024-06-30 DIAGNOSIS — Z29.9 ENCOUNTER FOR PROPHYLACTIC MEASURES, UNSPECIFIED: ICD-10-CM

## 2024-06-30 DIAGNOSIS — R29.6 REPEATED FALLS: ICD-10-CM

## 2024-06-30 DIAGNOSIS — I71.40 ABDOMINAL AORTIC ANEURYSM, WITHOUT RUPTURE, UNSPECIFIED: ICD-10-CM

## 2024-06-30 DIAGNOSIS — Z98.1 ARTHRODESIS STATUS: ICD-10-CM

## 2024-06-30 DIAGNOSIS — M54.40 LUMBAGO WITH SCIATICA, UNSPECIFIED SIDE: ICD-10-CM

## 2024-06-30 DIAGNOSIS — D64.9 ANEMIA, UNSPECIFIED: ICD-10-CM

## 2024-06-30 DIAGNOSIS — M48.50XA COLLAPSED VERTEBRA, NOT ELSEWHERE CLASSIFIED, SITE UNSPECIFIED, INITIAL ENCOUNTER FOR FRACTURE: ICD-10-CM

## 2024-06-30 LAB
ADD ON TEST-SPECIMEN IN LAB: SIGNIFICANT CHANGE UP
ALBUMIN SERPL ELPH-MCNC: 2.9 G/DL — LOW (ref 3.3–5)
ALBUMIN SERPL ELPH-MCNC: 2.9 G/DL — LOW (ref 3.3–5)
ALP SERPL-CCNC: 72 U/L — SIGNIFICANT CHANGE UP (ref 40–120)
ALP SERPL-CCNC: 74 U/L — SIGNIFICANT CHANGE UP (ref 40–120)
ALT FLD-CCNC: 8 U/L — LOW (ref 10–45)
ALT FLD-CCNC: <5 U/L — LOW (ref 10–45)
AMPHET UR-MCNC: NEGATIVE — SIGNIFICANT CHANGE UP
ANION GAP SERPL CALC-SCNC: 8 MMOL/L — SIGNIFICANT CHANGE UP (ref 5–17)
AST SERPL-CCNC: 13 U/L — SIGNIFICANT CHANGE UP (ref 10–40)
AST SERPL-CCNC: 16 U/L — SIGNIFICANT CHANGE UP (ref 10–40)
BARBITURATES UR SCN-MCNC: NEGATIVE — SIGNIFICANT CHANGE UP
BENZODIAZ UR-MCNC: POSITIVE
BILIRUB DIRECT SERPL-MCNC: <0.2 MG/DL — SIGNIFICANT CHANGE UP (ref 0–0.3)
BILIRUB INDIRECT FLD-MCNC: SIGNIFICANT CHANGE UP MG/DL (ref 0.2–1)
BILIRUB SERPL-MCNC: 0.3 MG/DL — SIGNIFICANT CHANGE UP (ref 0.2–1.2)
BILIRUB SERPL-MCNC: 0.3 MG/DL — SIGNIFICANT CHANGE UP (ref 0.2–1.2)
BLD GP AB SCN SERPL QL: NEGATIVE — SIGNIFICANT CHANGE UP
BUN SERPL-MCNC: 12 MG/DL — SIGNIFICANT CHANGE UP (ref 7–23)
CALCIUM SERPL-MCNC: 8.2 MG/DL — LOW (ref 8.4–10.5)
CHLORIDE SERPL-SCNC: 106 MMOL/L — SIGNIFICANT CHANGE UP (ref 96–108)
CO2 SERPL-SCNC: 23 MMOL/L — SIGNIFICANT CHANGE UP (ref 22–31)
COCAINE METAB.OTHER UR-MCNC: NEGATIVE — SIGNIFICANT CHANGE UP
CREAT SERPL-MCNC: 0.75 MG/DL — SIGNIFICANT CHANGE UP (ref 0.5–1.3)
EGFR: 92 ML/MIN/1.73M2 — SIGNIFICANT CHANGE UP
GLUCOSE SERPL-MCNC: 88 MG/DL — SIGNIFICANT CHANGE UP (ref 70–99)
HCT VFR BLD CALC: 32.4 % — LOW (ref 39–50)
HGB BLD-MCNC: 10.7 G/DL — LOW (ref 13–17)
MAGNESIUM SERPL-MCNC: 2.1 MG/DL — SIGNIFICANT CHANGE UP (ref 1.6–2.6)
MAGNESIUM SERPL-MCNC: 2.1 MG/DL — SIGNIFICANT CHANGE UP (ref 1.6–2.6)
MCHC RBC-ENTMCNC: 28.9 PG — SIGNIFICANT CHANGE UP (ref 27–34)
MCHC RBC-ENTMCNC: 33 GM/DL — SIGNIFICANT CHANGE UP (ref 32–36)
MCV RBC AUTO: 87.6 FL — SIGNIFICANT CHANGE UP (ref 80–100)
METHADONE UR-MCNC: NEGATIVE — SIGNIFICANT CHANGE UP
NRBC # BLD: 0 /100 WBCS — SIGNIFICANT CHANGE UP (ref 0–0)
OPIATES UR-MCNC: POSITIVE
PCP SPEC-MCNC: SIGNIFICANT CHANGE UP
PCP UR-MCNC: NEGATIVE — SIGNIFICANT CHANGE UP
PHOSPHATE SERPL-MCNC: 3.6 MG/DL — SIGNIFICANT CHANGE UP (ref 2.5–4.5)
PLATELET # BLD AUTO: 256 K/UL — SIGNIFICANT CHANGE UP (ref 150–400)
POTASSIUM SERPL-MCNC: 3.8 MMOL/L — SIGNIFICANT CHANGE UP (ref 3.5–5.3)
POTASSIUM SERPL-SCNC: 3.8 MMOL/L — SIGNIFICANT CHANGE UP (ref 3.5–5.3)
PROT SERPL-MCNC: 6.3 G/DL — SIGNIFICANT CHANGE UP (ref 6–8.3)
PROT SERPL-MCNC: 6.7 G/DL — SIGNIFICANT CHANGE UP (ref 6–8.3)
RBC # BLD: 3.7 M/UL — LOW (ref 4.2–5.8)
RBC # FLD: 14.1 % — SIGNIFICANT CHANGE UP (ref 10.3–14.5)
RH IG SCN BLD-IMP: POSITIVE — SIGNIFICANT CHANGE UP
SODIUM SERPL-SCNC: 137 MMOL/L — SIGNIFICANT CHANGE UP (ref 135–145)
THC UR QL: NEGATIVE — SIGNIFICANT CHANGE UP
WBC # BLD: 5.99 K/UL — SIGNIFICANT CHANGE UP (ref 3.8–10.5)
WBC # FLD AUTO: 5.99 K/UL — SIGNIFICANT CHANGE UP (ref 3.8–10.5)

## 2024-06-30 PROCEDURE — 99232 SBSQ HOSP IP/OBS MODERATE 35: CPT | Mod: GC

## 2024-06-30 PROCEDURE — 93970 EXTREMITY STUDY: CPT | Mod: 26

## 2024-06-30 RX ORDER — BISACODYL 5 MG
5 TABLET, DELAYED RELEASE (ENTERIC COATED) ORAL AT BEDTIME
Refills: 0 | Status: DISCONTINUED | OUTPATIENT
Start: 2024-06-30 | End: 2024-07-01

## 2024-06-30 RX ORDER — ACETAMINOPHEN 325 MG
650 TABLET ORAL EVERY 6 HOURS
Refills: 0 | Status: DISCONTINUED | OUTPATIENT
Start: 2024-06-30 | End: 2024-07-01

## 2024-06-30 RX ORDER — HYDROMORPHONE HCL 0.2 MG/ML
4 INJECTION, SOLUTION INTRAVENOUS EVERY 12 HOURS
Refills: 0 | Status: DISCONTINUED | OUTPATIENT
Start: 2024-06-30 | End: 2024-06-30

## 2024-06-30 RX ORDER — KETOROLAC TROMETHAMINE 30 MG/ML
15 INJECTION, SOLUTION INTRAMUSCULAR
Refills: 0 | Status: DISCONTINUED | OUTPATIENT
Start: 2024-06-30 | End: 2024-07-01

## 2024-06-30 RX ORDER — POLYETHYLENE GLYCOL 3350 1 G/G
17 POWDER ORAL DAILY
Refills: 0 | Status: DISCONTINUED | OUTPATIENT
Start: 2024-06-30 | End: 2024-07-01

## 2024-06-30 RX ORDER — HYDROMORPHONE HCL 0.2 MG/ML
2 INJECTION, SOLUTION INTRAVENOUS
Refills: 0 | Status: DISCONTINUED | OUTPATIENT
Start: 2024-06-30 | End: 2024-07-01

## 2024-06-30 RX ORDER — ENOXAPARIN SODIUM 100 MG/ML
40 INJECTION SUBCUTANEOUS EVERY 24 HOURS
Refills: 0 | Status: DISCONTINUED | OUTPATIENT
Start: 2024-06-30 | End: 2024-07-01

## 2024-06-30 RX ORDER — POTASSIUM CHLORIDE 600 MG/1
40 TABLET, FILM COATED, EXTENDED RELEASE ORAL ONCE
Refills: 0 | Status: COMPLETED | OUTPATIENT
Start: 2024-06-30 | End: 2024-06-30

## 2024-06-30 RX ORDER — SENNOSIDES 8.6 MG
2 TABLET ORAL AT BEDTIME
Refills: 0 | Status: DISCONTINUED | OUTPATIENT
Start: 2024-06-30 | End: 2024-07-01

## 2024-06-30 RX ADMIN — HYDROMORPHONE HCL 2 MILLIGRAM(S): 0.2 INJECTION, SOLUTION INTRAVENOUS at 21:29

## 2024-06-30 RX ADMIN — KETOROLAC TROMETHAMINE 15 MILLIGRAM(S): 30 INJECTION, SOLUTION INTRAMUSCULAR at 11:45

## 2024-06-30 RX ADMIN — HYDROMORPHONE HCL 2 MILLIGRAM(S): 0.2 INJECTION, SOLUTION INTRAVENOUS at 22:30

## 2024-06-30 RX ADMIN — HYDROMORPHONE HCL 4 MILLIGRAM(S): 0.2 INJECTION, SOLUTION INTRAVENOUS at 01:19

## 2024-06-30 RX ADMIN — HYDROMORPHONE HCL 4 MILLIGRAM(S): 0.2 INJECTION, SOLUTION INTRAVENOUS at 00:19

## 2024-06-30 RX ADMIN — ENOXAPARIN SODIUM 40 MILLIGRAM(S): 100 INJECTION SUBCUTANEOUS at 06:22

## 2024-06-30 RX ADMIN — KETOROLAC TROMETHAMINE 15 MILLIGRAM(S): 30 INJECTION, SOLUTION INTRAMUSCULAR at 03:48

## 2024-06-30 RX ADMIN — KETOROLAC TROMETHAMINE 15 MILLIGRAM(S): 30 INJECTION, SOLUTION INTRAMUSCULAR at 09:46

## 2024-06-30 RX ADMIN — KETOROLAC TROMETHAMINE 15 MILLIGRAM(S): 30 INJECTION, SOLUTION INTRAMUSCULAR at 04:10

## 2024-07-01 ENCOUNTER — TRANSCRIPTION ENCOUNTER (OUTPATIENT)
Age: 79
End: 2024-07-01

## 2024-07-01 VITALS — WEIGHT: 173.94 LBS

## 2024-07-01 PROCEDURE — 85027 COMPLETE CBC AUTOMATED: CPT

## 2024-07-01 PROCEDURE — 84100 ASSAY OF PHOSPHORUS: CPT

## 2024-07-01 PROCEDURE — 80053 COMPREHEN METABOLIC PANEL: CPT

## 2024-07-01 PROCEDURE — 97165 OT EVAL LOW COMPLEX 30 MIN: CPT

## 2024-07-01 PROCEDURE — 99239 HOSP IP/OBS DSCHRG MGMT >30: CPT

## 2024-07-01 PROCEDURE — 70450 CT HEAD/BRAIN W/O DYE: CPT | Mod: MC

## 2024-07-01 PROCEDURE — 72131 CT LUMBAR SPINE W/O DYE: CPT | Mod: MC

## 2024-07-01 PROCEDURE — 80048 BASIC METABOLIC PNL TOTAL CA: CPT

## 2024-07-01 PROCEDURE — 80307 DRUG TEST PRSMV CHEM ANLYZR: CPT

## 2024-07-01 PROCEDURE — 82962 GLUCOSE BLOOD TEST: CPT

## 2024-07-01 PROCEDURE — 93005 ELECTROCARDIOGRAM TRACING: CPT

## 2024-07-01 PROCEDURE — 93970 EXTREMITY STUDY: CPT

## 2024-07-01 PROCEDURE — 72128 CT CHEST SPINE W/O DYE: CPT | Mod: MC

## 2024-07-01 PROCEDURE — 97161 PT EVAL LOW COMPLEX 20 MIN: CPT

## 2024-07-01 PROCEDURE — 86901 BLOOD TYPING SEROLOGIC RH(D): CPT

## 2024-07-01 PROCEDURE — 36415 COLL VENOUS BLD VENIPUNCTURE: CPT

## 2024-07-01 PROCEDURE — 83735 ASSAY OF MAGNESIUM: CPT

## 2024-07-01 PROCEDURE — 72125 CT NECK SPINE W/O DYE: CPT | Mod: MC

## 2024-07-01 PROCEDURE — 80076 HEPATIC FUNCTION PANEL: CPT

## 2024-07-01 PROCEDURE — 85025 COMPLETE CBC W/AUTO DIFF WBC: CPT

## 2024-07-01 PROCEDURE — 86900 BLOOD TYPING SEROLOGIC ABO: CPT

## 2024-07-01 PROCEDURE — 99285 EMERGENCY DEPT VISIT HI MDM: CPT | Mod: 25

## 2024-07-01 PROCEDURE — 86850 RBC ANTIBODY SCREEN: CPT

## 2024-07-01 RX ORDER — CLONAZEPAM 1 MG
1 TABLET ORAL
Refills: 0 | DISCHARGE

## 2024-07-01 RX ORDER — ROSUVASTATIN CALCIUM 5 MG/1
1 TABLET ORAL
Refills: 0 | DISCHARGE

## 2024-07-01 RX ADMIN — KETOROLAC TROMETHAMINE 15 MILLIGRAM(S): 30 INJECTION, SOLUTION INTRAMUSCULAR at 06:30

## 2024-07-01 RX ADMIN — KETOROLAC TROMETHAMINE 15 MILLIGRAM(S): 30 INJECTION, SOLUTION INTRAMUSCULAR at 07:00

## 2024-07-01 RX ADMIN — ENOXAPARIN SODIUM 40 MILLIGRAM(S): 100 INJECTION SUBCUTANEOUS at 06:23

## 2024-07-09 ENCOUNTER — APPOINTMENT (OUTPATIENT)
Dept: VASCULAR SURGERY | Facility: CLINIC | Age: 79
End: 2024-07-09
Payer: MEDICARE

## 2024-07-09 VITALS
HEIGHT: 68 IN | BODY MASS INDEX: 25.76 KG/M2 | WEIGHT: 170 LBS | DIASTOLIC BLOOD PRESSURE: 78 MMHG | HEART RATE: 74 BPM | SYSTOLIC BLOOD PRESSURE: 120 MMHG

## 2024-07-09 DIAGNOSIS — K21.9 GASTRO-ESOPHAGEAL REFLUX DISEASE WITHOUT ESOPHAGITIS: ICD-10-CM

## 2024-07-09 DIAGNOSIS — Z79.899 OTHER LONG TERM (CURRENT) DRUG THERAPY: ICD-10-CM
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
## 2024-07-09 DIAGNOSIS — T84.038A MECHANICAL LOOSENING OF OTHER INTERNAL PROSTHETIC JOINT, INITIAL ENCOUNTER: ICD-10-CM

## 2024-07-09 DIAGNOSIS — S22.069A UNSPECIFIED FRACTURE OF T7-T8 VERTEBRA, INITIAL ENCOUNTER FOR CLOSED FRACTURE: ICD-10-CM

## 2024-07-09 DIAGNOSIS — F41.0 PANIC DISORDER [EPISODIC PAROXYSMAL ANXIETY]: ICD-10-CM

## 2024-07-09 DIAGNOSIS — E78.00 PURE HYPERCHOLESTEROLEMIA, UNSPECIFIED: ICD-10-CM

## 2024-07-09 DIAGNOSIS — Z79.891 LONG TERM (CURRENT) USE OF OPIATE ANALGESIC: ICD-10-CM

## 2024-07-09 DIAGNOSIS — Z85.51 PERSONAL HISTORY OF MALIGNANT NEOPLASM OF BLADDER: ICD-10-CM

## 2024-07-09 DIAGNOSIS — R29.6 REPEATED FALLS: ICD-10-CM

## 2024-07-09 DIAGNOSIS — I71.40 ABDOMINAL AORTIC ANEURYSM, WITHOUT RUPTURE, UNSPECIFIED: ICD-10-CM

## 2024-07-09 DIAGNOSIS — I89.0 LYMPHEDEMA, NOT ELSEWHERE CLASSIFIED: ICD-10-CM

## 2024-07-09 DIAGNOSIS — Z91.81 HISTORY OF FALLING: ICD-10-CM

## 2024-07-09 DIAGNOSIS — G89.29 OTHER CHRONIC PAIN: ICD-10-CM

## 2024-07-09 DIAGNOSIS — S22.039A UNSPECIFIED FRACTURE OF THIRD THORACIC VERTEBRA, INITIAL ENCOUNTER FOR CLOSED FRACTURE: ICD-10-CM

## 2024-07-09 DIAGNOSIS — N40.0 BENIGN PROSTATIC HYPERPLASIA WITHOUT LOWER URINARY TRACT SYMPTOMS: ICD-10-CM

## 2024-07-09 DIAGNOSIS — D64.9 ANEMIA, UNSPECIFIED: ICD-10-CM

## 2024-07-09 DIAGNOSIS — Z98.1 ARTHRODESIS STATUS: ICD-10-CM
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
## 2024-07-09 DIAGNOSIS — F32.9 MAJOR DEPRESSIVE DISORDER, SINGLE EPISODE, UNSPECIFIED: ICD-10-CM

## 2024-07-09 DIAGNOSIS — Z88.2 ALLERGY STATUS TO SULFONAMIDES: ICD-10-CM
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
## 2024-07-09 PROCEDURE — 99204 OFFICE O/P NEW MOD 45 MIN: CPT

## 2024-07-09 PROCEDURE — 99214 OFFICE O/P EST MOD 30 MIN: CPT

## 2024-07-12 ENCOUNTER — RESULT REVIEW (OUTPATIENT)
Age: 79
End: 2024-07-12

## 2024-07-12 ENCOUNTER — APPOINTMENT (OUTPATIENT)
Dept: ORTHOPEDIC SURGERY | Facility: CLINIC | Age: 79
End: 2024-07-12

## 2024-07-12 ENCOUNTER — OUTPATIENT (OUTPATIENT)
Dept: OUTPATIENT SERVICES | Facility: HOSPITAL | Age: 79
LOS: 1 days | End: 2024-07-12
Payer: MEDICARE

## 2024-07-12 VITALS
OXYGEN SATURATION: 96 % | DIASTOLIC BLOOD PRESSURE: 80 MMHG | WEIGHT: 170 LBS | HEART RATE: 80 BPM | SYSTOLIC BLOOD PRESSURE: 120 MMHG | BODY MASS INDEX: 25.76 KG/M2 | HEIGHT: 68 IN

## 2024-07-12 DIAGNOSIS — Z98.890 OTHER SPECIFIED POSTPROCEDURAL STATES: Chronic | ICD-10-CM

## 2024-07-12 PROCEDURE — 73521 X-RAY EXAM HIPS BI 2 VIEWS: CPT | Mod: 26

## 2024-07-12 PROCEDURE — 73521 X-RAY EXAM HIPS BI 2 VIEWS: CPT

## 2024-07-12 PROCEDURE — 99213 OFFICE O/P EST LOW 20 MIN: CPT

## 2024-07-22 ENCOUNTER — LABORATORY RESULT (OUTPATIENT)
Age: 79
End: 2024-07-22

## 2024-07-22 ENCOUNTER — APPOINTMENT (OUTPATIENT)
Dept: ENDOCRINOLOGY | Facility: CLINIC | Age: 79
End: 2024-07-22
Payer: MEDICARE

## 2024-07-22 VITALS
HEART RATE: 88 BPM | WEIGHT: 158.38 LBS | BODY MASS INDEX: 24.08 KG/M2 | DIASTOLIC BLOOD PRESSURE: 73 MMHG | SYSTOLIC BLOOD PRESSURE: 134 MMHG

## 2024-07-22 DIAGNOSIS — M81.8 OTHER OSTEOPOROSIS W/OUT CURRENT PATHOLOGICAL FRACTURE: ICD-10-CM

## 2024-07-22 DIAGNOSIS — R77.8 OTHER SPECIFIED ABNORMALITIES OF PLASMA PROTEINS: ICD-10-CM

## 2024-07-22 PROCEDURE — 99204 OFFICE O/P NEW MOD 45 MIN: CPT

## 2024-07-23 DIAGNOSIS — R94.6 ABNORMAL RESULTS OF THYROID FUNCTION STUDIES: ICD-10-CM

## 2024-07-23 LAB
25(OH)D3 SERPL-MCNC: 29.8 NG/ML
ALBUMIN SERPL ELPH-MCNC: 3.4 G/DL
ALP BLD-CCNC: 83 U/L
ALT SERPL-CCNC: 9 U/L
ANION GAP SERPL CALC-SCNC: 11 MMOL/L
AST SERPL-CCNC: 15 U/L
BILIRUB SERPL-MCNC: 0.3 MG/DL
BUN SERPL-MCNC: 22 MG/DL
CALCIUM SERPL-MCNC: 8.8 MG/DL
CALCIUM SERPL-MCNC: 8.8 MG/DL
CHLORIDE SERPL-SCNC: 101 MMOL/L
CO2 SERPL-SCNC: 28 MMOL/L
CREAT SERPL-MCNC: 0.91 MG/DL
EGFR: 86 ML/MIN/1.73M2
GLUCOSE SERPL-MCNC: 98 MG/DL
MAGNESIUM SERPL-MCNC: 2.1 MG/DL
PARATHYROID HORMONE INTACT: 21 PG/ML
PHOSPHATE SERPL-MCNC: 4.6 MG/DL
POTASSIUM SERPL-SCNC: 5.1 MMOL/L
PROT SERPL-MCNC: 6.9 G/DL
SODIUM SERPL-SCNC: 140 MMOL/L
T4 FREE SERPL-MCNC: 0.9 NG/DL
TSH SERPL-ACNC: 6.63 UIU/ML
TTG IGA SER IA-ACNC: <0.5 U/ML
TTG IGA SER-ACNC: NEGATIVE
TTG IGG SER IA-ACNC: <0.8 U/ML
TTG IGG SER IA-ACNC: NEGATIVE

## 2024-07-25 PROBLEM — R77.8 ABNORMAL SERUM PROTEIN ELECTROPHORESIS: Status: ACTIVE | Noted: 2024-07-25

## 2024-07-25 LAB
ALBUMIN MFR SERPL ELPH: 43.7 %
ALBUMIN SERPL-MCNC: 3.1 G/DL
ALBUMIN/GLOB SERPL: 0.8 RATIO
ALP BONE SERPL-MCNC: 15 UG/L
ALPHA1 GLOB MFR SERPL ELPH: 7.5 %
ALPHA1 GLOB SERPL ELPH-MCNC: 0.5 G/DL
ALPHA2 GLOB MFR SERPL ELPH: 12.8 %
ALPHA2 GLOB SERPL ELPH-MCNC: 0.9 G/DL
B-GLOBULIN MFR SERPL ELPH: 13.9 %
B-GLOBULIN SERPL ELPH-MCNC: 1 G/DL
GAMMA GLOB FLD ELPH-MCNC: 1.6 G/DL
GAMMA GLOB MFR SERPL ELPH: 22.1 %
INTERPRETATION SERPL IEP-IMP: NORMAL
M PROTEIN MFR SERPL ELPH: NORMAL
MONOCLON BAND OBS SERPL: NORMAL
PROT SERPL-MCNC: 7.1 G/DL
PROT SERPL-MCNC: 7.1 G/DL
THYROGLOB AB SERPL-ACNC: <20 IU/ML
THYROPEROXIDASE AB SERPL IA-ACNC: 51.9 IU/ML

## 2024-07-26 NOTE — ADDENDUM
[FreeTextEntry1] : 07/23/2024, addISMA bashir Labs partially finalized, I will review with patient once finalized Thyroid AB added on today given persisting mildly elevated TSH.  07/25/2024, addISMA bashir VM left requesting return call to review lab results CMP with normal renal function and calcium of 8.8, will review calcium supplemental dosing with return call iPTH WNL phosph tracely elevated 4.6 (ULN 4.5) with normal magnesium vitD 29.8, will confirm vit D supplement dosing with return call Neg Celiac screening SPEP with weak gamma migrating paraprotein and immunofixation with weak IgG kappa band-- will refer to hematology, Dr Hennessy ([address], [address], [place], [place], Phone: [phone]) TSH mildly elevated to 6.63 with normal FT4 0.9 and +TPO AB 51.9 , will review for sx of hypothyroidism for consideration of treatment if symptomatic vs monitoring given subclinical presentation in 79 year old male  With return call will further discuss osteoporosis treatment recommendation with Prolia and will review need for calcium supplement goal to be achieved of 1200mg daily in divided doses <600mg per dose for absorptive purposes and vitD supplementation on daily basis given hypocalcemia risk surrounding prolia dosing.   07/26/2024, ISMA ceja Called and reviewed lab results Reviewed vitamin supplementation of: MVI Centrum Silver (calcium 220mg daily), Natures Bounty (Calcium 1000mg for 3 tablets)-- taking 1 tablet daily -- recommend increasing to Natures Bounty 3 tablets daily for full dosing with continued centrum silver MVI to total 1200mg daily -- recommend hematology consult, to be mailed to his home address -- call [facility] dept to schedule DEXA, I will call with results -- recommend prolia, contemplative, but will increase calcium supplementation in the meantime, will determine treatemtn when I call with DEXA, per patient

## 2024-07-26 NOTE — ASSESSMENT
[Denosumab Therapy] : Risks  and benefits of denosumab therapy were discussed with the patient including eczema, cellulitis, osteonecrosis of the jaw and atypical femur fractures [Bisphosphonate Therapy] : Risks and benefits of bisphosphonate therapy were  discussed with the patient including gastroesophageal irritation, osteonecrosis of the jaw, and atypical femur fractures, and acute phase reaction [Teriparatide/Abaloparatide Therapy] : Risks and benefits of Teriparatide/Abaloparatide therapy were discussed with the patient including potential risk of osteosarcoma [FreeTextEntry1] : 1. Osteoporosis Diagnosed with osteoporosis in setting of multiple fragility fractures over the past year No recent DEXA for review, endorses h/o osteopenia on DEXA in the past (years ago) Mr. Blair has experienced multiple fragility fractures including both right hip (7/2023), left hip (11/2023) and e.o compression fracture on imaging over the past 1-2 years No past treatment for osteoporosis Counseling provided regarding osteoporosis, osteoporosis etiology, risk factors, evaluation and management with both calcium/vitD, lifestyle and medicinal (bisphosphonate, oral/IV vs prolia vs anabolic agent (teriparatide, abaloparatide).  Given his recent and multiple fragility fractures, recommend strong consideration for anabolic agents vs prolia.  Hesitance for daily self injection and he is contemplative for therapy to initiate.  Of note, while recently hospitalized, calcium was low (8.2).  Will repeat with labs todau Discussed universal care of adequate calcium/vitD intake, weight bearing exercises and fall prevention -- labs today  2. abnormal TFT On labs reviewed from [facility] in October 2023 with TSH 7.310 and TT4 4.15 -- repeat with labs today  Labs today, I will call/[facility] message with results Follow up in 6 months  Steff Prince MS, FNP-BC, [facility] 07/22/2024

## 2024-07-26 NOTE — PHYSICAL EXAM
[Alert] : alert [No Acute Distress] : no acute distress [Normal Voice/Communication] : normal voice communication [Normal Hearing] : hearing was normal [No Respiratory Distress] : no respiratory distress [Normal Rate and Effort] : normal respiratory rate and effort [Normal Rate] : heart rate was normal [Regular Rhythm] : with a regular rhythm [Kyphosis] : kyphosis present [Oriented x3] : oriented to person, place, and time [Normal Affect] : the affect was normal [Normal Insight/Judgement] : insight and judgment were intact [Normal Mood] : the mood was normal [de-identified] : Ambulates slowly, but steady with walker

## 2024-07-26 NOTE — PHYSICAL EXAM
[Alert] : alert [No Acute Distress] : no acute distress [Normal Voice/Communication] : normal voice communication [Normal Hearing] : hearing was normal [No Respiratory Distress] : no respiratory distress [Normal Rate and Effort] : normal respiratory rate and effort [Normal Rate] : heart rate was normal [Regular Rhythm] : with a regular rhythm [Kyphosis] : kyphosis present [Oriented x3] : oriented to person, place, and time [Normal Affect] : the affect was normal [Normal Insight/Judgement] : insight and judgment were intact [Normal Mood] : the mood was normal [de-identified] : Ambulates slowly, but steady with walker

## 2024-07-26 NOTE — ADDENDUM
[FreeTextEntry1] : 07/23/2024, addISMA bashir Labs partially finalized, I will review with patient once finalized Thyroid AB added on today given persisting mildly elevated TSH.  07/25/2024, addISMA bashir VM left requesting return call to review lab results CMP with normal renal function and calcium of 8.8, will review calcium supplemental dosing with return call iPTH WNL phosph tracely elevated 4.6 (ULN 4.5) with normal magnesium vitD 29.8, will confirm vit D supplement dosing with return call Neg Celiac screening SPEP with weak gamma migrating paraprotein and immunofixation with weak IgG kappa band-- will refer to hematology, Dr Hennessy ([address], [address], [place], [place], Phone: [phone]) TSH mildly elevated to 6.63 with normal FT4 0.9 and +TPO AB 51.9 , will review for sx of hypothyroidism for consideration of treatment if symptomatic vs monitoring given subclinical presentation in 79 year old male  With return call will further discuss osteoporosis treatment recommendation with Prolia and will review need for calcium supplement goal to be achieved of 1200mg daily in divided doses <600mg per dose for absorptive purposes and vitD supplementation on daily basis given hypocalcemia risk surrounding prolia dosing.   07/26/2024, ISMA ceja Called and reviewed lab results Reviewed vitamin supplementation of: MVI Centrum Silver (calcium 220mg daily), Natures Bounty (Calcium 1000mg for 3 tablets)-- taking 1 tablet daily -- recommend increasing to Natures Bounty 3 tablets daily for full dosing with continued centrum silver MVI to total 1200mg daily -- recommend hematology consult, to be mailed to his home address -- call [facility] dept to schedule DEXA, I will call with results -- recommend prolia, contemplative, but will increase calcium supplementation in the meantime, will determine treatemtn when I call with DEXA, per patient

## 2024-07-26 NOTE — HISTORY OF PRESENT ILLNESS
[FreeTextEntry1] : Mr. DOM CASTAÑEDA  is a 79 year old male with pmhx of osteoporosis, kyphoscoliosis (s/p c-sacrum fusion at [facility] in October 2020 with Dr Carlisle), depression, bladder CA in remission (s/p resection, localized chemo) who presents for evaluation.  Referred by Ortho, Dr. Rice Oh  Diagnosed with osteoporosis in setting of multiple fragility fractures, as below Unsure of when last DEXA was performed, but years ago noted to have history of osteopenia  Fracture history:  - right hip in 7/2023 from fall from standing height - left hip 11/2023 from fall from standing height - On recent CT Angio Abdomen/Pelvis from 7/9/24 T12 compression fracture noted and patient endorses that h/o compression fracture prior to hip fractures and more recently also noted with recent hospitalization from [facility] review (T3, T12-as above, and T8).  Treatment history: -- None   Current calcium and vitamin D intake includes: Dietary sources: ~1 serving daily ice cream Supplements: multiple supplements, including MVI that all contain calcium, unsure of total amount    Weight bearing exercise includes Physical Therapy Dental examination: within past 6 months.  Denies need for dental extraction or implantation at this time.  Osteoporosis risk factors include: Postmenopausal status,  race, prior fracture, falls, height loss, small thin bones, tobacco use, excessive alcohol, anorexia, family history, vitamin D deficiency, corticosteroid use, seizure medications, malabsorption, hyperparathyroidism, hyperthyroidism. NEGATIVE EXCEPT: past fracture history,  race, falls

## 2024-07-26 NOTE — DATA REVIEWED
[FreeTextEntry1] : 6/30/24 CBC with WBC of 5.99, RBC 3.7, H&H 10.7/32.4 CMP with creat of 0.75, GFR 92, calcium 8.2 Magnesium 2.1, phosphorus 3.6  10/28/23 TSH 7.310, TT4 4.15

## 2024-07-26 NOTE — ASSESSMENT
[Denosumab Therapy] : Risks  and benefits of denosumab therapy were discussed with the patient including eczema, cellulitis, osteonecrosis of the jaw and atypical femur fractures [Bisphosphonate Therapy] : Risks and benefits of bisphosphonate therapy were  discussed with the patient including gastroesophageal irritation, osteonecrosis of the jaw, and atypical femur fractures, and acute phase reaction [Teriparatide/Abaloparatide Therapy] : Risks and benefits of Teriparatide/Abaloparatide therapy were discussed with the patient including potential risk of osteosarcoma [FreeTextEntry1] : 1. Osteoporosis Diagnosed with osteoporosis in setting of multiple fragility fractures over the past year No recent DEXA for review, endorses h/o osteopenia on DEXA in the past (years ago) Mr. Blair has experienced multiple fragility fractures including both right hip (7/2023), left hip (11/2023) and e.o compression fracture on imaging over the past 1-2 years No past treatment for osteoporosis Counseling provided regarding osteoporosis, osteoporosis etiology, risk factors, evaluation and management with both calcium/vitD, lifestyle and medicinal (bisphosphonate, oral/IV vs prolia vs anabolic agent (teriparatide, abaloparatide).  Given his recent and multiple fragility fractures, recommend strong consideration for anabolic agents vs prolia.  Hesitance for daily self injection and he is contemplative for therapy to initiate.  Of note, while recently hospitalized, calcium was low (8.2).  Will repeat with labs todau Discussed universal care of adequate calcium/vitD intake, weight bearing exercises and fall prevention -- labs today  2. abnormal TFT On labs reviewed from [facility] in October 2023 with TSH 7.310 and TT4 4.15 -- repeat with labs today  Labs today, I will call/[facility] message with results Follow up in 6 months  Steff Prince MS, FNP-BC, [facility] 07/22/2024

## 2024-07-26 NOTE — HISTORY OF PRESENT ILLNESS
[FreeTextEntry1] : Mr. DOM CASTAÑEDA  is a 79 year old male with pmhx of osteoporosis, kyphoscoliosis (s/p c-sacrum fusion at [facility] in October 2020 with Dr Carlisle), depression, bladder CA in remission (s/p resection, localized chemo) who presents for evaluation.  Referred by Ortho, Dr. Rice Oh  Diagnosed with osteoporosis in setting of multiple fragility fractures, as below Unsure of when last DEXA was performed, but years ago noted to have history of osteopenia  Fracture history:  - right hip in 7/2023 from fall from standing height - left hip 11/2023 from fall from standing height - On recent CT Angio Abdomen/Pelvis from 7/9/24 T12 compression fracture noted and patient endorses that h/o compression fracture prior to hip fractures and more recently also noted with recent hospitalization from [facility] review (T3, T12-as above, and T8).  Treatment history: -- None   Current calcium and vitamin D intake includes: Dietary sources: ~1 serving daily ice cream Supplements: multiple supplements, including MVI that all contain calcium, unsure of total amount    Weight bearing exercise includes Physical Therapy Dental examination: within past 6 months.  Denies need for dental extraction or implantation at this time.  Osteoporosis risk factors include: Postmenopausal status,  race, prior fracture, falls, height loss, small thin bones, tobacco use, excessive alcohol, anorexia, family history, vitamin D deficiency, corticosteroid use, seizure medications, malabsorption, hyperparathyroidism, hyperthyroidism. NEGATIVE EXCEPT: past fracture history,  race, falls

## 2024-07-26 NOTE — HISTORY OF PRESENT ILLNESS
[FreeTextEntry1] : Mr. DOM CASTAÑEDA  is a 79 year old male with pmhx of osteoporosis, kyphoscoliosis (s/p c-sacrum fusion at [facility] in October 2020 with Dr Carlisle), depression, bladder CA in remission (s/p resection, localized chemo) who presents for evaluation.  Referred by Ortho, Dr. Rice Oh  Diagnosed with osteoporosis in setting of multiple fragility fractures, as below Unsure of when last DEXA was performed, but years ago noted to have history of osteopenia  Fracture history:  - right hip in 7/2023 from fall from standing height - left hip 11/2023 from fall from standing height - On recent CT Angio Abdomen/Pelvis from 7/9/24 T12 compression fracture noted and patient endorses that h/o compression fracture prior to hip fractures and more recently also noted with recent hospitalization from [facility] review (T3, T12-as above, and T8).  Treatment history: -- None   Current calcium and vitamin D intake includes: Dietary sources: ~1 serving daily ice cream Supplements: multiple supplements, including MVI that all contain calcium, unsure of total amount    Weight bearing exercise includes Physical Therapy Dental examination: within past 6 months.  Denies need for dental extraction or implantation at this time.  Osteoporosis risk factors include: Postmenopausal status,  race, prior fracture, falls, height loss, small thin bones, tobacco use, excessive alcohol, anorexia, family history, vitamin D deficiency, corticosteroid use, seizure medications, malabsorption, hyperparathyroidism, hyperthyroidism. NEGATIVE EXCEPT: past fracture history,  race, falls

## 2024-07-26 NOTE — ASSESSMENT
[Denosumab Therapy] : Risks  and benefits of denosumab therapy were discussed with the patient including eczema, cellulitis, osteonecrosis of the jaw and atypical femur fractures [Bisphosphonate Therapy] : Risks and benefits of bisphosphonate therapy were  discussed with the patient including gastroesophageal irritation, osteonecrosis of the jaw, and atypical femur fractures, and acute phase reaction [Teriparatide/Abaloparatide Therapy] : Risks and benefits of Teriparatide/Abaloparatide therapy were discussed with the patient including potential risk of osteosarcoma [FreeTextEntry1] : 1. Osteoporosis Diagnosed with osteoporosis in setting of multiple fragility fractures over the past year No recent DEXA for review, endorses h/o osteopenia on DEXA in the past (years ago) Mr. Blair has experienced multiple fragility fractures including both right hip (7/2023), left hip (11/2023) and e.o compression fracture on imaging over the past 1-2 years No past treatment for osteoporosis Counseling provided regarding osteoporosis, osteoporosis etiology, risk factors, evaluation and management with both calcium/vitD, lifestyle and medicinal (bisphosphonate, oral/IV vs prolia vs anabolic agent (teriparatide, abaloparatide).  Given his recent and multiple fragility fractures, recommend strong consideration for anabolic agents vs prolia.  Hesitance for daily self injection and he is contemplative for therapy to initiate.  Of note, while recently hospitalized, calcium was low (8.2).  Will repeat with labs todau Discussed universal care of adequate calcium/vitD intake, weight bearing exercises and fall prevention -- labs today  2. abnormal TFT On labs reviewed from [facility] in October 2023 with TSH 7.310 and TT4 4.15 -- repeat with labs today  Labs today, I will call/[facility] message with results Follow up in 6 months  Steff Prince MS, FNP-BC, [facility] 07/22/2024

## 2024-07-26 NOTE — PHYSICAL EXAM
[Alert] : alert [No Acute Distress] : no acute distress [Normal Voice/Communication] : normal voice communication [Normal Hearing] : hearing was normal [No Respiratory Distress] : no respiratory distress [Normal Rate and Effort] : normal respiratory rate and effort [Normal Rate] : heart rate was normal [Regular Rhythm] : with a regular rhythm [Kyphosis] : kyphosis present [Oriented x3] : oriented to person, place, and time [Normal Affect] : the affect was normal [Normal Insight/Judgement] : insight and judgment were intact [Normal Mood] : the mood was normal [de-identified] : Ambulates slowly, but steady with walker

## 2024-07-26 NOTE — ADDENDUM
[FreeTextEntry1] : 07/23/2024, addISMA bashir Labs partially finalized, I will review with patient once finalized Thyroid AB added on today given persisting mildly elevated TSH.  07/25/2024, addISMA bashir VM left requesting return call to review lab results CMP with normal renal function and calcium of 8.8, will review calcium supplemental dosing with return call iPTH WNL phosph tracely elevated 4.6 (ULN 4.5) with normal magnesium vitD 29.8, will confirm vit D supplement dosing with return call Neg Celiac screening SPEP with weak gamma migrating paraprotein and immunofixation with weak IgG kappa band-- will refer to hematology, Dr Hennessy ([address], [address], [place], [place], Phone: [phone]) TSH mildly elevated to 6.63 with normal FT4 0.9 and +TPO AB 51.9 , will review for sx of hypothyroidism for consideration of treatment if symptomatic vs monitoring given subclinical presentation in 79 year old male  With return call will further discuss osteoporosis treatment recommendation with Prolia and will review need for calcium supplement goal to be achieved of 1200mg daily in divided doses <600mg per dose for absorptive purposes and vitD supplementation on daily basis given hypocalcemia risk surrounding prolia dosing.   07/26/2024, ISMA ceja Called and reviewed lab results Reviewed vitamin supplementation of: MVI Centrum Silver (calcium 220mg daily), Natures Bounty (Calcium 1000mg for 3 tablets)-- taking 1 tablet daily -- recommend increasing to Natures Bounty 3 tablets daily for full dosing with continued centrum silver MVI to total 1200mg daily -- recommend hematology consult, to be mailed to his home address -- call [facility] dept to schedule DEXA, I will call with results -- recommend prolia, contemplative, but will increase calcium supplementation in the meantime, will determine treatemtn when I call with DEXA, per patient

## 2024-07-26 NOTE — ASSESSMENT
[Denosumab Therapy] : Risks  and benefits of denosumab therapy were discussed with the patient including eczema, cellulitis, osteonecrosis of the jaw and atypical femur fractures [Bisphosphonate Therapy] : Risks and benefits of bisphosphonate therapy were  discussed with the patient including gastroesophageal irritation, osteonecrosis of the jaw, and atypical femur fractures, and acute phase reaction [Teriparatide/Abaloparatide Therapy] : Risks and benefits of Teriparatide/Abaloparatide therapy were discussed with the patient including potential risk of osteosarcoma [FreeTextEntry1] : 1. Osteoporosis Diagnosed with osteoporosis in setting of multiple fragility fractures over the past year No recent DEXA for review, endorses h/o osteopenia on DEXA in the past (years ago) Mr. Blair has experienced multiple fragility fractures including both right hip (7/2023), left hip (11/2023) and e.o compression fracture on imaging over the past 1-2 years No past treatment for osteoporosis Counseling provided regarding osteoporosis, osteoporosis etiology, risk factors, evaluation and management with both calcium/vitD, lifestyle and medicinal (bisphosphonate, oral/IV vs prolia vs anabolic agent (teriparatide, abaloparatide).  Given his recent and multiple fragility fractures, recommend strong consideration for anabolic agents vs prolia.  Hesitance for daily self injection and he is contemplative for therapy to initiate.  Of note, while recently hospitalized, calcium was low (8.2).  Will repeat with labs todau Discussed universal care of adequate calcium/vitD intake, weight bearing exercises and fall prevention -- labs today  2. abnormal TFT On labs reviewed from [facility] in October 2023 with TSH 7.310 and TT4 4.15 -- repeat with labs today  Labs today, I will call/[facility] message with results Follow up in 6 months  Steff Prince MS, FNP-BC, [facility] 07/22/2024

## 2024-07-26 NOTE — HISTORY OF PRESENT ILLNESS
[FreeTextEntry1] : Mr. DOM CASTAÑEDA  is a 79 year old male with pmhx of osteoporosis, kyphoscoliosis (s/p c-sacrum fusion at [facility] in October 2020 with Dr Carlisle), depression, bladder CA in remission (s/p resection, localized chemo) who presents for evaluation.  Referred by Ortho, Dr. Rice Oh  Diagnosed with osteoporosis in setting of multiple fragility fractures, as below Unsure of when last DEXA was performed, but years ago noted to have history of osteopenia  Fracture history:  - right hip in 7/2023 from fall from standing height - left hip 11/2023 from fall from standing height - On recent CT Angio Abdomen/Pelvis from 7/9/24 T12 compression fracture noted and patient endorses that h/o compression fracture prior to hip fractures and more recently also noted with recent hospitalization from [facility] review (T3, T12-as above, and T8).  Treatment history: -- None   Current calcium and vitamin D intake includes: Dietary sources: ~1 serving daily ice cream Supplements: multiple supplements, including MVI that all contain calcium, unsure of total amount    Weight bearing exercise includes Physical Therapy Dental examination: within past 6 months.  Denies need for dental extraction or implantation at this time.  Osteoporosis risk factors include: Postmenopausal status,  race, prior fracture, falls, height loss, small thin bones, tobacco use, excessive alcohol, anorexia, family history, vitamin D deficiency, corticosteroid use, seizure medications, malabsorption, hyperparathyroidism, hyperthyroidism. NEGATIVE EXCEPT: past fracture history,  race, falls

## 2024-07-26 NOTE — PHYSICAL EXAM
[Alert] : alert [No Acute Distress] : no acute distress [Normal Voice/Communication] : normal voice communication [Normal Hearing] : hearing was normal [No Respiratory Distress] : no respiratory distress [Normal Rate and Effort] : normal respiratory rate and effort [Normal Rate] : heart rate was normal [Regular Rhythm] : with a regular rhythm [Kyphosis] : kyphosis present [Oriented x3] : oriented to person, place, and time [Normal Affect] : the affect was normal [Normal Insight/Judgement] : insight and judgment were intact [Normal Mood] : the mood was normal [de-identified] : Ambulates slowly, but steady with walker

## 2024-07-26 NOTE — ADDENDUM
[FreeTextEntry1] : 07/23/2024, addISMA bashir Labs partially finalized, I will review with patient once finalized Thyroid AB added on today given persisting mildly elevated TSH.  07/25/2024, addISMA bashir VM left requesting return call to review lab results CMP with normal renal function and calcium of 8.8, will review calcium supplemental dosing with return call iPTH WNL phosph tracely elevated 4.6 (ULN 4.5) with normal magnesium vitD 29.8, will confirm vit D supplement dosing with return call Neg Celiac screening SPEP with weak gamma migrating paraprotein and immunofixation with weak IgG kappa band-- will refer to hematology, Dr Hennessy ([address], [address], [place], [place], Phone: [phone]) TSH mildly elevated to 6.63 with normal FT4 0.9 and +TPO AB 51.9 , will review for sx of hypothyroidism for consideration of treatment if symptomatic vs monitoring given subclinical presentation in 79 year old male  With return call will further discuss osteoporosis treatment recommendation with Prolia and will review need for calcium supplement goal to be achieved of 1200mg daily in divided doses <600mg per dose for absorptive purposes and vitD supplementation on daily basis given hypocalcemia risk surrounding prolia dosing.   07/26/2024, ISMA ceja Called and reviewed lab results Reviewed vitamin supplementation of: MVI Centrum Silver (calcium 220mg daily), Natures Bounty (Calcium 1000mg for 3 tablets)-- taking 1 tablet daily -- recommend increasing to Natures Bounty 3 tablets daily for full dosing with continued centrum silver MVI to total 1200mg daily -- recommend hematology consult, to be mailed to his home address -- call [facility] Radiology dept to schedule DEXA, I will call with results -- recommend prolia, contemplative, but will increase calcium supplementation in the meantime, will determine treatemtn when I call with DEXA, per patient

## 2024-07-31 ENCOUNTER — NON-APPOINTMENT (OUTPATIENT)
Age: 79
End: 2024-07-31

## 2024-07-31 ENCOUNTER — APPOINTMENT (OUTPATIENT)
Dept: HEART AND VASCULAR | Facility: CLINIC | Age: 79
End: 2024-07-31
Payer: MEDICARE

## 2024-07-31 VITALS
BODY MASS INDEX: 23.64 KG/M2 | WEIGHT: 156 LBS | HEART RATE: 75 BPM | SYSTOLIC BLOOD PRESSURE: 103 MMHG | DIASTOLIC BLOOD PRESSURE: 62 MMHG | TEMPERATURE: 98.5 F | OXYGEN SATURATION: 98 % | HEIGHT: 68 IN

## 2024-07-31 DIAGNOSIS — I71.40 ABDOMINAL AORTIC ANEURYSM, WITHOUT RUPTURE, UNSPECIFIED: ICD-10-CM

## 2024-07-31 DIAGNOSIS — R60.0 LOCALIZED EDEMA: ICD-10-CM

## 2024-07-31 PROCEDURE — 99203 OFFICE O/P NEW LOW 30 MIN: CPT

## 2024-07-31 PROCEDURE — 93000 ELECTROCARDIOGRAM COMPLETE: CPT

## 2024-07-31 NOTE — PHYSICAL EXAM

## 2024-07-31 NOTE — PHYSICAL EXAM

## 2024-07-31 NOTE — HISTORY OF PRESENT ILLNESS
[FreeTextEntry1] : 79 M former smoker Osteoporosis, Kyphoscoliosis, s/p c sacrum fusion at [facility] 10/2020, Depression, Bladder CA in remission (s/p resection localized chemo) Multiple Fragility fractures, Gerd, Lymphedema, Prior RUE DVT, BPH  referred by vascular for preoperative cardiac optimization prior to EVAR of infrarenal aortic aneurysm noted to be 5.0 cm on CT scan done for fall. He seems to be a little confused. Lives alone walks with a walk and is very debilitated with severe kyphosis. He needs help with all his ADLs. Does not have active chest pain or shortness of breath. He does not ambulate much.  ecg nsr 7/31/24

## 2024-07-31 NOTE — HISTORY OF PRESENT ILLNESS
[FreeTextEntry1] : 79 M former smoker Osteoporosis, Kyphoscoliosis, s/p c sacrum fusion at [facility] 10/2020, Depression, Bladder CA in remission (s/p resection localized chemo) Multiple Fragility fractures, Gerd, Lymphedema, Prior RUE DVT, BPH  referred by vascular for preoperative cardiac optimization prior to EVAR of infrarenal aortic aneurysm noted to be 5.0 cm on CT scan done for fall. He seems to be a little confused. Lives alone walks with a walk and is very debilitated with severe kyphosis. He needs help with all his ADLs. Does not have active chest pain or shortness of breath. He does not ambulate much.  ecg nsr 7/31/24

## 2024-07-31 NOTE — ASSESSMENT
[FreeTextEntry1] : - has severe edema bilaterally with blistering - will need US of his legs - reach out to vascular he will need compression and wound care. will set him up with VNS as well - unclear if he is taking a statin or diuretic - will call him at home to confirm his meds - plan on fu after his testing

## 2024-08-01 DIAGNOSIS — M41.9 SCOLIOSIS, UNSPECIFIED: ICD-10-CM

## 2024-08-08 ENCOUNTER — APPOINTMENT (OUTPATIENT)
Dept: ULTRASOUND IMAGING | Facility: HOSPITAL | Age: 79
End: 2024-08-08

## 2024-08-11 ENCOUNTER — NON-APPOINTMENT (OUTPATIENT)
Age: 79
End: 2024-08-11

## 2024-08-12 ENCOUNTER — INPATIENT (INPATIENT)
Facility: HOSPITAL | Age: 79
LOS: 7 days | Discharge: NURSING, REHAB OR LONG-TERM CARE | DRG: 871 | End: 2024-08-20
Attending: STUDENT IN AN ORGANIZED HEALTH CARE EDUCATION/TRAINING PROGRAM | Admitting: STUDENT IN AN ORGANIZED HEALTH CARE EDUCATION/TRAINING PROGRAM
Payer: MEDICARE

## 2024-08-12 VITALS
DIASTOLIC BLOOD PRESSURE: 54 MMHG | WEIGHT: 175.05 LBS | TEMPERATURE: 98 F | OXYGEN SATURATION: 96 % | HEART RATE: 79 BPM | RESPIRATION RATE: 16 BRPM | HEIGHT: 68 IN | SYSTOLIC BLOOD PRESSURE: 91 MMHG

## 2024-08-12 DIAGNOSIS — I71.40 ABDOMINAL AORTIC ANEURYSM, WITHOUT RUPTURE, UNSPECIFIED: ICD-10-CM

## 2024-08-12 DIAGNOSIS — R78.81 BACTEREMIA: ICD-10-CM

## 2024-08-12 DIAGNOSIS — Z29.9 ENCOUNTER FOR PROPHYLACTIC MEASURES, UNSPECIFIED: ICD-10-CM

## 2024-08-12 DIAGNOSIS — Z85.51 PERSONAL HISTORY OF MALIGNANT NEOPLASM OF BLADDER: ICD-10-CM

## 2024-08-12 DIAGNOSIS — F32.9 MAJOR DEPRESSIVE DISORDER, SINGLE EPISODE, UNSPECIFIED: ICD-10-CM

## 2024-08-12 DIAGNOSIS — M25.522 PAIN IN LEFT ELBOW: ICD-10-CM

## 2024-08-12 DIAGNOSIS — B95.61 METHICILLIN SUSCEPTIBLE STAPHYLOCOCCUS AUREUS INFECTION AS THE CAUSE OF DISEASES CLASSIFIED ELSEWHERE: ICD-10-CM

## 2024-08-12 DIAGNOSIS — K59.09 OTHER CONSTIPATION: ICD-10-CM

## 2024-08-12 DIAGNOSIS — D50.9 IRON DEFICIENCY ANEMIA, UNSPECIFIED: ICD-10-CM

## 2024-08-12 DIAGNOSIS — R53.81 OTHER MALAISE: ICD-10-CM

## 2024-08-12 DIAGNOSIS — E43 UNSPECIFIED SEVERE PROTEIN-CALORIE MALNUTRITION: ICD-10-CM

## 2024-08-12 DIAGNOSIS — N40.0 BENIGN PROSTATIC HYPERPLASIA WITHOUT LOWER URINARY TRACT SYMPTOMS: ICD-10-CM

## 2024-08-12 DIAGNOSIS — Z98.890 OTHER SPECIFIED POSTPROCEDURAL STATES: Chronic | ICD-10-CM

## 2024-08-12 DIAGNOSIS — Z11.52 ENCOUNTER FOR SCREENING FOR COVID-19: ICD-10-CM

## 2024-08-12 DIAGNOSIS — D64.9 ANEMIA, UNSPECIFIED: ICD-10-CM

## 2024-08-12 DIAGNOSIS — R29.6 REPEATED FALLS: ICD-10-CM

## 2024-08-12 DIAGNOSIS — G89.29 OTHER CHRONIC PAIN: ICD-10-CM

## 2024-08-12 DIAGNOSIS — I89.0 LYMPHEDEMA, NOT ELSEWHERE CLASSIFIED: ICD-10-CM

## 2024-08-12 DIAGNOSIS — Z92.21 PERSONAL HISTORY OF ANTINEOPLASTIC CHEMOTHERAPY: ICD-10-CM

## 2024-08-12 DIAGNOSIS — K21.9 GASTRO-ESOPHAGEAL REFLUX DISEASE WITHOUT ESOPHAGITIS: ICD-10-CM

## 2024-08-12 DIAGNOSIS — Z86.718 PERSONAL HISTORY OF OTHER VENOUS THROMBOSIS AND EMBOLISM: ICD-10-CM

## 2024-08-12 DIAGNOSIS — R26.0 ATAXIC GAIT: ICD-10-CM

## 2024-08-12 DIAGNOSIS — S59.902A UNSPECIFIED INJURY OF LEFT ELBOW, INITIAL ENCOUNTER: ICD-10-CM

## 2024-08-12 DIAGNOSIS — R53.1 WEAKNESS: ICD-10-CM

## 2024-08-12 DIAGNOSIS — Z88.2 ALLERGY STATUS TO SULFONAMIDES: ICD-10-CM

## 2024-08-12 DIAGNOSIS — M54.50 LOW BACK PAIN, UNSPECIFIED: ICD-10-CM

## 2024-08-12 DIAGNOSIS — F41.8 OTHER SPECIFIED ANXIETY DISORDERS: ICD-10-CM

## 2024-08-12 DIAGNOSIS — M54.9 DORSALGIA, UNSPECIFIED: ICD-10-CM

## 2024-08-12 DIAGNOSIS — R09.89 OTHER SPECIFIED SYMPTOMS AND SIGNS INVOLVING THE CIRCULATORY AND RESPIRATORY SYSTEMS: ICD-10-CM

## 2024-08-12 DIAGNOSIS — L03.116 CELLULITIS OF LEFT LOWER LIMB: ICD-10-CM

## 2024-08-12 LAB
ALBUMIN SERPL ELPH-MCNC: 3.1 G/DL — LOW (ref 3.3–5)
ALP SERPL-CCNC: 75 U/L — SIGNIFICANT CHANGE UP (ref 40–120)
ALT FLD-CCNC: 9 U/L — LOW (ref 10–45)
ANION GAP SERPL CALC-SCNC: 11 MMOL/L — SIGNIFICANT CHANGE UP (ref 5–17)
AST SERPL-CCNC: 22 U/L — SIGNIFICANT CHANGE UP (ref 10–40)
BASOPHILS # BLD AUTO: 0.05 K/UL — SIGNIFICANT CHANGE UP (ref 0–0.2)
BASOPHILS NFR BLD AUTO: 0.8 % — SIGNIFICANT CHANGE UP (ref 0–2)
BILIRUB SERPL-MCNC: 0.4 MG/DL — SIGNIFICANT CHANGE UP (ref 0.2–1.2)
BLD GP AB SCN SERPL QL: NEGATIVE — SIGNIFICANT CHANGE UP
BUN SERPL-MCNC: 19 MG/DL — SIGNIFICANT CHANGE UP (ref 7–23)
CALCIUM SERPL-MCNC: 8.4 MG/DL — SIGNIFICANT CHANGE UP (ref 8.4–10.5)
CHLORIDE SERPL-SCNC: 102 MMOL/L — SIGNIFICANT CHANGE UP (ref 96–108)
CK SERPL-CCNC: 419 U/L — HIGH (ref 30–200)
CO2 SERPL-SCNC: 26 MMOL/L — SIGNIFICANT CHANGE UP (ref 22–31)
CREAT SERPL-MCNC: 0.73 MG/DL — SIGNIFICANT CHANGE UP (ref 0.5–1.3)
EGFR: 93 ML/MIN/1.73M2 — SIGNIFICANT CHANGE UP
EOSINOPHIL # BLD AUTO: 0.33 K/UL — SIGNIFICANT CHANGE UP (ref 0–0.5)
EOSINOPHIL NFR BLD AUTO: 5.1 % — SIGNIFICANT CHANGE UP (ref 0–6)
FLUAV AG NPH QL: SIGNIFICANT CHANGE UP
FLUBV AG NPH QL: SIGNIFICANT CHANGE UP
GLUCOSE SERPL-MCNC: 78 MG/DL — SIGNIFICANT CHANGE UP (ref 70–99)
HCT VFR BLD CALC: 35 % — LOW (ref 39–50)
HGB BLD-MCNC: 11 G/DL — LOW (ref 13–17)
IMM GRANULOCYTES NFR BLD AUTO: 0.3 % — SIGNIFICANT CHANGE UP (ref 0–0.9)
LACTATE SERPL-SCNC: 1.4 MMOL/L — SIGNIFICANT CHANGE UP (ref 0.5–2)
LYMPHOCYTES # BLD AUTO: 1.8 K/UL — SIGNIFICANT CHANGE UP (ref 1–3.3)
LYMPHOCYTES # BLD AUTO: 28 % — SIGNIFICANT CHANGE UP (ref 13–44)
MAGNESIUM SERPL-MCNC: 2.3 MG/DL — SIGNIFICANT CHANGE UP (ref 1.6–2.6)
MCHC RBC-ENTMCNC: 28.5 PG — SIGNIFICANT CHANGE UP (ref 27–34)
MCHC RBC-ENTMCNC: 31.4 GM/DL — LOW (ref 32–36)
MCV RBC AUTO: 90.7 FL — SIGNIFICANT CHANGE UP (ref 80–100)
MONOCYTES # BLD AUTO: 0.65 K/UL — SIGNIFICANT CHANGE UP (ref 0–0.9)
MONOCYTES NFR BLD AUTO: 10.1 % — SIGNIFICANT CHANGE UP (ref 2–14)
NEUTROPHILS # BLD AUTO: 3.59 K/UL — SIGNIFICANT CHANGE UP (ref 1.8–7.4)
NEUTROPHILS NFR BLD AUTO: 55.7 % — SIGNIFICANT CHANGE UP (ref 43–77)
NRBC # BLD: 0 /100 WBCS — SIGNIFICANT CHANGE UP (ref 0–0)
PLATELET # BLD AUTO: 242 K/UL — SIGNIFICANT CHANGE UP (ref 150–400)
POTASSIUM SERPL-MCNC: 4.5 MMOL/L — SIGNIFICANT CHANGE UP (ref 3.5–5.3)
POTASSIUM SERPL-SCNC: 4.5 MMOL/L — SIGNIFICANT CHANGE UP (ref 3.5–5.3)
PROT SERPL-MCNC: 7.3 G/DL — SIGNIFICANT CHANGE UP (ref 6–8.3)
RBC # BLD: 3.86 M/UL — LOW (ref 4.2–5.8)
RBC # FLD: 14 % — SIGNIFICANT CHANGE UP (ref 10.3–14.5)
RH IG SCN BLD-IMP: POSITIVE — SIGNIFICANT CHANGE UP
RSV RNA NPH QL NAA+NON-PROBE: SIGNIFICANT CHANGE UP
SARS-COV-2 RNA SPEC QL NAA+PROBE: SIGNIFICANT CHANGE UP
SODIUM SERPL-SCNC: 139 MMOL/L — SIGNIFICANT CHANGE UP (ref 135–145)
TROPONIN T, HIGH SENSITIVITY RESULT: 30 NG/L — SIGNIFICANT CHANGE UP (ref 0–51)
WBC # BLD: 6.44 K/UL — SIGNIFICANT CHANGE UP (ref 3.8–10.5)
WBC # FLD AUTO: 6.44 K/UL — SIGNIFICANT CHANGE UP (ref 3.8–10.5)

## 2024-08-12 PROCEDURE — 93010 ELECTROCARDIOGRAM REPORT: CPT

## 2024-08-12 PROCEDURE — 99285 EMERGENCY DEPT VISIT HI MDM: CPT

## 2024-08-12 PROCEDURE — 73070 X-RAY EXAM OF ELBOW: CPT | Mod: 26,LT

## 2024-08-12 PROCEDURE — 71045 X-RAY EXAM CHEST 1 VIEW: CPT | Mod: 26

## 2024-08-12 PROCEDURE — 99223 1ST HOSP IP/OBS HIGH 75: CPT | Mod: GC

## 2024-08-12 RX ORDER — HYDROMORPHONE HYDROCHLORIDE 2 MG/1
4 TABLET ORAL EVERY 6 HOURS
Refills: 0 | Status: DISCONTINUED | OUTPATIENT
Start: 2024-08-12 | End: 2024-08-12

## 2024-08-12 RX ORDER — CLONAZEPAM 1 MG
2 TABLET ORAL EVERY 12 HOURS
Refills: 0 | Status: DISCONTINUED | OUTPATIENT
Start: 2024-08-12 | End: 2024-08-16

## 2024-08-12 RX ORDER — POLYETHYLENE GLYCOL 3350 17 G/17G
17 POWDER, FOR SOLUTION ORAL EVERY 24 HOURS
Refills: 0 | Status: DISCONTINUED | OUTPATIENT
Start: 2024-08-12 | End: 2024-08-20

## 2024-08-12 RX ORDER — CLONAZEPAM 1 MG
1 TABLET ORAL
Refills: 0 | DISCHARGE

## 2024-08-12 RX ORDER — SENNA 187 MG
2 TABLET ORAL AT BEDTIME
Refills: 0 | Status: DISCONTINUED | OUTPATIENT
Start: 2024-08-12 | End: 2024-08-20

## 2024-08-12 RX ORDER — LIDOCAINE/BENZALKONIUM/ALCOHOL
1 SOLUTION, NON-ORAL TOPICAL EVERY 24 HOURS
Refills: 0 | Status: DISCONTINUED | OUTPATIENT
Start: 2024-08-12 | End: 2024-08-20

## 2024-08-12 RX ORDER — ACETAMINOPHEN 325 MG/1
650 TABLET ORAL EVERY 6 HOURS
Refills: 0 | Status: DISCONTINUED | OUTPATIENT
Start: 2024-08-12 | End: 2024-08-20

## 2024-08-12 RX ORDER — TRAMADOL HYDROCHLORIDE 200 MG/1
100 TABLET, EXTENDED RELEASE ORAL EVERY 24 HOURS
Refills: 0 | Status: DISCONTINUED | OUTPATIENT
Start: 2024-08-12 | End: 2024-08-12

## 2024-08-12 RX ORDER — SODIUM CHLORIDE 9 MG/ML
1000 INJECTION INTRAMUSCULAR; INTRAVENOUS; SUBCUTANEOUS ONCE
Refills: 0 | Status: COMPLETED | OUTPATIENT
Start: 2024-08-12 | End: 2024-08-12

## 2024-08-12 RX ORDER — ENOXAPARIN SODIUM 100 MG/ML
40 INJECTION SUBCUTANEOUS EVERY 24 HOURS
Refills: 0 | Status: DISCONTINUED | OUTPATIENT
Start: 2024-08-12 | End: 2024-08-20

## 2024-08-12 RX ORDER — TRAMADOL HYDROCHLORIDE 200 MG/1
50 TABLET, EXTENDED RELEASE ORAL EVERY 12 HOURS
Refills: 0 | Status: DISCONTINUED | OUTPATIENT
Start: 2024-08-12 | End: 2024-08-16

## 2024-08-12 RX ORDER — HYDROMORPHONE HYDROCHLORIDE 2 MG/1
4 TABLET ORAL EVERY 12 HOURS
Refills: 0 | Status: DISCONTINUED | OUTPATIENT
Start: 2024-08-12 | End: 2024-08-14

## 2024-08-12 RX ORDER — DULOXETINE HCL 30 MG
60 CAPSULE,DELAYED RELEASE (ENTERIC COATED) ORAL EVERY 12 HOURS
Refills: 0 | Status: DISCONTINUED | OUTPATIENT
Start: 2024-08-12 | End: 2024-08-20

## 2024-08-12 RX ADMIN — Medication 1 PATCH: at 19:00

## 2024-08-12 RX ADMIN — ENOXAPARIN SODIUM 40 MILLIGRAM(S): 100 INJECTION SUBCUTANEOUS at 18:59

## 2024-08-12 RX ADMIN — Medication 60 MILLIGRAM(S): at 19:01

## 2024-08-12 RX ADMIN — TRAMADOL HYDROCHLORIDE 50 MILLIGRAM(S): 200 TABLET, EXTENDED RELEASE ORAL at 19:00

## 2024-08-12 RX ADMIN — POLYETHYLENE GLYCOL 3350 17 GRAM(S): 17 POWDER, FOR SOLUTION ORAL at 19:00

## 2024-08-12 RX ADMIN — SODIUM CHLORIDE 2000 MILLILITER(S): 9 INJECTION INTRAMUSCULAR; INTRAVENOUS; SUBCUTANEOUS at 05:07

## 2024-08-12 RX ADMIN — TRAMADOL HYDROCHLORIDE 50 MILLIGRAM(S): 200 TABLET, EXTENDED RELEASE ORAL at 20:24

## 2024-08-12 RX ADMIN — Medication 1 PATCH: at 20:24

## 2024-08-12 NOTE — PHYSICAL THERAPY INITIAL EVALUATION ADULT - PERTINENT HX OF CURRENT PROBLEM, REHAB EVAL
79M w/ PMH of bladder CA (s/p tumor resection), GERD, chronic lymphedema, kyphoscoliosis s/p C-sacrum fusion (2020), chronic compression fractures of vertebral column, L knee OA, MDD, panic disorder, BPH, abdominal aortic aneurysm found on last admission, recently admitted for fall at home, now presenting to ED due weakness and inability to stand from chair for two days. Pt states he felt generalized weakness and difficulty getting up and out of his chair. Pt states he was able to urinate by using a bedside urinal. Pt endorses eating snacks and Chinese food that his friend brought over last night. Pt states he normally has help from A but last time they were came was Friday. Pt called the hospital as he was unsure what to do and was advised to call 911 for assistance in getting out of chair. Pt states 3 days prior he fell on his elbow, which has been bleeding on and off. Pt also reports having hit his head on his dresser last week when trying to  a bandage, no bleeding or LOC. He also reports b/l lower leg pain with oozing of the skin. Pt also endorses cough since this morning. Pt states he is chronically constipated, does not know last BM. Pt states he as not been taking home diazepam for the past few weeks but endorses taking dilaudid 4mg q4 and tramadol for his back pain. Pt takes clonazepam 2mg BID for anxiety, last dose was last night. Pt denies fevers, chills, abdominal pain, dysuria, diarrhea, vomiting, hematochezia or melena, lightheadedness.

## 2024-08-12 NOTE — PATIENT PROFILE ADULT - FALL HARM RISK - HARM RISK INTERVENTIONS

## 2024-08-12 NOTE — ED ADULT NURSE NOTE - CHPI ED NUR SYMPTOMS NEG
weakness/no back pain/no chest pain/no chills/no congestion/no diaphoresis/no dizziness/no nausea/no shortness of breath/no syncope/no vomiting

## 2024-08-12 NOTE — H&P ADULT - PROBLEM SELECTOR PLAN 10
Pt on home duloxetine 60mg PO 2 tabs WD    - con't home duloxetine Pt on home duloxetine 60mg PO BID. Pt has clonazepam 2mg BID PRN for anxiety and ambien 5mg PRN for insomnia. Pt was lethargic during interview and examination, pt endorses taking ambien last night. Unclear when pt last took clonazepam, on exam pt had b/l hand tremors with movement, however pt denies anxiety, no diaphoresis, no tachycardia, no nausea/vomiting, headache, afebrile.     - con't home duloxetine  - hold clonazepam, monitor for potential benzodiazepine withdrawal Pt on home duloxetine 60mg PO BID. Pt has clonazepam 2mg BID PRN for anxiety and ambien 5mg PRN for insomnia. Pt was lethargic during interview and examination, pt endorses taking ambien last night. PT endorses taking clonazepam BID, last dose was last night. On exam pt had b/l hand tremors with movement, however pt denies anxiety, no diaphoresis, no tachycardia, no nausea/vomiting, headache, afebrile.     - con't home duloxetine  - con't home clonazepam Pt on home duloxetine 60mg PO BID. Pt has clonazepam 2mg BID PRN for anxiety and ambien 5mg PRN for insomnia. Pt was lethargic during interview and examination, pt endorses taking ambien last night. PT endorses taking clonazepam BID, last dose was last night. On exam pt had b/l hand tremors with movement, however pt denies anxiety, no diaphoresis, no tachycardia, no nausea/vomiting, headache, afebrile.     - con't home duloxetine  - con't home clonazepam 2mg BID PRN  - hold ambien, can offer melatonin for insomnia

## 2024-08-12 NOTE — PATIENT PROFILE ADULT - FUNCTIONAL ASSESSMENT - BASIC MOBILITY 6.
2-calculated by average/Not able to assess (calculate score using [facility] averaging method)

## 2024-08-12 NOTE — PHYSICAL THERAPY INITIAL EVALUATION ADULT - HEALTH SCREEN CRITERIA
Patient:  Page Roberson YOB: 1930 Date of Visit: 4/26/2017 Dear MD [phone] VIA Facsimile: [phone] 
 : Thank you for referring Mr. Estela Crandall to me for evaluation/treatment. Below are the relevant portions of my assessment and plan of care. Subjective:  
   Estela Crandall is in the office today for cardiac reevaluation. He is an 49-year-old man that has been followed by Dr. Krupa Cruz in the past.  He has a history of hypertension, coronary artery disease, dyslipidemia, prior coronary bypass grafting, cardiomyopathy, atrial fibrillation, pacemaker, renal artery stenosis, and congestive heart failure. The patient had coronary bypass grafting in 2008. Prior to his coronary bypass, he was experiencing primarily easy fatigability. He subsequently had an abnormal nuclear stress test, which led to cardiac catheterization and bypass surgery done at [facility].  
  
He had repeat cardiac catheterizations done in 2014 and in 2016. Medical therapy was continued and no percutaneous intervention or other recommendations were done at that time. He has been having [facility] come to the home and he is doing well with it. He has noticed that if he exerts himself physically, he does have some discomfort in his chest, which seems consistent with angina. This seems stable and does not occur at rest.  He has had no PND or orthopnea. In the office today, he says he is doing pretty well. He has been weighing himself religiously. He has not taken any Lasix in the past two to three days. He says his legs are really weak at times. His breathing has been pretty good.  Patient Active Problem List  
 Diagnosis Date Noted  Essential hypertension 02/27/2017  Anxiety 02/27/2017  Chronic systolic congestive heart failure ([address] 01/25/2017  S/P CABG x 2 08/25/2015  Atherosclerotic NAHED (renal artery stenosis), bilateral ([facility] [address] 08/25/2015  Dyslipidemia  Coronary artery disease of native artery of native heart with stable angina pectoris ([facility] [address]  Cardiomyopathy  Atrial fibrillation  Sick sinus syndrome Current Outpatient Prescriptions Medication Sig Dispense Refill  metoprolol succinate (TOPROL XL) 50 mg XL tablet Take 50 mg by mouth daily.  LORazepam (ATIVAN) 0.5 mg tablet TK 1 T PO  BID PRN 25 Tab 1  
 furosemide (LASIX) 40 mg tablet Take 40 mg by mouth daily.  nitroglycerin (NITROSTAT) 0.3 mg SL tablet 1 Tab by SubLINGual route every five (5) minutes as needed for Chest Pain (up to 3 total 1 every 5 min). 1 Bottle 0  
 apixaban (ELIQUIS) 5 mg tablet Take 5 mg by mouth two (2) times a day.  enalapril (VASOTEC) 20 mg tablet Take 20 mg by mouth daily.  amLODIPine (NORVASC) 10 mg tablet Take 1 Tab by mouth daily. 90 Tab 3  
 omeprazole (PRILOSEC OTC) 20 mg tablet Take 40 mg by mouth two (2) times a day. Indications: HEARTBURN Allergies Allergen Reactions  Beta-Blockers (Beta-Adrenergic Blocking Agts) Drowsiness  Penicillins Swelling  Statins-Hmg-Coa Reductase Inhibitors Drowsiness Past Medical History:  
Diagnosis Date  Atrial fibrillation CHADS score 3  (+CHF, +HTN, +AGE, -DM, -CVA)  CABG   
 2008   LIMA - LAD,   SVG - RCA  Cardiomyopathy EF 30-35% (ECHO 6/14)  Coronary artery disease  Dyslipidemia  Hypertension  Hypothyroid  Pacemaker  Peripheral vascular disease  Renal artery stenosis   
 bilateral stents  Sick sinus syndrome Past Surgical History:  
Procedure Laterality Date  HX CORONARY ARTERY BYPASS GRAFT    
 2008   LIMA - LAD,   SVG - RCA No family history on file. History Smoking Status  Former Smoker Smokeless Tobacco  
 Not on file Review of Systems, additional: 
Constitutional: negative Eyes: negative Respiratory: negative Cardiovascular: positive for fatigue, dyspnea on exertion Gastrointestinal: negative Musculoskeletal:negative Neurological: negative Behvioral/Psych: negative Endocrine: negative ENT: negative Objective:  
 
Visit Vitals  /65  Pulse 69  Ht 5' 9\" (1.753 m)  Wt 146 lb (66.2 kg)  SpO2 90%  BMI 21.56 kg/m2 General:  alert, cooperative, no distress Chest Wall: inspection normal - no chest wall deformities or tenderness, respiratory effort normal  
Lung: clear to auscultation bilaterally Heart:  normal rate and regular rhythm, systolic murmur 2/6 at 2nd right intercostal space Abdomen: soft, non-tender. Bowel sounds normal. No masses,  no organomegaly Extremities: extremities normal, atraumatic, no cyanosis or edema. Pulses both feet markedly reduced. Skin: no rashes Neuro: alert, oriented, normal speech, no focal findings or movement disorder noted Assessment/Plan: ICD-10-CM ICD-9-CM 1. Pain of lower extremity, unspecified laterality, will order lower extremity arterial duplex studies M79.606 729.5 LOWER EXT ART PVR WITH EXERCISE 2. Coronary artery disease of native artery of native heart with stable angina pectoris ([facility] [address] I25.118 414.01   
  413.9 3. Other cardiomyopathy ([facility] [address] I42.8 4. Chronic atrial fibrillation (HCC) I48.2 427.31   
5. Sick sinus syndrome I49.5 427.81   
6. Atherosclerotic NAHED (renal artery stenosis), bilateral (HCC) I70.1 440.1 7. Chronic systolic congestive heart failure ([facility] [address] now closely following body weight I50.22 428.22   
  428.0 8. Essential hypertension, reasonably controlled in office today. I10 401.9   
9. S/P CABG x 2, 2008, LIMA-LAD, SVG-RCA Z95.1 V45.81   
10. Dyslipidemia, need to address statin intolerance and PCSK9 candidacy with him next  visit. E78.5 272.4 11. Anxiety F41.9 300.00 If you have questions, please do not hesitate to call me.   I look forward to following Mr. Ray Santos along with you. Sincerely, Michelle Rico MD 
 yes

## 2024-08-12 NOTE — H&P ADULT - PROBLEM SELECTOR PLAN 11
F: s/p 1L NS bolus  E: replete for K<4 and Mg<2  N: regular diet  V: Lovenox 40mg  GI: omeprazole 20mg QD    Code:

## 2024-08-12 NOTE — H&P ADULT - TIME BILLING

## 2024-08-12 NOTE — H&P ADULT - PROBLEM SELECTOR PLAN 5
Asymptomatic aorticy aneurysm <5.5cm. Prior CT C-spine (6/29/24) found 5cm transverse abdominal aortic aneurysm, pt has outpt vascular surgery (Dr. Corbin) appointment 7/12 w/ plans for CT AP before discussion of most appropriate intervention. Pt presented to ED w/ BP 91/54 HR 79, hypotension likely 2/2 hypovolemia w/ pt presenting dry on exam, r/p BP after receiving 1L NS bolus was 128/57.     - con't to monitor vitals  - con't outpatient follow up on discharge Asymptomatic aortic aneurysm <5.5cm. Prior CT C-spine (6/29/24) found 5cm transverse abdominal aortic aneurysm, pt has outpt vascular surgery (Dr. Corbin) appointment 7/12 w/ plans for CT AP before discussion of most appropriate intervention. Pt presented to ED w/ BP 91/54 HR 79, hypotension likely 2/2 hypovolemia w/ pt presenting dry on exam, r/p BP after receiving 1L NS bolus was 128/57.     - consult vascular outpatient   - con't to monitor vitals  - con't outpatient follow up on discharge

## 2024-08-12 NOTE — ED PROVIDER NOTE - PHYSICAL EXAMINATION
VITAL SIGNS: I have reviewed nursing notes and confirm.  CONSTITUTIONAL: Well appearing, in no acute distress.   SKIN:  warm and dry, no acute rash.   HEAD:  normocephalic, atraumatic.  EYES: EOM intact; conjunctiva and sclera clear.  ENT: No nasal discharge; airway clear.  dry mucous membranes  NECK: Supple.  CARD: S1, S2, Regular rate and rhythm.  not diaphoretic but is borderline hypotensive appears dehydrated  RESP:  Clear to auscultation b/l, no wheezes, rales or rhonchi.  ABD: Normal bowel sounds; soft; non-distended; non-tender; no guarding/ rebound.  EXT: Bilateral lower extremity venous stasis lymphedema with erythema and excoriated rash appears to be at baseline  NEURO: Alert, oriented, grossly unremarkable  PSYCH: Cooperative, mood and affect appropriate. VITAL SIGNS: I have reviewed nursing notes and confirm.  CONSTITUTIONAL: Well appearing, in no acute distress.   SKIN:  warm and dry, no acute rash.   HEAD:  normocephalic, atraumatic.  EYES: EOM intact; conjunctiva and sclera clear.  ENT: No nasal discharge; airway clear.  dry mucous membranes  NECK: Supple.  CARD: S1, S2, Regular rate and rhythm.  not diaphoretic but is borderline hypotensive appears dehydrated  RESP:  Clear to auscultation b/l, no wheezes, rales or rhonchi.  ABD: Normal bowel sounds; soft; non-distended; non-tender; no guarding/ rebound.  EXT: Bilateral lower extremity venous stasis lymphedema with erythema and excoriated rash appears to be at baseline, 3+ strength lower ext bilat   NEURO: Alert, oriented, grossly unremarkable  PSYCH: Cooperative, mood and affect appropriate.

## 2024-08-12 NOTE — CONSULT NOTE ADULT - SUBJECTIVE AND OBJECTIVE BOX
79M w/ PMH of bladder CA (s/p tumor resection), GERD, chronic lymphedema, kyphoscoliosis s/p C-sacrum fusion (2020), chronic compression fractures of vertebral column, L knee OA, MDD, panic disorder, BPH, abdominal aortic aneurysm found on last admission, recently admitted for fall at home, now presenting to ED due weakness and inability to stand from chair for two days. Pt states he felt generalized weakness and difficulty getting up and out of his chair. Pt states he was able to urinate by using a bedside urinal. Pt endorses eating snacks and Chinese food that his friend brought over last night. Pt states he normally has help from [facility] but last time they were came was Friday. Pt called the hospital as he was unsure what to do and was advised to call 911 for assistance in getting out of chair. Pt states 3 days prior he fell on his elbow, which has been bleeding on and off. Pt also reports having hit his head on his dresser last week when trying to  a bandage, no bleeding or LOC. He also reports b/l lower leg pain with oozing of the skin. Pt also endorses cough since this morning. Pt states he is chronically constipated, does not know last BM. Pt states he as not been taking home diazepam for the past few weeks but endorses taking dilaudid 4mg q4 and tramadol for his back pain. Pt takes clonazepam 2mg BID for anxiety, last dose was last night. Pt denies fevers, chills, abdominal pain, dysuria, diarrhea, vomiting, hematochezia or melena, lightheadedness.     ED Course:  Vitals - 91/54, HR 79, RR 16 96% RA, 98F  Labs - WBC 6.44, Hgb 11 Hct 35, , RVP negative  Imaging - CXR: Left basilar focal atelectasis. Heart size within normal limits, thoracic aortic calcification. Stable bony structures.   Thoracolumbar hardware.  Interventions - 1L NS bolus x1, blood cx collected, UA w/ culture ordered    VASCULAR SURGERY ADDENDUM  Patient with multiple recent admissions for falls and weakness. During his last admission in June 2024 CT cervical spine was done which incidentally found 5cm transverse abdominal aortic aneurysm, the patient was provided vascular surgery outpatient follow up and was seen by Dr. Corbin on 7/9/2024, at that time Dr. Corbin recommended CTA A/P, and recommended outpatient follow up with cardiologist Dr. Centeno for pre-operative evaluation, he states that he has seen Dr. Centeno (7/31/24) but has not completed the pre-operative testing prescribed to him.       Vital Signs Last 24 Hrs  T(C): 36.6 (12 Aug 2024 15:19), Max: 37 (12 Aug 2024 05:24)  T(F): 97.9 (12 Aug 2024 15:19), Max: 98.6 (12 Aug 2024 05:24)  HR: 83 (12 Aug 2024 15:19) (79 - 84)  BP: 93/58 (12 Aug 2024 15:19) (91/54 - 128/57)  BP(mean): --  RR: 16 (12 Aug 2024 10:24) (16 - 18)  SpO2: 96% (12 Aug 2024 15:19) (95% - 96%)    Parameters below as of 12 Aug 2024 15:19  Patient On (Oxygen Delivery Method): room air        PHYSICAL EXAM  T(C): 36.6 (08-12-24 @ 15:19), Max: 37 (08-12-24 @ 05:24)  HR: 83 (08-12-24 @ 15:19) (79 - 84)  BP: 93/58 (08-12-24 @ 15:19) (91/54 - 128/57)  RR: 16 (08-12-24 @ 10:24) (16 - 18)  SpO2: 96% (08-12-24 @ 15:19) (95% - 96%)    CONSTITUTIONAL: no apparent distress  EYES: PERRLA and symmetric, EOMI, No conjunctival or scleral injection, non-icteric  RESP: No respiratory distress, no use of accessory muscles; CTA b/l, no WRR  CV: RRR, +S1S2, no MRG; no JVD; no peripheral edema  GI: Soft, NT, ND, no rebound, no guarding; no palpable masses; no hepatosplenomegaly; no hernia palpated  EXTREM: chronic venous changes in bilateral LE noted with erythema and swelling, 2cm circular ulceration of left calf noted with oozing  PULSES: left biphasic DP/PT, monophasic PT biphasic DP  LYMPH: No cervical LAD or tenderness; no axillary LAD or tenderness; no inguinal LAD or tenderness  SKIN: No rashes or ulcers noted; no subcutaneous nodules or induration palpable  NEURO: CN II-XII intact; normal reflexes in upper and lower extremities, sensation intact in upper and lower extremities b/l to light touch   PSYCH: Appropriate insight/judgment; A+O x 3, mood and affect appropriate, recent/remote memory intact    LABS:                        11.0   6.44  )-----------( 242      ( 12 Aug 2024 04:16 )             35.0     08-12    139  |  102  |  19  ----------------------------<  78  4.5   |  26  |  0.73    Ca    8.4      12 Aug 2024 04:16  Mg     2.3     08-12    TPro  7.3  /  Alb  3.1<L>  /  TBili  0.4  /  DBili  x   /  AST  22  /  ALT  9<L>  /  AlkPhos  75  08-12      Urinalysis Basic - ( 12 Aug 2024 04:16 )    Color: x / Appearance: x / SG: x / pH: x  Gluc: 78 mg/dL / Ketone: x  / Bili: x / Urobili: x   Blood: x / Protein: x / Nitrite: x   Leuk Esterase: x / RBC: x / WBC x   Sq Epi: x / Non Sq Epi: x / Bacteria: x        RADIOLOGY & ADDITIONAL STUDIES: 79M w/ PMH of bladder CA (s/p tumor resection), GERD, chronic lymphedema, kyphoscoliosis s/p C-sacrum fusion (2020), chronic compression fractures of vertebral column, L knee OA, MDD, panic disorder, BPH, abdominal aortic aneurysm found on last admission, recently admitted for fall at home, now presenting to ED due weakness and inability to stand from chair for two days. Pt states he felt generalized weakness and difficulty getting up and out of his chair. Pt states he was able to urinate by using a bedside urinal. Pt endorses eating snacks and Chinese food that his friend brought over last night. Pt states he normally has help from [facility] but last time they were came was Friday. Pt called the hospital as he was unsure what to do and was advised to call 911 for assistance in getting out of chair. Pt states 3 days prior he fell on his elbow, which has been bleeding on and off. Pt also reports having hit his head on his dresser last week when trying to  a bandage, no bleeding or LOC. He also reports b/l lower leg pain with oozing of the skin. Pt also endorses cough since this morning. Pt states he is chronically constipated, does not know last BM. Pt states he as not been taking home diazepam for the past few weeks but endorses taking dilaudid 4mg q4 and tramadol for his back pain. Pt takes clonazepam 2mg BID for anxiety, last dose was last night. Pt denies fevers, chills, abdominal pain, dysuria, diarrhea, vomiting, hematochezia or melena, lightheadedness.     ED Course:  Vitals - 91/54, HR 79, RR 16 96% RA, 98F  Labs - WBC 6.44, Hgb 11 Hct 35, , RVP negative  Imaging - CXR: Left basilar focal atelectasis. Heart size within normal limits, thoracic aortic calcification. Stable bony structures.   Thoracolumbar hardware.  Interventions - 1L NS bolus x1, blood cx collected, UA w/ culture ordered    VASCULAR SURGERY ADDENDUM  Patient with multiple recent admissions for falls and weakness. During his last admission in June 2024 CT cervical spine was done which incidentally found 5cm transverse abdominal aortic aneurysm, the patient was provided vascular surgery outpatient follow up and was seen by Dr. Corbin on 7/9/2024, patient was being followed outpatient for planned aneurysm repair of infrarenal AAA and recommended to obtain CTA of abdomen and pelvis to define anatomy (already completed at [facility]; no need for repeat imaging), and recommended outpatient follow up with cardiologist Dr. Centeno for pre-operative evaluation, he states that he has seen Dr. Centeno (7/31/24) but has not completed the pre-operative testing prescribed to him.       Vital Signs Last 24 Hrs  T(C): 36.6 (12 Aug 2024 15:19), Max: 37 (12 Aug 2024 05:24)  T(F): 97.9 (12 Aug 2024 15:19), Max: 98.6 (12 Aug 2024 05:24)  HR: 83 (12 Aug 2024 15:19) (79 - 84)  BP: 93/58 (12 Aug 2024 15:19) (91/54 - 128/57)  BP(mean): --  RR: 16 (12 Aug 2024 10:24) (16 - 18)  SpO2: 96% (12 Aug 2024 15:19) (95% - 96%)    Parameters below as of 12 Aug 2024 15:19  Patient On (Oxygen Delivery Method): room air        PHYSICAL EXAM  T(C): 36.6 (08-12-24 @ 15:19), Max: 37 (08-12-24 @ 05:24)  HR: 83 (08-12-24 @ 15:19) (79 - 84)  BP: 93/58 (08-12-24 @ 15:19) (91/54 - 128/57)  RR: 16 (08-12-24 @ 10:24) (16 - 18)  SpO2: 96% (08-12-24 @ 15:19) (95% - 96%)    CONSTITUTIONAL: no apparent distress  EYES: PERRLA and symmetric, EOMI, No conjunctival or scleral injection, non-icteric  RESP: No respiratory distress, no use of accessory muscles; CTA b/l, no WRR  CV: RRR, +S1S2, no MRG; no JVD; no peripheral edema  GI: Soft, NT, ND, no rebound, no guarding; no palpable masses; no hepatosplenomegaly; no hernia palpated  EXTREM: chronic venous changes in bilateral LE noted with erythema and swelling, 2cm circular ulceration of left calf noted with oozing  PULSES: left biphasic DP/PT, monophasic PT biphasic DP  LYMPH: No cervical LAD or tenderness; no axillary LAD or tenderness; no inguinal LAD or tenderness  SKIN: No rashes or ulcers noted; no subcutaneous nodules or induration palpable  NEURO: CN II-XII intact; normal reflexes in upper and lower extremities, sensation intact in upper and lower extremities b/l to light touch   PSYCH: Appropriate insight/judgment; A+O x 3, mood and affect appropriate, recent/remote memory intact    LABS:                        11.0   6.44  )-----------( 242      ( 12 Aug 2024 04:16 )             35.0     08-12    139  |  102  |  19  ----------------------------<  78  4.5   |  26  |  0.73    Ca    8.4      12 Aug 2024 04:16  Mg     2.3     08-12    TPro  7.3  /  Alb  3.1<L>  /  TBili  0.4  /  DBili  x   /  AST  22  /  ALT  9<L>  /  AlkPhos  75  08-12      Urinalysis Basic - ( 12 Aug 2024 04:16 )    Color: x / Appearance: x / SG: x / pH: x  Gluc: 78 mg/dL / Ketone: x  / Bili: x / Urobili: x   Blood: x / Protein: x / Nitrite: x   Leuk Esterase: x / RBC: x / WBC x   Sq Epi: x / Non Sq Epi: x / Bacteria: x   79M w/ PMH of bladder CA (s/p tumor resection), GERD, chronic lymphedema, kyphoscoliosis s/p C-sacrum fusion (2020), chronic compression fractures of vertebral column, L knee OA, MDD, panic disorder, BPH, abdominal aortic aneurysm found on last admission, recently admitted for fall at home, now presenting to ED due weakness and inability to stand from chair for two days. Pt states he felt generalized weakness and difficulty getting up and out of his chair. Pt states he was able to urinate by using a bedside urinal. Pt endorses eating snacks and Chinese food that his friend brought over last night. Pt states he normally has help from [facility] but last time they were came was Friday. Pt called the hospital as he was unsure what to do and was advised to call 911 for assistance in getting out of chair. Pt states 3 days prior he fell on his elbow, which has been bleeding on and off. Pt also reports having hit his head on his dresser last week when trying to  a bandage, no bleeding or LOC. He also reports b/l lower leg pain with oozing of the skin. Pt also endorses cough since this morning. Pt states he is chronically constipated, does not know last BM. Pt states he as not been taking home diazepam for the past few weeks but endorses taking dilaudid 4mg q4 and tramadol for his back pain. Pt takes clonazepam 2mg BID for anxiety, last dose was last night. Pt denies fevers, chills, abdominal pain, dysuria, diarrhea, vomiting, hematochezia or melena, lightheadedness.     ED Course:  Vitals - 91/54, HR 79, RR 16 96% RA, 98F  Labs - WBC 6.44, Hgb 11 Hct 35, , RVP negative  Imaging - CXR: Left basilar focal atelectasis. Heart size within normal limits, thoracic aortic calcification. Stable bony structures.   Thoracolumbar hardware.  Interventions - 1L NS bolus x1, blood cx collected, UA w/ culture ordered    VASCULAR SURGERY ADDENDUM  Patient with multiple recent admissions for falls and weakness. During his last admission in June 2024 CT cervical spine was done which incidentally found 5cm transverse abdominal aortic aneurysm, the patient was provided vascular surgery outpatient follow up and was seen by Dr. Corbin on 7/9/2024, patient was being followed outpatient for planned aneurysm repair of infrarenal AAA and recommended to obtain CTA of abdomen and pelvis to define anatomy (already completed at [facility]; no need for repeat imaging), and recommended outpatient follow up with cardiologist Dr. Centeno for pre-operative evaluation, he states that he has seen Dr. Centeno (7/31/24) but has not completed the pre-operative testing prescribed to him.       Vital Signs Last 24 Hrs  T(C): 36.6 (12 Aug 2024 15:19), Max: 37 (12 Aug 2024 05:24)  T(F): 97.9 (12 Aug 2024 15:19), Max: 98.6 (12 Aug 2024 05:24)  HR: 83 (12 Aug 2024 15:19) (79 - 84)  BP: 93/58 (12 Aug 2024 15:19) (91/54 - 128/57)  BP(mean): --  RR: 16 (12 Aug 2024 10:24) (16 - 18)  SpO2: 96% (12 Aug 2024 15:19) (95% - 96%)    Parameters below as of 12 Aug 2024 15:19  Patient On (Oxygen Delivery Method): room air        PHYSICAL EXAM  T(C): 36.6 (08-12-24 @ 15:19), Max: 37 (08-12-24 @ 05:24)  HR: 83 (08-12-24 @ 15:19) (79 - 84)  BP: 93/58 (08-12-24 @ 15:19) (91/54 - 128/57)  RR: 16 (08-12-24 @ 10:24) (16 - 18)  SpO2: 96% (08-12-24 @ 15:19) (95% - 96%)    CONSTITUTIONAL: no apparent distress  EYES: PERRLA and symmetric, EOMI, No conjunctival or scleral injection, non-icteric  RESP: No respiratory distress, no use of accessory muscles; CTA b/l, no WRR  CV: RRR, +S1S2, no MRG; no JVD; no peripheral edema  GI: Soft, NT, ND, no rebound, no guarding; no palpable masses; no hepatosplenomegaly; no hernia palpated  EXTREM: chronic venous changes in bilateral LE noted with erythema and swelling, 2cm circular ulceration of left calf noted with oozing  PULSES: left biphasic DP/PT, monophasic PT biphasic DP  LYMPH: No cervical LAD or tenderness; no axillary LAD or tenderness; no inguinal LAD or tenderness  NEURO: CN II-XII intact; normal reflexes in upper and lower extremities, sensation intact in upper and lower extremities b/l to light touch   PSYCH: Appropriate insight/judgment; A+O x 3, mood and affect appropriate, recent/remote memory intact    LABS:                        11.0   6.44  )-----------( 242      ( 12 Aug 2024 04:16 )             35.0     08-12    139  |  102  |  19  ----------------------------<  78  4.5   |  26  |  0.73    Ca    8.4      12 Aug 2024 04:16  Mg     2.3     08-12    TPro  7.3  /  Alb  3.1<L>  /  TBili  0.4  /  DBili  x   /  AST  22  /  ALT  9<L>  /  AlkPhos  75  08-12      Urinalysis Basic - ( 12 Aug 2024 04:16 )    Color: x / Appearance: x / SG: x / pH: x  Gluc: 78 mg/dL / Ketone: x  / Bili: x / Urobili: x   Blood: x / Protein: x / Nitrite: x   Leuk Esterase: x / RBC: x / WBC x   Sq Epi: x / Non Sq Epi: x / Bacteria: x

## 2024-08-12 NOTE — ED ADULT NURSE NOTE - TEMPLATE LIST FOR HEAD TO TOE ASSESSMENT
Pt called and said that wrong medication was sent to the pharmacy for bp  Stated she spoke with you about switching to valsartan as the losartan gives her headaches  Fluid/Electrolyte/Metabolic

## 2024-08-12 NOTE — ED ADULT NURSE NOTE - OBJECTIVE STATEMENT
Patient to the ED with complaint of weakness, lives alone, ambulates with walker at baseline, endorsed that he was  sitting all day yesterday, unable to stand up because of weakness, patient awake alert, oriented, speaking clearly able to provide information.

## 2024-08-12 NOTE — H&P ADULT - PROBLEM SELECTOR PLAN 3
Pt has hx of chronic lymphedema of B/L legs. Pt endorses mild tenderness/pain to touch on B/L LE. On physical exam, pt's legs are B/L erythematous, warm to touch, with dry scaling skin with intermittent patches of oozing indicative of venous insufficiency.    - encourage leg elevation Pt has hx of chronic lymphedema of B/L legs. Pt endorses mild tenderness/pain to touch on B/L LE. On physical exam, pt's legs are B/L erythematous, warm to touch, with dry scaling skin with intermittent patches of oozing indicative of venous insufficiency. Prior US duplex b/l was negative B/L    - wound care for b/l legs  - encourage leg elevation

## 2024-08-12 NOTE — H&P ADULT - PROBLEM SELECTOR PLAN 9
Pt on home omeprazole 20mg QD    - con't home omeprazole Pt has prescription for omeprazole 20mg QD, however per pt's pharmacy pt has not filled prescription since May 2024    - con't omeprazole

## 2024-08-12 NOTE — H&P ADULT - PROBLEM SELECTOR PLAN 4
Normocytic anemia, per previous labs, pt's baseline is Hgb 10-12, on presentation to ED pt's Hgb 11. Likely anemia of chronic disease in setting of chronic lymphedema and chronic back pain. No s/s acute bleed. Can consider iron studies however pt has normocytic anemia and less likely anemia 2/2 JODIE    - con't to trend CBC  - transfuse for Hgb <7

## 2024-08-12 NOTE — H&P ADULT - PROBLEM SELECTOR PLAN 7
Spoke to patient's son to remind them of upcoming appointment.  Patient made aware of time / date of appointment as well as location.   Coming early tomorrow for labs.   Pt on home tamsulosin 0.4mg PO QD    - con't home tamsulosin Pt has prescription for tamsulosin 0.4mg PO QD, however per pt's home pharmacy, pt has not picked up medication since September 2023    - hold tamsulosin

## 2024-08-12 NOTE — H&P ADULT - ATTENDING COMMENTS
Patient seen and examined by me. Case discussed with resident and agree with the resident's findings and plan as documented in the resident's note. 79M h/o bladder CA (s/p tumor resection), GERD, chronic lymphedema, kyphoscoliosis s/p C-sacrum fusion (2020), chronic compression fractures of vertebral column, L knee OA, MDD, panic disorder, BPH abdominal aortic aneurysm found on last admission, recently admitted for fall at home, now p/w deconditioning.    1. Deconditioning:  -PT evaluated and recommended [facility] however pt wanted to go home.   -CK elevated to 419, likely iso inability to move for two days,  -PT/OT consult  -Fall precautions  -f/u UA  -BCx sent in the ED – will f/u cultures    2. Left Elbow injury:  -some fluctuance on L elbow with small amount of dried blood and mildly tender to palpation w/ full ROM. Pt has been afebrile, does not endorse subjective fevers/chills  -check left elbow Xray   -trend CBC    3. Chronic LE lymphedema:  -encourage leg elevation  -wound care consult    4. Anemia:  -pt's baseline is Hgb 10-12, on presentation to ED pt's Hgb 11.   -f/u iron studies   -con't to trend CBC  -transfuse for Hgb <7.    5. Abdominal aortic aneurysm (AAA):  -Asymptomatic aortic aneurysm <5.5cm. Prior CT C-spine (6/29/24) found 5cm transverse abdominal aortic aneurysm, pt had outpt vascular surgery (Dr. Corbin) appointment on 7/12 w/ plans for CT AP before discussion of most appropriate intervention.   -on this admission, pt p/w BP 91/54, worsening lower back and increased LE weakness: will consult vascular surgery for their recs and whether to repeat CT abdomen on this admission     6. Chronic constipation:  -likely from chronic opiate use  -c/w bowel regimen: senna qhs, dulcolax BID and Dulcolax suppository daily prn for constipation   -will add miralax daily   -monitor BM    7. BPH (benign prostatic hyperplasia).   -pt has prescription for tamsulosin 0.4mg PO QD, however per pt's home pharmacy, pt has not picked up medication since September 2023  -hold tamsulosin.    8. Chronic back pain:   -pt with history of chronic vertebral compression fractures and kyphoscoliosis s/p C-sacrum fusion.   -on home Tramadol 100mg ER QD and Dilaudid 4mg q4 PRN.   -will c/w tramadol and order dilaudid 4mg bid prn pain  -will add lidocaine patch to lower back  -monitor for back pain symptoms.    9. GERD:  -pt has prescription for omeprazole 20mg QD, however per pt's pharmacy pt has not filled prescription since May 2024  -hold omeprazole for now    10. Major depressive disorder:  -c/w home duloxetine and clonazepam.  -melatonin for sleep    11. Dispo:  -PT consult  -Social work consult

## 2024-08-12 NOTE — H&P ADULT - ASSESSMENT
79M w/ PMH of bladder CA (s/p tumor resection), GERD, chronic lymphedema, kyphoscoliosis s/p C-sacrum fusion (2020), chronic compression fractures of vertebral column, L knee OA, MDD, panic disorder, BPH abdominal aortic aneurysm found on last admission, recently admitted for fall at home, now presenting to ED due weakness and inability to stand from chair for two days, admitted for further management.

## 2024-08-12 NOTE — H&P ADULT - PROBLEM SELECTOR PLAN 6
Pt does not remember last BM. On home senna and bisacodyl PO and suppository PRN    - con't home senna  - miralax q12   - monitor BM Pt does not remember last BM. On home senna and bisacodyl PO and suppository PRN    - con't home senna  - con't bisacodyl 5mg BID and suppository PRN  - monitor BM Pt does not remember last BM. On home senna and bisacodyl PO and suppository PRN    - con't home senna  - miralax QD  - con't bisacodyl 5mg BID PO  - con't bisacodyl suppository PRN  - monitor for BM

## 2024-08-12 NOTE — PATIENT PROFILE ADULT - HOME ACCESSIBILITY CONCERNS
RN cannot approve Refill Request    RN can NOT refill this medication med is not covered by policy/route to provider. Last office visit: 12/17/2018 Isabel Cotter CNP Last Physical: Visit date not found Last MTM visit: Visit date not found Last visit same specialty: 1/15/2020 Ralph Lee MD.  Next visit within 3 mo: Visit date not found  Next physical within 3 mo: Visit date not found      Kellie Nair, [facility] Triage/Med Refill 10/8/2020    Requested Prescriptions   Pending Prescriptions Disp Refills     montelukast (SINGULAIR) 5 MG chewable tablet [Pharmacy Med Name: MONTELUKAST SODIUM 5MG CHEW] 30 tablet 1     Sig: CHEW AND SWALLOW ONE TABLET BY MOUTH AT BEDTIME       There is no refill protocol information for this order           
stairs to enter home

## 2024-08-12 NOTE — H&P ADULT - HISTORY OF PRESENT ILLNESS
79M w/ PMH of bladder CA (s/p tumor resection), GERD, chronic lymphedema, kyphoscoliosis s/p C-sacrum fusion (2020), chronic compression fractures of vertebral column, L knee OA, MDD, panic disorder, BPH, abdominal aortic aneurysm found on last admission, recently admitted for fall at home, now presenting to ED due weakness and inability to stand from chair for two days. Pt states he felt generalized weakness and difficulty standing up from his chair, however pt was able to use the bathroom using a bedside commode and holding onto the rails. Pt states he normally has help from A but last time they were came was Friday. Pt called the hospital as he was unsure what to do and was advised to call 911 for assistance in getting out of chair. Pt states 3 days prior he fell on his elbow, which has been bleeding on and off. Pt also reports having hit his head on his dresser last week when trying to  a bandage. He also reports b/l lower leg pain with oozing of the skin. Pt also endorses cough since this morning. Pt states he is chronically constipated, does not know last BM. Pt states he as not been taking home diazepam for the past few weeks but endorses taking dilaudid 4mg at least once a day for his back pain. Pt denies fevers, chills, abdominal pain, dysuria, diarrhea, vomiting, hematochezia or melena, lightheadedness.       ED Course:  Vitals - 91/54, HR 79, RR 16 96% RA, 98F  Labs - WBC 6.44, Hgb 11 Hct 35, , RVP negative  Imaging - CXR: Left basilar focal atelectasis. Heart size within normal limits, thoracic aortic calcification. Stable bony structures.   Thoracolumbar hardware.  Intervention: 1L NS bolus x1   79M w/ PMH of bladder CA (s/p tumor resection), GERD, chronic lymphedema, kyphoscoliosis s/p C-sacrum fusion (2020), chronic compression fractures of vertebral column, L knee OA, MDD, panic disorder, BPH, abdominal aortic aneurysm found on last admission, recently admitted for fall at home, now presenting to ED due weakness and inability to stand from chair for two days. Pt states he felt generalized weakness and difficulty getting up and out of his chair. Pt states he was able to urinate by using a bedside urinal. Pt endorses eating snacks and Chinese food that his friend brought over last night. Pt states he normally has help from A but last time they were came was Friday. Pt called the hospital as he was unsure what to do and was advised to call 911 for assistance in getting out of chair. Pt states 3 days prior he fell on his elbow, which has been bleeding on and off. Pt also reports having hit his head on his dresser last week when trying to  a bandage. He also reports b/l lower leg pain with oozing of the skin. Pt also endorses cough since this morning. Pt states he is chronically constipated, does not know last BM. Pt states he as not been taking home diazepam for the past few weeks but endorses taking dilaudid 4mg at least once a day for his back pain. Pt denies fevers, chills, abdominal pain, dysuria, diarrhea, vomiting, hematochezia or melena, lightheadedness.       ED Course:  Vitals - 91/54, HR 79, RR 16 96% RA, 98F  Labs - WBC 6.44, Hgb 11 Hct 35, , RVP negative  Imaging - CXR: Left basilar focal atelectasis. Heart size within normal limits, thoracic aortic calcification. Stable bony structures.   Thoracolumbar hardware.  Interventions - 1L NS bolus x1   79M w/ PMH of bladder CA (s/p tumor resection), GERD, chronic lymphedema, kyphoscoliosis s/p C-sacrum fusion (2020), chronic compression fractures of vertebral column, L knee OA, MDD, panic disorder, BPH, abdominal aortic aneurysm found on last admission, recently admitted for fall at home, now presenting to ED due weakness and inability to stand from chair for two days. Pt states he felt generalized weakness and difficulty getting up and out of his chair. Pt states he was able to urinate by using a bedside urinal. Pt endorses eating snacks and Chinese food that his friend brought over last night. Pt states he normally has help from A but last time they were came was Friday. Pt called the hospital as he was unsure what to do and was advised to call 911 for assistance in getting out of chair. Pt states 3 days prior he fell on his elbow, which has been bleeding on and off. Pt also reports having hit his head on his dresser last week when trying to  a bandage, no bleeding or LOC. He also reports b/l lower leg pain with oozing of the skin. Pt also endorses cough since this morning. Pt states he is chronically constipated, does not know last BM. Pt states he as not been taking home diazepam for the past few weeks but endorses taking dilaudid 4mg at least once a day for his back pain. Pt denies fevers, chills, abdominal pain, dysuria, diarrhea, vomiting, hematochezia or melena, lightheadedness.       ED Course:  Vitals - 91/54, HR 79, RR 16 96% RA, 98F  Labs - WBC 6.44, Hgb 11 Hct 35, , RVP negative  Imaging - CXR: Left basilar focal atelectasis. Heart size within normal limits, thoracic aortic calcification. Stable bony structures.   Thoracolumbar hardware.  Interventions - 1L NS bolus x1   79M w/ PMH of bladder CA (s/p tumor resection), GERD, chronic lymphedema, kyphoscoliosis s/p C-sacrum fusion (2020), chronic compression fractures of vertebral column, L knee OA, MDD, panic disorder, BPH, abdominal aortic aneurysm found on last admission, recently admitted for fall at home, now presenting to ED due weakness and inability to stand from chair for two days. Pt states he felt generalized weakness and difficulty getting up and out of his chair. Pt states he was able to urinate by using a bedside urinal. Pt endorses eating snacks and Chinese food that his friend brought over last night. Pt states he normally has help from A but last time they were came was Friday. Pt called the hospital as he was unsure what to do and was advised to call 911 for assistance in getting out of chair. Pt states 3 days prior he fell on his elbow, which has been bleeding on and off. Pt also reports having hit his head on his dresser last week when trying to  a bandage, no bleeding or LOC. He also reports b/l lower leg pain with oozing of the skin. Pt also endorses cough since this morning. Pt states he is chronically constipated, does not know last BM. Pt states he as not been taking home diazepam for the past few weeks but endorses taking dilaudid 4mg at least once a day for his back pain. Pt denies fevers, chills, abdominal pain, dysuria, diarrhea, vomiting, hematochezia or melena, lightheadedness.       ED Course:  Vitals - 91/54, HR 79, RR 16 96% RA, 98F  Labs - WBC 6.44, Hgb 11 Hct 35, , RVP negative  Imaging - CXR: Left basilar focal atelectasis. Heart size within normal limits, thoracic aortic calcification. Stable bony structures.   Thoracolumbar hardware.  Interventions - 1L NS bolus x1, blood cx collected, UA w/ culture ordered   79M w/ PMH of bladder CA (s/p tumor resection), GERD, chronic lymphedema, kyphoscoliosis s/p C-sacrum fusion (2020), chronic compression fractures of vertebral column, L knee OA, MDD, panic disorder, BPH, abdominal aortic aneurysm found on last admission, recently admitted for fall at home, now presenting to ED due weakness and inability to stand from chair for two days. Pt states he felt generalized weakness and difficulty getting up and out of his chair. Pt states he was able to urinate by using a bedside urinal. Pt endorses eating snacks and Chinese food that his friend brought over last night. Pt states he normally has help from A but last time they were came was Friday. Pt called the hospital as he was unsure what to do and was advised to call 911 for assistance in getting out of chair. Pt states 3 days prior he fell on his elbow, which has been bleeding on and off. Pt also reports having hit his head on his dresser last week when trying to  a bandage, no bleeding or LOC. He also reports b/l lower leg pain with oozing of the skin. Pt also endorses cough since this morning. Pt states he is chronically constipated, does not know last BM. Pt states he as not been taking home diazepam for the past few weeks but endorses taking dilaudid 4mg q4 and tramadol for his back pain. Pt takes clonazepam 2mg BID for anxiety, last dose was last night. Pt denies fevers, chills, abdominal pain, dysuria, diarrhea, vomiting, hematochezia or melena, lightheadedness.       ED Course:  Vitals - 91/54, HR 79, RR 16 96% RA, 98F  Labs - WBC 6.44, Hgb 11 Hct 35, , RVP negative  Imaging - CXR: Left basilar focal atelectasis. Heart size within normal limits, thoracic aortic calcification. Stable bony structures.   Thoracolumbar hardware.  Interventions - 1L NS bolus x1, blood cx collected, UA w/ culture ordered

## 2024-08-12 NOTE — ED ADULT NURSE NOTE - NSFALLHARMRISKINTERV_ED_ALL_ED

## 2024-08-12 NOTE — H&P ADULT - PROBLEM SELECTOR PLAN 1
Pt presents with 2 days of inability to get up and out of chair at home; pt has HHA 2-3x a week. Pt was last admitted 7/1/24 for recurrent falls, Pt endorses being able to eat during this period. PT evaluated and recommended [facility] however pt wanted to go home. Vitals stable, CK elevated to 419, likely rhabdomyolysis iso inability to move for two days,    - PT/OT consult  - Fall precautions Pt presents with 2 days of inability to get up and out of chair at home; pt has HHA 2-3x a week. Pt was last admitted 7/1/24 for recurrent falls, Pt endorses being able to eat during this period. PT evaluated and recommended [facility] however pt wanted to go home. Vitals stable, CK elevated to 419, likely rhabdomyolysis iso inability to move for two days,    - PT/OT consult  - Fall precautions  - f/u blood cx and UA Pt presents with 2 days of inability to get up and out of chair at home; pt has HHA 2-3x a week. Pt was last admitted 7/1/24 for recurrent falls, Prior admission PT evaluated and recommended [facility] however pt chose to go home. Pt endorses being able to eat during this period. PT evaluated and recommended [facility] however pt wanted to go home. Vitals stable, CK elevated to 419, iso inability to move. In the ED blood cultures were sent, still pending.     - PT consult  - Fall precautions  - f/u UA

## 2024-08-12 NOTE — ED PROVIDER NOTE - OBJECTIVE STATEMENT
78 y/o M with a PMHx of Bladder cancer (s/p tumor resection, s/p localized chemo and now in remission), GERD, L knee OA, chronic lymphedema, kyphoscoliosis s/p C-sacrum fusion at [facility] (10/5/2020, w/ Dr. Carlisle), MDD, panic disorder, prior RUE DVT, BPH Here today with generalized weakness worsening over the past 2 days unable to get out of his chair at home.  Patient called the hospital  and asked what he should do was told to call 911 call 911 for assistance in getting out of his chair was brought to the ED.  Denies associated chest pain shortness of breath fever chills cough lightheadedness dizziness.  No hematochezia or melena.  States he was simply unable to stand up and has been feeling weak has been stuck in his chair for the past 2 days.  States that his home health aides have not been coming as they have been interviewing for a new home health aides.  Has no assistance at home currently.

## 2024-08-12 NOTE — H&P ADULT - PROBLEM SELECTOR PLAN 8
Pt has history of chronic vertebral compression fractures and kyphoscoliosis s/p C-sacrum fusion. Pt on home gabapentin 75mg TID and pt reports taking Dilaudid 4mg Q8 PRN, pt states recently back pain has been less severe and taking ~1 dilaudid a day.     - Con't gabapentin 75mg TID  - monitor for back pain symptoms Pt has history of chronic vertebral compression fractures and kyphoscoliosis s/p C-sacrum fusion. Pt on Tramadol 100mg ER QD and Dilaudid 4mg q4 PRN.     - Con't tramadol and dilaudid PRN  - monitor for back pain symptoms Pt has history of chronic vertebral compression fractures and kyphoscoliosis s/p C-sacrum fusion. Pt on Tramadol 100mg ER QD and Dilaudid 4mg q4 PRN.     - Con't tramadol 100mg ER QD  - Dilaudid 4mg q12 for severe PRN  - monitor for back pain symptoms

## 2024-08-12 NOTE — ED PROVIDER NOTE - PROGRESS NOTE DETAILS
Pt admitted to medicine for PT eval likely [facility] placement, pt cannot care for self has limited help at home, initial exam consistent w/ ftt and dehydration, fluid responsive vs stable, labs otherwise unremarkable, admitted and endorsed to [facility]

## 2024-08-12 NOTE — H&P ADULT - NSHPREVIEWOFSYSTEMS_GEN_ALL_CORE
REVIEW OF SYSTEMS:    CONSTITUTIONAL:  No weakness, fevers or chills  EYES/ENT: + "double vision" when he first wakes up but goes away as he fully wakes up;  No vertigo or throat pain   NECK:  No pain or stiffness  RESPIRATORY:  +cough. No wheezing, hemoptysis; No shortness of breath  CARDIOVASCULAR:  No chest pain or palpitations  GASTROINTESTINAL:  +constipation, No abdominal or epigastric pain. No nausea, vomiting, or hematemesis; No diarrhea. No melena or hematochezia.  GENITOURINARY:  No dysuria, frequency or hematuria  MUSCULOSKELETAL:  +weakness in all extremities, +b/l LE pain  NEUROLOGICAL:  No numbness or weakness  SKIN:  +rashes with oozing on b/l LE, +bleeding from elbow injury REVIEW OF SYSTEMS:    CONSTITUTIONAL:  +weakness, No fevers or chills  EYES/ENT: + "double vision" when he first wakes up but goes away as he fully wakes up;  No vertigo or throat pain   NECK:  No pain or stiffness  RESPIRATORY:  +cough. No wheezing, hemoptysis; No shortness of breath  CARDIOVASCULAR:  No chest pain or palpitations  GASTROINTESTINAL:  +constipation, No abdominal or epigastric pain. No nausea, vomiting, or hematemesis; No diarrhea. No melena or hematochezia.  GENITOURINARY:  No dysuria, frequency or hematuria  MUSCULOSKELETAL:  +weakness in all extremities, +b/l LE pain  NEUROLOGICAL:  No numbness or weakness  SKIN:  +rashes with oozing on b/l LE, +bleeding from elbow injury

## 2024-08-12 NOTE — CONSULT NOTE ADULT - ASSESSMENT
79M w/ PMH of bladder CA (s/p tumor resection), GERD, chronic lymphedema, kyphoscoliosis s/p C-sacrum fusion (2020), chronic compression fractures of vertebral column, L knee OA, MDD, panic disorder, BPH, abdominal aortic aneurysm found on last admission, recently admitted for fall at home, now presenting to ED due weakness and inability to stand from chair for two days. Pt states he felt generalized weakness and difficulty getting up and out of his chair. Pt states he was able to urinate by using a bedside urinal. Patient with multiple recent admissions for falls and weakness. During his last admission in June 2024 CT cervical spine was done which incidentally found 5cm transverse abdominal aortic aneurysm, the patient was provided vascular surgery outpatient follow up and was seen by Dr. Corbin on 7/9/2024.    Recommendations:  Pending discussion with attending. 79M w/ PMH of bladder CA (s/p tumor resection), GERD, chronic lymphedema, kyphoscoliosis s/p C-sacrum fusion (2020), chronic compression fractures of vertebral column, L knee OA, MDD, panic disorder, BPH, abdominal aortic aneurysm found on last admission, recently admitted for fall at home, now presenting to ED due weakness and inability to stand from chair for two days. Pt states he felt generalized weakness and difficulty getting up and out of his chair. Pt states he was able to urinate by using a bedside urinal. Patient with multiple recent admissions for falls and weakness. During his last admission in June 2024 CT cervical spine was done which incidentally found 5cm transverse abdominal aortic aneurysm, the patient was provided vascular surgery outpatient follow up and was seen by Dr. Corbin on 7/9/2024. The patient was being followed outpatient for planned aneurysm repair of infrarenal AAA and recommended to obtain CTA of abdomen and pelvis to define anatomy (already completed at [facility]; no need for repeat imaging). Patient also was seen in office and recommended to see a cardiologist for optimization prior to planned EVAR and referral given to Dr. Centeno. Please consult Dr. Centeno's team while inpatient to confirm patient is cardiac optimized for EVAR.    Recommendations:  - Consult Dr. Centeno's team for pre-operative risk stratification and cardiac optimization for EVAR  - No need for repeat CTA A/P  - Continue medical work up for generalized weakness per primary team  - Agree with wound care for bilateral LE  - Rest of care per primary  Plan discussed with chief resident. Vascular Surgery will continue to follow.

## 2024-08-12 NOTE — H&P ADULT - PROBLEM SELECTOR PLAN 2
Pt reports falling on elbow 3 days prior, found to have erythematous fluctuance on L elbow with small amount of dried blood and mildly tender to palpation w/ full ROM. Pt has been afebrile, does not endorse subjective fevers/chills, WBC 6.44, lower c/f septic bursitis or septic arthritis. More likely localized inflammatory reaction to mechanical injury    - con't to monitor for ROM and worsening erythema/fluctuance  - trend CBC Pt reports falling on elbow 3 days prior, found to have erythematous fluctuance on L elbow with small amount of dried blood and mildly tender to palpation w/ full ROM. Pt has been afebrile, does not endorse subjective fevers/chills, WBC 6.44, lower c/f septic bursitis or septic arthritis. More likely localized inflammatory reaction to mechanical injury    - f/u L elbow xray  - con't to monitor for ROM and worsening erythema/fluctuance  - trend CBC

## 2024-08-12 NOTE — H&P ADULT - NSHPPHYSICALEXAM_GEN_ALL_CORE
GENERAL: NAD, lying in bed under multiple blankets and intermittently shivering, pt was responsive however sleepy during exam  HEAD:  Atraumatic, normocephalic, dry mucous membranes   EYES: EOMI, PER but sluggishly reactive to light B/L  NECK: Supple, trachea midline, no JVD  HEART: Regular rate and rhythm  LUNGS: Unlabored respirations.  Clear to auscultation bilaterally, no crackles, wheezing, or rhonchi  ABDOMEN: Mildly tender to deep palpation of B/L lower quadrants, soft, nondistended, +BS  EXTREMITIES: B/L lower extremities erythematous, warm to touch, skin covered in scaling with patches of oozing serosanguinous fluid . L elbow has erythematous, fluctuance that is mildly tender to palpation with patch of dried blood  NERVOUS SYSTEM:  A&Ox3, 4/5 strength in B/L upper extremities, 4/5 strength in L LE, unable to assess strength in R LE as patient was laying on R side and did not want to change position for exam. GENERAL: NAD, lying in bed under multiple blankets and intermittently shivering, pt was responsive however sleepy during exam  HEAD:  Atraumatic, normocephalic, dry mucous membranes   EYES: EOMI, PER but sluggishly reactive to light B/L  NECK: Supple, trachea midline, no JVD  HEART: Regular rate and rhythm  LUNGS: Unlabored respirations.  Clear to auscultation bilaterally, no crackles, wheezing, or rhonchi  ABDOMEN: Mildly tender to deep palpation of B/L lower quadrants, soft, nondistended, +BS  EXTREMITIES: +2 peripheral pulses in B/L upper extremities, +1 peripheral pulses in B/L pedal pulses. B/L lower extremities erythematous, warm to touch, skin covered in scaling with patches of oozing serosanguinous fluid . L elbow has erythematous, fluctuance that is mildly tender to palpation with patch of dried blood  NERVOUS SYSTEM:  A&Ox3, b/l hand tremors with movement, 4/5 strength in B/L upper extremities, 4/5 strength in L LE, unable to assess strength in R LE as patient was laying on R side and did not want to change position for exam.

## 2024-08-12 NOTE — PHYSICAL THERAPY INITIAL EVALUATION ADULT - ADDITIONAL COMMENTS
Pt states he lives alone, lives in a 2nd fl walk up, has ~20 steps to enter apt. Pt owns a walker, but states he typically doesn't use it.

## 2024-08-12 NOTE — H&P ADULT - NSHPLABSRESULTS_GEN_ALL_CORE
11.0   6.44  )-----------( 242      ( 12 Aug 2024 04:16 )             35.0   08-12    139  |  102  |  19  ----------------------------<  78  4.5   |  26  |  0.73    Ca    8.4      12 Aug 2024 04:16  Mg     2.3     08-12    TPro  7.3  /  Alb  3.1<L>  /  TBili  0.4  /  DBili  x   /  AST  22  /  ALT  9<L>  /  AlkPhos  75  08-12       XR CHEST PORTABLE URGENT   Findings/impression: Left basilar focal atelectasis. Heart size within normal   limits, thoracic aortic calcification. Stable bony structures.   Thoracolumbar hardware.

## 2024-08-12 NOTE — ED ADULT TRIAGE NOTE - CHIEF COMPLAINT QUOTE
biba from home for weakness;  pt states he's "been sitting on chair for days, unable to get up and feels weak"; also  with swelling to both legs; denies chest/abdo  pain, shortness of breath, fever or chills

## 2024-08-12 NOTE — ED PROVIDER NOTE - CLINICAL SUMMARY MEDICAL DECISION MAKING FREE TEXT BOX
79-year-old male here with inability to stand out of chair appears dehydrated on exam we will screen for rhabdomyolysis atypical ACS underlying sepsis although patient is not febrile he is hypotensive is  a limited historian   CBC CMP lactic troponin magnesium blood culture x 1 UA with reflex IV fluids x-ray chest EKG RVP reassess 79-year-old male here with inability to stand out of chair appears dehydrated on exam we will screen for rhabdomyolysis atypical ACS underlying sepsis although patient is not febrile he is hypotensive is  a limited historian  EKG normal sinus rhythm 77 Motion artifact normal axis borderline QT prolongation likely secondary to motion artifact normal QTc no acute ischemia no STEMI   CBC CMP lactic troponin magnesium blood culture x 1 UA with reflex IV fluids x-ray chest EKG RVP reassess

## 2024-08-13 DIAGNOSIS — R78.81 BACTEREMIA: ICD-10-CM

## 2024-08-13 LAB
ADD ON TEST-SPECIMEN IN LAB: SIGNIFICANT CHANGE UP
ANION GAP SERPL CALC-SCNC: 7 MMOL/L — SIGNIFICANT CHANGE UP (ref 5–17)
BUN SERPL-MCNC: 13 MG/DL — SIGNIFICANT CHANGE UP (ref 7–23)
CALCIUM SERPL-MCNC: 8.1 MG/DL — LOW (ref 8.4–10.5)
CHLORIDE SERPL-SCNC: 104 MMOL/L — SIGNIFICANT CHANGE UP (ref 96–108)
CK SERPL-CCNC: 324 U/L — HIGH (ref 30–200)
CO2 SERPL-SCNC: 26 MMOL/L — SIGNIFICANT CHANGE UP (ref 22–31)
CREAT SERPL-MCNC: 0.62 MG/DL — SIGNIFICANT CHANGE UP (ref 0.5–1.3)
EGFR: 97 ML/MIN/1.73M2 — SIGNIFICANT CHANGE UP
GLUCOSE SERPL-MCNC: 95 MG/DL — SIGNIFICANT CHANGE UP (ref 70–99)
GRAM STN FLD: ABNORMAL
HCT VFR BLD CALC: 32.8 % — LOW (ref 39–50)
HGB BLD-MCNC: 10.5 G/DL — LOW (ref 13–17)
MAGNESIUM SERPL-MCNC: 2.2 MG/DL — SIGNIFICANT CHANGE UP (ref 1.6–2.6)
MCHC RBC-ENTMCNC: 28.4 PG — SIGNIFICANT CHANGE UP (ref 27–34)
MCHC RBC-ENTMCNC: 32 GM/DL — SIGNIFICANT CHANGE UP (ref 32–36)
MCV RBC AUTO: 88.6 FL — SIGNIFICANT CHANGE UP (ref 80–100)
METHOD TYPE: SIGNIFICANT CHANGE UP
MSSA DNA SPEC QL NAA+PROBE: SIGNIFICANT CHANGE UP
NRBC # BLD: 0 /100 WBCS — SIGNIFICANT CHANGE UP (ref 0–0)
PHOSPHATE SERPL-MCNC: 3.3 MG/DL — SIGNIFICANT CHANGE UP (ref 2.5–4.5)
PLATELET # BLD AUTO: 247 K/UL — SIGNIFICANT CHANGE UP (ref 150–400)
POTASSIUM SERPL-MCNC: 4 MMOL/L — SIGNIFICANT CHANGE UP (ref 3.5–5.3)
POTASSIUM SERPL-SCNC: 4 MMOL/L — SIGNIFICANT CHANGE UP (ref 3.5–5.3)
RBC # BLD: 3.7 M/UL — LOW (ref 4.2–5.8)
RBC # FLD: 14.1 % — SIGNIFICANT CHANGE UP (ref 10.3–14.5)
SODIUM SERPL-SCNC: 137 MMOL/L — SIGNIFICANT CHANGE UP (ref 135–145)
SPECIMEN SOURCE: SIGNIFICANT CHANGE UP
WBC # BLD: 5.4 K/UL — SIGNIFICANT CHANGE UP (ref 3.8–10.5)
WBC # FLD AUTO: 5.4 K/UL — SIGNIFICANT CHANGE UP (ref 3.8–10.5)

## 2024-08-13 PROCEDURE — 99222 1ST HOSP IP/OBS MODERATE 55: CPT

## 2024-08-13 PROCEDURE — 99233 SBSQ HOSP IP/OBS HIGH 50: CPT | Mod: GC

## 2024-08-13 RX ORDER — CEFAZOLIN SODIUM 2 G/100ML
INJECTION, SOLUTION INTRAVENOUS
Refills: 0 | Status: DISCONTINUED | OUTPATIENT
Start: 2024-08-13 | End: 2024-08-20

## 2024-08-13 RX ORDER — CEFAZOLIN SODIUM 2 G/100ML
2000 INJECTION, SOLUTION INTRAVENOUS ONCE
Refills: 0 | Status: COMPLETED | OUTPATIENT
Start: 2024-08-13 | End: 2024-08-13

## 2024-08-13 RX ORDER — CLOTRIMAZOLE 1 %
1 CREAM (GRAM) TOPICAL
Refills: 0 | Status: DISCONTINUED | OUTPATIENT
Start: 2024-08-13 | End: 2024-08-16

## 2024-08-13 RX ORDER — CEFAZOLIN SODIUM 2 G/100ML
2000 INJECTION, SOLUTION INTRAVENOUS EVERY 8 HOURS
Refills: 0 | Status: DISCONTINUED | OUTPATIENT
Start: 2024-08-13 | End: 2024-08-20

## 2024-08-13 RX ADMIN — Medication 1 PATCH: at 06:27

## 2024-08-13 RX ADMIN — HYDROMORPHONE HYDROCHLORIDE 4 MILLIGRAM(S): 2 TABLET ORAL at 02:51

## 2024-08-13 RX ADMIN — ENOXAPARIN SODIUM 40 MILLIGRAM(S): 100 INJECTION SUBCUTANEOUS at 18:20

## 2024-08-13 RX ADMIN — Medication 5 MILLIGRAM(S): at 10:36

## 2024-08-13 RX ADMIN — CEFAZOLIN SODIUM 100 MILLIGRAM(S): 2 INJECTION, SOLUTION INTRAVENOUS at 16:47

## 2024-08-13 RX ADMIN — CEFAZOLIN SODIUM 100 MILLIGRAM(S): 2 INJECTION, SOLUTION INTRAVENOUS at 21:48

## 2024-08-13 RX ADMIN — Medication 1 APPLICATION(S): at 18:20

## 2024-08-13 RX ADMIN — HYDROMORPHONE HYDROCHLORIDE 4 MILLIGRAM(S): 2 TABLET ORAL at 03:51

## 2024-08-13 RX ADMIN — TRAMADOL HYDROCHLORIDE 50 MILLIGRAM(S): 200 TABLET, EXTENDED RELEASE ORAL at 06:21

## 2024-08-13 RX ADMIN — Medication 60 MILLIGRAM(S): at 06:21

## 2024-08-13 RX ADMIN — POLYETHYLENE GLYCOL 3350 17 GRAM(S): 17 POWDER, FOR SOLUTION ORAL at 16:47

## 2024-08-13 RX ADMIN — Medication 60 MILLIGRAM(S): at 18:20

## 2024-08-13 RX ADMIN — TRAMADOL HYDROCHLORIDE 50 MILLIGRAM(S): 200 TABLET, EXTENDED RELEASE ORAL at 07:21

## 2024-08-13 RX ADMIN — HYDROMORPHONE HYDROCHLORIDE 4 MILLIGRAM(S): 2 TABLET ORAL at 22:53

## 2024-08-13 RX ADMIN — TRAMADOL HYDROCHLORIDE 50 MILLIGRAM(S): 200 TABLET, EXTENDED RELEASE ORAL at 19:20

## 2024-08-13 RX ADMIN — HYDROMORPHONE HYDROCHLORIDE 4 MILLIGRAM(S): 2 TABLET ORAL at 21:53

## 2024-08-13 RX ADMIN — TRAMADOL HYDROCHLORIDE 50 MILLIGRAM(S): 200 TABLET, EXTENDED RELEASE ORAL at 18:20

## 2024-08-13 NOTE — CONSULT NOTE ADULT - ASSESSMENT
# MSSA bacteremia   - Likely source lower extremity excoriations and possible LLE mild SSTI  - No e/o metastatic infection on exam    Recommendations:  - Repeat BCx x2, then start ancef 2g IV q8h  - TTE. Will likely also require RAJANI.  - Treatment duration will be 4 weeks IV abx minimum given presence of hardware    ID Team 2 will follow

## 2024-08-13 NOTE — CONSULT NOTE ADULT - SUBJECTIVE AND OBJECTIVE BOX
INFECTIOUS DISEASES INITIAL CONSULT NOTE    HPI:  79M w/ hx L femoral neck fracture s/p L hip hemiarthroplasty (10/2023), hx THR, kyphoscoliosis s/p thoracolumbar fusion (reportedly 2020), bladder CA s/p resection, mood disorder, AAA of 5cm (undergoing workup for EVAR), admitted on 8/12 after presented with recent falls and generalized weakness. Also has had a 2 week history of intermittent shaking chills and decreased appetite. No significant change in baseline back pain, and no new joint pains or episodes of skin infections but he notes that he is always scratching his lower extremities as they are itchy. Afebrile since admission, WBC normal, UTox +opiates and bzd, BCx x1 set from admission with MSSA. ID consulted to assist with management.        PAST MEDICAL & SURGICAL HISTORY:  High cholesterol      Depression      GERD (gastroesophageal reflux disease)      Bladder cancer      History of back surgery            Review of Systems:   Constitutional, eyes, ENT, cardiovascular, respiratory, gastrointestinal, genitourinary, integumentary, neurological, psychiatric and heme/lymph are otherwise negative other than noted above       ANTIBIOTICS:  MEDICATIONS  (STANDING):  bisacodyl 5 milliGRAM(s) Oral every 12 hours  ceFAZolin   IVPB      ceFAZolin   IVPB 2000 milliGRAM(s) IV Intermittent once  ceFAZolin   IVPB 2000 milliGRAM(s) IV Intermittent every 8 hours  clotrimazole 1% Cream 1 Application(s) Topical two times a day  DULoxetine 60 milliGRAM(s) Oral every 12 hours  enoxaparin Injectable 40 milliGRAM(s) SubCutaneous every 24 hours  lidocaine   4% Patch 1 Patch Transdermal every 24 hours  polyethylene glycol 3350 17 Gram(s) Oral every 24 hours  senna 2 Tablet(s) Oral at bedtime  traMADol 50 milliGRAM(s) Oral every 12 hours    MEDICATIONS  (PRN):  acetaminophen     Tablet .. 650 milliGRAM(s) Oral every 6 hours PRN Temp greater or equal to 38C (100.4F), Mild Pain (1 - 3)  bisacodyl Suppository 10 milliGRAM(s) Rectal daily PRN Constipation  clonazePAM  Tablet 2 milliGRAM(s) Oral every 12 hours PRN Anxiety  HYDROmorphone   Tablet 4 milliGRAM(s) Oral every 12 hours PRN Severe Pain (7 - 10)      Allergies    sulfa drugs (Unknown)    Intolerances        SOCIAL HISTORY:  Lives in [facility], alone  No EtOH/tobacco/drug use  No pets    FAMILY HISTORY:  No pertinent family history in first degree relatives     no FH leading to current infection    Vital Signs Last 24 Hrs  T(C): 36.8 (13 Aug 2024 12:30), Max: 36.8 (13 Aug 2024 06:07)  T(F): 98.3 (13 Aug 2024 12:30), Max: 98.3 (13 Aug 2024 12:30)  HR: 63 (13 Aug 2024 12:30) (63 - 77)  BP: 99/63 (13 Aug 2024 12:30) (96/60 - 127/73)  BP(mean): --  RR: 17 (13 Aug 2024 12:30) (17 - 17)  SpO2: 91% (13 Aug 2024 12:30) (91% - 96%)    Parameters below as of 13 Aug 2024 12:30  Patient On (Oxygen Delivery Method): room air          PHYSICAL EXAM:  Constitutional: alert, NAD  Eyes: the sclera and conjunctiva were normal.   ENT: the ears and nose were normal in appearance.   Neck: the appearance of the neck was normal and the neck was supple.   Pulmonary: no respiratory distress and lungs were clear to auscultation bilaterally.   Heart: heart rate was normal and rhythm regular, no murmur  Abdomen: soft, non-tender  Neurological: no focal deficits.   Psychiatric: the affect was normal  MSK: Lower extremity excoriations and L upper leg erythema without fluctuance, not involving groin and overall mild in appearance. L elbow wound with bandage overlying with bloody drainage. No elbow tenderness. No other peripheral joint swelling/tenderness or ROM limitation. No point spinal tenderness.  Skin: no stigmata of IE      LABS:                        10.5   5.40  )-----------( 247      ( 13 Aug 2024 05:30 )             32.8     08-13    137  |  104  |  13  ----------------------------<  95  4.0   |  26  |  0.62    Ca    8.1<L>      13 Aug 2024 05:30  Phos  3.3     08-13  Mg     2.2     08-13    TPro  7.3  /  Alb  3.1<L>  /  TBili  0.4  /  DBili  x   /  AST  22  /  ALT  9<L>  /  AlkPhos  75  08-12      Urinalysis Basic - ( 13 Aug 2024 05:30 )    Color: x / Appearance: x / SG: x / pH: x  Gluc: 95 mg/dL / Ketone: x  / Bili: x / Urobili: x   Blood: x / Protein: x / Nitrite: x   Leuk Esterase: x / RBC: x / WBC x   Sq Epi: x / Non Sq Epi: x / Bacteria: x        MICROBIOLOGY:  Reviewed    RADIOLOGY & ADDITIONAL STUDIES:  Reviewed

## 2024-08-13 NOTE — PROGRESS NOTE ADULT - PROBLEM SELECTOR PLAN 3
CT Cervical Spine No Cont (06.29.24 @ 14:25): 5 cm transverse abdominal aortic aneurysm.   Plan:   - Outpatient Vascular surgery follow-up on discharge CT Cervical Spine No Cont (06.29.24 @ 14:25): 5 cm transverse abdominal aortic aneurysm.   Vascular surgery consulted, no interventions at this time    Plan:   - OP Vascular surgery follow-up on discharge Pt reports falling on elbow 3 days prior, found to have erythematous fluctuance on L elbow with small amount of dried blood and mildly tender to palpation w/ full ROM. Pt has been afebrile, does not endorse subjective fevers/chills, WBC 6.44, lower c/f septic bursitis or septic arthritis. More likely localized inflammatory reaction to mechanical injury  L Elbow xray normal without signs of fx    Plan:  - con't to monitor for ROM and worsening erythema/fluctuance  - trend CBC.

## 2024-08-13 NOTE — PROGRESS NOTE ADULT - PROBLEM SELECTOR PLAN 10
Pt on home duloxetine 60mg PO BID. Pt has clonazepam 2mg BID PRN for anxiety and ambien 5mg PRN for insomnia. Pt was lethargic during interview and examination, pt endorses taking ambien last night. PT endorses taking clonazepam BID, last dose was last night. On exam pt had b/l hand tremors with movement, however pt denies anxiety, no diaphoresis, no tachycardia, no nausea/vomiting, headache, afebrile.     Plan:  - con't home duloxetine  - con't home clonazepam 2mg BID PRN  - hold ambien, can offer melatonin for insomnia Pt has prescription for omeprazole 20mg QD, however per pt's pharmacy pt has not filled prescription since May 2024    Plan:  - con't omeprazole

## 2024-08-13 NOTE — PROGRESS NOTE ADULT - PROBLEM SELECTOR PLAN 8
Pt has history of chronic vertebral compression fractures and kyphoscoliosis s/p C-sacrum fusion. Pt on Tramadol 100mg ER QD and Dilaudid 4mg q4 PRN.     Plan:  - Con't tramadol 100mg ER QD  - Dilaudid 4mg q12 for severe PRN  - monitor for back pain symptoms Pt has prescription for tamsulosin 0.4mg PO QD, however per pt's home pharmacy, pt has not picked up medication since September 2023    Plan:   - hold tamsulosin

## 2024-08-13 NOTE — PROGRESS NOTE ADULT - PROBLEM SELECTOR PLAN 5
F: Tolerating oral  E: Replete PRN   GI: None  Diet: Regular  DVT: Lovenox  Dispo: FORTINO Normocytic anemia, per previous labs, pt's baseline is Hgb 10-12, on presentation to ED pt's Hgb 11. Likely anemia of chronic disease in setting of chronic lymphedema and chronic back pain. No s/s acute bleed. Can consider iron studies however pt has normocytic anemia and less likely anemia 2/2 JODIE    Plan:  - con't to trend CBC  - transfuse for Hgb <7 Pt has hx of chronic lymphedema of B/L legs. Pt endorses mild tenderness/pain to touch on B/L LE. On physical exam, pt's legs are B/L erythematous, warm to touch, with dry scaling skin with intermittent patches of oozing indicative of venous insufficiency. Prior US duplex b/l was negative B/L    Plan:  - wound care for b/l legs  - encourage leg elevation  - will start clotrimazole 1% topical cream for fungal rash in bilateral thighs  - Wound care to start emollient   - Wound care consulted, recs appreciated

## 2024-08-13 NOTE — PROGRESS NOTE ADULT - PROBLEM SELECTOR PLAN 7
Pt has prescription for tamsulosin 0.4mg PO QD, however per pt's home pharmacy, pt has not picked up medication since September 2023    Plan:   - hold tamsulosin Pt does not remember last BM. On home senna and bisacodyl PO and suppository PRN    Plan:   - con't home senna  - miralax QD  - con't bisacodyl 5mg BID PO  - con't bisacodyl suppository PRN  - monitor for BM

## 2024-08-13 NOTE — PROGRESS NOTE ADULT - PROBLEM SELECTOR PLAN 6
Pt does not remember last BM. On home senna and bisacodyl PO and suppository PRN    Plan:   - con't home senna  - miralax QD  - con't bisacodyl 5mg BID PO  - con't bisacodyl suppository PRN  - monitor for BM Normocytic anemia, per previous labs, pt's baseline is Hgb 10-12, on presentation to ED pt's Hgb 11. Likely anemia of chronic disease in setting of chronic lymphedema and chronic back pain. No s/s acute bleed. Can consider iron studies however pt has normocytic anemia and less likely anemia 2/2 JODIE    Plan:  - con't to trend CBC  - transfuse for Hgb <7

## 2024-08-13 NOTE — PROGRESS NOTE ADULT - PROBLEM SELECTOR PLAN 2
Pt with frequent falls. EKG NSR, non-ischemic. Pt reports missing his step while ambulating with his walker. Further limited mobility from chronic pain i/s/o rib fractures/spine fracture. opiate/benzo use likely contributing to falls   Plan:   - Fall precautions  - PT consult as above Pt reports falling on elbow 3 days prior, found to have erythematous fluctuance on L elbow with small amount of dried blood and mildly tender to palpation w/ full ROM. Pt has been afebrile, does not endorse subjective fevers/chills, WBC 6.44, lower c/f septic bursitis or septic arthritis. More likely localized inflammatory reaction to mechanical injury  L Elbow xray normal without signs of fx    Plan:  - con't to monitor for ROM and worsening erythema/fluctuance  - trend CBC. BCx growing gram positive cocci in clusters in 1 set, PCR positive for MSSA    Plan:  - start Ancef 2g q8  - Will repeat BCx every AM until negative for 48 hours  - RAJANI ordered to assess for infective endocarditis

## 2024-08-13 NOTE — PROGRESS NOTE ADULT - ASSESSMENT
79M w/ PMH of bladder CA (s/p tumor resection), GERD, chronic lymphedema, kyphoscoliosis s/p C-sacrum fusion (2020), chronic compression fractures of vertebral column, L knee OA, MDD, panic disorder, BPH, abdominal aortic aneurysm found on last admission, recently admitted for fall at home, now presenting to ED due weakness and inability to stand from chair for two days. Patient states he felt generalized weakness and difficulty getting up and out of his chair. Patient with multiple recent admissions for falls and weakness. During his last admission in June 2024 CT cervical spine was done which incidentally found 5cm transverse abdominal aortic aneurysm, the patient was provided vascular surgery outpatient follow up and was seen by Dr. Corbin on 7/9/2024.; the patient was being followed outpatient for planned aneurysm repair of infrarenal AAA and recommended to obtain CTA of abdomen and pelvis to define anatomy (already completed at [facility]; no need for repeat imaging). Patient also was seen in office and recommended to see a cardiologist for optimization prior to planned EVAR and referral given to Dr. Centeno. Please consult Dr. Centeno's team while inpatient to confirm patient is cardiac optimized for EVAR.    Recommendations:  - Consult Dr. Centeno's team for pre-operative risk stratification and cardiac optimization for EVAR  - No need for repeat CTA A/P  - Continue medical work up for generalized weakness per primary team  - Agree with wound care for bilateral LE  - Rest of care per primary  Plan discussed with chief resident. Vascular Surgery will continue to follow.

## 2024-08-13 NOTE — PROGRESS NOTE ADULT - CONVERSATION DETAILS
The patient understands his diagnosis. He understands his condition might be irreversible. He understood his condition might pose a threat indirectly to life. He is not interested to assign a HCP for himself at the moment , even though he says his closest would be his brother, Osvaldo Blair [phone]. The patient wishes to be DNR/DNI in case he loses the power to make decisions for himself.

## 2024-08-13 NOTE — ADVANCED PRACTICE NURSE CONSULT - REASON FOR CONSULT
BLE    Per chart review, Mr. Egan is a 78 y/o M with a PMHx of Bladder cancer (s/p tumor resection, s/p localized chemo and now in remission), GERD, L knee OA, chronic lymphedema, kyphoscoliosis s/p C-sacrum fusion at [facility] (10/5/2020, w/ Dr. Carlisle), MDD, panic disorder, prior RUE DVT, BPH who was admitted for recurrent falls. Patient is here requiring PT evaluation.

## 2024-08-13 NOTE — ADVANCED PRACTICE NURSE CONSULT - RECOMMEDATIONS
BLE: apply liberal amount of Atrac-Tain lotion twice daily (left at bedside)  recommend antifungal cream to thigh fungal rash    Education with patient, ideally lotion should be applied at least BID. Needs leg elevation at home.     Discussed with medical team.    Please reconsult if wound deteriorates or new concerns arise.    Total time spent: 60 minutes

## 2024-08-13 NOTE — PROGRESS NOTE ADULT - PROBLEM SELECTOR PLAN 1
Pt with chronic spine fractures as noted on CT. In comparison to 11/20/2020, there has been interval progression of the wedge compression fracture at T3. Further, pt found to have loosening of the left T2 pedicle screw. Old right third and 10th rib fractures. Unchanged burst type compression deformity at T12. Unchanged wedge compression fracture at T8. No saddle anesthesia, incontinence, concern for cord compression. Ortho-Spine consulted, no acute interventions    Plan:   - Pain control, pt reports being on daily PO dilaudid 4mg Q8h confirmed on istop  - Urine tox screen positive for benzodiazepines and opiates, both of which he is prescribed for pain management outpatient. Likely contributing to falls   - hold BZD for now given slightly lethargic today. restart as tolerated at lower dose   - outpt taper   - PT consult Pt presents with 2 days of inability to get up and out of chair at home; pt has HHA 2-3x a week. Pt was last admitted 7/1/24 for recurrent falls, Prior admission PT evaluated and recommended MCKENNA however pt chose to go home. Pt endorses being able to eat during this period. PT evaluated and recommended MCKENNA however pt wanted to go home. Vitals stable, CK elevated to 419, iso inability to move. In the ED blood cultures were sent, still pending  On exam tremor of b/l UE present    Plan:  - PT consult  - Neurology consult for evaluation of Parkinson's vs other neurological disorders  - Orthostatics today  - Fall precautions  - f/u UA Pt presents with 2 days of inability to get up and out of chair at home; pt has HHA 2-3x a week. Pt was last admitted 7/1/24 for recurrent falls, Prior admission PT evaluated and recommended MCKENNA however pt chose to go home. Pt endorses being able to eat during this period. PT evaluated and recommended MCKENNA however pt wanted to go home. Vitals stable, CK elevated to 419, iso inability to move. In the ED blood cultures were sent, still pending  On exam tremor of b/l UE present    Plan:  - PT consult  - Neurology consult for evaluation of Parkinson's vs other neurological disordersfollow up  - Orthostatics today  - Fall precautions  - f/u UA Pt presents with 2 days of inability to get up and out of chair at home; pt has HHA 2-3x a week. Pt was last admitted 7/1/24 for recurrent falls, Prior admission PT evaluated and recommended MCKENNA however pt chose to go home. Pt endorses being able to eat during this period. PT evaluated and recommended MCKENNA however pt wanted to go home. Vitals stable, CK elevated to 419, iso inability to move.   BCx growing gram positive cocci in clusters in 1 set  On exam tremor of b/l UE present    Plan:  - PT consult  - Neurology consult for evaluation of Parkinson's vs other neurological disordersfollow up  - Orthostatics today  - Fall precautions  - f/u UA  - Will repeat BCx Pt presents with 2 days of inability to get up and out of chair at home; pt has HHA 2-3x a week. Pt was last admitted 7/1/24 for recurrent falls, Prior admission PT evaluated and recommended MCKENNA however pt chose to go home. Pt endorses being able to eat during this period. PT evaluated and recommended MCKENNA however pt wanted to go home. Vitals stable, CK elevated to 419, iso inability to move.   BCx growing gram positive cocci in clusters in 1 set  On exam tremor of b/l UE present    Plan:  - PT consult  - Neurology consult for evaluation of Parkinson's vs other neurological disorders -- follow up as OP  - Orthostatics today  - Fall precautions  - f/u UA  - Will repeat BCx Pt presents with 2 days of inability to get up and out of chair at home; pt has HHA 2-3x a week. Pt was last admitted 7/1/24 for recurrent falls. During his prior admission PT evaluated and recommended MCKENNA however pt chose to go home. Pt endorses being able to eat during this period.   PT evaluated and recommended MCKENNA however pt wanted to go home. Vitals stable, CK elevated to 419, iso inability to move.   On exam tremor of b/l UE present    Plan:  - PT consult  - Neurology consult for evaluation of Parkinson's vs other neurological disorders -- follow up as OP  - Orthostatics today  - Fall precautions  - f/u UA

## 2024-08-13 NOTE — CONSULT NOTE ADULT - ATTENDING COMMENTS
multifactorial risk factors for falls, including medications, orthopedic spine disease, and exam with possible parkinsonism but not diagnostic for parkinsons.  also possible cognitive dysfunction    might consider a trial of sinemet 25/100 TID once medically stabilized.  given current hypotension from bacteremia, would wait until BP has normalized to start sinemet as it can worsen orthostasis.  he is not sure he wants to try the medication, we will continue to follow

## 2024-08-13 NOTE — PROGRESS NOTE ADULT - PROBLEM SELECTOR PLAN 11
F: Tolerating oral  E: Replete PRN   GI: None  Diet: Regular  DVT: Lovenox  Dispo: FORTINO Pt on home duloxetine 60mg PO BID. Pt has clonazepam 2mg BID PRN for anxiety and ambien 5mg PRN for insomnia. Pt was lethargic during interview and examination, pt endorses taking ambien last night. PT endorses taking clonazepam BID, last dose was last night. On exam pt had b/l hand tremors with movement, however pt denies anxiety, no diaphoresis, no tachycardia, no nausea/vomiting, headache, afebrile.     Plan:  - con't home duloxetine  - con't home clonazepam 2mg BID PRN  - hold ambien, can offer melatonin for insomnia

## 2024-08-13 NOTE — PROGRESS NOTE ADULT - PROBLEM SELECTOR PLAN 9
Pt has prescription for omeprazole 20mg QD, however per pt's pharmacy pt has not filled prescription since May 2024    Plan:  - con't omeprazole Pt has history of chronic vertebral compression fractures and kyphoscoliosis s/p C-sacrum fusion. Pt on Tramadol 100mg ER QD and Dilaudid 4mg q4 PRN.     Plan:  - Con't tramadol 100mg ER QD  - Dilaudid 4mg q12 for severe PRN  - monitor for back pain symptoms

## 2024-08-13 NOTE — PROGRESS NOTE ADULT - SUBJECTIVE AND OBJECTIVE BOX
SUBJECTIVE: Patient seen and examined at bedside. This am patient states that he has bilateral heel pain but otherwise feels well. He denies abdominal pain, nausea, vomiting.      enoxaparin Injectable 40 milliGRAM(s) SubCutaneous every 24 hours      Vital Signs Last 24 Hrs  T(C): 36.8 (13 Aug 2024 06:07), Max: 36.8 (12 Aug 2024 10:24)  T(F): 98.2 (13 Aug 2024 06:07), Max: 98.3 (12 Aug 2024 10:24)  HR: 65 (13 Aug 2024 06:07) (65 - 83)  BP: 127/73 (13 Aug 2024 06:07) (93/58 - 127/73)  BP(mean): --  RR: 17 (13 Aug 2024 06:07) (16 - 17)  SpO2: 96% (13 Aug 2024 06:07) (91% - 96%)    Parameters below as of 12 Aug 2024 21:33  Patient On (Oxygen Delivery Method): room air      I&O's Detail    T(C): 36.8 (08-13-24 @ 06:07), Max: 36.8 (08-12-24 @ 10:24)  HR: 65 (08-13-24 @ 06:07) (65 - 83)  BP: 127/73 (08-13-24 @ 06:07) (93/58 - 127/73)  RR: 17 (08-13-24 @ 06:07) (16 - 17)  SpO2: 96% (08-13-24 @ 06:07) (91% - 96%)    CONSTITUTIONAL: no apparent distress  EYES: PERRLA and symmetric, EOMI, No conjunctival or scleral injection, non-icteric  RESP: No respiratory distress, no use of accessory muscles; CTA b/l, no WRR  CV: RRR, +S1S2, no MRG; no JVD; no peripheral edema  EXTREM: chronic venous changes in bilateral LE noted with erythema and mild swelling, 2cm circular ulceration of left calf noted with serous oozing, dry, flaking skin noted bilateral LE  PULSES: left biphasic DP/PT, monophasic PT biphasic DP  LYMPH: No cervical LAD or tenderness; no axillary LAD or tenderness; no inguinal LAD or tenderness  NEURO: CN II-XII intact; normal reflexes in upper and lower extremities, sensation intact in upper and lower extremities b/l to light touch   PSYCH: Appropriate insight/judgment; A+O x 3, mood and affect appropriate, recent/remote memory intact    LABS:                        10.5   5.40  )-----------( 247      ( 13 Aug 2024 05:30 )             32.8     08-13    137  |  104  |  13  ----------------------------<  95  4.0   |  26  |  0.62    Ca    8.1<L>      13 Aug 2024 05:30  Phos  3.3     08-13  Mg     2.2     08-13    TPro  7.3  /  Alb  3.1<L>  /  TBili  0.4  /  DBili  x   /  AST  22  /  ALT  9<L>  /  AlkPhos  75  08-12      Urinalysis Basic - ( 13 Aug 2024 05:30 )    Color: x / Appearance: x / SG: x / pH: x  Gluc: 95 mg/dL / Ketone: x  / Bili: x / Urobili: x   Blood: x / Protein: x / Nitrite: x   Leuk Esterase: x / RBC: x / WBC x   Sq Epi: x / Non Sq Epi: x / Bacteria: x        RADIOLOGY & ADDITIONAL STUDIES:

## 2024-08-13 NOTE — ADVANCED PRACTICE NURSE CONSULT - ASSESSMENT
Pt awake, alert. States he applies lotion once a day at home; has an aide who helps him. States he spends most of his time in the chair at home. Reports legs burn and itch.    BLE: feet warm, dry, palpable DPs. Very minimal nonpitting edema. Skin is extremely dry and cracked. Fungal rash noted to bilateral thighs. Scattered scratches and dry crusting but overt open wounds noted.

## 2024-08-13 NOTE — PROGRESS NOTE ADULT - ASSESSMENT
Mr. Egan is a 78 y/o M with a PMHx of Bladder cancer (s/p tumor resection, s/p localized chemo and now in remission), GERD, L knee OA, chronic lymphedema, kyphoscoliosis s/p C-sacrum fusion at [facility] (10/5/2020, w/ Dr. Carlisle), MDD, panic disorder, prior RUE DVT, BPH who was admitted for recurrent falls. Patient is here requiring PT evaluation.

## 2024-08-13 NOTE — CONSULT NOTE ADULT - SUBJECTIVE AND OBJECTIVE BOX
***Neurology Initial Consult Note***    Patient is a 79y old  Male who presents with a chief complaint of Couldn't stand up from chair for two days (13 Aug 2024 11:46)      HPI:  79M w/ PMH of bladder CA (s/p tumor resection), GERD, chronic lymphedema, kyphoscoliosis s/p C-sacrum fusion (2020), chronic compression fractures of vertebral column, L knee OA, MDD, panic disorder, BPH, abdominal aortic aneurysm found on last admission, recently admitted for fall at home, now presenting to ED due weakness and inability to stand from chair for two days. Pt states he felt generalized weakness and difficulty getting up and out of his chair. Pt states he was able to urinate by using a bedside urinal. Pt endorses eating snacks and Chinese food that his friend brought over last night. Pt states he normally has help from [facility] but last time they were came was Friday. Pt called the hospital as he was unsure what to do and was advised to call 911 for assistance in getting out of chair. Pt states 3 days prior he fell on his elbow, which has been bleeding on and off. Pt also reports having hit his head on his dresser last week when trying to  a bandage, no bleeding or LOC. He also reports b/l lower leg pain with oozing of the skin. Pt also endorses cough since this morning. Pt states he is chronically constipated, does not know last BM. Pt states he as not been taking home diazepam for the past few weeks but endorses taking dilaudid 4mg q4 and tramadol for his back pain. Pt takes clonazepam 2mg BID for anxiety, last dose was last night. Pt denies fevers, chills, abdominal pain, dysuria, diarrhea, vomiting, hematochezia or melena, lightheadedness.       ED Course:  Vitals - 91/54, HR 79, RR 16 96% RA, 98F  Labs - WBC 6.44, Hgb 11 Hct 35, , RVP negative  Imaging - CXR: Left basilar focal atelectasis. Heart size within normal limits, thoracic aortic calcification. Stable bony structures.   Thoracolumbar hardware.  Interventions - 1L NS bolus x1, blood cx collected, UA w/ culture ordered   (12 Aug 2024 08:01)      Interval subjective:   Patient seen and examined bedside. Initially sleeping comfortably without tremors. When awoken, patient with no acute complaints. Reports he presented to hospital because he was not able to get up from his chair at home.     REVIEW OF SYSTEMS: see HPI    PAST MEDICAL & SURGICAL HISTORY:  High cholesterol      Depression      GERD (gastroesophageal reflux disease)      Bladder cancer      History of back surgery    MEDICATIONS:  MEDICATIONS  (STANDING):  bisacodyl 5 milliGRAM(s) Oral every 12 hours  clotrimazole 1% Cream 1 Application(s) Topical two times a day  DULoxetine 60 milliGRAM(s) Oral every 12 hours  enoxaparin Injectable 40 milliGRAM(s) SubCutaneous every 24 hours  lidocaine   4% Patch 1 Patch Transdermal every 24 hours  polyethylene glycol 3350 17 Gram(s) Oral every 24 hours  senna 2 Tablet(s) Oral at bedtime  traMADol 50 milliGRAM(s) Oral every 12 hours    MEDICATIONS  (PRN):  acetaminophen     Tablet .. 650 milliGRAM(s) Oral every 6 hours PRN Temp greater or equal to 38C (100.4F), Mild Pain (1 - 3)  bisacodyl Suppository 10 milliGRAM(s) Rectal daily PRN Constipation  clonazePAM  Tablet 2 milliGRAM(s) Oral every 12 hours PRN Anxiety  HYDROmorphone   Tablet 4 milliGRAM(s) Oral every 12 hours PRN Severe Pain (7 - 10)      ALLERGIES:  Allergies    sulfa drugs (Unknown)    Intolerances    VITAL SIGNS:  Vital Signs Last 24 Hrs  T(C): 36.8 (13 Aug 2024 12:30), Max: 36.8 (13 Aug 2024 06:07)  T(F): 98.3 (13 Aug 2024 12:30), Max: 98.3 (13 Aug 2024 12:30)  HR: 63 (13 Aug 2024 12:30) (63 - 83)  BP: 99/63 (13 Aug 2024 12:30) (93/58 - 127/73)  BP(mean): --  RR: 17 (13 Aug 2024 12:30) (17 - 17)  SpO2: 91% (13 Aug 2024 12:30) (91% - 96%)    Parameters below as of 13 Aug 2024 12:30  Patient On (Oxygen Delivery Method): room air      PHYSICAL EXAM:  Constitutional: resting comfortably in bed;   Head: NC/AT  Eyes: PERRL, EOMI, anicteric sclera  ENT: no nasal discharge, dry mucus membranes;  Neck: supple;   Respiratory: CTA B/L;   Cardiac: +S1/S2; RRR; no M/R/G;   Gastrointestinal: abdomen soft, NT/ND; no rebound or guarding;   Extremities: WWP;  Musculoskeletal: NROM x4, +low-frequency tremor when awake, worsening on movement.  Dermatologic: bilateral LE with scaling and dry skin, covered in white lotion;  Neurologic: AAOx3; CNII-XII grossly intact; no focal deficits    Mini-Cog:     LABS:                        10.5   5.40  )-----------( 247      ( 13 Aug 2024 05:30 )             32.8     08-13    137  |  104  |  13  ----------------------------<  95  4.0   |  26  |  0.62    Ca    8.1<L>      13 Aug 2024 05:30  Phos  3.3     08-13  Mg     2.2     08-13    TPro  7.3  /  Alb  3.1<L>  /  TBili  0.4  /  DBili  x   /  AST  22  /  ALT  9<L>  /  AlkPhos  75  08-12      Urinalysis Basic - ( 13 Aug 2024 05:30 )    Color: x / Appearance: x / SG: x / pH: x  Gluc: 95 mg/dL / Ketone: x  / Bili: x / Urobili: x   Blood: x / Protein: x / Nitrite: x   Leuk Esterase: x / RBC: x / WBC x   Sq Epi: x / Non Sq Epi: x / Bacteria: x      CAPILLARY BLOOD GLUCOSE      RADIOLOGY & ADDITIONAL TESTS: Reviewed. ***Neurology Initial Consult Note***    Patient is a 79y old  Male who presents with a chief complaint of Couldn't stand up from chair for two days (13 Aug 2024 11:46)      HPI:  79M w/ PMH of bladder CA (s/p tumor resection), GERD, chronic lymphedema, kyphoscoliosis s/p C-sacrum fusion (2020), chronic compression fractures of vertebral column, L knee OA, MDD, panic disorder, BPH, abdominal aortic aneurysm found on last admission, recently admitted for fall at home, now presenting to ED due weakness and inability to stand from chair for two days. Pt states he felt generalized weakness and difficulty getting up and out of his chair. Pt states he was able to urinate by using a bedside urinal. Pt endorses eating snacks and Chinese food that his friend brought over last night. Pt states he normally has help from [facility] but last time they were came was Friday. Pt called the hospital as he was unsure what to do and was advised to call 911 for assistance in getting out of chair. Pt states 3 days prior he fell on his elbow, which has been bleeding on and off. Pt also reports having hit his head on his dresser last week when trying to  a bandage, no bleeding or LOC. He also reports b/l lower leg pain with oozing of the skin. Pt also endorses cough since this morning. Pt states he is chronically constipated, does not know last BM. Pt states he as not been taking home diazepam for the past few weeks but endorses taking dilaudid 4mg q4 and tramadol for his back pain. Pt takes clonazepam 2mg BID for anxiety, last dose was last night. Pt denies fevers, chills, abdominal pain, dysuria, diarrhea, vomiting, hematochezia or melena, lightheadedness.     ED Course:  Vitals - 91/54, HR 79, RR 16 96% RA, 98F  Labs - WBC 6.44, Hgb 11 Hct 35, , RVP negative  Imaging - CXR: Left basilar focal atelectasis. Heart size within normal limits, thoracic aortic calcification. Stable bony structures.   Thoracolumbar hardware.  Interventions - 1L NS bolus x1, blood cx collected, UA w/ culture ordered   (12 Aug 2024 08:01)      Interval subjective:   Patient seen and examined bedside. Initially sleeping comfortably without tremors. When awoken, patient with no acute complaints. Reports he presented to hospital because he was not able to get up from his chair at home.     REVIEW OF SYSTEMS: see HPI    PAST MEDICAL & SURGICAL HISTORY:  High cholesterol      Depression      GERD (gastroesophageal reflux disease)      Bladder cancer      History of back surgery    MEDICATIONS:  MEDICATIONS  (STANDING):  bisacodyl 5 milliGRAM(s) Oral every 12 hours  clotrimazole 1% Cream 1 Application(s) Topical two times a day  DULoxetine 60 milliGRAM(s) Oral every 12 hours  enoxaparin Injectable 40 milliGRAM(s) SubCutaneous every 24 hours  lidocaine   4% Patch 1 Patch Transdermal every 24 hours  polyethylene glycol 3350 17 Gram(s) Oral every 24 hours  senna 2 Tablet(s) Oral at bedtime  traMADol 50 milliGRAM(s) Oral every 12 hours    MEDICATIONS  (PRN):  acetaminophen     Tablet .. 650 milliGRAM(s) Oral every 6 hours PRN Temp greater or equal to 38C (100.4F), Mild Pain (1 - 3)  bisacodyl Suppository 10 milliGRAM(s) Rectal daily PRN Constipation  clonazePAM  Tablet 2 milliGRAM(s) Oral every 12 hours PRN Anxiety  HYDROmorphone   Tablet 4 milliGRAM(s) Oral every 12 hours PRN Severe Pain (7 - 10)      ALLERGIES:  Allergies    sulfa drugs (Unknown)    Intolerances    VITAL SIGNS:  Vital Signs Last 24 Hrs  T(C): 36.8 (13 Aug 2024 12:30), Max: 36.8 (13 Aug 2024 06:07)  T(F): 98.3 (13 Aug 2024 12:30), Max: 98.3 (13 Aug 2024 12:30)  HR: 63 (13 Aug 2024 12:30) (63 - 83)  BP: 99/63 (13 Aug 2024 12:30) (93/58 - 127/73)  BP(mean): --  RR: 17 (13 Aug 2024 12:30) (17 - 17)  SpO2: 91% (13 Aug 2024 12:30) (91% - 96%)    Parameters below as of 13 Aug 2024 12:30  Patient On (Oxygen Delivery Method): room air      PHYSICAL EXAM:  Constitutional: resting comfortably in bed;   Head: NC/AT  Eyes: PERRL, EOMI, anicteric sclera  ENT: no nasal discharge, dry mucus membranes;  Neck: supple;   Respiratory: CTA B/L;   Cardiac: +S1/S2; RRR; no M/R/G;   Gastrointestinal: abdomen soft, NT/ND; no rebound or guarding;   Extremities: WWP;  Musculoskeletal: NROM x4, b/l LE 4/5 strength, +low-frequency tremor when awake, worsening with movement.  Dermatologic: bilateral LE with scaling and dry skin, covered in white lotion;  Neurologic: AAOx3; CNII-XII grossly intact; no focal deficits, sensation intact;    Mini-Cog: 3/5 for word recall and clock draw    LABS:                        10.5   5.40  )-----------( 247      ( 13 Aug 2024 05:30 )             32.8     08-13    137  |  104  |  13  ----------------------------<  95  4.0   |  26  |  0.62    Ca    8.1<L>      13 Aug 2024 05:30  Phos  3.3     08-13  Mg     2.2     08-13    TPro  7.3  /  Alb  3.1<L>  /  TBili  0.4  /  DBili  x   /  AST  22  /  ALT  9<L>  /  AlkPhos  75  08-12      Urinalysis Basic - ( 13 Aug 2024 05:30 )    Color: x / Appearance: x / SG: x / pH: x  Gluc: 95 mg/dL / Ketone: x  / Bili: x / Urobili: x   Blood: x / Protein: x / Nitrite: x   Leuk Esterase: x / RBC: x / WBC x   Sq Epi: x / Non Sq Epi: x / Bacteria: x      CAPILLARY BLOOD GLUCOSE      RADIOLOGY & ADDITIONAL TESTS: Reviewed. ***Neurology Initial Consult Note***    Patient is a 79y old  Male who presents with a chief complaint of Couldn't stand up from chair for two days (13 Aug 2024 11:46)      HPI:  79M w/ PMH of bladder CA (s/p tumor resection), GERD, chronic lymphedema, kyphoscoliosis s/p C-sacrum fusion (2020), chronic compression fractures of vertebral column, L knee OA, MDD, panic disorder, BPH, abdominal aortic aneurysm found on last admission, recently admitted for fall at home, now presenting to ED due weakness and inability to stand from chair for two days. Pt states he felt generalized weakness and difficulty getting up and out of his chair. Pt states he was able to urinate by using a bedside urinal. Pt endorses eating snacks and Chinese food that his friend brought over last night. Pt states he normally has help from [facility] but last time they were came was Friday. Pt called the hospital as he was unsure what to do and was advised to call 911 for assistance in getting out of chair. Pt states 3 days prior he fell on his elbow, which has been bleeding on and off. Pt also reports having hit his head on his dresser last week when trying to  a bandage, no bleeding or LOC. He also reports b/l lower leg pain with oozing of the skin. Pt also endorses cough since this morning. Pt states he is chronically constipated, does not know last BM. Pt states he as not been taking home diazepam for the past few weeks but endorses taking dilaudid 4mg q4 and tramadol for his back pain. Pt takes clonazepam 2mg BID for anxiety, last dose was last night. Pt denies fevers, chills, abdominal pain, dysuria, diarrhea, vomiting, hematochezia or melena, lightheadedness.     ED Course:  Vitals - 91/54, HR 79, RR 16 96% RA, 98F  Labs - WBC 6.44, Hgb 11 Hct 35, , RVP negative  Imaging - CXR: Left basilar focal atelectasis. Heart size within normal limits, thoracic aortic calcification. Stable bony structures.   Thoracolumbar hardware.  Interventions - 1L NS bolus x1, blood cx collected, UA w/ culture ordered   (12 Aug 2024 08:01)      Interval subjective:   Patient seen and examined bedside. Initially sleeping comfortably without tremors. When awoken, patient with no acute complaints. Reports he presented to hospital because he was not able to get up from his chair at home.     REVIEW OF SYSTEMS: see HPI    PAST MEDICAL & SURGICAL HISTORY:  High cholesterol      Depression      GERD (gastroesophageal reflux disease)      Bladder cancer      History of back surgery    MEDICATIONS:  MEDICATIONS  (STANDING):  bisacodyl 5 milliGRAM(s) Oral every 12 hours  clotrimazole 1% Cream 1 Application(s) Topical two times a day  DULoxetine 60 milliGRAM(s) Oral every 12 hours  enoxaparin Injectable 40 milliGRAM(s) SubCutaneous every 24 hours  lidocaine   4% Patch 1 Patch Transdermal every 24 hours  polyethylene glycol 3350 17 Gram(s) Oral every 24 hours  senna 2 Tablet(s) Oral at bedtime  traMADol 50 milliGRAM(s) Oral every 12 hours    MEDICATIONS  (PRN):  acetaminophen     Tablet .. 650 milliGRAM(s) Oral every 6 hours PRN Temp greater or equal to 38C (100.4F), Mild Pain (1 - 3)  bisacodyl Suppository 10 milliGRAM(s) Rectal daily PRN Constipation  clonazePAM  Tablet 2 milliGRAM(s) Oral every 12 hours PRN Anxiety  HYDROmorphone   Tablet 4 milliGRAM(s) Oral every 12 hours PRN Severe Pain (7 - 10)      ALLERGIES:  Allergies    sulfa drugs (Unknown)    Intolerances    VITAL SIGNS:  Vital Signs Last 24 Hrs  T(C): 36.8 (13 Aug 2024 12:30), Max: 36.8 (13 Aug 2024 06:07)  T(F): 98.3 (13 Aug 2024 12:30), Max: 98.3 (13 Aug 2024 12:30)  HR: 63 (13 Aug 2024 12:30) (63 - 83)  BP: 99/63 (13 Aug 2024 12:30) (93/58 - 127/73)  BP(mean): --  RR: 17 (13 Aug 2024 12:30) (17 - 17)  SpO2: 91% (13 Aug 2024 12:30) (91% - 96%)    Parameters below as of 13 Aug 2024 12:30  Patient On (Oxygen Delivery Method): room air    PHYSICAL EXAM:  Constitutional: resting comfortably in bed;   Head: NC/AT  Eyes: PERRL, EOMI, anicteric sclera  ENT: no nasal discharge, dry mucus membranes;  Neck: supple;   Respiratory: CTA B/L;   Cardiac: +S1/S2; RRR; no M/R/G;   Gastrointestinal: abdomen soft, NT/ND; no rebound or guarding;   Extremities: WWP;  Musculoskeletal: NROM x4, b/l LE 4/5 strength, +low-frequency tremor when awake, worsening with movement.  Dermatologic: left elbow with erythema covered in bandage, bilateral LE with scaling and dry skin, covered in white lotion;  Neurologic: AAOx3; CNII-XII grossly intact; no focal deficits, sensation intact;  Mini-Cog: 3/5 for word recall and clock draw    Neurological exam:   General: Appearance is consistent with chronologic age.   Cognitive/Language: Awake, alert, and oriented to person, place, and date. Fund of knowledge is appropriate. Nondysarthric. Mini-Cog 3/5   Cranial Nerves  - Eyes: Visual acuity intact, Visual fields full.  EOMI w/o nystagmus, skew or reported double vision.  PERRL.  No ptosis/weakness of eyelid closure.    - Face: Facial sensation normal V1 - 3, no facial asymmetry.    - Ears/Nose/Throat: Hearing grossly intact b/l to finger rub.  Palate elevates midline.  Tongue and uvula midline.   Motor exam: Normal tone and bulk. Low-frequency bilateral tremor worse with movement.    - Upper extremity strength exam: 4/5 B/L UE  - Lower extremity strength exam: 4/5 B/L LE  Sensory examination: Intact to light touch, pain, and vibration in all extremities.  Reflexes: 2+ b/l biceps, triceps, patella and achilles.  Unable to assess gait.     LABS:                        10.5   5.40  )-----------( 247      ( 13 Aug 2024 05:30 )             32.8     08-13    137  |  104  |  13  ----------------------------<  95  4.0   |  26  |  0.62    Ca    8.1<L>      13 Aug 2024 05:30  Phos  3.3     08-13  Mg     2.2     08-13    TPro  7.3  /  Alb  3.1<L>  /  TBili  0.4  /  DBili  x   /  AST  22  /  ALT  9<L>  /  AlkPhos  75  08-12      Urinalysis Basic - ( 13 Aug 2024 05:30 )    Color: x / Appearance: x / SG: x / pH: x  Gluc: 95 mg/dL / Ketone: x  / Bili: x / Urobili: x   Blood: x / Protein: x / Nitrite: x   Leuk Esterase: x / RBC: x / WBC x   Sq Epi: x / Non Sq Epi: x / Bacteria: x      CAPILLARY BLOOD GLUCOSE      RADIOLOGY & ADDITIONAL TESTS: Reviewed. ***Neurology Initial Consult Note***    Patient is a 79y old  Male who presents with a chief complaint of Couldn't stand up from chair for two days (13 Aug 2024 11:46)      HPI:  79M w/ PMH of bladder CA (s/p tumor resection), GERD, chronic lymphedema, kyphoscoliosis s/p C-sacrum fusion (2020), chronic compression fractures of vertebral column, L knee OA, MDD, panic disorder, BPH, abdominal aortic aneurysm found on last admission, recently admitted for fall at home, now presenting to ED due weakness and inability to stand from chair for two days. Pt states he felt generalized weakness and difficulty getting up and out of his chair. Pt states he was able to urinate by using a bedside urinal. Pt endorses eating snacks and Chinese food that his friend brought over last night. Pt states he normally has help from [facility] but last time they were came was Friday. Pt called the hospital as he was unsure what to do and was advised to call 911 for assistance in getting out of chair. Pt states 3 days prior he fell on his elbow, which has been bleeding on and off. Pt also reports having hit his head on his dresser last week when trying to  a bandage, no bleeding or LOC. He also reports b/l lower leg pain with oozing of the skin. Pt also endorses cough since this morning. Pt states he is chronically constipated, does not know last BM. Pt states he as not been taking home diazepam for the past few weeks but endorses taking dilaudid 4mg q4 and tramadol for his back pain. Pt takes clonazepam 2mg BID for anxiety, last dose was last night. Pt denies fevers, chills, abdominal pain, dysuria, diarrhea, vomiting, hematochezia or melena, lightheadedness.     ED Course:  Vitals - 91/54, HR 79, RR 16 96% RA, 98F  Labs - WBC 6.44, Hgb 11 Hct 35, , RVP negative  Imaging - CXR: Left basilar focal atelectasis. Heart size within normal limits, thoracic aortic calcification. Stable bony structures.   Thoracolumbar hardware.  Interventions - 1L NS bolus x1, blood cx collected, UA w/ culture ordered   (12 Aug 2024 08:01)      Interval subjective:   Patient seen and examined bedside. Initially sleeping comfortably without tremors. When awoken, patient with no acute complaints. Reports he presented to hospital because he was not able to get up from his chair at home.     REVIEW OF SYSTEMS: see HPI    PAST MEDICAL & SURGICAL HISTORY:  High cholesterol      Depression      GERD (gastroesophageal reflux disease)      Bladder cancer      History of back surgery    MEDICATIONS:  MEDICATIONS  (STANDING):  bisacodyl 5 milliGRAM(s) Oral every 12 hours  clotrimazole 1% Cream 1 Application(s) Topical two times a day  DULoxetine 60 milliGRAM(s) Oral every 12 hours  enoxaparin Injectable 40 milliGRAM(s) SubCutaneous every 24 hours  lidocaine   4% Patch 1 Patch Transdermal every 24 hours  polyethylene glycol 3350 17 Gram(s) Oral every 24 hours  senna 2 Tablet(s) Oral at bedtime  traMADol 50 milliGRAM(s) Oral every 12 hours    MEDICATIONS  (PRN):  acetaminophen     Tablet .. 650 milliGRAM(s) Oral every 6 hours PRN Temp greater or equal to 38C (100.4F), Mild Pain (1 - 3)  bisacodyl Suppository 10 milliGRAM(s) Rectal daily PRN Constipation  clonazePAM  Tablet 2 milliGRAM(s) Oral every 12 hours PRN Anxiety  HYDROmorphone   Tablet 4 milliGRAM(s) Oral every 12 hours PRN Severe Pain (7 - 10)      ALLERGIES:  Allergies    sulfa drugs (Unknown)    Intolerances    VITAL SIGNS:  Vital Signs Last 24 Hrs  T(C): 36.8 (13 Aug 2024 12:30), Max: 36.8 (13 Aug 2024 06:07)  T(F): 98.3 (13 Aug 2024 12:30), Max: 98.3 (13 Aug 2024 12:30)  HR: 63 (13 Aug 2024 12:30) (63 - 83)  BP: 99/63 (13 Aug 2024 12:30) (93/58 - 127/73)  BP(mean): --  RR: 17 (13 Aug 2024 12:30) (17 - 17)  SpO2: 91% (13 Aug 2024 12:30) (91% - 96%)    Parameters below as of 13 Aug 2024 12:30  Patient On (Oxygen Delivery Method): room air    PHYSICAL EXAM:  Constitutional: resting comfortably in bed;   Head: NC/AT  Eyes: PERRL, EOMI, anicteric sclera  ENT: no nasal discharge, dry mucus membranes;  Neck: supple;   Respiratory: CTA B/L;   Cardiac: +S1/S2; RRR; no M/R/G;   Gastrointestinal: abdomen soft, NT/ND; no rebound or guarding;   Extremities: WWP;  Musculoskeletal: NROM x4, b/l LE 4/5 strength.  Dermatologic: left elbow with erythema covered in bandage, bilateral LE with scaling and dry skin, covered in white lotion;  Neurologic: AAOx3; CNII-XII grossly intact; no focal deficits, sensation intact;  Mini-Cog: 3/5 for word recall and clock draw    Neurological exam:   General: Appearance is consistent with chronologic age.   Cognitive/Language: Awake, alert, and oriented to person, place, and date. Fund of knowledge is appropriate. Nondysarthric. Mini-Cog 3/5   Cranial Nerves  - Eyes: Visual acuity intact, Visual fields full.  EOMI w/o nystagmus, skew or reported double vision.  PERRL.  No ptosis/weakness of eyelid closure.    - Face: Facial sensation normal V1 - 3, no facial asymmetry.    - Ears/Nose/Throat: Hearing grossly intact b/l to finger rub.  Palate elevates midline. Tongue and uvula midline.   Motor exam: Normal tone and bulk. Rigidity on wrists and knees. Low-amplitude and high frequency tremor of bilateral UE, intermittent pill-rolling of thumb.   - Upper extremity strength exam: 4/5 B/L UE  - Lower extremity strength exam: 4/5 B/L LE  Sensory examination: Intact to light touch, pain, and vibration in all extremities.  Reflexes: 2+ b/l biceps, triceps, patella and achilles.  Unable to assess gait.     LABS:                        10.5   5.40  )-----------( 247      ( 13 Aug 2024 05:30 )             32.8     08-13    137  |  104  |  13  ----------------------------<  95  4.0   |  26  |  0.62    Ca    8.1<L>      13 Aug 2024 05:30  Phos  3.3     08-13  Mg     2.2     08-13    TPro  7.3  /  Alb  3.1<L>  /  TBili  0.4  /  DBili  x   /  AST  22  /  ALT  9<L>  /  AlkPhos  75  08-12      Urinalysis Basic - ( 13 Aug 2024 05:30 )    Color: x / Appearance: x / SG: x / pH: x  Gluc: 95 mg/dL / Ketone: x  / Bili: x / Urobili: x   Blood: x / Protein: x / Nitrite: x   Leuk Esterase: x / RBC: x / WBC x   Sq Epi: x / Non Sq Epi: x / Bacteria: x      CAPILLARY BLOOD GLUCOSE      RADIOLOGY & ADDITIONAL TESTS: Reviewed.

## 2024-08-13 NOTE — PROGRESS NOTE ADULT - SUBJECTIVE AND OBJECTIVE BOX
OVERNIGHT EVENTS: LAKIA    SUBJECTIVE / INTERVAL HPI: Patient seen and examined at bedside. Pt reports he does not recall the events that preceded his falls and is unsure of       PHYSICAL EXAM:    General: Lying comfortably and in no acute distress  HEENT: NC/AT; EOMI, anicteric sclera; MMM  Neck: supple  Cardiovascular: +S1/S2, RRR  Respiratory: CTA B/L, decreased breath sounds bilaterally; no W/R/R  Gastrointestinal: soft, NT/ND; +BSx4  Extremities: WWP; no edema, clubbing or cyanosis  Vascular: 2+ radial pulses B/L  Neurological: AAOx3; no focal deficits  Psychiatric: pleasant mood and affect  Dermatologic: no appreciable wounds or damage to the skin    VITAL SIGNS:  Vital Signs Last 24 Hrs  T(C): 36.8 (13 Aug 2024 06:07), Max: 36.8 (12 Aug 2024 10:24)  T(F): 98.2 (13 Aug 2024 06:07), Max: 98.3 (12 Aug 2024 10:24)  HR: 65 (13 Aug 2024 06:07) (65 - 83)  BP: 127/73 (13 Aug 2024 06:07) (93/58 - 127/73)  BP(mean): --  RR: 17 (13 Aug 2024 06:07) (16 - 17)  SpO2: 96% (13 Aug 2024 06:07) (91% - 96%)    Parameters below as of 12 Aug 2024 21:33  Patient On (Oxygen Delivery Method): room air          MEDICATIONS:  MEDICATIONS  (STANDING):  bisacodyl 5 milliGRAM(s) Oral every 12 hours  DULoxetine 60 milliGRAM(s) Oral every 12 hours  enoxaparin Injectable 40 milliGRAM(s) SubCutaneous every 24 hours  lidocaine   4% Patch 1 Patch Transdermal every 24 hours  polyethylene glycol 3350 17 Gram(s) Oral every 24 hours  senna 2 Tablet(s) Oral at bedtime  traMADol 50 milliGRAM(s) Oral every 12 hours    MEDICATIONS  (PRN):  acetaminophen     Tablet .. 650 milliGRAM(s) Oral every 6 hours PRN Temp greater or equal to 38C (100.4F), Mild Pain (1 - 3)  bisacodyl Suppository 10 milliGRAM(s) Rectal daily PRN Constipation  clonazePAM  Tablet 2 milliGRAM(s) Oral every 12 hours PRN Anxiety  HYDROmorphone   Tablet 4 milliGRAM(s) Oral every 12 hours PRN Severe Pain (7 - 10)      ALLERGIES:  Allergies    sulfa drugs (Unknown)    Intolerances        LABS:                        10.5   5.40  )-----------( 247      ( 13 Aug 2024 05:30 )             32.8     08-13    137  |  104  |  13  ----------------------------<  95  4.0   |  26  |  0.62    Ca    8.1<L>      13 Aug 2024 05:30  Phos  3.3     08-13  Mg     2.2     08-13    TPro  7.3  /  Alb  3.1<L>  /  TBili  0.4  /  DBili  x   /  AST  22  /  ALT  9<L>  /  AlkPhos  75  08-12      Urinalysis Basic - ( 13 Aug 2024 05:30 )    Color: x / Appearance: x / SG: x / pH: x  Gluc: 95 mg/dL / Ketone: x  / Bili: x / Urobili: x   Blood: x / Protein: x / Nitrite: x   Leuk Esterase: x / RBC: x / WBC x   Sq Epi: x / Non Sq Epi: x / Bacteria: x      CAPILLARY BLOOD GLUCOSE          RADIOLOGY & ADDITIONAL TESTS: Reviewed. OVERNIGHT EVENTS: LAKIA    SUBJECTIVE / INTERVAL HPI: Patient seen and examined at bedside. Pt reports he does not recall the events that preceded his falls and is unsure if he experienced any dizziness, lightheadedness, or palpitations. He states that he was just walking when he fell and hit the back of his head on a dresser a few weeks ago and his L arm a few days ago. He     PHYSICAL EXAM:    General: Lying comfortably and in no acute distress  HEENT: NC/AT, no signs of bleeding, bruising; EOMI, anicteric sclera; MMM  Neck: supple  Cardiovascular: +S1/S2, RRR  Respiratory: CTA B/L, decreased breath sounds bilaterally; no W/R/R  Gastrointestinal: soft, NT/ND; +BSx4  Extremities: WWP; no edema, clubbing or cyanosis  Vascular: 2+ radial pulses B/L  Neurological: AAOx3; no focal deficits  Psychiatric: pleasant mood and affect  Dermatologic: no appreciable wounds or damage to the skin    VITAL SIGNS:  Vital Signs Last 24 Hrs  T(C): 36.8 (13 Aug 2024 06:07), Max: 36.8 (12 Aug 2024 10:24)  T(F): 98.2 (13 Aug 2024 06:07), Max: 98.3 (12 Aug 2024 10:24)  HR: 65 (13 Aug 2024 06:07) (65 - 83)  BP: 127/73 (13 Aug 2024 06:07) (93/58 - 127/73)  BP(mean): --  RR: 17 (13 Aug 2024 06:07) (16 - 17)  SpO2: 96% (13 Aug 2024 06:07) (91% - 96%)    Parameters below as of 12 Aug 2024 21:33  Patient On (Oxygen Delivery Method): room air          MEDICATIONS:  MEDICATIONS  (STANDING):  bisacodyl 5 milliGRAM(s) Oral every 12 hours  DULoxetine 60 milliGRAM(s) Oral every 12 hours  enoxaparin Injectable 40 milliGRAM(s) SubCutaneous every 24 hours  lidocaine   4% Patch 1 Patch Transdermal every 24 hours  polyethylene glycol 3350 17 Gram(s) Oral every 24 hours  senna 2 Tablet(s) Oral at bedtime  traMADol 50 milliGRAM(s) Oral every 12 hours    MEDICATIONS  (PRN):  acetaminophen     Tablet .. 650 milliGRAM(s) Oral every 6 hours PRN Temp greater or equal to 38C (100.4F), Mild Pain (1 - 3)  bisacodyl Suppository 10 milliGRAM(s) Rectal daily PRN Constipation  clonazePAM  Tablet 2 milliGRAM(s) Oral every 12 hours PRN Anxiety  HYDROmorphone   Tablet 4 milliGRAM(s) Oral every 12 hours PRN Severe Pain (7 - 10)      ALLERGIES:  Allergies    sulfa drugs (Unknown)    Intolerances        LABS:                        10.5   5.40  )-----------( 247      ( 13 Aug 2024 05:30 )             32.8     08-13    137  |  104  |  13  ----------------------------<  95  4.0   |  26  |  0.62    Ca    8.1<L>      13 Aug 2024 05:30  Phos  3.3     08-13  Mg     2.2     08-13    TPro  7.3  /  Alb  3.1<L>  /  TBili  0.4  /  DBili  x   /  AST  22  /  ALT  9<L>  /  AlkPhos  75  08-12      Urinalysis Basic - ( 13 Aug 2024 05:30 )    Color: x / Appearance: x / SG: x / pH: x  Gluc: 95 mg/dL / Ketone: x  / Bili: x / Urobili: x   Blood: x / Protein: x / Nitrite: x   Leuk Esterase: x / RBC: x / WBC x   Sq Epi: x / Non Sq Epi: x / Bacteria: x      CAPILLARY BLOOD GLUCOSE          RADIOLOGY & ADDITIONAL TESTS: Reviewed. OVERNIGHT EVENTS: LAKIA    SUBJECTIVE / INTERVAL HPI: Patient seen and examined at bedside. Pt reports he does not recall the events that preceded his falls and is unsure if he experienced any dizziness, lightheadedness, or palpitations. He states that he was just walking when he fell and hit the back of his head on a dresser a few weeks ago and his L arm a few days ago. He also reports his legs have been feeling "heavy" for the past few days, but denies any pain around the area. He admits to chronic LE pruritis and swelling.    PHYSICAL EXAM:    General: Lying comfortably and in no acute distress. Pt is hard of hearing, appears sleepy on exam  HEENT: NC/AT, no signs of bleeding, bruising; EOMI, anicteric sclera; MMM  Neck: supple  Cardiovascular: +S1/S2, RRR  Respiratory: CTA B/L, decreased breath sounds bilaterally; no W/R/R  Gastrointestinal: soft, NT/ND; +BSx4  Extremities: WWP; no edema, clubbing or cyanosis  Vascular: 2+ radial pulses B/L  Neurological: AAOx2; no focal deficits  Psychiatric: pleasant mood and affect  Dermatologic: diffuse scaling and dry skin of the b/l LE with excorations    VITAL SIGNS:  Vital Signs Last 24 Hrs  T(C): 36.8 (13 Aug 2024 06:07), Max: 36.8 (12 Aug 2024 10:24)  T(F): 98.2 (13 Aug 2024 06:07), Max: 98.3 (12 Aug 2024 10:24)  HR: 65 (13 Aug 2024 06:07) (65 - 83)  BP: 127/73 (13 Aug 2024 06:07) (93/58 - 127/73)  BP(mean): --  RR: 17 (13 Aug 2024 06:07) (16 - 17)  SpO2: 96% (13 Aug 2024 06:07) (91% - 96%)    Parameters below as of 12 Aug 2024 21:33  Patient On (Oxygen Delivery Method): room air          MEDICATIONS:  MEDICATIONS  (STANDING):  bisacodyl 5 milliGRAM(s) Oral every 12 hours  DULoxetine 60 milliGRAM(s) Oral every 12 hours  enoxaparin Injectable 40 milliGRAM(s) SubCutaneous every 24 hours  lidocaine   4% Patch 1 Patch Transdermal every 24 hours  polyethylene glycol 3350 17 Gram(s) Oral every 24 hours  senna 2 Tablet(s) Oral at bedtime  traMADol 50 milliGRAM(s) Oral every 12 hours    MEDICATIONS  (PRN):  acetaminophen     Tablet .. 650 milliGRAM(s) Oral every 6 hours PRN Temp greater or equal to 38C (100.4F), Mild Pain (1 - 3)  bisacodyl Suppository 10 milliGRAM(s) Rectal daily PRN Constipation  clonazePAM  Tablet 2 milliGRAM(s) Oral every 12 hours PRN Anxiety  HYDROmorphone   Tablet 4 milliGRAM(s) Oral every 12 hours PRN Severe Pain (7 - 10)      ALLERGIES:  Allergies    sulfa drugs (Unknown)    Intolerances        LABS:                        10.5   5.40  )-----------( 247      ( 13 Aug 2024 05:30 )             32.8     08-13    137  |  104  |  13  ----------------------------<  95  4.0   |  26  |  0.62    Ca    8.1<L>      13 Aug 2024 05:30  Phos  3.3     08-13  Mg     2.2     08-13    TPro  7.3  /  Alb  3.1<L>  /  TBili  0.4  /  DBili  x   /  AST  22  /  ALT  9<L>  /  AlkPhos  75  08-12      Urinalysis Basic - ( 13 Aug 2024 05:30 )    Color: x / Appearance: x / SG: x / pH: x  Gluc: 95 mg/dL / Ketone: x  / Bili: x / Urobili: x   Blood: x / Protein: x / Nitrite: x   Leuk Esterase: x / RBC: x / WBC x   Sq Epi: x / Non Sq Epi: x / Bacteria: x      CAPILLARY BLOOD GLUCOSE          RADIOLOGY & ADDITIONAL TESTS: Reviewed.

## 2024-08-13 NOTE — CONSULT NOTE ADULT - ASSESSMENT
80 y/o M with a PMHx of Bladder cancer (s/p tumor resection, s/p localized chemo and now in remission), GERD, L knee OA, chronic lymphedema, kyphoscoliosis s/p C-sacrum fusion at [facility] (10/5/2020, w/ Dr. Carlisle), MDD, panic disorder, prior RUE DVT, BPH who was recently admitted for recurrent falls, now presenting with LE weakness and inability to get up from chair. Patient seen by PT and recommending MCKENNA. Neuro consulted for bilateral tremor and neurological disorder workup due to frequent falls.     On exam patient with bilateral UE low frequency tremor worsened with movement, b/l LE 4/5 in strength. Differential includes Parkinson's disease vs neuropathy vs myopathy given elevated CK.     #Intention Tremor  #Frequent Falls  - obtain serum VB12, Folate, Thiamine, Vitamin D  - trend CK  - medication reconciliation/I-Stop and try to avoid benzodiazepines in elderly patient with falls  - orthostatics     Thank you for this consult. Neurology will continue to follow.    78 y/o M with a PMHx of Bladder cancer (s/p tumor resection, s/p localized chemo and now in remission), GERD, L knee OA, chronic lymphedema, kyphoscoliosis s/p C-sacrum fusion at [facility] (10/5/2020, w/ Dr. Carlisle), MDD, panic disorder, prior RUE DVT, BPH who was recently admitted for recurrent falls, now presenting with LE weakness and inability to get up from chair. Patient seen by PT and recommending MCKENNA. Neuro consulted for bilateral tremor and neurological disorder workup due to frequent falls.     On exam patient with bilateral UE low frequency tremor worsened with movement, b/l LE 4/5 in strength. Differential includes Parkinson's disease vs neuropathy vs myopathy given elevated CK.     #B/L low-frequency tremor  #Frequent Falls  - obtain serum VB12, Folate, Thiamine, Vitamin D  - trend CK  - medication reconciliation/I-Stop and try to avoid benzodiazepines in elderly patient with falls  - orthostatics     Thank you for this consult. Neurology will continue to follow.    78 y/o M with a PMHx of Bladder cancer (s/p tumor resection, s/p localized chemo and now in remission), GERD, L knee OA, chronic lymphedema, kyphoscoliosis s/p C-sacrum fusion at [facility] (10/5/2020, w/ Dr. Carlisle), MDD, panic disorder, prior RUE DVT, BPH who was recently admitted for recurrent falls, now presenting with LE weakness and inability to get up from chair. Patient seen by PT and recommending MCKENNA. Neuro consulted for bilateral tremor and neurological disorder workup due to frequent falls.     On exam patient with bilateral UE low frequency tremor worsened with movement, b/l LE 4/5 in strength. Mini-cog 3/5. Differential includes Parkinson's disease vs neuropathy vs myopathy given elevated CK.     #B/L low-frequency tremor  #Frequent Falls  - obtain serum VB12, Folate, Thiamine, Vitamin D  - trend CK  - medication reconciliation/I-Stop and try to avoid benzodiazepines in elderly patient with falls  - orthostatics   - PT/OT    Thank you for this consult. Neurology will continue to follow.    78 y/o M with a PMHx of Bladder cancer (s/p tumor resection, s/p localized chemo and now in remission), GERD, L knee OA, chronic lymphedema, kyphoscoliosis s/p C-sacrum fusion at [facility] (10/5/2020, w/ Dr. Carlisle), MDD, panic disorder, prior RUE DVT, BPH who was recently admitted for recurrent falls, now presenting with LE weakness and inability to get up from chair. Patient seen by PT and recommending MCKENNA. Neuro consulted for bilateral tremor and neurological disorder workup due to frequent falls.     On exam patient with bilateral UE low frequency tremor worsened with movement, b/l LE 4/5 in strength. Mini-cog 3/5. Differential includes Parkinson's disease vs neuropathy vs myopathy given elevated CK.  downtrending to 324 today.     #B/L low-frequency tremor  #Frequent Falls  - obtain serum VB12, Folate, Thiamine, Vitamin D, TSH  - trend CK  - medication reconciliation/I-Stop and try to avoid benzodiazepines in elderly patient with falls  - orthostatics   - PT/OT    Thank you for this consult. Neurology will continue to follow.    78 y/o M with a PMHx of Bladder cancer (s/p tumor resection, s/p localized chemo and now in remission), GERD, L knee OA, chronic lymphedema, kyphoscoliosis s/p C-sacrum fusion at [facility] (10/5/2020, w/ Dr. Carlisle), MDD, panic disorder, prior RUE DVT, BPH who was recently admitted for recurrent falls, now presenting with LE weakness and inability to get up from chair. Patient seen by PT and recommending MCKENNA. Neuro consulted for bilateral tremor and neurological disorder workup due to frequent falls.     On exam patient with bilateral UE low-amplitude, high frequency tremor worsened with movement, rigidity on wrists and knees, and intermittent pill-rolling of thumb, which can be signs of Parkinson's. However patient also with other factors that can cause tremor including bacteremia. Mini-cog 3/5. Differential includes Parkinson's disease vs essential tremor vs toxic/metabolic etiology of tremor/falls. Fall etiology likely multi-factorial as patient also on Dilaudid and clonazepam at home which can lead to falls in elderly. BP on softer side, no evidence of supine hypertension. Orthostatics positive.  downtrending to 324 today.     #B/L low-amplitude, high frequency tremor  #Frequent Falls  - obtain serum VB12, Folate, Thiamine, Vitamin D, TSH  - trend CK  - medication reconciliation/I-Stop and try to avoid benzodiazepines in elderly patient with falls. however if patient is consistently taking benzodiazepine at home please ensure he receives medication or titrate the medication to prevent withdrawal  - encourage po intake  - discussed trial of Carbidopa-Levodopa with patient and potential SE of dizziness when standing, patient would prefer to hold off on starting medication at this time   - outpatient neurology follow-up, consider DAPT scan outpatient  - PT/OT    Thank you for this consult. Neurology will continue to follow.    78 y/o M with a PMHx of Bladder cancer (s/p tumor resection, s/p localized chemo and now in remission), GERD, L knee OA, chronic lymphedema, kyphoscoliosis s/p C-sacrum fusion at [facility] (10/5/2020, w/ Dr. Carlisle), MDD, panic disorder, prior RUE DVT, BPH who was recently admitted for recurrent falls, now presenting with LE weakness and inability to get up from chair. Patient seen by PT and recommending MCKENNA. Neuro consulted for bilateral tremor and neurological disorder workup due to frequent falls.     On exam patient with bilateral UE low-amplitude, high frequency tremor worsened with movement, rigidity on wrists and knees, and intermittent pill-rolling of thumb, which can be signs of Parkinson's. However patient also with other factors that can cause tremor including bacteremia. Mini-cog 3/5. Differential includes Parkinson's disease vs essential tremor vs toxic/metabolic etiology of tremor/falls. Fall etiology likely multi-factorial as patient also on Dilaudid and clonazepam at home which can lead to falls in elderly. BP on softer side, no evidence of supine hypertension. Orthostatics positive.  downtrending to 324 today.     #B/L low-amplitude, high frequency tremor  #Frequent Falls  - obtain serum VB12, Folate, Thiamine, Vitamin D, TSH  - trend CK  - medication reconciliation/I-Stop and try to avoid benzodiazepines in elderly patient with falls. however if patient is consistently taking benzodiazepine at home please ensure he receives medication or titrate the medication to prevent withdrawal  - encourage po intake  - discussed trial of Carbidopa-Levodopa with patient and potential SE of dizziness when standing, patient would prefer to hold off on starting medication at this time   - outpatient neurology follow-up, consider ELISABETH scan outpatient  - PT/OT    Thank you for this consult. Neurology will continue to follow.

## 2024-08-14 ENCOUNTER — RESULT REVIEW (OUTPATIENT)
Age: 79
End: 2024-08-14

## 2024-08-14 ENCOUNTER — TRANSCRIPTION ENCOUNTER (OUTPATIENT)
Age: 79
End: 2024-08-14

## 2024-08-14 DIAGNOSIS — R53.81 OTHER MALAISE: ICD-10-CM

## 2024-08-14 LAB
-  BLOOD PCR PANEL: SIGNIFICANT CHANGE UP
-  CLINDAMYCIN: SIGNIFICANT CHANGE UP
-  ERYTHROMYCIN: SIGNIFICANT CHANGE UP
-  GENTAMICIN: SIGNIFICANT CHANGE UP
-  OXACILLIN: SIGNIFICANT CHANGE UP
-  PENICILLIN: SIGNIFICANT CHANGE UP
-  RIFAMPIN: SIGNIFICANT CHANGE UP
-  TETRACYCLINE: SIGNIFICANT CHANGE UP
-  TRIMETHOPRIM/SULFAMETHOXAZOLE: SIGNIFICANT CHANGE UP
-  VANCOMYCIN: SIGNIFICANT CHANGE UP
GRAM STN FLD: ABNORMAL
METHOD TYPE: SIGNIFICANT CHANGE UP
METHOD TYPE: SIGNIFICANT CHANGE UP

## 2024-08-14 PROCEDURE — 93306 TTE W/DOPPLER COMPLETE: CPT | Mod: 26

## 2024-08-14 PROCEDURE — 99233 SBSQ HOSP IP/OBS HIGH 50: CPT | Mod: GC

## 2024-08-14 PROCEDURE — 99232 SBSQ HOSP IP/OBS MODERATE 35: CPT

## 2024-08-14 RX ORDER — HYDROMORPHONE HYDROCHLORIDE 2 MG/1
4 TABLET ORAL EVERY 4 HOURS
Refills: 0 | Status: DISCONTINUED | OUTPATIENT
Start: 2024-08-14 | End: 2024-08-18

## 2024-08-14 RX ADMIN — HYDROMORPHONE HYDROCHLORIDE 4 MILLIGRAM(S): 2 TABLET ORAL at 09:30

## 2024-08-14 RX ADMIN — HYDROMORPHONE HYDROCHLORIDE 4 MILLIGRAM(S): 2 TABLET ORAL at 09:15

## 2024-08-14 RX ADMIN — Medication 2 TABLET(S): at 22:16

## 2024-08-14 RX ADMIN — Medication 5 MILLIGRAM(S): at 22:16

## 2024-08-14 RX ADMIN — Medication 60 MILLIGRAM(S): at 17:46

## 2024-08-14 RX ADMIN — HYDROMORPHONE HYDROCHLORIDE 4 MILLIGRAM(S): 2 TABLET ORAL at 18:46

## 2024-08-14 RX ADMIN — HYDROMORPHONE HYDROCHLORIDE 4 MILLIGRAM(S): 2 TABLET ORAL at 13:13

## 2024-08-14 RX ADMIN — HYDROMORPHONE HYDROCHLORIDE 4 MILLIGRAM(S): 2 TABLET ORAL at 14:13

## 2024-08-14 RX ADMIN — Medication 5 MILLIGRAM(S): at 09:15

## 2024-08-14 RX ADMIN — TRAMADOL HYDROCHLORIDE 50 MILLIGRAM(S): 200 TABLET, EXTENDED RELEASE ORAL at 17:46

## 2024-08-14 RX ADMIN — TRAMADOL HYDROCHLORIDE 50 MILLIGRAM(S): 200 TABLET, EXTENDED RELEASE ORAL at 18:46

## 2024-08-14 RX ADMIN — TRAMADOL HYDROCHLORIDE 50 MILLIGRAM(S): 200 TABLET, EXTENDED RELEASE ORAL at 06:39

## 2024-08-14 RX ADMIN — CEFAZOLIN SODIUM 100 MILLIGRAM(S): 2 INJECTION, SOLUTION INTRAVENOUS at 13:13

## 2024-08-14 RX ADMIN — ENOXAPARIN SODIUM 40 MILLIGRAM(S): 100 INJECTION SUBCUTANEOUS at 17:45

## 2024-08-14 RX ADMIN — HYDROMORPHONE HYDROCHLORIDE 4 MILLIGRAM(S): 2 TABLET ORAL at 22:16

## 2024-08-14 RX ADMIN — CEFAZOLIN SODIUM 100 MILLIGRAM(S): 2 INJECTION, SOLUTION INTRAVENOUS at 22:16

## 2024-08-14 RX ADMIN — Medication 1000 MILLILITER(S): at 17:51

## 2024-08-14 RX ADMIN — CEFAZOLIN SODIUM 100 MILLIGRAM(S): 2 INJECTION, SOLUTION INTRAVENOUS at 06:38

## 2024-08-14 RX ADMIN — Medication 1 APPLICATION(S): at 17:47

## 2024-08-14 RX ADMIN — TRAMADOL HYDROCHLORIDE 50 MILLIGRAM(S): 200 TABLET, EXTENDED RELEASE ORAL at 07:39

## 2024-08-14 RX ADMIN — Medication 1 APPLICATION(S): at 06:54

## 2024-08-14 RX ADMIN — Medication 60 MILLIGRAM(S): at 06:39

## 2024-08-14 RX ADMIN — HYDROMORPHONE HYDROCHLORIDE 4 MILLIGRAM(S): 2 TABLET ORAL at 23:16

## 2024-08-14 RX ADMIN — HYDROMORPHONE HYDROCHLORIDE 4 MILLIGRAM(S): 2 TABLET ORAL at 17:46

## 2024-08-14 NOTE — PROGRESS NOTE ADULT - PROBLEM SELECTOR PLAN 6
Normocytic anemia, per previous labs, pt's baseline is Hgb 10-12, on presentation to ED pt's Hgb 11. Likely anemia of chronic disease in setting of chronic lymphedema and chronic back pain. No s/s acute bleed. Can consider iron studies however pt has normocytic anemia and less likely anemia 2/2 JODIE    Plan:  - con't to trend CBC  - transfuse for Hgb <7

## 2024-08-14 NOTE — DISCHARGE NOTE PROVIDER - PROVIDER TOKENS
PROVIDER:[TOKEN:[[id number]],FOLLOWUP:[2 weeks]],PROVIDER:[TOKEN:[[id number]],FOLLOWUP:[1 month]] PROVIDER:[TOKEN:[[id number]],FOLLOWUP:[1 month]],FREE:[LAST:[Delon],FIRST:[Estevan],PHONE:[phone]],FAX:[(   )    -],ADDRESS:[[address]  [place] [address]],SCHEDULEDAPPT:[02/21/2025],SCHEDULEDAPPTTIME:[11:20 AM]] PROVIDER:[TOKEN:[[id number]],SCHEDULEDAPPT:[08/27/2024],SCHEDULEDAPPTTIME:[11:45 AM],ESTABLISHEDPATIENT:[T]],FREE:[LAST:[Delon],FIRST:[Estevan],PHONE:[phone]],FAX:[(   )    -],ADDRESS:[[address]  [place] [address]],SCHEDULEDAPPT:[02/21/2025],SCHEDULEDAPPTTIME:[11:20 AM]] PROVIDER:[TOKEN:[[id number]],SCHEDULEDAPPT:[08/27/2024],SCHEDULEDAPPTTIME:[11:45 AM],ESTABLISHEDPATIENT:[T]],FREE:[LAST:[Delon],FIRST:[Estevan],PHONE:[phone]],FAX:[(   )    -],ADDRESS:[[address], [facility] [address]  [place] [address]],SCHEDULEDAPPT:[02/21/2025],SCHEDULEDAPPTTIME:[11:20 AM]],PROVIDER:[TOKEN:[[id number]],FOLLOWUP:[2 weeks]]

## 2024-08-14 NOTE — OCCUPATIONAL THERAPY INITIAL EVALUATION ADULT - MODIFIED CLINICAL TEST OF SENSORY INTEGRATION IN BALANCE TEST
Patient functionally side stepped to the R x 5, L x 5, x 20 feet forward with Min A x 1 and RW. Patient noted with kyphotic posture, decreased step length, weight shifting requiring min verbal cues for proper positioning and safety.

## 2024-08-14 NOTE — PROGRESS NOTE ADULT - ASSESSMENT
79M w/ hx L femoral neck fracture s/p L hip hemiarthroplasty (10/2023), hx THR, kyphoscoliosis s/p thoracolumbar fusion (reportedly 2020), bladder CA s/p resection, mood disorder, AAA of 5cm (undergoing workup for EVAR), admitted on 8/12 after presented with recent falls and generalized weakness, in the context of a 2 week history of intermittent shaking chills and decreased appetite. Found to have MSSA bacteremia, likely source is BLE excoriations with mild LLE SSTI (now improving). No evidence of metastatic/deep seated infection at this time.    Recommendations:  - Continue ancef 2g IV q8h  - Follow pending repeat BCx (drawn 8/13 before abx started). Repeat BCx today  - Pending TTE. Will likely also require RAJANI.  - Treatment duration will be 4 weeks IV abx minimum given presence of hardware    ID Team 2 will follow. Dr. Whitehead will assume care of the service tomorrow.

## 2024-08-14 NOTE — PROGRESS NOTE ADULT - PROBLEM SELECTOR PLAN 10
Pt has prescription for omeprazole 20mg QD, however per pt's pharmacy pt has not filled prescription since May 2024    Plan:  - con't omeprazole

## 2024-08-14 NOTE — PROGRESS NOTE ADULT - ATTENDING COMMENTS
79M PMH bladder cancer, GERD, L knee OA, chronic lymphedema, kyphoscoliosis s/p C-sacrum fusion, MDD, panic disorder, prior RUE DVT, BPH admitted for deconditioning and fall, found to have MSSA bacteremia  - continuing on ancef 2g q8 for MSSA bacteremia, will get daily blood cultures until clear, TTE pending  - intention tremor on exam, given frequent falls and deconditioning, neuro consulted, likely can pursue outpatient workup, ptn w positive orthostatics, could be related to dysautonomia in context of potential underlying movement disorder  - per team discussion w ptn, ptn wishes to be DNR DNI  - PT eval rec for MCKENNA, discussed with patient, initially reluctant, but convinced to be amenable to short MCKENNA stay, especially given will likely need IV abx on discharge 79M PMH bladder cancer, GERD, L knee OA, chronic lymphedema, kyphoscoliosis s/p C-sacrum fusion, MDD, panic disorder, prior RUE DVT, BPH admitted for deconditioning and fall, found to have MSSA bacteremia  - continuing on ancef 2g q8 for MSSA bacteremia, will get daily blood cultures until clear, TTE pending  - intention tremor on exam, given frequent falls and deconditioning, neuro consulted, likely can pursue outpatient workup, ptn w positive orthostatics, could be related to dysautonomia in context of potential underlying movement disorder  - per team discussion w ptn, ptn wishes to be DNR DNI  - PT eval rec for MCKENNA, discussed with patient, initially reluctant, but convinced to be amenable to short MCKENNA stay, especially given will likely need IV abx on discharge.

## 2024-08-14 NOTE — DISCHARGE NOTE PROVIDER - NSDCCPCAREPLAN_GEN_ALL_CORE_FT
PRINCIPAL DISCHARGE DIAGNOSIS  Diagnosis: Generalized weakness  Assessment and Plan of Treatment:       SECONDARY DISCHARGE DIAGNOSES  Diagnosis: MSSA bacteremia  Assessment and Plan of Treatment: The bacteria Staphylococcus aureus (staph) lives on the skin and in the nose of many people. It usually only causes a problem such as MSSA bacteremia if it gets inside the body. Staph infections can be either methicillin-resistant staph (MRSA) or methicillin-susceptible staph (MSSA). MSSA infections are usually treatable with antibiotics. However, MRSA infections are resistant to antibiotics. Many staph infections are mild, but they can also be serious and life-threatening. MSSA Bacteremia occurs when the MSSA bacteria enter your bloodstream. This is a serious infection that has a high risk of complications and death. Once it's in the bloodstream, the infection often spreads to other organs and tissues within the body such as the heart, lungs, or brain.    Diagnosis: Unsteady gait  Assessment and Plan of Treatment: Gait is the pattern that you walk. Sometimes, an injury or underlying medical condition can cause an abnormal gait. You may notice an abnormal gait if you drag your toes when you walk, take high steps or feel off balance when walking. Certain gait abnormalities are temporary and others require lifelong management. You were seen by physical therapy and they recommended that you spend some time at a subacute rehabilitation facility where they can help get your strength back up.     PRINCIPAL DISCHARGE DIAGNOSIS  Diagnosis: Generalized weakness  Assessment and Plan of Treatment: Weakness and fatigue are two of the most common nonspecific medical complaints clinicians encounter. And because they are linked to so many different medical conditions, it takes time and care to diagnose and treat them properly. Very often, the key to determining what is the cause of generalized weakness for a patient is finding out what’s not causing the symptoms before diagnosing what is causing them.  Although fatigue and weakness often occur together, they are actually two different symptoms. Generalized weakness refers to a decrease in muscle strength. It may take extra effort to move your arms, legs, or other parts of the body. General weakness associated with exertion usually resolve within a few days. Sudden weakness, weakness that gets progressively worse, or weakness in combination with loss of function should be assessed as soon as possible.  It is important to eat regular meals, drink lots of water, and get adequate amounts of sleep every night. It is also good to try and walk and exercise as much as you can every day. This can help with feelings of generalized weakness. If you symptoms do not improve or worsen, please see your PCP or come to the emergency room to see medical attention.      SECONDARY DISCHARGE DIAGNOSES  Diagnosis: MSSA bacteremia  Assessment and Plan of Treatment: The bacteria Staphylococcus aureus (staph) lives on the skin and in the nose of many people. It usually only causes a problem such as MSSA bacteremia if it gets inside the body. Staph infections can be either methicillin-resistant staph (MRSA) or methicillin-susceptible staph (MSSA). MSSA infections are usually treatable with antibiotics. However, MRSA infections are resistant to antibiotics. Many staph infections are mild, but they can also be serious and life-threatening. MSSA Bacteremia occurs when the MSSA bacteria enter your bloodstream. This is a serious infection that has a high risk of complications and death. Once it's in the bloodstream, the infection often spreads to other organs and tissues within the body such as the heart, lungs, or brain.    Diagnosis: Unsteady gait  Assessment and Plan of Treatment: Gait is the pattern that you walk. Sometimes, an injury or underlying medical condition can cause an abnormal gait. You may notice an abnormal gait if you drag your toes when you walk, take high steps or feel off balance when walking. Certain gait abnormalities are temporary and others require lifelong management. You were seen by physical therapy and they recommended that you spend some time at a subacute rehabilitation facility where they can help get your strength back up.     PRINCIPAL DISCHARGE DIAGNOSIS  Diagnosis: Generalized weakness  Assessment and Plan of Treatment: Weakness and fatigue are two of the most common nonspecific medical complaints clinicians encounter. And because they are linked to so many different medical conditions, it takes time and care to diagnose and treat them properly. Very often, the key to determining what is the cause of generalized weakness for a patient is finding out what’s not causing the symptoms before diagnosing what is causing them.  Although fatigue and weakness often occur together, they are actually two different symptoms. Generalized weakness refers to a decrease in muscle strength. It may take extra effort to move your arms, legs, or other parts of the body. General weakness associated with exertion usually resolve within a few days. Sudden weakness, weakness that gets progressively worse, or weakness in combination with loss of function should be assessed as soon as possible.  It is important to eat regular meals, drink lots of water, and get adequate amounts of sleep every night. It is also good to try and walk and exercise as much as you can every day. This can help with feelings of generalized weakness. If you symptoms do not improve or worsen, please see your PCP or come to the emergency room to see medical attention.      SECONDARY DISCHARGE DIAGNOSES  Diagnosis: MSSA bacteremia  Assessment and Plan of Treatment: The bacteria Staphylococcus aureus (staph) lives on the skin and in the nose of many people. It usually only causes a problem such as MSSA bacteremia if it gets inside the body. Staph infections can be either methicillin-resistant staph (MRSA) or methicillin-susceptible staph (MSSA). MSSA infections are usually treatable with antibiotics. However, MRSA infections are resistant to antibiotics. Many staph infections are mild, but they can also be serious and life-threatening. MSSA Bacteremia occurs when the MSSA bacteria enter your bloodstream. This is a serious infection that has a high risk of complications and death. Once it's in the bloodstream, the infection often spreads to other organs and tissues within the body such as the heart, lungs, or brain.    Diagnosis: Unsteady gait  Assessment and Plan of Treatment: Gait is the pattern that you walk. Sometimes, an injury or underlying medical condition can cause an abnormal gait. You may notice an abnormal gait if you drag your toes when you walk, take high steps or feel off balance when walking. Certain gait abnormalities are temporary and others require lifelong management. You were seen by physical therapy and they recommended that you spend some time at a subacute rehabilitation facility where they can help get your strength back up.  We scheduled a follow up with neurology for further workup of your unsteady gait. Please follow up with Dr. Jernigan in 2 weeks.    Diagnosis: Abdominal aortic aneurysm (AAA)  Assessment and Plan of Treatment: An abdominal aortic aneurysm is an enlarged area in the lower part of the body's main artery, called the aorta. The aorta runs from the heart through the center of the chest and belly area, called the abdomen. The aorta is the largest blood vessel in the body. An abdominal aortic aneurysm that ruptures can cause life-threatening bleeding. Treatment depends on the size of the aneurysm and how fast it's growing. Treatment varies from regular health checkups and imaging tests to emergency surgery.  We scheduled an appointment with Vascular surgery with Dr. Corbin, please follow up within 1 month of discharge.     PRINCIPAL DISCHARGE DIAGNOSIS  Diagnosis: Generalized weakness  Assessment and Plan of Treatment: Weakness and fatigue are two of the most common nonspecific medical complaints clinicians encounter. And because they are linked to so many different medical conditions, it takes time and care to diagnose and treat them properly. Very often, the key to determining what is the cause of generalized weakness for a patient is finding out what’s not causing the symptoms before diagnosing what is causing them.  Although fatigue and weakness often occur together, they are actually two different symptoms. Generalized weakness refers to a decrease in muscle strength. It may take extra effort to move your arms, legs, or other parts of the body. General weakness associated with exertion usually resolve within a few days. Sudden weakness, weakness that gets progressively worse, or weakness in combination with loss of function should be assessed as soon as possible.  It is important to eat regular meals, drink lots of water, and get adequate amounts of sleep every night. It is also good to try and walk and exercise as much as you can every day. This can help with feelings of generalized weakness. If you symptoms do not improve or worsen, please see your PCP or come to the emergency room to see medical attention.      SECONDARY DISCHARGE DIAGNOSES  Diagnosis: MSSA bacteremia  Assessment and Plan of Treatment: The bacteria Staphylococcus aureus (staph) lives on the skin and in the nose of many people. It usually only causes a problem such as MSSA bacteremia if it gets inside the body. Staph infections can be either methicillin-resistant staph (MRSA) or methicillin-susceptible staph (MSSA). MSSA infections are usually treatable with antibiotics. However, MRSA infections are resistant to antibiotics. Many staph infections are mild, but they can also be serious and life-threatening. MSSA Bacteremia occurs when the MSSA bacteria enter your bloodstream. This is a serious infection that has a high risk of complications and death. Once it's in the bloodstream, the infection often spreads to other organs and tissues within the body such as the heart, lungs, or brain.  We discharged you with intravenous antibiotics which you will continue to receive for 3 more weeks until 9/9/2024. Please be sure to complete the full antibiotic course at the subacute rehabilitation facility to ensure that the infection is cleared from your body.  We also scheduled a follow up with Dr. Williamson from infectious disease, his office will call to schedule an appointment in 2 weeks.    Diagnosis: Unsteady gait  Assessment and Plan of Treatment: Gait is the pattern that you walk. Sometimes, an injury or underlying medical condition can cause an abnormal gait. You may notice an abnormal gait if you drag your toes when you walk, take high steps or feel off balance when walking. Certain gait abnormalities are temporary and others require lifelong management. You were seen by physical therapy and they recommended that you spend some time at a subacute rehabilitation facility where they can help get your strength back up.  We scheduled a follow up with neurology for further workup of your unsteady gait. Please follow up with Dr. Jernigan in 2 weeks.    Diagnosis: Abdominal aortic aneurysm (AAA)  Assessment and Plan of Treatment: An abdominal aortic aneurysm is an enlarged area in the lower part of the body's main artery, called the aorta. The aorta runs from the heart through the center of the chest and belly area, called the abdomen. The aorta is the largest blood vessel in the body. An abdominal aortic aneurysm that ruptures can cause life-threatening bleeding. Treatment depends on the size of the aneurysm and how fast it's growing. Treatment varies from regular health checkups and imaging tests to emergency surgery.  We scheduled an appointment with Vascular surgery with Dr. Corbin, please follow up within 1 month of discharge.

## 2024-08-14 NOTE — PROGRESS NOTE ADULT - ASSESSMENT
Mr. Egan is a 78 y/o M with a PMHx of Bladder cancer (s/p tumor resection, s/p localized chemo and now in remission), GERD, L knee OA, chronic lymphedema, kyphoscoliosis s/p C-sacrum fusion at [facility] (10/5/2020, w/ Dr. Carlisle), MDD, panic disorder, prior RUE DVT, BPH who was admitted for recurrent falls. Patient is here requiring PT evaluation.   Mr. Egan is a 80 y/o M with a PMHx of Bladder cancer (s/p tumor resection, s/p localized chemo and now in remission), GERD, L knee OA, chronic lymphedema, kyphoscoliosis s/p C-sacrum fusion at [facility] (10/5/2020, w/ Dr. Carlisle), MDD, panic disorder, prior RUE DVT, BPH who was admitted for recurrent falls, now found to have MSSA bacteremia.

## 2024-08-14 NOTE — DISCHARGE NOTE PROVIDER - CARE PROVIDER_API CALL
Estevan Jernigan  Neurology  [address] [place] [address], [address]  [place] [address]  Phone: [phone]  Fax: [phone]  Follow Up Time: 2 weeks    Francisco J Corbin  Vascular Surgery  [address], [address]  [place] [address]  Phone: [phone]  Fax: [phone]  Follow Up Time: 1 month   Francisco J Corbin  Vascular Surgery  [address], [address]  [place] [address]  Phone: [phone]  Fax: [phone]  Follow Up Time: 1 month    Estevan Jernigan  Neurology  [address], [address]  [place] [address]  Phone: [phone]  Fax: (   )    -  Scheduled Appointment: 02/21/2025 11:20 AM   Francisco J Corbin  Vascular Surgery  [address], [address]  [place] [address]  Phone: [phone]  Fax: [phone]  Established Patient  Scheduled Appointment: 08/27/2024 11:45 AM    Estevan Jernigan  Neurology  [address], [address]  [place] [address]  Phone: [phone]  Fax: (   )    -  Scheduled Appointment: 02/21/2025 11:20 AM   Francisco J Corbin  Vascular Surgery  [address], [address]  [place] [address]  Phone: [phone]  Fax: [phone]  Established Patient  Scheduled Appointment: 08/27/2024 11:45 AM    Estevan Jernigan  Neurology  [address], [address]  [place] [address]  Phone: [phone]  Fax: (   )    -  Scheduled Appointment: 02/21/2025 11:20 AM    Oracio Williamson  Infectious Disease  [address], [address]  [place] [address]  Phone: [phone]  Fax: [phone]  Follow Up Time: 2 weeks

## 2024-08-14 NOTE — PROGRESS NOTE ADULT - PROBLEM SELECTOR PLAN 7
Pt does not remember last BM. On home senna and bisacodyl PO and suppository PRN    Plan:   - con't home senna  - miralax QD  - con't bisacodyl 5mg BID PO  - con't bisacodyl suppository PRN  - monitor for BM

## 2024-08-14 NOTE — PROGRESS NOTE ADULT - ASSESSMENT
79M w/ PMH of bladder CA (s/p tumor resection), GERD, chronic lymphedema, kyphoscoliosis s/p C-sacrum fusion (2020), chronic compression fractures of vertebral column, L knee OA, MDD, panic disorder, BPH, abdominal aortic aneurysm found on last admission, recently admitted for fall at home, now presenting to ED due weakness and inability to stand from chair for two days. Patient states he felt generalized weakness and difficulty getting up and out of his chair. Patient with multiple recent admissions for falls and weakness. During his last admission in June 2024 CT cervical spine was done which incidentally found 5cm transverse abdominal aortic aneurysm, the patient was provided vascular surgery outpatient follow up and was seen by Dr. Corbin on 7/9/2024.; the patient was being followed outpatient for planned aneurysm repair of infrarenal AAA and recommended to obtain CTA of abdomen and pelvis to define anatomy (already completed at [facility]; no need for repeat imaging). Patient also was seen in office and recommended to see a cardiologist for optimization prior to planned EVAR and referral given to Dr. Centeno. Please consult Dr. Centeno's team while inpatient to confirm patient is cardiac optimized for EVAR. Patient opted for DNR/DNI status this morning. Vascular team had lengthy discussion with patient regarding risks and benefits of EVAR procedure once patient is medically optimized. Patient states that he is still open to the idea of having the procedure but is undecided at this time.    Recommendations:  - Consult Dr. Centeno's team for pre-operative risk stratification and cardiac optimization for EVAR  - No need for repeat CTA A/P  - Continue medical work up for generalized weakness per primary team  - Agree with wound care for bilateral LE  - Rest of care per primary  Vascular Surgery will continue to follow.  Final plan pending discussion with attending.

## 2024-08-14 NOTE — DISCHARGE NOTE PROVIDER - NSDCMRMEDTOKEN_GEN_ALL_CORE_FT
Ambien 5 mg oral tablet: 1 tab(s) orally once a day (at bedtime) as needed for  insomnia  bisacodyl 10 mg rectal suppository: 1 suppository(ies) rectal once a day As needed If no bowel movement by POD#2  bisacodyl 5 mg oral delayed release tablet: 1 tab(s) orally every 12 hours  clonazePAM 2 mg oral tablet: 1 tab(s) orally every 12 hours as needed for  anxiety  Dilaudid 4 mg oral tablet: 1 tab(s) orally every 4 hours as needed for  severe pain  DULoxetine 60 mg oral delayed release capsule: 1 cap(s) orally every 12 hours  senna leaf extract oral tablet: 2 tab(s) orally once a day (at bedtime)  TraMADol (Eqv-Ryzolt) 100 mg/24 hours oral tablet, extended release: 1 tab(s) orally once a day   Ambien 5 mg oral tablet: 1 tab(s) orally once a day (at bedtime) as needed for  insomnia  bisacodyl 10 mg rectal suppository: 1 suppository(ies) rectal once a day As needed Constipation  ceFAZolin: 2 gram(s) intravenous every 8 hours until 9/9/2024  clonazePAM 2 mg oral tablet: 1 tab(s) orally every 12 hours as needed for  anxiety  clotrimazole 1% topical cream: 1 Apply topically to affected area every 12 hours  DULoxetine 60 mg oral delayed release capsule: 1 cap(s) orally every 12 hours  HYDROmorphone 4 mg oral tablet: 1 tab(s) orally every 6 hours  Multiple Vitamins oral tablet: 1 tab(s) orally once a day  senna leaf extract oral tablet: 2 tab(s) orally once a day (at bedtime)  traMADol 50 mg oral tablet: 1 tab(s) orally every 12 hours

## 2024-08-14 NOTE — PROGRESS NOTE ADULT - PROBLEM SELECTOR PLAN 5
Pt has hx of chronic lymphedema of B/L legs. Pt endorses mild tenderness/pain to touch on B/L LE. On physical exam, pt's legs are B/L erythematous, warm to touch, with dry scaling skin with intermittent patches of oozing indicative of venous insufficiency. Prior US duplex b/l was negative B/L    Plan:  - wound care for b/l legs  - encourage leg elevation  - will start clotrimazole 1% topical cream for fungal rash in bilateral thighs  - Wound care to start emollient   - Wound care consulted, recs appreciated

## 2024-08-14 NOTE — DISCHARGE NOTE PROVIDER - HOSPITAL COURSE
#Discharge: do not delete    Mr. Egan is a 80 y/o M with a PMHx of Bladder cancer (s/p tumor resection, s/p localized chemo and now in remission), GERD, L knee OA, chronic lymphedema, kyphoscoliosis s/p C-sacrum fusion at [facility] (10/5/2020, w/ Dr. Carlisle), MDD, panic disorder, prior RUE DVT, BPH who was admitted for recurrent falls, now found to have MSSA bacteremia.    Problem List/Main Diagnoses (system-based):   #Physical deconditioning.   Pt presents with 2 days of inability to get up and out of chair at home; pt has HHA 2-3x a week. Pt was last admitted 7/1/24 for recurrent falls. During his prior admission PT evaluated and recommended MCKENNA however pt chose to go home. Pt endorses being able to eat during this period.   PT evaluated and recommended MCKENNA however pt wanted to go home. Vitals stable, CK elevated to 419, iso inability to move.   On exam tremor of b/l UE present - pt evaluated by neurology, evidence of some rigidity and mild pill-rolling tremor however unclear if these symptoms are exacerbated by his ongoing infx  Orthostatics positive - may be an element of dysautonomia, expect supine hypertension in Parkinson's    Plan:  - Neurology consult for evaluation of Parkinson's vs other neurological disorders -- follow up as OP w/ Dr. Jernigan    #MSSA bacteremia.   ·  Plan: BCx growing gram positive cocci in clusters in 1 set, PCR positive for MSSA    Plan:  - c/w Ancef 2g q8 for 4 weeks (8/13 - 9/12)    #Injury of left elbow.   ·  Plan: Pt reports falling on elbow 3 days prior, found to have erythematous fluctuance on L elbow with small amount of dried blood and mildly tender to palpation w/ full ROM. Pt has been afebrile, does not endorse subjective fevers/chills, WBC 6.44, lower c/f septic bursitis or septic arthritis. More likely localized inflammatory reaction to mechanical injury  L Elbow xray normal without signs of fx    #Abdominal aortic aneurysm (AAA).   ·  Plan: CT Cervical Spine No Cont (06.29.24 @ 14:25): 5 cm transverse abdominal aortic aneurysm.   Vascular surgery consulted, no interventions at this time  Cardiology consulted for pre-operative risk stratification and cardiac optimization for EVAR - given no interventions will be done in-patient and currently no date set for procedure, defer cardiac pre-operative risk stratification for now    Plan:   - OP Vascular surgery follow-up on discharge  - OP cardiology follow-up with Dr. Centeno on discharge.    #Lymphedema of leg.   ·  Plan: Pt has hx of chronic lymphedema of B/L legs. Pt endorses mild tenderness/pain to touch on B/L LE. On physical exam, pt's legs are B/L erythematous, warm to touch, with dry scaling skin with intermittent patches of oozing indicative of venous insufficiency. Prior US duplex b/l was negative B/L    Plan:  - wound care for b/l legs  - encourage leg elevation  - c/w clotrimazole 1% topical cream for fungal rash in bilateral thighs  - Wound care to start emollient     Patient was discharged to: home    New medications: ancef, clotrimazole  Changes to old medications: None  Medications that were stopped: None    Items to follow up as outpatient: PCP, Neurology    Physical exam at the time of discharge:       LABS & STUDIES:   #Discharge: do not delete    Mr. Egan is a 78 y/o M with a PMHx of Bladder cancer (s/p tumor resection, s/p localized chemo and now in remission), GERD, L knee OA, chronic lymphedema, kyphoscoliosis s/p C-sacrum fusion at [facility] (10/5/2020, w/ Dr. Carlisle), MDD, panic disorder, prior RUE DVT, BPH who was admitted for recurrent falls, now found to have MSSA bacteremia.    Problem List/Main Diagnoses (system-based):   #Physical deconditioning.   Pt presents with 2 days of inability to get up and out of chair at home; pt has HHA 2-3x a week. Pt was last admitted 7/1/24 for recurrent falls. During his prior admission PT evaluated and recommended MCKENNA however pt chose to go home. Pt endorses being able to eat during this period.   PT evaluated and recommended MCKENNA however pt wanted to go home. Vitals stable, CK elevated to 419, iso inability to move.   On exam tremor of b/l UE present - pt evaluated by neurology, evidence of some rigidity and mild pill-rolling tremor however unclear if these symptoms are exacerbated by his ongoing infx  Orthostatics positive - may be an element of dysautonomia, expect supine hypertension in Parkinson's; s/p LR bolus and abdominal binder, repeat orthostatics negative  Palliative recommending chronic pain consult given pt appears oversedated on his current pain regimen with difficulties in participating in GOC conversations    Plan:  - Neurology consult for evaluation of Parkinson's vs other neurological disorders -- follow up as OP w/ Dr. Jernigan    #MSSA bacteremia.   BCx growing gram positive cocci in clusters in 1 set, PCR positive for MSSA  8/15: BCx drawn on 8/14 NGTD  TTE LVEF 55%, PASP 40mmHg  Pt currently DNR/DNI, spoke to pt's brother and pt agreed to reverse DNR/DNI for RAJANI  8/19: RAJANI deferred by pt    Plan:  - c/w Ancef 2g q8 for 4 weeks total, needs 3 more weeks at [facility] (8/13 - 9/9)  - PICC team placing midline today  - OP ID follow up with Dr. Williamson in 2 weeks.    #Injury of left elbow.   ·  Plan: Pt reports falling on elbow 3 days prior, found to have erythematous fluctuance on L elbow with small amount of dried blood and mildly tender to palpation w/ full ROM. Pt has been afebrile, does not endorse subjective fevers/chills, WBC 6.44, lower c/f septic bursitis or septic arthritis. More likely localized inflammatory reaction to mechanical injury  L Elbow xray normal without signs of fx    #Abdominal aortic aneurysm (AAA).   ·  Plan: CT Cervical Spine No Cont (06.29.24 @ 14:25): 5 cm transverse abdominal aortic aneurysm.   Vascular surgery consulted, no interventions at this time  Cardiology consulted for pre-operative risk stratification and cardiac optimization for EVAR - given no interventions will be done in-patient and currently no date set for procedure, defer cardiac pre-operative risk stratification for now    Plan:   - OP Vascular surgery follow-up on discharge  - OP cardiology follow-up with Dr. Centeno on discharge.    #Lymphedema of leg.   ·  Plan: Pt has hx of chronic lymphedema of B/L legs. Pt endorses mild tenderness/pain to touch on B/L LE. On physical exam, pt's legs are B/L erythematous, warm to touch, with dry scaling skin with intermittent patches of oozing indicative of venous insufficiency. Prior US duplex b/l was negative B/L    Plan:  - wound care for b/l legs  - encourage leg elevation  - c/w clotrimazole 1% topical cream for fungal rash in bilateral thighs  - per wound care, apply Atrac-tain to bilateral UE, LE     Patient was discharged to: [facility]    New medications: IV Ancef 2g q8, clotrimazole topical cream  Changes to old medications: None  Medications that were stopped: None    Items to follow up as outpatient: PCP, Neurology, ID, Vascular surgery    Physical exam at the time of discharge:     General: Lying comfortably and in no acute distress. Asleep at bedside but awakens easily to name  HEENT: NC/AT, no signs of bleeding, bruising; EOMI, anicteric sclera; MMM  Neck: supple  Cardiovascular: +S1/S2, RRR  Respiratory: CTA B/L, decreased breath sounds bilaterally; no W/R/R; no increased WOB  Gastrointestinal: soft, NT/ND; +BSx4  Extremities: WWP; no edema, clubbing or cyanosis  Vascular: 2+ radial pulses B/L  Neurological: AAOx2; no focal deficits  Psychiatric: pleasant mood and affect  Dermatologic: diffuse scaling and dry skin of the b/l UE and LE with excorations    LABS & STUDIES:   #Discharge: do not delete    Mr. Egan is a 78 y/o M with a PMHx of Bladder cancer (s/p tumor resection, s/p localized chemo and now in remission), GERD, L knee OA, chronic lymphedema, kyphoscoliosis s/p C-sacrum fusion at [facility] (10/5/2020, w/ Dr. Carlisle), MDD, panic disorder, prior RUE DVT, BPH who was admitted for recurrent falls, now found to have MSSA bacteremia.    Problem List/Main Diagnoses (system-based):   #Physical deconditioning.   Pt presents with 2 days of inability to get up and out of chair at home; pt has HHA 2-3x a week. Pt was last admitted 7/1/24 for recurrent falls. During his prior admission PT evaluated and recommended MCKENNA however pt chose to go home. Pt endorses being able to eat during this period.   PT evaluated and recommended MCKENNA however pt wanted to go home. Vitals stable, CK elevated to 419, iso inability to move.   On exam tremor of b/l UE present - pt evaluated by neurology, evidence of some rigidity and mild pill-rolling tremor however unclear if these symptoms are exacerbated by his ongoing infx  Orthostatics positive - may be an element of dysautonomia, expect supine hypertension in Parkinson's; s/p LR bolus and abdominal binder, repeat orthostatics negative  Palliative recommending chronic pain consult given pt appears oversedated on his current pain regimen with difficulties in participating in GOC conversations    Plan:  - Neurology consult for evaluation of Parkinson's vs other neurological disorders -- follow up as OP w/ Dr. Jernigan    #MSSA bacteremia.   BCx growing gram positive cocci in clusters in 1 set, PCR positive for MSSA  8/15: BCx drawn on 8/14 NGTD  TTE LVEF 55%, PASP 40mmHg  8/19: RAJANI deferred by pt, ptn was extensively counseled by infectious disease team, primary team and family on risks of not obtaining RAJANI and ptn continued to refuse procedure, discussed w infecitous disease team, will treat with 4 week course of IV antibiotics    Plan:  - c/w Ancef 2g q8 for 4 weeks total, needs 3 more weeks at [facility] (8/13 - 9/9)  - PICC team placing midline today  - OP ID follow up with Dr. Pek in 2 weeks.    #Injury of left elbow.   ·  Plan: Pt reports falling on elbow 3 days prior, found to have erythematous fluctuance on L elbow with small amount of dried blood and mildly tender to palpation w/ full ROM. Pt has been afebrile, does not endorse subjective fevers/chills, WBC 6.44, lower c/f septic bursitis or septic arthritis. More likely localized inflammatory reaction to mechanical injury  L Elbow xray normal without signs of fx    #chronic back pain, history of chronic vertebral compression fractures and kyphoscoliosis sp C-sacrum fusion  - per patient, on dilaudid 4mg q4 at home, tramadol 100 ER qd, ptn noted to be intermittently somnolent on exam while inpatient also did not show acute symptoms of pain on exam  - weaned to dilaudid q6 here in the hospital, tramadol 50 q12 PRN, pain appears well controlled on new regimen, somnolence improved    #Abdominal aortic aneurysm (AAA).   ·  Plan: CT Cervical Spine No Cont (06.29.24 @ 14:25): 5 cm transverse abdominal aortic aneurysm.   Vascular surgery consulted, no interventions at this time  Cardiology consulted for pre-operative risk stratification and cardiac optimization for EVAR - given no interventions will be done in-patient and currently no date set for procedure, defer cardiac pre-operative risk stratification for now    Plan:   - OP Vascular surgery follow-up on discharge  - OP cardiology follow-up with Dr. Centeno on discharge.    #Lymphedema of leg.   ·  Plan: Pt has hx of chronic lymphedema of B/L legs. Pt endorses mild tenderness/pain to touch on B/L LE. On physical exam, pt's legs are B/L erythematous, warm to touch, with dry scaling skin with intermittent patches of oozing indicative of venous insufficiency. Prior US duplex b/l was negative B/L    Plan:  - wound care for b/l legs  - encourage leg elevation  - c/w clotrimazole 1% topical cream for fungal rash in bilateral thighs  - per wound care, apply Atrac-tain to bilateral UE, LE     Patient was discharged to: [facility]    New medications: IV Ancef 2g q8, clotrimazole topical cream  Changes to old medications: None  Medications that were stopped: None    Items to follow up as outpatient: PCP, Neurology, ID, Vascular surgery    Physical exam at the time of discharge:     General: Lying comfortably and in no acute distress. Asleep at bedside but awakens easily to name  HEENT: NC/AT, no signs of bleeding, bruising; EOMI, anicteric sclera; MMM  Neck: supple  Cardiovascular: +S1/S2, RRR  Respiratory: CTA B/L, decreased breath sounds bilaterally; no W/R/R; no increased WOB  Gastrointestinal: soft, NT/ND; +BSx4  Extremities: WWP; no edema, clubbing or cyanosis  Vascular: 2+ radial pulses B/L  Neurological: AAOx2; no focal deficits  Psychiatric: pleasant mood and affect  Dermatologic: diffuse scaling and dry skin of the b/l UE and LE with excorations    LABS & STUDIES:   #Discharge: do not delete    Mr. Egan is a 80 y/o M with a PMHx of Bladder cancer (s/p tumor resection, s/p localized chemo and now in remission), GERD, L knee OA, chronic lymphedema, kyphoscoliosis s/p C-sacrum fusion at [facility] (10/5/2020, w/ Dr. Carlisle), MDD, panic disorder, prior RUE DVT, BPH who was admitted for recurrent falls, now found to have MSSA bacteremia.    Problem List/Main Diagnoses (system-based):   #Physical deconditioning.   Pt presents with 2 days of inability to get up and out of chair at home; pt has HHA 2-3x a week. Pt was last admitted 7/1/24 for recurrent falls. During his prior admission PT evaluated and recommended MCKENNA however pt chose to go home. Pt endorses being able to eat during this period.   PT evaluated and recommended MCKENNA however pt wanted to go home. Vitals stable, CK elevated to 419, iso inability to move.   On exam tremor of b/l UE present - pt evaluated by neurology, evidence of some rigidity and mild pill-rolling tremor however unclear if these symptoms are exacerbated by his ongoing infx  Orthostatics positive - may be an element of dysautonomia, expect supine hypertension in Parkinson's; s/p LR bolus and abdominal binder, repeat orthostatics negative  Palliative recommending chronic pain consult given pt appears oversedated on his current pain regimen with difficulties in participating in GOC conversations    Plan:  - Neurology consult for evaluation of Parkinson's vs other neurological disorders -- follow up as OP w/ Dr. Jernigan    #MSSA bacteremia.   BCx growing gram positive cocci in clusters in 1 set, PCR positive for MSSA  8/15: BCx drawn on 8/14 NGTD  TTE LVEF 55%, PASP 40mmHg  8/19: RAJANI deferred by pt, ptn was extensively counseled by infectious disease team, primary team and family on risks of not obtaining RAJANI and ptn continued to refuse procedure, discussed w infecitous disease team, will treat with 4 week course of IV antibiotics    Plan:  - c/w Ancef 2g q8 for 4 weeks total, needs 3 more weeks at [facility] (8/13 - 9/9), will need weekly labs: CMP, CBC, ESR, CRP faxed to ID office at [phone]  - PICC team placing midline today  - OP ID follow up with Dr. Williamson in 2 weeks.    #Injury of left elbow.   ·  Plan: Pt reports falling on elbow 3 days prior, found to have erythematous fluctuance on L elbow with small amount of dried blood and mildly tender to palpation w/ full ROM. Pt has been afebrile, does not endorse subjective fevers/chills, WBC 6.44, lower c/f septic bursitis or septic arthritis. More likely localized inflammatory reaction to mechanical injury  L Elbow xray normal without signs of fx    #chronic back pain, history of chronic vertebral compression fractures and kyphoscoliosis sp C-sacrum fusion  - per patient, on dilaudid 4mg q4 at home, tramadol 100 ER qd, ptn noted to be intermittently somnolent on exam while inpatient also did not show acute symptoms of pain on exam  - weaned to dilaudid q6 here in the hospital, tramadol 50 q12 PRN, pain appears well controlled on new regimen, somnolence improved    #Abdominal aortic aneurysm (AAA).   ·  Plan: CT Cervical Spine No Cont (06.29.24 @ 14:25): 5 cm transverse abdominal aortic aneurysm.   Vascular surgery consulted, no interventions at this time  Cardiology consulted for pre-operative risk stratification and cardiac optimization for EVAR - given no interventions will be done in-patient and currently no date set for procedure, defer cardiac pre-operative risk stratification for now    Plan:   - OP Vascular surgery follow-up on discharge  - OP cardiology follow-up with Dr. Centeno on discharge.    #Lymphedema of leg.   ·  Plan: Pt has hx of chronic lymphedema of B/L legs. Pt endorses mild tenderness/pain to touch on B/L LE. On physical exam, pt's legs are B/L erythematous, warm to touch, with dry scaling skin with intermittent patches of oozing indicative of venous insufficiency. Prior US duplex b/l was negative B/L    Plan:  - wound care for b/l legs  - encourage leg elevation  - c/w clotrimazole 1% topical cream for fungal rash in bilateral thighs  - per wound care, apply Atrac-tain to bilateral UE, LE     Patient was discharged to: [facility]    New medications: IV Ancef 2g q8, clotrimazole topical cream  Changes to old medications: None  Medications that were stopped: None    Items to follow up as outpatient: PCP, Neurology, ID, Vascular surgery    Physical exam at the time of discharge:     General: Lying comfortably and in no acute distress. Asleep at bedside but awakens easily to name  HEENT: NC/AT, no signs of bleeding, bruising; EOMI, anicteric sclera; MMM  Neck: supple  Cardiovascular: +S1/S2, RRR  Respiratory: CTA B/L, decreased breath sounds bilaterally; no W/R/R; no increased WOB  Gastrointestinal: soft, NT/ND; +BSx4  Extremities: WWP; no edema, clubbing or cyanosis  Vascular: 2+ radial pulses B/L  Neurological: AAOx2; no focal deficits  Psychiatric: pleasant mood and affect  Dermatologic: diffuse scaling and dry skin of the b/l UE and LE with excorations    LABS & STUDIES:   #Discharge: do not delete    Mr. Egan is a 78 y/o M with a PMHx of Bladder cancer (s/p tumor resection, s/p localized chemo and now in remission), GERD, L knee OA, chronic lymphedema, kyphoscoliosis s/p C-sacrum fusion at [facility] (10/5/2020, w/ Dr. Carlisle), MDD, panic disorder, prior RUE DVT, BPH who was admitted for recurrent falls, now found to have MSSA bacteremia.    Problem List/Main Diagnoses (system-based):   #Physical deconditioning.   Pt presents with 2 days of inability to get up and out of chair at home; pt has HHA 2-3x a week. Pt was last admitted 7/1/24 for recurrent falls. During his prior admission PT evaluated and recommended MCKENNA however pt chose to go home. Pt endorses being able to eat during this period.   PT evaluated and recommended MCKENNA however pt wanted to go home. Vitals stable, CK elevated to 419, iso inability to move.   On exam tremor of b/l UE present - pt evaluated by neurology, evidence of some rigidity and mild pill-rolling tremor however unclear if these symptoms are exacerbated by his ongoing infx  Orthostatics positive - may be an element of dysautonomia, expect supine hypertension in Parkinson's; s/p LR bolus and abdominal binder, repeat orthostatics negative  Palliative recommending chronic pain consult given pt appears oversedated on his current pain regimen with difficulties in participating in GOC conversations    Plan:  - Neurology consult for evaluation of Parkinson's vs other neurological disorders -- follow up as OP w/ Dr. Jernigan    #MSSA bacteremia.   BCx growing gram positive cocci in clusters in 1 set, PCR positive for MSSA  8/15: BCx drawn on 8/14 NGTD  TTE LVEF 55%, PASP 40mmHg  8/19: RAJANI deferred by pt, ptn was extensively counseled by infectious disease team, primary team and family on risks of not obtaining RAJANI and ptn continued to refuse procedure, discussed w infecitous disease team, will treat with 4 week course of IV antibiotics    Plan:  - c/w Ancef 2g q8 for 4 weeks total, needs 3 more weeks at [facility] (8/13 - 9/9), will need weekly labs: CMP, CBC, ESR, CRP faxed to ID office at [phone]  - PICC team placing midline today  - OP ID follow up with Dr. Williamson in 2 weeks.    #Injury of left elbow.   ·  Plan: Pt reports falling on elbow 3 days prior, found to have erythematous fluctuance on L elbow with small amount of dried blood and mildly tender to palpation w/ full ROM. Pt has been afebrile, does not endorse subjective fevers/chills, WBC 6.44, lower c/f septic bursitis or septic arthritis. More likely localized inflammatory reaction to mechanical injury  L Elbow xray normal without signs of fx    #chronic back pain, history of chronic vertebral compression fractures and kyphoscoliosis sp C-sacrum fusion  - per patient, on dilaudid 4mg q4 at home, tramadol 100 ER qd, ptn noted to be intermittently somnolent on exam while inpatient also did not show acute symptoms of pain on exam  - weaned to dilaudid q6 here in the hospital, tramadol 50 q12 PRN, pain appears well controlled on new regimen, somnolence improved    #Abdominal aortic aneurysm (AAA).   ·  Plan: CT Cervical Spine No Cont (06.29.24 @ 14:25): 5 cm transverse abdominal aortic aneurysm.   Vascular surgery consulted, no interventions at this time  Cardiology consulted for pre-operative risk stratification and cardiac optimization for EVAR - given no interventions will be done in-patient and currently no date set for procedure, defer cardiac pre-operative risk stratification for now    Plan:   - OP Vascular surgery follow-up on discharge  - OP cardiology follow-up with Dr. Centeno on discharge.    #Lymphedema of leg.   ·  Plan: Pt has hx of chronic lymphedema of B/L legs. Pt endorses mild tenderness/pain to touch on B/L LE. On physical exam, pt's legs are B/L erythematous, warm to touch, with dry scaling skin with intermittent patches of oozing indicative of venous insufficiency. Prior US duplex b/l was negative B/L    Plan:  - wound care for b/l legs  - encourage leg elevation  - c/w clotrimazole 1% topical cream for fungal rash in bilateral thighs  - per wound care, apply Atrac-tain to bilateral UE, LE     #Chronic back pain  Home med: hydromorphone 4mg q4  While inpatient, pt has been on dilaudid 4mg q4, switched to q8 but pt did not tolerate and complained of ongoing pain and refused to engage in care plans  Discharged pt to hydromorphone 4mg q6 with plans to for OP chronic pain management follow up    Patient was discharged to: [facility]    New medications: IV Ancef 2g q8, clotrimazole topical cream  Changes to old medications: None  Medications that were stopped: None    Items to follow up as outpatient: PCP, Neurology, ID, Vascular surgery    Physical exam at the time of discharge:     General: Lying comfortably and in no acute distress. Asleep at bedside but awakens easily to name  HEENT: NC/AT, no signs of bleeding, bruising; EOMI, anicteric sclera; MMM  Neck: supple  Cardiovascular: +S1/S2, RRR  Respiratory: CTA B/L, decreased breath sounds bilaterally; no W/R/R; no increased WOB  Gastrointestinal: soft, NT/ND; +BSx4  Extremities: WWP; no edema, clubbing or cyanosis  Vascular: 2+ radial pulses B/L  Neurological: AAOx2; no focal deficits  Psychiatric: pleasant mood and affect  Dermatologic: diffuse scaling and dry skin of the b/l UE and LE with excorations    LABS & STUDIES:

## 2024-08-14 NOTE — PROGRESS NOTE ADULT - PROBLEM SELECTOR PLAN 1
Pt presents with 2 days of inability to get up and out of chair at home; pt has HHA 2-3x a week. Pt was last admitted 7/1/24 for recurrent falls. During his prior admission PT evaluated and recommended MCKENNA however pt chose to go home. Pt endorses being able to eat during this period.   PT evaluated and recommended MCKENNA however pt wanted to go home. Vitals stable, CK elevated to 419, iso inability to move.   On exam tremor of b/l UE present    Plan:  - PT consult  - Neurology consult for evaluation of Parkinson's vs other neurological disorders -- follow up as OP  - Orthostatics today  - Fall precautions  - f/u UA Pt presents with 2 days of inability to get up and out of chair at home; pt has HHA 2-3x a week. Pt was last admitted 7/1/24 for recurrent falls. During his prior admission PT evaluated and recommended MCKENNA however pt chose to go home. Pt endorses being able to eat during this period.   PT evaluated and recommended MCKENNA however pt wanted to go home. Vitals stable, CK elevated to 419, iso inability to move.   On exam tremor of b/l UE present    Plan:  - PT consult  - Neurology consult for evaluation of Parkinson's vs other neurological disorders -- follow up as OP  - orthostatics positive, could be iso dysautonomia given above  - Orthostatics today  - Fall precautions  - f/u UA Pt presents with 2 days of inability to get up and out of chair at home; pt has HHA 2-3x a week. Pt was last admitted 7/1/24 for recurrent falls. During his prior admission PT evaluated and recommended MCKENNA however pt chose to go home. Pt endorses being able to eat during this period.   PT evaluated and recommended MCKENNA however pt wanted to go home. Vitals stable, CK elevated to 419, iso inability to move.   On exam tremor of b/l UE present - pt evaluated by neurology, evidence of some rigidity and mild pill-rolling tremor however unclear if these symptoms are exacerbated by his ongoing infx  Orthostatics positive - may be an element of dysautonomia, expect supine hypertension in Parkinson's    Plan:  - Neurology consult for evaluation of Parkinson's vs other neurological disorders -- follow up as OP  - Orthostatics positive, could be iso dysautonomia given above  - LR bolus, repeat orthostatics today  - Fall precautions  - f/u UA Pt presents with 2 days of inability to get up and out of chair at home; pt has HHA 2-3x a week. Pt was last admitted 7/1/24 for recurrent falls. During his prior admission PT evaluated and recommended MCKENNA however pt chose to go home. Pt endorses being able to eat during this period.   PT evaluated and recommended MCKENNA however pt wanted to go home. Vitals stable, CK elevated to 419, iso inability to move.   On exam tremor of b/l UE present - pt evaluated by neurology, evidence of some rigidity and mild pill-rolling tremor however unclear if these symptoms are exacerbated by his ongoing infx  Orthostatics positive - may be an element of dysautonomia, expect supine hypertension in Parkinson's    Plan:  - Neurology consult for evaluation of Parkinson's vs other neurological disorders -- follow up as OP  - Orthostatics positive, could be iso dysautonomia given above  - LR bolus, repeat orthostatics today  - OP follow up with neurology (Dr. Jernigan)  - Fall precautions  - f/u UA

## 2024-08-14 NOTE — OCCUPATIONAL THERAPY INITIAL EVALUATION ADULT - PERTINENT HX OF CURRENT PROBLEM, REHAB EVAL
79M w/ PMH of bladder CA (s/p tumor resection), GERD, chronic lymphedema, kyphoscoliosis s/p C-sacrum fusion (2020), chronic compression fractures of vertebral column, L knee OA, MDD, panic disorder, BPH, abdominal aortic aneurysm found on last admission, recently admitted for fall at home, now presenting to ED due weakness and inability to stand from chair for two days. Pt states he felt generalized weakness and difficulty getting up and out of his chair. Pt states he was able to urinate by using a bedside urinal. Pt endorses eating snacks and Chinese food that his friend brought over last night. Pt states he normally has help from [facility] but last time they were came was Friday. Pt called the hospital as he was unsure what to do and was advised to call 911 for assistance in getting out of chair. Pt states 3 days prior he fell on his elbow, which has been bleeding on and off. Pt also reports having hit his head on his dresser last week when trying to  a bandage, no bleeding or LOC. He also reports b/l lower leg pain with oozing of the skin. Pt also endorses cough since this morning. Pt states he is chronically constipated, does not know last BM. Pt states he as not been taking home diazepam for the past few weeks but endorses taking dilaudid 4mg q4 and tramadol for his back pain. Pt takes clonazepam 2mg BID for anxiety, last dose was last night. Pt denies fevers, chills, abdominal pain, dysuria, diarrhea, vomiting, hematochezia or melena, lightheadedness

## 2024-08-14 NOTE — DISCHARGE NOTE PROVIDER - ATTENDING DISCHARGE PHYSICAL EXAMINATION:
Gen: sitting upright in bed at time of exam  HEENT: NCAT, MMM, clear OP  Neck: supple, trachea at midline  CV: RRR, +S1/S2  Pulm: adequate respiratory effort, no increased work of breathing  Abd: soft, NTND  Skin: warm and dry, no new rashes vs prior report  Ext: WWP  Neuro: AOx3, no gross focal neurological deficits  Psych: affect and behavior appropriate Gen: sitting upright in bed at time of exam  HEENT: NCAT, MMM, clear OP  Neck: supple, trachea at midline  CV: RRR, +S1/S2  Pulm: adequate respiratory effort, no increased work of breathing  Abd: soft, NTND  Skin: warm and dry, no new rashes vs prior report.  Ext: WWP  Neuro: AOx3, no gross focal neurological deficits  Psych: affect and behavior appropriate

## 2024-08-14 NOTE — OCCUPATIONAL THERAPY INITIAL EVALUATION ADULT - GENERAL OBSERVATIONS, REHAB EVAL
PT Troy present. Patient received semisupine in bed +heplock IV, room air, NAD. Patient A&Ox4, agreeable to OT evaluation.

## 2024-08-14 NOTE — PROGRESS NOTE ADULT - PROBLEM SELECTOR PLAN 8
Pt has prescription for tamsulosin 0.4mg PO QD, however per pt's home pharmacy, pt has not picked up medication since September 2023    Plan:   - hold tamsulosin

## 2024-08-14 NOTE — PROGRESS NOTE ADULT - SUBJECTIVE AND OBJECTIVE BOX
SUBJECTIVE: Patient seen and examined at bedside this am. Patient agitated and threatening to leave against medical advice due to issues with pain control. Patient was uncooperative with interview. Discussed with primary team.      ceFAZolin   IVPB      ceFAZolin   IVPB 2000 milliGRAM(s) IV Intermittent every 8 hours  enoxaparin Injectable 40 milliGRAM(s) SubCutaneous every 24 hours      Vital Signs Last 24 Hrs  T(C): 37.2 (14 Aug 2024 09:00), Max: 37.2 (14 Aug 2024 06:17)  T(F): 98.9 (14 Aug 2024 09:00), Max: 98.9 (14 Aug 2024 06:17)  HR: 70 (14 Aug 2024 09:00) (67 - 73)  BP: 122/63 (14 Aug 2024 09:00) (122/63 - 125/67)  BP(mean): --  RR: 18 (14 Aug 2024 09:00) (17 - 18)  SpO2: 97% (14 Aug 2024 09:00) (97% - 98%)    Parameters below as of 14 Aug 2024 09:00  Patient On (Oxygen Delivery Method): room air      I&O's Detail      Physical Exam:  Patient was uncooperative for physical exam      LABS:                        10.5   5.40  )-----------( 247      ( 13 Aug 2024 05:30 )             32.8     08-13    137  |  104  |  13  ----------------------------<  95  4.0   |  26  |  0.62    Ca    8.1<L>      13 Aug 2024 05:30  Phos  3.3     08-13  Mg     2.2     08-13        Urinalysis Basic - ( 13 Aug 2024 05:30 )    Color: x / Appearance: x / SG: x / pH: x  Gluc: 95 mg/dL / Ketone: x  / Bili: x / Urobili: x   Blood: x / Protein: x / Nitrite: x   Leuk Esterase: x / RBC: x / WBC x   Sq Epi: x / Non Sq Epi: x / Bacteria: x        RADIOLOGY & ADDITIONAL STUDIES:

## 2024-08-14 NOTE — OCCUPATIONAL THERAPY INITIAL EVALUATION ADULT - DIAGNOSIS, OT EVAL
Patient brought to [facility] 2/2 weakness and inability to stand from chair for two days presents with mild/moderate BLE edema with skin peeling, deficits with overall strength, balance, postural control, activity tolerance, kyphotic posture requiring min verbal cues throughout for proper positioning and safety.

## 2024-08-14 NOTE — PROGRESS NOTE ADULT - PROBLEM SELECTOR PLAN 3
Pt reports falling on elbow 3 days prior, found to have erythematous fluctuance on L elbow with small amount of dried blood and mildly tender to palpation w/ full ROM. Pt has been afebrile, does not endorse subjective fevers/chills, WBC 6.44, lower c/f septic bursitis or septic arthritis. More likely localized inflammatory reaction to mechanical injury  L Elbow xray normal without signs of fx    Plan:  - con't to monitor for ROM and worsening erythema/fluctuance  - trend CBC.

## 2024-08-14 NOTE — PROGRESS NOTE ADULT - SUBJECTIVE AND OBJECTIVE BOX
INFECTIOUS DISEASES CONSULT FOLLOW-UP NOTE    INTERVAL HPI/OVERNIGHT EVENTS:  Afebrile, no new sx, no new joint pains. Tolerating abx    ROS:   Constitutional, eyes, ENT, cardiovascular, respiratory, gastrointestinal, genitourinary, integumentary, neurological, psychiatric and heme/lymph are otherwise negative other than noted above       ANTIBIOTICS/RELEVANT:    MEDICATIONS  (STANDING):  bisacodyl 5 milliGRAM(s) Oral every 12 hours  ceFAZolin   IVPB      ceFAZolin   IVPB 2000 milliGRAM(s) IV Intermittent every 8 hours  clotrimazole 1% Cream 1 Application(s) Topical two times a day  DULoxetine 60 milliGRAM(s) Oral every 12 hours  enoxaparin Injectable 40 milliGRAM(s) SubCutaneous every 24 hours  HYDROmorphone   Tablet 4 milliGRAM(s) Oral every 4 hours  lidocaine   4% Patch 1 Patch Transdermal every 24 hours  polyethylene glycol 3350 17 Gram(s) Oral every 24 hours  senna 2 Tablet(s) Oral at bedtime  traMADol 50 milliGRAM(s) Oral every 12 hours    MEDICATIONS  (PRN):  acetaminophen     Tablet .. 650 milliGRAM(s) Oral every 6 hours PRN Temp greater or equal to 38C (100.4F), Mild Pain (1 - 3)  bisacodyl Suppository 10 milliGRAM(s) Rectal daily PRN Constipation  clonazePAM  Tablet 2 milliGRAM(s) Oral every 12 hours PRN Anxiety        Vital Signs Last 24 Hrs  T(C): 37.2 (14 Aug 2024 09:00), Max: 37.2 (14 Aug 2024 06:17)  T(F): 98.9 (14 Aug 2024 09:00), Max: 98.9 (14 Aug 2024 06:17)  HR: 70 (14 Aug 2024 09:00) (63 - 73)  BP: 122/63 (14 Aug 2024 09:00) (99/63 - 125/67)  BP(mean): --  RR: 18 (14 Aug 2024 09:00) (17 - 18)  SpO2: 97% (14 Aug 2024 09:00) (91% - 98%)    Parameters below as of 14 Aug 2024 09:00  Patient On (Oxygen Delivery Method): room air        PHYSICAL EXAM:  Constitutional: alert, NAD  Pulmonary: no respiratory distress on RA  Heart: heart rate was normal and rhythm regular, no murmur  Abdomen: soft, non-tender  Neurological: no focal deficits.   Psychiatric: the affect was normal  MSK: Lower extremity excoriations and improving L upper leg erythema without fluctuance, not involving groin and overall mild in appearance. L elbow wound with bandage overlying with bloody drainage. No elbow tenderness. No other peripheral joint swelling/tenderness or ROM limitation. No point spinal tenderness.  Skin: no stigmata of IE        LABS:                        10.5   5.40  )-----------( 247      ( 13 Aug 2024 05:30 )             32.8     08-13    137  |  104  |  13  ----------------------------<  95  4.0   |  26  |  0.62    Ca    8.1<L>      13 Aug 2024 05:30  Phos  3.3     08-13  Mg     2.2     08-13        Urinalysis Basic - ( 13 Aug 2024 05:30 )    Color: x / Appearance: x / SG: x / pH: x  Gluc: 95 mg/dL / Ketone: x  / Bili: x / Urobili: x   Blood: x / Protein: x / Nitrite: x   Leuk Esterase: x / RBC: x / WBC x   Sq Epi: x / Non Sq Epi: x / Bacteria: x        MICROBIOLOGY:  Reviewed    RADIOLOGY & ADDITIONAL STUDIES:  Reviewed

## 2024-08-14 NOTE — PROGRESS NOTE ADULT - PROBLEM SELECTOR PLAN 9
Pt has history of chronic vertebral compression fractures and kyphoscoliosis s/p C-sacrum fusion. Pt on Tramadol 100mg ER QD and Dilaudid 4mg q4 PRN.     Plan:  - Con't tramadol 100mg ER QD  - Dilaudid 4mg q12 for severe PRN  - monitor for back pain symptoms

## 2024-08-14 NOTE — OCCUPATIONAL THERAPY INITIAL EVALUATION ADULT - ADDITIONAL COMMENTS
Patient reports living alone in an 2nd floor walk up with 20 steps. Patient was independent with all ADL's, IADL's and functional mobility with no AD. Patient owns a RW however does not use. Patient has a bathtub shower with shower chair and grab bars. Patient is R hand dominant.

## 2024-08-14 NOTE — PROGRESS NOTE ADULT - PROBLEM SELECTOR PLAN 2
BCx growing gram positive cocci in clusters in 1 set, PCR positive for MSSA    Plan:  - start Ancef 2g q8  - Will repeat BCx every AM until negative for 48 hours  - RAJANI ordered to assess for infective endocarditis BCx growing gram positive cocci in clusters in 1 set, PCR positive for MSSA    Plan:  - start Ancef 2g q8  - Will repeat BCx every AM until negative for 48 hours  - TTE ordered to assess for infective endocarditis, RAJANI if TTE unrevealing BCx growing gram positive cocci in clusters in 1 set, PCR positive for MSSA    Plan:  - c/w Ancef 2g q8  - Will repeat BCx every AM until negative for 48 hours  - Once BCx are negative, will place PICC line  - TTE ordered to assess for infective endocarditis, RAJANI if TTE unrevealing

## 2024-08-14 NOTE — PROGRESS NOTE ADULT - PROBLEM SELECTOR PLAN 4
CT Cervical Spine No Cont (06.29.24 @ 14:25): 5 cm transverse abdominal aortic aneurysm.   Vascular surgery consulted, no interventions at this time    Plan:   - OP Vascular surgery follow-up on discharge CT Cervical Spine No Cont (06.29.24 @ 14:25): 5 cm transverse abdominal aortic aneurysm.   Vascular surgery consulted, no interventions at this time  Cardiology consulted for pre-operative risk stratification and cardiac optimization for EVAR - given no interventions will be done    Plan:   - OP Vascular surgery follow-up on discharge CT Cervical Spine No Cont (06.29.24 @ 14:25): 5 cm transverse abdominal aortic aneurysm.   Vascular surgery consulted, no interventions at this time  Cardiology consulted for pre-operative risk stratification and cardiac optimization for EVAR - given no interventions will be done in-patient and currently no date set for procedure, defer cardiac pre-operative risk stratification for now    Plan:   - OP Vascular surgery follow-up on discharge  - OP cardiology follow-up with Dr. Centeno on discharge

## 2024-08-14 NOTE — DISCHARGE NOTE PROVIDER - CARE PROVIDERS DIRECT ADDRESSES
[email or web address] [email or web address],DirectAddress_Unknown,DirectAddress_Unknown

## 2024-08-14 NOTE — PROGRESS NOTE ADULT - SUBJECTIVE AND OBJECTIVE BOX
OVERNIGHT EVENTS:    SUBJECTIVE / INTERVAL HPI: Patient seen and examined at bedside.       PHYSICAL EXAM:    General: Lying comfortably and in no acute distress  HEENT: NC/AT; EOMI, anicteric sclera; MMM  Neck: supple  Cardiovascular: +S1/S2, RRR  Respiratory: CTA B/L; no W/R/R  Gastrointestinal: soft, NT/ND; +BSx4  Extremities: WWP; no edema, clubbing or cyanosis  Vascular: 2+ radial pulses B/L  Neurological: AAOx3; no focal deficits  Psychiatric: pleasant mood and affect  Dermatologic: no appreciable wounds or damage to the skin    VITAL SIGNS:  Vital Signs Last 24 Hrs  T(C): 37.2 (14 Aug 2024 06:17), Max: 37.2 (14 Aug 2024 06:17)  T(F): 98.9 (14 Aug 2024 06:17), Max: 98.9 (14 Aug 2024 06:17)  HR: 67 (14 Aug 2024 06:17) (63 - 73)  BP: 125/67 (14 Aug 2024 06:17) (99/63 - 125/67)  BP(mean): --  RR: 17 (14 Aug 2024 06:17) (17 - 17)  SpO2: 97% (14 Aug 2024 06:17) (91% - 98%)    Parameters below as of 13 Aug 2024 21:45  Patient On (Oxygen Delivery Method): room air          MEDICATIONS:  MEDICATIONS  (STANDING):  bisacodyl 5 milliGRAM(s) Oral every 12 hours  ceFAZolin   IVPB 2000 milliGRAM(s) IV Intermittent every 8 hours  ceFAZolin   IVPB      clotrimazole 1% Cream 1 Application(s) Topical two times a day  DULoxetine 60 milliGRAM(s) Oral every 12 hours  enoxaparin Injectable 40 milliGRAM(s) SubCutaneous every 24 hours  lidocaine   4% Patch 1 Patch Transdermal every 24 hours  polyethylene glycol 3350 17 Gram(s) Oral every 24 hours  senna 2 Tablet(s) Oral at bedtime  traMADol 50 milliGRAM(s) Oral every 12 hours    MEDICATIONS  (PRN):  acetaminophen     Tablet .. 650 milliGRAM(s) Oral every 6 hours PRN Temp greater or equal to 38C (100.4F), Mild Pain (1 - 3)  bisacodyl Suppository 10 milliGRAM(s) Rectal daily PRN Constipation  clonazePAM  Tablet 2 milliGRAM(s) Oral every 12 hours PRN Anxiety  HYDROmorphone   Tablet 4 milliGRAM(s) Oral every 12 hours PRN Severe Pain (7 - 10)      ALLERGIES:  Allergies    sulfa drugs (Unknown)    Intolerances        LABS:                        10.5   5.40  )-----------( 247      ( 13 Aug 2024 05:30 )             32.8     08-13    137  |  104  |  13  ----------------------------<  95  4.0   |  26  |  0.62    Ca    8.1<L>      13 Aug 2024 05:30  Phos  3.3     08-13  Mg     2.2     08-13        Urinalysis Basic - ( 13 Aug 2024 05:30 )    Color: x / Appearance: x / SG: x / pH: x  Gluc: 95 mg/dL / Ketone: x  / Bili: x / Urobili: x   Blood: x / Protein: x / Nitrite: x   Leuk Esterase: x / RBC: x / WBC x   Sq Epi: x / Non Sq Epi: x / Bacteria: x      CAPILLARY BLOOD GLUCOSE          RADIOLOGY & ADDITIONAL TESTS: Reviewed. OVERNIGHT EVENTS: LAKIA    SUBJECTIVE / INTERVAL HPI: Patient seen and examined at bedside. Pt reports that he is in a lot of pain, reports that he normally takes his hydromorphone 4 mg every 4 hours.     PHYSICAL EXAM:    General: Lying comfortably and in no acute distress  HEENT: NC/AT; EOMI, anicteric sclera; MMM  Neck: supple  Cardiovascular: +S1/S2, RRR  Respiratory: CTA B/L; no W/R/R  Gastrointestinal: soft, NT/ND; +BSx4  Extremities: WWP; no edema, clubbing or cyanosis  Vascular: 2+ radial pulses B/L  Neurological: AAOx3; no focal deficits  Psychiatric: pleasant mood and affect  Dermatologic: no appreciable wounds or damage to the skin    VITAL SIGNS:  Vital Signs Last 24 Hrs  T(C): 37.2 (14 Aug 2024 06:17), Max: 37.2 (14 Aug 2024 06:17)  T(F): 98.9 (14 Aug 2024 06:17), Max: 98.9 (14 Aug 2024 06:17)  HR: 67 (14 Aug 2024 06:17) (63 - 73)  BP: 125/67 (14 Aug 2024 06:17) (99/63 - 125/67)  BP(mean): --  RR: 17 (14 Aug 2024 06:17) (17 - 17)  SpO2: 97% (14 Aug 2024 06:17) (91% - 98%)    Parameters below as of 13 Aug 2024 21:45  Patient On (Oxygen Delivery Method): room air          MEDICATIONS:  MEDICATIONS  (STANDING):  bisacodyl 5 milliGRAM(s) Oral every 12 hours  ceFAZolin   IVPB 2000 milliGRAM(s) IV Intermittent every 8 hours  ceFAZolin   IVPB      clotrimazole 1% Cream 1 Application(s) Topical two times a day  DULoxetine 60 milliGRAM(s) Oral every 12 hours  enoxaparin Injectable 40 milliGRAM(s) SubCutaneous every 24 hours  lidocaine   4% Patch 1 Patch Transdermal every 24 hours  polyethylene glycol 3350 17 Gram(s) Oral every 24 hours  senna 2 Tablet(s) Oral at bedtime  traMADol 50 milliGRAM(s) Oral every 12 hours    MEDICATIONS  (PRN):  acetaminophen     Tablet .. 650 milliGRAM(s) Oral every 6 hours PRN Temp greater or equal to 38C (100.4F), Mild Pain (1 - 3)  bisacodyl Suppository 10 milliGRAM(s) Rectal daily PRN Constipation  clonazePAM  Tablet 2 milliGRAM(s) Oral every 12 hours PRN Anxiety  HYDROmorphone   Tablet 4 milliGRAM(s) Oral every 12 hours PRN Severe Pain (7 - 10)      ALLERGIES:  Allergies    sulfa drugs (Unknown)    Intolerances        LABS:                        10.5   5.40  )-----------( 247      ( 13 Aug 2024 05:30 )             32.8     08-13    137  |  104  |  13  ----------------------------<  95  4.0   |  26  |  0.62    Ca    8.1<L>      13 Aug 2024 05:30  Phos  3.3     08-13  Mg     2.2     08-13        Urinalysis Basic - ( 13 Aug 2024 05:30 )    Color: x / Appearance: x / SG: x / pH: x  Gluc: 95 mg/dL / Ketone: x  / Bili: x / Urobili: x   Blood: x / Protein: x / Nitrite: x   Leuk Esterase: x / RBC: x / WBC x   Sq Epi: x / Non Sq Epi: x / Bacteria: x      CAPILLARY BLOOD GLUCOSE          RADIOLOGY & ADDITIONAL TESTS: Reviewed. OVERNIGHT EVENTS: LAKIA    SUBJECTIVE / INTERVAL HPI: Patient seen and examined at bedside. Pt reports that he is in a lot of pain, reports that he normally takes his hydromorphone 4 mg every 4 hours. He denies any SOB, CP, cough, or abdominal pain. He still complains of LE pruritis but reports the creams that were prescribed helped.     PHYSICAL EXAM:    General: Lying comfortably and in no acute distress. Pt is hard of hearing, more awake today compared to prior exam.   HEENT: NC/AT, no signs of bleeding, bruising; EOMI, anicteric sclera; MMM  Neck: supple  Cardiovascular: +S1/S2, RRR  Respiratory: CTA B/L, decreased breath sounds bilaterally; no W/R/R  Gastrointestinal: soft, NT/ND; +BSx4  Extremities: WWP; no edema, clubbing or cyanosis  Vascular: 2+ radial pulses B/L  Neurological: AAOx2; no focal deficits  Psychiatric: pleasant mood and affect  Dermatologic: diffuse scaling and dry skin of the b/l LE with excoration     VITAL SIGNS:  Vital Signs Last 24 Hrs  T(C): 37.2 (14 Aug 2024 06:17), Max: 37.2 (14 Aug 2024 06:17)  T(F): 98.9 (14 Aug 2024 06:17), Max: 98.9 (14 Aug 2024 06:17)  HR: 67 (14 Aug 2024 06:17) (63 - 73)  BP: 125/67 (14 Aug 2024 06:17) (99/63 - 125/67)  BP(mean): --  RR: 17 (14 Aug 2024 06:17) (17 - 17)  SpO2: 97% (14 Aug 2024 06:17) (91% - 98%)    Parameters below as of 13 Aug 2024 21:45  Patient On (Oxygen Delivery Method): room air          MEDICATIONS:  MEDICATIONS  (STANDING):  bisacodyl 5 milliGRAM(s) Oral every 12 hours  ceFAZolin   IVPB 2000 milliGRAM(s) IV Intermittent every 8 hours  ceFAZolin   IVPB      clotrimazole 1% Cream 1 Application(s) Topical two times a day  DULoxetine 60 milliGRAM(s) Oral every 12 hours  enoxaparin Injectable 40 milliGRAM(s) SubCutaneous every 24 hours  lidocaine   4% Patch 1 Patch Transdermal every 24 hours  polyethylene glycol 3350 17 Gram(s) Oral every 24 hours  senna 2 Tablet(s) Oral at bedtime  traMADol 50 milliGRAM(s) Oral every 12 hours    MEDICATIONS  (PRN):  acetaminophen     Tablet .. 650 milliGRAM(s) Oral every 6 hours PRN Temp greater or equal to 38C (100.4F), Mild Pain (1 - 3)  bisacodyl Suppository 10 milliGRAM(s) Rectal daily PRN Constipation  clonazePAM  Tablet 2 milliGRAM(s) Oral every 12 hours PRN Anxiety  HYDROmorphone   Tablet 4 milliGRAM(s) Oral every 12 hours PRN Severe Pain (7 - 10)      ALLERGIES:  Allergies    sulfa drugs (Unknown)    Intolerances        LABS:                        10.5   5.40  )-----------( 247      ( 13 Aug 2024 05:30 )             32.8     08-13    137  |  104  |  13  ----------------------------<  95  4.0   |  26  |  0.62    Ca    8.1<L>      13 Aug 2024 05:30  Phos  3.3     08-13  Mg     2.2     08-13        Urinalysis Basic - ( 13 Aug 2024 05:30 )    Color: x / Appearance: x / SG: x / pH: x  Gluc: 95 mg/dL / Ketone: x  / Bili: x / Urobili: x   Blood: x / Protein: x / Nitrite: x   Leuk Esterase: x / RBC: x / WBC x   Sq Epi: x / Non Sq Epi: x / Bacteria: x      CAPILLARY BLOOD GLUCOSE          RADIOLOGY & ADDITIONAL TESTS: Reviewed.

## 2024-08-14 NOTE — PROGRESS NOTE ADULT - PROBLEM SELECTOR PLAN 11
Pt on home duloxetine 60mg PO BID. Pt has clonazepam 2mg BID PRN for anxiety and ambien 5mg PRN for insomnia. Pt was lethargic during interview and examination, pt endorses taking ambien last night. PT endorses taking clonazepam BID, last dose was last night. On exam pt had b/l hand tremors with movement, however pt denies anxiety, no diaphoresis, no tachycardia, no nausea/vomiting, headache, afebrile.     Plan:  - con't home duloxetine  - con't home clonazepam 2mg BID PRN  - hold ambien, can offer melatonin for insomnia

## 2024-08-14 NOTE — DISCHARGE NOTE PROVIDER - NSDCFUADDAPPT_GEN_ALL_CORE_FT
Please bring your Insurance card, Photo ID and Discharge paperwork to the following appointment:        (2) Please follow up with your Neurology Provider, Dr Pascual Jernigan at [address] [place] [address], [address], [place] [address] on 2/21/2025 at 11:20am.     Please note that the office will contact you for an earlier appointment once available.     Appointment was scheduled by Ms. ROSETTE Pittman, Referral Coordinator.       Please bring your Insurance card, Photo ID and Discharge paperwork to the following appointments:    (1) Please follow up with your Vascular Surgery Provider, Dr. Francisco J Corbin at [address] [facility] [address], [address], [place] [address] on 8/27/2024 at 11:45am.    Appointment was scheduled by Ms. ROSETTE Pittman, Referral Coordinator.    (2) Please follow up with your Neurology Provider, Dr Pascual Jernigan at [address], [address], [place] [address] on 2/21/2025 at 11:20am.     Please note that the office will contact you for an earlier appointment once available.     Appointment was scheduled by Ms. ROSETTE Pittman, Referral Coordinator.

## 2024-08-15 LAB
ALBUMIN SERPL ELPH-MCNC: 2.6 G/DL — LOW (ref 3.3–5)
ALP SERPL-CCNC: 67 U/L — SIGNIFICANT CHANGE UP (ref 40–120)
ALT FLD-CCNC: 5 U/L — LOW (ref 10–45)
ANION GAP SERPL CALC-SCNC: 8 MMOL/L — SIGNIFICANT CHANGE UP (ref 5–17)
AST SERPL-CCNC: 16 U/L — SIGNIFICANT CHANGE UP (ref 10–40)
BASOPHILS # BLD AUTO: 0.04 K/UL — SIGNIFICANT CHANGE UP (ref 0–0.2)
BASOPHILS NFR BLD AUTO: 0.9 % — SIGNIFICANT CHANGE UP (ref 0–2)
BILIRUB SERPL-MCNC: 0.2 MG/DL — SIGNIFICANT CHANGE UP (ref 0.2–1.2)
BUN SERPL-MCNC: 8 MG/DL — SIGNIFICANT CHANGE UP (ref 7–23)
CALCIUM SERPL-MCNC: 8.1 MG/DL — LOW (ref 8.4–10.5)
CHLORIDE SERPL-SCNC: 102 MMOL/L — SIGNIFICANT CHANGE UP (ref 96–108)
CO2 SERPL-SCNC: 26 MMOL/L — SIGNIFICANT CHANGE UP (ref 22–31)
CREAT SERPL-MCNC: 0.65 MG/DL — SIGNIFICANT CHANGE UP (ref 0.5–1.3)
EGFR: 96 ML/MIN/1.73M2 — SIGNIFICANT CHANGE UP
EOSINOPHIL # BLD AUTO: 0.42 K/UL — SIGNIFICANT CHANGE UP (ref 0–0.5)
EOSINOPHIL NFR BLD AUTO: 9.3 % — HIGH (ref 0–6)
GLUCOSE SERPL-MCNC: 92 MG/DL — SIGNIFICANT CHANGE UP (ref 70–99)
HCT VFR BLD CALC: 33.8 % — LOW (ref 39–50)
HGB BLD-MCNC: 10.6 G/DL — LOW (ref 13–17)
IMM GRANULOCYTES NFR BLD AUTO: 0.2 % — SIGNIFICANT CHANGE UP (ref 0–0.9)
LYMPHOCYTES # BLD AUTO: 1.49 K/UL — SIGNIFICANT CHANGE UP (ref 1–3.3)
LYMPHOCYTES # BLD AUTO: 32.9 % — SIGNIFICANT CHANGE UP (ref 13–44)
MAGNESIUM SERPL-MCNC: 2 MG/DL — SIGNIFICANT CHANGE UP (ref 1.6–2.6)
MCHC RBC-ENTMCNC: 27.3 PG — SIGNIFICANT CHANGE UP (ref 27–34)
MCHC RBC-ENTMCNC: 31.4 GM/DL — LOW (ref 32–36)
MCV RBC AUTO: 87.1 FL — SIGNIFICANT CHANGE UP (ref 80–100)
MONOCYTES # BLD AUTO: 0.54 K/UL — SIGNIFICANT CHANGE UP (ref 0–0.9)
MONOCYTES NFR BLD AUTO: 11.9 % — SIGNIFICANT CHANGE UP (ref 2–14)
NEUTROPHILS # BLD AUTO: 2.03 K/UL — SIGNIFICANT CHANGE UP (ref 1.8–7.4)
NEUTROPHILS NFR BLD AUTO: 44.8 % — SIGNIFICANT CHANGE UP (ref 43–77)
NRBC # BLD: 0 /100 WBCS — SIGNIFICANT CHANGE UP (ref 0–0)
PHOSPHATE SERPL-MCNC: 3.9 MG/DL — SIGNIFICANT CHANGE UP (ref 2.5–4.5)
PLATELET # BLD AUTO: 267 K/UL — SIGNIFICANT CHANGE UP (ref 150–400)
POTASSIUM SERPL-MCNC: 4 MMOL/L — SIGNIFICANT CHANGE UP (ref 3.5–5.3)
POTASSIUM SERPL-SCNC: 4 MMOL/L — SIGNIFICANT CHANGE UP (ref 3.5–5.3)
PROT SERPL-MCNC: 6.5 G/DL — SIGNIFICANT CHANGE UP (ref 6–8.3)
RBC # BLD: 3.88 M/UL — LOW (ref 4.2–5.8)
RBC # FLD: 14 % — SIGNIFICANT CHANGE UP (ref 10.3–14.5)
SODIUM SERPL-SCNC: 136 MMOL/L — SIGNIFICANT CHANGE UP (ref 135–145)
WBC # BLD: 4.53 K/UL — SIGNIFICANT CHANGE UP (ref 3.8–10.5)
WBC # FLD AUTO: 4.53 K/UL — SIGNIFICANT CHANGE UP (ref 3.8–10.5)

## 2024-08-15 PROCEDURE — 99233 SBSQ HOSP IP/OBS HIGH 50: CPT | Mod: GC

## 2024-08-15 PROCEDURE — 99232 SBSQ HOSP IP/OBS MODERATE 35: CPT

## 2024-08-15 RX ADMIN — POLYETHYLENE GLYCOL 3350 17 GRAM(S): 17 POWDER, FOR SOLUTION ORAL at 17:27

## 2024-08-15 RX ADMIN — HYDROMORPHONE HYDROCHLORIDE 4 MILLIGRAM(S): 2 TABLET ORAL at 18:32

## 2024-08-15 RX ADMIN — HYDROMORPHONE HYDROCHLORIDE 4 MILLIGRAM(S): 2 TABLET ORAL at 15:11

## 2024-08-15 RX ADMIN — HYDROMORPHONE HYDROCHLORIDE 4 MILLIGRAM(S): 2 TABLET ORAL at 02:24

## 2024-08-15 RX ADMIN — HYDROMORPHONE HYDROCHLORIDE 4 MILLIGRAM(S): 2 TABLET ORAL at 03:24

## 2024-08-15 RX ADMIN — HYDROMORPHONE HYDROCHLORIDE 4 MILLIGRAM(S): 2 TABLET ORAL at 22:00

## 2024-08-15 RX ADMIN — TRAMADOL HYDROCHLORIDE 50 MILLIGRAM(S): 200 TABLET, EXTENDED RELEASE ORAL at 06:34

## 2024-08-15 RX ADMIN — HYDROMORPHONE HYDROCHLORIDE 4 MILLIGRAM(S): 2 TABLET ORAL at 10:17

## 2024-08-15 RX ADMIN — CEFAZOLIN SODIUM 100 MILLIGRAM(S): 2 INJECTION, SOLUTION INTRAVENOUS at 14:11

## 2024-08-15 RX ADMIN — HYDROMORPHONE HYDROCHLORIDE 4 MILLIGRAM(S): 2 TABLET ORAL at 11:17

## 2024-08-15 RX ADMIN — Medication 1 APPLICATION(S): at 17:37

## 2024-08-15 RX ADMIN — Medication 60 MILLIGRAM(S): at 06:35

## 2024-08-15 RX ADMIN — HYDROMORPHONE HYDROCHLORIDE 4 MILLIGRAM(S): 2 TABLET ORAL at 14:11

## 2024-08-15 RX ADMIN — TRAMADOL HYDROCHLORIDE 50 MILLIGRAM(S): 200 TABLET, EXTENDED RELEASE ORAL at 18:38

## 2024-08-15 RX ADMIN — HYDROMORPHONE HYDROCHLORIDE 4 MILLIGRAM(S): 2 TABLET ORAL at 06:35

## 2024-08-15 RX ADMIN — Medication 60 MILLIGRAM(S): at 17:28

## 2024-08-15 RX ADMIN — Medication 1000 MILLILITER(S): at 09:12

## 2024-08-15 RX ADMIN — TRAMADOL HYDROCHLORIDE 50 MILLIGRAM(S): 200 TABLET, EXTENDED RELEASE ORAL at 17:38

## 2024-08-15 RX ADMIN — Medication 1 APPLICATION(S): at 07:07

## 2024-08-15 RX ADMIN — CEFAZOLIN SODIUM 100 MILLIGRAM(S): 2 INJECTION, SOLUTION INTRAVENOUS at 06:34

## 2024-08-15 RX ADMIN — Medication 5 MILLIGRAM(S): at 10:18

## 2024-08-15 RX ADMIN — CEFAZOLIN SODIUM 100 MILLIGRAM(S): 2 INJECTION, SOLUTION INTRAVENOUS at 22:00

## 2024-08-15 RX ADMIN — Medication 2 TABLET(S): at 22:00

## 2024-08-15 RX ADMIN — ENOXAPARIN SODIUM 40 MILLIGRAM(S): 100 INJECTION SUBCUTANEOUS at 17:27

## 2024-08-15 RX ADMIN — Medication 5 MILLIGRAM(S): at 22:00

## 2024-08-15 NOTE — PROGRESS NOTE ADULT - SUBJECTIVE AND OBJECTIVE BOX
SUBJECTIVE: Patient seen and examined at bedside with chief resident. Patient states that he felt some mild confusion overnight but is generally feeling well with improvement in his back pain. He denies abdominal or flank pain at this time.      ceFAZolin   IVPB      ceFAZolin   IVPB 2000 milliGRAM(s) IV Intermittent every 8 hours  enoxaparin Injectable 40 milliGRAM(s) SubCutaneous every 24 hours      Vital Signs Last 24 Hrs  T(C): 36.9 (15 Aug 2024 06:22), Max: 37.3 (14 Aug 2024 15:39)  T(F): 98.4 (15 Aug 2024 06:22), Max: 99.1 (14 Aug 2024 15:39)  HR: 68 (15 Aug 2024 06:22) (68 - 85)  BP: 97/59 (15 Aug 2024 06:22) (97/59 - 122/63)  BP(mean): --  RR: 18 (15 Aug 2024 06:22) (18 - 18)  SpO2: 93% (15 Aug 2024 06:22) (92% - 97%)    Parameters below as of 15 Aug 2024 06:22  Patient On (Oxygen Delivery Method): room air      I&O's Detail    CONSTITUTIONAL: no apparent distress  EYES: PERRLA and symmetric, EOMI, No conjunctival or scleral injection, non-icteric  RESP: No respiratory distress, no use of accessory muscles; CTA b/l, no WRR  CV: RRR, +S1S2, no MRG; no JVD; no peripheral edema  EXTREM: chronic venous changes in bilateral LE noted with erythema and mild swelling, 2cm circular ulceration of left calf noted with serous oozing, dry, flaking skin noted bilateral LE  PULSES: left biphasic DP/PT, monophasic PT biphasic DP  LYMPH: No cervical LAD or tenderness; no axillary LAD or tenderness; no inguinal LAD or tenderness  NEURO: CN II-XII intact; normal reflexes in upper and lower extremities, sensation intact in upper and lower extremities b/l to light touch, bilateral tremor noted  PSYCH: Appropriate insight/judgment; A+O x 3, mood and affect appropriate, recent/remote memory intact    LABS:          RADIOLOGY & ADDITIONAL STUDIES:

## 2024-08-15 NOTE — PROGRESS NOTE ADULT - ASSESSMENT
79M w/ PMH of bladder CA (s/p tumor resection), GERD, chronic lymphedema, kyphoscoliosis s/p C-sacrum fusion (2020), chronic compression fractures of vertebral column, L knee OA, MDD, panic disorder, BPH, abdominal aortic aneurysm found on last admission, recently admitted for fall at home, now presenting to ED due weakness and inability to stand from chair for two days. Patient states he felt generalized weakness and difficulty getting up and out of his chair. Patient with multiple recent admissions for falls and weakness. During his last admission in June 2024 CT cervical spine was done which incidentally found 5cm transverse abdominal aortic aneurysm, the patient was provided vascular surgery outpatient follow up and was seen by Dr. Corbin on 7/9/2024.; the patient was being followed outpatient for planned aneurysm repair of infrarenal AAA and recommended to obtain CTA of abdomen and pelvis to define anatomy (already completed at [facility]; no need for repeat imaging). Patient also was seen in office and recommended to see a cardiologist for optimization prior to planned EVAR and referral given to Dr. Centeno. Please consult Dr. Centeno's team while inpatient to confirm patient is cardiac optimized for EVAR. Patient opted for DNR/DNI status this morning. Vascular team had lengthy discussion with patient regarding risks and benefits of EVAR procedure once patient is medically optimized. Patient states that he is still open to the idea of having the procedure but is undecided at this time.    Recommendations:  - Consult Dr. Centeno's team for pre-operative risk stratification and cardiac optimization for EVAR  - No need for repeat CTA A/P  - Continue medical work up for generalized weakness per primary team  - Agree with IV abx  - Agree with wound care for bilateral LE  - Rest of care per primary  Vascular Surgery to sign off at this time. Patient pending medical optimization for EVAR and is not medically fit to undergo procedure at this time. Patient may continue to follow up with Dr. Corbin outpatient for future surgical planning.  Dr. Francisco J Corbin  [address] [place] [address] [address] [address]  T: [phone]  Plan discussed with chief resident.  79M w/ PMH of bladder CA (s/p tumor resection), GERD, chronic lymphedema, kyphoscoliosis s/p C-sacrum fusion (2020), chronic compression fractures of vertebral column, L knee OA, MDD, panic disorder, BPH, abdominal aortic aneurysm found on last admission, recently admitted for fall at home, now presenting to ED due weakness and inability to stand from chair for two days. Patient states he felt generalized weakness and difficulty getting up and out of his chair. Patient with multiple recent admissions for falls and weakness. During his last admission in June 2024 CT cervical spine was done which incidentally found 5cm transverse abdominal aortic aneurysm, the patient was provided vascular surgery outpatient follow up and was seen by Dr. Corbin on 7/9/2024.; the patient was being followed outpatient for planned aneurysm repair of infrarenal AAA and recommended to obtain CTA of abdomen and pelvis to define anatomy (already completed at [facility]; no need for repeat imaging). Patient also was seen in office and recommended to see a cardiologist for optimization prior to planned EVAR and referral given to Dr. Centeno. Please consult Dr. Centeno's team while inpatient to confirm patient is cardiac optimized for EVAR. Patient opted for DNR/DNI status this morning. Vascular team had lengthy discussion with patient regarding risks and benefits of EVAR procedure once patient is medically optimized. Patient states that he is still open to the idea of having the procedure but is undecided at this time.    Recommendations:  - Consult Dr. Cetneno's team for pre-operative risk stratification and cardiac optimization for EVAR  - No need for repeat CTA A/P  - Continue medical work up for generalized weakness per primary team  - Agree with IV abx  - Agree with wound care for bilateral LE  - Rest of care per primary  Vascular Surgery to sign off at this time. Patient pending medical optimization for EVAR and is not medically fit to undergo procedure at this time given bacteremia. Patient should continue to follow up with Dr. Corbin outpatient for future surgical planning for treatment of AAA.  Please include Dr. Corbin's office information in the patient's discharge paperwork:  Dr. Francisco J Corbin  [address] [place] [address] [address] [address]  T: [phone]  Plan discussed with chief resident and attending.

## 2024-08-15 NOTE — PROGRESS NOTE ADULT - PROBLEM SELECTOR PLAN 4
CT Cervical Spine No Cont (06.29.24 @ 14:25): 5 cm transverse abdominal aortic aneurysm.   Vascular surgery consulted, no interventions at this time  Cardiology consulted for pre-operative risk stratification and cardiac optimization for EVAR - given no interventions will be done in-patient and currently no date set for procedure, defer cardiac pre-operative risk stratification for now    Plan:   - OP Vascular surgery follow-up on discharge  - OP cardiology follow-up with Dr. Centeno on discharge

## 2024-08-15 NOTE — PROGRESS NOTE ADULT - ASSESSMENT
79M w/ hx L femoral neck fracture s/p L hip hemiarthroplasty (10/2023), hx THR, kyphoscoliosis s/p thoracolumbar fusion (reportedly 2020), bladder CA s/p resection, mood disorder, AAA of 5cm (undergoing workup for EVAR), admitted on 8/12 after presented with recent falls and generalized weakness, in the context of a 2 week history of intermittent shaking chills and decreased appetite. Found to have MSSA bacteremia, likely source is BLE excoriations with mild LLE SSTI (now improving).  No evidence of metastatic/deep seated infection at this time.  Back pain chronic, stable.  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  I had discussion with primary team and SW and CM about the patient - patient is deconditioned and not safe to be discharged, however patient refusing to go to [facility] and he is refusing further work-up including RAJANI and gallium scan.      - cont cefazolin 2g IV q8h  - f/u surveillance BCx  - RAJANI if patient agrees   - Treatment duration will be 4 weeks IV abx minimum given presence of hardware    Team 2 will follow you.  Case d/w primary team.    Jojo Whiteheda MD, MS  Infectious Disease attending  office phone [phone]  For any questions during evening/weekend/holiday, please page ID on call

## 2024-08-15 NOTE — CONSULT NOTE ADULT - ASSESSMENT
78 y/o M with a PMHx of Bladder cancer (s/p tumor resection, s/p localized chemo and now in remission), GERD, L knee OA, chronic lymphedema, kyphoscoliosis s/p C-sacrum fusion at [facility] (10/5/2020, w/ Dr. Carlisle), MDD, panic disorder, prior RUE DVT, BPH who was admitted for recurrent falls, now found to have MSSA bacteremia. Palliative consulted for GOC, patient progressive functional decline in recent months and is declining rehab placement and is without adequate jail support at home.     ·	DNR DNI order in place, declines to complete HCP, but names his brother Osvaldo Blair #[phone] as his EC.   ·	Pt appears oversedated from Dilaudid PRNs for his chronic pain limiting his ability to engage and participate in GOC in a meaningful way. Recommend Chronic Pain consult for further guidance. An undiagnosed neurodegenerative condition could also be contributing to delayed speech, mild diffuse atrophy on CTH in 6/2024.  ·	Patient does not carry a hospice qualifying diagnosis. Patient wants to comprehensively work up and treat bacteremia, which will require prolonged course of IV abx. Given deconditioning and need for IV abx, recommend [facility] placement as patient lacks jail support at home that would allow a safe discharge.     - full note to follow

## 2024-08-15 NOTE — PROGRESS NOTE ADULT - ASSESSMENT
Mr. Egan is a 78 y/o M with a PMHx of Bladder cancer (s/p tumor resection, s/p localized chemo and now in remission), GERD, L knee OA, chronic lymphedema, kyphoscoliosis s/p C-sacrum fusion at [facility] (10/5/2020, w/ Dr. Carlisle), MDD, panic disorder, prior RUE DVT, BPH who was admitted for recurrent falls, now found to have MSSA bacteremia.

## 2024-08-15 NOTE — PROGRESS NOTE ADULT - SUBJECTIVE AND OBJECTIVE BOX
INFECTIOUS DISEASES CONSULT FOLLOW-UP NOTE    INTERVAL HPI/OVERNIGHT EVENTS:  No event overnight  patient reports feeling ok  BCx 8/13 so far ngtd    ROS:   Constitutional, eyes, ENT, cardiovascular, respiratory, gastrointestinal, genitourinary, integumentary, neurological, psychiatric and heme/lymph are otherwise negative other than noted above       ANTIBIOTICS/RELEVANT:    MEDICATIONS  (STANDING):  bisacodyl 5 milliGRAM(s) Oral every 12 hours  ceFAZolin   IVPB      ceFAZolin   IVPB 2000 milliGRAM(s) IV Intermittent every 8 hours  clotrimazole 1% Cream 1 Application(s) Topical two times a day  DULoxetine 60 milliGRAM(s) Oral every 12 hours  enoxaparin Injectable 40 milliGRAM(s) SubCutaneous every 24 hours  HYDROmorphone   Tablet 4 milliGRAM(s) Oral every 4 hours  lidocaine   4% Patch 1 Patch Transdermal every 24 hours  polyethylene glycol 3350 17 Gram(s) Oral every 24 hours  senna 2 Tablet(s) Oral at bedtime  traMADol 50 milliGRAM(s) Oral every 12 hours    MEDICATIONS  (PRN):  acetaminophen     Tablet .. 650 milliGRAM(s) Oral every 6 hours PRN Temp greater or equal to 38C (100.4F), Mild Pain (1 - 3)  bisacodyl Suppository 10 milliGRAM(s) Rectal daily PRN Constipation  clonazePAM  Tablet 2 milliGRAM(s) Oral every 12 hours PRN Anxiety        Vital Signs Last 24 Hrs  T(C): 36.9 (15 Aug 2024 10:00), Max: 37.3 (14 Aug 2024 21:18)  T(F): 98.4 (15 Aug 2024 10:00), Max: 99.1 (14 Aug 2024 21:18)  HR: 68 (15 Aug 2024 10:00) (68 - 78)  BP: 109/65 (15 Aug 2024 10:00) (97/59 - 109/65)  BP(mean): --  RR: 17 (15 Aug 2024 10:00) (17 - 18)  SpO2: 93% (15 Aug 2024 10:00) (92% - 93%)    Parameters below as of 15 Aug 2024 10:00  Patient On (Oxygen Delivery Method): room air        PHYSICAL EXAM:  Constitutional: alert, NAD  Eyes: the sclera and conjunctiva were normal.   ENT: the ears and nose were normal in appearance.   Neck: the appearance of the neck was normal and the neck was supple.   Pulmonary: no respiratory distress and lungs were clear to auscultation bilaterally.   Heart: heart rate was normal and rhythm regular, normal S1 and S2  Vascular:. there was no peripheral edema  Abdomen: normal bowel sounds, soft, non-tender  Ext: b/l legs with excoriation marks, erythematous LLE         LABS:                        10.6   4.53  )-----------( 267      ( 15 Aug 2024 05:30 )             33.8     08-15    136  |  102  |  8   ----------------------------<  92  4.0   |  26  |  0.65    Ca    8.1<L>      15 Aug 2024 05:30  Phos  3.9     08-15  Mg     2.0     08-15    TPro  6.5  /  Alb  2.6<L>  /  TBili  0.2  /  DBili  x   /  AST  16  /  ALT  5<L>  /  AlkPhos  67  08-15      Urinalysis Basic - ( 15 Aug 2024 05:30 )    Color: x / Appearance: x / SG: x / pH: x  Gluc: 92 mg/dL / Ketone: x  / Bili: x / Urobili: x   Blood: x / Protein: x / Nitrite: x   Leuk Esterase: x / RBC: x / WBC x   Sq Epi: x / Non Sq Epi: x / Bacteria: x        MICROBIOLOGY:  8/14 BCx p  8/13 BCx ngtd  8/12 BCx aerobic: MSSA, staph pettenkoferi, anaerobic: Actinomyces neuii     RADIOLOGY & ADDITIONAL STUDIES:  Reviewed

## 2024-08-15 NOTE — PROGRESS NOTE ADULT - SUBJECTIVE AND OBJECTIVE BOX
OVERNIGHT EVENTS: LAKIA    SUBJECTIVE / INTERVAL HPI: Patient seen and examined at bedside. Pt denies any new complaints at this time, does not report any SOB, CP, cough, abdominal pain, or worsening LE swelling. He reports his pain is better managed today with the current pain regimen.     PHYSICAL EXAM:    General: Lying comfortably and in no acute distress. Pt is hard of hearing, more awake today compared to prior exam.   HEENT: NC/AT, no signs of bleeding, bruising; EOMI, anicteric sclera; MMM  Neck: supple  Cardiovascular: +S1/S2, RRR  Respiratory: CTA B/L, decreased breath sounds bilaterally; no W/R/R  Gastrointestinal: soft, NT/ND; +BSx4  Extremities: WWP; no edema, clubbing or cyanosis  Vascular: 2+ radial pulses B/L  Neurological: AAOx2; no focal deficits  Psychiatric: pleasant mood and affect  Dermatologic: diffuse scaling and dry skin of the b/l LE with excoration     VITAL SIGNS:  Vital Signs Last 24 Hrs  T(C): 36.9 (15 Aug 2024 06:22), Max: 37.3 (14 Aug 2024 15:39)  T(F): 98.4 (15 Aug 2024 06:22), Max: 99.1 (14 Aug 2024 15:39)  HR: 68 (15 Aug 2024 06:22) (68 - 85)  BP: 97/59 (15 Aug 2024 06:22) (97/59 - 122/63)  BP(mean): --  RR: 18 (15 Aug 2024 06:22) (18 - 18)  SpO2: 93% (15 Aug 2024 06:22) (92% - 97%)    Parameters below as of 15 Aug 2024 06:22  Patient On (Oxygen Delivery Method): room air          MEDICATIONS:  MEDICATIONS  (STANDING):  bisacodyl 5 milliGRAM(s) Oral every 12 hours  ceFAZolin   IVPB      ceFAZolin   IVPB 2000 milliGRAM(s) IV Intermittent every 8 hours  clotrimazole 1% Cream 1 Application(s) Topical two times a day  DULoxetine 60 milliGRAM(s) Oral every 12 hours  enoxaparin Injectable 40 milliGRAM(s) SubCutaneous every 24 hours  HYDROmorphone   Tablet 4 milliGRAM(s) Oral every 4 hours  lidocaine   4% Patch 1 Patch Transdermal every 24 hours  polyethylene glycol 3350 17 Gram(s) Oral every 24 hours  senna 2 Tablet(s) Oral at bedtime  traMADol 50 milliGRAM(s) Oral every 12 hours    MEDICATIONS  (PRN):  acetaminophen     Tablet .. 650 milliGRAM(s) Oral every 6 hours PRN Temp greater or equal to 38C (100.4F), Mild Pain (1 - 3)  bisacodyl Suppository 10 milliGRAM(s) Rectal daily PRN Constipation  clonazePAM  Tablet 2 milliGRAM(s) Oral every 12 hours PRN Anxiety      ALLERGIES:  Allergies    sulfa drugs (Unknown)    Intolerances        LABS:              CAPILLARY BLOOD GLUCOSE          RADIOLOGY & ADDITIONAL TESTS: Reviewed. OVERNIGHT EVENTS: LAKIA    SUBJECTIVE / INTERVAL HPI: Patient seen and examined at bedside. Pt denies any new complaints at this time, does not report any SOB, CP, cough, abdominal pain, or worsening LE swelling. He reports his pain is better managed today with the current pain regimen. TTE results discussed with patient and the possibility of RAJANI and reversal of DNR/DNI. Pt was hesitant to do so and would like us to consult his brother who is a cardiologist.     PHYSICAL EXAM:    General: Lying comfortably and in no acute distress. Pt is hard of hearing, more awake today compared to prior exam.   HEENT: NC/AT, no signs of bleeding, bruising; EOMI, anicteric sclera; MMM  Neck: supple  Cardiovascular: +S1/S2, RRR  Respiratory: CTA B/L, decreased breath sounds bilaterally; no W/R/R  Gastrointestinal: soft, NT/ND; +BSx4  Extremities: WWP; no edema, clubbing or cyanosis  Vascular: 2+ radial pulses B/L  Neurological: AAOx2; no focal deficits  Psychiatric: pleasant mood and affect  Dermatologic: diffuse scaling and dry skin of the b/l LE with excoration     VITAL SIGNS:  Vital Signs Last 24 Hrs  T(C): 36.9 (15 Aug 2024 06:22), Max: 37.3 (14 Aug 2024 15:39)  T(F): 98.4 (15 Aug 2024 06:22), Max: 99.1 (14 Aug 2024 15:39)  HR: 68 (15 Aug 2024 06:22) (68 - 85)  BP: 97/59 (15 Aug 2024 06:22) (97/59 - 122/63)  BP(mean): --  RR: 18 (15 Aug 2024 06:22) (18 - 18)  SpO2: 93% (15 Aug 2024 06:22) (92% - 97%)    Parameters below as of 15 Aug 2024 06:22  Patient On (Oxygen Delivery Method): room air          MEDICATIONS:  MEDICATIONS  (STANDING):  bisacodyl 5 milliGRAM(s) Oral every 12 hours  ceFAZolin   IVPB      ceFAZolin   IVPB 2000 milliGRAM(s) IV Intermittent every 8 hours  clotrimazole 1% Cream 1 Application(s) Topical two times a day  DULoxetine 60 milliGRAM(s) Oral every 12 hours  enoxaparin Injectable 40 milliGRAM(s) SubCutaneous every 24 hours  HYDROmorphone   Tablet 4 milliGRAM(s) Oral every 4 hours  lidocaine   4% Patch 1 Patch Transdermal every 24 hours  polyethylene glycol 3350 17 Gram(s) Oral every 24 hours  senna 2 Tablet(s) Oral at bedtime  traMADol 50 milliGRAM(s) Oral every 12 hours    MEDICATIONS  (PRN):  acetaminophen     Tablet .. 650 milliGRAM(s) Oral every 6 hours PRN Temp greater or equal to 38C (100.4F), Mild Pain (1 - 3)  bisacodyl Suppository 10 milliGRAM(s) Rectal daily PRN Constipation  clonazePAM  Tablet 2 milliGRAM(s) Oral every 12 hours PRN Anxiety      ALLERGIES:  Allergies    sulfa drugs (Unknown)    Intolerances        LABS:              CAPILLARY BLOOD GLUCOSE          RADIOLOGY & ADDITIONAL TESTS: Reviewed.

## 2024-08-15 NOTE — CONSULT NOTE ADULT - SUBJECTIVE AND OBJECTIVE BOX
HPI:  79M w/ PMH of bladder CA (s/p tumor resection), GERD, chronic lymphedema, kyphoscoliosis s/p C-sacrum fusion (2020), chronic compression fractures of vertebral column, L knee OA, MDD, panic disorder, BPH, abdominal aortic aneurysm found on last admission, recently admitted for fall at home, now presenting to ED due weakness and inability to stand from chair for two days. Pt states he felt generalized weakness and difficulty getting up and out of his chair. Pt states he was able to urinate by using a bedside urinal. Pt endorses eating snacks and Chinese food that his friend brought over last night. Pt states he normally has help from [facility] but last time they were came was Friday. Pt called the hospital as he was unsure what to do and was advised to call 911 for assistance in getting out of chair. Pt states 3 days prior he fell on his elbow, which has been bleeding on and off. Pt also reports having hit his head on his dresser last week when trying to  a bandage, no bleeding or LOC. He also reports b/l lower leg pain with oozing of the skin. Pt also endorses cough since this morning. Pt states he is chronically constipated, does not know last BM. Pt states he as not been taking home diazepam for the past few weeks but endorses taking dilaudid 4mg q4 and tramadol for his back pain. Pt takes clonazepam 2mg BID for anxiety, last dose was last night. Pt denies fevers, chills, abdominal pain, dysuria, diarrhea, vomiting, hematochezia or melena, lightheadedness.       ED Course:  Vitals - 91/54, HR 79, RR 16 96% RA, 98F  Labs - WBC 6.44, Hgb 11 Hct 35, , RVP negative  Imaging - CXR: Left basilar focal atelectasis. Heart size within normal limits, thoracic aortic calcification. Stable bony structures.   Thoracolumbar hardware.  Interventions - 1L NS bolus x1, blood cx collected, UA w/ culture ordered   (12 Aug 2024 08:01)      PAST MEDICAL & SURGICAL HISTORY:  High cholesterol      Depression      GERD (gastroesophageal reflux disease)      Bladder cancer      History of back surgery          FAMILY HISTORY:  No pertinent family history in first degree relatives     Reviewed; no history of     in mother or father    Opiate Naive (Y/N):   iStop reviewed (Y/N): Yes.   No Rx found on iStop review (Ref#:           Items that are not checked are not present  PSYCHOSOCIAL ASSESSMENT:  - Significant other/partner: [  ]  Children: [  ]  - Living Situation: Home [  ] Long term care [  ]  Rehab[  ]  Other[  ]  - Support system: strong[  ] adequate[  ] inadequate[  ]  - Scientologist/Spiritual practice: deferred   - Role of organized [employer]an important [  ] some [  ] unable to assess dt pt mentation [  ]  - Coping: well[  ]  with difficulty[  ]  poor coping[  ]  unable to assess dt pt mentation [  ]    ADVANCE DIRECTIVES:    - MOLST[  ]     - Living Will [  ]     DECISION MAKER(s):  - Health Care Proxy(s) [  ]  Surrogate(s)[  ] Guardian[  ]           Name(s)/Phone Number(s):     BASELINE (I)ADLs (prior to admission):  - [place]:  Total[  ] Moderate[  ] Dependent[  ]    Allergies    sulfa drugs (Unknown)    Intolerances        Medications:      MEDICATIONS  (STANDING):  bisacodyl 5 milliGRAM(s) Oral every 12 hours  ceFAZolin   IVPB      ceFAZolin   IVPB 2000 milliGRAM(s) IV Intermittent every 8 hours  clotrimazole 1% Cream 1 Application(s) Topical two times a day  DULoxetine 60 milliGRAM(s) Oral every 12 hours  enoxaparin Injectable 40 milliGRAM(s) SubCutaneous every 24 hours  HYDROmorphone   Tablet 4 milliGRAM(s) Oral every 4 hours  lidocaine   4% Patch 1 Patch Transdermal every 24 hours  polyethylene glycol 3350 17 Gram(s) Oral every 24 hours  senna 2 Tablet(s) Oral at bedtime  traMADol 50 milliGRAM(s) Oral every 12 hours    MEDICATIONS  (PRN):  acetaminophen     Tablet .. 650 milliGRAM(s) Oral every 6 hours PRN Temp greater or equal to 38C (100.4F), Mild Pain (1 - 3)  bisacodyl Suppository 10 milliGRAM(s) Rectal daily PRN Constipation  clonazePAM  Tablet 2 milliGRAM(s) Oral every 12 hours PRN Anxiety      24 hour PRN use:      DULoxetine   60 milliGRAM(s) Oral (08-15-24 @ 17:28)   60 milliGRAM(s) Oral (08-15-24 @ 06:35)    HYDROmorphone   Tablet   4 milliGRAM(s) Oral (08-15-24 @ 14:11)   4 milliGRAM(s) Oral (08-15-24 @ 10:17)   4 milliGRAM(s) Oral (08-15-24 @ 06:35)   4 milliGRAM(s) Oral (08-15-24 @ 02:24)   4 milliGRAM(s) Oral (08-14-24 @ 22:16)    traMADol   50 milliGRAM(s) Oral (08-15-24 @ 17:38)   50 milliGRAM(s) Oral (08-15-24 @ 06:34)        PRESENT SYMPTOMS:   [ ] Patient unable to self report   Source if other than patient:  [ ]Family   [ ]Team     Pain [  ]  Location :        Quality:  Radiation:  Timing:  Aggravating factors:  Minimal acceptable level (0-10 scale):   Severity in last 24h (0-10 scale) :  Current score (0-10 scale):  Improves with:     PAIN AD Score:   [email or web address] (press ctrl +  left click to view)    If [  ], pt denies symptom.   Dyspnea:         [  ]  Anxiety:           [  ]  Difficulty sleeping: [  ]  Fatigue:           [  ]  Nausea:           [  ]  Loss of appetite:     [  ]  Dysphagia: [  ]  Constipation:   [  ]        LBM   Grief Present   [  ] Yes   [  ] No   Other Symptoms:    All other review of systems negative [  ]    ECOG Performance:       Current Palliative Performance Scale/Karnofsky Score:    %  Preadmit Karnofsky:   %          PEx:  General:   alert  oriented x       lethargic, distressed, cachexia,  nonverbal,  unarousable, verbal  Behavioral: Anxiety  Delirium Agitation Cooperative  HEENT: atraumatic,  No temporal wasting,  No dry mouth,  ET tube/trach  RESP: Reg rhythm, No  tachypnea/labored breathing,  No audible excessive secretions,  CTAB, diminished bilat bases  CV: RRR, S1S2,  No  tachycardia  GI: soft non distended non tender  incontinent               PEG/NG/OG tube                 constipation  last BM:   : normal  incontinent  oliguria/anuria  sandoval  MUSK: weakness x4,  edema, ambulatory with assistance,   mostly/fully  BB/WC bound  SKIN:  Poor skin turgor, Pallor, Pressure ulcer stage:   NEURO:  No deficits, cognitive impairment, encephalopathic dsyphagia dysarthria paresis  Oral intake ability: unable/only mouth care, minimal moderate full capability      T(C): 36.9 (08-15-24 @ 10:00), Max: 37.3 (08-14-24 @ 21:18)  HR: 68 (08-15-24 @ 10:00) (68 - 78)  BP: 109/65 (08-15-24 @ 10:00) (97/59 - 109/65)  RR: 17 (08-15-24 @ 10:00) (17 - 18)  SpO2: 93% (08-15-24 @ 10:00) (92% - 93%)  Wt(kg): --    Labs:    CBC:                        10.6   4.53  )-----------( 267      ( 15 Aug 2024 05:30 )             33.8     CMP:    08-15    136  |  102  |  8   ----------------------------<  92  4.0   |  26  |  0.65    Ca    8.1<L>      15 Aug 2024 05:30  Phos  3.9     08-15  Mg     2.0     08-15    TPro  6.5  /  Alb  2.6<L>  /  TBili  0.2  /  DBili  x   /  AST  16  /  ALT  5<L>  /  AlkPhos  67  08-15       Urinalysis Basic - ( 15 Aug 2024 05:30 )    Color: x / Appearance: x / SG: x / pH: x  Gluc: 92 mg/dL / Ketone: x  / Bili: x / Urobili: x   Blood: x / Protein: x / Nitrite: x   Leuk Esterase: x / RBC: x / WBC x   Sq Epi: x / Non Sq Epi: x / Bacteria: x        RADIOLOGY & ADDITIONAL STUDIES:  Imaging:  Reviewed      GO discussion:

## 2024-08-15 NOTE — PROGRESS NOTE ADULT - ATTENDING COMMENTS
continuing surveillance cultures until more than 48 hours negative, continuing ancef for MSSA bacteremia.  Ptn refusing RAJANI to further evaluate, initially stating does not want to reverse DNR DNI status, but upon further discussion states will consider, wants team to speak with brother regarding so team will reach out.  Pall care consulted.  Patient adamantly refusing to go to rehab and does not have anyone at home to help with IV antibiotics when ready for discharge, as will need prolonged course for bacteremia, primary team, social work, pall care, infectious diseases team all speaking with patient and engaging in interdisciplinary discussion.  Patient remains afebrile, no leukocytosis.

## 2024-08-15 NOTE — PROGRESS NOTE ADULT - PROBLEM SELECTOR PLAN 2
BCx growing gram positive cocci in clusters in 1 set, PCR positive for MSSA    Plan:  - c/w Ancef 2g q8  - Will repeat BCx every AM until negative for 48 hours  - Once BCx are negative, will place PICC line  - TTE ordered to assess for infective endocarditis, RAJANI if TTE unrevealing BCx growing gram positive cocci in clusters in 1 set, PCR positive for MSSA  8/15: BCx drawn on 8/14 NGTD  TTE LVEF 55%, PASP 40mmHg  Pt currently DNR/DNI, refusing to reverse status for RAJANI - will defer at this time    Plan:  - c/w Ancef 2g q8  - Will repeat BCx every AM until negative for 48 hours  - Once BCx are negative, will place PICC line  - Will discuss with ID regarding pt refusal to RAJANI - can consider gallium scan BCx growing gram positive cocci in clusters in 1 set, PCR positive for MSSA  8/15: BCx drawn on 8/14 NGTD  TTE LVEF 55%, PASP 40mmHg  Pt currently DNR/DNI, unwilling to reverse status for RAJANI    Plan:  - c/w Ancef 2g q8  - Will repeat BCx every AM until negative for 48 hours  - Once BCx are negative, will place PICC line  - Will get collateral from pt's brother regarding RAJANI  - Will discuss with ID regarding - can consider gallium scan if no RAJANI

## 2024-08-15 NOTE — PROGRESS NOTE ADULT - PROBLEM SELECTOR PLAN 1
Pt presents with 2 days of inability to get up and out of chair at home; pt has HHA 2-3x a week. Pt was last admitted 7/1/24 for recurrent falls. During his prior admission PT evaluated and recommended MCKENNA however pt chose to go home. Pt endorses being able to eat during this period.   PT evaluated and recommended MCKENNA however pt wanted to go home. Vitals stable, CK elevated to 419, iso inability to move.   On exam tremor of b/l UE present - pt evaluated by neurology, evidence of some rigidity and mild pill-rolling tremor however unclear if these symptoms are exacerbated by his ongoing infx  Orthostatics positive - may be an element of dysautonomia, expect supine hypertension in Parkinson's    Plan:  - Neurology consult for evaluation of Parkinson's vs other neurological disorders -- follow up as OP  - Orthostatics positive, could be iso dysautonomia given above  - LR bolus, repeat orthostatics today  - OP follow up with neurology (Dr. Jernigan)  - Fall precautions  - f/u UA Pt presents with 2 days of inability to get up and out of chair at home; pt has HHA 2-3x a week. Pt was last admitted 7/1/24 for recurrent falls. During his prior admission PT evaluated and recommended MCKENNA however pt chose to go home. Pt endorses being able to eat during this period.   PT evaluated and recommended MCKENNA however pt wanted to go home. Vitals stable, CK elevated to 419, iso inability to move.   On exam tremor of b/l UE present - pt evaluated by neurology, evidence of some rigidity and mild pill-rolling tremor however unclear if these symptoms are exacerbated by his ongoing infx  Orthostatics positive - may be an element of dysautonomia, expect supine hypertension in Parkinson's    Plan:  - Neurology consult for evaluation of Parkinson's vs other neurological disorders -- follow up as OP  - Orthostatics positive, could be iso dysautonomia given above  - LR bolus, repeat orthostatics today  - OP follow up with neurology (Dr. Jernigan)  - Fall precautions  - Nutrition consulted, pending recs Pt presents with 2 days of inability to get up and out of chair at home; pt has HHA 2-3x a week. Pt was last admitted 7/1/24 for recurrent falls. During his prior admission PT evaluated and recommended MCKENNA however pt chose to go home. Pt endorses being able to eat during this period.   PT evaluated and recommended MCKENNA however pt wanted to go home. Vitals stable, CK elevated to 419, iso inability to move.   On exam tremor of b/l UE present - pt evaluated by neurology, evidence of some rigidity and mild pill-rolling tremor however unclear if these symptoms are exacerbated by his ongoing infx  Orthostatics positive - may be an element of dysautonomia, expect supine hypertension in Parkinson's    Plan:  - Neurology consult for evaluation of Parkinson's vs other neurological disorders -- follow up as OP  - Orthostatics positive, could be iso dysautonomia given above  - LR bolus, repeat orthostatics today  - OP follow up with neurology (Dr. Jernigan)  - Fall precautions  - Nutrition consulted, pending recs  - Palliative consulted, pending recs

## 2024-08-16 LAB
ALBUMIN SERPL ELPH-MCNC: 2.6 G/DL — LOW (ref 3.3–5)
ALP SERPL-CCNC: 70 U/L — SIGNIFICANT CHANGE UP (ref 40–120)
ALT FLD-CCNC: 5 U/L — LOW (ref 10–45)
ANION GAP SERPL CALC-SCNC: 8 MMOL/L — SIGNIFICANT CHANGE UP (ref 5–17)
AST SERPL-CCNC: 15 U/L — SIGNIFICANT CHANGE UP (ref 10–40)
BASOPHILS # BLD AUTO: 0.05 K/UL — SIGNIFICANT CHANGE UP (ref 0–0.2)
BASOPHILS NFR BLD AUTO: 1.1 % — SIGNIFICANT CHANGE UP (ref 0–2)
BILIRUB SERPL-MCNC: <0.2 MG/DL — SIGNIFICANT CHANGE UP (ref 0.2–1.2)
BUN SERPL-MCNC: 10 MG/DL — SIGNIFICANT CHANGE UP (ref 7–23)
CALCIUM SERPL-MCNC: 8.5 MG/DL — SIGNIFICANT CHANGE UP (ref 8.4–10.5)
CHLORIDE SERPL-SCNC: 101 MMOL/L — SIGNIFICANT CHANGE UP (ref 96–108)
CO2 SERPL-SCNC: 30 MMOL/L — SIGNIFICANT CHANGE UP (ref 22–31)
CREAT SERPL-MCNC: 0.73 MG/DL — SIGNIFICANT CHANGE UP (ref 0.5–1.3)
CULTURE RESULTS: ABNORMAL
EGFR: 93 ML/MIN/1.73M2 — SIGNIFICANT CHANGE UP
EOSINOPHIL # BLD AUTO: 0.54 K/UL — HIGH (ref 0–0.5)
EOSINOPHIL NFR BLD AUTO: 11.6 % — HIGH (ref 0–6)
GLUCOSE SERPL-MCNC: 84 MG/DL — SIGNIFICANT CHANGE UP (ref 70–99)
HCT VFR BLD CALC: 37.7 % — LOW (ref 39–50)
HGB BLD-MCNC: 11.8 G/DL — LOW (ref 13–17)
IMM GRANULOCYTES NFR BLD AUTO: 0.2 % — SIGNIFICANT CHANGE UP (ref 0–0.9)
LYMPHOCYTES # BLD AUTO: 1.74 K/UL — SIGNIFICANT CHANGE UP (ref 1–3.3)
LYMPHOCYTES # BLD AUTO: 37.5 % — SIGNIFICANT CHANGE UP (ref 13–44)
MAGNESIUM SERPL-MCNC: 2 MG/DL — SIGNIFICANT CHANGE UP (ref 1.6–2.6)
MCHC RBC-ENTMCNC: 28.4 PG — SIGNIFICANT CHANGE UP (ref 27–34)
MCHC RBC-ENTMCNC: 31.3 GM/DL — LOW (ref 32–36)
MCV RBC AUTO: 90.6 FL — SIGNIFICANT CHANGE UP (ref 80–100)
MONOCYTES # BLD AUTO: 0.54 K/UL — SIGNIFICANT CHANGE UP (ref 0–0.9)
MONOCYTES NFR BLD AUTO: 11.6 % — SIGNIFICANT CHANGE UP (ref 2–14)
NEUTROPHILS # BLD AUTO: 1.76 K/UL — LOW (ref 1.8–7.4)
NEUTROPHILS NFR BLD AUTO: 38 % — LOW (ref 43–77)
NRBC # BLD: 0 /100 WBCS — SIGNIFICANT CHANGE UP (ref 0–0)
ORGANISM # SPEC MICROSCOPIC CNT: ABNORMAL
ORGANISM # SPEC MICROSCOPIC CNT: SIGNIFICANT CHANGE UP
PHOSPHATE SERPL-MCNC: 3.8 MG/DL — SIGNIFICANT CHANGE UP (ref 2.5–4.5)
PLATELET # BLD AUTO: 242 K/UL — SIGNIFICANT CHANGE UP (ref 150–400)
POTASSIUM SERPL-MCNC: 4.2 MMOL/L — SIGNIFICANT CHANGE UP (ref 3.5–5.3)
POTASSIUM SERPL-SCNC: 4.2 MMOL/L — SIGNIFICANT CHANGE UP (ref 3.5–5.3)
PROT SERPL-MCNC: 6.8 G/DL — SIGNIFICANT CHANGE UP (ref 6–8.3)
RBC # BLD: 4.16 M/UL — LOW (ref 4.2–5.8)
RBC # FLD: 14.4 % — SIGNIFICANT CHANGE UP (ref 10.3–14.5)
SODIUM SERPL-SCNC: 139 MMOL/L — SIGNIFICANT CHANGE UP (ref 135–145)
SPECIMEN SOURCE: SIGNIFICANT CHANGE UP
WBC # BLD: 4.64 K/UL — SIGNIFICANT CHANGE UP (ref 3.8–10.5)
WBC # FLD AUTO: 4.64 K/UL — SIGNIFICANT CHANGE UP (ref 3.8–10.5)

## 2024-08-16 PROCEDURE — 99233 SBSQ HOSP IP/OBS HIGH 50: CPT | Mod: GC

## 2024-08-16 PROCEDURE — 99232 SBSQ HOSP IP/OBS MODERATE 35: CPT

## 2024-08-16 RX ORDER — TRAMADOL HYDROCHLORIDE 200 MG/1
50 TABLET, EXTENDED RELEASE ORAL EVERY 12 HOURS
Refills: 0 | Status: DISCONTINUED | OUTPATIENT
Start: 2024-08-16 | End: 2024-08-20

## 2024-08-16 RX ADMIN — Medication 60 MILLIGRAM(S): at 18:30

## 2024-08-16 RX ADMIN — HYDROMORPHONE HYDROCHLORIDE 4 MILLIGRAM(S): 2 TABLET ORAL at 15:13

## 2024-08-16 RX ADMIN — Medication 1 APPLICATION(S): at 06:39

## 2024-08-16 RX ADMIN — HYDROMORPHONE HYDROCHLORIDE 4 MILLIGRAM(S): 2 TABLET ORAL at 01:39

## 2024-08-16 RX ADMIN — CEFAZOLIN SODIUM 100 MILLIGRAM(S): 2 INJECTION, SOLUTION INTRAVENOUS at 06:28

## 2024-08-16 RX ADMIN — HYDROMORPHONE HYDROCHLORIDE 4 MILLIGRAM(S): 2 TABLET ORAL at 21:45

## 2024-08-16 RX ADMIN — Medication 2 TABLET(S): at 21:46

## 2024-08-16 RX ADMIN — HYDROMORPHONE HYDROCHLORIDE 4 MILLIGRAM(S): 2 TABLET ORAL at 18:30

## 2024-08-16 RX ADMIN — CEFAZOLIN SODIUM 100 MILLIGRAM(S): 2 INJECTION, SOLUTION INTRAVENOUS at 15:21

## 2024-08-16 RX ADMIN — CEFAZOLIN SODIUM 100 MILLIGRAM(S): 2 INJECTION, SOLUTION INTRAVENOUS at 21:45

## 2024-08-16 RX ADMIN — HYDROMORPHONE HYDROCHLORIDE 4 MILLIGRAM(S): 2 TABLET ORAL at 10:25

## 2024-08-16 RX ADMIN — Medication 5 MILLIGRAM(S): at 10:26

## 2024-08-16 RX ADMIN — Medication 60 MILLIGRAM(S): at 06:27

## 2024-08-16 RX ADMIN — Medication 1 TABLET(S): at 18:30

## 2024-08-16 RX ADMIN — TRAMADOL HYDROCHLORIDE 50 MILLIGRAM(S): 200 TABLET, EXTENDED RELEASE ORAL at 06:27

## 2024-08-16 RX ADMIN — HYDROMORPHONE HYDROCHLORIDE 4 MILLIGRAM(S): 2 TABLET ORAL at 06:26

## 2024-08-16 RX ADMIN — TRAMADOL HYDROCHLORIDE 50 MILLIGRAM(S): 200 TABLET, EXTENDED RELEASE ORAL at 18:30

## 2024-08-16 RX ADMIN — ENOXAPARIN SODIUM 40 MILLIGRAM(S): 100 INJECTION SUBCUTANEOUS at 18:29

## 2024-08-16 NOTE — DIETITIAN INITIAL EVALUATION ADULT - PROBLEM SELECTOR PLAN 6
Pt does not remember last BM. On home senna and bisacodyl PO and suppository PRN    - con't home senna  - miralax QD  - con't bisacodyl 5mg BID PO  - con't bisacodyl suppository PRN  - monitor for BM

## 2024-08-16 NOTE — DIETITIAN INITIAL EVALUATION ADULT - OTHER CALCULATIONS
*Using +10% ideal body weight (169 pounds) as current weight likely inaccurate due to 3+ edema. Needs adjusted for older age, malnutrition, pressure injury. Fluid needs per team. ideal body weight: 169 pounds; % ideal body weight: 114%

## 2024-08-16 NOTE — DIETITIAN INITIAL EVALUATION ADULT - PROBLEM SELECTOR PLAN 5
Asymptomatic aortic aneurysm <5.5cm. Prior CT C-spine (6/29/24) found 5cm transverse abdominal aortic aneurysm, pt has outpt vascular surgery (Dr. Corbin) appointment 7/12 w/ plans for CT AP before discussion of most appropriate intervention. Pt presented to ED w/ BP 91/54 HR 79, hypotension likely 2/2 hypovolemia w/ pt presenting dry on exam, r/p BP after receiving 1L NS bolus was 128/57.     - consult vascular outpatient   - con't to monitor vitals  - con't outpatient follow up on discharge

## 2024-08-16 NOTE — DIETITIAN INITIAL EVALUATION ADULT - NS FNS DIET ORDER
Diet, Regular:   Supplement Feeding Modality:  Oral  Ensure Enlive Cans or Servings Per Day:  1       Frequency:  Three Times a day (08-16-24 @ 09:28)

## 2024-08-16 NOTE — DIETITIAN INITIAL EVALUATION ADULT - PERTINENT MEDS FT
MEDICATIONS  (STANDING):  bisacodyl 5 milliGRAM(s) Oral every 12 hours  ceFAZolin   IVPB 2000 milliGRAM(s) IV Intermittent every 8 hours  ceFAZolin   IVPB      clotrimazole 1% Cream 1 Application(s) Topical two times a day  DULoxetine 60 milliGRAM(s) Oral every 12 hours  enoxaparin Injectable 40 milliGRAM(s) SubCutaneous every 24 hours  HYDROmorphone   Tablet 4 milliGRAM(s) Oral every 4 hours  lidocaine   4% Patch 1 Patch Transdermal every 24 hours  polyethylene glycol 3350 17 Gram(s) Oral every 24 hours  senna 2 Tablet(s) Oral at bedtime  traMADol 50 milliGRAM(s) Oral every 12 hours    MEDICATIONS  (PRN):  acetaminophen     Tablet .. 650 milliGRAM(s) Oral every 6 hours PRN Temp greater or equal to 38C (100.4F), Mild Pain (1 - 3)  bisacodyl Suppository 10 milliGRAM(s) Rectal daily PRN Constipation  clonazePAM  Tablet 2 milliGRAM(s) Oral every 12 hours PRN Anxiety

## 2024-08-16 NOTE — DIETITIAN INITIAL EVALUATION ADULT - PROBLEM SELECTOR PLAN 9
Pt has prescription for omeprazole 20mg QD, however per pt's pharmacy pt has not filled prescription since May 2024    - con't omeprazole

## 2024-08-16 NOTE — PROGRESS NOTE ADULT - PROBLEM SELECTOR PLAN 5
Pt has hx of chronic lymphedema of B/L legs. Pt endorses mild tenderness/pain to touch on B/L LE. On physical exam, pt's legs are B/L erythematous, warm to touch, with dry scaling skin with intermittent patches of oozing indicative of venous insufficiency. Prior US duplex b/l was negative B/L    Plan:  - wound care for b/l legs  - encourage leg elevation  - c/w clotrimazole 1% topical cream for fungal rash in bilateral thighs  - Wound care to continue with emollient   - Wound care consulted, recs appreciated

## 2024-08-16 NOTE — PROGRESS NOTE ADULT - PROBLEM SELECTOR PLAN 1
Pt presents with 2 days of inability to get up and out of chair at home; pt has HHA 2-3x a week. Pt was last admitted 7/1/24 for recurrent falls. During his prior admission PT evaluated and recommended MCKENNA however pt chose to go home. Pt endorses being able to eat during this period.   PT evaluated and recommended MCKENNA however pt wanted to go home. Vitals stable, CK elevated to 419, iso inability to move.   On exam tremor of b/l UE present - pt evaluated by neurology, evidence of some rigidity and mild pill-rolling tremor however unclear if these symptoms are exacerbated by his ongoing infx  Orthostatics positive - may be an element of dysautonomia, expect supine hypertension in Parkinson's; s/p LR bolus and abdominal binder, repeat orthostatics negative  Palliative recommending chronic pain consult given pt appears oversedated on his current pain regimen with difficulties in participating in GOC conversations    Plan:  - Neurology consult for evaluation of Parkinson's vs other neurological disorders -- follow up as OP w/ Dr. Jernigan  - Fall precautions  - Nutrition consulted, pending recs Pt presents with 2 days of inability to get up and out of chair at home; pt has HHA 2-3x a week. Pt was last admitted 7/1/24 for recurrent falls. During his prior admission PT evaluated and recommended MCKENNA however pt chose to go home. Pt endorses being able to eat during this period.   PT evaluated and recommended MCKENNA however pt wanted to go home. Vitals stable, CK elevated to 419, iso inability to move.   On exam tremor of b/l UE present - pt evaluated by neurology, evidence of some rigidity and mild pill-rolling tremor however unclear if these symptoms are exacerbated by his ongoing infx  Orthostatics positive - may be an element of dysautonomia, expect supine hypertension in Parkinson's; s/p LR bolus and abdominal binder, repeat orthostatics negative  Palliative recommending chronic pain consult given pt appears oversedated on his current pain regimen with difficulties in participating in GOC conversations    Plan:  - Follow up as OP w/ Dr. Jernigan  - Fall precautions  - Nutrition consulted, pending recs

## 2024-08-16 NOTE — PROGRESS NOTE ADULT - PROBLEM SELECTOR PLAN 2
BCx growing gram positive cocci in clusters in 1 set, PCR positive for MSSA  8/15: BCx drawn on 8/14 NGTD  TTE LVEF 55%, PASP 40mmHg  Pt currently DNR/DNI, unwilling to reverse status for RAJANI    Plan:  - c/w Ancef 2g q8 x 4 weeks  - Will repeat BCx every AM until negative for 48 hours  - Once BCx are negative, will place PICC line  - Will get collateral from pt's brother regarding RAJANI, GOC  - Will discuss with ID - can consider gallium scan if no RAJANI BCx growing gram positive cocci in clusters in 1 set, PCR positive for MSSA  8/15: BCx drawn on 8/14 NGTD  TTE LVEF 55%, PASP 40mmHg  Pt currently DNR/DNI, spoke to pt's brother and pt agreed to reverse DNR/DNI for RAJANI, and agrees to IV abx    Plan:  - c/w Ancef 2g q8 x 4 weeks  - Will repeat BCx every AM until negative for 48 hours  - Once BCx are negative, will place PICC line  - RAJANI ordered

## 2024-08-16 NOTE — DIETITIAN INITIAL EVALUATION ADULT - ORAL INTAKE PTA/DIET HISTORY
No known food allergies nor food intolerances reported. Pt known to [facility] nutrition services from previous admission. Pt reported eating well since prior admission and breakfast this AM was the only meal he has not eaten.

## 2024-08-16 NOTE — PROGRESS NOTE ADULT - SUBJECTIVE AND OBJECTIVE BOX
INFECTIOUS DISEASES CONSULT FOLLOW-UP NOTE    INTERVAL HPI/OVERNIGHT EVENTS:  no event overnight  patient reports pain is stable, no new symptoms    ROS:   Constitutional, eyes, ENT, cardiovascular, respiratory, gastrointestinal, genitourinary, integumentary, neurological, psychiatric and heme/lymph are otherwise negative other than noted above       ANTIBIOTICS/RELEVANT:    MEDICATIONS  (STANDING):  bisacodyl 5 milliGRAM(s) Oral every 12 hours  ceFAZolin   IVPB      ceFAZolin   IVPB 2000 milliGRAM(s) IV Intermittent every 8 hours  clotrimazole 1% Cream 1 Application(s) Topical two times a day  DULoxetine 60 milliGRAM(s) Oral every 12 hours  enoxaparin Injectable 40 milliGRAM(s) SubCutaneous every 24 hours  HYDROmorphone   Tablet 4 milliGRAM(s) Oral every 4 hours  lidocaine   4% Patch 1 Patch Transdermal every 24 hours  polyethylene glycol 3350 17 Gram(s) Oral every 24 hours  senna 2 Tablet(s) Oral at bedtime  traMADol 50 milliGRAM(s) Oral every 12 hours    MEDICATIONS  (PRN):  acetaminophen     Tablet .. 650 milliGRAM(s) Oral every 6 hours PRN Temp greater or equal to 38C (100.4F), Mild Pain (1 - 3)  bisacodyl Suppository 10 milliGRAM(s) Rectal daily PRN Constipation  clonazePAM  Tablet 2 milliGRAM(s) Oral every 12 hours PRN Anxiety        Vital Signs Last 24 Hrs  T(C): 36.9 (16 Aug 2024 05:33), Max: 37.1 (15 Aug 2024 20:58)  T(F): 98.4 (16 Aug 2024 05:33), Max: 98.7 (15 Aug 2024 20:58)  HR: 79 (16 Aug 2024 05:33) (79 - 98)  BP: 123/78 (16 Aug 2024 05:33) (106/60 - 123/78)  BP(mean): --  RR: 18 (16 Aug 2024 05:33) (18 - 18)  SpO2: 96% (16 Aug 2024 05:33) (92% - 96%)    Parameters below as of 16 Aug 2024 05:33  Patient On (Oxygen Delivery Method): room air        PHYSICAL EXAM:  Constitutional: alert, NAD  Eyes: the sclera and conjunctiva were normal.   ENT: the ears and nose were normal in appearance.   Neck: the appearance of the neck was normal and the neck was supple.   Pulmonary: no respiratory distress and lungs were clear to auscultation bilaterally.   Heart: heart rate was normal and rhythm regular, normal S1 and S2  Vascular:. there was no peripheral edema  Abdomen: normal bowel sounds, soft, non-tender  Ext: b/l legs with excoriation marks, erythematous LLE         LABS:                        11.8   4.64  )-----------( 242      ( 16 Aug 2024 08:26 )             37.7     08-16    139  |  101  |  10  ----------------------------<  84  4.2   |  30  |  0.73    Ca    8.5      16 Aug 2024 08:26  Phos  3.8     08-16  Mg     2.0     08-16    TPro  6.8  /  Alb  2.6<L>  /  TBili  <0.2  /  DBili  x   /  AST  15  /  ALT  5<L>  /  AlkPhos  70  08-16      Urinalysis Basic - ( 16 Aug 2024 08:26 )    Color: x / Appearance: x / SG: x / pH: x  Gluc: 84 mg/dL / Ketone: x  / Bili: x / Urobili: x   Blood: x / Protein: x / Nitrite: x   Leuk Esterase: x / RBC: x / WBC x   Sq Epi: x / Non Sq Epi: x / Bacteria: x        MICROBIOLOGY:  8/14 BCx ngtd  8/13 BCx ngtd  8/12 BCx aerobic: MSSA, staph pettenkoferi, anaerobic: Actinomyces neuii         RADIOLOGY & ADDITIONAL STUDIES:  Reviewed

## 2024-08-16 NOTE — PROGRESS NOTE ADULT - PROBLEM SELECTOR PLAN 9
Pt has history of chronic vertebral compression fractures and kyphoscoliosis s/p C-sacrum fusion. Pt on Tramadol 100mg ER QD and Dilaudid 4mg q4 PRN.     Plan:  - Con't tramadol 100mg ER QD  - Dilaudid 4mg q4 for severe pain PRN  - monitor for back pain symptoms

## 2024-08-16 NOTE — DIETITIAN INITIAL EVALUATION ADULT - PROBLEM SELECTOR PLAN 8
Pt has history of chronic vertebral compression fractures and kyphoscoliosis s/p C-sacrum fusion. Pt on Tramadol 100mg ER QD and Dilaudid 4mg q4 PRN.     - Con't tramadol 100mg ER QD  - Dilaudid 4mg q12 for severe PRN  - monitor for back pain symptoms

## 2024-08-16 NOTE — DIETITIAN INITIAL EVALUATION ADULT - PROBLEM SELECTOR PLAN 10
Pt on home duloxetine 60mg PO BID. Pt has clonazepam 2mg BID PRN for anxiety and ambien 5mg PRN for insomnia. Pt was lethargic during interview and examination, pt endorses taking ambien last night. PT endorses taking clonazepam BID, last dose was last night. On exam pt had b/l hand tremors with movement, however pt denies anxiety, no diaphoresis, no tachycardia, no nausea/vomiting, headache, afebrile.     - con't home duloxetine  - con't home clonazepam 2mg BID PRN  - hold ambien, can offer melatonin for insomnia

## 2024-08-16 NOTE — DIETITIAN INITIAL EVALUATION ADULT - NSFNSGIIOFT_GEN_A_CORE
Pt denied nausea/vomiting/diarrhea/constipation, however endorsed loose stools and mentioned his last BM was a couple of days ago (last BM 8/13 per RN flowsheets)

## 2024-08-16 NOTE — PROGRESS NOTE ADULT - ATTENDING COMMENTS
Was able to reach patient brother who spoke to patient about need for RAJANI and IV antibiotics for his bacteremia, patient now amenable to code status reversal for RAJANI and IV antibiotics on discharge, however still hesitant about going to [facility]CKENNA.  Will continued to  patient and have patient family speak to him given that ptn has clear MCKENNA needs and inadequate support at him (especially for IV abx on discharge).  Continues to be afebrile, no leukocytosis, bcx from last few days NGTD, continues on cefazolin IV, ID following.

## 2024-08-16 NOTE — PROGRESS NOTE ADULT - ASSESSMENT
Mr. Egan is a 80 y/o M with a PMHx of Bladder cancer (s/p tumor resection, s/p localized chemo and now in remission), GERD, L knee OA, chronic lymphedema, kyphoscoliosis s/p C-sacrum fusion at [facility] (10/5/2020, w/ Dr. Carlisle), MDD, panic disorder, prior RUE DVT, BPH who was admitted for recurrent falls, now found to have MSSA bacteremia.

## 2024-08-16 NOTE — DIETITIAN INITIAL EVALUATION ADULT - PERTINENT LABORATORY DATA
08-16    139  |  101  |  10  ----------------------------<  84  4.2   |  30  |  0.73    Ca    8.5      16 Aug 2024 08:26  Phos  3.8     08-16  Mg     2.0     08-16    TPro  6.8  /  Alb  2.6<L>  /  TBili  <0.2  /  DBili  x   /  AST  15  /  ALT  5<L>  /  AlkPhos  70  08-16

## 2024-08-16 NOTE — PROGRESS NOTE ADULT - ASSESSMENT
79M w/ hx L femoral neck fracture s/p L hip hemiarthroplasty (10/2023), hx THR, kyphoscoliosis s/p thoracolumbar fusion (reportedly 2020), bladder CA s/p resection, mood disorder, AAA of 5cm (undergoing workup for EVAR), admitted on 8/12 after presented with recent falls and generalized weakness, in the context of a 2 week history of intermittent shaking chills and decreased appetite. Found to have MSSA bacteremia, likely source is BLE excoriations with mild LLE SSTI (now improving).  No evidence of metastatic/deep seated infection at this time.  Back pain chronic, stable.  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 Per primary team, his brother convinced patient to get RAJANI and is now it is ordered.  Patient is having hard time understanding my explanation of what's going on and it takes long time for him to respond - cognitive issue vs opioid effect?  Recommend reducing opioid.      - cont cefazolin 2g IV q8h  - f/u surveillance BCx  - RAJANI if patient agrees   - Treatment duration will be 4 weeks IV abx minimum given presence of hardware    Team 2 will follow you.  Case d/w primary team.    Jojo Whitehead MD, MS  Infectious Disease attending  office phone [phone]  For any questions during evening/weekend/holiday, please page ID on call

## 2024-08-16 NOTE — DIETITIAN INITIAL EVALUATION ADULT - PROBLEM SELECTOR PLAN 2
Pt reports falling on elbow 3 days prior, found to have erythematous fluctuance on L elbow with small amount of dried blood and mildly tender to palpation w/ full ROM. Pt has been afebrile, does not endorse subjective fevers/chills, WBC 6.44, lower c/f septic bursitis or septic arthritis. More likely localized inflammatory reaction to mechanical injury    - f/u L elbow xray  - con't to monitor for ROM and worsening erythema/fluctuance  - trend CBC

## 2024-08-16 NOTE — DIETITIAN INITIAL EVALUATION ADULT - PROBLEM SELECTOR PLAN 1
Pt presents with 2 days of inability to get up and out of chair at home; pt has HHA 2-3x a week. Pt was last admitted 7/1/24 for recurrent falls, Prior admission PT evaluated and recommended MCKENNA however pt chose to go home. Pt endorses being able to eat during this period. PT evaluated and recommended MCKENNA however pt wanted to go home. Vitals stable, CK elevated to 419, iso inability to move. In the ED blood cultures were sent, still pending.     - PT consult  - Fall precautions  - f/u UA

## 2024-08-16 NOTE — DIETITIAN INITIAL EVALUATION ADULT - OTHER INFO
Per H&P: 79M w/ PMH of bladder CA (s/p tumor resection), GERD, chronic lymphedema, kyphoscoliosis s/p C-sacrum fusion (2020), chronic compression fractures of vertebral column, L knee OA, MDD, panic disorder, BPH, abdominal aortic aneurysm found on last admission, recently admitted for fall at home, now presenting to ED due weakness and inability to stand from chair for two days. Pt states he felt generalized weakness and difficulty getting up and out of his chair. Pt states he was able to urinate by using a bedside urinal. Pt endorses eating snacks and Chinese food that his friend brought over last night. Pt states he normally has help from [facility] but last time they were came was Friday. Pt called the hospital as he was unsure what to do and was advised to call 911 for assistance in getting out of chair. Pt states 3 days prior he fell on his elbow, which has been bleeding on and off. Pt also reports having hit his head on his dresser last week when trying to  a bandage, no bleeding or LOC. He also reports b/l lower leg pain with oozing of the skin. Pt also endorses cough since this morning. Pt states he is chronically constipated, does not know last BM. Pt states he as not been taking home diazepam for the past few weeks but endorses taking dilaudid 4mg q4 and tramadol for his back pain. Pt takes clonazepam 2mg BID for anxiety, last dose was last night. Pt denies fevers, chills, abdominal pain, dysuria, diarrhea, vomiting, hematochezia or melena, lightheadedness.     Met with pt this AM at bedside. Pt was seated upright and able to articulate his nutrition hx well. No known food allergies nor food intolerances reported. Pt known to [facility] nutrition services from previous admission. Pt reported eating well since prior admission and breakfast this AM was the only meal he has not eaten as he is tired due to inability to sleep last night. Pt stated "nothing is wrong" with his appetite. Pt denied chewing/swallowing difficulties and nausea/vomiting/diarrhea/constipation, however endorsed loose stools and mentioned his last BM was a couple of days ago (last BM 8/13 per RN flowsheets). Pt reported his usual body weight 160 pounds, denied any recent wt loss/gain (wt gain likely secondary to fluid fluctuations in setting of 3+ edema). RD reviewed protein sources (chicken, fish, beans, nut, etc.) and encouraged adequate PO and protein consumption in order to regain strength, maintain muscle mass and reach adequate nutritional status. Pt was accepting to RD suggestion and asking appropriate questions.     *Previous RD note (7/1):   - Weight: 173.9 pounds  - Nutrition dx: increased nutrient needs related to wound healing

## 2024-08-16 NOTE — DIETITIAN INITIAL EVALUATION ADULT - PROBLEM SELECTOR PLAN 3
Pt has hx of chronic lymphedema of B/L legs. Pt endorses mild tenderness/pain to touch on B/L LE. On physical exam, pt's legs are B/L erythematous, warm to touch, with dry scaling skin with intermittent patches of oozing indicative of venous insufficiency. Prior US duplex b/l was negative B/L    - wound care for b/l legs  - encourage leg elevation

## 2024-08-16 NOTE — DIETITIAN INITIAL EVALUATION ADULT - PROBLEM SELECTOR PLAN 7
Pt has prescription for tamsulosin 0.4mg PO QD, however per pt's home pharmacy, pt has not picked up medication since September 2023    - hold tamsulosin

## 2024-08-16 NOTE — DIETITIAN INITIAL EVALUATION ADULT - ADD RECOMMEND
1. Continue with Regular diet  - Encourage adequate PO and protein intake  - Fluid needs per team  - Honor food preferences, as medically able  2. Recommend Ensure plus HP 1x/day (chocolate)  - Provides 350 kcal, 20 g pro per serving  3. Recommend MVI for general nutrient coverage  4. Appreciate weekly weight trends  5. Continue with current bowel regimen, prn

## 2024-08-16 NOTE — PROGRESS NOTE ADULT - SUBJECTIVE AND OBJECTIVE BOX
OVERNIGHT EVENTS: LAKIA    SUBJECTIVE / INTERVAL HPI: Patient seen and examined at bedside. Pt denies any new complaints at this time, including CP, SOB, cough, abdominal pain, worsening LE swelling and reports his pain is well controlled. Pt would like team to reach out to his brother (Osvaldo) regarding his care and disposition.     PHYSICAL EXAM:    General: Lying comfortably and in no acute distress. Pt is hard of hearing, more sleepy compared to prior exam. Awakens easily to name.  HEENT: NC/AT, no signs of bleeding, bruising; EOMI, anicteric sclera; MMM  Neck: supple  Cardiovascular: +S1/S2, RRR  Respiratory: CTA B/L, decreased breath sounds bilaterally; no W/R/R; no increased WOB  Gastrointestinal: soft, NT/ND; +BSx4  Extremities: WWP; no edema, clubbing or cyanosis  Vascular: 2+ radial pulses B/L  Neurological: AAOx2; no focal deficits  Psychiatric: pleasant mood and affect  Dermatologic: diffuse scaling and dry skin of the b/l LE with excoration       VITAL SIGNS:  Vital Signs Last 24 Hrs  T(C): 36.9 (16 Aug 2024 05:33), Max: 37.1 (15 Aug 2024 20:58)  T(F): 98.4 (16 Aug 2024 05:33), Max: 98.7 (15 Aug 2024 20:58)  HR: 79 (16 Aug 2024 05:33) (68 - 98)  BP: 123/78 (16 Aug 2024 05:33) (97/59 - 123/78)  BP(mean): --  RR: 18 (16 Aug 2024 05:33) (17 - 18)  SpO2: 96% (16 Aug 2024 05:33) (92% - 96%)    Parameters below as of 16 Aug 2024 05:33  Patient On (Oxygen Delivery Method): room air          MEDICATIONS:  MEDICATIONS  (STANDING):  bisacodyl 5 milliGRAM(s) Oral every 12 hours  ceFAZolin   IVPB 2000 milliGRAM(s) IV Intermittent every 8 hours  ceFAZolin   IVPB      clotrimazole 1% Cream 1 Application(s) Topical two times a day  DULoxetine 60 milliGRAM(s) Oral every 12 hours  enoxaparin Injectable 40 milliGRAM(s) SubCutaneous every 24 hours  HYDROmorphone   Tablet 4 milliGRAM(s) Oral every 4 hours  lidocaine   4% Patch 1 Patch Transdermal every 24 hours  polyethylene glycol 3350 17 Gram(s) Oral every 24 hours  senna 2 Tablet(s) Oral at bedtime  traMADol 50 milliGRAM(s) Oral every 12 hours    MEDICATIONS  (PRN):  acetaminophen     Tablet .. 650 milliGRAM(s) Oral every 6 hours PRN Temp greater or equal to 38C (100.4F), Mild Pain (1 - 3)  bisacodyl Suppository 10 milliGRAM(s) Rectal daily PRN Constipation  clonazePAM  Tablet 2 milliGRAM(s) Oral every 12 hours PRN Anxiety      ALLERGIES:  Allergies    sulfa drugs (Unknown)    Intolerances        LABS:                        10.6   4.53  )-----------( 267      ( 15 Aug 2024 05:30 )             33.8     08-15    136  |  102  |  8   ----------------------------<  92  4.0   |  26  |  0.65    Ca    8.1<L>      15 Aug 2024 05:30  Phos  3.9     08-15  Mg     2.0     08-15    TPro  6.5  /  Alb  2.6<L>  /  TBili  0.2  /  DBili  x   /  AST  16  /  ALT  5<L>  /  AlkPhos  67  08-15      Urinalysis Basic - ( 15 Aug 2024 05:30 )    Color: x / Appearance: x / SG: x / pH: x  Gluc: 92 mg/dL / Ketone: x  / Bili: x / Urobili: x   Blood: x / Protein: x / Nitrite: x   Leuk Esterase: x / RBC: x / WBC x   Sq Epi: x / Non Sq Epi: x / Bacteria: x      CAPILLARY BLOOD GLUCOSE          RADIOLOGY & ADDITIONAL TESTS: Reviewed.

## 2024-08-17 LAB
ANION GAP SERPL CALC-SCNC: 8 MMOL/L — SIGNIFICANT CHANGE UP (ref 5–17)
BASOPHILS # BLD AUTO: 0.03 K/UL — SIGNIFICANT CHANGE UP (ref 0–0.2)
BASOPHILS NFR BLD AUTO: 0.7 % — SIGNIFICANT CHANGE UP (ref 0–2)
BUN SERPL-MCNC: 12 MG/DL — SIGNIFICANT CHANGE UP (ref 7–23)
CALCIUM SERPL-MCNC: 8.2 MG/DL — LOW (ref 8.4–10.5)
CHLORIDE SERPL-SCNC: 100 MMOL/L — SIGNIFICANT CHANGE UP (ref 96–108)
CO2 SERPL-SCNC: 29 MMOL/L — SIGNIFICANT CHANGE UP (ref 22–31)
CREAT SERPL-MCNC: 0.71 MG/DL — SIGNIFICANT CHANGE UP (ref 0.5–1.3)
EGFR: 93 ML/MIN/1.73M2 — SIGNIFICANT CHANGE UP
EOSINOPHIL # BLD AUTO: 0.49 K/UL — SIGNIFICANT CHANGE UP (ref 0–0.5)
EOSINOPHIL NFR BLD AUTO: 11.4 % — HIGH (ref 0–6)
GLUCOSE SERPL-MCNC: 93 MG/DL — SIGNIFICANT CHANGE UP (ref 70–99)
HCT VFR BLD CALC: 33.6 % — LOW (ref 39–50)
HGB BLD-MCNC: 10.4 G/DL — LOW (ref 13–17)
IMM GRANULOCYTES NFR BLD AUTO: 0.2 % — SIGNIFICANT CHANGE UP (ref 0–0.9)
LYMPHOCYTES # BLD AUTO: 1.89 K/UL — SIGNIFICANT CHANGE UP (ref 1–3.3)
LYMPHOCYTES # BLD AUTO: 44 % — SIGNIFICANT CHANGE UP (ref 13–44)
MAGNESIUM SERPL-MCNC: 1.9 MG/DL — SIGNIFICANT CHANGE UP (ref 1.6–2.6)
MCHC RBC-ENTMCNC: 27.4 PG — SIGNIFICANT CHANGE UP (ref 27–34)
MCHC RBC-ENTMCNC: 31 GM/DL — LOW (ref 32–36)
MCV RBC AUTO: 88.4 FL — SIGNIFICANT CHANGE UP (ref 80–100)
MONOCYTES # BLD AUTO: 0.45 K/UL — SIGNIFICANT CHANGE UP (ref 0–0.9)
MONOCYTES NFR BLD AUTO: 10.5 % — SIGNIFICANT CHANGE UP (ref 2–14)
NEUTROPHILS # BLD AUTO: 1.43 K/UL — LOW (ref 1.8–7.4)
NEUTROPHILS NFR BLD AUTO: 33.2 % — LOW (ref 43–77)
NRBC # BLD: 0 /100 WBCS — SIGNIFICANT CHANGE UP (ref 0–0)
PHOSPHATE SERPL-MCNC: 3.5 MG/DL — SIGNIFICANT CHANGE UP (ref 2.5–4.5)
PLATELET # BLD AUTO: 276 K/UL — SIGNIFICANT CHANGE UP (ref 150–400)
POTASSIUM SERPL-MCNC: 4.3 MMOL/L — SIGNIFICANT CHANGE UP (ref 3.5–5.3)
POTASSIUM SERPL-SCNC: 4.3 MMOL/L — SIGNIFICANT CHANGE UP (ref 3.5–5.3)
RBC # BLD: 3.8 M/UL — LOW (ref 4.2–5.8)
RBC # FLD: 14.1 % — SIGNIFICANT CHANGE UP (ref 10.3–14.5)
SODIUM SERPL-SCNC: 137 MMOL/L — SIGNIFICANT CHANGE UP (ref 135–145)
WBC # BLD: 4.3 K/UL — SIGNIFICANT CHANGE UP (ref 3.8–10.5)
WBC # FLD AUTO: 4.3 K/UL — SIGNIFICANT CHANGE UP (ref 3.8–10.5)

## 2024-08-17 PROCEDURE — 99232 SBSQ HOSP IP/OBS MODERATE 35: CPT

## 2024-08-17 PROCEDURE — 99232 SBSQ HOSP IP/OBS MODERATE 35: CPT | Mod: GC

## 2024-08-17 RX ADMIN — HYDROMORPHONE HYDROCHLORIDE 4 MILLIGRAM(S): 2 TABLET ORAL at 07:10

## 2024-08-17 RX ADMIN — Medication 60 MILLIGRAM(S): at 18:17

## 2024-08-17 RX ADMIN — Medication 5 MILLIGRAM(S): at 21:48

## 2024-08-17 RX ADMIN — CEFAZOLIN SODIUM 100 MILLIGRAM(S): 2 INJECTION, SOLUTION INTRAVENOUS at 07:10

## 2024-08-17 RX ADMIN — HYDROMORPHONE HYDROCHLORIDE 4 MILLIGRAM(S): 2 TABLET ORAL at 13:57

## 2024-08-17 RX ADMIN — HYDROMORPHONE HYDROCHLORIDE 4 MILLIGRAM(S): 2 TABLET ORAL at 23:24

## 2024-08-17 RX ADMIN — TRAMADOL HYDROCHLORIDE 50 MILLIGRAM(S): 200 TABLET, EXTENDED RELEASE ORAL at 07:09

## 2024-08-17 RX ADMIN — POLYETHYLENE GLYCOL 3350 17 GRAM(S): 17 POWDER, FOR SOLUTION ORAL at 18:16

## 2024-08-17 RX ADMIN — HYDROMORPHONE HYDROCHLORIDE 4 MILLIGRAM(S): 2 TABLET ORAL at 18:17

## 2024-08-17 RX ADMIN — ENOXAPARIN SODIUM 40 MILLIGRAM(S): 100 INJECTION SUBCUTANEOUS at 18:17

## 2024-08-17 RX ADMIN — HYDROMORPHONE HYDROCHLORIDE 4 MILLIGRAM(S): 2 TABLET ORAL at 14:59

## 2024-08-17 RX ADMIN — HYDROMORPHONE HYDROCHLORIDE 4 MILLIGRAM(S): 2 TABLET ORAL at 22:24

## 2024-08-17 RX ADMIN — Medication 1 PATCH: at 19:01

## 2024-08-17 RX ADMIN — Medication 5 MILLIGRAM(S): at 10:38

## 2024-08-17 RX ADMIN — TRAMADOL HYDROCHLORIDE 50 MILLIGRAM(S): 200 TABLET, EXTENDED RELEASE ORAL at 18:17

## 2024-08-17 RX ADMIN — Medication 1 PATCH: at 18:17

## 2024-08-17 RX ADMIN — CEFAZOLIN SODIUM 100 MILLIGRAM(S): 2 INJECTION, SOLUTION INTRAVENOUS at 13:57

## 2024-08-17 RX ADMIN — HYDROMORPHONE HYDROCHLORIDE 4 MILLIGRAM(S): 2 TABLET ORAL at 10:39

## 2024-08-17 RX ADMIN — HYDROMORPHONE HYDROCHLORIDE 4 MILLIGRAM(S): 2 TABLET ORAL at 11:45

## 2024-08-17 RX ADMIN — CEFAZOLIN SODIUM 100 MILLIGRAM(S): 2 INJECTION, SOLUTION INTRAVENOUS at 22:24

## 2024-08-17 RX ADMIN — HYDROMORPHONE HYDROCHLORIDE 4 MILLIGRAM(S): 2 TABLET ORAL at 02:54

## 2024-08-17 RX ADMIN — Medication 60 MILLIGRAM(S): at 07:10

## 2024-08-17 RX ADMIN — HYDROMORPHONE HYDROCHLORIDE 4 MILLIGRAM(S): 2 TABLET ORAL at 19:01

## 2024-08-17 RX ADMIN — Medication 1 TABLET(S): at 13:57

## 2024-08-17 RX ADMIN — Medication 25 GRAM(S): at 10:39

## 2024-08-17 RX ADMIN — TRAMADOL HYDROCHLORIDE 50 MILLIGRAM(S): 200 TABLET, EXTENDED RELEASE ORAL at 19:01

## 2024-08-17 NOTE — PROGRESS NOTE ADULT - PROBLEM SELECTOR PLAN 2
Clearance Letter - No Exposure & Negative Test but Symptomatic    Dear Marimar Kearns     Return to Work Clearance: 04/18/21    Your COVID-19 test results were negative. You will need to follow your department’s normal unscheduled absence process and/or seek care from your provider if you need to remain off or need further evaluation. Please call the [facility] COVID Team hotline at [phone] if you have further questions.      If you have a new unprotected exposure to someone with COVID-19, please fill out the COVID-19 Exposure Evaluation tool at the top of the COVID-19 Information Center available through your organization’s home page.      As always be vigilant with PPE.      If you are experiencing a life-threatening illness or injury, or a condition you want assessed quickly, you should go to the nearest hospital emergency department and/or dial [phone] for immediate attention. Contact your personal physician by phone or seek walk-in services if you have a matter that requires medical attention and cannot wait for a routinely scheduled appointment.    Leader, please review the Pay Practice document on the COVID-19 Information Center to determine proper coding for time off. The [facility] COVID Team will no longer follow this team member.    Thank you,    [facility] COVID Team    [phone]    Hours: M-F [phone] Saturday . Please answer your phone if an outside line is calling, regardless of hours we work 7 days a week and will follow up as appropriate.     [email or web address]    Cc: Manager   BCx growing gram positive cocci in clusters in 1 set, PCR positive for MSSA  8/15: BCx drawn on 8/14 NGTD  TTE LVEF 55%, PASP 40mmHg  Pt currently DNR/DNI, spoke to pt's brother and pt agreed to reverse DNR/DNI for RAJANI, and agrees to IV abx    Plan:  - c/w Ancef 2g q8 x 4 weeks  - Will repeat BCx every AM until negative for 48 hours  - Once BCx are negative, will place PICC line  - RAJANI ordered

## 2024-08-17 NOTE — PROGRESS NOTE ADULT - ATTENDING COMMENTS
Patient with MSSA bacteremia.  Blood cultures negative since 8/13/24.  Follow up RAJANI.  Will need at least 4 weeks of IV antibiotics given MSSA bacteremia in the setting of hardware.  Continue Cefazolin 2 grams IV q8hrs

## 2024-08-17 NOTE — PROGRESS NOTE ADULT - SUBJECTIVE AND OBJECTIVE BOX
INFECTIOUS DISEASES CONSULT FOLLOW-UP NOTE    INTERVAL HPI/OVERNIGHT EVENTS:  no event overnight  patient reports pain is stable, no new symptoms    ROS:   Constitutional, eyes, ENT, cardiovascular, respiratory, gastrointestinal, genitourinary, integumentary, neurological, psychiatric and heme/lymph are otherwise negative other than noted above       ANTIBIOTICS/RELEVANT:    MEDICATIONS  (STANDING):  bisacodyl 5 milliGRAM(s) Oral every 12 hours  ceFAZolin   IVPB      ceFAZolin   IVPB 2000 milliGRAM(s) IV Intermittent every 8 hours  clotrimazole 1% Cream 1 Application(s) Topical two times a day  DULoxetine 60 milliGRAM(s) Oral every 12 hours  enoxaparin Injectable 40 milliGRAM(s) SubCutaneous every 24 hours  HYDROmorphone   Tablet 4 milliGRAM(s) Oral every 4 hours  lidocaine   4% Patch 1 Patch Transdermal every 24 hours  polyethylene glycol 3350 17 Gram(s) Oral every 24 hours  senna 2 Tablet(s) Oral at bedtime  traMADol 50 milliGRAM(s) Oral every 12 hours    MEDICATIONS  (PRN):  acetaminophen     Tablet .. 650 milliGRAM(s) Oral every 6 hours PRN Temp greater or equal to 38C (100.4F), Mild Pain (1 - 3)  bisacodyl Suppository 10 milliGRAM(s) Rectal daily PRN Constipation  clonazePAM  Tablet 2 milliGRAM(s) Oral every 12 hours PRN Anxiety        Vital Signs Last 24 Hrs  T(C): 36.9 (16 Aug 2024 05:33), Max: 37.1 (15 Aug 2024 20:58)  T(F): 98.4 (16 Aug 2024 05:33), Max: 98.7 (15 Aug 2024 20:58)  HR: 79 (16 Aug 2024 05:33) (79 - 98)  BP: 123/78 (16 Aug 2024 05:33) (106/60 - 123/78)  BP(mean): --  RR: 18 (16 Aug 2024 05:33) (18 - 18)  SpO2: 96% (16 Aug 2024 05:33) (92% - 96%)    Parameters below as of 16 Aug 2024 05:33  Patient On (Oxygen Delivery Method): room air        PHYSICAL EXAM:  Constitutional: alert, NAD  Eyes: the sclera and conjunctiva were normal.   ENT: the ears and nose were normal in appearance.   Neck: the appearance of the neck was normal and the neck was supple.   Pulmonary: no respiratory distress and lungs were clear to auscultation bilaterally.   Heart: heart rate was normal and rhythm regular, normal S1 and S2  Vascular:. there was no peripheral edema  Abdomen: normal bowel sounds, soft, non-tender  Ext: b/l legs with excoriation marks, erythematous LLE         LABS:                        11.8   4.64  )-----------( 242      ( 16 Aug 2024 08:26 )             37.7     08-16    139  |  101  |  10  ----------------------------<  84  4.2   |  30  |  0.73    Ca    8.5      16 Aug 2024 08:26  Phos  3.8     08-16  Mg     2.0     08-16    TPro  6.8  /  Alb  2.6<L>  /  TBili  <0.2  /  DBili  x   /  AST  15  /  ALT  5<L>  /  AlkPhos  70  08-16      Urinalysis Basic - ( 16 Aug 2024 08:26 )    Color: x / Appearance: x / SG: x / pH: x  Gluc: 84 mg/dL / Ketone: x  / Bili: x / Urobili: x   Blood: x / Protein: x / Nitrite: x   Leuk Esterase: x / RBC: x / WBC x   Sq Epi: x / Non Sq Epi: x / Bacteria: x        MICROBIOLOGY:  8/14 BCx ngtd  8/13 BCx ngtd  8/12 BCx aerobic: MSSA, staph pettenkoferi, anaerobic: Actinomyces neuii         RADIOLOGY & ADDITIONAL STUDIES:  Reviewed INFECTIOUS DISEASES CONSULT FOLLOW-UP NOTE    INTERVAL HPI/OVERNIGHT EVENTS:  NAEO. Reports stable chronic back pain. Speaks slow, has poor insight to overall condition. Otherwise complaints.     ROS:   Constitutional, eyes, ENT, cardiovascular, respiratory, gastrointestinal, genitourinary, integumentary, neurological, psychiatric and heme/lymph are otherwise negative other than noted above       ANTIBIOTICS/RELEVANT:    MEDICATIONS  (STANDING):  bisacodyl 5 milliGRAM(s) Oral every 12 hours  ceFAZolin   IVPB      ceFAZolin   IVPB 2000 milliGRAM(s) IV Intermittent every 8 hours  clotrimazole 1% Cream 1 Application(s) Topical two times a day  DULoxetine 60 milliGRAM(s) Oral every 12 hours  enoxaparin Injectable 40 milliGRAM(s) SubCutaneous every 24 hours  HYDROmorphone   Tablet 4 milliGRAM(s) Oral every 4 hours  lidocaine   4% Patch 1 Patch Transdermal every 24 hours  polyethylene glycol 3350 17 Gram(s) Oral every 24 hours  senna 2 Tablet(s) Oral at bedtime  traMADol 50 milliGRAM(s) Oral every 12 hours    MEDICATIONS  (PRN):  acetaminophen     Tablet .. 650 milliGRAM(s) Oral every 6 hours PRN Temp greater or equal to 38C (100.4F), Mild Pain (1 - 3)  bisacodyl Suppository 10 milliGRAM(s) Rectal daily PRN Constipation  clonazePAM  Tablet 2 milliGRAM(s) Oral every 12 hours PRN Anxiety        Vital Signs Last 24 Hrs  T(C): 36.9 (16 Aug 2024 05:33), Max: 37.1 (15 Aug 2024 20:58)  T(F): 98.4 (16 Aug 2024 05:33), Max: 98.7 (15 Aug 2024 20:58)  HR: 79 (16 Aug 2024 05:33) (79 - 98)  BP: 123/78 (16 Aug 2024 05:33) (106/60 - 123/78)  BP(mean): --  RR: 18 (16 Aug 2024 05:33) (18 - 18)  SpO2: 96% (16 Aug 2024 05:33) (92% - 96%)    Parameters below as of 16 Aug 2024 05:33  Patient On (Oxygen Delivery Method): room air        PHYSICAL EXAM:  Constitutional: alert, NAD, strange affect   Eyes: the sclera and conjunctiva were normal.   ENT: the ears and nose were normal in appearance.   Neck: the appearance of the neck was normal and the neck was supple.   Pulmonary: no respiratory distress and lungs were clear to auscultation bilaterally.   Heart: heart rate was normal and rhythm regular, normal S1 and S2  Vascular:. there was no peripheral edema  Abdomen: normal bowel sounds, soft, non-tender  Ext: b/l legs with excoriation marks, erythematous LLE         LABS:                        11.8   4.64  )-----------( 242      ( 16 Aug 2024 08:26 )             37.7     08-16    139  |  101  |  10  ----------------------------<  84  4.2   |  30  |  0.73    Ca    8.5      16 Aug 2024 08:26  Phos  3.8     08-16  Mg     2.0     08-16    TPro  6.8  /  Alb  2.6<L>  /  TBili  <0.2  /  DBili  x   /  AST  15  /  ALT  5<L>  /  AlkPhos  70  08-16      Urinalysis Basic - ( 16 Aug 2024 08:26 )    Color: x / Appearance: x / SG: x / pH: x  Gluc: 84 mg/dL / Ketone: x  / Bili: x / Urobili: x   Blood: x / Protein: x / Nitrite: x   Leuk Esterase: x / RBC: x / WBC x   Sq Epi: x / Non Sq Epi: x / Bacteria: x        MICROBIOLOGY:  8/14 BCx ngtd  8/13 BCx ngtd  8/12 BCx aerobic: MSSA, staph pettenkoferi, anaerobic: Actinomyces neuii         RADIOLOGY & ADDITIONAL STUDIES:  Reviewed

## 2024-08-17 NOTE — PROGRESS NOTE ADULT - ASSESSMENT
Mr. Egan is a 78 y/o M with a PMHx of Bladder cancer (s/p tumor resection, s/p localized chemo and now in remission), GERD, L knee OA, chronic lymphedema, kyphoscoliosis s/p C-sacrum fusion at [facility] (10/5/2020, w/ Dr. Carlisle), MDD, panic disorder, prior RUE DVT, BPH who was admitted for recurrent falls, now found to have MSSA bacteremia.

## 2024-08-17 NOTE — PROGRESS NOTE ADULT - PROBLEM SELECTOR PLAN 1
Pt presents with 2 days of inability to get up and out of chair at home; pt has HHA 2-3x a week. Pt was last admitted 7/1/24 for recurrent falls. During his prior admission PT evaluated and recommended MCKENNA however pt chose to go home. Pt endorses being able to eat during this period.   PT evaluated and recommended MCKENNA however pt wanted to go home. Vitals stable, CK elevated to 419, iso inability to move.   On exam tremor of b/l UE present - pt evaluated by neurology, evidence of some rigidity and mild pill-rolling tremor however unclear if these symptoms are exacerbated by his ongoing infx  Orthostatics positive - may be an element of dysautonomia, expect supine hypertension in Parkinson's; s/p LR bolus and abdominal binder, repeat orthostatics negative  Palliative recommending chronic pain consult given pt appears oversedated on his current pain regimen with difficulties in participating in GOC conversations    Plan:  - Follow up as OP w/ Dr. Jernigan  - Fall precautions  - Nutrition consulted, pending recs

## 2024-08-17 NOTE — PROGRESS NOTE ADULT - SUBJECTIVE AND OBJECTIVE BOX
Internal Medicine Progress Note  Michelle Barros, PGY1     OVERNIGHT EVENTS/INTERVAL HPI:    OBJECTIVE:  Vital Signs Last 24 Hrs  T(C): 36.9 (17 Aug 2024 05:12), Max: 37.2 (16 Aug 2024 21:00)  T(F): 98.5 (17 Aug 2024 05:12), Max: 99 (16 Aug 2024 21:00)  HR: 66 (17 Aug 2024 05:12) (66 - 68)  BP: 104/62 (17 Aug 2024 05:12) (101/61 - 104/62)  BP(mean): 75 (16 Aug 2024 21:00) (75 - 75)  RR: 17 (17 Aug 2024 05:12) (17 - 18)  SpO2: 92% (17 Aug 2024 05:12) (92% - 93%)    Parameters below as of 16 Aug 2024 21:00  Patient On (Oxygen Delivery Method): room air      I&O's Detail    Physical Exam:  GENERAL: Awake, alert and interactive, no acute distress, appears comfortable  NEURO: A&Ox4, no focal deficits, 5/5 strength in all ext, reflexes 2+ throughout, CN 2-12 intact  HEENT: Normocephalic, atraumatic, no conjunctivitis or scleral icterus, oral mucosa moist, no oral lesions noted  NECK: Supple, no LAD, no JVD, thyroid not palpable  CARDIAC: Regular rate and rhythm, +S1/S2, no murmurs/rubs/gallops  PULM: Breathing comfortably on RA, clear to auscultation bilaterally, no wheezes/rales/rhonchi  ABDOMEN: Soft, nontender, nondistended, +bs, no hepatosplenomegaly, no rebound tenderness or fluid wave, no CVA tenderness  : Deferred  MSK: Range of motion grossly intact, no back tenderness  SKIN: Warm and dry, no rashes, lesions  VASC: Cap refil < 2 sec, 2+ peripheral pulses, no edema, no LE tenderness  Psych: Appropriate affect    Medications:  MEDICATIONS  (STANDING):  bisacodyl 5 milliGRAM(s) Oral every 12 hours  ceFAZolin   IVPB      ceFAZolin   IVPB 2000 milliGRAM(s) IV Intermittent every 8 hours  DULoxetine 60 milliGRAM(s) Oral every 12 hours  enoxaparin Injectable 40 milliGRAM(s) SubCutaneous every 24 hours  HYDROmorphone   Tablet 4 milliGRAM(s) Oral every 4 hours  lidocaine   4% Patch 1 Patch Transdermal every 24 hours  multivitamin 1 Tablet(s) Oral daily  polyethylene glycol 3350 17 Gram(s) Oral every 24 hours  senna 2 Tablet(s) Oral at bedtime  traMADol 50 milliGRAM(s) Oral every 12 hours    MEDICATIONS  (PRN):  acetaminophen     Tablet .. 650 milliGRAM(s) Oral every 6 hours PRN Temp greater or equal to 38C (100.4F), Mild Pain (1 - 3)  bisacodyl Suppository 10 milliGRAM(s) Rectal daily PRN Constipation      Labs:                        11.8   4.64  )-----------( 242      ( 16 Aug 2024 08:26 )             37.7     08-16    139  |  101  |  10  ----------------------------<  84  4.2   |  30  |  0.73    Ca    8.5      16 Aug 2024 08:26  Phos  3.8     08-16  Mg     2.0     08-16    TPro  6.8  /  Alb  2.6<L>  /  TBili  <0.2  /  DBili  x   /  AST  15  /  ALT  5<L>  /  AlkPhos  70  08-16        Urinalysis Basic - ( 16 Aug 2024 08:26 )    Color: x / Appearance: x / SG: x / pH: x  Gluc: 84 mg/dL / Ketone: x  / Bili: x / Urobili: x   Blood: x / Protein: x / Nitrite: x   Leuk Esterase: x / RBC: x / WBC x   Sq Epi: x / Non Sq Epi: x / Bacteria: x          Radiology: Reviewed Internal Medicine Progress Note  Michelle Barros, PGY1     OVERNIGHT EVENTS/INTERVAL HPI:    OBJECTIVE:  Vital Signs Last 24 Hrs  T(C): 36.9 (17 Aug 2024 05:12), Max: 37.2 (16 Aug 2024 21:00)  T(F): 98.5 (17 Aug 2024 05:12), Max: 99 (16 Aug 2024 21:00)  HR: 66 (17 Aug 2024 05:12) (66 - 68)  BP: 104/62 (17 Aug 2024 05:12) (101/61 - 104/62)  BP(mean): 75 (16 Aug 2024 21:00) (75 - 75)  RR: 17 (17 Aug 2024 05:12) (17 - 18)  SpO2: 92% (17 Aug 2024 05:12) (92% - 93%)    Parameters below as of 16 Aug 2024 21:00  Patient On (Oxygen Delivery Method): room air      I&O's Detail    PHYSICAL EXAM:  General: Lying comfortably and in no acute distress. Pt is hard of hearing, more sleepy compared to prior exam. Awakens easily to name.  HEENT: NC/AT, no signs of bleeding, bruising; EOMI, anicteric sclera; MMM  Neck: supple  Cardiovascular: +S1/S2, RRR  Respiratory: CTA B/L, decreased breath sounds bilaterally; no W/R/R; no increased WOB  Gastrointestinal: soft, NT/ND; +BSx4  Extremities: WWP; no edema, clubbing or cyanosis  Vascular: 2+ radial pulses B/L  Neurological: AAOx2; no focal deficits  Psychiatric: pleasant mood and affect  Dermatologic: diffuse scaling and dry skin of the b/l LE with excoration    Medications:  MEDICATIONS  (STANDING):  bisacodyl 5 milliGRAM(s) Oral every 12 hours  ceFAZolin   IVPB      ceFAZolin   IVPB 2000 milliGRAM(s) IV Intermittent every 8 hours  DULoxetine 60 milliGRAM(s) Oral every 12 hours  enoxaparin Injectable 40 milliGRAM(s) SubCutaneous every 24 hours  HYDROmorphone   Tablet 4 milliGRAM(s) Oral every 4 hours  lidocaine   4% Patch 1 Patch Transdermal every 24 hours  multivitamin 1 Tablet(s) Oral daily  polyethylene glycol 3350 17 Gram(s) Oral every 24 hours  senna 2 Tablet(s) Oral at bedtime  traMADol 50 milliGRAM(s) Oral every 12 hours    MEDICATIONS  (PRN):  acetaminophen     Tablet .. 650 milliGRAM(s) Oral every 6 hours PRN Temp greater or equal to 38C (100.4F), Mild Pain (1 - 3)  bisacodyl Suppository 10 milliGRAM(s) Rectal daily PRN Constipation      Labs:                        11.8   4.64  )-----------( 242      ( 16 Aug 2024 08:26 )             37.7     08-16    139  |  101  |  10  ----------------------------<  84  4.2   |  30  |  0.73    Ca    8.5      16 Aug 2024 08:26  Phos  3.8     08-16  Mg     2.0     08-16    TPro  6.8  /  Alb  2.6<L>  /  TBili  <0.2  /  DBili  x   /  AST  15  /  ALT  5<L>  /  AlkPhos  70  08-16        Urinalysis Basic - ( 16 Aug 2024 08:26 )    Color: x / Appearance: x / SG: x / pH: x  Gluc: 84 mg/dL / Ketone: x  / Bili: x / Urobili: x   Blood: x / Protein: x / Nitrite: x   Leuk Esterase: x / RBC: x / WBC x   Sq Epi: x / Non Sq Epi: x / Bacteria: x          Radiology: Reviewed

## 2024-08-17 NOTE — PROGRESS NOTE ADULT - ASSESSMENT
79M w/ hx L femoral neck fracture s/p L hip hemiarthroplasty (10/2023), hx THR, kyphoscoliosis s/p thoracolumbar fusion (reportedly 2020), bladder CA s/p resection, mood disorder, AAA of 5cm (undergoing workup for EVAR), admitted on 8/12 after presented with recent falls and generalized weakness, in the context of a 2 week history of intermittent shaking chills and decreased appetite. Found to have MSSA bacteremia, likely source is BLE excoriations with mild LLE SSTI (now improving).  No evidence of metastatic/deep seated infection at this time.  Back pain chronic, stable.  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 Per primary team, his brother convinced patient to get RAJANI and is now it is ordered and planned Monday.  Patient is having hard time understanding my explanation of what's going on and it takes long time for him to respond, demonstrates poor insight to his clinical condition overall but is stable from infectious standpoint     - cont cefazolin 2g IV q8h  - f/u surveillance BCx  - RAJANI planned for Monday   - Treatment duration will be 4 weeks IV abx minimum given presence of hardware    Team 2 will follow you.  Dr. Soto to follow 8/18, Dr. Whitehead will assume care Monday

## 2024-08-17 NOTE — PROGRESS NOTE ADULT - ATTENDING COMMENTS
MEDICATIONS  (STANDING):  bisacodyl 5 milliGRAM(s) Oral every 12 hours  ceFAZolin   IVPB 2000 milliGRAM(s) IV Intermittent every 8 hours  ceFAZolin   IVPB      DULoxetine 60 milliGRAM(s) Oral every 12 hours  enoxaparin Injectable 40 milliGRAM(s) SubCutaneous every 24 hours  HYDROmorphone   Tablet 4 milliGRAM(s) Oral every 4 hours  lidocaine   4% Patch 1 Patch Transdermal every 24 hours  multivitamin 1 Tablet(s) Oral daily  polyethylene glycol 3350 17 Gram(s) Oral every 24 hours  senna 2 Tablet(s) Oral at bedtime  traMADol 50 milliGRAM(s) Oral every 12 hours    A/P:  78 y/o M with a PMHx of Bladder cancer (s/p tumor resection, s/p localized chemo and now in remission), GERD, L knee OA, chronic lymphedema, kyphoscoliosis s/p C-sacrum fusion at [facility] (10/5/2020, w/ Dr. Carlisle), MDD, panic disorder, prior RUE DVT, BPH who was admitted for recurrent falls, now found to have MSSA bacteremia.  #MSSA bacteremia   -ID following per ID the pt willneed at least 4 weeks of IV abx   -Will f/u surveillance BCx on 8/13, 8/14  and 8/15 NTD   -Will continue Cefazolin   -Pt for RAJANI on Monday 8/19; NPOI @midnight on Sunday 8/18     #DISPO  -Pt was seen by PT and recommended for MCKENNA if agreeable     Time based billing  40 minutes spent on total encounter. The necessity of the time spent during the encounter on this date of service was due to:    Review of hospital course, labs, vitals, radiology and medical records.  Direct patient encounter including bedside exam   Discussed plan of care with resident team and interdisciplinary team.   Documenting the encounter. MEDICATIONS  (STANDING):  bisacodyl 5 milliGRAM(s) Oral every 12 hours  ceFAZolin   IVPB 2000 milliGRAM(s) IV Intermittent every 8 hours  ceFAZolin   IVPB      DULoxetine 60 milliGRAM(s) Oral every 12 hours  enoxaparin Injectable 40 milliGRAM(s) SubCutaneous every 24 hours  HYDROmorphone   Tablet 4 milliGRAM(s) Oral every 4 hours  lidocaine   4% Patch 1 Patch Transdermal every 24 hours  multivitamin 1 Tablet(s) Oral daily  polyethylene glycol 3350 17 Gram(s) Oral every 24 hours  senna 2 Tablet(s) Oral at bedtime  traMADol 50 milliGRAM(s) Oral every 12 hours    A/P:  78 y/o M with a PMHx of Bladder cancer (s/p tumor resection, s/p localized chemo and now in remission), GERD, L knee OA, chronic lymphedema, [place]os[place] [facility] [place], w/ Dr. Carlisle), MDD, panic disorder, prior RUE DVT, BPH who was admitted for recurrent falls, now found to have MSSA bacteremia.    #MSSA bacteremia   -ID following per ID the pt willneed at least 4 weeks of IV abx   -Will f/u surveillance BCx on 8/13, 8/14  and 8/15 NTD   -Will continue Cefazolin   -Pt for RAJANI on Monday 8/19; NPOI @midnight on Sunday 8/18     #DISPO  -Pt was seen by PT and recommended for MCKENNA if agreeable     Time based billing  40 minutes spent on total encounter. The necessity of the time spent during the encounter on this date of service was due to:    Review of hospital course, labs, vitals, radiology and medical records.  Direct patient encounter including bedside exam   Discussed plan of care with resident team and interdisciplinary team.   Documenting the encounter. MEDICATIONS  (STANDING):  bisacodyl 5 milliGRAM(s) Oral every 12 hours  ceFAZolin   IVPB 2000 milliGRAM(s) IV Intermittent every 8 hours  ceFAZolin   IVPB      DULoxetine 60 milliGRAM(s) Oral every 12 hours  enoxaparin Injectable 40 milliGRAM(s) SubCutaneous every 24 hours  HYDROmorphone   Tablet 4 milliGRAM(s) Oral every 4 hours  lidocaine   4% Patch 1 Patch Transdermal every 24 hours  multivitamin 1 Tablet(s) Oral daily  polyethylene glycol 3350 17 Gram(s) Oral every 24 hours  senna 2 Tablet(s) Oral at bedtime  traMADol 50 milliGRAM(s) Oral every 12 hours    A/P:  78 y/o M with a PMHx of Bladder cancer (s/p tumor resection, s/p localized chemo and now in remission), GERD, L knee OA, chronic lymphedema, kyphoscoliosis s/p C-sacrum fusion at [facility] (10/5/2020, w/ Dr. Carlisle), MDD, panic disorder, prior RUE DVT, BPH who was admitted for recurrent falls, now found to have MSSA bacteremia.    #MSSA bacteremia   -ID following per ID the pt willneed at least 4 weeks of IV abx   -Will f/u surveillance BCx on 8/13, 8/14  and 8/15 NTD   -Will continue Cefazolin   -Pt for RAJANI on Monday 8/19; NPO @midnight on Sunday 8/18     #DISPO  -Pt was seen by PT and recommended for MCKENNA if agreeable     Time based billing  40 minutes spent on total encounter. The necessity of the time spent during the encounter on this date of service was due to:    Review of hospital course, labs, vitals, radiology and medical records.  Direct patient encounter including bedside exam   Discussed plan of care with resident team and interdisciplinary team.   Documenting the encounter.

## 2024-08-18 LAB
ANION GAP SERPL CALC-SCNC: 11 MMOL/L — SIGNIFICANT CHANGE UP (ref 5–17)
BUN SERPL-MCNC: 12 MG/DL — SIGNIFICANT CHANGE UP (ref 7–23)
CALCIUM SERPL-MCNC: 8.5 MG/DL — SIGNIFICANT CHANGE UP (ref 8.4–10.5)
CHLORIDE SERPL-SCNC: 101 MMOL/L — SIGNIFICANT CHANGE UP (ref 96–108)
CO2 SERPL-SCNC: 27 MMOL/L — SIGNIFICANT CHANGE UP (ref 22–31)
CREAT SERPL-MCNC: 0.64 MG/DL — SIGNIFICANT CHANGE UP (ref 0.5–1.3)
EGFR: 96 ML/MIN/1.73M2 — SIGNIFICANT CHANGE UP
GLUCOSE SERPL-MCNC: 88 MG/DL — SIGNIFICANT CHANGE UP (ref 70–99)
HCT VFR BLD CALC: 39 % — SIGNIFICANT CHANGE UP (ref 39–50)
HGB BLD-MCNC: 11.9 G/DL — LOW (ref 13–17)
MAGNESIUM SERPL-MCNC: 2.2 MG/DL — SIGNIFICANT CHANGE UP (ref 1.6–2.6)
MCHC RBC-ENTMCNC: 27.4 PG — SIGNIFICANT CHANGE UP (ref 27–34)
MCHC RBC-ENTMCNC: 30.5 GM/DL — LOW (ref 32–36)
MCV RBC AUTO: 89.7 FL — SIGNIFICANT CHANGE UP (ref 80–100)
NRBC # BLD: 0 /100 WBCS — SIGNIFICANT CHANGE UP (ref 0–0)
PHOSPHATE SERPL-MCNC: 3.5 MG/DL — SIGNIFICANT CHANGE UP (ref 2.5–4.5)
PLATELET # BLD AUTO: 288 K/UL — SIGNIFICANT CHANGE UP (ref 150–400)
POTASSIUM SERPL-MCNC: 4.4 MMOL/L — SIGNIFICANT CHANGE UP (ref 3.5–5.3)
POTASSIUM SERPL-SCNC: 4.4 MMOL/L — SIGNIFICANT CHANGE UP (ref 3.5–5.3)
RBC # BLD: 4.35 M/UL — SIGNIFICANT CHANGE UP (ref 4.2–5.8)
RBC # FLD: 13.9 % — SIGNIFICANT CHANGE UP (ref 10.3–14.5)
SODIUM SERPL-SCNC: 139 MMOL/L — SIGNIFICANT CHANGE UP (ref 135–145)
WBC # BLD: 5.17 K/UL — SIGNIFICANT CHANGE UP (ref 3.8–10.5)
WBC # FLD AUTO: 5.17 K/UL — SIGNIFICANT CHANGE UP (ref 3.8–10.5)

## 2024-08-18 PROCEDURE — 99232 SBSQ HOSP IP/OBS MODERATE 35: CPT

## 2024-08-18 RX ORDER — HYDROMORPHONE HYDROCHLORIDE 2 MG/1
4 TABLET ORAL EVERY 8 HOURS
Refills: 0 | Status: DISCONTINUED | OUTPATIENT
Start: 2024-08-18 | End: 2024-08-19

## 2024-08-18 RX ORDER — CLOTRIMAZOLE 1 %
1 CREAM (GRAM) TOPICAL EVERY 12 HOURS
Refills: 0 | Status: DISCONTINUED | OUTPATIENT
Start: 2024-08-18 | End: 2024-08-20

## 2024-08-18 RX ADMIN — Medication 1 PATCH: at 06:00

## 2024-08-18 RX ADMIN — CEFAZOLIN SODIUM 100 MILLIGRAM(S): 2 INJECTION, SOLUTION INTRAVENOUS at 06:31

## 2024-08-18 RX ADMIN — HYDROMORPHONE HYDROCHLORIDE 4 MILLIGRAM(S): 2 TABLET ORAL at 03:42

## 2024-08-18 RX ADMIN — HYDROMORPHONE HYDROCHLORIDE 4 MILLIGRAM(S): 2 TABLET ORAL at 14:08

## 2024-08-18 RX ADMIN — Medication 60 MILLIGRAM(S): at 06:31

## 2024-08-18 RX ADMIN — Medication 1 TABLET(S): at 14:08

## 2024-08-18 RX ADMIN — Medication 1 APPLICATION(S): at 06:32

## 2024-08-18 RX ADMIN — TRAMADOL HYDROCHLORIDE 50 MILLIGRAM(S): 200 TABLET, EXTENDED RELEASE ORAL at 17:58

## 2024-08-18 RX ADMIN — HYDROMORPHONE HYDROCHLORIDE 4 MILLIGRAM(S): 2 TABLET ORAL at 06:53

## 2024-08-18 RX ADMIN — HYDROMORPHONE HYDROCHLORIDE 4 MILLIGRAM(S): 2 TABLET ORAL at 10:59

## 2024-08-18 RX ADMIN — HYDROMORPHONE HYDROCHLORIDE 4 MILLIGRAM(S): 2 TABLET ORAL at 07:53

## 2024-08-18 RX ADMIN — Medication 5 MILLIGRAM(S): at 10:24

## 2024-08-18 RX ADMIN — TRAMADOL HYDROCHLORIDE 50 MILLIGRAM(S): 200 TABLET, EXTENDED RELEASE ORAL at 06:31

## 2024-08-18 RX ADMIN — HYDROMORPHONE HYDROCHLORIDE 4 MILLIGRAM(S): 2 TABLET ORAL at 10:24

## 2024-08-18 RX ADMIN — HYDROMORPHONE HYDROCHLORIDE 4 MILLIGRAM(S): 2 TABLET ORAL at 22:34

## 2024-08-18 RX ADMIN — TRAMADOL HYDROCHLORIDE 50 MILLIGRAM(S): 200 TABLET, EXTENDED RELEASE ORAL at 07:31

## 2024-08-18 RX ADMIN — CEFAZOLIN SODIUM 100 MILLIGRAM(S): 2 INJECTION, SOLUTION INTRAVENOUS at 14:08

## 2024-08-18 RX ADMIN — Medication 2 TABLET(S): at 01:06

## 2024-08-18 RX ADMIN — CEFAZOLIN SODIUM 100 MILLIGRAM(S): 2 INJECTION, SOLUTION INTRAVENOUS at 22:09

## 2024-08-18 RX ADMIN — HYDROMORPHONE HYDROCHLORIDE 4 MILLIGRAM(S): 2 TABLET ORAL at 21:34

## 2024-08-18 RX ADMIN — HYDROMORPHONE HYDROCHLORIDE 4 MILLIGRAM(S): 2 TABLET ORAL at 14:59

## 2024-08-18 RX ADMIN — Medication 60 MILLIGRAM(S): at 17:58

## 2024-08-18 RX ADMIN — HYDROMORPHONE HYDROCHLORIDE 4 MILLIGRAM(S): 2 TABLET ORAL at 02:42

## 2024-08-18 NOTE — PROGRESS NOTE ADULT - SUBJECTIVE AND OBJECTIVE BOX
Patient was seen and examined at bedside. Case discuss with resident. Pt reports that he feels anxious about the RAJANI tomorrow     Vital Signs Last 24 Hrs  T(C): 36.9 (18 Aug 2024 09:32), Max: 36.9 (17 Aug 2024 15:57)  T(F): 98.5 (18 Aug 2024 09:32), Max: 98.5 (17 Aug 2024 15:57)  HR: 60 (18 Aug 2024 09:32) (60 - 66)  BP: 113/67 (18 Aug 2024 09:32) (104/60 - 146/65)  BP(mean): 88 (17 Aug 2024 20:55) (88 - 88)  RR: 18 (18 Aug 2024 09:32) (18 - 18)  SpO2: 96% (18 Aug 2024 09:32) (92% - 96%)    Parameters below as of 18 Aug 2024 09:32  Patient On (Oxygen Delivery Method): room air    PE: NAD laying in bed   CTA B/l no wheezing or crackles  Nl S1,S2 no mumur   soft NT/ND + BS     LABS:                        11.9   5.17  )-----------( 288      ( 18 Aug 2024 05:30 )             39.0     139  |  101  |  12  ----------------------------<  88  4.4   |  27  |  0.64    Ca    8.5      18 Aug 2024 05:30  Phos  3.5     08-18  Mg     2.2     08-18    acetaminophen     Tablet .. 650 milliGRAM(s) Oral every 6 hours PRN  bisacodyl 5 milliGRAM(s) Oral every 12 hours  bisacodyl Suppository 10 milliGRAM(s) Rectal daily PRN  ceFAZolin   IVPB      ceFAZolin   IVPB 2000 milliGRAM(s) IV Intermittent every 8 hours  clotrimazole 1% Cream 1 Application(s) Topical every 12 hours  DULoxetine 60 milliGRAM(s) Oral every 12 hours  enoxaparin Injectable 40 milliGRAM(s) SubCutaneous every 24 hours  HYDROmorphone   Tablet 4 milliGRAM(s) Oral every 4 hours  lidocaine   4% Patch 1 Patch Transdermal every 24 hours  multivitamin 1 Tablet(s) Oral daily  polyethylene glycol 3350 17 Gram(s) Oral every 24 hours  senna 2 Tablet(s) Oral at bedtime  traMADol 50 milliGRAM(s) Oral every 12 hours

## 2024-08-18 NOTE — PROGRESS NOTE ADULT - ASSESSMENT
79M w/ hx L femoral neck fracture s/p L hip hemiarthroplasty (10/2023), hx THR, kyphoscoliosis s/p thoracolumbar fusion (reportedly 2020), bladder CA s/p resection, mood disorder, AAA of 5cm (undergoing workup for EVAR), admitted on 8/12 after presented with recent falls and generalized weakness, in the context of a 2 week history of intermittent shaking chills and decreased appetite. Found to have MSSA bacteremia, likely source is BLE excoriations with mild LLE SSTI (now improving).  No evidence of metastatic/deep seated infection at this time.  Back pain chronic, stable.  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 Per primary team, his brother convinced patient to get RAJANI and is now it is ordered and planned Monday.  Patient is having hard time understanding my explanation of what's going on and it takes long time for him to respond, demonstrates poor insight to his clinical condition overall but is stable from infectious standpoint     - cont cefazolin 2g IV q8h  - f/u surveillance BCx 8/16 --> NGTD  - RAJANI planned for Monday   - Treatment duration will be 4 weeks IV abx minimum given presence of hardware    Team 2 will follow you.  Dr. Whitehead will assume care Monday      79M w/ hx L femoral neck fracture s/p L hip hemiarthroplasty (10/2023), hx THR, kyphoscoliosis s/p thoracolumbar fusion (reportedly 2020), bladder CA s/p resection, mood disorder, AAA of 5cm (undergoing workup for EVAR), admitted on 8/12 after presented with recent falls and generalized weakness, in the context of a 2 week history of intermittent shaking chills and decreased appetite. Found to have MSSA bacteremia, likely source is BLE excoriations with mild LLE SSTI (now improving).  No evidence of metastatic/deep seated infection at this time.  Back pain chronic, stable.  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 Per primary team, his brother convinced patient to get RAJANI and is now it is ordered and planned Monday.  Patient is having hard time understanding my explanation of what's going on and it takes long time for him to respond, demonstrates poor insight to his clinical condition overall but is stable from infectious standpoint     - cont cefazolin 2g IV q8h  - f/u surveillance BCx 8/16 --> NGTD  - RAJAIN planned for Monday   - Treatment duration will be 4 weeks IV abx minimum given presence of hardware    Team 2 will follow you. Dr. Soto will cover the weekend. Dr. Whitehead will assume care Monday

## 2024-08-18 NOTE — PROGRESS NOTE ADULT - ASSESSMENT
A/P:  78 y/o M with a PMHx of Bladder cancer (s/p tumor resection, s/p localized chemo and now in remission), GERD, L knee OA, chronic lymphedema, kyphoscoliosis s/p C-sacrum fusion at [facility] (10/5/2020, w/ Dr. Carlisle), MDD, panic disorder, prior RUE DVT, BPH who was admitted for recurrent falls, now found to have MSSA bacteremia.    #MSSA bacteremia   -ID following per ID the pt will need at least 4 weeks of IV abx   -Will f/u surveillance BCx on 8/13 NTD x 4 days NTD x72hrs  8/14  and 8/15 NTD x48hrs and 8/16 NTD x24hrs   -Will continue Cefazolin   -Pt for RAJANI on Monday 8/19; NPOI @midnight on Sunday 8/18     #DISPO  -Pt was seen by PT and recommended for MCKENNA if agreeable     Time based billing  40 minutes spent on total encounter. The necessity of the time spent during the encounter on this date of service was due to:    Review of hospital course, labs, vitals, radiology and medical records.  Direct patient encounter including bedside exam   Discussed plan of care with resident team and interdisciplinary team.   Documenting the encounter.

## 2024-08-18 NOTE — PROGRESS NOTE ADULT - ATTENDING COMMENTS
Agree with above.  MSSA bacteremia.  Repeat cultures negative since 8/16/24.  Follow up RAJANI.  Continue Cefazolin 2 grams IV q8hrs

## 2024-08-18 NOTE — PROGRESS NOTE ADULT - SUBJECTIVE AND OBJECTIVE BOX
INFECTIOUS DISEASE PROGRESS NOTE    DOM CASTAÑEDA is a 79y Male patient    Overnight/Interval Events: NAEO. Patient seen and examined at bedside. States he feels better but is anxious about general anesthesia for the RAJANI tomorrow. Continues to scratch at his legs, but feels they are better today.    ROS:  CONSTITUTIONAL:  Negative fever or chills, feels well, good appetite  EYES:  Negative  blurry vision or double vision  CARDIOVASCULAR:  Negative for chest pain or palpitations  RESPIRATORY:  Negative for cough, wheezing, or SOB   GASTROINTESTINAL:  Negative for nausea, vomiting, diarrhea, constipation, or abdominal pain  GENITOURINARY:  Negative frequency, urgency or dysuria  NEUROLOGIC:  No headache, confusion, dizziness, lightheadedness    Allergies    sulfa drugs (Unknown)    Intolerances        ANTIBIOTICS/RELEVANT:  antimicrobials  ceFAZolin   IVPB      ceFAZolin   IVPB 2000 milliGRAM(s) IV Intermittent every 8 hours    immunologic:    OTHER:  acetaminophen     Tablet .. 650 milliGRAM(s) Oral every 6 hours PRN  bisacodyl 5 milliGRAM(s) Oral every 12 hours  bisacodyl Suppository 10 milliGRAM(s) Rectal daily PRN  clotrimazole 1% Cream 1 Application(s) Topical every 12 hours  DULoxetine 60 milliGRAM(s) Oral every 12 hours  enoxaparin Injectable 40 milliGRAM(s) SubCutaneous every 24 hours  HYDROmorphone   Tablet 4 milliGRAM(s) Oral every 4 hours  lidocaine   4% Patch 1 Patch Transdermal every 24 hours  multivitamin 1 Tablet(s) Oral daily  polyethylene glycol 3350 17 Gram(s) Oral every 24 hours  senna 2 Tablet(s) Oral at bedtime  traMADol 50 milliGRAM(s) Oral every 12 hours      Objective:  Vital Signs Last 24 Hrs  T(C): 36.9 (18 Aug 2024 09:32), Max: 36.9 (17 Aug 2024 15:57)  T(F): 98.5 (18 Aug 2024 09:32), Max: 98.5 (17 Aug 2024 15:57)  HR: 60 (18 Aug 2024 09:32) (60 - 66)  BP: 113/67 (18 Aug 2024 09:32) (104/60 - 146/65)  BP(mean): 88 (17 Aug 2024 20:55) (88 - 88)  RR: 18 (18 Aug 2024 09:32) (18 - 18)  SpO2: 96% (18 Aug 2024 09:32) (92% - 96%)    Parameters below as of 18 Aug 2024 09:32  Patient On (Oxygen Delivery Method): room air          PHYSICAL EXAM:  Constitutional: alert, NAD, strange affect   Eyes: the sclera and conjunctiva were normal.   ENT: the ears and nose were normal in appearance.   Neck: the appearance of the neck was normal and the neck was supple.   Pulmonary: no respiratory distress and lungs were clear to auscultation bilaterally.   Heart: heart rate was normal and rhythm regular, normal S1 and S2  Vascular:. there was no peripheral edema  Abdomen: normal bowel sounds, soft, non-tender  Ext: b/l legs with excoriation marks, erythematous LLE         LABS:                        11.9   5.17  )-----------( 288      ( 18 Aug 2024 05:30 )             39.0     08-18    139  |  101  |  12  ----------------------------<  88  4.4   |  27  |  0.64    Ca    8.5      18 Aug 2024 05:30  Phos  3.5     08-18  Mg     2.2     08-18        Urinalysis Basic - ( 18 Aug 2024 05:30 )    Color: x / Appearance: x / SG: x / pH: x  Gluc: 88 mg/dL / Ketone: x  / Bili: x / Urobili: x   Blood: x / Protein: x / Nitrite: x   Leuk Esterase: x / RBC: x / WBC x   Sq Epi: x / Non Sq Epi: x / Bacteria: x          MICROBIOLOGY:          RADIOLOGY & ADDITIONAL STUDIES:

## 2024-08-19 ENCOUNTER — APPOINTMENT (OUTPATIENT)
Dept: RADIOLOGY | Facility: HOSPITAL | Age: 79
End: 2024-08-19

## 2024-08-19 LAB
ALBUMIN SERPL ELPH-MCNC: 2.9 G/DL — LOW (ref 3.3–5)
ALP SERPL-CCNC: 77 U/L — SIGNIFICANT CHANGE UP (ref 40–120)
ALT FLD-CCNC: <5 U/L — LOW (ref 10–45)
ANION GAP SERPL CALC-SCNC: 10 MMOL/L — SIGNIFICANT CHANGE UP (ref 5–17)
AST SERPL-CCNC: 28 U/L — SIGNIFICANT CHANGE UP (ref 10–40)
BASOPHILS # BLD AUTO: 0.05 K/UL — SIGNIFICANT CHANGE UP (ref 0–0.2)
BASOPHILS NFR BLD AUTO: 0.9 % — SIGNIFICANT CHANGE UP (ref 0–2)
BILIRUB SERPL-MCNC: 0.2 MG/DL — SIGNIFICANT CHANGE UP (ref 0.2–1.2)
BUN SERPL-MCNC: 9 MG/DL — SIGNIFICANT CHANGE UP (ref 7–23)
CALCIUM SERPL-MCNC: 8.6 MG/DL — SIGNIFICANT CHANGE UP (ref 8.4–10.5)
CHLORIDE SERPL-SCNC: 104 MMOL/L — SIGNIFICANT CHANGE UP (ref 96–108)
CO2 SERPL-SCNC: 25 MMOL/L — SIGNIFICANT CHANGE UP (ref 22–31)
CREAT SERPL-MCNC: 0.65 MG/DL — SIGNIFICANT CHANGE UP (ref 0.5–1.3)
CULTURE RESULTS: SIGNIFICANT CHANGE UP
CULTURE RESULTS: SIGNIFICANT CHANGE UP
EGFR: 96 ML/MIN/1.73M2 — SIGNIFICANT CHANGE UP
EOSINOPHIL # BLD AUTO: 0.49 K/UL — SIGNIFICANT CHANGE UP (ref 0–0.5)
EOSINOPHIL NFR BLD AUTO: 8.5 % — HIGH (ref 0–6)
GLUCOSE SERPL-MCNC: 96 MG/DL — SIGNIFICANT CHANGE UP (ref 70–99)
HCT VFR BLD CALC: 38 % — LOW (ref 39–50)
HGB BLD-MCNC: 11.9 G/DL — LOW (ref 13–17)
IMM GRANULOCYTES NFR BLD AUTO: 0.3 % — SIGNIFICANT CHANGE UP (ref 0–0.9)
LYMPHOCYTES # BLD AUTO: 1.89 K/UL — SIGNIFICANT CHANGE UP (ref 1–3.3)
LYMPHOCYTES # BLD AUTO: 32.8 % — SIGNIFICANT CHANGE UP (ref 13–44)
MAGNESIUM SERPL-MCNC: 2 MG/DL — SIGNIFICANT CHANGE UP (ref 1.6–2.6)
MCHC RBC-ENTMCNC: 28.3 PG — SIGNIFICANT CHANGE UP (ref 27–34)
MCHC RBC-ENTMCNC: 31.3 GM/DL — LOW (ref 32–36)
MCV RBC AUTO: 90.5 FL — SIGNIFICANT CHANGE UP (ref 80–100)
MONOCYTES # BLD AUTO: 0.55 K/UL — SIGNIFICANT CHANGE UP (ref 0–0.9)
MONOCYTES NFR BLD AUTO: 9.5 % — SIGNIFICANT CHANGE UP (ref 2–14)
NEUTROPHILS # BLD AUTO: 2.77 K/UL — SIGNIFICANT CHANGE UP (ref 1.8–7.4)
NEUTROPHILS NFR BLD AUTO: 48 % — SIGNIFICANT CHANGE UP (ref 43–77)
NRBC # BLD: 0 /100 WBCS — SIGNIFICANT CHANGE UP (ref 0–0)
PHOSPHATE SERPL-MCNC: 3.4 MG/DL — SIGNIFICANT CHANGE UP (ref 2.5–4.5)
PLATELET # BLD AUTO: 299 K/UL — SIGNIFICANT CHANGE UP (ref 150–400)
POTASSIUM SERPL-MCNC: 4.1 MMOL/L — SIGNIFICANT CHANGE UP (ref 3.5–5.3)
POTASSIUM SERPL-SCNC: 4.1 MMOL/L — SIGNIFICANT CHANGE UP (ref 3.5–5.3)
PROT SERPL-MCNC: 7.4 G/DL — SIGNIFICANT CHANGE UP (ref 6–8.3)
RBC # BLD: 4.2 M/UL — SIGNIFICANT CHANGE UP (ref 4.2–5.8)
RBC # FLD: 14.3 % — SIGNIFICANT CHANGE UP (ref 10.3–14.5)
SODIUM SERPL-SCNC: 139 MMOL/L — SIGNIFICANT CHANGE UP (ref 135–145)
SPECIMEN SOURCE: SIGNIFICANT CHANGE UP
SPECIMEN SOURCE: SIGNIFICANT CHANGE UP
WBC # BLD: 5.77 K/UL — SIGNIFICANT CHANGE UP (ref 3.8–10.5)
WBC # FLD AUTO: 5.77 K/UL — SIGNIFICANT CHANGE UP (ref 3.8–10.5)

## 2024-08-19 PROCEDURE — 99233 SBSQ HOSP IP/OBS HIGH 50: CPT | Mod: GC

## 2024-08-19 PROCEDURE — 99233 SBSQ HOSP IP/OBS HIGH 50: CPT

## 2024-08-19 RX ORDER — HYDROMORPHONE HYDROCHLORIDE 2 MG/1
4 TABLET ORAL EVERY 6 HOURS
Refills: 0 | Status: DISCONTINUED | OUTPATIENT
Start: 2024-08-19 | End: 2024-08-20

## 2024-08-19 RX ADMIN — HYDROMORPHONE HYDROCHLORIDE 4 MILLIGRAM(S): 2 TABLET ORAL at 07:48

## 2024-08-19 RX ADMIN — TRAMADOL HYDROCHLORIDE 50 MILLIGRAM(S): 200 TABLET, EXTENDED RELEASE ORAL at 06:35

## 2024-08-19 RX ADMIN — Medication 1 APPLICATION(S): at 06:53

## 2024-08-19 RX ADMIN — Medication 1 TABLET(S): at 12:05

## 2024-08-19 RX ADMIN — CEFAZOLIN SODIUM 100 MILLIGRAM(S): 2 INJECTION, SOLUTION INTRAVENOUS at 06:35

## 2024-08-19 RX ADMIN — Medication 60 MILLIGRAM(S): at 06:35

## 2024-08-19 RX ADMIN — TRAMADOL HYDROCHLORIDE 50 MILLIGRAM(S): 200 TABLET, EXTENDED RELEASE ORAL at 07:05

## 2024-08-19 RX ADMIN — ENOXAPARIN SODIUM 40 MILLIGRAM(S): 100 INJECTION SUBCUTANEOUS at 17:48

## 2024-08-19 RX ADMIN — HYDROMORPHONE HYDROCHLORIDE 4 MILLIGRAM(S): 2 TABLET ORAL at 06:48

## 2024-08-19 RX ADMIN — Medication 5 MILLIGRAM(S): at 08:37

## 2024-08-19 RX ADMIN — Medication 1 APPLICATION(S): at 17:49

## 2024-08-19 RX ADMIN — Medication 60 MILLIGRAM(S): at 17:49

## 2024-08-19 RX ADMIN — CEFAZOLIN SODIUM 100 MILLIGRAM(S): 2 INJECTION, SOLUTION INTRAVENOUS at 13:24

## 2024-08-19 RX ADMIN — HYDROMORPHONE HYDROCHLORIDE 4 MILLIGRAM(S): 2 TABLET ORAL at 18:03

## 2024-08-19 RX ADMIN — HYDROMORPHONE HYDROCHLORIDE 4 MILLIGRAM(S): 2 TABLET ORAL at 13:23

## 2024-08-19 RX ADMIN — TRAMADOL HYDROCHLORIDE 50 MILLIGRAM(S): 200 TABLET, EXTENDED RELEASE ORAL at 17:49

## 2024-08-19 RX ADMIN — CEFAZOLIN SODIUM 100 MILLIGRAM(S): 2 INJECTION, SOLUTION INTRAVENOUS at 20:56

## 2024-08-19 NOTE — PROGRESS NOTE ADULT - PROBLEM SELECTOR PLAN 9
Pt has history of chronic vertebral compression fractures and kyphoscoliosis s/p C-sacrum fusion. Pt on Tramadol 100mg ER QD and Dilaudid 4mg q4 PRN.     Plan:  - Con't tramadol 100mg ER QD  - Dilaudid 4mg q4 for severe pain PRN  - monitor for back pain symptoms Pt has history of chronic vertebral compression fractures and kyphoscoliosis s/p C-sacrum fusion. Pt on Tramadol 100mg ER QD and Dilaudid 4mg q4 at home.    Plan:  - Con't tramadol 100mg ER QD  - ptn noted by multiple providers to be lethargic on previous standing pain regimen, weaned back pain regimen to dilaudid 4mg q8 for severe pain and counseled ptn extensively can ask for PRN doses if in pain.  Pain appears well controlled today on new regimen.  - monitor for back pain symptoms

## 2024-08-19 NOTE — PROGRESS NOTE ADULT - PROBLEM SELECTOR PLAN 2
BCx growing gram positive cocci in clusters in 1 set, PCR positive for MSSA  8/15: BCx drawn on 8/14 NGTD  TTE LVEF 55%, PASP 40mmHg  Pt currently DNR/DNI, spoke to pt's brother and pt agreed to reverse DNR/DNI for RAJANI, and agrees to IV abx    Plan:  - c/w Ancef 2g q8 x 4 weeks  - Will repeat BCx every AM until negative for 48 hours  - Once BCx are negative, will place PICC line  - RAJANI ordered BCx growing gram positive cocci in clusters in 1 set, PCR positive for MSSA  8/15: BCx drawn on 8/14 NGTD  TTE LVEF 55%, PASP 40mmHg  Pt currently DNR/DNI, spoke to pt's brother and pt agreed to reverse DNR/DNI for RAJANI, and agrees to IV abx  BCx NGTD    Plan:  - c/w Ancef 2g q8 x 4 weeks  - RAJANI today  - NPO at midnight  - PICC team consulted, placement deferred for now pending dispo to [facility] vs home BCx growing gram positive cocci in clusters in 1 set, PCR positive for MSSA  8/15: BCx drawn on 8/14 NGTD  TTE LVEF 55%, PASP 40mmHg  Pt currently DNR/DNI, spoke to pt's brother and pt agreed to reverse DNR/DNI for RAJANI  8/19: RAJANI deferred by pt    Plan:  - c/w Ancef 2g q8 x 4 weeks  - Will re-consult   - PICC team consulted, placement deferred for now pending dispo to [facility] vs home BCx growing gram positive cocci in clusters in 1 set, PCR positive for MSSA  8/15: BCx drawn on 8/14 NGTD  TTE LVEF 55%, PASP 40mmHg  Pt currently DNR/DNI, spoke to pt's brother and pt agreed to reverse DNR/DNI for RAJANI  8/19: RAJANI deferred by pt    Plan:  - c/w Ancef 2g q8 x 4 weeks  - Per ID, c/w IV abx, but actively refusing [facility] placement -- will continue discussions with pt and pt's brother, if pt still refusing IV abx then will d/c pt on PO abx  - PICC team consulted, placement deferred for now pending dispo to [facility] vs home

## 2024-08-19 NOTE — PROGRESS NOTE ADULT - SUBJECTIVE AND OBJECTIVE BOX
OVERNIGHT EVENTS:    SUBJECTIVE / INTERVAL HPI: Patient seen and examined at bedside.       PHYSICAL EXAM:    General: Lying comfortably and in no acute distress  HEENT: NC/AT; EOMI, anicteric sclera; MMM  Neck: supple  Cardiovascular: +S1/S2, RRR  Respiratory: CTA B/L; no W/R/R  Gastrointestinal: soft, NT/ND; +BSx4  Extremities: WWP; no edema, clubbing or cyanosis  Vascular: 2+ radial pulses B/L  Neurological: AAOx3; no focal deficits  Psychiatric: pleasant mood and affect  Dermatologic: no appreciable wounds or damage to the skin    VITAL SIGNS:  Vital Signs Last 24 Hrs  T(C): 36.9 (19 Aug 2024 05:36), Max: 36.9 (18 Aug 2024 09:32)  T(F): 98.5 (19 Aug 2024 05:36), Max: 98.5 (18 Aug 2024 09:32)  HR: 65 (19 Aug 2024 05:36) (60 - 74)  BP: 136/83 (19 Aug 2024 05:36) (108/60 - 136/83)  BP(mean): 83 (18 Aug 2024 20:31) (83 - 83)  RR: 18 (19 Aug 2024 05:36) (18 - 18)  SpO2: 94% (19 Aug 2024 05:36) (92% - 96%)    Parameters below as of 19 Aug 2024 05:36  Patient On (Oxygen Delivery Method): room air          MEDICATIONS:  MEDICATIONS  (STANDING):  bisacodyl 5 milliGRAM(s) Oral every 12 hours  ceFAZolin   IVPB      ceFAZolin   IVPB 2000 milliGRAM(s) IV Intermittent every 8 hours  clotrimazole 1% Cream 1 Application(s) Topical every 12 hours  DULoxetine 60 milliGRAM(s) Oral every 12 hours  enoxaparin Injectable 40 milliGRAM(s) SubCutaneous every 24 hours  HYDROmorphone   Tablet 4 milliGRAM(s) Oral every 8 hours  lidocaine   4% Patch 1 Patch Transdermal every 24 hours  multivitamin 1 Tablet(s) Oral daily  polyethylene glycol 3350 17 Gram(s) Oral every 24 hours  senna 2 Tablet(s) Oral at bedtime  traMADol 50 milliGRAM(s) Oral every 12 hours    MEDICATIONS  (PRN):  acetaminophen     Tablet .. 650 milliGRAM(s) Oral every 6 hours PRN Temp greater or equal to 38C (100.4F), Mild Pain (1 - 3)  bisacodyl Suppository 10 milliGRAM(s) Rectal daily PRN Constipation      ALLERGIES:  Allergies    sulfa drugs (Unknown)    Intolerances        LABS:                        11.9   5.17  )-----------( 288      ( 18 Aug 2024 05:30 )             39.0     08-18    139  |  101  |  12  ----------------------------<  88  4.4   |  27  |  0.64    Ca    8.5      18 Aug 2024 05:30  Phos  3.5     08-18  Mg     2.2     08-18        Urinalysis Basic - ( 18 Aug 2024 05:30 )    Color: x / Appearance: x / SG: x / pH: x  Gluc: 88 mg/dL / Ketone: x  / Bili: x / Urobili: x   Blood: x / Protein: x / Nitrite: x   Leuk Esterase: x / RBC: x / WBC x   Sq Epi: x / Non Sq Epi: x / Bacteria: x      CAPILLARY BLOOD GLUCOSE          RADIOLOGY & ADDITIONAL TESTS: Reviewed. OVERNIGHT EVENTS: LAKIA    SUBJECTIVE / INTERVAL HPI: Patient seen and examined at bedside. Pt denies any new complaints at this time, including CP, SOB, cough, and reports his pain is well-controlled at this time. Discussed the plan for RAJANI today with patient and the need for temporary reversal of his DNR/DNI status prior to the procedure. Pt is in agreement with the plan and does not report any questions or concerns regarding the procedure.     PHYSICAL EXAM:    General: Lying comfortably and in no acute distress. Asleep at bedside but awakens easily to name  HEENT: NC/AT, no signs of bleeding, bruising; EOMI, anicteric sclera; MMM  Neck: supple  Cardiovascular: +S1/S2, RRR  Respiratory: CTA B/L, decreased breath sounds bilaterally; no W/R/R; no increased WOB  Gastrointestinal: soft, NT/ND; +BSx4  Extremities: WWP; no edema, clubbing or cyanosis  Vascular: 2+ radial pulses B/L  Neurological: AAOx2; no focal deficits  Psychiatric: pleasant mood and affect  Dermatologic: diffuse scaling and dry skin of the b/l UE and LE with excorations    VITAL SIGNS:  Vital Signs Last 24 Hrs  T(C): 36.9 (19 Aug 2024 05:36), Max: 36.9 (18 Aug 2024 09:32)  T(F): 98.5 (19 Aug 2024 05:36), Max: 98.5 (18 Aug 2024 09:32)  HR: 65 (19 Aug 2024 05:36) (60 - 74)  BP: 136/83 (19 Aug 2024 05:36) (108/60 - 136/83)  BP(mean): 83 (18 Aug 2024 20:31) (83 - 83)  RR: 18 (19 Aug 2024 05:36) (18 - 18)  SpO2: 94% (19 Aug 2024 05:36) (92% - 96%)    Parameters below as of 19 Aug 2024 05:36  Patient On (Oxygen Delivery Method): room air          MEDICATIONS:  MEDICATIONS  (STANDING):  bisacodyl 5 milliGRAM(s) Oral every 12 hours  ceFAZolin   IVPB      ceFAZolin   IVPB 2000 milliGRAM(s) IV Intermittent every 8 hours  clotrimazole 1% Cream 1 Application(s) Topical every 12 hours  DULoxetine 60 milliGRAM(s) Oral every 12 hours  enoxaparin Injectable 40 milliGRAM(s) SubCutaneous every 24 hours  HYDROmorphone   Tablet 4 milliGRAM(s) Oral every 8 hours  lidocaine   4% Patch 1 Patch Transdermal every 24 hours  multivitamin 1 Tablet(s) Oral daily  polyethylene glycol 3350 17 Gram(s) Oral every 24 hours  senna 2 Tablet(s) Oral at bedtime  traMADol 50 milliGRAM(s) Oral every 12 hours    MEDICATIONS  (PRN):  acetaminophen     Tablet .. 650 milliGRAM(s) Oral every 6 hours PRN Temp greater or equal to 38C (100.4F), Mild Pain (1 - 3)  bisacodyl Suppository 10 milliGRAM(s) Rectal daily PRN Constipation      ALLERGIES:  Allergies    sulfa drugs (Unknown)    Intolerances        LABS:                        11.9   5.17  )-----------( 288      ( 18 Aug 2024 05:30 )             39.0     08-18    139  |  101  |  12  ----------------------------<  88  4.4   |  27  |  0.64    Ca    8.5      18 Aug 2024 05:30  Phos  3.5     08-18  Mg     2.2     08-18        Urinalysis Basic - ( 18 Aug 2024 05:30 )    Color: x / Appearance: x / SG: x / pH: x  Gluc: 88 mg/dL / Ketone: x  / Bili: x / Urobili: x   Blood: x / Protein: x / Nitrite: x   Leuk Esterase: x / RBC: x / WBC x   Sq Epi: x / Non Sq Epi: x / Bacteria: x      CAPILLARY BLOOD GLUCOSE          RADIOLOGY & ADDITIONAL TESTS: Reviewed. OVERNIGHT EVENTS: LAKIA    SUBJECTIVE: Patient seen and examined at bedside. Pt denies any new complaints at this time, including CP, SOB, cough, and reports his pain is well-controlled at this time. Discussed the plan for RAJANI today with patient and the need for temporary reversal of his DNR/DNI status prior to the procedure. Pt is in agreement with the plan and does not report any questions or concerns regarding the procedure.     INTERVAL EVENTS: While at echo suite, pt actively refusing RAJANI. Pt visibly upset that his pain regimen was changed from q4 to q8, reports his pain now not well-controlled. Extensive discussion was held with pt on the importance of the RAJANI, but pt continues to refuse procedure, RAJANI was ultimately canceled.    PHYSICAL EXAM:    General: Lying comfortably and in no acute distress. Asleep at bedside but awakens easily to name  HEENT: NC/AT, no signs of bleeding, bruising; EOMI, anicteric sclera; MMM  Neck: supple  Cardiovascular: +S1/S2, RRR  Respiratory: CTA B/L, decreased breath sounds bilaterally; no W/R/R; no increased WOB  Gastrointestinal: soft, NT/ND; +BSx4  Extremities: WWP; no edema, clubbing or cyanosis  Vascular: 2+ radial pulses B/L  Neurological: AAOx2; no focal deficits  Psychiatric: pleasant mood and affect  Dermatologic: diffuse scaling and dry skin of the b/l UE and LE with excorations    VITAL SIGNS:  Vital Signs Last 24 Hrs  T(C): 36.9 (19 Aug 2024 05:36), Max: 36.9 (18 Aug 2024 09:32)  T(F): 98.5 (19 Aug 2024 05:36), Max: 98.5 (18 Aug 2024 09:32)  HR: 65 (19 Aug 2024 05:36) (60 - 74)  BP: 136/83 (19 Aug 2024 05:36) (108/60 - 136/83)  BP(mean): 83 (18 Aug 2024 20:31) (83 - 83)  RR: 18 (19 Aug 2024 05:36) (18 - 18)  SpO2: 94% (19 Aug 2024 05:36) (92% - 96%)    Parameters below as of 19 Aug 2024 05:36  Patient On (Oxygen Delivery Method): room air          MEDICATIONS:  MEDICATIONS  (STANDING):  bisacodyl 5 milliGRAM(s) Oral every 12 hours  ceFAZolin   IVPB      ceFAZolin   IVPB 2000 milliGRAM(s) IV Intermittent every 8 hours  clotrimazole 1% Cream 1 Application(s) Topical every 12 hours  DULoxetine 60 milliGRAM(s) Oral every 12 hours  enoxaparin Injectable 40 milliGRAM(s) SubCutaneous every 24 hours  HYDROmorphone   Tablet 4 milliGRAM(s) Oral every 8 hours  lidocaine   4% Patch 1 Patch Transdermal every 24 hours  multivitamin 1 Tablet(s) Oral daily  polyethylene glycol 3350 17 Gram(s) Oral every 24 hours  senna 2 Tablet(s) Oral at bedtime  traMADol 50 milliGRAM(s) Oral every 12 hours    MEDICATIONS  (PRN):  acetaminophen     Tablet .. 650 milliGRAM(s) Oral every 6 hours PRN Temp greater or equal to 38C (100.4F), Mild Pain (1 - 3)  bisacodyl Suppository 10 milliGRAM(s) Rectal daily PRN Constipation      ALLERGIES:  Allergies    sulfa drugs (Unknown)    Intolerances        LABS:                        11.9   5.17  )-----------( 288      ( 18 Aug 2024 05:30 )             39.0     08-18    139  |  101  |  12  ----------------------------<  88  4.4   |  27  |  0.64    Ca    8.5      18 Aug 2024 05:30  Phos  3.5     08-18  Mg     2.2     08-18        Urinalysis Basic - ( 18 Aug 2024 05:30 )    Color: x / Appearance: x / SG: x / pH: x  Gluc: 88 mg/dL / Ketone: x  / Bili: x / Urobili: x   Blood: x / Protein: x / Nitrite: x   Leuk Esterase: x / RBC: x / WBC x   Sq Epi: x / Non Sq Epi: x / Bacteria: x      CAPILLARY BLOOD GLUCOSE          RADIOLOGY & ADDITIONAL TESTS: Reviewed.

## 2024-08-19 NOTE — PROGRESS NOTE ADULT - PROBLEM SELECTOR PLAN 5
Pt has hx of chronic lymphedema of B/L legs. Pt endorses mild tenderness/pain to touch on B/L LE. On physical exam, pt's legs are B/L erythematous, warm to touch, with dry scaling skin with intermittent patches of oozing indicative of venous insufficiency. Prior US duplex b/l was negative B/L    Plan:  - wound care for b/l legs  - encourage leg elevation  - c/w clotrimazole 1% topical cream for fungal rash in bilateral thighs  - Wound care to continue with emollient   - Wound care consulted, recs appreciated Pt has hx of chronic lymphedema of B/L legs. Pt endorses mild tenderness/pain to touch on B/L LE. On physical exam, pt's legs are B/L erythematous, warm to touch, with dry scaling skin with intermittent patches of oozing indicative of venous insufficiency. Prior US duplex b/l was negative B/L    Plan:  - wound care for b/l legs  - encourage leg elevation  - c/w clotrimazole 1% topical cream for fungal rash in bilateral thighs  - per wound care, apply Atrac-tain to bilateral UE, LE   - Wound care consulted, recs appreciated

## 2024-08-19 NOTE — PROGRESS NOTE ADULT - SUBJECTIVE AND OBJECTIVE BOX
INFECTIOUS DISEASES CONSULT FOLLOW-UP NOTE    INTERVAL HPI/OVERNIGHT EVENTS:  patient went to RAJANI then refused to undergo   per primary team, dialudid frequency was tapered down given lethargy and patient became very upset   team spoke with patient multiple times in echo suit and patient adamantly refused  patient blaming me for part of this medication change despite me explaining that I am ID physician and I don't manage his pain med      ROS:   Constitutional, eyes, ENT, cardiovascular, respiratory, gastrointestinal, genitourinary, integumentary, neurological, psychiatric and heme/lymph are otherwise negative other than noted above       ANTIBIOTICS/RELEVANT:    MEDICATIONS  (STANDING):  bisacodyl 5 milliGRAM(s) Oral every 12 hours  ceFAZolin   IVPB 2000 milliGRAM(s) IV Intermittent every 8 hours  ceFAZolin   IVPB      clotrimazole 1% Cream 1 Application(s) Topical every 12 hours  DULoxetine 60 milliGRAM(s) Oral every 12 hours  enoxaparin Injectable 40 milliGRAM(s) SubCutaneous every 24 hours  HYDROmorphone   Tablet 4 milliGRAM(s) Oral every 8 hours  lidocaine   4% Patch 1 Patch Transdermal every 24 hours  multivitamin 1 Tablet(s) Oral daily  polyethylene glycol 3350 17 Gram(s) Oral every 24 hours  senna 2 Tablet(s) Oral at bedtime  traMADol 50 milliGRAM(s) Oral every 12 hours    MEDICATIONS  (PRN):  acetaminophen     Tablet .. 650 milliGRAM(s) Oral every 6 hours PRN Temp greater or equal to 38C (100.4F), Mild Pain (1 - 3)  bisacodyl Suppository 10 milliGRAM(s) Rectal daily PRN Constipation        Vital Signs Last 24 Hrs  T(C): 36.9 (19 Aug 2024 05:36), Max: 36.9 (19 Aug 2024 05:36)  T(F): 98.5 (19 Aug 2024 05:36), Max: 98.5 (19 Aug 2024 05:36)  HR: 65 (19 Aug 2024 05:36) (63 - 74)  BP: 136/83 (19 Aug 2024 05:36) (108/60 - 136/83)  BP(mean): 83 (18 Aug 2024 20:31) (83 - 83)  RR: 18 (19 Aug 2024 05:36) (18 - 18)  SpO2: 94% (19 Aug 2024 05:36) (92% - 95%)    Parameters below as of 19 Aug 2024 05:36  Patient On (Oxygen Delivery Method): room air        PHYSICAL EXAM:  Constitutional: awake  Eyes: the sclera and conjunctiva were normal.   ENT: the ears and nose were normal in appearance.   Neck: the appearance of the neck was normal and the neck was supple.   Pulmonary: no respiratory distress and lungs were clear to auscultation bilaterally.   Heart: heart rate was normal and rhythm regular, normal S1 and S2  Vascular:. there was no peripheral edema  Abdomen: normal bowel sounds, soft, non-tender  Ext: b/l leg with dry scaly skin      LABS:                        11.9   5.77  )-----------( 299      ( 19 Aug 2024 08:32 )             38.0     08-19    139  |  104  |  9   ----------------------------<  96  4.1   |  25  |  0.65    Ca    8.6      19 Aug 2024 08:32  Phos  3.4     08-19  Mg     2.0     08-19    TPro  7.4  /  Alb  2.9<L>  /  TBili  0.2  /  DBili  x   /  AST  28  /  ALT  <5<L>  /  AlkPhos  77  08-19      Urinalysis Basic - ( 19 Aug 2024 08:32 )    Color: x / Appearance: x / SG: x / pH: x  Gluc: 96 mg/dL / Ketone: x  / Bili: x / Urobili: x   Blood: x / Protein: x / Nitrite: x   Leuk Esterase: x / RBC: x / WBC x   Sq Epi: x / Non Sq Epi: x / Bacteria: x        MICROBIOLOGY:  8/14 BCx ngtd  8/13 BCx ngtd  8/12 BCx aerobic: MSSA, staph pettenkoferi, anaerobic: Actinomyces neuii     RADIOLOGY & ADDITIONAL STUDIES:  Reviewed

## 2024-08-19 NOTE — PROGRESS NOTE ADULT - PROBLEM SELECTOR PLAN 1
Pt presents with 2 days of inability to get up and out of chair at home; pt has HHA 2-3x a week. Pt was last admitted 7/1/24 for recurrent falls. During his prior admission PT evaluated and recommended MCKENNA however pt chose to go home. Pt endorses being able to eat during this period.   PT evaluated and recommended MCKENNA however pt wanted to go home. Vitals stable, CK elevated to 419, iso inability to move.   On exam tremor of b/l UE present - pt evaluated by neurology, evidence of some rigidity and mild pill-rolling tremor however unclear if these symptoms are exacerbated by his ongoing infx  Orthostatics positive - may be an element of dysautonomia, expect supine hypertension in Parkinson's; s/p LR bolus and abdominal binder, repeat orthostatics negative  Palliative recommending chronic pain consult given pt appears oversedated on his current pain regimen with difficulties in participating in GOC conversations    Plan:  - Follow up as OP w/ Dr. Jernigan  - Fall precautions  - Nutrition consulted, pending recs Pt presents with 2 days of inability to get up and out of chair at home; pt has HHA 2-3x a week. Pt was last admitted 7/1/24 for recurrent falls. During his prior admission PT evaluated and recommended MCKENNA however pt chose to go home. Pt endorses being able to eat during this period.   PT evaluated and recommended MCKENNA however pt wanted to go home. Vitals stable, CK elevated to 419, iso inability to move.   On exam tremor of b/l UE present - pt evaluated by neurology, evidence of some rigidity and mild pill-rolling tremor however unclear if these symptoms are exacerbated by his ongoing infx  Orthostatics positive - may be an element of dysautonomia, expect supine hypertension in Parkinson's; s/p LR bolus and abdominal binder, repeat orthostatics negative  Palliative recommending chronic pain consult given pt appears oversedated on his current pain regimen with difficulties in participating in GOC conversations    Plan:  - Follow up as OP w/ Dr. Jernigan  - Fall precautions  - Nutrition consulted, diet adjusted per recommendations

## 2024-08-19 NOTE — PROGRESS NOTE ADULT - ASSESSMENT
Mr. Egan is a 78 y/o M with a PMHx of Bladder cancer (s/p tumor resection, s/p localized chemo and now in remission), GERD, L knee OA, chronic lymphedema, kyphoscoliosis s/p C-sacrum fusion at [facility] (10/5/2020, w/ Dr. Carlisle), MDD, panic disorder, prior RUE DVT, BPH who was admitted for recurrent falls, now found to have MSSA bacteremia.

## 2024-08-19 NOTE — PROGRESS NOTE ADULT - ASSESSMENT
79M w/ hx L femoral neck fracture s/p L hip hemiarthroplasty (10/2023), hx THR, kyphoscoliosis s/p thoracolumbar fusion (reportedly 2020), bladder CA s/p resection, mood disorder, AAA of 5cm (undergoing workup for EVAR), admitted on 8/12 after presented with recent falls and generalized weakness, in the context of a 2 week history of intermittent shaking chills and decreased appetite. Found to have MSSA bacteremia, likely source is BLE excoriations with mild LLE SSTI (now improving).  No evidence of metastatic/deep seated infection at this time.  Back pain chronic, stable.  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 Per primary team, his brother convinced patient to get RAJANI however patient adamantly refused to get RAJANI today.  Since he only grew MSSA in 1/4 bottle, subsequent cultures all remain negative, he has clear source of infection (LLE cellulitis) and back pain chronic (not new), I think it is reasonable to do 4 weeks of IV abx.  The conversation with him was difficult unfortunately since patient was fixated on pain med regimen change and he believed I was involved in this.  Primary team, PT and I all agreed that it is unsafe to discharge patient home.  My recommendation is IV abx for 4 weeks at [facility].      - cont cefazolin 2g IV q8h  - f/u surveillance BCx  - Place single lumen midline if patient agreed to go to [facility]  - Duration of antibiotics is 4 weeks ( 8/13 - 10/9 )  - Weekly labs: CMP, CBC, ESR, CRP faxed to ID office at [phone]  - Patient to follow up with Dr. Williamson in 2 weeks ([address] [place] [address], [phone]), ID office will call patient to schedule       Team 2 will follow you.  Case d/w primary team.    Jojo Whitehead MD, MS  Infectious Disease attending  office phone [phone]  For any questions during evening/weekend/holiday, please page ID on call    79M w/ hx L femoral neck fracture s/p L hip hemiarthroplasty (10/2023), hx THR, kyphoscoliosis s/p thoracolumbar fusion (reportedly 2020), bladder CA s/p resection, mood disorder, AAA of 5cm (undergoing workup for EVAR), admitted on 8/12 after presented with recent falls and generalized weakness, in the context of a 2 week history of intermittent shaking chills and decreased appetite. Found to have MSSA bacteremia, likely source is BLE excoriations with mild LLE SSTI (now improving).  No evidence of metastatic/deep seated infection at this time.  Back pain chronic, stable.  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 Per primary team, his brother convinced patient to get RAJANI however patient adamantly refused to get RAJANI today.  Since he only grew MSSA in 1/4 bottle, subsequent cultures all remain negative, he has clear source of infection (LLE cellulitis) and back pain chronic (not new), I think it is reasonable to do 4 weeks of IV abx.  The conversation with him was difficult unfortunately since patient was fixated on pain med regimen change and he believed I was involved in this.  Primary team, PT and I all agreed that it is unsafe to discharge patient home.  My recommendation is IV abx for 4 weeks at [facility].      - cont cefazolin 2g IV q8h  - f/u surveillance BCx  - Place single lumen midline if patient agreed to go to [facility]  - Duration of antibiotics is 4 weeks ( 8/13 - 9/9 )  - Weekly labs: CMP, CBC, ESR, CRP faxed to ID office at [phone]  - Patient to follow up with Dr. Williamson in 2 weeks ([address] [place] [address], [phone]), ID office will call patient to schedule       Team 2 will follow you.  Case d/w primary team.    Jojo Whitehead MD, MS  Infectious Disease attending  office phone [phone]  For any questions during evening/weekend/holiday, please page ID on call

## 2024-08-19 NOTE — PROGRESS NOTE ADULT - ATTENDING COMMENTS
Ptn originally scheduled for RAJANI this AM, was down in suite however started refusing RAJANI, primary team to bedside and provided extensive counseling on why a RAJANI is important, however ptn continued to refuse and RAJANI was canceled.  When evaluated at bedside afterwards, ptn again stated did not want RAJANI procedure and did not to go to [facility] even though he would otherwise be difficult to discharge on IV antibiotics for bacteremia given how little support he has at home.  Ptn expressed understanding but was firm in decision to not go to [facility] or receive RAJANI.  Will discuss this with patient brother, infectious disease team, and palliative care team, patient may need to be discharged on suboptimal regimen of prolonged oral antibiotic course.  Patient was on home regiment of standing dilaudid 4mg q4, however in hospital ptn noted by multiple providers to be lethargic on that standing regimen.  Over the weekend, ptn was weaned back on pain regimen to dilaudid 4mg q8 for severe pain and counseled ptn extensively that he can ask for PRN doses if in pain.  Pain appears well controlled today on exam. Ptn originally scheduled for RAJANI this AM, was down in suite however started refusing RAJANI, primary team to bedside and provided extensive counseling on why a RAJANI is important, however ptn continued to refuse and RAJANI was canceled.  When evaluated at bedside afterwards, ptn again stated did not want RAJANI procedure and did not to go to [facility] even though he would otherwise be difficult to discharge on IV antibiotics for bacteremia given how little support he has at home.  Ptn expressed understanding but was firm in decision to not go to [facility] or receive RAJNAI.  Will discuss this with patient brother, infectious disease team, and palliative care team, patient may need to instead be discharged on regimen of prolonged oral antibiotic course.  Patient was on home regiment of standing dilaudid 4mg q4, however in hospital ptn noted by multiple providers to be lethargic on that standing regimen.  Over the weekend, ptn was weaned back on pain regimen to dilaudid 4mg q8 for severe pain and counseled ptn extensively that he can ask for PRN doses if in pain.  Pain appears well controlled today on exam. Ptn originally scheduled for RAJANI this AM, was down in suite however started refusing RAJANI, primary team to bedside and provided extensive counseling on why a RAJANI is important, however ptn continued to refuse and RAJANI was canceled.  When evaluated at bedside afterwards, ptn again stated did not want RAJANI procedure and did not to go to [facility] even though he would otherwise be difficult to discharge on IV antibiotics for bacteremia given how little support he has at home.  Ptn expressed understanding but was firm in decision to not go to [facility] or receive RAJANI.  Will discuss this with patient brother, infectious disease team, and palliative care team, ideally ptn would agree to [facility] w 4 week IV abx but patient may need to instead be discharged on regimen of prolonged oral antibiotic course.  Patient was on home regiment of standing dilaudid 4mg q4, however in hospital ptn noted by multiple providers to be lethargic on that standing regimen.  Over the weekend, ptn was weaned back on pain regimen to dilaudid 4mg q8 for severe pain and counseled ptn extensively that he can ask for PRN doses if in pain.  Pain appears well controlled today on exam, however ptn fixated on pain regimen change during all parts of interview.

## 2024-08-20 ENCOUNTER — TRANSCRIPTION ENCOUNTER (OUTPATIENT)
Age: 79
End: 2024-08-20

## 2024-08-20 VITALS
OXYGEN SATURATION: 95 % | SYSTOLIC BLOOD PRESSURE: 116 MMHG | TEMPERATURE: 99 F | RESPIRATION RATE: 18 BRPM | HEART RATE: 74 BPM | DIASTOLIC BLOOD PRESSURE: 72 MMHG

## 2024-08-20 LAB
ALBUMIN SERPL ELPH-MCNC: 3 G/DL — LOW (ref 3.3–5)
ALP SERPL-CCNC: 80 U/L — SIGNIFICANT CHANGE UP (ref 40–120)
ALT FLD-CCNC: <5 U/L — LOW (ref 10–45)
ANION GAP SERPL CALC-SCNC: 10 MMOL/L — SIGNIFICANT CHANGE UP (ref 5–17)
AST SERPL-CCNC: 32 U/L — SIGNIFICANT CHANGE UP (ref 10–40)
BASOPHILS # BLD AUTO: 0.06 K/UL — SIGNIFICANT CHANGE UP (ref 0–0.2)
BASOPHILS NFR BLD AUTO: 1.1 % — SIGNIFICANT CHANGE UP (ref 0–2)
BILIRUB SERPL-MCNC: 0.3 MG/DL — SIGNIFICANT CHANGE UP (ref 0.2–1.2)
BUN SERPL-MCNC: 13 MG/DL — SIGNIFICANT CHANGE UP (ref 7–23)
CALCIUM SERPL-MCNC: 8.8 MG/DL — SIGNIFICANT CHANGE UP (ref 8.4–10.5)
CHLORIDE SERPL-SCNC: 104 MMOL/L — SIGNIFICANT CHANGE UP (ref 96–108)
CO2 SERPL-SCNC: 25 MMOL/L — SIGNIFICANT CHANGE UP (ref 22–31)
CREAT SERPL-MCNC: 0.66 MG/DL — SIGNIFICANT CHANGE UP (ref 0.5–1.3)
CULTURE RESULTS: SIGNIFICANT CHANGE UP
EGFR: 95 ML/MIN/1.73M2 — SIGNIFICANT CHANGE UP
EOSINOPHIL # BLD AUTO: 0.48 K/UL — SIGNIFICANT CHANGE UP (ref 0–0.5)
EOSINOPHIL NFR BLD AUTO: 8.7 % — HIGH (ref 0–6)
GLUCOSE SERPL-MCNC: 90 MG/DL — SIGNIFICANT CHANGE UP (ref 70–99)
HCT VFR BLD CALC: 39.6 % — SIGNIFICANT CHANGE UP (ref 39–50)
HGB BLD-MCNC: 12.7 G/DL — LOW (ref 13–17)
IMM GRANULOCYTES NFR BLD AUTO: 0.4 % — SIGNIFICANT CHANGE UP (ref 0–0.9)
LYMPHOCYTES # BLD AUTO: 1.93 K/UL — SIGNIFICANT CHANGE UP (ref 1–3.3)
LYMPHOCYTES # BLD AUTO: 34.9 % — SIGNIFICANT CHANGE UP (ref 13–44)
MAGNESIUM SERPL-MCNC: 2 MG/DL — SIGNIFICANT CHANGE UP (ref 1.6–2.6)
MCHC RBC-ENTMCNC: 28.7 PG — SIGNIFICANT CHANGE UP (ref 27–34)
MCHC RBC-ENTMCNC: 32.1 GM/DL — SIGNIFICANT CHANGE UP (ref 32–36)
MCV RBC AUTO: 89.4 FL — SIGNIFICANT CHANGE UP (ref 80–100)
MONOCYTES # BLD AUTO: 0.6 K/UL — SIGNIFICANT CHANGE UP (ref 0–0.9)
MONOCYTES NFR BLD AUTO: 10.8 % — SIGNIFICANT CHANGE UP (ref 2–14)
NEUTROPHILS # BLD AUTO: 2.44 K/UL — SIGNIFICANT CHANGE UP (ref 1.8–7.4)
NEUTROPHILS NFR BLD AUTO: 44.1 % — SIGNIFICANT CHANGE UP (ref 43–77)
NRBC # BLD: 0 /100 WBCS — SIGNIFICANT CHANGE UP (ref 0–0)
PHOSPHATE SERPL-MCNC: 3.6 MG/DL — SIGNIFICANT CHANGE UP (ref 2.5–4.5)
PLATELET # BLD AUTO: 289 K/UL — SIGNIFICANT CHANGE UP (ref 150–400)
POTASSIUM SERPL-MCNC: 4.2 MMOL/L — SIGNIFICANT CHANGE UP (ref 3.5–5.3)
POTASSIUM SERPL-SCNC: 4.2 MMOL/L — SIGNIFICANT CHANGE UP (ref 3.5–5.3)
PROT SERPL-MCNC: 7.6 G/DL — SIGNIFICANT CHANGE UP (ref 6–8.3)
RBC # BLD: 4.43 M/UL — SIGNIFICANT CHANGE UP (ref 4.2–5.8)
RBC # FLD: 14.6 % — HIGH (ref 10.3–14.5)
SODIUM SERPL-SCNC: 139 MMOL/L — SIGNIFICANT CHANGE UP (ref 135–145)
SPECIMEN SOURCE: SIGNIFICANT CHANGE UP
WBC # BLD: 5.53 K/UL — SIGNIFICANT CHANGE UP (ref 3.8–10.5)
WBC # FLD AUTO: 5.53 K/UL — SIGNIFICANT CHANGE UP (ref 3.8–10.5)

## 2024-08-20 PROCEDURE — 99239 HOSP IP/OBS DSCHRG MGMT >30: CPT

## 2024-08-20 PROCEDURE — 99232 SBSQ HOSP IP/OBS MODERATE 35: CPT

## 2024-08-20 PROCEDURE — 36556 INSERT NON-TUNNEL CV CATH: CPT

## 2024-08-20 PROCEDURE — 76937 US GUIDE VASCULAR ACCESS: CPT | Mod: 26,59

## 2024-08-20 RX ORDER — SODIUM CHLORIDE 9 MG/ML
10 INJECTION INTRAMUSCULAR; INTRAVENOUS; SUBCUTANEOUS
Refills: 0 | Status: DISCONTINUED | OUTPATIENT
Start: 2024-08-20 | End: 2024-08-20

## 2024-08-20 RX ORDER — CHLORHEXIDINE GLUCONATE 40 MG/ML
1 SOLUTION TOPICAL
Refills: 0 | Status: DISCONTINUED | OUTPATIENT
Start: 2024-08-20 | End: 2024-08-20

## 2024-08-20 RX ORDER — CLOTRIMAZOLE 1 %
1 CREAM (GRAM) TOPICAL
Qty: 0 | Refills: 0 | DISCHARGE
Start: 2024-08-20

## 2024-08-20 RX ORDER — CEFAZOLIN SODIUM 2 G/100ML
2 INJECTION, SOLUTION INTRAVENOUS
Qty: 0 | Refills: 0 | DISCHARGE
Start: 2024-08-20

## 2024-08-20 RX ORDER — TRAMADOL HYDROCHLORIDE 200 MG/1
1 TABLET, EXTENDED RELEASE ORAL
Qty: 0 | Refills: 0 | DISCHARGE
Start: 2024-08-20

## 2024-08-20 RX ORDER — HYDROMORPHONE HYDROCHLORIDE 2 MG/1
1 TABLET ORAL
Qty: 0 | Refills: 0 | DISCHARGE
Start: 2024-08-20

## 2024-08-20 RX ORDER — TRAMADOL HYDROCHLORIDE 200 MG/1
1 TABLET, EXTENDED RELEASE ORAL
Refills: 0 | DISCHARGE

## 2024-08-20 RX ORDER — HYDROMORPHONE HYDROCHLORIDE 2 MG/1
1 TABLET ORAL
Refills: 0 | DISCHARGE

## 2024-08-20 RX ADMIN — Medication 5 MILLIGRAM(S): at 08:52

## 2024-08-20 RX ADMIN — HYDROMORPHONE HYDROCHLORIDE 4 MILLIGRAM(S): 2 TABLET ORAL at 06:13

## 2024-08-20 RX ADMIN — Medication 1 APPLICATION(S): at 06:13

## 2024-08-20 RX ADMIN — HYDROMORPHONE HYDROCHLORIDE 4 MILLIGRAM(S): 2 TABLET ORAL at 01:55

## 2024-08-20 RX ADMIN — HYDROMORPHONE HYDROCHLORIDE 4 MILLIGRAM(S): 2 TABLET ORAL at 07:13

## 2024-08-20 RX ADMIN — TRAMADOL HYDROCHLORIDE 50 MILLIGRAM(S): 200 TABLET, EXTENDED RELEASE ORAL at 07:12

## 2024-08-20 RX ADMIN — CEFAZOLIN SODIUM 100 MILLIGRAM(S): 2 INJECTION, SOLUTION INTRAVENOUS at 13:07

## 2024-08-20 RX ADMIN — CHLORHEXIDINE GLUCONATE 1 APPLICATION(S): 40 SOLUTION TOPICAL at 13:07

## 2024-08-20 RX ADMIN — TRAMADOL HYDROCHLORIDE 50 MILLIGRAM(S): 200 TABLET, EXTENDED RELEASE ORAL at 06:12

## 2024-08-20 RX ADMIN — CEFAZOLIN SODIUM 100 MILLIGRAM(S): 2 INJECTION, SOLUTION INTRAVENOUS at 06:13

## 2024-08-20 RX ADMIN — HYDROMORPHONE HYDROCHLORIDE 4 MILLIGRAM(S): 2 TABLET ORAL at 13:07

## 2024-08-20 RX ADMIN — Medication 60 MILLIGRAM(S): at 06:12

## 2024-08-20 RX ADMIN — Medication 1 TABLET(S): at 13:07

## 2024-08-20 RX ADMIN — HYDROMORPHONE HYDROCHLORIDE 4 MILLIGRAM(S): 2 TABLET ORAL at 00:55

## 2024-08-20 NOTE — PROGRESS NOTE ADULT - SUBJECTIVE AND OBJECTIVE BOX
OVERNIGHT EVENTS: LAKIA    SUBJECTIVE / INTERVAL HPI: Patient seen and examined at bedside.     PHYSICAL EXAM:    General: Lying comfortably and in no acute distress. Asleep at bedside but awakens easily to name  HEENT: NC/AT, no signs of bleeding, bruising; EOMI, anicteric sclera; MMM  Neck: supple  Cardiovascular: +S1/S2, RRR  Respiratory: CTA B/L, decreased breath sounds bilaterally; no W/R/R; no increased WOB  Gastrointestinal: soft, NT/ND; +BSx4  Extremities: WWP; no edema, clubbing or cyanosis  Vascular: 2+ radial pulses B/L  Neurological: AAOx2; no focal deficits  Psychiatric: pleasant mood and affect  Dermatologic: diffuse scaling and dry skin of the b/l UE and LE with excorations      VITAL SIGNS:  Vital Signs Last 24 Hrs  T(C): 36.7 (20 Aug 2024 06:10), Max: 37.1 (19 Aug 2024 21:21)  T(F): 98.1 (20 Aug 2024 06:10), Max: 98.8 (19 Aug 2024 21:21)  HR: 64 (20 Aug 2024 06:10) (64 - 81)  BP: 130/79 (20 Aug 2024 06:10) (104/65 - 145/90)  BP(mean): --  RR: 18 (20 Aug 2024 06:10) (18 - 18)  SpO2: 95% (20 Aug 2024 06:10) (93% - 95%)    Parameters below as of 20 Aug 2024 06:10  Patient On (Oxygen Delivery Method): room air          MEDICATIONS:  MEDICATIONS  (STANDING):  bisacodyl 5 milliGRAM(s) Oral every 12 hours  ceFAZolin   IVPB      ceFAZolin   IVPB 2000 milliGRAM(s) IV Intermittent every 8 hours  clotrimazole 1% Cream 1 Application(s) Topical every 12 hours  DULoxetine 60 milliGRAM(s) Oral every 12 hours  enoxaparin Injectable 40 milliGRAM(s) SubCutaneous every 24 hours  HYDROmorphone   Tablet 4 milliGRAM(s) Oral every 6 hours  lidocaine   4% Patch 1 Patch Transdermal every 24 hours  multivitamin 1 Tablet(s) Oral daily  polyethylene glycol 3350 17 Gram(s) Oral every 24 hours  senna 2 Tablet(s) Oral at bedtime  traMADol 50 milliGRAM(s) Oral every 12 hours    MEDICATIONS  (PRN):  acetaminophen     Tablet .. 650 milliGRAM(s) Oral every 6 hours PRN Temp greater or equal to 38C (100.4F), Mild Pain (1 - 3)  bisacodyl Suppository 10 milliGRAM(s) Rectal daily PRN Constipation      ALLERGIES:  Allergies    sulfa drugs (Unknown)    Intolerances        LABS:                        11.9   5.77  )-----------( 299      ( 19 Aug 2024 08:32 )             38.0     08-19    139  |  104  |  9   ----------------------------<  96  4.1   |  25  |  0.65    Ca    8.6      19 Aug 2024 08:32  Phos  3.4     08-19  Mg     2.0     08-19    TPro  7.4  /  Alb  2.9<L>  /  TBili  0.2  /  DBili  x   /  AST  28  /  ALT  <5<L>  /  AlkPhos  77  08-19      Urinalysis Basic - ( 19 Aug 2024 08:32 )    Color: x / Appearance: x / SG: x / pH: x  Gluc: 96 mg/dL / Ketone: x  / Bili: x / Urobili: x   Blood: x / Protein: x / Nitrite: x   Leuk Esterase: x / RBC: x / WBC x   Sq Epi: x / Non Sq Epi: x / Bacteria: x      CAPILLARY BLOOD GLUCOSE          RADIOLOGY & ADDITIONAL TESTS: Reviewed. OVERNIGHT EVENTS: LAKIA    SUBJECTIVE / INTERVAL HPI: Patient seen and examined at bedside. Pt denies any new complaints at this time, including CP, SOB, cough, abdominal pain, LE swelling. Pt reports he spoke to his brother last night and they discussed the need for IV antibiotics to properly manage his bacteremia. Discussed with pt the     PHYSICAL EXAM:    General: Lying comfortably and in no acute distress. Asleep at bedside but awakens easily to name  HEENT: NC/AT, no signs of bleeding, bruising; EOMI, anicteric sclera; MMM  Neck: supple  Cardiovascular: +S1/S2, RRR  Respiratory: CTA B/L, decreased breath sounds bilaterally; no W/R/R; no increased WOB  Gastrointestinal: soft, NT/ND; +BSx4  Extremities: WWP; no edema, clubbing or cyanosis  Vascular: 2+ radial pulses B/L  Neurological: AAOx2; no focal deficits  Psychiatric: pleasant mood and affect  Dermatologic: diffuse scaling and dry skin of the b/l UE and LE with excorations      VITAL SIGNS:  Vital Signs Last 24 Hrs  T(C): 36.7 (20 Aug 2024 06:10), Max: 37.1 (19 Aug 2024 21:21)  T(F): 98.1 (20 Aug 2024 06:10), Max: 98.8 (19 Aug 2024 21:21)  HR: 64 (20 Aug 2024 06:10) (64 - 81)  BP: 130/79 (20 Aug 2024 06:10) (104/65 - 145/90)  BP(mean): --  RR: 18 (20 Aug 2024 06:10) (18 - 18)  SpO2: 95% (20 Aug 2024 06:10) (93% - 95%)    Parameters below as of 20 Aug 2024 06:10  Patient On (Oxygen Delivery Method): room air          MEDICATIONS:  MEDICATIONS  (STANDING):  bisacodyl 5 milliGRAM(s) Oral every 12 hours  ceFAZolin   IVPB      ceFAZolin   IVPB 2000 milliGRAM(s) IV Intermittent every 8 hours  clotrimazole 1% Cream 1 Application(s) Topical every 12 hours  DULoxetine 60 milliGRAM(s) Oral every 12 hours  enoxaparin Injectable 40 milliGRAM(s) SubCutaneous every 24 hours  HYDROmorphone   Tablet 4 milliGRAM(s) Oral every 6 hours  lidocaine   4% Patch 1 Patch Transdermal every 24 hours  multivitamin 1 Tablet(s) Oral daily  polyethylene glycol 3350 17 Gram(s) Oral every 24 hours  senna 2 Tablet(s) Oral at bedtime  traMADol 50 milliGRAM(s) Oral every 12 hours    MEDICATIONS  (PRN):  acetaminophen     Tablet .. 650 milliGRAM(s) Oral every 6 hours PRN Temp greater or equal to 38C (100.4F), Mild Pain (1 - 3)  bisacodyl Suppository 10 milliGRAM(s) Rectal daily PRN Constipation      ALLERGIES:  Allergies    sulfa drugs (Unknown)    Intolerances        LABS:                        11.9   5.77  )-----------( 299      ( 19 Aug 2024 08:32 )             38.0     08-19    139  |  104  |  9   ----------------------------<  96  4.1   |  25  |  0.65    Ca    8.6      19 Aug 2024 08:32  Phos  3.4     08-19  Mg     2.0     08-19    TPro  7.4  /  Alb  2.9<L>  /  TBili  0.2  /  DBili  x   /  AST  28  /  ALT  <5<L>  /  AlkPhos  77  08-19      Urinalysis Basic - ( 19 Aug 2024 08:32 )    Color: x / Appearance: x / SG: x / pH: x  Gluc: 96 mg/dL / Ketone: x  / Bili: x / Urobili: x   Blood: x / Protein: x / Nitrite: x   Leuk Esterase: x / RBC: x / WBC x   Sq Epi: x / Non Sq Epi: x / Bacteria: x      CAPILLARY BLOOD GLUCOSE          RADIOLOGY & ADDITIONAL TESTS: Reviewed. OVERNIGHT EVENTS: LAKIA    SUBJECTIVE / INTERVAL HPI: Patient seen and examined at bedside. Pt denies any new complaints at this time, including CP, SOB, cough, abdominal pain, LE swelling. Pt reports he spoke to his brother last night and they discussed the need for IV antibiotics to properly manage his bacteremia. Discussed with pt the need to complete the abx course in [facility] and pt is amendable to the plan.    PHYSICAL EXAM:    General: Lying comfortably and in no acute distress. Asleep at bedside but awakens easily to name  HEENT: NC/AT, no signs of bleeding, bruising; EOMI, anicteric sclera; MMM  Neck: supple  Cardiovascular: +S1/S2, RRR  Respiratory: CTA B/L, decreased breath sounds bilaterally; no W/R/R; no increased WOB  Gastrointestinal: soft, NT/ND; +BSx4  Extremities: WWP; no edema, clubbing or cyanosis  Vascular: 2+ radial pulses B/L  Neurological: AAOx2; no focal deficits  Psychiatric: pleasant mood and affect  Dermatologic: diffuse scaling and dry skin of the b/l UE and LE with excorations      VITAL SIGNS:  Vital Signs Last 24 Hrs  T(C): 36.7 (20 Aug 2024 06:10), Max: 37.1 (19 Aug 2024 21:21)  T(F): 98.1 (20 Aug 2024 06:10), Max: 98.8 (19 Aug 2024 21:21)  HR: 64 (20 Aug 2024 06:10) (64 - 81)  BP: 130/79 (20 Aug 2024 06:10) (104/65 - 145/90)  BP(mean): --  RR: 18 (20 Aug 2024 06:10) (18 - 18)  SpO2: 95% (20 Aug 2024 06:10) (93% - 95%)    Parameters below as of 20 Aug 2024 06:10  Patient On (Oxygen Delivery Method): room air          MEDICATIONS:  MEDICATIONS  (STANDING):  bisacodyl 5 milliGRAM(s) Oral every 12 hours  ceFAZolin   IVPB      ceFAZolin   IVPB 2000 milliGRAM(s) IV Intermittent every 8 hours  clotrimazole 1% Cream 1 Application(s) Topical every 12 hours  DULoxetine 60 milliGRAM(s) Oral every 12 hours  enoxaparin Injectable 40 milliGRAM(s) SubCutaneous every 24 hours  HYDROmorphone   Tablet 4 milliGRAM(s) Oral every 6 hours  lidocaine   4% Patch 1 Patch Transdermal every 24 hours  multivitamin 1 Tablet(s) Oral daily  polyethylene glycol 3350 17 Gram(s) Oral every 24 hours  senna 2 Tablet(s) Oral at bedtime  traMADol 50 milliGRAM(s) Oral every 12 hours    MEDICATIONS  (PRN):  acetaminophen     Tablet .. 650 milliGRAM(s) Oral every 6 hours PRN Temp greater or equal to 38C (100.4F), Mild Pain (1 - 3)  bisacodyl Suppository 10 milliGRAM(s) Rectal daily PRN Constipation      ALLERGIES:  Allergies    sulfa drugs (Unknown)    Intolerances        LABS:                        11.9   5.77  )-----------( 299      ( 19 Aug 2024 08:32 )             38.0     08-19    139  |  104  |  9   ----------------------------<  96  4.1   |  25  |  0.65    Ca    8.6      19 Aug 2024 08:32  Phos  3.4     08-19  Mg     2.0     08-19    TPro  7.4  /  Alb  2.9<L>  /  TBili  0.2  /  DBili  x   /  AST  28  /  ALT  <5<L>  /  AlkPhos  77  08-19      Urinalysis Basic - ( 19 Aug 2024 08:32 )    Color: x / Appearance: x / SG: x / pH: x  Gluc: 96 mg/dL / Ketone: x  / Bili: x / Urobili: x   Blood: x / Protein: x / Nitrite: x   Leuk Esterase: x / RBC: x / WBC x   Sq Epi: x / Non Sq Epi: x / Bacteria: x      CAPILLARY BLOOD GLUCOSE          RADIOLOGY & ADDITIONAL TESTS: Reviewed.

## 2024-08-20 NOTE — PROGRESS NOTE ADULT - PROVIDER SPECIALTY LIST ADULT
Hospitalist
Infectious Disease
Vascular Surgery
Infectious Disease
Vascular Surgery
Internal Medicine
Internal Medicine
Vascular Surgery
Infectious Disease
Internal Medicine

## 2024-08-20 NOTE — PROGRESS NOTE ADULT - PROBLEM SELECTOR PLAN 1
Pt presents with 2 days of inability to get up and out of chair at home; pt has HHA 2-3x a week. Pt was last admitted 7/1/24 for recurrent falls. During his prior admission PT evaluated and recommended [facility] however pt chose to go home. Pt endorses being able to eat during this period.   PT evaluated and recommended [facility] however pt wanted to go home. Vitals stable, CK elevated to 419, iso inability to move.   On exam tremor of b/l UE present - pt evaluated by neurology, evidence of some rigidity and mild pill-rolling tremor however unclear if these symptoms are exacerbated by his ongoing infx  Orthostatics positive - may be an element of dysautonomia, expect supine hypertension in Parkinson's; s/p LR bolus and abdominal binder, repeat orthostatics negative  Palliative recommending chronic pain consult given pt appears oversedated on his current pain regimen with difficulties in participating in GOC conversations    Plan:  - Follow up as OP w/ Dr. Jernigan  - Fall precautions  - Nutrition consulted, diet adjusted per recommendations

## 2024-08-20 NOTE — PROGRESS NOTE ADULT - PROBLEM SELECTOR PLAN 2
BCx growing gram positive cocci in clusters in 1 set, PCR positive for MSSA  8/15: BCx drawn on 8/14 NGTD  TTE LVEF 55%, PASP 40mmHg  Pt currently DNR/DNI, spoke to pt's brother and pt agreed to reverse DNR/DNI for RAJANI  8/19: RAJANI deferred by pt    Plan:  - c/w Ancef 2g q8 x 4 weeks  - Per ID, c/w IV abx, but actively refusing [facility] placement -- will continue discussions with pt and pt's brother, if pt still refusing IV abx then will d/c pt on PO abx  - PICC team consulted, placement deferred for now pending dispo to [facility] vs home BCx growing gram positive cocci in clusters in 1 set, PCR positive for MSSA  8/15: BCx drawn on 8/14 NGTD  TTE LVEF 55%, PASP 40mmHg  Pt currently DNR/DNI, spoke to pt's brother and pt agreed to reverse DNR/DNI for RAJANI  8/19: RAJANI deferred by pt    Plan:  - c/w Ancef 2g q8 for 4 weeks total, needs 3 more weeks at [facility] (  - PICC team to place midline today  - d/c to [facility] BCx growing gram positive cocci in clusters in 1 set, PCR positive for MSSA  8/15: BCx drawn on 8/14 NGTD  TTE LVEF 55%, PASP 40mmHg  Pt currently DNR/DNI, spoke to pt's brother and pt agreed to reverse DNR/DNI for RAJANI  8/19: RAJANI deferred by pt    Plan:  - c/w Ancef 2g q8 for 4 weeks total, needs 3 more weeks at [facility] (8/13 - 9/9)  - PICC team to place midline today  - d/c to [facility]  - OP follow up with Dr. Williamson in 2 weeks

## 2024-08-20 NOTE — PROGRESS NOTE ADULT - ASSESSMENT
79M w/ hx L femoral neck fracture s/p L hip hemiarthroplasty (10/2023), hx THR, kyphoscoliosis s/p thoracolumbar fusion (reportedly 2020), bladder CA s/p resection, mood disorder, AAA of 5cm (undergoing workup for EVAR), admitted on 8/12 after presented with recent falls and generalized weakness, in the context of a 2 week history of intermittent shaking chills and decreased appetite. Found to have MSSA bacteremia, likely source is BLE excoriations with mild LLE SSTI (now improving).  No evidence of metastatic/deep seated infection at this time.  Back pain chronic, stable.  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 Per primary team, his brother convinced patient to get RAJANI however patient adamantly refused to get RAJANI today.  Since he only grew MSSA in 1/4 bottle, subsequent cultures all remain negative, he has clear source of infection (LLE cellulitis) and back pain chronic (not new), I think it is reasonable to do 4 weeks of IV abx.   Dr. Hernández (hospitalist) had discussed with patient's brother (cardiologist), who spoke with patient.  Patient now agreed to go to [facility] to receive IV antibiotics as well as PT.      - cont cefazolin 2g IV q8h  - f/u surveillance BCx  - Place single lumen midline   - Duration of antibiotics is 4 weeks ( 8/13 - 9/9 )  - Weekly labs: CMP, CBC, ESR, CRP faxed to ID office at [phone]  - Patient to follow up with Dr. Williamson in 2 weeks ([address] [place] [address], [phone]), ID office will call patient to schedule       Thank you for your consult.  Please re-consult us or call us with questions.  Case d/w primary team.    Jojo Whitehead MD, MS  Infectious Disease attending  office phone [phone]  For any questions during evening/weekend/holiday, please page ID on call

## 2024-08-20 NOTE — PROGRESS NOTE ADULT - SUBJECTIVE AND OBJECTIVE BOX
INFECTIOUS DISEASES CONSULT FOLLOW-UP NOTE    INTERVAL HPI/OVERNIGHT EVENTS:  no event overnight  I saw patient with Dr. Hernández hospitalist   patient was calm, denied any discomfort     ROS:   Constitutional, eyes, ENT, cardiovascular, respiratory, gastrointestinal, genitourinary, integumentary, neurological, psychiatric and heme/lymph are otherwise negative other than noted above       ANTIBIOTICS/RELEVANT:    MEDICATIONS  (STANDING):  bisacodyl 5 milliGRAM(s) Oral every 12 hours  ceFAZolin   IVPB      ceFAZolin   IVPB 2000 milliGRAM(s) IV Intermittent every 8 hours  clotrimazole 1% Cream 1 Application(s) Topical every 12 hours  DULoxetine 60 milliGRAM(s) Oral every 12 hours  enoxaparin Injectable 40 milliGRAM(s) SubCutaneous every 24 hours  HYDROmorphone   Tablet 4 milliGRAM(s) Oral every 6 hours  lidocaine   4% Patch 1 Patch Transdermal every 24 hours  multivitamin 1 Tablet(s) Oral daily  polyethylene glycol 3350 17 Gram(s) Oral every 24 hours  senna 2 Tablet(s) Oral at bedtime  traMADol 50 milliGRAM(s) Oral every 12 hours    MEDICATIONS  (PRN):  acetaminophen     Tablet .. 650 milliGRAM(s) Oral every 6 hours PRN Temp greater or equal to 38C (100.4F), Mild Pain (1 - 3)  bisacodyl Suppository 10 milliGRAM(s) Rectal daily PRN Constipation        Vital Signs Last 24 Hrs  T(C): 37.1 (20 Aug 2024 09:07), Max: 37.1 (19 Aug 2024 21:21)  T(F): 98.7 (20 Aug 2024 09:07), Max: 98.8 (19 Aug 2024 21:21)  HR: 74 (20 Aug 2024 09:07) (64 - 81)  BP: 116/72 (20 Aug 2024 09:07) (104/65 - 145/90)  BP(mean): --  RR: 18 (20 Aug 2024 09:07) (18 - 18)  SpO2: 95% (20 Aug 2024 09:07) (93% - 95%)    Parameters below as of 20 Aug 2024 09:07  Patient On (Oxygen Delivery Method): room air        PHYSICAL EXAM:  Constitutional: awake  Eyes: the sclera and conjunctiva were normal.   ENT: the ears and nose were normal in appearance.   Neck: the appearance of the neck was normal and the neck was supple.   Pulmonary: no respiratory distress and lungs were clear to auscultation bilaterally.   Heart: heart rate was normal and rhythm regular, normal S1 and S2  Vascular:. there was no peripheral edema  Abdomen: normal bowel sounds, soft, non-tender  Ext: b/l leg with dry scaly skin, improved from prior           LABS:                        12.7   5.53  )-----------( 289      ( 20 Aug 2024 08:23 )             39.6     08-20    139  |  104  |  13  ----------------------------<  90  4.2   |  25  |  0.66    Ca    8.8      20 Aug 2024 08:23  Phos  3.6     08-20  Mg     2.0     08-20    TPro  7.6  /  Alb  3.0<L>  /  TBili  0.3  /  DBili  x   /  AST  32  /  ALT  <5<L>  /  AlkPhos  80  08-20      Urinalysis Basic - ( 20 Aug 2024 08:23 )    Color: x / Appearance: x / SG: x / pH: x  Gluc: 90 mg/dL / Ketone: x  / Bili: x / Urobili: x   Blood: x / Protein: x / Nitrite: x   Leuk Esterase: x / RBC: x / WBC x   Sq Epi: x / Non Sq Epi: x / Bacteria: x        MICROBIOLOGY:  8/14 BCx ngtd  8/13 BCx ngtd  8/12 BCx aerobic: MSSA, staph pettenkoferi, anaerobic: Actinomyces neuii     RADIOLOGY & ADDITIONAL STUDIES:  Reviewed

## 2024-08-20 NOTE — PROGRESS NOTE ADULT - PROBLEM SELECTOR PLAN 5
Pt has hx of chronic lymphedema of B/L legs. Pt endorses mild tenderness/pain to touch on B/L LE. On physical exam, pt's legs are B/L erythematous, warm to touch, with dry scaling skin with intermittent patches of oozing indicative of venous insufficiency. Prior US duplex b/l was negative B/L    Plan:  - wound care for b/l legs  - encourage leg elevation  - c/w clotrimazole 1% topical cream for fungal rash in bilateral thighs  - per wound care, apply Atrac-tain to bilateral UE, LE   - Wound care consulted, recs appreciated

## 2024-08-20 NOTE — CHART NOTE - NSCHARTNOTEFT_GEN_A_CORE
ADMITTING DIAGNOSIS:   Patient is a 79y old  Male who presents with a chief complaint of Couldn't stand up from chair for two days (20 Aug 2024 11:06)    PAST MEDICAL & SURGICAL HISTORY:  High cholesterol  Depression  GERD (gastroesophageal reflux disease)  Bladder cancer  History of back surgery    CURRENT DIET ORDER:   Diet, Regular:   Supplement Feeding Modality:  Oral  Ensure Plus High Protein Cans or Servings Per Day:  1       Frequency:  Daily (08-19-24 @ 15:26)    PO INTAKE: Good (%) [   ]  Fair (50-75%) [  ] Poor (<25%) [X]    GI ISSUES: pt reported nausea (denied emesis) & occasional diarrhea (denied constipation)    PAIN: none reported at this time    SKIN: stage I sacrum pressure injury per RN flowsheets    EDEMA: 3+ L leg edema per RN flowsheets    LABS:   08-20    139  |  104  |  13  ----------------------------<  90  4.2   |  25  |  0.66    Ca    8.8      20 Aug 2024 08:23  Phos  3.6     08-20  Mg     2.0     08-20    TPro  7.6  /  Alb  3.0<L>  /  TBili  0.3  /  DBili  x   /  AST  32  /  ALT  <5<L>  /  AlkPhos  80  08-20    MEDICATIONS:  MEDICATIONS  (STANDING):  bisacodyl 5 milliGRAM(s) Oral every 12 hours  ceFAZolin   IVPB      ceFAZolin   IVPB 2000 milliGRAM(s) IV Intermittent every 8 hours  chlorhexidine 2% Cloths 1 Application(s) Topical <User Schedule>  clotrimazole 1% Cream 1 Application(s) Topical every 12 hours  DULoxetine 60 milliGRAM(s) Oral every 12 hours  enoxaparin Injectable 40 milliGRAM(s) SubCutaneous every 24 hours  HYDROmorphone   Tablet 4 milliGRAM(s) Oral every 6 hours  lidocaine   4% Patch 1 Patch Transdermal every 24 hours  multivitamin 1 Tablet(s) Oral daily  polyethylene glycol 3350 17 Gram(s) Oral every 24 hours  senna 2 Tablet(s) Oral at bedtime  traMADol 50 milliGRAM(s) Oral every 12 hours    MEDICATIONS  (PRN):  acetaminophen     Tablet .. 650 milliGRAM(s) Oral every 6 hours PRN Temp greater or equal to 38C (100.4F), Mild Pain (1 - 3)  bisacodyl Suppository 10 milliGRAM(s) Rectal daily PRN Constipation  sodium chloride 0.9% lock flush 10 milliLiter(s) IV Push every 1 hour PRN Pre/post blood products, medications, blood draw, and to maintain line patency    WEIGHT: (8/12)  Height for BMI (FEET)	5 Feet  Height for BMI (INCHES)	8 Inch(s)  Height for BMI (CENTIMETERS)	172.72 Centimeter(s)  Weight for BMI (lbs)	175 lb  Weight for BMI (kg)	79.4 kg  Body Mass Index	26.6    WEIGHT CHANGE: no updated weight since admission    NUTRITION FOCUSED PHYSICAL EXAM: Completed [8/16]; Not Pertinent at this time  Nutriton Focused Physical Exam	yes...  Muscle Mass (Loss of Muscle)	Shoulders...  Shoulders Depletion is	moderate  Body Fat (loss of subcutaneous fat)	Orbital...  Orbital Depletion is	moderate    ESTIMATED ENERGY NEEDS:   Weight used for calculations	IBW  Estimated Energy Needs Weight (lbs)	169 lb  Estimated Energy Needs Weight (kg)	76.6 kg  Estimated Energy Needs From (livier/kg)	25  Estimated Energy Needs To (livier/kg)	30  Estimated Energy Needs Calculated From (livier/kg)	1915  Estimated Energy Needs Calculated To (lviier/kg)	2298  Weight used for calculations	IBW    Estimated Protein Needs Weight (lbs)	169 lb  Estimated Protein Needs Weight (kg)	76.6 kg  Estimated Protein Needs From (g/kg)	1.25  Estimated Protein Needs To (g/kg)	1.5  Estimated Protein Needs Calculated From (g/kg)	95.75  Estimated Protein Needs Calculated To (g/kg)	114.9  *Using +10% ideal body weight (169 pounds) as current weight likely inaccurate due to 3+ edema. Needs adjusted for older age, malnutrition, pressure injury. Fluid needs per team. ideal body weight: 169 pounds; % ideal body weight: 114%    SUBJECTIVE:   Per H&P: 79M w/ PMH of bladder CA (s/p tumor resection), GERD, chronic lymphedema, kyphoscoliosis s/p C-sacrum fusion (2020), chronic compression fractures of vertebral column, L knee OA, MDD, panic disorder, BPH, abdominal aortic aneurysm found on last admission, recently admitted for fall at home, now presenting to ED due weakness and inability to stand from chair for two days. Pt states he felt generalized weakness and difficulty getting up and out of his chair. Pt states he was able to urinate by using a bedside urinal. Pt endorses eating snacks and Chinese food that his friend brought over last night. Pt states he normally has help from [facility] but last time they were came was Friday. Pt called the hospital as he was unsure what to do and was advised to call 911 for assistance in getting out of chair. Pt states 3 days prior he fell on his elbow, which has been bleeding on and off. Pt also reports having hit his head on his dresser last week when trying to  a bandage, no bleeding or LOC. He also reports b/l lower leg pain with oozing of the skin. Pt also endorses cough since this morning. Pt states he is chronically constipated, does not know last BM. Pt states he as not been taking home diazepam for the past few weeks but endorses taking dilaudid 4mg q4 and tramadol for his back pain. Pt takes clonazepam 2mg BID for anxiety, last dose was last night. Pt denies fevers, chills, abdominal pain, dysuria, diarrhea, vomiting, hematochezia or melena, lightheadedness.     (8/16) Met with pt this AM at bedside. Pt was seated upright and able to articulate his nutrition hx well. No known food allergies nor food intolerances reported. Pt known to [facility] nutrition services from previous admission. Pt reported eating well since prior admission and breakfast this AM was the only meal he has not eaten as he is tired due to inability to sleep last night. Pt stated "nothing is wrong" with his appetite. Pt denied chewing/swallowing difficulties and nausea/vomiting/diarrhea/constipation, however endorsed loose stools and mentioned his last BM was a couple of days ago (last BM 8/13 per RN flowsheets). Pt reported his usual body weight 160 pounds, denied any recent wt loss/gain (wt gain likely secondary to fluid fluctuations in setting of 3+ edema). RD reviewed protein sources (chicken, fish, beans, nut, etc.) and encouraged adequate PO and protein consumption in order to regain strength, maintain muscle mass and reach adequate nutritional status. Pt was accepting to RD suggestion and asking appropriate questions.     (8/20) Met with pt this AM at bedside. Pt was seated upright and able to articulate his nutrition hx well. Pt reported good appetite, mentioned he's been eating his meals and is with good acceptance to Ensure plus HP 1x/day. RD observed 0% breakfast - pt stated he did not eat because he did not sleep last night and he will eat later. Pt reported nausea (denied emesis), and occasional diarrhea (denied constipation), stated last BM 8/18. RD encouraged adequate fluid consumption and bananas in setting of diarrhea to avoid dehydration and assist with diarrhea/loose BMs. Pt was accepting to RD suggestion.     PREVIOUS NUTRITION DIAGNOSIS: severe protein-kcal malnutrition related to inability to meet increased nutrient needs as evidenced by moderate muscle and fat wasting    Active [X]  Resolved [   ]    IF RESOLVED, NEW PES: N/A    GOAL: Pt to meet >75% estimated energy and protein needs throughout hospitalization    MONITORING AND EVALUATION:   Current diet order is appropriate and is well tolerated, will continue to monitor:  - Food and nutrient intake/POs  - Nutrition related lab values  - Weight/weight trends  - GI functions  - Supplement acceptance    NUTRITION RECOMMENDATIONS:   1. Continue with Regular diet  - Encourage adequate PO and protein intake  - Fluid needs per team  - Honor food preferences, as medically able  2. Continue with Ensure plus HP 1x/day (chocolate)  - Provides 350 kcal, 20 g pro per serving  3. Continue with MVI for general nutrient coverage  4. Appreciate updated weight and weekly weight trends  5. Hold current bowel regimen if diarrhea persists    RISK LEVEL: High [X] Moderate [   ] Low [   ]    Anastacia Alamo RDN also available via TEAMS

## 2024-08-20 NOTE — PROGRESS NOTE ADULT - NUTRITIONAL ASSESSMENT
This patient has been assessed with a concern for Malnutrition and has been determined to have a diagnosis/diagnoses of Severe protein-calorie malnutrition.    This patient is being managed with:   Diet Regular-  Supplement Feeding Modality:  Oral  Ensure Plus High Protein Cans or Servings Per Day:  1       Frequency:  Daily  Entered: Aug 16 2024  2:05PM  
This patient has been assessed with a concern for Malnutrition and has been determined to have a diagnosis/diagnoses of Severe protein-calorie malnutrition.    This patient is being managed with:   Diet NPO after Midnight-     NPO Start Date: 18-Aug-2024   NPO Start Time: 23:59  Entered: Aug 18 2024  8:16AM    Diet Regular-  Supplement Feeding Modality:  Oral  Ensure Plus High Protein Cans or Servings Per Day:  1       Frequency:  Daily  Entered: Aug 16 2024  2:05PM  
This patient has been assessed with a concern for Malnutrition and has been determined to have a diagnosis/diagnoses of Severe protein-calorie malnutrition.    This patient is being managed with:   Diet NPO-  Except Medications  Entered: Aug 19 2024  5:55AM    Diet NPO after Midnight-     NPO Start Date: 18-Aug-2024   NPO Start Time: 23:59  Entered: Aug 18 2024  8:16AM  
This patient has been assessed with a concern for Malnutrition and has been determined to have a diagnosis/diagnoses of Severe protein-calorie malnutrition.    This patient is being managed with:   Diet Regular-  Supplement Feeding Modality:  Oral  Ensure Plus High Protein Cans or Servings Per Day:  1       Frequency:  Daily  Entered: Aug 19 2024  3:26PM

## 2024-08-20 NOTE — PROGRESS NOTE ADULT - TIME BILLING
MSSA bacteremia, cellulitis.
MSSA bacteremia, cellulitis.
MSSA bacteremia, cellulitis
MSSA bacteremia, cellulitis.
Managing bacteremia
treatment of MSSA bacteremia
treatment of MSSA bacteremia
Bedside exam and interview   Reviewed vitals, labs   Discussed patient's plan of care with housestaff   Documentation of encounter  Excludes teaching and separately reported services

## 2024-08-20 NOTE — PROGRESS NOTE ADULT - REASON FOR ADMISSION
Couldn't stand up from chair for two days
Weakness
Couldn't stand up from chair for two days

## 2024-08-20 NOTE — PROGRESS NOTE ADULT - ASSESSMENT
Mr. Egan is a 80 y/o M with a PMHx of Bladder cancer (s/p tumor resection, s/p localized chemo and now in remission), GERD, L knee OA, chronic lymphedema, kyphoscoliosis s/p C-sacrum fusion at [facility] (10/5/2020, w/ Dr. Carlisle), MDD, panic disorder, prior RUE DVT, BPH who was admitted for recurrent falls, now found to have MSSA bacteremia.

## 2024-08-20 NOTE — CONSULT NOTE ADULT - CONSULT REASON
MSSA bacteremia
evaluation for midline placement
abdominal aortic aneurysm
tremors, r/o parkinsons
Palliative consulted for GOC, patient declining rehab and lacks adequate [facility] support at home

## 2024-08-20 NOTE — DISCHARGE NOTE NURSING/CASE MANAGEMENT/SOCIAL WORK - NSDCPEFALRISK_GEN_ALL_CORE
For information on Fall & Injury Prevention, visit: [email or web address] OR  [email or web address] OR  https://www.cdc.gov/steadi/patient.html

## 2024-08-20 NOTE — DISCHARGE NOTE NURSING/CASE MANAGEMENT/SOCIAL WORK - NSDCFUADDAPPT_GEN_ALL_CORE_FT
Please bring your Insurance card, Photo ID and Discharge paperwork to the following appointments:    (1) Please follow up with your Vascular Surgery Provider, Dr. Francisco J Corbin at [address], [address], [place] [address] on 8/27/2024 at 11:45am.    Appointment was scheduled by Ms. ROSETTE Pittman, Referral Coordinator.    (2) Please follow up with your Neurology Provider, Dr Pascual Jernigan at [address], [address], [place] [address] on 2/21/2025 at 11:20am.     Please note that the office will contact you for an earlier appointment once available.     Appointment was scheduled by Ms. ROSETTE Pittman, Referral Coordinator.

## 2024-08-20 NOTE — CONSULT NOTE ADULT - REASON FOR ADMISSION
Couldn't stand up from chair for two days

## 2024-08-20 NOTE — CONSULT NOTE ADULT - SUBJECTIVE AND OBJECTIVE BOX
Vascular Access Service Consult Note    [facility] ISSUES - PROBLEM Dx:  General weakness    Injury of left elbow    BPH (benign prostatic hyperplasia)    Chronic back pain    Lymphedema of leg    Prophylactic measure    Major depressive disorder    GERD (gastroesophageal reflux disease)    Anemia    Abdominal aortic aneurysm (AAA)    Chronic constipation    Physical deconditioning    MSSA bacteremia  The bacteria Staphylococcus aureus (staph) lives on the skin and in the nose of many people. It usually only causes a problem such as MSSA bacteremia if it gets inside the body. Staph infections can be either methicillin-resistant staph (MRSA) or methicillin-susceptible staph (MSSA). MSSA infections are usually treatable with antibiotics. However, MRSA infections are resistant to antibiotics. Many staph infections are mild, but they can also be serious and life-threatening. MSSA Bacteremia occurs when the MSSA bacteria enter your bloodstream. This is a serious infection that has a high risk of complications and death. Once it's in the bloodstream, the infection often spreads to other organs and tissues within the body such as the heart, lungs, or brain.    Physical deconditioning               Diagnosis: bacteremia     Indications for Vascular Access (Check all that apply)  [  X]  Antibiotic Therapy       Antibiotic Prescribed: ancef x 3 weeks                                                           [  ]  IV Hydration  [  ]  Total Parenteral Nutrition  [  ]  Chemotherapy  [  ]  Difficult Venous Access  [  ]  CVP monitoring  [  ]  Medications with high potential for tissue necrosis on extravasation  [  ]  Other    Screening (Check all that apply)  Previous Radiation to chest  [  ] Yes      [X  ]  No  Breast Cancer                          [  ] Left     [  ]  Right    [X  ]  No  Lymph Node Dissection         [  ] Left     [  ]  Right    [  X]  No  Pacemaker or ICD                   [  ] Left     [  ]  Right    [X  ]  No  Upper Extremity DVT             [  ] Left     [  ]  Right    [ X ]  No  Chronic Kidney Disease         [  ]  Yes     [X  ]  No  Hemodialysis                           [  ]  Yes     [ X ]  No  AV Fistula/ Graft                     [  ]  Left    [  ]  Right    [ X ]  No  Temp>101F in past 24 H       [  ]  Yes     [ X ]  No  H/O PICC/Midline                   [  ]  Yes     [ X ]  No    Lab data:                        12.7   5.53  )-----------( 289      ( 20 Aug 2024 08:23 )             39.6     08-20    139  |  104  |  13  ----------------------------<  90  4.2   |  25  |  0.66    Ca    8.8      20 Aug 2024 08:23  Phos  3.6     08-20  Mg     2.0     08-20    TPro  7.6  /  Alb  3.0<L>  /  TBili  0.3  /  DBili  x   /  AST  32  /  ALT  <5<L>  /  AlkPhos  80  08-20          ** spoke with patient who initially refused RAJANI, PICC and rehab. Patient is not a reliable candidate for home IV infusions. Conversation was had again at bedside with PICC team, ID ( dr. burris) and Dr. Hernández and patient now agreeable to PICC and rehab but not RAJANI. As per ID OK to proceed with 3 weeks of ancef**     I have reviewed the chart, interviewed and examined the patient and determined that this patient:  [  ] Is a candidate for a PICC line  [  X] Is a candidate for a Midline  [  ] Is not a candidate for vascular access device (reason)    Lumens:    [ X ] Single  [  ] Double

## 2024-08-20 NOTE — PROGRESS NOTE ADULT - PROBLEM SELECTOR PLAN 9
Pt has history of chronic vertebral compression fractures and kyphoscoliosis s/p C-sacrum fusion. Pt on Tramadol 100mg ER QD and Dilaudid 4mg q4 at home.    Plan:  - Con't tramadol 100mg ER QD  - ptn noted by multiple providers to be lethargic on previous standing pain regimen, weaned back pain regimen to dilaudid 4mg q8 for severe pain and counseled ptn extensively can ask for PRN doses if in pain.  Pain appears well controlled today on new regimen.  - monitor for back pain symptoms

## 2024-08-20 NOTE — PROGRESS NOTE ADULT - PROBLEM SELECTOR PLAN 12
F: Tolerating oral  E: Replete PRN   GI: None  Diet: Regular  DVT: Lovenox  Dispo: FORTINO

## 2024-08-20 NOTE — DISCHARGE NOTE NURSING/CASE MANAGEMENT/SOCIAL WORK - PATIENT PORTAL LINK FT
You can access the FollowMyHealth Patient Portal offered by [facility] by registering at the following website: [email or web address]. By joining [facility]’s FollowMyHealth portal, you will also be able to view your health information using other applications (apps) compatible with our system.

## 2024-08-21 LAB
CULTURE RESULTS: SIGNIFICANT CHANGE UP
SPECIMEN SOURCE: SIGNIFICANT CHANGE UP

## 2024-08-22 ENCOUNTER — APPOINTMENT (OUTPATIENT)
Dept: HEART AND VASCULAR | Facility: CLINIC | Age: 79
End: 2024-08-22

## 2024-08-23 ENCOUNTER — APPOINTMENT (OUTPATIENT)
Dept: HEART AND VASCULAR | Facility: CLINIC | Age: 79
End: 2024-08-23

## 2024-09-03 ENCOUNTER — APPOINTMENT (OUTPATIENT)
Dept: VASCULAR SURGERY | Facility: CLINIC | Age: 79
End: 2024-09-03
Payer: MEDICARE

## 2024-09-03 VITALS
HEART RATE: 65 BPM | DIASTOLIC BLOOD PRESSURE: 74 MMHG | SYSTOLIC BLOOD PRESSURE: 114 MMHG | HEIGHT: 68 IN | WEIGHT: 156 LBS | BODY MASS INDEX: 23.64 KG/M2

## 2024-09-03 PROCEDURE — 99213 OFFICE O/P EST LOW 20 MIN: CPT

## 2024-09-04 NOTE — PHYSICAL EXAM
[Respiratory Effort] : normal respiratory effort [Normal Rate and Rhythm] : normal rate and rhythm [2+] : left 2+ [1+] : left 1+ [Ankle Swelling (On Exam)] : present [Ankle Swelling Bilaterally] : bilaterally  [Ankle Swelling On The Right] : mild [] : bilaterally [Skin Ulcer] : ulcer [Alert] : alert [Calm] : calm [JVD] : no jugular venous distention  [Varicose Veins Of Lower Extremities] : not present [Abdomen Masses] : No abdominal masses [Abdomen Tenderness] : ~T ~M No abdominal tenderness [Purpura] : no purpura  [Petechiae] : no petechiae [Skin Induration] : no induration [de-identified] : Frail, NAD [de-identified] : NC/AT, anicteric [de-identified] : Limited AROM at lower back, strength 5/5x4, no palpable cords in LEs b/l [de-identified] : No erythema noted in legs but subcentimeter ulcers of the L medial malleolus, healing abrasions of the R pretibia and R prepatella noted

## 2024-09-04 NOTE — ASSESSMENT
[FreeTextEntry1] : 79yoM w/multiple comorbidities including bladder CA s/p TURBT, previously noted to have an infrarenal AAA requiring repair but pt admitted to [facility] July 2024 for recurrent traumatic falls and again in August 2024 for bacteremia/sepsis.  Pt was not medically optimized for EVAR/AAA and was recommended for d/c to a [facility], though pt refused and was d/c'd home.  He returns today for reevaluation of his AAA to consider rescheduling for EVAR.  Pt still reports significant weakness/fatigue and states he is not yet ready for surgical repair of his AAA.  Will contact pt again in 2wks to assess for improvement and plan to repair his aneurysm in October 2024.

## 2024-09-04 NOTE — HISTORY OF PRESENT ILLNESS
[FreeTextEntry1] : 79yoM w/multiple comorbidities including bladder CA s/p TURBT, previously noted to have an infrarenal AAA requiring repair but pt admitted to [facility] July 2024 for recurrent traumatic falls and again in August 2024 for bacteremia/sepsis.  Pt was not medically optimized for EVAR/AAA and was recommended for d/c to a [facility], though pt refused and was d/c'd home.  He returns today for reevaluation of his AAA to consider rescheduling for EVAR.

## 2024-09-04 NOTE — PHYSICAL EXAM
[Respiratory Effort] : normal respiratory effort [Normal Rate and Rhythm] : normal rate and rhythm [2+] : left 2+ [1+] : left 1+ [Ankle Swelling (On Exam)] : present [Ankle Swelling Bilaterally] : bilaterally  [Ankle Swelling On The Right] : mild [] : bilaterally [Skin Ulcer] : ulcer [Alert] : alert [Calm] : calm [JVD] : no jugular venous distention  [Varicose Veins Of Lower Extremities] : not present [Abdomen Masses] : No abdominal masses [Abdomen Tenderness] : ~T ~M No abdominal tenderness [Purpura] : no purpura  [Petechiae] : no petechiae [Skin Induration] : no induration [de-identified] : Frail, NAD [de-identified] : NC/AT, anicteric [de-identified] : Limited AROM at lower back, strength 5/5x4, no palpable cords in LEs b/l [de-identified] : No erythema noted in legs but subcentimeter ulcers of the L medial malleolus, healing abrasions of the R pretibia and R prepatella noted

## 2024-09-04 NOTE — ADDENDUM
[FreeTextEntry1] : This note was written by Kang Ulloa, acting as a scribe for Dr. Francisco J Corbin.  I, Dr. Francisco J Corbin, have read and attest that all the information, medical decision-making, and discharge instructions within are true and accurate.  I, Dr. Francisco J Corbin, personally performed the evaluation and management (E/M) services for this established patient who presents today with (an) existing condition(s).  That E/M includes conducting the examination, assessing all conditions, and (re)establishing/reinforcing a plan of care.  Today, my ACP, Kang Ulloa, was here to observe my evaluation and management services for this condition to be followed going forward.

## 2024-09-04 NOTE — ASSESSMENT
[FreeTextEntry1] : 79yoM w/multiple comorbidities including bladder CA s/p TURBT, previously noted to have an infrarenal AAA requiring repair but pt admitted to [facility] July 2024 for recurrent traumatic falls and again in August 2024 for bacteremia/sepsis.  Pt was not medically optimized for EVAR/AAA and was recommended for d/c to a [facility], though pt refused and was d/c'd home.  He returns today for reevaluation of his AAA to consider rescheduling for EVAR.  Pt still reports significant weakness/fatigue and states he is not yet ready for surgical repair of his AAA.  Will contact pt again in 2wks to assess for improvement and plan to repair his aneurysm in October 2024.

## 2024-09-04 NOTE — HISTORY OF PRESENT ILLNESS
[FreeTextEntry1] : 79yoM w/multiple comorbidities including bladder CA s/p TURBT, previously noted to have an infrarenal AAA requiring repair but pt admitted to [facility] July 2024 for recurrent traumatic falls and again in August 2024 for bacteremia/sepsis.  Pt was not medically optimized for EVAR/AAA and was recommended for d/c to a [facility], though pt refused and was d/c'd home.  He returns today for reevaluation of his AAA to consider rescheduling for EVAR.

## 2024-09-13 ENCOUNTER — APPOINTMENT (OUTPATIENT)
Dept: INFECTIOUS DISEASE | Facility: CLINIC | Age: 79
End: 2024-09-13
Payer: MEDICARE

## 2024-09-13 VITALS
TEMPERATURE: 97.3 F | OXYGEN SATURATION: 93 % | BODY MASS INDEX: 21.98 KG/M2 | HEIGHT: 68 IN | HEART RATE: 63 BPM | SYSTOLIC BLOOD PRESSURE: 127 MMHG | WEIGHT: 145 LBS | DIASTOLIC BLOOD PRESSURE: 77 MMHG

## 2024-09-13 PROCEDURE — 99214 OFFICE O/P EST MOD 30 MIN: CPT

## 2024-09-13 NOTE — PHYSICAL EXAM
[General Appearance - Alert] : alert [General Appearance - In No Acute Distress] : in no acute distress [Sclera] : the sclera and conjunctiva were normal [Neck Appearance] : the appearance of the neck was normal [] : no respiratory distress [Auscultation Breath Sounds / Voice Sounds] : lungs were clear to auscultation bilaterally [Heart Rate And Rhythm] : heart rate was normal and rhythm regular [Murmurs] : no murmurs [Abdomen Soft] : soft [Abdomen Tenderness] : non-tender [Musculoskeletal - Swelling] : no joint swelling [FreeTextEntry1] : Bilateral lower extremities with venous stasis changes, resolved prior signs of cellulitis [Oriented To Time, Place, And Person] : oriented to person, place, and time

## 2024-09-13 NOTE — ASSESSMENT
[FreeTextEntry1] : # MSSA bacteremia likely 2/2 LLE SSTI - resolved - S/p 4 weeks of ancef 2g IV q8h at [facility] (EOT 9/9) - Recommended [facility] remove RUE PICC - Advised patient monitor closely for any recurrent fever/chills/sweats, any new signs of skin infection, or any new joint pains, and call our office if these develop.  RTC PRN

## 2024-09-13 NOTE — HISTORY OF PRESENT ILLNESS
[FreeTextEntry1] : 79M w/ hx L femoral neck fracture s/p L hip hemiarthroplasty (10/2023), hx THR, kyphoscoliosis s/p thoracolumbar fusion (reportedly 2020), bladder CA s/p resection, mood disorder, AAA of 5cm (undergoing workup for EVAR), admitted to [facility] on 8/12 after presented with recent falls and generalized weakness, in the context of a 2 week history of intermittent shaking chills and decreased appetite. Found to have MSSA bacteremia (on one out of 2 BCx sets), likely source is BLE excoriations with mild LLE SSTI (now resolved). The positive BCx set also grew A.neuii and S.pettenkoferi - favored contaminants as 8/13 BCx (prior to abx) was negative. TTE negative and patient refused RAJANI. Discharged to [facility] on 8/20 with plan for 4 weeks of ancef for MSSA bacteremia in the presence of spinal hardware.  We did not receive any of the requested weekly safety labs from [facility]. He finished abx as planned on 9/9 but still has PICC in place. Patient brought today with him labs from 9/10 showing ~normal CBC and CMP, CRP normal, ESR 45 (not checked previously). Patient denies any local/systemic signs of infection at this time. No fever/chills, no new back pain or joint pain, tolerated abx well, without GI side effects or rash.

## 2024-09-29 PROCEDURE — 85025 COMPLETE CBC W/AUTO DIFF WBC: CPT

## 2024-09-29 PROCEDURE — 97116 GAIT TRAINING THERAPY: CPT

## 2024-09-29 PROCEDURE — 99285 EMERGENCY DEPT VISIT HI MDM: CPT

## 2024-09-29 PROCEDURE — 73070 X-RAY EXAM OF ELBOW: CPT

## 2024-09-29 PROCEDURE — 80053 COMPREHEN METABOLIC PANEL: CPT

## 2024-09-29 PROCEDURE — 85027 COMPLETE CBC AUTOMATED: CPT

## 2024-09-29 PROCEDURE — 86850 RBC ANTIBODY SCREEN: CPT

## 2024-09-29 PROCEDURE — 87077 CULTURE AEROBIC IDENTIFY: CPT

## 2024-09-29 PROCEDURE — 87186 SC STD MICRODIL/AGAR DIL: CPT

## 2024-09-29 PROCEDURE — 80048 BASIC METABOLIC PNL TOTAL CA: CPT

## 2024-09-29 PROCEDURE — 97530 THERAPEUTIC ACTIVITIES: CPT

## 2024-09-29 PROCEDURE — 36000 PLACE NEEDLE IN VEIN: CPT

## 2024-09-29 PROCEDURE — 97161 PT EVAL LOW COMPLEX 20 MIN: CPT

## 2024-09-29 PROCEDURE — 83605 ASSAY OF LACTIC ACID: CPT

## 2024-09-29 PROCEDURE — 93005 ELECTROCARDIOGRAM TRACING: CPT

## 2024-09-29 PROCEDURE — 93307 TTE W/O DOPPLER COMPLETE: CPT

## 2024-09-29 PROCEDURE — 71045 X-RAY EXAM CHEST 1 VIEW: CPT

## 2024-09-29 PROCEDURE — 84484 ASSAY OF TROPONIN QUANT: CPT

## 2024-09-29 PROCEDURE — 36415 COLL VENOUS BLD VENIPUNCTURE: CPT

## 2024-09-29 PROCEDURE — 86900 BLOOD TYPING SEROLOGIC ABO: CPT

## 2024-09-29 PROCEDURE — 83735 ASSAY OF MAGNESIUM: CPT

## 2024-09-29 PROCEDURE — 84100 ASSAY OF PHOSPHORUS: CPT

## 2024-09-29 PROCEDURE — 97165 OT EVAL LOW COMPLEX 30 MIN: CPT

## 2024-09-29 PROCEDURE — 86901 BLOOD TYPING SEROLOGIC RH(D): CPT

## 2024-09-29 PROCEDURE — 87150 DNA/RNA AMPLIFIED PROBE: CPT

## 2024-09-29 PROCEDURE — 87637 SARSCOV2&INF A&B&RSV AMP PRB: CPT

## 2024-09-29 PROCEDURE — 82550 ASSAY OF CK (CPK): CPT

## 2024-09-29 PROCEDURE — 87040 BLOOD CULTURE FOR BACTERIA: CPT

## 2024-11-06 DIAGNOSIS — E78.00 PURE HYPERCHOLESTEROLEMIA, UNSPECIFIED: ICD-10-CM

## 2024-11-07 ENCOUNTER — APPOINTMENT (OUTPATIENT)
Dept: HEART AND VASCULAR | Facility: CLINIC | Age: 79
End: 2024-11-07
Payer: MEDICARE

## 2024-11-07 ENCOUNTER — NON-APPOINTMENT (OUTPATIENT)
Age: 79
End: 2024-11-07

## 2024-11-07 VITALS
HEART RATE: 65 BPM | SYSTOLIC BLOOD PRESSURE: 124 MMHG | OXYGEN SATURATION: 97 % | DIASTOLIC BLOOD PRESSURE: 60 MMHG | TEMPERATURE: 97.4 F | HEIGHT: 68 IN | WEIGHT: 156 LBS | BODY MASS INDEX: 23.64 KG/M2

## 2024-11-07 VITALS — DIASTOLIC BLOOD PRESSURE: 80 MMHG | SYSTOLIC BLOOD PRESSURE: 130 MMHG

## 2024-11-07 DIAGNOSIS — I71.40 ABDOMINAL AORTIC ANEURYSM, WITHOUT RUPTURE, UNSPECIFIED: ICD-10-CM

## 2024-11-07 LAB
ALBUMIN SERPL ELPH-MCNC: 3.9 G/DL
ALP BLD-CCNC: 80 U/L
ALT SERPL-CCNC: 11 U/L
ANION GAP SERPL CALC-SCNC: 12 MMOL/L
APTT BLD: 35.9 SEC
AST SERPL-CCNC: 18 U/L
BILIRUB SERPL-MCNC: 0.2 MG/DL
BUN SERPL-MCNC: 25 MG/DL
CALCIUM SERPL-MCNC: 9 MG/DL
CHLORIDE SERPL-SCNC: 104 MMOL/L
CO2 SERPL-SCNC: 27 MMOL/L
CREAT SERPL-MCNC: 0.76 MG/DL
EGFR: 91 ML/MIN/1.73M2
GLUCOSE SERPL-MCNC: 79 MG/DL
HCT VFR BLD CALC: 39.5 %
HGB BLD-MCNC: 12.9 G/DL
INR PPP: 1.02 RATIO
MCHC RBC-ENTMCNC: 29.6 PG
MCHC RBC-ENTMCNC: 32.7 G/DL
MCV RBC AUTO: 90.6 FL
PLATELET # BLD AUTO: 195 K/UL
POTASSIUM SERPL-SCNC: 4.7 MMOL/L
PROT SERPL-MCNC: 7.2 G/DL
PT BLD: 12 SEC
RBC # BLD: 4.36 M/UL
RBC # FLD: 18 %
SODIUM SERPL-SCNC: 144 MMOL/L
WBC # FLD AUTO: 4.76 K/UL

## 2024-11-07 PROCEDURE — A9500: CPT

## 2024-11-07 PROCEDURE — 78452 HT MUSCLE IMAGE SPECT MULT: CPT

## 2024-11-07 PROCEDURE — G2211 COMPLEX E/M VISIT ADD ON: CPT

## 2024-11-07 PROCEDURE — 93306 TTE W/DOPPLER COMPLETE: CPT

## 2024-11-07 PROCEDURE — 93000 ELECTROCARDIOGRAM COMPLETE: CPT

## 2024-11-07 PROCEDURE — 93015 CV STRESS TEST SUPVJ I&R: CPT

## 2024-11-07 PROCEDURE — 99214 OFFICE O/P EST MOD 30 MIN: CPT | Mod: 25

## 2024-11-08 ENCOUNTER — APPOINTMENT (OUTPATIENT)
Dept: ORTHOPEDIC SURGERY | Facility: CLINIC | Age: 79
End: 2024-11-08

## 2024-11-08 ENCOUNTER — RESULT REVIEW (OUTPATIENT)
Age: 79
End: 2024-11-08

## 2024-11-08 ENCOUNTER — OUTPATIENT (OUTPATIENT)
Dept: OUTPATIENT SERVICES | Facility: HOSPITAL | Age: 79
LOS: 1 days | End: 2024-11-08
Payer: MEDICARE

## 2024-11-08 ENCOUNTER — NON-APPOINTMENT (OUTPATIENT)
Age: 79
End: 2024-11-08

## 2024-11-08 VITALS
DIASTOLIC BLOOD PRESSURE: 75 MMHG | OXYGEN SATURATION: 96 % | HEART RATE: 78 BPM | WEIGHT: 156 LBS | SYSTOLIC BLOOD PRESSURE: 123 MMHG | BODY MASS INDEX: 23.64 KG/M2 | HEIGHT: 68 IN

## 2024-11-08 DIAGNOSIS — Z98.890 OTHER SPECIFIED POSTPROCEDURAL STATES: Chronic | ICD-10-CM

## 2024-11-08 DIAGNOSIS — Z96.642 AFTERCARE FOLLOWING JOINT REPLACEMENT SURGERY: ICD-10-CM

## 2024-11-08 DIAGNOSIS — Z47.1 AFTERCARE FOLLOWING JOINT REPLACEMENT SURGERY: ICD-10-CM

## 2024-11-08 DIAGNOSIS — M25.551 PAIN IN RIGHT HIP: ICD-10-CM

## 2024-11-08 DIAGNOSIS — Z96.643 AFTERCARE FOLLOWING JOINT REPLACEMENT SURGERY: ICD-10-CM

## 2024-11-08 DIAGNOSIS — Z96.641 AFTERCARE FOLLOWING JOINT REPLACEMENT SURGERY: ICD-10-CM

## 2024-11-08 DIAGNOSIS — M25.552 PAIN IN RIGHT HIP: ICD-10-CM

## 2024-11-08 PROCEDURE — 73521 X-RAY EXAM HIPS BI 2 VIEWS: CPT | Mod: 26

## 2024-11-08 PROCEDURE — 99214 OFFICE O/P EST MOD 30 MIN: CPT

## 2024-11-11 ENCOUNTER — TRANSCRIPTION ENCOUNTER (OUTPATIENT)
Age: 79
End: 2024-11-11

## 2024-11-11 ENCOUNTER — APPOINTMENT (OUTPATIENT)
Dept: VASCULAR SURGERY | Facility: HOSPITAL | Age: 79
End: 2024-11-11

## 2024-11-11 ENCOUNTER — INPATIENT (INPATIENT)
Facility: HOSPITAL | Age: 79
LOS: 0 days | Discharge: HOME | End: 2024-11-12
Attending: SURGERY | Admitting: SURGERY
Payer: MEDICARE

## 2024-11-11 VITALS
HEART RATE: 68 BPM | OXYGEN SATURATION: 96 % | SYSTOLIC BLOOD PRESSURE: 138 MMHG | RESPIRATION RATE: 11 BRPM | DIASTOLIC BLOOD PRESSURE: 63 MMHG | TEMPERATURE: 97 F

## 2024-11-11 DIAGNOSIS — Z98.890 OTHER SPECIFIED POSTPROCEDURAL STATES: Chronic | ICD-10-CM

## 2024-11-11 LAB
ANION GAP SERPL CALC-SCNC: 9 MMOL/L — SIGNIFICANT CHANGE UP (ref 5–17)
APTT BLD: 39.3 SEC — HIGH (ref 24.5–35.6)
BUN SERPL-MCNC: 25 MG/DL — HIGH (ref 7–23)
CALCIUM SERPL-MCNC: 7.6 MG/DL — LOW (ref 8.4–10.5)
CHLORIDE SERPL-SCNC: 104 MMOL/L — SIGNIFICANT CHANGE UP (ref 96–108)
CO2 SERPL-SCNC: 27 MMOL/L — SIGNIFICANT CHANGE UP (ref 22–31)
CREAT SERPL-MCNC: 0.92 MG/DL — SIGNIFICANT CHANGE UP (ref 0.5–1.3)
EGFR: 85 ML/MIN/1.73M2 — SIGNIFICANT CHANGE UP
GLUCOSE SERPL-MCNC: 97 MG/DL — SIGNIFICANT CHANGE UP (ref 70–99)
HCT VFR BLD CALC: 32.6 % — LOW (ref 39–50)
HGB BLD-MCNC: 10.2 G/DL — LOW (ref 13–17)
INR BLD: 1.17 — HIGH (ref 0.85–1.16)
MAGNESIUM SERPL-MCNC: 1.8 MG/DL — SIGNIFICANT CHANGE UP (ref 1.6–2.6)
MCHC RBC-ENTMCNC: 27.9 PG — SIGNIFICANT CHANGE UP (ref 27–34)
MCHC RBC-ENTMCNC: 31.3 G/DL — LOW (ref 32–36)
MCV RBC AUTO: 89.3 FL — SIGNIFICANT CHANGE UP (ref 80–100)
NRBC # BLD: 0 /100 WBCS — SIGNIFICANT CHANGE UP (ref 0–0)
PHOSPHATE SERPL-MCNC: 4.6 MG/DL — HIGH (ref 2.5–4.5)
PLATELET # BLD AUTO: 133 K/UL — LOW (ref 150–400)
POTASSIUM SERPL-MCNC: 4.3 MMOL/L — SIGNIFICANT CHANGE UP (ref 3.5–5.3)
POTASSIUM SERPL-SCNC: 4.3 MMOL/L — SIGNIFICANT CHANGE UP (ref 3.5–5.3)
PROTHROM AB SERPL-ACNC: 13.4 SEC — SIGNIFICANT CHANGE UP (ref 9.9–13.4)
RBC # BLD: 3.65 M/UL — LOW (ref 4.2–5.8)
RBC # FLD: 16.8 % — HIGH (ref 10.3–14.5)
SODIUM SERPL-SCNC: 140 MMOL/L — SIGNIFICANT CHANGE UP (ref 135–145)
WBC # BLD: 6.4 K/UL — SIGNIFICANT CHANGE UP (ref 3.8–10.5)
WBC # FLD AUTO: 6.4 K/UL — SIGNIFICANT CHANGE UP (ref 3.8–10.5)

## 2024-11-11 PROCEDURE — 34705 EVAC RPR A-BIILIAC NDGFT: CPT | Mod: GC

## 2024-11-11 PROCEDURE — 37236 OPEN/PERQ PLACE STENT 1ST: CPT | Mod: GC,59,RT

## 2024-11-11 PROCEDURE — 34713 PERQ ACCESS & CLSR FEM ART: CPT | Mod: 50,GC,59

## 2024-11-11 RX ORDER — ROSUVASTATIN CALCIUM 10 MG
10 TABLET ORAL AT BEDTIME
Refills: 0 | Status: DISCONTINUED | OUTPATIENT
Start: 2024-11-11 | End: 2024-11-12

## 2024-11-11 RX ORDER — MAGNESIUM SULFATE IN 0.9% NACL 2 G/50 ML
1 INTRAVENOUS SOLUTION, PIGGYBACK (ML) INTRAVENOUS ONCE
Refills: 0 | Status: COMPLETED | OUTPATIENT
Start: 2024-11-11 | End: 2024-11-11

## 2024-11-11 RX ORDER — CEFAZOLIN SODIUM 1 G
2000 VIAL (EA) INJECTION EVERY 8 HOURS
Refills: 0 | Status: COMPLETED | OUTPATIENT
Start: 2024-11-11 | End: 2024-11-11

## 2024-11-11 RX ORDER — OXYBUTYNIN CHLORIDE 5 MG/1
10 TABLET ORAL DAILY
Refills: 0 | Status: DISCONTINUED | OUTPATIENT
Start: 2024-11-11 | End: 2024-11-12

## 2024-11-11 RX ORDER — DULOXETINE HYDROCHLORIDE 30 MG/1
60 CAPSULE, DELAYED RELEASE ORAL DAILY
Refills: 0 | Status: DISCONTINUED | OUTPATIENT
Start: 2024-11-11 | End: 2024-11-12

## 2024-11-11 RX ORDER — HYDROMORPHONE HCL/0.9% NACL/PF 6 MG/30 ML
0.5 PATIENT CONTROLLED ANALGESIA SYRINGE INTRAVENOUS
Refills: 0 | Status: DISCONTINUED | OUTPATIENT
Start: 2024-11-11 | End: 2024-11-11

## 2024-11-11 RX ORDER — OXYCODONE HYDROCHLORIDE 30 MG/1
2.5 TABLET ORAL EVERY 6 HOURS
Refills: 0 | Status: DISCONTINUED | OUTPATIENT
Start: 2024-11-11 | End: 2024-11-12

## 2024-11-11 RX ORDER — SENNA 187 MG
1 TABLET ORAL AT BEDTIME
Refills: 0 | Status: DISCONTINUED | OUTPATIENT
Start: 2024-11-11 | End: 2024-11-12

## 2024-11-11 RX ORDER — ACETAMINOPHEN 500 MG
650 TABLET ORAL EVERY 6 HOURS
Refills: 0 | Status: DISCONTINUED | OUTPATIENT
Start: 2024-11-11 | End: 2024-11-12

## 2024-11-11 RX ORDER — OXYCODONE HYDROCHLORIDE 30 MG/1
5 TABLET ORAL EVERY 6 HOURS
Refills: 0 | Status: DISCONTINUED | OUTPATIENT
Start: 2024-11-11 | End: 2024-11-12

## 2024-11-11 RX ADMIN — Medication 100 MILLIGRAM(S): at 22:06

## 2024-11-11 RX ADMIN — OXYBUTYNIN CHLORIDE 10 MILLIGRAM(S): 5 TABLET ORAL at 22:06

## 2024-11-11 RX ADMIN — Medication 75 MILLILITER(S): at 20:43

## 2024-11-11 RX ADMIN — Medication 75 MILLILITER(S): at 20:11

## 2024-11-11 RX ADMIN — Medication 10 MILLIGRAM(S): at 22:06

## 2024-11-11 RX ADMIN — Medication 100 GRAM(S): at 19:17

## 2024-11-11 NOTE — PRE-ANESTHESIA EVALUATION ADULT - NSANTHPEFT_GEN_ALL_CORE
General: Appearance is consistent with chronological age. No abnormal facies. Sleepy and slow to respond.   EENT: Anicteric sclera; oropharynx clear, moist mucus membranes  Cardiovascular:  Rate and rhythm evaluated  Respiratory: Unlabored breathing  Neurological: Awake and alert, moves all extremities

## 2024-11-11 NOTE — PATIENT PROFILE ADULT - FALL HARM RISK - HARM RISK INTERVENTIONS

## 2024-11-11 NOTE — H&P ADULT - HISTORY OF PRESENT ILLNESS
79 M former smoker Osteoporosis, Kyphoscoliosis, s/p c sacrum fusion at [facility] 10/2020, Depression, Bladder CA in remission (s/p resection localized chemo) Multiple Fragility fractures, Gerd, Lymphedema, Prior RUE DVT, BPH. previously noted to have an infrarenal AAA requiring repair but pt admitted to [facility] July 2024 for recurrent traumatic falls and again in August 2024 for bacteremia/sepsis. Pt was not medically optimized for EVAR/AAA and was recommended for d/c to a [facility] [place], though pt refused and was d/c'd home.  Presents today for EVAR

## 2024-11-11 NOTE — BRIEF OPERATIVE NOTE - OPERATION/FINDINGS
EVAR and R renal Stent: Bilateral groin access R groin: 18 Fr (Perclosed x 2) L groin 14Fr (Perclosed x 3)  Terumo Treo Device: Main Body: 28 mm x 80 mm x 100 mm  Ipsi limbs 15 x 140 mm x 17 mm  Contra limb: 15 x 100 mm x 17 mm.  Inadvertent coverage of bilateral renal arteries on completion angiogram. R renal artery cannulated and stented with 5 x 40mm Omnilink Elite stent. Main body of graft dragged down to uncover L renal artery. Completion angiogram showing R renal artery filling and partial coverage of L renal with some filling.

## 2024-11-11 NOTE — PROGRESS NOTE ADULT - SUBJECTIVE AND OBJECTIVE BOX
Vascular Surgery Post-Op Note    Procedure: EVAR, R renal artery stent placement    Diagnosis/Indication: AAA    Surgeon: Dr. Corbin    S: Pt resting comfortably, sleepy but arousable. Endorses mild pain due to laying flat. Denies abdominal pain, back pain, flank pain, chest pain, SOB.    O:  T(C): 36.1 (11-11-24 @ 17:06), Max: 36.1 (11-11-24 @ 17:06)  T(F): 97 (11-11-24 @ 17:06), Max: 97 (11-11-24 @ 17:06)  HR: 61 (11-11-24 @ 18:16) (56 - 68)  BP: 131/67 (11-11-24 @ 18:16) (126/60 - 138/66)  RR: 23 (11-11-24 @ 18:16) (11 - 23)  SpO2: 92% (11-11-24 @ 18:16) (92% - 100%)  Wt(kg): --                        10.2   6.40  )-----------( 133      ( 11 Nov 2024 17:28 )             32.6     11-11    140  |  104  |  25[H]  ----------------------------<  97  4.3   |  27  |  0.92    Ca    7.6[L]      11 Nov 2024 17:28  Phos  4.6     11-11  Mg     1.8     11-11        Gen: NAD, resting comfortably in bed  C/V: NSR  Pulm: Nonlabored breathing, no respiratory distress, On NC  Abd: soft, NT/ND  Extrem: b/l groin dressing c/d/i, soft, no palpable hematoma WWP, no calf edema, motor and sensory intact bilaterally  Pulses: palpable DP b/l, triphasic PT bilaterally      A/P: 79yMale s/p above procedure  Diet: CLD  IVF: LR at 75cc/hr  Pain/nausea control  DVT ppx: holding  Dispo plan: 5 Urtis

## 2024-11-11 NOTE — BRIEF OPERATIVE NOTE - NSICDXBRIEFPROCEDURE_GEN_ALL_CORE_FT
PROCEDURES:  Endovascular repair of aortoiliac aneurysm 11-Nov-2024 17:31:52  Emigdio Gibbs  Renal artery stent placement 11-Nov-2024 17:32:00  Emigdio Gibbs

## 2024-11-11 NOTE — PRE-ANESTHESIA EVALUATION ADULT - NSANTHAIRWAYFT_ENT_ALL_CORE
Patient's airway examined, Neck FROM, poor dentition. TMJ FROM. Good mouth opening  Midline trachea.  Adequate oral aperture/mento-hyoid distance/cervical range of motion.  CV RRR  CTAB

## 2024-11-11 NOTE — H&P ADULT - ASSESSMENT
79 M former smoker Osteoporosis, Kyphoscoliosis, s/p c sacrum fusion at [facility] 10/2020, Depression, Bladder CA in remission (s/p resection localized chemo) Multiple Fragility fractures, Gerd, Lymphedema, Prior RUE DVT, BPH. who presents today for EVAR.    Plan:    Admit to vascular, telemetry   Ancef 24hrs  SQH at 8pm   CLD adv. if macho  Flat for 2 hours  d/c sandoval at [place]

## 2024-11-11 NOTE — PRE-ANESTHESIA EVALUATION ADULT - NSANTHTIREDRD_ENT_A_CORE
5/10/2022      Ronn GILMORE Umer  [address] [place] [id number]      To Whom It May Concern,    This letter is to states that Ronn Owusu,  2019, has been a patient of Dr. Zavala's since 2020. Ronn's emergency contact is his mother, Opal Owusu.       Sincerely,         Annalee Zavala,   [id number]  [place] [address]  Dept: [phone]   No

## 2024-11-11 NOTE — PATIENT PROFILE ADULT - MEDICATIONS/VISITS
2019       Lady Reid,   [address] [address]  VIA Mail      Patient: Jaquelin Bo   YOB: 2015   Date of Visit: 2019       Dear Dr. Reid:    Thank you for referring Jaquelin Bo to me for evaluation. Below are my notes for this visit with her.    If you have questions, please do not hesitate to call me. I look forward to following your patient along with you.      Sincerely,        Coy Plata MD        CC: No Recipients  Coy Plata MD  2019  8:06 PM  Sign when Signing Visit    PEDIATRIC ENDOCRINOLOGY FOLLOW UP        Patient: Jaquelin Bo Date of Service: 2019   : 2015 MRN: [id number]       HISTORY OF PRESENT ILLNESS:  Jaquelin Bo is a 4 year old female who presents today for follow up of hypothyroidism.  Mom notes that Jaquelin has been doing well.  No trips to ED or hospital.  She has a good energy level.  No concerns with stooling.  Her hair has been growing.  No rashes or dry skin.  She has a good energy level.  She will now sleep through the night. No constipation or diarrhea. She has a good appetite but can be picky.  She will sleep through the night.      Past Hx:  Mom notes that she had 2 miscarriages and during the course of evaluation it was found that mom's thyroid levels was elevated. Mom was on levothyroxine during pregnancy. When Jaquelin was born it was by emergency c/s due to her size. Her  screen was slightly abnormal. She was started on levothyroxine at a few days of life due to her TSH being in the 15 range. Patient became hyperthyroid and on the initial dose and the dose was decreased. Her medication was stopped and when the levels were repeated her TSH had increased to 8 but her Free T4 was normal.     History reviewed. No pertinent past medical history.    Birth History     Home with mom       History reviewed. No pertinent surgical history.    No current outpatient medications on file.      No current facility-administered medications for this visit.        ALLERGIES:  No Known Allergies    Family History   Problem Relation Age of Onset   • Diabetes Paternal Grandfather    • Patient is unaware of any medical problems Mother    • Patient is unaware of any medical problems Father        Social History     Tobacco Use   • Smoking status: Never Smoker   • Smokeless tobacco: Never Used   Substance Use Topics   • Alcohol use: Not on file   • Drug use: Not on file       Social History     Patient does not qualify to have social determinant information on file (likely too young).   Social History Narrative    In suzy        Review of Systems   Constitutional: Negative for activity change, appetite change, fatigue and unexpected weight change.   HENT: Negative for sore throat and trouble swallowing.    Eyes: Negative for visual disturbance.   Respiratory: Negative for cough.    Cardiovascular: Negative for chest pain and palpitations.   Gastrointestinal: Negative for abdominal pain, constipation and diarrhea.   Endocrine: Negative for cold intolerance and heat intolerance.   Genitourinary: Negative for decreased urine volume.   Musculoskeletal: Negative for myalgias.   Skin: Negative for rash.   Neurological: Negative for headaches.   Hematological: Does not bruise/bleed easily.   Psychiatric/Behavioral: Negative for sleep disturbance.       Visit Vitals  /62   Pulse 96   Ht 3' 5.18\" (1.046 m)   Wt 15.7 kg (34 lb 9.8 oz)   BMI 14.35 kg/m²       Growth percentiles:   65 %ile (Z= 0.38) based on CDC (Girls, 2-20 Years) Stature-for-age data based on Stature recorded on 11/5/2019.   36 %ile (Z= -0.35) based on CDC (Girls, 2-20 Years) weight-for-age data using vitals from 11/5/2019.     BSA: 0.67 meters squared    Physical Exam   Constitutional: She appears well-developed and well-nourished. She is active.   HENT:   Mouth/Throat: Mucous membranes are moist. Dentition is normal.   Eyes: Pupils  are equal, round, and reactive to light. EOM are normal.   Neck: Normal range of motion. Neck supple. Thyroid normal.   Cardiovascular: Normal rate, regular rhythm, S1 normal and S2 normal. Pulses are palpable.   Pulmonary/Chest: Effort normal and breath sounds normal. No respiratory distress.   Abdominal: Full and soft. There is no tenderness. Musculoskeletal: Normal range of motion.         General: No deformity.     Neurological: She is alert. She displays normal reflexes. No cranial nerve deficit.   Skin: Skin is warm and dry. Capillary refill takes less than 3 seconds. No rash noted.       Lab and Imaging Results  Recent Results (from the past 4200 hour(s))   THYROID STIMULATING HORMONE    Collection Time: 19  2:32 PM   Result Value Ref Range    TSH 4.553 (H) 0.662 - 4.010 mcUnits/mL   FREE T4    Collection Time: 19  2:32 PM   Result Value Ref Range    T4, FREE 1.2 0.8 - 1.4 ng/dL          ASSESSMENT AND PLAN:    This is a 4 year old year-old female who presents with diagnosed with congenital hypothyroidism. She did not have imaging done as . She has been off levothyroxine for about 3-4 months.  Mom is concerned about long term need for replacement therapy. Currently she does not have any signs of hypothyroidism.      Reviewed & Interpreted: medical records and labs.      Hypothyroidism   Discussed signs and symptoms of hypothyroidism.         Plan:   Discussed most recent lab results with mom.  TSH was slightly elevated but Free T4 was normal.  Mom feels patient has been better since being off replacement therapy.   Will repeat TSH and Free T4 today. If TSH has increased discussed need to restart levothyroxine and likely need for lifelong replacement.      Discussed possible imaging depending on thyroid results.  Discussed with mom that thyroid US may not provide much insight unless gland is not seen.  Discussed potential for technetium scan.      Follow up to be determined when lab results  are received.     1. Congenital hypothyroidism        Return for visit: date to be determined once results received.    Instructions provided as documented in the AVS.    The mother indicated understanding of the diagnosis and agreed with the plan of care.                  no

## 2024-11-12 ENCOUNTER — TRANSCRIPTION ENCOUNTER (OUTPATIENT)
Age: 79
End: 2024-11-12

## 2024-11-12 VITALS
RESPIRATION RATE: 18 BRPM | DIASTOLIC BLOOD PRESSURE: 61 MMHG | SYSTOLIC BLOOD PRESSURE: 125 MMHG | OXYGEN SATURATION: 92 % | HEART RATE: 77 BPM

## 2024-11-12 LAB
ANION GAP SERPL CALC-SCNC: 9 MMOL/L — SIGNIFICANT CHANGE UP (ref 5–17)
BUN SERPL-MCNC: 23 MG/DL — SIGNIFICANT CHANGE UP (ref 7–23)
CALCIUM SERPL-MCNC: 7.9 MG/DL — LOW (ref 8.4–10.5)
CHLORIDE SERPL-SCNC: 100 MMOL/L — SIGNIFICANT CHANGE UP (ref 96–108)
CO2 SERPL-SCNC: 25 MMOL/L — SIGNIFICANT CHANGE UP (ref 22–31)
CREAT SERPL-MCNC: 1.18 MG/DL — SIGNIFICANT CHANGE UP (ref 0.5–1.3)
EGFR: 63 ML/MIN/1.73M2 — SIGNIFICANT CHANGE UP
GLUCOSE SERPL-MCNC: 98 MG/DL — SIGNIFICANT CHANGE UP (ref 70–99)
HCT VFR BLD CALC: 35.4 % — LOW (ref 39–50)
HGB BLD-MCNC: 11.2 G/DL — LOW (ref 13–17)
MAGNESIUM SERPL-MCNC: 2 MG/DL — SIGNIFICANT CHANGE UP (ref 1.6–2.6)
MCHC RBC-ENTMCNC: 29.7 PG — SIGNIFICANT CHANGE UP (ref 27–34)
MCHC RBC-ENTMCNC: 31.6 G/DL — LOW (ref 32–36)
MCV RBC AUTO: 93.9 FL — SIGNIFICANT CHANGE UP (ref 80–100)
NRBC # BLD: 0 /100 WBCS — SIGNIFICANT CHANGE UP (ref 0–0)
PHOSPHATE SERPL-MCNC: 4 MG/DL — SIGNIFICANT CHANGE UP (ref 2.5–4.5)
PLATELET # BLD AUTO: 127 K/UL — LOW (ref 150–400)
POTASSIUM SERPL-MCNC: 4.3 MMOL/L — SIGNIFICANT CHANGE UP (ref 3.5–5.3)
POTASSIUM SERPL-SCNC: 4.3 MMOL/L — SIGNIFICANT CHANGE UP (ref 3.5–5.3)
RBC # BLD: 3.77 M/UL — LOW (ref 4.2–5.8)
RBC # FLD: 17.1 % — HIGH (ref 10.3–14.5)
SODIUM SERPL-SCNC: 134 MMOL/L — LOW (ref 135–145)
WBC # BLD: 8.26 K/UL — SIGNIFICANT CHANGE UP (ref 3.8–10.5)
WBC # FLD AUTO: 8.26 K/UL — SIGNIFICANT CHANGE UP (ref 3.8–10.5)

## 2024-11-12 PROCEDURE — 99222 1ST HOSP IP/OBS MODERATE 55: CPT

## 2024-11-12 RX ORDER — ASPIRIN/MAG CARB/ALUMINUM AMIN 325 MG
81 TABLET ORAL DAILY
Refills: 0 | Status: DISCONTINUED | OUTPATIENT
Start: 2024-11-12 | End: 2024-11-12

## 2024-11-12 RX ORDER — HYDROMORPHONE HCL/0.9% NACL/PF 6 MG/30 ML
4 PATIENT CONTROLLED ANALGESIA SYRINGE INTRAVENOUS EVERY 6 HOURS
Refills: 0 | Status: DISCONTINUED | OUTPATIENT
Start: 2024-11-12 | End: 2024-11-12

## 2024-11-12 RX ORDER — OXYBUTYNIN CHLORIDE 5 MG/1
2 TABLET ORAL
Qty: 0 | Refills: 0 | DISCHARGE
Start: 2024-11-12

## 2024-11-12 RX ORDER — ROSUVASTATIN CALCIUM 10 MG
1 TABLET ORAL
Qty: 30 | Refills: 0
Start: 2024-11-12 | End: 2024-12-11

## 2024-11-12 RX ORDER — ASPIRIN/MAG CARB/ALUMINUM AMIN 325 MG
1 TABLET ORAL
Qty: 30 | Refills: 0
Start: 2024-11-12 | End: 2024-12-11

## 2024-11-12 RX ORDER — INFLUENZ VIR VAC TV P-SURF2003 15MCG/.5ML
0.5 SYRINGE (ML) INTRAMUSCULAR ONCE
Refills: 0 | Status: DISCONTINUED | OUTPATIENT
Start: 2024-11-12 | End: 2024-11-12

## 2024-11-12 RX ADMIN — OXYBUTYNIN CHLORIDE 10 MILLIGRAM(S): 5 TABLET ORAL at 10:17

## 2024-11-12 RX ADMIN — Medication 4 MILLIGRAM(S): at 14:09

## 2024-11-12 RX ADMIN — Medication 81 MILLIGRAM(S): at 07:22

## 2024-11-12 RX ADMIN — Medication 4 MILLIGRAM(S): at 08:22

## 2024-11-12 RX ADMIN — Medication 4 MILLIGRAM(S): at 13:36

## 2024-11-12 RX ADMIN — DULOXETINE HYDROCHLORIDE 60 MILLIGRAM(S): 30 CAPSULE, DELAYED RELEASE ORAL at 10:18

## 2024-11-12 RX ADMIN — OXYCODONE HYDROCHLORIDE 2.5 MILLIGRAM(S): 30 TABLET ORAL at 04:16

## 2024-11-12 RX ADMIN — OXYCODONE HYDROCHLORIDE 2.5 MILLIGRAM(S): 30 TABLET ORAL at 03:16

## 2024-11-12 RX ADMIN — Medication 4 MILLIGRAM(S): at 07:22

## 2024-11-12 NOTE — DISCHARGE NOTE NURSING/CASE MANAGEMENT/SOCIAL WORK - PATIENT PORTAL LINK FT
You can access the FollowMyHealth Patient Portal offered by [facility] by registering at the following website: [email or web address]. By joining [facility]’s FollowMyHealth portal, you will also be able to view your health information using other applications (apps) compatible with our system.

## 2024-11-12 NOTE — DISCHARGE NOTE NURSING/CASE MANAGEMENT/SOCIAL WORK - NSDCPEFALRISK_GEN_ALL_CORE
For information on Fall & Injury Prevention, visit: [email or web address] OR  [email or web address] OR  https://www.cdc.gov/steadi/patient.html

## 2024-11-12 NOTE — CONSULT NOTE ADULT - SUBJECTIVE AND OBJECTIVE BOX
HPI:   Mr. Blair is a 80 yo M with a PMH of osteoporosis, kyphoscoliosis s/p sacrum fusion at [facility] in 2020, depression, bladder cancer in remission s/p resection and localized chemo, multiple fragility fractures, GERD, lymphedema, prior RUE DVT, BPH, who presented for planned EVAR for AAA, completed on 11/11 and uncomplicated.     Seen at bedside this morning, feels well overall but has had low appetite since procedure. Pain is well controlled. Asking for summary of his recommended follow up appointments.     MEDICATIONS  (STANDING):  aspirin  chewable 81 milliGRAM(s) Oral daily  DULoxetine 60 milliGRAM(s) Oral daily  influenza  Vaccine (HIGH DOSE) 0.5 milliLiter(s) IntraMuscular once  oxybutynin 10 milliGRAM(s) Oral daily  rosuvastatin 10 milliGRAM(s) Oral at bedtime  senna 1 Tablet(s) Oral at bedtime    MEDICATIONS  (PRN):  acetaminophen     Tablet .. 650 milliGRAM(s) Oral every 6 hours PRN Mild Pain (1 - 3),  HYDROmorphone   Tablet 4 milliGRAM(s) Oral every 6 hours PRN Severe Pain (7 - 10)  oxyCODONE    IR 2.5 milliGRAM(s) Oral every 6 hours PRN Moderate Pain (4 - 6)    CAPILLARY BLOOD GLUCOSE        I&O's Summary    11 Nov 2024 07:01  -  12 Nov 2024 07:00  --------------------------------------------------------  IN: 2320 mL / OUT: 2250 mL / NET: 70 mL    12 Nov 2024 07:01  -  12 Nov 2024 11:47  --------------------------------------------------------  IN: 180 mL / OUT: 0 mL / NET: 180 mL        PHYSICAL EXAM:  Vital Signs Last 24 Hrs  T(C): 36.7 (12 Nov 2024 08:19), Max: 36.8 (12 Nov 2024 05:33)  T(F): 98 (12 Nov 2024 08:19), Max: 98.3 (12 Nov 2024 05:33)  HR: 67 (12 Nov 2024 08:19) (56 - 68)  BP: 126/60 (12 Nov 2024 08:19) (122/60 - 164/77)  BP(mean): 92 (11 Nov 2024 19:16) (87 - 95)  RR: 18 (12 Nov 2024 08:19) (11 - 23)  SpO2: 94% (12 Nov 2024 08:19) (92% - 100%)    Parameters below as of 12 Nov 2024 08:19  Patient On (Oxygen Delivery Method): room air      Appears comfortable   MMM  Normal WOB on RA, CTAB   RRR, no mrg   Abdomen soft, nontender, nondistended  Extremities warm and without edema   AOX3, no focal neuro deficits     LABS:                        11.2   8.26  )-----------( 127      ( 12 Nov 2024 05:30 )             35.4     11-12    134[L]  |  100  |  23  ----------------------------<  98  4.3   |  25  |  1.18    Ca    7.9[L]      12 Nov 2024 05:30  Phos  4.0     11-12  Mg     2.0     11-12      PT/INR - ( 11 Nov 2024 17:28 )   PT: 13.4 sec;   INR: 1.17          PTT - ( 11 Nov 2024 17:28 )  PTT:39.3 sec      Urinalysis Basic - ( 12 Nov 2024 05:30 )    Color: x / Appearance: x / SG: x / pH: x  Gluc: 98 mg/dL / Ketone: x  / Bili: x / Urobili: x   Blood: x / Protein: x / Nitrite: x   Leuk Esterase: x / RBC: x / WBC x   Sq Epi: x / Non Sq Epi: x / Bacteria: x            RADIOLOGY & ADDITIONAL TESTS:  Imaging from Last 24 Hours:  None new

## 2024-11-12 NOTE — DISCHARGE NOTE PROVIDER - NSDCCPTREATMENT_GEN_ALL_CORE_FT
PRINCIPAL PROCEDURE  Procedure: Endovascular repair of aortoiliac aneurysm  Findings and Treatment:       SECONDARY PROCEDURE  Procedure: Renal artery stent placement  Findings and Treatment:

## 2024-11-12 NOTE — CONSULT NOTE ADULT - ASSESSMENT
78 yo M with a PMH of osteoporosis, kyphoscoliosis s/p sacrum fusion at [facility] in 2020, depression, bladder cancer in remission s/p resection and localized chemo, multiple fragility fractures, GERD, lymphedema, prior RUE DVT, BPH, who presented for planned EVAR for AAA, completed on 11/11 and uncomplicated. Medicine is consulted for comanagement.     #AAA s/p EVAR   -s/p EVAR on 11/11, uncomplicated   -pain management per vascular team   -encourage working with PT   -no current DVT ppx  -continue ASA, rosuvastatin 10 mg daily     #Normocytic anemia   -At baseline hemoglobin in mid 11s   -Recommend follow up with PCP to check CBC as well as iron studies and reticulocyte count, ensure patient is up to date on age appropriate cancer screenings     #Mild thrombocytopenia   -Likely in setting of procedure, continue to monitor   -PCP for follow up CBC as above     #Mild/borderline hyponatremia   -Na 134 this AM, likely due to decreased PO in setting of being NPO for surgery, having low appetite   -Encourage PO intake, follow up with PCP for recheck of BMP     #Chronic bilateral hip pain   -s/p plain films with no evidence of fracture here   -continue home pain regimen, can give tylenol with PRN dilaudid 4 mg PO q6hrs for severe pain, low dose oxycodone for moderate pain     #Urinary urgency   #Urinary retention   -continue home oxybutynin 10 mg daily   -regular bladder scans to ensure not retaining urine     No DVT ppx presently, planned for discharge home today

## 2024-11-12 NOTE — DISCHARGE NOTE PROVIDER - NSDCFUSCHEDAPPT_GEN_ALL_CORE_FT
Nadira Guevara  [facility]  ENDOCRIN [address]  Scheduled Appointment: 11/19/2024    Dwayne Mcgovern  [facility]  ORTHOSURG [address]  Scheduled Appointment: 11/29/2024     Nadira Guevara  [facility]  ENDOCRIN [address]  Scheduled Appointment: 11/19/2024    Francisco J Corbin  [facility]  VASCULAR [address]  Scheduled Appointment: 11/26/2024    Dwayne Mcgovern  [facility]  ORTHOSURG [address]  Scheduled Appointment: 11/29/2024

## 2024-11-12 NOTE — DISCHARGE NOTE PROVIDER - HOSPITAL COURSE
79 M former smoker Osteoporosis, Kyphoscoliosis, s/p c sacrum fusion at [facility] 10/2020, Depression, Bladder CA in remission (s/p resection localized chemo) Multiple Fragility fractures, Gerd, Lymphedema, Prior RUE DVT, BPH. Patient previously noted to have an infrarenal AAA requiring repair but has admitted to [facility] July 2024 for recurrent traumatic falls and again in August 2024 for bacteremia/sepsis. Pt was not medically optimized for EVAR/AAA at the time and was recommended for d/c to a [facility], though pt refused and was d/c'd home. Patient is now better optimized and presented for elective for EVAR. On 11/11/24 he underwent EVAR (endovascular repair of aneurysm) and right renal stent. Patient tolerated the procedure well. Today patient is feeling well, all the VS and blood work is within normal limits, tolerating diet and ambulating independently - patient is stable and ready for discharge today.

## 2024-11-12 NOTE — DISCHARGE NOTE PROVIDER - NSDCCPCAREPLAN_GEN_ALL_CORE_FT
PRINCIPAL DISCHARGE DIAGNOSIS  Diagnosis: AAA (abdominal aortic aneurysm)  Assessment and Plan of Treatment:       SECONDARY DISCHARGE DIAGNOSES  Diagnosis: Bladder cancer  Assessment and Plan of Treatment:     Diagnosis: HLD (hyperlipidemia)  Assessment and Plan of Treatment:     Diagnosis: Depression, major  Assessment and Plan of Treatment:     Diagnosis: GERD (gastroesophageal reflux disease)  Assessment and Plan of Treatment:     Diagnosis: Kyphoscoliosis  Assessment and Plan of Treatment:     Diagnosis: Osteoporosis  Assessment and Plan of Treatment:     Diagnosis: BPH (benign prostatic hyperplasia)  Assessment and Plan of Treatment:

## 2024-11-12 NOTE — CHART NOTE - NSCHARTNOTEFT_GEN_A_CORE
Called to bedside by RN for finding of home medication andrew. Patient hiding blue pill andrew in sheets and refused to disclose. States he only took extra strength Tylenol, requesting oral Dilaudid home dose. Medication removed from patient's sheets, put in envelope and brought to pharmacy. At this time, patient A&Ox3, resting comfortably in bed. Patient educated on importance of not taking his home medications due to potential interactions.   Patient also concerned about loosing his backpack with other personal items in it. Assisted patient in calling his emergency contact Ermias ([phone]) who states he took the backpack home with him today.

## 2024-11-12 NOTE — PROGRESS NOTE ADULT - SUBJECTIVE AND OBJECTIVE BOX
O/N: post op Cr 0.92 (0.66), poc wnl, LR at 75cc/hr continued ON, confiscated pill andrew brought to pharmacy, would not disclose what med was taken, voided 375cc    ---------------------------------------------------------------------------  PLEASE CHECK WHEN PRESENT:  [  ]Heart Failure     [  ] Acute     [  ] Acute on Chronic     [  ] Chronic       [  ]Diastolic [HFpEF]     [  ]Systolic [HFrEF]     [  ]Combined [HFpEF & HFrEF]  .................................................................................  [  ] Hypertensive Heart Disease  [  ] CAD  [  ] Atrial Fibrillation     [  ] Paroxysmal A-fib     [  ] Chronic A-fib     [  ] Persistent A-fib     [  ] Longstanding Persistent A-fib     [  ] Permanent A-fib  [  ] Pulmonary Hypertension  [  ] Other:  -------------------------------------------------------------------  [  ] Respiratory failure  [  ] Acute PE   [  ] Acute cor pulmonale  [  ] Asthma/COPD Exacerbation  [  ] COPD on home O2 (Chronic renal Failure)   [  ] Atelectasis   [  ] Pleural effusion  [  ] Aspiration pneumonia  [  ] Obstructive Sleep Apnea  -------------------------------------------------------------------  [  ] Acute Kidney Injury      [  ] Acute Tubular Necrosis      [  ] Reneal Medullary Necrosis     [  ] Renal Cortical Necrosis     [  ] Other Pathological Lesions:  [  ] Chronic Kidney Disease     [  ]CKD 1     [  ]CKD 2     [  ]CKD 3     [  ]CKD 4     [  ]CKD 5 (ESRD)  [  ]Other:  -------------------------------------------------------------------  [  ] Diabetes  [  ] Diabetic PVD Ulcer  [  ] Neuropathic ulcer to DM  [  ] Diabetes with Nephropathy  [  ] Osteomyelitis due to diabetes  [  ] Hyperglycemia   [  ] Hypoglycemia   --------------------------------------------------------------------  [  ] Malnutrition: See Nutrition Note  [  ] Cachexia  [  ] Other:   [  ] Supplement Ordered:  [  ] Morbid Obesity (BMI >=40]  [  ] Ileus  ---------------------------------------------------------------------  [  ] Sepsis/severe sepsis/septic shock  [  ] Noninfectious SIRS  [  ] UTI  [  ] Pneumonia  [  ] Thrombophlebitis   -----------------------------------------------------------------------  [  ] Acidosis/alkalosis  [  ] Fluid overload  [  ] Hypokalemia  [  ] Hyperkalemia  [  ] Hypomagnesemia  [  ] Hypophosphatemia  [  ] Hyperphosphatemia  ------------------------------------------------------------------------  [  ] Acute blood loss anemia  [  ] Post op blood loss anemia  [  ] Iron deficiency anemia  [  ] Anemia due to chronic disease  [  ] Hypercoagulable state  [  ] Thrombocytopenia  ----------------------------------------------------------------------  [  ] Cerebral infarction  [  ] Transient ischemia attack  [  ] Encephalopathy - Toxic or Metabolic    A/P: 79 M former smoker Osteoporosis, Kyphoscoliosis, s/p c sacrum fusion at [facility] 10/2020, Depression, Bladder CA in remission (s/p resection localized chemo) Multiple Fragility fractures, Gerd, Lymphedema, Prior RUE DVT, BPH. who presents today for EVAR.    Vascular:  - s/p EVAR w/ R renal stent 11/11/24  - c/w sandoval  - c/w statin  - pain control    HTN/HLD:  - c/w statin    BPH:  - c/w sandoval  - c/w oxybutynin    GALINA:  - Cr 0.66 to 0.92  - monitor Cr  - c/w LR at 75cc/hr    Diet: DASH  Activity: as tolerated  DVTPPx: holding  Dispo: 5 uris           O/N: post op Cr 0.92 (0.66), poc wnl, LR at 75cc/hr continued ON, confiscated pill andrew brought to pharmacy, would not disclose what med was taken, voided 375cc    S: Patient does not have any complaints    O: Examined in bed resting comfortably     aspirin  chewable 81      Allergies    sulfa drugs (Unknown)    Intolerances        Vital Signs Last 24 Hrs  T(C): 36.8 (12 Nov 2024 05:33), Max: 36.8 (12 Nov 2024 05:33)  T(F): 98.3 (12 Nov 2024 05:33), Max: 98.3 (12 Nov 2024 05:33)  HR: 67 (12 Nov 2024 07:19) (56 - 68)  BP: 122/60 (12 Nov 2024 07:19) (122/60 - 164/77)  BP(mean): 92 (11 Nov 2024 19:16) (87 - 95)  RR: 18 (12 Nov 2024 07:19) (11 - 23)  SpO2: 92% (12 Nov 2024 07:19) (92% - 100%)    Parameters below as of 12 Nov 2024 07:19  Patient On (Oxygen Delivery Method): room air      I&O's Summary    11 Nov 2024 07:01  -  12 Nov 2024 07:00  --------------------------------------------------------  IN: 2195 mL / OUT: 2250 mL / NET: -55 mL        Physical Exam:  General: alert and awake, NAD  Pulmonary: no respiratory distress  Cardiovascular: RRR  Abdominal: soft  Groins: BL groins soft, no hemtoma  Extremities: warm, well perfused  Pulses:       LABS:                        10.2   6.40  )-----------( 133      ( 11 Nov 2024 17:28 )             32.6     11-11    140  |  104  |  25[H]  ----------------------------<  97  4.3   |  27  |  0.92    Ca    7.6[L]      11 Nov 2024 17:28  Phos  4.6     11-11  Mg     1.8     11-11      PT/INR - ( 11 Nov 2024 17:28 )   PT: 13.4 sec;   INR: 1.17          PTT - ( 11 Nov 2024 17:28 )  PTT:39.3 sec    Radiology and Additional Studies:    ---------------------------------------------------------------------------  PLEASE CHECK WHEN PRESENT:  [  ]Heart Failure     [  ] Acute     [  ] Acute on Chronic     [  ] Chronic       [  ]Diastolic [HFpEF]     [  ]Systolic [HFrEF]     [  ]Combined [HFpEF & HFrEF]  .................................................................................  [  ] Hypertensive Heart Disease  [  ] CAD  [  ] Atrial Fibrillation     [  ] Paroxysmal A-fib     [  ] Chronic A-fib     [  ] Persistent A-fib     [  ] Longstanding Persistent A-fib     [  ] Permanent A-fib  [  ] Pulmonary Hypertension  [  ] Other:  -------------------------------------------------------------------  [  ] Respiratory failure  [  ] Acute PE   [  ] Acute cor pulmonale  [  ] Asthma/COPD Exacerbation  [  ] COPD on home O2 (Chronic renal Failure)   [  ] Atelectasis   [  ] Pleural effusion  [  ] Aspiration pneumonia  [  ] Obstructive Sleep Apnea  -------------------------------------------------------------------  [  ] Acute Kidney Injury      [  ] Acute Tubular Necrosis      [  ] Reneal Medullary Necrosis     [  ] Renal Cortical Necrosis     [  ] Other Pathological Lesions:  [  ] Chronic Kidney Disease     [  ]CKD 1     [  ]CKD 2     [  ]CKD 3     [  ]CKD 4     [  ]CKD 5 (ESRD)  [  ]Other:  -------------------------------------------------------------------  [  ] Diabetes  [  ] Diabetic PVD Ulcer  [  ] Neuropathic ulcer to DM  [  ] Diabetes with Nephropathy  [  ] Osteomyelitis due to diabetes  [  ] Hyperglycemia   [  ] Hypoglycemia   --------------------------------------------------------------------  [  ] Malnutrition: See Nutrition Note  [  ] Cachexia  [  ] Other:   [  ] Supplement Ordered:  [  ] Morbid Obesity (BMI >=40]  [  ] Ileus  ---------------------------------------------------------------------  [  ] Sepsis/severe sepsis/septic shock  [  ] Noninfectious SIRS  [  ] UTI  [  ] Pneumonia  [  ] Thrombophlebitis   -----------------------------------------------------------------------  [  ] Acidosis/alkalosis  [  ] Fluid overload  [  ] Hypokalemia  [  ] Hyperkalemia  [  ] Hypomagnesemia  [  ] Hypophosphatemia  [  ] Hyperphosphatemia  ------------------------------------------------------------------------  [  ] Acute blood loss anemia  [  ] Post op blood loss anemia  [  ] Iron deficiency anemia  [  ] Anemia due to chronic disease  [  ] Hypercoagulable state  [  ] Thrombocytopenia  ----------------------------------------------------------------------  [  ] Cerebral infarction  [  ] Transient ischemia attack  [  ] Encephalopathy - Toxic or Metabolic    A/P: 79 M former smoker Osteoporosis, Kyphoscoliosis, s/p c sacrum fusion at [facility] 10/2020, Depression, Bladder CA in remission (s/p resection localized chemo) Multiple Fragility fractures, Gerd, Lymphedema, Prior RUE DVT, BPH. who presented for EVAR, now s/p EVAR w/ R renal stent 11/11/24    Vascular:  - s/p EVAR w/ R renal stent 11/11/24  - d/c jaime this AM if Cr is wnl  - c/w statin  - pain control    HTN/HLD:  - c/w statin    BPH:  - d/c jaime  - c/w oxybutynin    GALINA:  - Cr 0.66 to 0.92  - monitor Cr  - HL IVF    Diet: DASH  Activity: as tolerated  DVTPPx: holding  Dispo: d/c home today

## 2024-11-12 NOTE — DISCHARGE NOTE PROVIDER - NSDCFUADDINST_GEN_ALL_CORE_FT
FOLLOW UP: Dr. Corbin in 1 week. Your appointment has been made for _______. Call the office at  with any questions.  WOUND CARE: You may shower; soap and water over incision sites. Do not scrub. Pat dry when done.   ACTIVITY: Ambulate as tolerated, but no heavy lifting (>10lbs) or strenuous exercise.   DIET: You may resume DASH diet.   Call the office if you experience increasing pain, redness, swelling or drainage from incision sites/wounds, or temperature >101.4F.   NEW MEDICATIONS: Aspirin 81mg daily,  Crestor 10mg daily. Please  the new medication at [facility] located in the lobby of the hospital.   Also please continue taking Oxybutynin recently prescribed to you to prevent bladder and urination issues.   DISCHARGE DESTINATION: Home  Discharge Education provided: Yes FOLLOW UP: Dr. Corbin in 1 week. Your appointment has been made for 11/26/24 at 10:45am. Call the office at  with any questions.  WOUND CARE: You may shower; soap and water over incision sites. Do not scrub. Pat dry when done.   ACTIVITY: Ambulate as tolerated, but no heavy lifting (>10lbs) or strenuous exercise.   DIET: You may resume DASH diet.   Call the office if you experience increasing pain, redness, swelling or drainage from incision sites/wounds, or temperature >101.4F.   NEW MEDICATIONS: Aspirin 81mg daily,  Crestor 10mg daily. Please  the new medication at [facility] located in the lobby of the hospital.   Also please continue taking Oxybutynin recently prescribed to you to prevent bladder and urination issues.   DISCHARGE DESTINATION: Home  Discharge Education provided: Yes

## 2024-11-12 NOTE — DISCHARGE NOTE PROVIDER - CARE PROVIDER_API CALL
Francisco J Corbin  Vascular Surgery  [address]  [place] [address]  Phone: [phone]  Fax: [phone]  Follow Up Time:    Francisco J Corbin  Vascular Surgery  [address]  [place] [address]  Phone: [phone]  Fax: [phone]  Scheduled Appointment: 11/26/2024 10:45 AM

## 2024-11-12 NOTE — DISCHARGE NOTE PROVIDER - NSDCMRMEDTOKEN_GEN_ALL_CORE_FT
Ambien 5 mg oral tablet: 1 tab(s) orally once a day (at bedtime) as needed for  insomnia  Aspirin Low Dose 81 mg oral tablet, chewable: 1 tab(s) orally once a day  bisacodyl 10 mg rectal suppository: 1 suppository(ies) rectal once a day As needed Constipation  clonazePAM 2 mg oral tablet: 1 tab(s) orally every 12 hours as needed for  anxiety  Crestor 10 mg oral tablet: 1 tab(s) orally once a day (at bedtime)  DULoxetine 60 mg oral delayed release capsule: 1 cap(s) orally every 12 hours  HYDROmorphone 4 mg oral tablet: 1 tab(s) orally every 6 hours  Multiple Vitamins oral tablet: 1 tab(s) orally once a day  oxyBUTYnin 5 mg oral tablet: 2 tab(s) orally once a day  senna leaf extract oral tablet: 2 tab(s) orally once a day (at bedtime)  traMADol 50 mg oral tablet: 1 tab(s) orally every 12 hours

## 2024-11-12 NOTE — DISCHARGE NOTE PROVIDER - PROVIDER TOKENS
PROVIDER:[TOKEN:[[id number]]] PROVIDER:[TOKEN:[[id number]],SCHEDULEDAPPT:[11/26/2024],SCHEDULEDAPPTTIME:[10:45 AM]]

## 2024-11-12 NOTE — DISCHARGE NOTE NURSING/CASE MANAGEMENT/SOCIAL WORK - FINANCIAL ASSISTANCE
[facility] provides services at a reduced cost to those who are determined to be eligible through [facility]’s financial assistance program. Information regarding [facility]’s financial assistance program can be found by going to [email or web address] or by calling [phone].

## 2024-11-19 ENCOUNTER — APPOINTMENT (OUTPATIENT)
Dept: ENDOCRINOLOGY | Facility: CLINIC | Age: 79
End: 2024-11-19
Payer: MEDICARE

## 2024-11-19 VITALS
HEART RATE: 75 BPM | WEIGHT: 146 LBS | SYSTOLIC BLOOD PRESSURE: 148 MMHG | BODY MASS INDEX: 22.2 KG/M2 | DIASTOLIC BLOOD PRESSURE: 78 MMHG

## 2024-11-19 DIAGNOSIS — R26.89 OTHER ABNORMALITIES OF GAIT AND MOBILITY: ICD-10-CM

## 2024-11-19 DIAGNOSIS — M81.8 OTHER OSTEOPOROSIS W/OUT CURRENT PATHOLOGICAL FRACTURE: ICD-10-CM

## 2024-11-19 DIAGNOSIS — E03.8 OTHER SPECIFIED HYPOTHYROIDISM: ICD-10-CM

## 2024-11-19 PROCEDURE — G2211 COMPLEX E/M VISIT ADD ON: CPT

## 2024-11-19 PROCEDURE — 99205 OFFICE O/P NEW HI 60 MIN: CPT

## 2024-11-19 RX ORDER — OXYBUTYNIN CHLORIDE 2.5 MG/1
TABLET ORAL
Refills: 0 | Status: ACTIVE | COMMUNITY

## 2024-11-19 RX ORDER — MULTIVITAMIN
TABLET ORAL
Refills: 0 | Status: ACTIVE | COMMUNITY

## 2024-11-19 RX ORDER — HYDROMORPHONE HYDROCHLORIDE 8 MG/1
TABLET ORAL
Refills: 0 | Status: ACTIVE | COMMUNITY

## 2024-11-29 ENCOUNTER — APPOINTMENT (OUTPATIENT)
Dept: ORTHOPEDIC SURGERY | Facility: CLINIC | Age: 79
End: 2024-11-29

## 2024-12-10 ENCOUNTER — APPOINTMENT (OUTPATIENT)
Dept: VASCULAR SURGERY | Facility: CLINIC | Age: 79
End: 2024-12-10
Payer: MEDICARE

## 2024-12-10 VITALS
WEIGHT: 146 LBS | SYSTOLIC BLOOD PRESSURE: 146 MMHG | BODY MASS INDEX: 22.13 KG/M2 | HEART RATE: 68 BPM | DIASTOLIC BLOOD PRESSURE: 84 MMHG | HEIGHT: 68 IN

## 2024-12-10 DIAGNOSIS — I71.40 ABDOMINAL AORTIC ANEURYSM, WITHOUT RUPTURE, UNSPECIFIED: ICD-10-CM

## 2024-12-10 PROCEDURE — 99024 POSTOP FOLLOW-UP VISIT: CPT

## 2024-12-10 PROCEDURE — 93978 VASCULAR STUDY: CPT

## 2024-12-15 ENCOUNTER — INPATIENT (INPATIENT)
Facility: HOSPITAL | Age: 79
LOS: 3 days | Discharge: HOME | DRG: 92 | End: 2024-12-19
Attending: STUDENT IN AN ORGANIZED HEALTH CARE EDUCATION/TRAINING PROGRAM | Admitting: INTERNAL MEDICINE
Payer: MEDICARE

## 2024-12-15 VITALS
RESPIRATION RATE: 16 BRPM | HEIGHT: 67 IN | SYSTOLIC BLOOD PRESSURE: 174 MMHG | TEMPERATURE: 98 F | WEIGHT: 149.91 LBS | HEART RATE: 58 BPM | OXYGEN SATURATION: 94 % | DIASTOLIC BLOOD PRESSURE: 104 MMHG
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
## 2024-12-15 DIAGNOSIS — F32.9 MAJOR DEPRESSIVE DISORDER, SINGLE EPISODE, UNSPECIFIED: ICD-10-CM

## 2024-12-15 DIAGNOSIS — I10 ESSENTIAL (PRIMARY) HYPERTENSION: ICD-10-CM

## 2024-12-15 DIAGNOSIS — Z29.9 ENCOUNTER FOR PROPHYLACTIC MEASURES, UNSPECIFIED: ICD-10-CM

## 2024-12-15 DIAGNOSIS — D64.9 ANEMIA, UNSPECIFIED: ICD-10-CM

## 2024-12-15 DIAGNOSIS — Z91.89 OTHER SPECIFIED PERSONAL RISK FACTORS, NOT ELSEWHERE CLASSIFIED: ICD-10-CM

## 2024-12-15 DIAGNOSIS — Z98.890 OTHER SPECIFIED POSTPROCEDURAL STATES: Chronic | ICD-10-CM

## 2024-12-15 DIAGNOSIS — Z86.718 PERSONAL HISTORY OF OTHER VENOUS THROMBOSIS AND EMBOLISM: ICD-10-CM

## 2024-12-15 DIAGNOSIS — K21.9 GASTRO-ESOPHAGEAL REFLUX DISEASE WITHOUT ESOPHAGITIS: ICD-10-CM

## 2024-12-15 DIAGNOSIS — Z98.890 OTHER SPECIFIED POSTPROCEDURAL STATES: ICD-10-CM

## 2024-12-15 LAB
ADD ON TEST-SPECIMEN IN LAB: SIGNIFICANT CHANGE UP
ANION GAP SERPL CALC-SCNC: 9 MMOL/L — SIGNIFICANT CHANGE UP (ref 5–17)
APPEARANCE UR: CLEAR — SIGNIFICANT CHANGE UP
BASOPHILS # BLD AUTO: 0.06 K/UL — SIGNIFICANT CHANGE UP (ref 0–0.2)
BASOPHILS NFR BLD AUTO: 0.8 % — SIGNIFICANT CHANGE UP (ref 0–2)
BILIRUB UR-MCNC: NEGATIVE — SIGNIFICANT CHANGE UP
BUN SERPL-MCNC: 29 MG/DL — HIGH (ref 7–23)
CALCIUM SERPL-MCNC: 8.7 MG/DL — SIGNIFICANT CHANGE UP (ref 8.4–10.5)
CHLORIDE SERPL-SCNC: 100 MMOL/L — SIGNIFICANT CHANGE UP (ref 96–108)
CO2 SERPL-SCNC: 29 MMOL/L — SIGNIFICANT CHANGE UP (ref 22–31)
COLOR SPEC: YELLOW — SIGNIFICANT CHANGE UP
CREAT SERPL-MCNC: 1.15 MG/DL — SIGNIFICANT CHANGE UP (ref 0.5–1.3)
DIFF PNL FLD: ABNORMAL
EGFR: 65 ML/MIN/1.73M2 — SIGNIFICANT CHANGE UP
EOSINOPHIL # BLD AUTO: 0.56 K/UL — HIGH (ref 0–0.5)
EOSINOPHIL NFR BLD AUTO: 7.5 % — HIGH (ref 0–6)
ETHANOL SERPL-MCNC: <10 MG/DL — SIGNIFICANT CHANGE UP (ref 0–10)
GLUCOSE SERPL-MCNC: 88 MG/DL — SIGNIFICANT CHANGE UP (ref 70–99)
GLUCOSE UR QL: NEGATIVE MG/DL — SIGNIFICANT CHANGE UP
HCT VFR BLD CALC: 34.8 % — LOW (ref 39–50)
HGB BLD-MCNC: 11.1 G/DL — LOW (ref 13–17)
IMM GRANULOCYTES NFR BLD AUTO: 0.4 % — SIGNIFICANT CHANGE UP (ref 0–0.9)
KETONES UR-MCNC: NEGATIVE MG/DL — SIGNIFICANT CHANGE UP
LEUKOCYTE ESTERASE UR-ACNC: NEGATIVE — SIGNIFICANT CHANGE UP
LYMPHOCYTES # BLD AUTO: 1.8 K/UL — SIGNIFICANT CHANGE UP (ref 1–3.3)
LYMPHOCYTES # BLD AUTO: 24 % — SIGNIFICANT CHANGE UP (ref 13–44)
MCHC RBC-ENTMCNC: 27.9 PG — SIGNIFICANT CHANGE UP (ref 27–34)
MCHC RBC-ENTMCNC: 31.9 G/DL — LOW (ref 32–36)
MCV RBC AUTO: 87.4 FL — SIGNIFICANT CHANGE UP (ref 80–100)
MONOCYTES # BLD AUTO: 0.61 K/UL — SIGNIFICANT CHANGE UP (ref 0–0.9)
MONOCYTES NFR BLD AUTO: 8.1 % — SIGNIFICANT CHANGE UP (ref 2–14)
NEUTROPHILS # BLD AUTO: 4.44 K/UL — SIGNIFICANT CHANGE UP (ref 1.8–7.4)
NEUTROPHILS NFR BLD AUTO: 59.2 % — SIGNIFICANT CHANGE UP (ref 43–77)
NITRITE UR-MCNC: NEGATIVE — SIGNIFICANT CHANGE UP
NRBC # BLD: 0 /100 WBCS — SIGNIFICANT CHANGE UP (ref 0–0)
PCP SPEC-MCNC: SIGNIFICANT CHANGE UP
PH UR: 6.5 — SIGNIFICANT CHANGE UP (ref 5–8)
PLATELET # BLD AUTO: 180 K/UL — SIGNIFICANT CHANGE UP (ref 150–400)
POTASSIUM SERPL-MCNC: 3.6 MMOL/L — SIGNIFICANT CHANGE UP (ref 3.5–5.3)
POTASSIUM SERPL-SCNC: 3.6 MMOL/L — SIGNIFICANT CHANGE UP (ref 3.5–5.3)
PROT UR-MCNC: 30 MG/DL
RBC # BLD: 3.98 M/UL — LOW (ref 4.2–5.8)
RBC # FLD: 13.9 % — SIGNIFICANT CHANGE UP (ref 10.3–14.5)
SODIUM SERPL-SCNC: 138 MMOL/L — SIGNIFICANT CHANGE UP (ref 135–145)
SP GR SPEC: 1.02 — SIGNIFICANT CHANGE UP (ref 1–1.03)
UROBILINOGEN FLD QL: 0.2 MG/DL — SIGNIFICANT CHANGE UP (ref 0.2–1)
WBC # BLD: 7.5 K/UL — SIGNIFICANT CHANGE UP (ref 3.8–10.5)
WBC # FLD AUTO: 7.5 K/UL — SIGNIFICANT CHANGE UP (ref 3.8–10.5)

## 2024-12-15 PROCEDURE — 70450 CT HEAD/BRAIN W/O DYE: CPT | Mod: 26,MC

## 2024-12-15 PROCEDURE — 99285 EMERGENCY DEPT VISIT HI MDM: CPT | Mod: FS

## 2024-12-15 PROCEDURE — 93010 ELECTROCARDIOGRAM REPORT: CPT

## 2024-12-15 PROCEDURE — 99223 1ST HOSP IP/OBS HIGH 75: CPT | Mod: GC

## 2024-12-15 PROCEDURE — 72131 CT LUMBAR SPINE W/O DYE: CPT | Mod: 26,MC

## 2024-12-15 PROCEDURE — 73564 X-RAY EXAM KNEE 4 OR MORE: CPT | Mod: 26,50

## 2024-12-15 RX ORDER — ARIPIPRAZOLE 15 MG/1
1 TABLET ORAL
Refills: 0 | DISCHARGE

## 2024-12-15 RX ORDER — ENOXAPARIN SODIUM 30 MG/.3ML
40 INJECTION SUBCUTANEOUS EVERY 24 HOURS
Refills: 0 | Status: DISCONTINUED | OUTPATIENT
Start: 2024-12-15 | End: 2024-12-16

## 2024-12-15 RX ORDER — AMMONIUM LACTATE 120 MG/G
1 LOTION TOPICAL
Refills: 0 | DISCHARGE

## 2024-12-15 RX ORDER — BETAMETHASONE VALERATE 0.1 %
1 CREAM (GRAM) TOPICAL
Refills: 0 | DISCHARGE

## 2024-12-15 RX ORDER — PANTOPRAZOLE SODIUM 40 MG/1
40 TABLET, DELAYED RELEASE ORAL
Refills: 0 | Status: DISCONTINUED | OUTPATIENT
Start: 2024-12-15 | End: 2024-12-19

## 2024-12-15 RX ORDER — LIDOCAINE HCL 20 MG/ML
5 VIAL (ML) INJECTION ONCE
Refills: 0 | Status: COMPLETED | OUTPATIENT
Start: 2024-12-15 | End: 2024-12-15

## 2024-12-15 RX ORDER — CYANOCOBALAMIN/FOLIC AC/VIT B6 1-2.2-25MG
1 TABLET ORAL DAILY
Refills: 0 | Status: DISCONTINUED | OUTPATIENT
Start: 2024-12-15 | End: 2024-12-19

## 2024-12-15 RX ORDER — DULOXETINE HCL 60 MG
60 CAPSULE,DELAYED RELEASE (ENTERIC COATED) ORAL AT BEDTIME
Refills: 0 | Status: DISCONTINUED | OUTPATIENT
Start: 2024-12-15 | End: 2024-12-19

## 2024-12-15 RX ORDER — ACETAMINOPHEN, DIPHENHYDRAMINE HCL, PHENYLEPHRINE HCL 325; 25; 5 MG/1; MG/1; MG/1
3 TABLET ORAL AT BEDTIME
Refills: 0 | Status: DISCONTINUED | OUTPATIENT
Start: 2024-12-15 | End: 2024-12-19

## 2024-12-15 RX ORDER — TRIAMCINOLONE ACETONIDE 0.15 MG/G
1 AEROSOL, SPRAY TOPICAL
Refills: 0 | DISCHARGE

## 2024-12-15 RX ORDER — TADALAFIL 20 MG/1
1 TABLET ORAL
Refills: 0 | DISCHARGE

## 2024-12-15 RX ORDER — DUTASTERIDE 0.5 MG/1
1 CAPSULE, LIQUID FILLED ORAL
Refills: 0 | DISCHARGE

## 2024-12-15 RX ORDER — HYDROMORPHONE HYDROCHLORIDE 2 MG/1
4 TABLET ORAL EVERY 12 HOURS
Refills: 0 | Status: DISCONTINUED | OUTPATIENT
Start: 2024-12-15 | End: 2024-12-16

## 2024-12-15 RX ORDER — TRAMADOL HYDROCHLORIDE 300 MG/1
50 CAPSULE ORAL EVERY 12 HOURS
Refills: 0 | Status: DISCONTINUED | OUTPATIENT
Start: 2024-12-16 | End: 2024-12-19

## 2024-12-15 RX ORDER — ACETAMINOPHEN 500MG 500 MG/1
650 TABLET, COATED ORAL EVERY 6 HOURS
Refills: 0 | Status: DISCONTINUED | OUTPATIENT
Start: 2024-12-15 | End: 2024-12-19

## 2024-12-15 RX ORDER — ROSUVASTATIN CALCIUM 5 MG/1
1 TABLET, FILM COATED ORAL
Refills: 0 | DISCHARGE

## 2024-12-15 RX ORDER — LABETALOL 100 MG/1
5 TABLET, FILM COATED ORAL ONCE
Refills: 0 | Status: DISCONTINUED | OUTPATIENT
Start: 2024-12-15 | End: 2024-12-15

## 2024-12-15 RX ORDER — KETOCONAZOLE 20 MG/G
1 CREAM TOPICAL
Refills: 0 | DISCHARGE

## 2024-12-15 RX ORDER — CLOBETASOL PROPIONATE 0.05 %
1 OINTMENT (GRAM) TOPICAL
Refills: 0 | DISCHARGE

## 2024-12-15 RX ORDER — ROSUVASTATIN CALCIUM 5 MG/1
10 TABLET, FILM COATED ORAL AT BEDTIME
Refills: 0 | Status: DISCONTINUED | OUTPATIENT
Start: 2024-12-15 | End: 2024-12-19

## 2024-12-15 RX ORDER — TAMSULOSIN HYDROCHLORIDE 0.4 MG/1
1 CAPSULE ORAL
Refills: 0 | DISCHARGE

## 2024-12-15 RX ORDER — ARIPIPRAZOLE 15 MG/1
2 TABLET ORAL AT BEDTIME
Refills: 0 | Status: DISCONTINUED | OUTPATIENT
Start: 2024-12-15 | End: 2024-12-19

## 2024-12-15 RX ORDER — SENNOSIDES 8.6 MG
2 TABLET ORAL AT BEDTIME
Refills: 0 | Status: DISCONTINUED | OUTPATIENT
Start: 2024-12-15 | End: 2024-12-19

## 2024-12-15 RX ORDER — TRAMADOL HYDROCHLORIDE 300 MG/1
1 CAPSULE ORAL
Refills: 0 | DISCHARGE

## 2024-12-15 RX ORDER — OXYBUTYNIN CHLORIDE 5 MG
1 TABLET ORAL
Refills: 0 | DISCHARGE

## 2024-12-15 RX ADMIN — Medication 5 MILLILITER(S): at 20:03

## 2024-12-15 RX ADMIN — ARIPIPRAZOLE 2 MILLIGRAM(S): 15 TABLET ORAL at 23:32

## 2024-12-15 RX ADMIN — Medication 60 MILLIGRAM(S): at 23:32

## 2024-12-15 RX ADMIN — Medication 1 TABLET(S): at 23:32

## 2024-12-15 RX ADMIN — ROSUVASTATIN CALCIUM 10 MILLIGRAM(S): 5 TABLET, FILM COATED ORAL at 23:32

## 2024-12-15 RX ADMIN — Medication 81 MILLIGRAM(S): at 23:31

## 2024-12-15 RX ADMIN — Medication 2 TABLET(S): at 23:32

## 2024-12-15 RX ADMIN — HYDROMORPHONE HYDROCHLORIDE 4 MILLIGRAM(S): 2 TABLET ORAL at 18:26

## 2024-12-15 NOTE — H&P ADULT - NSHPLABSRESULTS_GEN_ALL_CORE
11.1   7.50  )-----------( 180      ( 15 Dec 2024 12:14 )             34.8       12-15    138  |  100  |  29[H]  ----------------------------<  88  3.6   |  29  |  1.15    Ca    8.7      15 Dec 2024 12:14

## 2024-12-15 NOTE — PHYSICAL THERAPY INITIAL EVALUATION ADULT - PERTINENT HX OF CURRENT PROBLEM, REHAB EVAL
80 y/o male w/ hx osteoporosis, Kyphoscoliosis, s/p c sacrum fusion at [facility] 10/2020, Depression, Bladder CA in remission (s/p resection localized chemo) Multiple Fragility fractures, Gerd, Lymphedema, Prior RUE DVT, BPH, recent infrarenal AAA s/p EVAR in 11/12/24 p/w worsening of his chronic lower back pain s/p fall this am.  Here with friend who states pt took 4mg Dilaudid at 1am, then again at 8am.  Also took Klonopin and unknown sleep medication, unsure what time.  Typically does not go to bed until 4am.  States tried to get up to go to bathroom this morning with his walker but lost balance and fell onto back.  Denies head strike, loc.  Denies asa/ ac use.  Denies recent illness, f/c, cp, sob, abd pain, n/v/d, changes in urination/ bowel habits, saddle anesthesia, numbness/tingling/weakness to ext.   Notes multiple falls in past, states last "few months ago."  Per prior dc note admitted to [facility] July 2024 for recurrent traumatic falls.  Lives home alone.

## 2024-12-15 NOTE — H&P ADULT - PROBLEM SELECTOR PLAN 6
Pt has history of chronic vertebral compression fractures and kyphoscoliosis s/p C-sacrum fusion. Pt on Tramadol 100mg ER QD and Dilaudid 4mg q4 PRN.     - Con't tramadol 100mg ER QD  - Dilaudid 4mg q12 for severe PRN  - monitor for back pain symptoms. Normocytic anemia, per previous labs, pt's baseline is Hgb 10-12, on presentation to ED pt's Hgb 11. Likely anemia of chronic disease in setting of chronic lymphedema and chronic back pain. No s/s acute bleed. Can consider iron studies however pt has normocytic anemia and less likely anemia 2/2 JODIE    - con't to trend CBC  - transfuse for Hgb <7.

## 2024-12-15 NOTE — H&P ADULT - TIME BILLING
Review of chart and imaging. Evaluation of the patient. Discussion with the care team members. Documentation.

## 2024-12-15 NOTE — H&P ADULT - PROBLEM SELECTOR PLAN 9
F:  E: replete K<3.5, Mg <2  N: regular diet  DVT: lovenox 40mg q24h  GI: omeprazole 20mg Pt on home duloxetine 60mg PO BID. Pt has clonazepam 2mg BID PRN for anxiety and ambien 5mg PRN for insomnia. Last dose klonopin 12/14 PM.     - con't home duloxetine  - hold home clonazepam 2mg BID PRN  - can offer melatonin for insomnia.

## 2024-12-15 NOTE — H&P ADULT - PROBLEM SELECTOR PLAN 5
Normocytic anemia, per previous labs, pt's baseline is Hgb 10-12, on presentation to ED pt's Hgb 11. Likely anemia of chronic disease in setting of chronic lymphedema and chronic back pain. No s/s acute bleed. Can consider iron studies however pt has normocytic anemia and less likely anemia 2/2 JODIE    - con't to trend CBC  - transfuse for Hgb <7. Pt has history of chronic vertebral compression fractures and kyphoscoliosis s/p C-sacrum fusion. Pt on Tramadol 100mg ER QD and Dilaudid 4mg q4 PRN.     - Con't tramadol 50mg bid   - holding Dilaudid 4mg for now as pt appears to be comfortable in bed  - monitor for back pain symptoms.

## 2024-12-15 NOTE — PHYSICAL THERAPY INITIAL EVALUATION ADULT - ADDITIONAL COMMENTS
Household ambulator who lives alone in 2nd floor walk up apartment (1 FOS). Ambulates with RW and also has SC present to use too. Reports being mainly independent with ADLs, however, also has someone who comes over to help with "chores" but is not a care giver. Patients friend, present at bedside, states patient has had multiple falls recently.

## 2024-12-15 NOTE — H&P ADULT - ASSESSMENT
Patient is a 80 y/o male w/ hx osteoporosis, Kyphoscoliosis, s/p c sacrum fusion at [facility] 10/2020, Depression, Bladder CA in remission (s/p resection localized chemo) Multiple Fragility fractures, Gerd, Lymphedema, Prior RUE DVT, BPH, recent infrarenal AAA s/p EVAR in 11/12/24 p/w worsening of his chronic lower back pain s/p fall admitted for further workup and management of mechanical fall pending MCKENNA.

## 2024-12-15 NOTE — PHYSICAL THERAPY INITIAL EVALUATION ADULT - TRANSFER SAFETY CONCERNS NOTED: SIT/STAND, REHAB EVAL
Demos poor standing tolerance with flexed forward posture. Poor eccentric control during descend/decreased weight-shifting ability

## 2024-12-15 NOTE — H&P ADULT - ATTENDING COMMENTS
Patient is a 80 y/o male w/ hx osteoporosis, Kyphoscoliosis, s/p c sacrum fusion at [facility] 10/2020, Depression, Bladder CA in remission (s/p resection localized chemo) Multiple Fragility fractures, Gerd, Lymphedema, Prior RUE DVT, BPH, recent infrarenal AAA s/p EVAR in 11/12/24 p/w worsening of his chronic lower back pain s/p fall admitted for further workup and management of mechanical fall pending MCKENNA.     Plan  -mechanical fall at home; no LOC; will f/u orthostatics  -f/u vitamin D levels in AM  -fall precautions, PT consult  -c/w PPI Patient is a 78 y/o male w/ hx osteoporosis, Kyphoscoliosis, s/p c sacrum fusion at [facility] 10/2020, Depression, Bladder CA in remission (s/p resection localized chemo) Multiple Fragility fractures, Gerd, Lymphedema, Prior RUE DVT, BPH, recent infrarenal AAA s/p EVAR in 11/12/24 p/w worsening of his chronic lower back pain s/p fall admitted for further workup and management of mechanical fall pending MCKENNA.     Plan  -mechanical fall at home; no LOC; will f/u orthostatics  -f/u vitamin D levels in AM  -fall precautions, PT consult  -c/w PPI  -caution with opioids as patient with significant home opioid use likely contributing to his falls and gait instability  -iStop in AM Patient is a 80 y/o male w/ hx osteoporosis, Kyphoscoliosis, s/p c sacrum fusion at [facility] 10/2020, Depression, Bladder CA in remission (s/p resection localized chemo) Multiple Fragility fractures, Gerd, Lymphedema, Prior RUE DVT, BPH, recent infrarenal AAA s/p EVAR in 11/12/24 p/w worsening of his chronic lower back pain s/p fall admitted for further workup and management of mechanical fall pending MCKENNA.     Plan  -mechanical fall at home; no LOC; will f/u orthostatics  -f/u vitamin D levels in AM  -fall precautions, PT consult  -c/w PPI  -caution with opioids as patient with significant home opioid use likely contributing to his falls and gait instability  -iStop in AM  -bladder scan to r/o retention in the setting of hypertension

## 2024-12-15 NOTE — ED PROVIDER NOTE - ATTENDING APP SHARED VISIT CONTRIBUTION OF CARE
80 y/o male w/ hx osteoporosis, Kyphoscoliosis, s/p c sacrum fusion at [facility] 10/2020, Depression, Bladder CA in remission (s/p resection localized chemo) Multiple Fragility fractures, Gerd, Lymphedema, Prior RUE DVT, BPH, recent infrarenal AAA s/p EVAR in 11/12/24 p/w worsening of his chronic lower back pain s/p fall this am.  Here with friend who states pt took 4mg Dilaudid at 1am, then again at 8am.  Also took Klonopin and unknown sleep medication, unsure what time.  Typically does not go to bed until 4am.  States tried to get up to go to bathroom this morning with his walker but lost balance and fell onto back.  Denies head strike, loc.  Denies asa/ ac use.  Denies recent illness, f/c, cp, sob, abd pain, n/v/d, changes in urination/ bowel habits, saddle anesthesia, numbness/tingling/weakness to ext.   Notes multiple falls in past, states last "few months ago."  Per prior dc note admitted to [facility] July 2024 for recurrent traumatic falls.  Lives home alone.  VS notable for /104, HR 58, otherwise wnl  Exam with +ttp L paralumbar region.  Otherwise reassuring  No red flags suggestive of acute cord compression, cauda equina, spinal abscess  Suspect likely exacerbation of chronic pain vs acute fx  C/f polypharmacy contributing to fall  Will check basics, ekg, cth and ct ls spine  SW and PT eval    Addendum to attending statement: I have reviewed the ACP note and agree with the history, exam, and plan of care. I  was available to PA   as a supervising provider if needed. PA given opportunity to ask questions and request further evaluation / care.    Pt well known to me from prior visits  Hx of multiple narcotics and aids to sleep and multiple falls in the past - now here with back pain in similar setting  no acute findings on imaging  Seen by PT in ED and recommended for admission secondary to unsafe discharge  Discussed at length with patient and friend at bedside

## 2024-12-15 NOTE — H&P ADULT - PROBLEM SELECTOR PLAN 1
Pt presenting with fall at home. Likely weakness related vs coordination.   Unlikely to have syncopal event as pt denies LOC. Unlikely seizure as no incontinence, no post-ictal state. Unlikely cardiac etiology as pt denies palpitations, chest pain, EKG similar to prior  PT evaluation in ED, recommended MCKENNA    - admit for MCKENNA placement   - fall precautions Pt presenting with fall at home. Likely weakness related vs coordination.   Unlikely to have syncopal event as pt denies LOC. Unlikely seizure as no incontinence, no post-ictal state. Unlikely cardiac etiology as pt denies palpitations, chest pain, EKG similar to prior  PT evaluation in ED, recommended MCKENNA    - admit for MCKENNA placement   - fall precautions  - f/u orthostatic vitals

## 2024-12-15 NOTE — H&P ADULT - NSHPPHYSICALEXAM_GEN_ALL_CORE
Vital Signs Last 24 Hrs  T(C): 36.2 (15 Dec 2024 14:41), Max: 36.7 (15 Dec 2024 10:04)  T(F): 97.2 (15 Dec 2024 14:41), Max: 98 (15 Dec 2024 10:04)  HR: 60 (15 Dec 2024 14:41) (58 - 60)  BP: 179/96 (15 Dec 2024 14:41) (174/104 - 179/96)  BP(mean): --  RR: 16 (15 Dec 2024 14:41) (16 - 16)  SpO2: 94% (15 Dec 2024 14:41) (94% - 94%)    Parameters below as of 15 Dec 2024 14:41  Patient On (Oxygen Delivery Method): room air    Appearance: NAD  HEENT: NC/AT. Extra ocular movements intact; PERRLA; dry mucus membrane  Respiratory: CTA b/l, no wheezing, no crackles  Cardiovascular: RRR, normal S1/S2, no m/r/g  Abdomen: Abdomen soft; no distension; +slight ttp diffusely, no guarding  Genitourinary: No costovertebral angle tenderness.  Skeletal Spine: No vertebral tenderness; No scoliosis  Back: +bruise of R paraspinal muscle near level of T10-T12. +ttp of left posterior hip  Extremities: 4/5 strength b/l LE extremities, no LE edema, pulses palpable b/l DP   Neurology: A&Ox3, slow rate of speech, hard of hearing. sensation intact and symmetric b/l. CN2-12 intact.   Skin: scattered ecchymoses

## 2024-12-15 NOTE — PHYSICAL THERAPY INITIAL EVALUATION ADULT - GENERAL OBSERVATIONS, REHAB EVAL
PT IE completed. Patient received semi supine in bed +heplock IV, NAD, willing to work with PT. Cleared for PT by ED MD. PT IE completed. Patient received semi supine in bed +heplock IV, NAD, willing to work with PT. Cleared for PT by ED MD. Tolerated session fair, overall. Patient presents with impaired strength and overall deconditioning throughout PT session. Demos unsteady gait with impaired weight shifting ability during short ambulation trial at bedside. No LOB observed. Recommend continued skilled PT to progress overall strength and progress ambulation distance. Patient returned semi supine in bed +Call bell, NAD, all lines intact, +RN notified.

## 2024-12-15 NOTE — ED PROVIDER NOTE - OBJECTIVE STATEMENT
80 y/o male w/ hx osteoporosis, Kyphoscoliosis, s/p c sacrum fusion at [facility] 10/2020, Depression, Bladder CA in remission (s/p resection localized chemo) Multiple Fragility fractures, Gerd, Lymphedema, Prior RUE DVT, BPH, recent infrarenal AAA s/p EVAR in 11/12/24 p/w worsening of his chronic lower back pain s/p fall this am.  Here with friend who states pt took 4mg Dilaudid at 1am, then again at 8am.  Also took Klonopin and unknown sleep medication, unsure what time.  Typically does not go to bed until 4am.  States tried to get up to go to bathroom this morning with his walker but lost balance and fell onto back.  Denies head strike, loc.  Denies asa/ ac use.  Denies recent illness, f/c, cp, sob, abd pain, n/v/d, changes in urination/ bowel habits, saddle anesthesia, numbness/tingling/weakness to ext.   Notes multiple falls in past, states last "few months ago."  Per prior dc note admitted to [facility] July 2024 for recurrent traumatic falls.  Lives home alone.

## 2024-12-15 NOTE — ED ADULT TRIAGE NOTE - CHIEF COMPLAINT QUOTE
BIBEMS from home for mechanical fall. Was ambulating with a walker when lost balance and fell backwards. Was able to get up on own. denies head strike, LOC, use of blood thinners. Reporting 9/10 back pain. hx of chronic back pain and surgery on back. took 8mg of dilaudid at home.

## 2024-12-15 NOTE — ED PROVIDER NOTE - NS ED ROS FT
CONSTITUTIONAL: Denies fever and chills    RESPIRATORY: Denies SOB     CARDIOVASCULAR: Denies palpitations and chest pain.    GASTROINTESTINAL: Denies abdominal pain, nausea, vomiting and diarrhea.    GENITOURINARY: Denies dysuria and hematuria.    MUSCULOSKELETAL: See HPI

## 2024-12-15 NOTE — PHYSICAL THERAPY INITIAL EVALUATION ADULT - GAIT DEVIATIONS NOTED, PT EVAL
Unsteady gait/decreased juve/increased time in double stance/decreased velocity of limb motion/decreased step length/decreased stride length/decreased weight-shifting ability

## 2024-12-15 NOTE — ED PROVIDER NOTE - PHYSICAL EXAMINATION
CONSTITUTIONAL: Awake, alert.  Nontoxic, no acute distress.  Falling asleep during exam     HEAD: Normocephalic, atraumatic.    EYES: Conjunctivae clear without exudates or hemorrhage. Sclera is non-icteric. PERRL.    ENT: Normal appearing external ears, nose, mucous membranes moist.    NECK: supple, trachea midline.    HEART:  Normal rate, regular rhythm.  Heart sounds S1, S2.  No murmurs, rubs or gallops.    LUNGS:  No acute respiratory distress.  Non-tachypneic and non-labored.  Lungs are clear bilaterally with good aeration.  No wheezing, rales, rhonchi.    ABDOMEN: Normal appearing skin without lesions, rashes.  Normal bowel sounds x 4.  Soft, non-distended, non-tender in all four quadrants. No rebound or guarding. No hernias or masses palpable.  No pulsatile abdominal mass.   No CVA tenderness b/l.    MUSCULOSKELETAL:  Normal appearing extremities without obvious deformity, rash, erythema.  No swelling.  Warm. No focal tenderness.  FROM b/l upper and lower extremities.  5/5 strength b/l upper and lower ext.  Sensation and motor function grossly intact.  Strong equal peripheral pulses b/l.   Cap refill < 2     BACK: No obvious deformity.  +ttp along L lumbar region.  No obvious midline ttp. Neg straight leg raise.    SKIN: Skin in warm, dry and intact without rashes or lesions.  Appropriate color for ethnicity.    NEUROLOGICAL:  Patient is alert, oriented x person, place and time.  Face symmetric.  PERRL.  Moving all ext.    PSYCH: Appropriate mood and affect. Good judgment and insight.

## 2024-12-15 NOTE — ED ADULT TRIAGE NOTE - BP NONINVASIVE DIASTOLIC (MM HG)
Review of Systems - History obtained from the patient  General ROS: negative  Psychological ROS: negative  Ophthalmic ROS: negative  ENT ROS: negative  Hematological and Lymphatic ROS: negative  Respiratory ROS: negative  Cardiovascular ROS: negative  Gastrointestinal ROS: negative  Genito-Urinary ROS: negative  Musculoskeletal ROS: negative  Neurological ROS: negative  Dermatological ROS: negative     Patient has questions about recent study he had participated in.      104

## 2024-12-15 NOTE — ED ADULT NURSE NOTE - NSFALLHARMRISKINTERV_ED_ALL_ED

## 2024-12-15 NOTE — H&P ADULT - PROBLEM SELECTOR PLAN 4
Prior RUE DVT, provoked, completed treatment    - dvt ppx hx of AAA, s/p EVAR in 11/2024    - no active intervention

## 2024-12-15 NOTE — H&P ADULT - PROBLEM SELECTOR PLAN 3
hx of AAA, s/p EVAR in 11/2024    - no active intervention Pt with multiple fragility fractures in past. Follows with endocrine outpatient    - vit D level in AM  - c/w endocrine f/u

## 2024-12-15 NOTE — H&P ADULT - PROBLEM SELECTOR PLAN 7
Pt has prescription for omeprazole 20mg QD, however per pt's pharmacy pt has not filled prescription since May 2024    - con't omeprazole. Prior RUE DVT, provoked, completed treatment    - dvt ppx

## 2024-12-15 NOTE — H&P ADULT - PROBLEM SELECTOR PLAN 8
Pt on home duloxetine 60mg PO BID. Pt has clonazepam 2mg BID PRN for anxiety and ambien 5mg PRN for insomnia. Pt was lethargic during interview and examination, pt endorses taking ambien last night. PT endorses taking clonazepam BID, last dose was last night. On exam pt had b/l hand tremors with movement, however pt denies anxiety, no diaphoresis, no tachycardia, no nausea/vomiting, headache, afebrile.     - con't home duloxetine  - con't home clonazepam 2mg BID PRN  - hold ambien, can offer melatonin for insomnia. Pt has prescription for omeprazole 20mg QD, however per pt's pharmacy pt has not filled prescription since May 2024    - con't omeprazole.

## 2024-12-15 NOTE — H&P ADULT - HISTORY OF PRESENT ILLNESS
Patient is a 80 y/o male w/ hx osteoporosis, Kyphoscoliosis, s/p c sacrum fusion at [facility] 10/2020, Depression, Bladder CA in remission (s/p resection localized chemo) Multiple Fragility fractures, Gerd, Lymphedema, Prior RUE DVT, BPH, recent infrarenal AAA s/p EVAR in 11/12/24 p/w worsening of his chronic lower back pain s/p fall this am.  Patient states that he fell this morning when getting out of bed to go to the bathroom. Pt states he stood up, grabbed his walker and then felt unsteady in his knees and fell backwards. Pt states he fell backwards and landed on his hip/back. Friend at bedside states that the patient had told her that he hit his head after he hit his back. Pt reports that his knees hurt and that the pain went away shortly before his knees became unsteady although denies other prodromal symptoms -- denies headache, lightheadedness, dizziness, vertigo. Pt denies loss of consciousness or post-ictal confusion. Pt denies recent illness, fevers, chills, chest pain, palpitations, abdominal pain, constipation/diarrhea, n/v/, urinary symptoms.    As per ED provider note: Friend additionally states that the patient took 4mg Dilaudid at 1am, then again at 8am.  Also took Klonopin and unknown sleep medication, unsure what time.  Typically does not go to bed until 4am.      Of note, patient previously admitted for falls, most recently 7/2024.    Social: lives alone, has neighbors who check up on him. Ambulated with walker at baseline. Denies alcohol use, denies drug use. Former smoker, quit 1990, prev ~30 years smoking    ED:   Vitals: T98F, HR58, /104 -> 179/96, RR16 @ 94% on RA  EKG: sinus bradycardia, OR 156ms, QTc 474ms, similar to prior EKGs  Labs: WBC7.5 (mild eosinophilia, 0.56, 7.5%), Hgb11.1, MCV 87.4, Plt 180, Na 138, K3.6, Cl 100, HCO3 29, BUN29, Cr 1.15 Gluc 88; UA +18 RBC, 30 prot  Imaging: CTH- chronic microvascular changes, no acute pathology  CT L-spine: chronic T12 compression fracture  XR B/L Knee: Mild medial joint space narrowing on the left. Small ossicle at the tibial tuberosity on the left. No fracture or joint effusion bilaterally.  Consult: PT -> MCKENNA

## 2024-12-16 LAB
24R-OH-CALCIDIOL SERPL-MCNC: 30.9 NG/ML — SIGNIFICANT CHANGE UP (ref 30–80)
ALBUMIN SERPL ELPH-MCNC: 3.7 G/DL — SIGNIFICANT CHANGE UP (ref 3.3–5)
ALP SERPL-CCNC: 94 U/L — SIGNIFICANT CHANGE UP (ref 40–120)
ALT FLD-CCNC: 9 U/L — LOW (ref 10–45)
ANION GAP SERPL CALC-SCNC: 12 MMOL/L — SIGNIFICANT CHANGE UP (ref 5–17)
AST SERPL-CCNC: 21 U/L — SIGNIFICANT CHANGE UP (ref 10–40)
BASOPHILS # BLD AUTO: 0.05 K/UL — SIGNIFICANT CHANGE UP (ref 0–0.2)
BASOPHILS NFR BLD AUTO: 0.7 % — SIGNIFICANT CHANGE UP (ref 0–2)
BILIRUB SERPL-MCNC: 0.5 MG/DL — SIGNIFICANT CHANGE UP (ref 0.2–1.2)
BUN SERPL-MCNC: 18 MG/DL — SIGNIFICANT CHANGE UP (ref 7–23)
CALCIUM SERPL-MCNC: 8.7 MG/DL — SIGNIFICANT CHANGE UP (ref 8.4–10.5)
CHLORIDE SERPL-SCNC: 100 MMOL/L — SIGNIFICANT CHANGE UP (ref 96–108)
CO2 SERPL-SCNC: 25 MMOL/L — SIGNIFICANT CHANGE UP (ref 22–31)
CREAT SERPL-MCNC: 1.04 MG/DL — SIGNIFICANT CHANGE UP (ref 0.5–1.3)
EGFR: 73 ML/MIN/1.73M2 — SIGNIFICANT CHANGE UP
EOSINOPHIL # BLD AUTO: 0.43 K/UL — SIGNIFICANT CHANGE UP (ref 0–0.5)
EOSINOPHIL NFR BLD AUTO: 6.1 % — HIGH (ref 0–6)
GLUCOSE SERPL-MCNC: 104 MG/DL — HIGH (ref 70–99)
HCT VFR BLD CALC: 38.1 % — LOW (ref 39–50)
HGB BLD-MCNC: 12.7 G/DL — LOW (ref 13–17)
IMM GRANULOCYTES NFR BLD AUTO: 0.1 % — SIGNIFICANT CHANGE UP (ref 0–0.9)
LYMPHOCYTES # BLD AUTO: 2.5 K/UL — SIGNIFICANT CHANGE UP (ref 1–3.3)
LYMPHOCYTES # BLD AUTO: 35.3 % — SIGNIFICANT CHANGE UP (ref 13–44)
MAGNESIUM SERPL-MCNC: 2 MG/DL — SIGNIFICANT CHANGE UP (ref 1.6–2.6)
MCHC RBC-ENTMCNC: 28.9 PG — SIGNIFICANT CHANGE UP (ref 27–34)
MCHC RBC-ENTMCNC: 33.3 G/DL — SIGNIFICANT CHANGE UP (ref 32–36)
MCV RBC AUTO: 86.6 FL — SIGNIFICANT CHANGE UP (ref 80–100)
MONOCYTES # BLD AUTO: 0.52 K/UL — SIGNIFICANT CHANGE UP (ref 0–0.9)
MONOCYTES NFR BLD AUTO: 7.3 % — SIGNIFICANT CHANGE UP (ref 2–14)
NEUTROPHILS # BLD AUTO: 3.57 K/UL — SIGNIFICANT CHANGE UP (ref 1.8–7.4)
NEUTROPHILS NFR BLD AUTO: 50.5 % — SIGNIFICANT CHANGE UP (ref 43–77)
NRBC # BLD: 0 /100 WBCS — SIGNIFICANT CHANGE UP (ref 0–0)
PHOSPHATE SERPL-MCNC: 2.5 MG/DL — SIGNIFICANT CHANGE UP (ref 2.5–4.5)
PLATELET # BLD AUTO: 191 K/UL — SIGNIFICANT CHANGE UP (ref 150–400)
POTASSIUM SERPL-MCNC: 3.2 MMOL/L — LOW (ref 3.5–5.3)
POTASSIUM SERPL-SCNC: 3.2 MMOL/L — LOW (ref 3.5–5.3)
PROT SERPL-MCNC: 7.5 G/DL — SIGNIFICANT CHANGE UP (ref 6–8.3)
RBC # BLD: 4.4 M/UL — SIGNIFICANT CHANGE UP (ref 4.2–5.8)
RBC # FLD: 13.8 % — SIGNIFICANT CHANGE UP (ref 10.3–14.5)
SODIUM SERPL-SCNC: 137 MMOL/L — SIGNIFICANT CHANGE UP (ref 135–145)
WBC # BLD: 7.08 K/UL — SIGNIFICANT CHANGE UP (ref 3.8–10.5)
WBC # FLD AUTO: 7.08 K/UL — SIGNIFICANT CHANGE UP (ref 3.8–10.5)

## 2024-12-16 PROCEDURE — 99233 SBSQ HOSP IP/OBS HIGH 50: CPT | Mod: GC

## 2024-12-16 RX ORDER — HYDRALAZINE HYDROCHLORIDE 10 MG/1
10 TABLET ORAL ONCE
Refills: 0 | Status: DISCONTINUED | OUTPATIENT
Start: 2024-12-16 | End: 2024-12-16

## 2024-12-16 RX ORDER — INFLUENZA VIRUS VACCINE 15; 15; 15; 15 UG/.5ML; UG/.5ML; UG/.5ML; UG/.5ML
0.5 SUSPENSION INTRAMUSCULAR ONCE
Refills: 0 | Status: DISCONTINUED | OUTPATIENT
Start: 2024-12-16 | End: 2024-12-19

## 2024-12-16 RX ORDER — ENOXAPARIN SODIUM 30 MG/.3ML
40 INJECTION SUBCUTANEOUS EVERY 24 HOURS
Refills: 0 | Status: DISCONTINUED | OUTPATIENT
Start: 2024-12-16 | End: 2024-12-19

## 2024-12-16 RX ORDER — TAMSULOSIN HYDROCHLORIDE 0.4 MG/1
0.4 CAPSULE ORAL AT BEDTIME
Refills: 0 | Status: DISCONTINUED | OUTPATIENT
Start: 2024-12-16 | End: 2024-12-19

## 2024-12-16 RX ORDER — TRIAMCINOLONE ACETONIDE 0.15 MG/G
1 AEROSOL, SPRAY TOPICAL
Refills: 0 | Status: DISCONTINUED | OUTPATIENT
Start: 2024-12-16 | End: 2024-12-17

## 2024-12-16 RX ORDER — KETOROLAC TROMETHAMINE 30 MG/ML
7.5 INJECTION INTRAMUSCULAR; INTRAVENOUS ONCE
Refills: 0 | Status: DISCONTINUED | OUTPATIENT
Start: 2024-12-16 | End: 2024-12-16

## 2024-12-16 RX ORDER — HYDRALAZINE HYDROCHLORIDE 10 MG/1
10 TABLET ORAL ONCE
Refills: 0 | Status: COMPLETED | OUTPATIENT
Start: 2024-12-16 | End: 2024-12-16

## 2024-12-16 RX ORDER — NIFEDIPINE 10 MG
30 CAPSULE ORAL EVERY 24 HOURS
Refills: 0 | Status: DISCONTINUED | OUTPATIENT
Start: 2024-12-16 | End: 2024-12-17

## 2024-12-16 RX ORDER — HYDROMORPHONE HYDROCHLORIDE 2 MG/1
4 TABLET ORAL EVERY 4 HOURS
Refills: 0 | Status: DISCONTINUED | OUTPATIENT
Start: 2024-12-16 | End: 2024-12-19

## 2024-12-16 RX ORDER — SODIUM,POTASSIUM PHOSPHATES 278-250MG
2 POWDER IN PACKET (EA) ORAL ONCE
Refills: 0 | Status: COMPLETED | OUTPATIENT
Start: 2024-12-16 | End: 2024-12-16

## 2024-12-16 RX ORDER — HYDROMORPHONE HYDROCHLORIDE 2 MG/1
4 TABLET ORAL ONCE
Refills: 0 | Status: DISCONTINUED | OUTPATIENT
Start: 2024-12-16 | End: 2024-12-16

## 2024-12-16 RX ORDER — AMMONIUM LACTATE 120 MG/G
1 LOTION TOPICAL
Refills: 0 | Status: DISCONTINUED | OUTPATIENT
Start: 2024-12-16 | End: 2024-12-17

## 2024-12-16 RX ORDER — ONDANSETRON HYDROCHLORIDE 4 MG/1
4 TABLET, FILM COATED ORAL ONCE
Refills: 0 | Status: COMPLETED | OUTPATIENT
Start: 2024-12-16 | End: 2024-12-16

## 2024-12-16 RX ORDER — POLYETHYLENE GLYCOL 3350 17 G/17G
17 POWDER, FOR SOLUTION ORAL
Refills: 0 | Status: DISCONTINUED | OUTPATIENT
Start: 2024-12-16 | End: 2024-12-19

## 2024-12-16 RX ORDER — POTASSIUM CHLORIDE 600 MG/1
40 TABLET, EXTENDED RELEASE ORAL ONCE
Refills: 0 | Status: COMPLETED | OUTPATIENT
Start: 2024-12-16 | End: 2024-12-16

## 2024-12-16 RX ADMIN — ROSUVASTATIN CALCIUM 10 MILLIGRAM(S): 5 TABLET, FILM COATED ORAL at 21:29

## 2024-12-16 RX ADMIN — KETOROLAC TROMETHAMINE 7.5 MILLIGRAM(S): 30 INJECTION INTRAMUSCULAR; INTRAVENOUS at 12:17

## 2024-12-16 RX ADMIN — Medication 60 MILLIGRAM(S): at 21:29

## 2024-12-16 RX ADMIN — Medication 81 MILLIGRAM(S): at 12:44

## 2024-12-16 RX ADMIN — HYDROMORPHONE HYDROCHLORIDE 4 MILLIGRAM(S): 2 TABLET ORAL at 23:30

## 2024-12-16 RX ADMIN — HYDRALAZINE HYDROCHLORIDE 10 MILLIGRAM(S): 10 TABLET ORAL at 04:40

## 2024-12-16 RX ADMIN — ARIPIPRAZOLE 2 MILLIGRAM(S): 15 TABLET ORAL at 21:27

## 2024-12-16 RX ADMIN — HYDROMORPHONE HYDROCHLORIDE 4 MILLIGRAM(S): 2 TABLET ORAL at 00:33

## 2024-12-16 RX ADMIN — TRAMADOL HYDROCHLORIDE 50 MILLIGRAM(S): 300 CAPSULE ORAL at 07:53

## 2024-12-16 RX ADMIN — PANTOPRAZOLE SODIUM 40 MILLIGRAM(S): 40 TABLET, DELAYED RELEASE ORAL at 06:11

## 2024-12-16 RX ADMIN — Medication 30 MILLIGRAM(S): at 08:03

## 2024-12-16 RX ADMIN — HYDROMORPHONE HYDROCHLORIDE 4 MILLIGRAM(S): 2 TABLET ORAL at 07:53

## 2024-12-16 RX ADMIN — Medication 5 MILLIGRAM(S): at 10:56

## 2024-12-16 RX ADMIN — Medication 1 TABLET(S): at 12:44

## 2024-12-16 RX ADMIN — ONDANSETRON HYDROCHLORIDE 4 MILLIGRAM(S): 4 TABLET, FILM COATED ORAL at 18:05

## 2024-12-16 RX ADMIN — POLYETHYLENE GLYCOL 3350 17 GRAM(S): 17 POWDER, FOR SOLUTION ORAL at 18:05

## 2024-12-16 RX ADMIN — HYDROMORPHONE HYDROCHLORIDE 4 MILLIGRAM(S): 2 TABLET ORAL at 06:27

## 2024-12-16 RX ADMIN — Medication 2 TABLET(S): at 10:57

## 2024-12-16 RX ADMIN — KETOROLAC TROMETHAMINE 7.5 MILLIGRAM(S): 30 INJECTION INTRAMUSCULAR; INTRAVENOUS at 13:15

## 2024-12-16 RX ADMIN — POTASSIUM CHLORIDE 40 MILLIEQUIVALENT(S): 600 TABLET, EXTENDED RELEASE ORAL at 10:56

## 2024-12-16 RX ADMIN — ENOXAPARIN SODIUM 40 MILLIGRAM(S): 30 INJECTION SUBCUTANEOUS at 10:56

## 2024-12-16 RX ADMIN — HYDROMORPHONE HYDROCHLORIDE 4 MILLIGRAM(S): 2 TABLET ORAL at 01:16

## 2024-12-16 RX ADMIN — TAMSULOSIN HYDROCHLORIDE 0.4 MILLIGRAM(S): 0.4 CAPSULE ORAL at 21:27

## 2024-12-16 RX ADMIN — TRAMADOL HYDROCHLORIDE 50 MILLIGRAM(S): 300 CAPSULE ORAL at 06:11

## 2024-12-16 RX ADMIN — HYDRALAZINE HYDROCHLORIDE 10 MILLIGRAM(S): 10 TABLET ORAL at 00:17

## 2024-12-16 RX ADMIN — HYDROMORPHONE HYDROCHLORIDE 4 MILLIGRAM(S): 2 TABLET ORAL at 18:05

## 2024-12-16 RX ADMIN — HYDROMORPHONE HYDROCHLORIDE 4 MILLIGRAM(S): 2 TABLET ORAL at 22:41

## 2024-12-16 NOTE — CHART NOTE - NSCHARTNOTEFT_GEN_A_CORE
Attempted to reach patient's first choice HCP (in scanned documents from 2023) Dr. Osvaldo Blair (cardiologist in [place]). Unable to reach after 3 calls.    Called alternate HCP agent Calvin Zaragoza ( [phone]) and discussed the advanced care planning for the patient Constantin. Per Calvin, the patient designated Calvin and Osvaldo as the HCPs last year. Calvin is additionally designated as the executor of the patient's will. Calvin normally lives in [place] but is currently in [place] on vacation. Calvin states that he is not familiar to his knowledge that Anna (friend who brought patient in) is involved in patient's health care as a health care proxy. Calvin clarifying that he and Osvaldo are the designated HCPs.     Calvin states that the patient's daughter Nayely ([phone]) can be called for further information regarding his care as she may be more aware of what has been going on with the patient recently. Nayely currently living in [place].

## 2024-12-16 NOTE — PROGRESS NOTE ADULT - ASSESSMENT
Patient is a 80 y/o male w/ hx osteoporosis, Kyphoscoliosis, s/p c sacrum fusion at [facility] 10/2020, Depression, Bladder CA in remission (s/p resection localized chemo) Multiple Fragility fractures, Gerd, Lymphedema, Prior RUE DVT, BPH, recent infrarenal AAA s/p EVAR in 11/12/24 p/w worsening of his chronic lower back pain s/p fall admitted for further workup and management of mechanical fall pending MCKENNA.  Patient is a 80 y/o male w/ hx osteoporosis, Kyphoscoliosis, s/p c sacrum fusion at [facility] 10/2020, Depression, Bladder CA in remission (s/p resection localized chemo) Multiple Fragility fractures, Gerd, Lymphedema, Prior RUE DVT, BPH, recent infrarenal AAA s/p EVAR in 11/12/24 p/w worsening of his chronic lower back pain s/p fall admitted for further workup and management of mechanical fall pending MCKENNA ccb hypertensive emergency.

## 2024-12-16 NOTE — OCCUPATIONAL THERAPY INITIAL EVALUATION ADULT - PERTINENT HX OF CURRENT PROBLEM, REHAB EVAL
Patient is a 80 y/o male w/ hx osteoporosis, Kyphoscoliosis, s/p c sacrum fusion at [facility] 10/2020, Depression, Bladder CA in remission (s/p resection localized chemo) Multiple Fragility fractures, Gerd, Lymphedema, Prior RUE DVT, BPH, recent infrarenal AAA s/p EVAR in 11/12/24 p/w worsening of his chronic lower back pain s/p fall this am.  Patient states that he fell this morning when getting out of bed to go to the bathroom. Pt states he stood up, grabbed his walker and then felt unsteady in his knees and fell backwards. Pt states he fell backwards and landed on his hip/back. Friend at bedside states that the patient had told her that he hit his head after he hit his back. Pt reports that his knees hurt and that the pain went away shortly before his knees became unsteady although denies other prodromal symptoms -- denies headache, lightheadedness, dizziness, vertigo. Pt denies loss of consciousness or post-ictal confusion. Pt denies recent illness, fevers, chills, chest pain, palpitations, abdominal pain, constipation/diarrhea, n/v/, urinary symptoms.    As per ED provider note: Friend additionally states that the patient took 4mg Dilaudid at 1am, then again at 8am.  Also took Klonopin and unknown sleep medication, unsure what time.  Typically does not go to bed until 4am.      Of note, patient previously admitted for falls, most recently 7/2024.

## 2024-12-16 NOTE — OCCUPATIONAL THERAPY INITIAL EVALUATION ADULT - GENERAL OBSERVATIONS, REHAB EVAL
Pt received semi-supine in bed, +heplock, NAD, and agreeable to OT. Cleared by covering RN Marylin to see.

## 2024-12-16 NOTE — PROGRESS NOTE ADULT - PROBLEM SELECTOR PLAN 2
Pt with hypertension to 180s/70s on admission, possibly iso pain vs recent renal artery stenosis. Pt with "splitting headache" although CTH negative for pathology; headache opioid withdrawal vs hypertensive emergency. Will reassess headache after pt receives dilaudid (takes q4h at home, headache began 8 hours after last dilaudid dose)  Pt with bladder scan 700cc, HTN likely due to retention    - consider starting nifedipine 30mg daily if needed for continued BP control  - bladder scan for urinary retention -> SC 800cc  - q6h bladder scans, SC>450cc  -if remains hypertensive despite SC, consider alternative causes (eg renal artery stenosis given recent R renal artery stent, poss vascular consult) vs pain Pt with hypertension to 180s/70s on admission, unclear etiology. Pt with "splitting headache" although CTH negative for pathology; headache opioid withdrawal vs hypertensive emergency. Will reassess headache after pt receives dilaudid (takes q4h at home, headache began 8 hours after last dilaudid dose)  Pt with bladder scan 700cc, HTN possibly due to retention although did not completely s/p straight cath (180s -> 160s).    Likely hypertensive emergency given headache, nausea. Pt with no other signs of end-organ perfusion deficiencies (no transaminitis, new renal dysfunction, chest pain, retinopathy).       - starting nifedipine 30mg daily, uptitrate as necessary  - bladder scan for urinary retention -> SC 800cc 12/15  - q6h bladder scans, SC>450cc

## 2024-12-16 NOTE — CONSULT NOTE ADULT - SUBJECTIVE AND OBJECTIVE BOX
78yo Male pt with PMH osteoporosis, kyphoscoliosis, s/p sacrum fusion at [facility] (10/2020), depression bladder CA (in remission s/p resection w/ localized chemi), multiple fragility fx, GERD, lymphedema, prior RUE DVT, BPH, s/p EVAR w/ RT renal stent placement ([facility], 11/2024), no complications, discharged. Re-admitted on 12/14/24 for mechanical fall, c/b new onset hypertension to SBPs 170s-180s, headache, CTH negative, vascular surgery consulted for hypertension in the setting of prior EVAR.    PMH: osteoporosis, kyphoscoliosis, dperession, bladder CA (in remisison, s/p resection w/ localized chemi), multiple fragility fx, GERD, lymphedema, prior RUE DVT, BPH,  PSHx: EVAR w/ RT renal stent placement,  sacrum fusion at [facility] (10/2020)  Allergies: Sulfa drugs    T(C): 37.1 (12-16-24 @ 05:13), Max: 37.1 (12-16-24 @ 05:13)  HR: 51 (12-16-24 @ 05:50) (51 - 76)  BP: 170/78 (12-16-24 @ 05:50) (168/87 - 191/85)  RR: 18 (12-16-24 @ 05:13) (16 - 18)  SpO2: 93% (12-16-24 @ 05:13) (93% - 98%)        LABS:                        12.7   7.08  )-----------( 191      ( 16 Dec 2024 05:30 )             38.1     12-15    138  |  100  |  29[H]  ----------------------------<  88  3.6   |  29  |  1.15    Ca    8.7      15 Dec 2024 12:14   78yo Male pt with PMH osteoporosis, kyphoscoliosis, s/p sacrum fusion at [facility] (10/2020), depression bladder CA (in remission s/p resection w/ localized chemi), multiple fragility fx, GERD, lymphedema, prior RUE DVT, BPH, s/p EVAR w/ RT renal stent placement ([facility], 11/2024), no complications, discharged. Re-admitted on 12/14/24 for mechanical fall, c/b new onset hypertension to SBPs 170s-180s, headache, CTH negative, vascular surgery consulted for hypertension in the setting of prior EVAR.    PMH: osteoporosis, kyphoscoliosis, depression bladder CA (in remission s/p resection w/ localized chemi), multiple fragility fx, GERD, lymphedema, prior RUE DVT, BPH,  PSHx: EVAR w/ RT renal stent placement,  sacrum fusion at [facility] (10/2020)  Allergies: Sulfa drugs    T(C): 37.1 (12-16-24 @ 05:13), Max: 37.1 (12-16-24 @ 05:13)  HR: 51 (12-16-24 @ 05:50) (51 - 76)  BP: 170/78 (12-16-24 @ 05:50) (168/87 - 191/85)  RR: 18 (12-16-24 @ 05:13) (16 - 18)  SpO2: 93% (12-16-24 @ 05:13) (93% - 98%)    Physical Exam:   GENERAL: NAD, sitting in bed  CHEST/LUNG: No resp distress   HEART: Regular rate and rhythm  EXTREMITIES: WWP B/L, B/L DPs/PTs biphasic, popliteals biphasic    LABS:                        12.7   7.08  )-----------( 191      ( 16 Dec 2024 05:30 )             38.1     12-15    138  |  100  |  29[H]  ----------------------------<  88  3.6   |  29  |  1.15    Ca    8.7      15 Dec 2024 12:14

## 2024-12-16 NOTE — PATIENT PROFILE ADULT - FALL HARM RISK - HARM RISK INTERVENTIONS

## 2024-12-16 NOTE — OCCUPATIONAL THERAPY INITIAL EVALUATION ADULT - LEVEL OF INDEPENDENCE: DRESS LOWER BODY, OT EVAL
don pants and don b/l socks/minimum assist (75% patients effort)/moderate assist (50% patients effort)

## 2024-12-16 NOTE — PROGRESS NOTE ADULT - SUBJECTIVE AND OBJECTIVE BOX
INTERVAL HPI/OVERNIGHT EVENTS:  This morning: Patient was seen and examined at bedside.      VITAL SIGNS:  T(F): 98.7 (12-16-24 @ 05:13)  HR: 51 (12-16-24 @ 05:50)  BP: 170/78 (12-16-24 @ 05:50)  RR: 18 (12-16-24 @ 05:13)  SpO2: 93% (12-16-24 @ 05:13)  Wt(kg): --    I/O:    12-15-24 @ 07:01  -  12-16-24 @ 06:57  --------------------------------------------------------  IN: 0 mL / OUT: 703 mL / NET: -703 mL        PHYSICAL EXAM:    Constitutional: NAD  HEENT: PERRL, EOMI, sclera non-icteric, neck supple, trachea midline, no masses, no JVD, MMM, good dentition  Respiratory: CTA b/l, good air entry b/l, no wheezing, no rhonchi, no rales, without accessory muscle use and no intercostal retractions  Cardiovascular: RRR, normal S1S2, no M/R/G  Gastrointestinal: abdomen soft, NTND, no masses palpable, BS normal  Extremities: Warm, well perfused, pulses equal bilateral upper and lower extremities, no edema, no clubbing  Neurological: AAOx3, CN Grossly intact  Skin: Normal temperature, warm, dry    MEDICATIONS  (STANDING):  ARIPiprazole 2 milliGRAM(s) Oral at bedtime  aspirin  chewable 81 milliGRAM(s) Oral daily  DULoxetine 60 milliGRAM(s) Oral at bedtime  enoxaparin Injectable 40 milliGRAM(s) SubCutaneous every 24 hours  influenza  Vaccine (HIGH DOSE) 0.5 milliLiter(s) IntraMuscular once  multivitamin 1 Tablet(s) Oral daily  pantoprazole    Tablet 40 milliGRAM(s) Oral before breakfast  rosuvastatin 10 milliGRAM(s) Oral at bedtime  senna 2 Tablet(s) Oral at bedtime  traMADol 50 milliGRAM(s) Oral every 12 hours    MEDICATIONS  (PRN):  acetaminophen     Tablet .. 650 milliGRAM(s) Oral every 6 hours PRN Temp greater or equal to 38C (100.4F), Mild Pain (1 - 3)  HYDROmorphone   Tablet 4 milliGRAM(s) Oral every 12 hours PRN Moderate Pain (4 - 6)  melatonin 3 milliGRAM(s) Oral at bedtime PRN Insomnia      Allergies    sulfa drugs (Unknown)    Intolerances        LABS:                        11.1   7.50  )-----------( 180      ( 15 Dec 2024 12:14 )             34.8     12-15    138  |  100  |  29[H]  ----------------------------<  88  3.6   |  29  |  1.15    Ca    8.7      15 Dec 2024 12:14        Urinalysis Basic - ( 15 Dec 2024 12:14 )    Color: x / Appearance: x / SG: x / pH: x  Gluc: 88 mg/dL / Ketone: x  / Bili: x / Urobili: x   Blood: x / Protein: x / Nitrite: x   Leuk Esterase: x / RBC: x / WBC x   Sq Epi: x / Non Sq Epi: x / Bacteria: x            Urinalysis with Rflx Culture (collected 12-15-24 @ 11:55)        Urinalysis with Rflx Culture (collected 12-15-24 @ 11:55)        RADIOLOGY & ADDITIONAL TESTS:  Reviewed INTERVAL HPI/OVERNIGHT EVENTS: shaunna o/n   This morning: Patient was seen and examined at bedside. Pt states he has a headache still and has new nausea. Pt states headache has been going on for weeks (yesterday pt reported it started at 3pm).     VITAL SIGNS:  T(F): 98.7 (12-16-24 @ 05:13)  HR: 51 (12-16-24 @ 05:50)  BP: 170/78 (12-16-24 @ 05:50)  RR: 18 (12-16-24 @ 05:13)  SpO2: 93% (12-16-24 @ 05:13)  Wt(kg): --    I/O:    12-15-24 @ 07:01  -  12-16-24 @ 06:57  --------------------------------------------------------  IN: 0 mL / OUT: 703 mL / NET: -703 mL        PHYSICAL EXAM:    Constitutional: NAD  HEENT: PERRL, EOMI, MMM  Respiratory: CTA b/l, good air entry b/l, no wheezing, no rhonchi, no rales, without accessory muscle use and no intercostal retractions  Cardiovascular: RRR, normal S1S2, no M/R/G  Gastrointestinal: abdomen soft, NTND  Extremities: 4/5 strength b/l LE  Neurological: AAOx3  Skin: Normal temperature, warm, dry    MEDICATIONS  (STANDING):  ARIPiprazole 2 milliGRAM(s) Oral at bedtime  aspirin  chewable 81 milliGRAM(s) Oral daily  DULoxetine 60 milliGRAM(s) Oral at bedtime  enoxaparin Injectable 40 milliGRAM(s) SubCutaneous every 24 hours  influenza  Vaccine (HIGH DOSE) 0.5 milliLiter(s) IntraMuscular once  multivitamin 1 Tablet(s) Oral daily  pantoprazole    Tablet 40 milliGRAM(s) Oral before breakfast  rosuvastatin 10 milliGRAM(s) Oral at bedtime  senna 2 Tablet(s) Oral at bedtime  traMADol 50 milliGRAM(s) Oral every 12 hours    MEDICATIONS  (PRN):  acetaminophen     Tablet .. 650 milliGRAM(s) Oral every 6 hours PRN Temp greater or equal to 38C (100.4F), Mild Pain (1 - 3)  HYDROmorphone   Tablet 4 milliGRAM(s) Oral every 12 hours PRN Moderate Pain (4 - 6)  melatonin 3 milliGRAM(s) Oral at bedtime PRN Insomnia      Allergies    sulfa drugs (Unknown)    Intolerances        LABS:                        11.1   7.50  )-----------( 180      ( 15 Dec 2024 12:14 )             34.8     12-15    138  |  100  |  29[H]  ----------------------------<  88  3.6   |  29  |  1.15    Ca    8.7      15 Dec 2024 12:14        Urinalysis Basic - ( 15 Dec 2024 12:14 )    Color: x / Appearance: x / SG: x / pH: x  Gluc: 88 mg/dL / Ketone: x  / Bili: x / Urobili: x   Blood: x / Protein: x / Nitrite: x   Leuk Esterase: x / RBC: x / WBC x   Sq Epi: x / Non Sq Epi: x / Bacteria: x            Urinalysis with Rflx Culture (collected 12-15-24 @ 11:55)        Urinalysis with Rflx Culture (collected 12-15-24 @ 11:55)        RADIOLOGY & ADDITIONAL TESTS:  Reviewed

## 2024-12-16 NOTE — OCCUPATIONAL THERAPY INITIAL EVALUATION ADULT - MODIFIED CLINICAL TEST OF SENSORY INTEGRATION IN BALANCE TEST
Pt able to ambulate 35 feet x3 with CGA using RW, demonstrating fairly steady gait, decreased step length/juve, and impaired posture.

## 2024-12-16 NOTE — PROGRESS NOTE ADULT - PROBLEM SELECTOR PLAN 3
Pt with multiple fragility fractures in past. Follows with endocrine outpatient    - vit D level in AM  - c/w endocrine f/u

## 2024-12-16 NOTE — CONSULT NOTE ADULT - ASSESSMENT
78yo Male pt with PMH osteoporosis, kyphoscoliosis, s/p sacrum fusion at [facility] (10/2020), dperession, bladder CA (in remisison, s/p resection w/ localized chemi), multiple fragility fx, GERD, lymphedema, prior RUE DVT, BPH, s/p EVAR w/ RT renal stent placement ([facility], 11/2024), no complications, discharged. Re-admitted on 12/14/24 for mechanical fall, c/b new onset hypertension to SBPs 170s-180s, headache, CTH negative, vascular surgery consulted for hypertension in the setting of prior EVAR, being managed with Nifedipine, PRN hydralazine. Recommend obtaining CTA A/P to evaluate RT renal stent.    -Obtain CTA A/P to evaluate RT renal stent  Vascular surgery will continue to follow, please reach out with any questions  78yo Male pt with PMH osteoporosis, kyphoscoliosis, s/p sacrum fusion at [facility] (10/2020), depression bladder CA (in remission s/p resection w/ localized chemi), multiple fragility fx, GERD, lymphedema, prior RUE DVT, BPH, s/p EVAR w/ RT renal stent placement ([facility], 11/2024), no complications, discharged. Re-admitted on 12/14/24 for mechanical fall, c/b new onset hypertension to SBPs 170s-180s, headache, CTH negative, vascular surgery consulted for hypertension in the setting of prior EVAR, being managed with Nifedipine, PRN hydralazine. Recommend obtaining CTA A/P to evaluate RT renal stent.    -Obtain CTA A/P to evaluate RT renal stent  -Vascular surgery will continue to follow, please reach out with any questions

## 2024-12-16 NOTE — PROGRESS NOTE ADULT - PROBLEM SELECTOR PLAN 1
Pt presenting with fall at home. Likely weakness related vs coordination.   Unlikely to have syncopal event as pt denies LOC. Unlikely seizure as no incontinence, no post-ictal state. Unlikely cardiac etiology as pt denies palpitations, chest pain, EKG similar to prior  PT evaluation in ED, recommended [facility]    - admit for [facility] placement   - fall precautions  - f/u orthostatic vitals Pt presenting with fall at home. Likely weakness related vs coordination.   Unlikely to have syncopal event as pt denies LOC. Unlikely seizure as no incontinence, no post-ictal state. Unlikely cardiac etiology as pt denies palpitations, chest pain, EKG similar to prior  PT evaluation in ED, recommended [facility]    - admit for [facility] placement -- pt currently refusing to go to [facility]  - fall precautions  - f/u orthostatic vitals

## 2024-12-16 NOTE — OCCUPATIONAL THERAPY INITIAL EVALUATION ADULT - DIAGNOSIS, OT EVAL
Pt admitted for mechanical fall and presents with generalized weakness, impaired balance, and decreased activity tolerance.

## 2024-12-16 NOTE — PROGRESS NOTE ADULT - PROBLEM SELECTOR PLAN 4
hx of AAA, s/p EVAR in 11/2024    - no active intervention hx of AAA, s/p EVAR in 11/2024 with R renal stent placement. No current abdominal / back pain.     - vascular consulted, recs appreciated  - CTA abd/pelv ordered

## 2024-12-16 NOTE — OCCUPATIONAL THERAPY INITIAL EVALUATION ADULT - ADDITIONAL COMMENTS
Pt states he lives alone in 1-floor up with 20 stairs. Pt reports being independent with ADLs and mobility tasks, use of no AD unless he feels unsteady then uses a SC. Pt reports having a HHA that assists with IADLs. When asked how many days/hours his HHA works, he replied with "how many ever I want/need them to". Pt states he has home PT 3x/week.

## 2024-12-17 ENCOUNTER — TRANSCRIPTION ENCOUNTER (OUTPATIENT)
Age: 79
End: 2024-12-17

## 2024-12-17 LAB
ANION GAP SERPL CALC-SCNC: 10 MMOL/L — SIGNIFICANT CHANGE UP (ref 5–17)
BUN SERPL-MCNC: 19 MG/DL — SIGNIFICANT CHANGE UP (ref 7–23)
CALCIUM SERPL-MCNC: 8.6 MG/DL — SIGNIFICANT CHANGE UP (ref 8.4–10.5)
CHLORIDE SERPL-SCNC: 94 MMOL/L — LOW (ref 96–108)
CO2 SERPL-SCNC: 27 MMOL/L — SIGNIFICANT CHANGE UP (ref 22–31)
CREAT SERPL-MCNC: 1.11 MG/DL — SIGNIFICANT CHANGE UP (ref 0.5–1.3)
EGFR: 68 ML/MIN/1.73M2 — SIGNIFICANT CHANGE UP
GLUCOSE SERPL-MCNC: 114 MG/DL — HIGH (ref 70–99)
HCT VFR BLD CALC: 36 % — LOW (ref 39–50)
HGB BLD-MCNC: 12.1 G/DL — LOW (ref 13–17)
MAGNESIUM SERPL-MCNC: 2 MG/DL — SIGNIFICANT CHANGE UP (ref 1.6–2.6)
MCHC RBC-ENTMCNC: 29.3 PG — SIGNIFICANT CHANGE UP (ref 27–34)
MCHC RBC-ENTMCNC: 33.6 G/DL — SIGNIFICANT CHANGE UP (ref 32–36)
MCV RBC AUTO: 87.2 FL — SIGNIFICANT CHANGE UP (ref 80–100)
NRBC # BLD: 0 /100 WBCS — SIGNIFICANT CHANGE UP (ref 0–0)
PHOSPHATE SERPL-MCNC: 3 MG/DL — SIGNIFICANT CHANGE UP (ref 2.5–4.5)
PLATELET # BLD AUTO: 187 K/UL — SIGNIFICANT CHANGE UP (ref 150–400)
POTASSIUM SERPL-MCNC: 3.2 MMOL/L — LOW (ref 3.5–5.3)
POTASSIUM SERPL-SCNC: 3.2 MMOL/L — LOW (ref 3.5–5.3)
RBC # BLD: 4.13 M/UL — LOW (ref 4.2–5.8)
RBC # FLD: 14 % — SIGNIFICANT CHANGE UP (ref 10.3–14.5)
SODIUM SERPL-SCNC: 131 MMOL/L — LOW (ref 135–145)
WBC # BLD: 8.15 K/UL — SIGNIFICANT CHANGE UP (ref 3.8–10.5)
WBC # FLD AUTO: 8.15 K/UL — SIGNIFICANT CHANGE UP (ref 3.8–10.5)

## 2024-12-17 PROCEDURE — 99233 SBSQ HOSP IP/OBS HIGH 50: CPT | Mod: GC

## 2024-12-17 RX ORDER — ONDANSETRON HYDROCHLORIDE 4 MG/1
4 TABLET, FILM COATED ORAL ONCE
Refills: 0 | Status: COMPLETED | OUTPATIENT
Start: 2024-12-17 | End: 2024-12-17

## 2024-12-17 RX ORDER — LOSARTAN POTASSIUM 100 MG/1
25 TABLET, FILM COATED ORAL EVERY 24 HOURS
Refills: 0 | Status: DISCONTINUED | OUTPATIENT
Start: 2024-12-17 | End: 2024-12-19

## 2024-12-17 RX ORDER — LOSARTAN POTASSIUM 100 MG/1
1 TABLET, FILM COATED ORAL
Qty: 30 | Refills: 0
Start: 2024-12-17 | End: 2025-01-15

## 2024-12-17 RX ORDER — POTASSIUM CHLORIDE 600 MG/1
40 TABLET, EXTENDED RELEASE ORAL EVERY 4 HOURS
Refills: 0 | Status: DISCONTINUED | OUTPATIENT
Start: 2024-12-17 | End: 2024-12-17

## 2024-12-17 RX ORDER — AMMONIUM LACTATE 120 MG/G
1 LOTION TOPICAL EVERY 12 HOURS
Refills: 0 | Status: DISCONTINUED | OUTPATIENT
Start: 2024-12-17 | End: 2024-12-19

## 2024-12-17 RX ORDER — ONDANSETRON HYDROCHLORIDE 4 MG/1
4 TABLET, FILM COATED ORAL ONCE
Refills: 0 | Status: DISCONTINUED | OUTPATIENT
Start: 2024-12-17 | End: 2024-12-17

## 2024-12-17 RX ORDER — TRIAMCINOLONE ACETONIDE 0.15 MG/G
1 AEROSOL, SPRAY TOPICAL EVERY 12 HOURS
Refills: 0 | Status: DISCONTINUED | OUTPATIENT
Start: 2024-12-17 | End: 2024-12-19

## 2024-12-17 RX ORDER — POTASSIUM CHLORIDE 600 MG/1
40 TABLET, EXTENDED RELEASE ORAL EVERY 4 HOURS
Refills: 0 | Status: COMPLETED | OUTPATIENT
Start: 2024-12-17 | End: 2024-12-17

## 2024-12-17 RX ADMIN — TRIAMCINOLONE ACETONIDE 1 APPLICATION(S): 0.15 AEROSOL, SPRAY TOPICAL at 01:05

## 2024-12-17 RX ADMIN — ENOXAPARIN SODIUM 40 MILLIGRAM(S): 30 INJECTION SUBCUTANEOUS at 09:27

## 2024-12-17 RX ADMIN — Medication 81 MILLIGRAM(S): at 12:14

## 2024-12-17 RX ADMIN — Medication 5 MILLIGRAM(S): at 09:27

## 2024-12-17 RX ADMIN — ONDANSETRON HYDROCHLORIDE 4 MILLIGRAM(S): 4 TABLET, FILM COATED ORAL at 12:15

## 2024-12-17 RX ADMIN — AMMONIUM LACTATE 1 APPLICATION(S): 120 LOTION TOPICAL at 14:15

## 2024-12-17 RX ADMIN — Medication 1 TABLET(S): at 12:14

## 2024-12-17 RX ADMIN — TRAMADOL HYDROCHLORIDE 50 MILLIGRAM(S): 300 CAPSULE ORAL at 06:38

## 2024-12-17 RX ADMIN — HYDROMORPHONE HYDROCHLORIDE 4 MILLIGRAM(S): 2 TABLET ORAL at 09:25

## 2024-12-17 RX ADMIN — AMMONIUM LACTATE 1 APPLICATION(S): 120 LOTION TOPICAL at 01:05

## 2024-12-17 RX ADMIN — LOSARTAN POTASSIUM 25 MILLIGRAM(S): 100 TABLET, FILM COATED ORAL at 14:15

## 2024-12-17 RX ADMIN — HYDROMORPHONE HYDROCHLORIDE 4 MILLIGRAM(S): 2 TABLET ORAL at 19:35

## 2024-12-17 RX ADMIN — HYDROMORPHONE HYDROCHLORIDE 4 MILLIGRAM(S): 2 TABLET ORAL at 05:19

## 2024-12-17 RX ADMIN — ROSUVASTATIN CALCIUM 10 MILLIGRAM(S): 5 TABLET, FILM COATED ORAL at 21:48

## 2024-12-17 RX ADMIN — ACETAMINOPHEN 500MG 650 MILLIGRAM(S): 500 TABLET, COATED ORAL at 12:14

## 2024-12-17 RX ADMIN — HYDROMORPHONE HYDROCHLORIDE 4 MILLIGRAM(S): 2 TABLET ORAL at 04:25

## 2024-12-17 RX ADMIN — HYDROMORPHONE HYDROCHLORIDE 4 MILLIGRAM(S): 2 TABLET ORAL at 14:15

## 2024-12-17 RX ADMIN — Medication 2 TABLET(S): at 21:49

## 2024-12-17 RX ADMIN — POTASSIUM CHLORIDE 40 MILLIEQUIVALENT(S): 600 TABLET, EXTENDED RELEASE ORAL at 09:25

## 2024-12-17 RX ADMIN — Medication 60 MILLIGRAM(S): at 21:48

## 2024-12-17 RX ADMIN — TRAMADOL HYDROCHLORIDE 50 MILLIGRAM(S): 300 CAPSULE ORAL at 18:32

## 2024-12-17 RX ADMIN — TRAMADOL HYDROCHLORIDE 50 MILLIGRAM(S): 300 CAPSULE ORAL at 07:15

## 2024-12-17 RX ADMIN — PANTOPRAZOLE SODIUM 40 MILLIGRAM(S): 40 TABLET, DELAYED RELEASE ORAL at 06:38

## 2024-12-17 RX ADMIN — TRAMADOL HYDROCHLORIDE 50 MILLIGRAM(S): 300 CAPSULE ORAL at 17:32

## 2024-12-17 RX ADMIN — TAMSULOSIN HYDROCHLORIDE 0.4 MILLIGRAM(S): 0.4 CAPSULE ORAL at 21:48

## 2024-12-17 RX ADMIN — Medication 30 MILLIGRAM(S): at 06:38

## 2024-12-17 RX ADMIN — ARIPIPRAZOLE 2 MILLIGRAM(S): 15 TABLET ORAL at 21:48

## 2024-12-17 RX ADMIN — TRIAMCINOLONE ACETONIDE 1 APPLICATION(S): 0.15 AEROSOL, SPRAY TOPICAL at 14:15

## 2024-12-17 NOTE — PROGRESS NOTE ADULT - PROBLEM SELECTOR PLAN 1
Pt presenting with fall at home. Likely weakness related vs coordination.   Unlikely to have syncopal event as pt denies LOC. Unlikely seizure as no incontinence, no post-ictal state. Unlikely cardiac etiology as pt denies palpitations, chest pain, EKG similar to prior  PT evaluation in ED, recommended [facility]    - admit for [facility] placement -- pt currently refusing to go to [facility]  - fall precautions  - f/u orthostatic vitals

## 2024-12-17 NOTE — DISCHARGE NOTE PROVIDER - NSDCMRMEDTOKEN_GEN_ALL_CORE_FT
ammonium lactate topical: Apply topically to affected area once a day  ARIPiprazole 2 mg oral tablet: 1 tab(s) orally once a day  Aspirin Low Dose 81 mg oral tablet, chewable: 1 tab(s) orally once a day  betamethasone dipropionate 0.05% topical cream: Apply topically to affected area once a day  bisacodyl 10 mg rectal suppository: 1 suppository(ies) rectal once a day As needed Constipation  clobetasol 0.05% topical cream: Apply topically to affected area once a day  clonazePAM 2 mg oral tablet: 1 tab(s) orally every 12 hours as needed for  anxiety  Crestor 10 mg oral tablet: 1 tab(s) orally once a day (at bedtime)  DULoxetine 60 mg oral delayed release capsule: 1 cap(s) orally every 24 hours  dutasteride 0.5 mg oral capsule: 1 cap(s) orally once a day  HYDROmorphone 4 mg oral tablet: 1 tab(s) orally every 6 hours  ketoconazole 2% topical cream: Apply topically to affected area once a day  Multiple Vitamins oral tablet: 1 tab(s) orally once a day  oxyBUTYnin 15 mg/24 hr oral tablet, extended release: 1 tab(s) orally once a day  rosuvastatin 10 mg oral capsule: 1 cap(s) orally once a day  senna leaf extract oral tablet: 2 tab(s) orally once a day (at bedtime)  tadalafil 10 mg oral tablet: 1 tab(s) orally once a day (at bedtime) prn  tamsulosin 0.4 mg oral capsule: 1 cap(s) orally once a day (at bedtime)  traMADol 100 mg oral tablet: 1 tab(s) orally once a day  triamcinolone 0.1% topical cream: Apply topically to affected area once a day   ammonium lactate topical: Apply topically to affected area once a day  ARIPiprazole 2 mg oral tablet: 1 tab(s) orally once a day  Aspirin Low Dose 81 mg oral tablet, chewable: 1 tab(s) orally once a day  betamethasone dipropionate 0.05% topical cream: Apply topically to affected area once a day  bisacodyl 10 mg rectal suppository: 1 suppository(ies) rectal once a day As needed Constipation  clobetasol 0.05% topical cream: Apply topically to affected area once a day  clonazePAM 2 mg oral tablet: 1 tab(s) orally every 12 hours as needed for  anxiety  Crestor 10 mg oral tablet: 1 tab(s) orally once a day (at bedtime)  DULoxetine 60 mg oral delayed release capsule: 1 cap(s) orally every 24 hours  dutasteride 0.5 mg oral capsule: 1 cap(s) orally once a day  HYDROmorphone 4 mg oral tablet: 1 tab(s) orally every 6 hours  ketoconazole 2% topical cream: Apply topically to affected area once a day  losartan 25 mg oral tablet: 1 tab(s) orally every 24 hours  Multiple Vitamins oral tablet: 1 tab(s) orally once a day  oxyBUTYnin 15 mg/24 hr oral tablet, extended release: 1 tab(s) orally once a day  rosuvastatin 10 mg oral capsule: 1 cap(s) orally once a day  senna leaf extract oral tablet: 2 tab(s) orally once a day (at bedtime)  tadalafil 10 mg oral tablet: 1 tab(s) orally once a day (at bedtime) prn  tamsulosin 0.4 mg oral capsule: 1 cap(s) orally once a day (at bedtime)  traMADol 100 mg oral tablet: 1 tab(s) orally once a day  triamcinolone 0.1% topical cream: Apply topically to affected area once a day   ammonium lactate topical: Apply topically to affected area once a day  ARIPiprazole 2 mg oral tablet: 1 tab(s) orally once a day  Aspirin Low Dose 81 mg oral tablet, chewable: 1 tab(s) orally once a day  betamethasone dipropionate 0.05% topical cream: Apply topically to affected area once a day  bisacodyl 10 mg rectal suppository: 1 suppository(ies) rectal once a day As needed Constipation  clobetasol 0.05% topical cream: Apply topically to affected area once a day  clonazePAM 2 mg oral tablet: 1 tab(s) orally every 12 hours as needed for  anxiety  Crestor 10 mg oral tablet: 1 tab(s) orally once a day (at bedtime)  DULoxetine 60 mg oral delayed release capsule: 1 cap(s) orally every 24 hours  dutasteride 0.5 mg oral capsule: 1 cap(s) orally once a day  HYDROmorphone 4 mg oral tablet: 1 tab(s) orally every 6 hours  ketoconazole 2% topical cream: Apply topically to affected area once a day  losartan 25 mg oral tablet: 1 tab(s) orally once a day  Multiple Vitamins oral tablet: 1 tab(s) orally once a day  oxyBUTYnin 15 mg/24 hr oral tablet, extended release: 1 tab(s) orally once a day  rosuvastatin 10 mg oral capsule: 1 cap(s) orally once a day  senna leaf extract oral tablet: 2 tab(s) orally once a day (at bedtime)  tadalafil 10 mg oral tablet: 1 tab(s) orally once a day (at bedtime) prn  tamsulosin 0.4 mg oral capsule: 1 cap(s) orally once a day (at bedtime)  traMADol 100 mg oral tablet: 1 tab(s) orally once a day  triamcinolone 0.1% topical cream: Apply topically to affected area once a day

## 2024-12-17 NOTE — PROGRESS NOTE ADULT - ASSESSMENT
80yo Male pt with PMH osteoporosis, kyphoscoliosis, s/p sacrum fusion at [facility] (10/2020), depression bladder CA (in remission s/p resection w/ localized chemi), multiple fragility fx, GERD, lymphedema, prior RUE DVT, BPH, s/p EVAR w/ RT renal stent placement ([facility], 11/2024), no complications, discharged. Re-admitted on 12/14/24 for mechanical fall, c/b new onset hypertension to SBPs 170s-180s, headache, CTH negative, vascular surgery consulted for hypertension in the setting of prior EVAR, being managed with Nifedipine, PRN hydralazine, renal artery stent unlikely contributing to new onset hypertension, on acute surgical intervention at this time.    -No acute surgical intervention at this time  -Vascular surgery will continue to follow, please reach out with any questions

## 2024-12-17 NOTE — PROGRESS NOTE ADULT - SUBJECTIVE AND OBJECTIVE BOX
INTERVAL HPI/OVERNIGHT EVENTS:  This morning: Patient was seen and examined at bedside.      VITAL SIGNS:  T(F): 98.1 (12-17-24 @ 06:05)  HR: 67 (12-17-24 @ 06:05)  BP: 168/82 (12-17-24 @ 06:05)  RR: 18 (12-17-24 @ 06:05)  SpO2: 94% (12-17-24 @ 06:05)  Wt(kg): --    I/O:    12-16-24 @ 07:01  -  12-17-24 @ 07:00  --------------------------------------------------------  IN: 0 mL / OUT: 950 mL / NET: -950 mL        PHYSICAL EXAM:    Constitutional: NAD  HEENT: PERRL, EOMI, sclera non-icteric, neck supple, trachea midline, no masses, no JVD, MMM, good dentition  Respiratory: CTA b/l, good air entry b/l, no wheezing, no rhonchi, no rales, without accessory muscle use and no intercostal retractions  Cardiovascular: RRR, normal S1S2, no M/R/G  Gastrointestinal: abdomen soft, NTND, no masses palpable, BS normal  Extremities: Warm, well perfused, pulses equal bilateral upper and lower extremities, no edema, no clubbing  Neurological: AAOx3, CN Grossly intact  Skin: Normal temperature, warm, dry    MEDICATIONS  (STANDING):  ammonium lactate 12% Lotion 1 Application(s) Topical every 12 hours  ARIPiprazole 2 milliGRAM(s) Oral at bedtime  aspirin  chewable 81 milliGRAM(s) Oral daily  DULoxetine 60 milliGRAM(s) Oral at bedtime  enoxaparin Injectable 40 milliGRAM(s) SubCutaneous every 24 hours  finasteride 5 milliGRAM(s) Oral every 24 hours  influenza  Vaccine (HIGH DOSE) 0.5 milliLiter(s) IntraMuscular once  multivitamin 1 Tablet(s) Oral daily  NIFEdipine XL 30 milliGRAM(s) Oral every 24 hours  pantoprazole    Tablet 40 milliGRAM(s) Oral before breakfast  polyethylene glycol 3350 17 Gram(s) Oral two times a day  rosuvastatin 10 milliGRAM(s) Oral at bedtime  senna 2 Tablet(s) Oral at bedtime  tamsulosin 0.4 milliGRAM(s) Oral at bedtime  traMADol 50 milliGRAM(s) Oral every 12 hours  triamcinolone 0.1% Cream 1 Application(s) Topical every 12 hours    MEDICATIONS  (PRN):  acetaminophen     Tablet .. 650 milliGRAM(s) Oral every 6 hours PRN Temp greater or equal to 38C (100.4F), Mild Pain (1 - 3)  HYDROmorphone   Tablet 4 milliGRAM(s) Oral every 4 hours PRN Severe Pain (7 - 10)  melatonin 3 milliGRAM(s) Oral at bedtime PRN Insomnia      Allergies    sulfa drugs (Unknown)    Intolerances        LABS:                        12.1   8.15  )-----------( 187      ( 17 Dec 2024 05:30 )             36.0     12-17    131[L]  |  94[L]  |  19  ----------------------------<  114[H]  3.2[L]   |  27  |  1.11    Ca    8.6      17 Dec 2024 05:30  Phos  3.0     12-17  Mg     2.0     12-17    TPro  7.5  /  Alb  3.7  /  TBili  0.5  /  DBili  x   /  AST  21  /  ALT  9[L]  /  AlkPhos  94  12-16      Urinalysis Basic - ( 17 Dec 2024 05:30 )    Color: x / Appearance: x / SG: x / pH: x  Gluc: 114 mg/dL / Ketone: x  / Bili: x / Urobili: x   Blood: x / Protein: x / Nitrite: x   Leuk Esterase: x / RBC: x / WBC x   Sq Epi: x / Non Sq Epi: x / Bacteria: x                RADIOLOGY & ADDITIONAL TESTS:  Reviewed INTERVAL HPI/OVERNIGHT EVENTS: shaunna o/n  This morning: Patient was seen and examined at bedside. Patient continues to report headache and nausea. Reports tylenol helped with headache yesterday.     VITAL SIGNS:  T(F): 98.1 (12-17-24 @ 06:05)  HR: 67 (12-17-24 @ 06:05)  BP: 168/82 (12-17-24 @ 06:05)  RR: 18 (12-17-24 @ 06:05)  SpO2: 94% (12-17-24 @ 06:05)  Wt(kg): --    I/O:    12-16-24 @ 07:01  -  12-17-24 @ 07:00  --------------------------------------------------------  IN: 0 mL / OUT: 950 mL / NET: -950 mL        PHYSICAL EXAM:    Constitutional: NAD  HEENT: PERRL, EOMI, MMM  Respiratory: CTA b/l, good air entry b/l, no wheezing, no rhonchi, no rales, without accessory muscle use and no intercostal retractions  Cardiovascular: RRR, normal S1S2, no M/R/G  Gastrointestinal: abdomen soft, NTND  Extremities: 4/5 strength b/l LE  Neurological: AAOx3  Skin: Normal temperature, warm, dry      MEDICATIONS  (STANDING):  ammonium lactate 12% Lotion 1 Application(s) Topical every 12 hours  ARIPiprazole 2 milliGRAM(s) Oral at bedtime  aspirin  chewable 81 milliGRAM(s) Oral daily  DULoxetine 60 milliGRAM(s) Oral at bedtime  enoxaparin Injectable 40 milliGRAM(s) SubCutaneous every 24 hours  finasteride 5 milliGRAM(s) Oral every 24 hours  influenza  Vaccine (HIGH DOSE) 0.5 milliLiter(s) IntraMuscular once  multivitamin 1 Tablet(s) Oral daily  NIFEdipine XL 30 milliGRAM(s) Oral every 24 hours  pantoprazole    Tablet 40 milliGRAM(s) Oral before breakfast  polyethylene glycol 3350 17 Gram(s) Oral two times a day  rosuvastatin 10 milliGRAM(s) Oral at bedtime  senna 2 Tablet(s) Oral at bedtime  tamsulosin 0.4 milliGRAM(s) Oral at bedtime  traMADol 50 milliGRAM(s) Oral every 12 hours  triamcinolone 0.1% Cream 1 Application(s) Topical every 12 hours    MEDICATIONS  (PRN):  acetaminophen     Tablet .. 650 milliGRAM(s) Oral every 6 hours PRN Temp greater or equal to 38C (100.4F), Mild Pain (1 - 3)  HYDROmorphone   Tablet 4 milliGRAM(s) Oral every 4 hours PRN Severe Pain (7 - 10)  melatonin 3 milliGRAM(s) Oral at bedtime PRN Insomnia      Allergies    sulfa drugs (Unknown)    Intolerances        LABS:                        12.1   8.15  )-----------( 187      ( 17 Dec 2024 05:30 )             36.0     12-17    131[L]  |  94[L]  |  19  ----------------------------<  114[H]  3.2[L]   |  27  |  1.11    Ca    8.6      17 Dec 2024 05:30  Phos  3.0     12-17  Mg     2.0     12-17    TPro  7.5  /  Alb  3.7  /  TBili  0.5  /  DBili  x   /  AST  21  /  ALT  9[L]  /  AlkPhos  94  12-16      Urinalysis Basic - ( 17 Dec 2024 05:30 )    Color: x / Appearance: x / SG: x / pH: x  Gluc: 114 mg/dL / Ketone: x  / Bili: x / Urobili: x   Blood: x / Protein: x / Nitrite: x   Leuk Esterase: x / RBC: x / WBC x   Sq Epi: x / Non Sq Epi: x / Bacteria: x                RADIOLOGY & ADDITIONAL TESTS:  Reviewed

## 2024-12-17 NOTE — DISCHARGE NOTE PROVIDER - NSDCADMDATE_GEN_ALL_CORE_FT
11/5/2020         RE: Zurdo Dash  [address] [place] [id number]        Dear Colleague,    Thank you for referring your patient, Zurdo Dash, to the [facility]. Please see a copy of my visit note below.    Subjective:    10/15/20   Patient seen today for diabetic foot exam.    He has a history of a wound on his left third toe 2018.   This healed with offloading and wound care.  The patient states it has bothered him off and on ever since.  It is gotten worse over the last several weeks.  Pain aggravated by activity and relieved by rest.    He denies any drainage purulence odor or increased edema on his feet.  The patient has peripheral neuropathy.  He has chronic kidney disease stage III.  Patient has rheumatoid arthritis and takes Plaquenil.  He is retired.  40-pack-year history of smoking and quit 13 years ago.  Primary care is Elli Arciniega last seen 7/8/2020.    11/5/20 patient returns for evaluation of his left third toe ulcer.  He states is feeling significantly better.  There is minimal drainage now.  He denies any purulence odor fever or chills.  He has gotten arterial ultrasound but has not yet discussed results with vascular surgery.         ROS: See above       No Known Allergies    Current Outpatient Medications   Medication Sig Dispense Refill     albuterol (PROAIR HFA/PROVENTIL HFA/VENTOLIN HFA) 108 (90 BASE) MCG/ACT Inhaler Inhale 2 puffs into the lungs every 4 hours as needed for other (coughing) 1 Inhaler 1     aspirin 81 MG tablet Take 1 tablet by mouth daily.  3     benzonatate (TESSALON) 100 MG capsule Take 1 capsule (100 mg) by mouth 3 times daily as needed for cough 30 capsule 0     blood glucose (ACCU-CHEK DAYANARA PLUS) test strip TEST THREE TIMES A DAY OR AS DIRECTED 300 each 1     blood glucose (NO BRAND SPECIFIED) lancing device Use to test blood sugars 3 times daily or as directed. 1 each 0     blood glucose monitoring (ONE TOUCH DELICA) lancets  Use to test blood sugars 3 times daily or as directed. 300 each 3     cetirizine (ZYRTEC) 10 MG tablet Take 10 mg by mouth At Bedtime       clobetasol (TEMOVATE) 0.05 % external ointment Apply topically 2 times daily 30 g 0     diclofenac (VOLTAREN) 1 % topical gel Apply up to 4 grams of 1% gel to knees up to 4 times daily as needed for joint pain (maximum: 8 g per lower extremity per day) 400 g 3     dulaglutide (TRULICITY) 1.5 MG/0.5ML pen Inject 1.5 mg Subcutaneous every 7 days 9 mL 3     folic acid (FOLVITE) 1 MG tablet Take 1 tablet (1 mg) by mouth daily 100 tablet 2     hydrochlorothiazide (MICROZIDE) 12.5 MG capsule Take 1 capsule (12.5 mg) by mouth daily 30 capsule 2     HYDROcodone-acetaminophen (NORCO) 5-325 MG tablet Take 1-2 tablets by mouth every 4 hours as needed for moderate to severe pain maximum 4 tablet(s) per day 28 tablet 0     hydroxychloroquine (PLAQUENIL) 200 MG tablet Take 1 tablet (200 mg) by mouth daily 90 tablet 1     lisinopril (ZESTRIL) 20 MG tablet Take 1 tablet (20 mg) by mouth daily 90 tablet 1     metFORMIN (GLUCOPHAGE) 500 MG tablet Take 1 tablet (500 mg) by mouth 2 times daily (with meals) 180 tablet 0     methotrexate sodium 2.5 MG TABS Take 7 tablets (17.5 mg) by mouth once a week . Take all 7 tablets on the same day of each week. 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 finger) With a 12 hour free period prn every 4-6 hours 15 mg 2     omeprazole (PRILOSEC) 40 MG DR capsule TAKE ONE CAPSULE BY MOUTH ONCE DAILY 90 capsule 1     order for DME Equipment being ordered: specialized shoes for diabetic neuropathy as well as all supplies provided for annual treatment of diabetes neuropathy 1 Device 0     order for DME Equipment being ordered: glucometer 1  15-Dec-2024 15:22 Device 0     order for DME Equipment being ordered: specialized shoes for diabetic neuropathy     His orthotics will be made by [facility]      Tim Jernigan, Certified   [email or web address]  Phone: [phone]  Fax: [phone] [address] [place] [address] 1 Device 0     pravastatin (PRAVACHOL) 40 MG tablet Take 0.5 tablets (20 mg) by mouth every other day 23 tablet 1     predniSONE (DELTASONE) 5 MG tablet For RA flare only: Prednisone 20mg daily x5days, then 15mg daily x5days, then 10mg daily x5days, then 5mg daily x5days, then stop. 50 tablet 0     SENNA-docusate sodium (SENNA S) 8.6-50 MG tablet Take 1 tablet by mouth At Bedtime 30 tablet 1     tiZANidine (ZANAFLEX) 2 MG tablet Take 1 tablet (2 mg) by mouth 3 times daily as needed for muscle spasms 90 tablet 1       Patient Active Problem List   Diagnosis     Pain in limb     Hyperlipidemia LDL goal <100     Hypertension goal BP (blood pressure) < 140/90     Hypogonadism     Advanced directives, counseling/discussion     Health Care Home     Type 2 diabetes mellitus with diabetic polyneuropathy, without long-term current use of insulin (H)     RBBB (right bundle branch block)     Ex-smoker     Family history of esophageal cancer     Gastroesophageal reflux disease, esophagitis presence not specified     Rheumatoid arthritis involving multiple sites with positive rheumatoid factor (H)     Pulmonary nodule     High risk medication use     Spondylosis of cervical region without myelopathy or radiculopathy     Erectile dysfunction, unspecified erectile dysfunction type     Type 2 diabetes mellitus without retinopathy (H)     Tubulovillous adenoma of colon     Diabetic polyneuropathy associated with type 2 diabetes mellitus (H)     Chronic bilateral low back pain without sciatica     Migraine equivalent     Posterior vitreous detachment of left eye     Pseudophakia, ou     Eyelid lesion, LLL     Dermatochalasis of both upper  eyelids     Bradycardia     Primary osteoarthritis of both knees     Syncope     Trigger finger, acquired     CKD (chronic kidney disease) stage 3, GFR 30-59 ml/min     Abdominal pain, generalized       Past Medical History:   Diagnosis Date     Abnormal CT scan 03/2004    calcified lung granuloma     C. difficile diarrhea     H/O     Cataract 11/18/2011     Diabetic neuropathy (H)     mild, mostly soles and distal forefeet, worse on the left side.     Diverticulitis      ED (erectile dysfunction)      Ex-smoker     QUIT SMOKING FEB 2007     History of ETOH abuse     recovering, sober since 1997     Hyperlipidemia LDL goal <100      Hypertension goal BP (blood pressure) < 140/90      Hypogonadism      Obesity      PAD (peripheral artery disease) (H)     leg cramps, with exertion, no formal diagnosis of PAD and minimal if any symptoms at all.     RA (rheumatoid arthritis) (H)     Dr Bailon     Type 2 diabetes, HbA1c goal < 7% (H) 10/2008    A1C of 7.1 %        Past Surgical History:   Procedure Laterality Date     CATARACT IOL, RT/LT       COLONOSCOPY  03/01/2018    [facility]     COMBINED REPAIR PTOSIS WITH BLEPHAROPLASTY BILATERAL Bilateral 8/16/2019    Procedure: BILATERAL UPPER EYELID BLEPHAROPLASTY AND BILATERAL PTOSIS REPAIR;  Surgeon: Janet Garcia MD;  Location: [facility] OR     EXCISE LESION EYELID Left 8/16/2019    Procedure: LEFT LOWER EYELID BIOPSY;  Surgeon: Janet Garcia MD;  Location: [facility] OR     HC INCISION TENDON SHEATH FINGER  4/2009    r hand ring finger     PHACOEMULSIFICATION WITH STANDARD INTRAOCULAR LENS IMPLANT  02/2019; 3/2019    left eye; right eye     TONSILLECTOMY         Family History   Problem Relation Age of Onset     Cerebrovascular Disease Mother      Arthritis Mother      Osteoporosis Mother      Alzheimer Disease Father      Arthritis Father      Cancer Father      Diabetes Maternal Grandmother      Cardiovascular Maternal Grandmother      Other Cancer Brother      Cancer Paternal  Aunt      Hypertension No family hx of      Thyroid Disease No family hx of      Glaucoma No family hx of      Macular Degeneration No family hx of        Social History     Tobacco Use     Smoking status: Former Smoker     Packs/day: 1.00     Years: 40.00     Pack years: 40.00     Types: Cigarettes     Quit date: 3/16/2007     Years since quittin.6     Smokeless tobacco: Never Used   Substance Use Topics     Alcohol use: No     Alcohol/week: 0.0 standard drinks         Exam:    Vitals: There were no vitals taken for this visit.  BMI: There is no height or weight on file to calculate BMI.  Height: Data Unavailable    Constitutional/ general:  Pt is in no apparent distress, appears well-nourished.  Cooperative with history and physical exam.     Psych:  The patient answered questions appropriately.  Normal affect.  Seems to have reasonable expectations, in terms of treatment.     Eyes:  Visual scanning/ tracking without deficit.     Ears:  Response to auditory stimuli is normal.  negative hearing aid devices.  Auricles in proper alignment.     Lymphatic:  Popliteal lymph nodes not enlarged.     Lungs:  Non labored breathing, non labored speech. No cough.  No audible wheezing. Even, quiet breathing.       Vascular: Lateral ankle edema and varicosities noted.  Difficult to palpate tibial pulses.  Dorsalis pedis weakly palpable.  Capillary refill time less than 3 seconds in all digits.  No hair growth noted on skin.  Duplex ultrasound of bilateral lower extremity arteries dated  10/22/2020 2:53 PM      Clinical information : Hx of PAD, toe ulcer, decreased pulses; PAD  (peripheral artery disease) (H); Toe ulcer (H); Decreased pulses in  feet      Comparison: None                                                                      Impression:   1) There is calcified plaque seen throughout both lower extremities  2) The left proximal SFA is occluded     Findings:      Right lower extremity:      Common femoral  artery: 102/10 cm/sec.  Deep femoral artery: 119/8 cm/sec.  Proximal SFA: 60/2 cm/sec.  Mid SFA: 46/0 cm/sec.  Distal SFA: 23/4 cm/sec.  Popliteal artery: 44/5 cm/sec.  Anterior tibial takeoff: 38/7 cm/sec.  Peroneal artery proximal: 34/6 cm/sec.  PTA ankle: 34/6 cm/sec.        Waveforms are triphasic at the CFA and biphasic to monophasic beyond        Left lower extremity:     Common femoral artery: 90/11 cm/sec.  Deep femoral artery: 74/13 cm/sec.  Proximal SFA: Occluded     Popliteal artery: 32/7 cm/sec.  Anterior tibial takeoff: 53/7 cm/sec.  PTA origin: 30/5 cm/sec.  Peroneal artery: 27/6 cm/sec.  PTA ankle: 30/5 cm/sec.     Waveforms are triphasic at the CFA and biphasic to monophasic beyond      Exam: Bilateral lower extremity resting ankle brachial indices dated  10/22/2020     Comparison study: 1/28/2020     Clinical history: PAD (peripheral artery disease) (H)     Technique: Bilateral lower extremity resting ankle brachial indices  obtained.     Findings:     Right:       Arm: 121 mmHg   PT at ankle: Noncompressible   DP at foot: Noncompressible      ALBERT: Noncompressible     Right lower extremity limited arterial duplex:  Non-compressible  Biphasic waveforms      Left:      Arm: 119 mmHg   PT at ankle: Noncompressible   DP at foot: Noncompressible   ALBERT: Noncompressible     Left lower extremity limited arterial duplex:  Non-compressible  Biphasic to monophasic waveforms                                                                         Impression:      Right leg: Resting ALBERT is noncompressible     Left leg: Resting ALBERT is noncompressible     Compared to the prior study, the non-compressibility of distal vessels  has progressed      Neuro:  Alert and oriented x 3. Coordinated gait.  Light touch sensation is intact to the L4, L5, S1 distributions. No obvious deficits.  No evidence of neurological-based weakness, spasticity, or contracture in the lower extremities.  Monofilament absent to midfoot.       Derm: Skin thin shiny atrophic with no hair growth.  Left third toe is hammered.  The wound at the end of the left third toe in the past measured 2 x 2 mm and today measures 1 x 1 mm.  There is no sinus tracts purulence or odor.  There is just scant edema and erythema surrounding this.    Musculoskeletal:    Lower extremity muscle strength is normal.  Patient is ambulatory without an assistive device or brace.  Pronated arch with weightbearing.  All lesser toes are hammered and stiff.  Generally his feet are stiff with a decreased range of motion bilaterally.      A:  Diabetes mellitus with peripheral neuropathy and LOPS  Peripheral arterial disease  Left third toe ulcer        P: Discussed with patient wound is smaller.  He will continue the same dressings and offloading with a crest pad.  Patient has not discussed arterial ultrasound results yet with vascular surgery.  Explained to patient that once he discusses these with vascular surgery and they give their blessing for having surgery on his left foot we will proceed.  We would do an arthroplasty of his left third PIPJ.  Same-day surgery under MAC anesthesia.  Recovery would involve elevation but he would be able to weight-bear on this.  Patient understands because of his diabetes and questionable circulation he is at high risk for poor healing infection and possible amputation.  He will call me after he has discussed with vascular he has adequate circulation to heal the surgery and then we will proceed.  25 minutes spent in total time caring for this patient and at least 15 minutes spent  face to face with patient regarding care.        Aaron Dent DPM, FACFAS              Again, thank you for allowing me to participate in the care of your patient.        Sincerely,        Aaron Dent DPM

## 2024-12-17 NOTE — DISCHARGE NOTE PROVIDER - HOSPITAL COURSE
#Discharge: do not delete  Patient is a 78 y/o male w/ hx osteoporosis, Kyphoscoliosis, s/p c sacrum fusion at [facility] 10/2020, Depression, Bladder CA in remission (s/p resection localized chemo) Multiple Fragility fractures, Gerd, Lymphedema, Prior RUE DVT, BPH, recent infrarenal AAA s/p EVAR in 11/12/24 p/w worsening of his chronic lower back pain s/p fall this am.  Patient states that he fell this morning when getting out of bed to go to the bathroom. Pt states he stood up, grabbed his walker and then felt unsteady in his knees and fell backwards. Pt states he fell backwards and landed on his hip/back. Friend at bedside states that the patient had told her that he hit his head after he hit his back. Pt reports that his knees hurt and that the pain went away shortly before his knees became unsteady although denies other prodromal symptoms -- denies headache, lightheadedness, dizziness, vertigo. Pt denies loss of consciousness or post-ictal confusion. Pt denies recent illness, fevers, chills, chest pain, palpitations, abdominal pain, constipation/diarrhea, n/v/, urinary symptoms.    Patient seen by PT in ED who recommended MCKENNA, admitted for MCKENNA placement. Pt found to have hypertensive emergency with BPs 180s/70-80s and headache, nausea. Found to have urinary retention with BS >700cc and pt unable to void likely iso not taking medications for a few days (on home tamsulosin, oxybutynin, dilasteride). Patient otherwise with improvement of BPs to 160s/70s after initiation of nifedipine 30mg. Additionally with further electrolyte abnormalities including hypokalemia, hyponatremia but pt refusing further workup. Patient stating desire to return home. Patient to be discharged to _______.     Hospital course (by problem):       Patient was discharged to: (home/MCKENNA/acute rehab/hospice, etc, and with what services – home health PT/RN? Home O2?)    New medications:   Changes to old medications:  Medications that were stopped:    Items to follow up as outpatient:    Physical exam at the time of discharge:       #Discharge: do not delete  Patient is a 80 y/o male w/ hx osteoporosis, Kyphoscoliosis, s/p c sacrum fusion at [facility] 10/2020, Depression, Bladder CA in remission (s/p resection localized chemo) Multiple Fragility fractures, Gerd, Lymphedema, Prior RUE DVT, BPH, recent infrarenal AAA s/p EVAR in 11/12/24 p/w worsening of his chronic lower back pain s/p fall this am.  Patient states that he fell this morning when getting out of bed to go to the bathroom. Pt states he stood up, grabbed his walker and then felt unsteady in his knees and fell backwards. Pt states he fell backwards and landed on his hip/back. Friend at bedside states that the patient had told her that he hit his head after he hit his back. Pt reports that his knees hurt and that the pain went away shortly before his knees became unsteady although denies other prodromal symptoms -- denies headache, lightheadedness, dizziness, vertigo. Pt denies loss of consciousness or post-ictal confusion. Pt denies recent illness, fevers, chills, chest pain, palpitations, abdominal pain, constipation/diarrhea, n/v/, urinary symptoms.    Patient seen by PT in ED who recommended MCKENNA, admitted for MCKENNA placement. Pt found to have hypertensive emergency with BPs 180s/70-80s and headache, nausea. Found to have urinary retention with BS >700cc and pt unable to void likely iso not taking medications for a few days (on home tamsulosin, oxybutynin, dilasteride). Patient previously not hypertensive, concern for new renal artery stenosis given recent stent. Vascular consulted, recommended medical management at this time with outpatient follow up. Additionally with further electrolyte abnormalities including hypokalemia, hyponatremia but pt refusing further workup. Patient stating desire to return home. Patient switched to losartan 25mg on discharge, Patient to be discharged to home with [facility] planning outpt.     Hospital course (by problem):   #Fall at home.    Pt presenting with fall at home. Likely weakness related vs coordination.   Unlikely to have syncopal event as pt denies LOC. Unlikely seizure as no incontinence, no post-ictal state. Unlikely cardiac etiology as pt denies palpitations, chest pain, EKG similar to prior  PT evaluation in ED, recommended MCKENNA. Patient refused MCKENNA with preference to return home. Patient to continue with home PT and has plan to set up [facility].     # Hypertension.   Pt with hypertension to 180s/70s on admission, unclear etiology. Pt with "splitting headache" although CTH negative for pathology; headache opioid withdrawal vs hypertensive emergency. Unlikely related to urinary retention, pt now voiding spontaneously.    Potentially iso recent renal artery stenting and RAAS activation.   - s/p nifedipine 30mg, switch to losartan 25mg on d/c    #At risk for osteoporosis.   Pt with multiple fragility fractures in past. Follows with endocrine outpatient    - vit D level >30  - c/w endocrine f/u.    #S/P AAA repair.   hx of AAA, s/p EVAR in 11/2024 with R renal stent placement. No current abdominal / back pain. Vascular consulted, will treat medically first before considering further workup for renal artery stenosis.     #Chronic back pain.   Pt has history of chronic vertebral compression fractures and kyphoscoliosis s/p C-sacrum fusion. Pt on Tramadol 100mg ER QD and Dilaudid 4mg q4 PRN. may c/w pain regimen and f/u outpatient.     #Anemia.   Normocytic anemia, per previous labs, pt's baseline is Hgb 10-12, on presentation to ED pt's Hgb 11. Likely anemia of chronic disease in setting of chronic lymphedema and chronic back pain. No s/s acute bleed. Can consider iron studies however pt has normocytic anemia and less likely anemia 2/2 JODIE.    #History of DVT (deep vein thrombosis).   ·  Plan: Prior RUE DVT, provoked, completed treatment.    #GERD (gastroesophageal reflux disease).   Pt has prescription for omeprazole 20mg QD, however per pt's pharmacy pt has not filled prescription since May 2024. c/w omeprazole    # Major depressive disorder.   Pt on home duloxetine 60mg PO BID. Pt has clonazepam 2mg BID PRN for anxiety and ambien 5mg PRN for insomnia. Last dose klonopin 12/14 PM. C/w home meds.      Patient was discharged to: home    New medications: losartan 25mg daily  Changes to old medications:  Medications that were stopped:    Items to follow up as outpatient: BP follow up, vascular follow up.    Physical exam at the time of discharge:      Constitutional: NAD  HEENT: PERRL, EOMI, MMM  Respiratory: CTA b/l, good air entry b/l, no wheezing, no rhonchi, no rales, without accessory muscle use and no intercostal retractions  Cardiovascular: RRR, normal S1S2, no M/R/G  Gastrointestinal: abdomen soft, NTND  Extremities: 4/5 strength b/l LE  Neurological: AAOx3  Skin: Normal temperature, warm, dry #Discharge: do not delete  Patient is a 80 y/o male w/ hx osteoporosis, Kyphoscoliosis, s/p c sacrum fusion at [facility] 10/2020, Depression, Bladder CA in remission (s/p resection localized chemo) Multiple Fragility fractures, GERD, Lymphedema, Prior RUE DVT, BPH, recent infrarenal AAA s/p EVAR in 11/12/24 p/w worsening of his chronic lower back pain s/p fall this am.  Patient states that he fell this morning when getting out of bed to go to the bathroom. Pt states he stood up, grabbed his walker and then felt unsteady in his knees and fell backwards. Pt states he fell backwards and landed on his hip/back. Friend at bedside states that the patient had told her that he hit his head after he hit his back. Pt reports that his knees hurt and that the pain went away shortly before his knees became unsteady although denies other prodromal symptoms -- denies headache, lightheadedness, dizziness, vertigo. Pt denies loss of consciousness or post-ictal confusion. Pt denies recent illness, fevers, chills, chest pain, palpitations, abdominal pain, constipation/diarrhea, n/v/, urinary symptoms.    Patient seen by PT in ED who recommended MCKENNA, admitted for MCKENNA placement. Pt found to have hypertensive emergency with BPs 180s/70-80s and headache, nausea. Found to have urinary retention with BS >700cc and pt unable to void likely iso not taking medications for a few days (on home tamsulosin, oxybutynin, dilasteride). Patient previously not hypertensive, concern for new renal artery stenosis given recent stent. Vascular consulted, recommended medical management at this time with outpatient follow up. Additionally with further electrolyte abnormalities including hypokalemia, hyponatremia but pt refusing further workup. Patient stating desire to return home. Patient switched to losartan 25mg on discharge, Patient to be discharged to home with correction planning outpt.     Hospital course (by problem):   #Fall at home.    Pt presenting with fall at home. Likely weakness related vs coordination.   Unlikely to have syncopal event as pt denies LOC. Unlikely seizure as no incontinence, no post-ictal state. Unlikely cardiac etiology as pt denies palpitations, chest pain, EKG similar to prior  PT evaluation in ED, recommended MCKENNA. Patient refused MCKENNA with preference to return home. Patient to continue with home PT and has plan to set up [facility].     # Hypertension.   Pt with hypertension to 180s/70s on admission, unclear etiology. Pt with "splitting headache" although CTH negative for pathology; headache opioid withdrawal vs hypertensive emergency. Unlikely related to urinary retention, pt now voiding spontaneously.    Potentially iso recent renal artery stenting and RAAS activation.   - s/p nifedipine 30mg, switch to losartan 25mg on d/c    #At risk for osteoporosis.   Pt with multiple fragility fractures in past. Follows with endocrine outpatient    - vit D level >30  - c/w endocrine f/u.    #S/P AAA repair.   hx of AAA, s/p EVAR in 11/2024 with R renal stent placement. No current abdominal / back pain. Vascular consulted, will treat medically first before considering further workup for renal artery stenosis.     #Chronic back pain.   Pt has history of chronic vertebral compression fractures and kyphoscoliosis s/p C-sacrum fusion. Pt on Tramadol 100mg ER QD and Dilaudid 4mg q4 PRN. may c/w pain regimen and f/u outpatient.     #Anemia.   Normocytic anemia, per previous labs, pt's baseline is Hgb 10-12, on presentation to ED pt's Hgb 11. Likely anemia of chronic disease in setting of chronic lymphedema and chronic back pain. No s/s acute bleed. Can consider iron studies however pt has normocytic anemia and less likely anemia 2/2 JODIE.    #History of DVT (deep vein thrombosis).   ·  Plan: Prior RUE DVT, provoked, completed treatment.    #GERD (gastroesophageal reflux disease).   Pt has prescription for omeprazole 20mg QD, however per pt's pharmacy pt has not filled prescription since May 2024. c/w omeprazole    # Major depressive disorder.   Pt on home duloxetine 60mg PO BID. Pt has clonazepam 2mg BID PRN for anxiety and ambien 5mg PRN for insomnia. Last dose klonopin 12/14 PM. C/w home meds.      Patient was discharged to: home    New medications: losartan 25mg daily  Changes to old medications:  Medications that were stopped:    Items to follow up as outpatient: BP follow up, vascular follow up.    Physical exam at the time of discharge:      Constitutional: NAD  HEENT: PERRL, EOMI, MMM  Respiratory: CTA b/l, good air entry b/l, no wheezing, no rhonchi, no rales, without accessory muscle use and no intercostal retractions  Cardiovascular: RRR, normal S1S2, no M/R/G  Gastrointestinal: abdomen soft, NTND  Extremities: 4/5 strength b/l LE  Neurological: AAOx3  Skin: Normal temperature, warm, dry

## 2024-12-17 NOTE — PROGRESS NOTE ADULT - PROBLEM SELECTOR PLAN 3
Pt with multiple fragility fractures in past. Follows with endocrine outpatient    - vit D level in AM  - c/w endocrine f/u Pt with multiple fragility fractures in past. Follows with endocrine outpatient    - vit D level >30  - c/w endocrine f/u

## 2024-12-17 NOTE — DISCHARGE NOTE PROVIDER - ATTENDING DISCHARGE PHYSICAL EXAMINATION:
******************************************************  ATTENDING ATTESTATION    I have read and agree with the resident Discharge Note above. Patient seen and discussed with resident team on the day of discharge.     Briefly, Mr. Blair is a 80yo man with PMHx osteoporosis c/b multiple fractures, AAA s/p EVAR (11/12/24), bladder cancer in remission, depression who presented after a mechanical fall, seen by PT recommending MCKENNA but patient prefers to return home. Patient started on losartan for BP control iso c/f renal artery stenosis. Patient to f/u with PCP for repeat labs to monitor hyponatremia (refused repeat labs prior to discharge).    Physical Exam:  T(C): 36.4  HR: 76  BP: 153/77  RR: 18  SpO2: 96%    Gen: sitting upright in bed at time of exam  HEENT: NCAT, MMM, clear OP  Neck: supple, trachea at midline  CV: RRR, +S1/S2  Pulm: adequate respiratory effort, no increased work of breathing  Abd: soft, NTND  Skin: warm and dry, no new rashes vs prior report  Ext: WWP  Neuro: AOx3, no gross focal neurological deficits  Psych: affect and behavior appropriate    I was physically present for the evaluation and management services provided. I agree with the included history, physical, and plan which I reviewed and edited where appropriate. I spent 33 minutes on direct patient care and discharge planning with more than 50% of the visit spent on counseling and/or coordination of care.

## 2024-12-17 NOTE — DISCHARGE NOTE PROVIDER - CARE PROVIDER_API CALL
Joseph Rocha  Internal Medicine  [address] [address]  Phone: [phone]  Fax: [phone]  Scheduled Appointment: 01/06/2025 02:40 PM

## 2024-12-17 NOTE — DISCHARGE NOTE PROVIDER - NSDCCPCAREPLAN_GEN_ALL_CORE_FT
PRINCIPAL DISCHARGE DIAGNOSIS  Diagnosis: Fall  Assessment and Plan of Treatment: You came to the hospital with a fall. You were evaluated by the physical therapists who recommended you go to a sub-acute rehab facility. You preferred to go home at this time. Please continue to follow up with your home physical therapist.      SECONDARY DISCHARGE DIAGNOSES  Diagnosis: Hypertension  Assessment and Plan of Treatment: During the hospitalization, we found that your blood pressure was high (>180/80). We began a medication to help treat the blood pressure, losartan. Please continue to take losartan 25mg once a day.  - Please follow up with your PCP about your blood pressures. This may be due to the recent surgery you had with a renal artery stent.

## 2024-12-17 NOTE — PROGRESS NOTE ADULT - PROBLEM SELECTOR PLAN 2
Pt with hypertension to 180s/70s on admission, unclear etiology. Pt with "splitting headache" although CTH negative for pathology; headache opioid withdrawal vs hypertensive emergency. Will reassess headache after pt receives dilaudid (takes q4h at home, headache began 8 hours after last dilaudid dose)  Pt with bladder scan 700cc, HTN possibly due to retention although did not completely s/p straight cath (180s -> 160s).    Likely hypertensive emergency given headache, nausea. Pt with no other signs of end-organ perfusion deficiencies (no transaminitis, new renal dysfunction, chest pain, retinopathy).       - starting nifedipine 30mg daily, uptitrate as necessary  - bladder scan for urinary retention -> SC 800cc 12/15  - q6h bladder scans, SC>450cc Pt with hypertension to 180s/70s on admission, unclear etiology. Pt with "splitting headache" although CTH negative for pathology; headache opioid withdrawal vs hypertensive emergency. Unlikely related to urinary retention, pt now voiding spontaneously.    Potentially iso recent renal artery stenting and RAAS activation.     - starting nifedipine 30mg daily, uptitrate as necessary  - switch oral agent to lisinopril given possible renal artery involvement   - q6h bladder scans, SC>450cc

## 2024-12-17 NOTE — PROGRESS NOTE ADULT - SUBJECTIVE AND OBJECTIVE BOX
Subjective: Patient seen and evaluated, overall no acute complaints at this time    ROS:   Denies Headache, blurred vision, Chest Pain, SOB, Abdominal pain, nausea or vomiting     Social   aspirin  chewable 81  enoxaparin Injectable 40  NIFEdipine XL 30    Allergies    sulfa drugs (Unknown)    Intolerances    Vital Signs Last 24 Hrs  T(C): 36.7 (17 Dec 2024 06:05), Max: 36.9 (16 Dec 2024 16:17)  T(F): 98.1 (17 Dec 2024 06:05), Max: 98.4 (16 Dec 2024 16:17)  HR: 67 (17 Dec 2024 06:05) (63 - 79)  BP: 168/82 (17 Dec 2024 06:05) (152/74 - 168/82)  BP(mean): 102 (16 Dec 2024 09:29) (102 - 102)  RR: 18 (17 Dec 2024 06:05) (16 - 18)  SpO2: 94% (17 Dec 2024 06:05) (93% - 96%)    Parameters below as of 17 Dec 2024 06:05  Patient On (Oxygen Delivery Method): room air    I&O's Summary    16 Dec 2024 07:01  -  17 Dec 2024 07:00  --------------------------------------------------------  IN: 0 mL / OUT: 950 mL / NET: -950 mL    Physical Exam:   GENERAL: NAD, sitting in bed  CHEST/LUNG: No resp distress   HEART: Regular rate and rhythm  EXTREMITIES: WWP B/L, B/L DPs/PTs biphasic, popliteals biphasic    LABS:                        12.1   8.15  )-----------( 187      ( 17 Dec 2024 05:30 )             36.0     12-17    131[L]  |  94[L]  |  19  ----------------------------<  114[H]  3.2[L]   |  27  |  1.11    Ca    8.6      17 Dec 2024 05:30  Phos  3.0     12-17  Mg     2.0     12-17    TPro  7.5  /  Alb  3.7  /  TBili  0.5  /  DBili  x   /  AST  21  /  ALT  9[L]  /  AlkPhos  94  12-16        Radiology and Additional Studies:

## 2024-12-17 NOTE — PROGRESS NOTE ADULT - CONVERSATION DETAILS
Discussed GOC with patient. Patient A&Ox4 at time of conversation.     Patient states that he understands that he is recommended to go to [facility] but states that he would rather return home and plan for eventual plan to go to [facility]. Pt states he wants to go home soon (today vs tomorrow) since he has to do taxes at home.     Patient states that his HCP is Mk Zaragoza and would prefer his brother Osvaldo is not called since his brother will try to force him to do things he does not want to do. Pt states that he is uncomfortable in the hospital and is unable to get any sleep. Patient expressed frustration that he has been in the hospital and missed his home PT appointment. Wishes to return home as soon as he can if his HHA Ermias is able to be at home.    Patient states he does not want further lab draws or medical work up at this time.     Additionally discussed code status. Patient states that he would not want to be kept alive artificially through the use of machines and would not want to be revived. At this time, patient's wishes most consistent with an order of DNR, trial of NON-invasive ventilation but otherwise DNI. Discussed GOC with patient. Patient A&Ox4 at time of conversation.     Patient states that he understands that he is recommended to go to [facility] but states that he would rather return home and plan for eventual plan to go to [facility]. Pt states he wants to go home soon (today vs tomorrow) since he has to do taxes at home.     Patient states that his HCP is Mk Zaragoza and would prefer his brother Osvaldo is not called since his brother will try to force him to do things he does not want to do. Pt states that he is uncomfortable in the hospital and is unable to get any sleep. Patient expressed frustration that he has been in the hospital and missed his home PT appointment. Wishes to return home as soon as he can if his HHA Ermias is able to be at home.    Patient states he does not want further lab draws or medical work up at this time.     Additionally discussed code status. Patient states that he would not want to be kept alive artificially through the use of machines and would not want to be revived. At this time, patient's wishes most consistent with an order of DNR, trial of NON-invasive ventilation but otherwise DNI. LOIS signed and placed in chart.

## 2024-12-17 NOTE — PROGRESS NOTE ADULT - ASSESSMENT
Patient is a 80 y/o male w/ hx osteoporosis, Kyphoscoliosis, s/p c sacrum fusion at [facility] 10/2020, Depression, Bladder CA in remission (s/p resection localized chemo) Multiple Fragility fractures, Gerd, Lymphedema, Prior RUE DVT, BPH, recent infrarenal AAA s/p EVAR in 11/12/24 p/w worsening of his chronic lower back pain s/p fall admitted for further workup and management of mechanical fall pending MCKENNA ccb hypertensive emergency.

## 2024-12-17 NOTE — DISCHARGE NOTE PROVIDER - NSDCFUSCHEDAPPT_GEN_ALL_CORE_FT
Nadira Guevara  [facility] PreAdmits  Scheduled Appointment: 12/18/2024    [facility]  AMBCHEMO  [address]  Scheduled Appointment: 12/18/2024    Dwayne Mcgovern  [facility]  ORTHOSURG [address]  Scheduled Appointment: 12/20/2024    Paulino Centeno  [facility] Physician [facility]  HEARTVASC [address]  Scheduled Appointment: 02/18/2025    Estevan Jernigan  [facility]  NEUROLOGY [address]  Scheduled Appointment: 02/21/2025     Nadira Guevara  [facility] PreAdmits  Scheduled Appointment: 12/18/2024    [facility] Physician [facility]  AMBCHEMO  [address]  Scheduled Appointment: 12/18/2024    Dwayne Mcgovern  [facility]  ORTHOSURG [address]  Scheduled Appointment: 12/20/2024    Joseph Rocha  [facility]  INTMED  [address]  Scheduled Appointment: 01/06/2025    Paulino Centeno  [facility] Physician [facility]  HEARTVASC [address]  Scheduled Appointment: 02/18/2025    Estevan Jernigan  [facility]  NEUROLOGY [address]  Scheduled Appointment: 02/21/2025     [facility]  AMBCHEMO  [address]  Scheduled Appointment: 12/18/2024    Dwayne Mcgovern  [facility]  ORTHOSURG [address]  Scheduled Appointment: 12/20/2024    Joseph Rocha  [facility]  INTMED  [address]  Scheduled Appointment: 01/06/2025    Paulino Centeno  [facility]  HEARTVASC [address]  Scheduled Appointment: 02/18/2025    Estevan Jernigan  [facility]  NEUROLOGY [address]  Scheduled Appointment: 02/21/2025     Joseph Rocha  [facility] Physician [facility]  INTMED  [address]  Scheduled Appointment: 01/06/2025    Paulino Centeno  [facility]  HEARTVASC [address]  Scheduled Appointment: 02/18/2025    Estevan Jernigan  [facility]  NEUROLOGY [address]  Scheduled Appointment: 02/21/2025

## 2024-12-17 NOTE — PROGRESS NOTE ADULT - PROBLEM SELECTOR PLAN 4
hx of AAA, s/p EVAR in 11/2024 with R renal stent placement. No current abdominal / back pain.     - vascular consulted, recs appreciated  - CTA abd/pelv ordered hx of AAA, s/p EVAR in 11/2024 with R renal stent placement. No current abdominal / back pain.     - vascular consulted, recs appreciated  -- no interventions at this time, trial BP medical management as above.

## 2024-12-17 NOTE — DISCHARGE NOTE PROVIDER - NSDCFUADDAPPT_GEN_ALL_CORE_FT
Please bring your discharge paperwork to the following appointment:  1) Dr. Joseph Rocha ([address] [place] [address]) on 1/6/2025 @ 2:40PM to follow up on your blood pressure.

## 2024-12-18 ENCOUNTER — OUTPATIENT (OUTPATIENT)
Dept: OUTPATIENT SERVICES | Facility: HOSPITAL | Age: 79
LOS: 1 days | End: 2024-12-18

## 2024-12-18 DIAGNOSIS — M81.0 AGE-RELATED OSTEOPOROSIS WITHOUT CURRENT PATHOLOGICAL FRACTURE: ICD-10-CM

## 2024-12-18 DIAGNOSIS — S70.912A: ICD-10-CM

## 2024-12-18 PROCEDURE — 99233 SBSQ HOSP IP/OBS HIGH 50: CPT | Mod: GC

## 2024-12-18 RX ADMIN — TRAMADOL HYDROCHLORIDE 50 MILLIGRAM(S): 300 CAPSULE ORAL at 07:12

## 2024-12-18 RX ADMIN — POLYETHYLENE GLYCOL 3350 17 GRAM(S): 17 POWDER, FOR SOLUTION ORAL at 06:19

## 2024-12-18 RX ADMIN — HYDROMORPHONE HYDROCHLORIDE 4 MILLIGRAM(S): 2 TABLET ORAL at 02:20

## 2024-12-18 RX ADMIN — ARIPIPRAZOLE 2 MILLIGRAM(S): 15 TABLET ORAL at 22:12

## 2024-12-18 RX ADMIN — TRIAMCINOLONE ACETONIDE 1 APPLICATION(S): 0.15 AEROSOL, SPRAY TOPICAL at 01:11

## 2024-12-18 RX ADMIN — PANTOPRAZOLE SODIUM 40 MILLIGRAM(S): 40 TABLET, DELAYED RELEASE ORAL at 06:18

## 2024-12-18 RX ADMIN — ENOXAPARIN SODIUM 40 MILLIGRAM(S): 30 INJECTION SUBCUTANEOUS at 10:07

## 2024-12-18 RX ADMIN — Medication 81 MILLIGRAM(S): at 10:07

## 2024-12-18 RX ADMIN — Medication 1 TABLET(S): at 10:21

## 2024-12-18 RX ADMIN — HYDROMORPHONE HYDROCHLORIDE 4 MILLIGRAM(S): 2 TABLET ORAL at 01:24

## 2024-12-18 RX ADMIN — HYDROMORPHONE HYDROCHLORIDE 4 MILLIGRAM(S): 2 TABLET ORAL at 17:43

## 2024-12-18 RX ADMIN — LOSARTAN POTASSIUM 25 MILLIGRAM(S): 100 TABLET, FILM COATED ORAL at 10:21

## 2024-12-18 RX ADMIN — AMMONIUM LACTATE 1 APPLICATION(S): 120 LOTION TOPICAL at 01:11

## 2024-12-18 RX ADMIN — TAMSULOSIN HYDROCHLORIDE 0.4 MILLIGRAM(S): 0.4 CAPSULE ORAL at 22:12

## 2024-12-18 RX ADMIN — HYDROMORPHONE HYDROCHLORIDE 4 MILLIGRAM(S): 2 TABLET ORAL at 22:11

## 2024-12-18 RX ADMIN — Medication 5 MILLIGRAM(S): at 10:07

## 2024-12-18 RX ADMIN — TRAMADOL HYDROCHLORIDE 50 MILLIGRAM(S): 300 CAPSULE ORAL at 06:18

## 2024-12-18 RX ADMIN — TRAMADOL HYDROCHLORIDE 50 MILLIGRAM(S): 300 CAPSULE ORAL at 23:24

## 2024-12-18 RX ADMIN — ACETAMINOPHEN, DIPHENHYDRAMINE HCL, PHENYLEPHRINE HCL 3 MILLIGRAM(S): 325; 25; 5 TABLET ORAL at 23:53

## 2024-12-18 RX ADMIN — ROSUVASTATIN CALCIUM 10 MILLIGRAM(S): 5 TABLET, FILM COATED ORAL at 22:11

## 2024-12-18 RX ADMIN — Medication 60 MILLIGRAM(S): at 22:12

## 2024-12-18 RX ADMIN — HYDROMORPHONE HYDROCHLORIDE 4 MILLIGRAM(S): 2 TABLET ORAL at 10:21

## 2024-12-18 RX ADMIN — ACETAMINOPHEN, DIPHENHYDRAMINE HCL, PHENYLEPHRINE HCL 3 MILLIGRAM(S): 325; 25; 5 TABLET ORAL at 01:12

## 2024-12-18 NOTE — PROGRESS NOTE ADULT - PROBLEM SELECTOR PLAN 2
Pt with hypertension to 180s/70s on admission, unclear etiology. Pt with "splitting headache" although CTH negative for pathology; headache opioid withdrawal vs hypertensive emergency. Unlikely related to urinary retention, pt now voiding spontaneously.    Potentially iso recent renal artery stenting and RAAS activation.     - starting nifedipine 30mg daily, uptitrate as necessary  - switch oral agent to lisinopril given possible renal artery involvement   - q6h bladder scans, SC>450cc

## 2024-12-18 NOTE — PROGRESS NOTE ADULT - PROBLEM SELECTOR PLAN 6
Normocytic anemia, per previous labs, pt's baseline is Hgb 10-12, on presentation to ED pt's Hgb 11. Likely anemia of chronic disease in setting of chronic lymphedema and chronic back pain. No s/s acute bleed. Can consider iron studies however pt has normocytic anemia and less likely anemia 2/2 JODIE    - con't to trend CBC  - transfuse for Hgb <7.

## 2024-12-18 NOTE — PROGRESS NOTE ADULT - TIME BILLING
Bedside exam and interview   Reviewed vitals, labs   Discussed patient's plan of care with housestaff, patient and patient's friend Anna  Documentation of encounter  Excludes teaching and separately reported services
Bedside exam and interview    Review of hospital course, labs, vitals, imaging ,  medical records.   Reviewing outside records   Discharge planning on Interdisciplinary rounds   Discussion with consultants and Primary team   Documenting the encounter.
Bedside exam and interview   Reviewed vitals, labs   Discussed patient's plan of care with housestaff   Documentation of encounter  Excludes teaching and separately reported services

## 2024-12-18 NOTE — PROGRESS NOTE ADULT - SUBJECTIVE AND OBJECTIVE BOX
INTERVAL HPI/OVERNIGHT EVENTS:  This morning: Patient was seen and examined at bedside.      VITAL SIGNS:  T(F): 98.2 (12-19-24 @ 06:09)  HR: 65 (12-19-24 @ 06:09)  BP: 161/84 (12-19-24 @ 06:09)  RR: 18 (12-19-24 @ 06:09)  SpO2: 96% (12-19-24 @ 06:09)  Wt(kg): --    I/O:      PHYSICAL EXAM:    Constitutional: NAD  HEENT: PERRL, EOMI, sclera non-icteric, neck supple, trachea midline, no masses, no JVD, MMM, good dentition  Respiratory: CTA b/l, good air entry b/l, no wheezing, no rhonchi, no rales, without accessory muscle use and no intercostal retractions  Cardiovascular: RRR, normal S1S2, no M/R/G  Gastrointestinal: abdomen soft, NTND, no masses palpable, BS normal  Extremities: Warm, well perfused, pulses equal bilateral upper and lower extremities, no edema, no clubbing  Neurological: AAOx3, CN Grossly intact  Skin: Normal temperature, warm, dry    MEDICATIONS  (STANDING):  ammonium lactate 12% Lotion 1 Application(s) Topical every 12 hours  ARIPiprazole 2 milliGRAM(s) Oral at bedtime  aspirin  chewable 81 milliGRAM(s) Oral daily  DULoxetine 60 milliGRAM(s) Oral at bedtime  enoxaparin Injectable 40 milliGRAM(s) SubCutaneous every 24 hours  finasteride 5 milliGRAM(s) Oral every 24 hours  influenza  Vaccine (HIGH DOSE) 0.5 milliLiter(s) IntraMuscular once  losartan 25 milliGRAM(s) Oral every 24 hours  multivitamin 1 Tablet(s) Oral daily  pantoprazole    Tablet 40 milliGRAM(s) Oral before breakfast  polyethylene glycol 3350 17 Gram(s) Oral two times a day  rosuvastatin 10 milliGRAM(s) Oral at bedtime  senna 2 Tablet(s) Oral at bedtime  tamsulosin 0.4 milliGRAM(s) Oral at bedtime  traMADol 50 milliGRAM(s) Oral every 12 hours  triamcinolone 0.1% Cream 1 Application(s) Topical every 12 hours    MEDICATIONS  (PRN):  acetaminophen     Tablet .. 650 milliGRAM(s) Oral every 6 hours PRN Temp greater or equal to 38C (100.4F), Mild Pain (1 - 3)  HYDROmorphone   Tablet 4 milliGRAM(s) Oral every 4 hours PRN Severe Pain (7 - 10)  melatonin 3 milliGRAM(s) Oral at bedtime PRN Insomnia      Allergies    sulfa drugs (Unknown)    Intolerances        LABS:                        RADIOLOGY & ADDITIONAL TESTS:  Reviewed INTERVAL HPI/OVERNIGHT EVENTS: shaunna o/n  This morning: Patient was seen and examined at bedside.  Pt without any acute concerns, states that he would not want to stay in hospital further.     VITAL SIGNS:  T(F): 98.2 (12-19-24 @ 06:09)  HR: 65 (12-19-24 @ 06:09)  BP: 161/84 (12-19-24 @ 06:09)  RR: 18 (12-19-24 @ 06:09)  SpO2: 96% (12-19-24 @ 06:09)  Wt(kg): --    I/O:      PHYSICAL EXAM:    Constitutional: NAD  HEENT: PERRL, EOMI, MMM  Respiratory: CTA b/l, good air entry b/l, no wheezing, no rhonchi, no rales, without accessory muscle use and no intercostal retractions  Cardiovascular: RRR, normal S1S2, no M/R/G  Gastrointestinal: abdomen soft, NTND  Extremities: 4/5 strength b/l LE  Neurological: AAOx3  Skin: Normal temperature, warm, dry      MEDICATIONS  (STANDING):  ammonium lactate 12% Lotion 1 Application(s) Topical every 12 hours  ARIPiprazole 2 milliGRAM(s) Oral at bedtime  aspirin  chewable 81 milliGRAM(s) Oral daily  DULoxetine 60 milliGRAM(s) Oral at bedtime  enoxaparin Injectable 40 milliGRAM(s) SubCutaneous every 24 hours  finasteride 5 milliGRAM(s) Oral every 24 hours  influenza  Vaccine (HIGH DOSE) 0.5 milliLiter(s) IntraMuscular once  losartan 25 milliGRAM(s) Oral every 24 hours  multivitamin 1 Tablet(s) Oral daily  pantoprazole    Tablet 40 milliGRAM(s) Oral before breakfast  polyethylene glycol 3350 17 Gram(s) Oral two times a day  rosuvastatin 10 milliGRAM(s) Oral at bedtime  senna 2 Tablet(s) Oral at bedtime  tamsulosin 0.4 milliGRAM(s) Oral at bedtime  traMADol 50 milliGRAM(s) Oral every 12 hours  triamcinolone 0.1% Cream 1 Application(s) Topical every 12 hours    MEDICATIONS  (PRN):  acetaminophen     Tablet .. 650 milliGRAM(s) Oral every 6 hours PRN Temp greater or equal to 38C (100.4F), Mild Pain (1 - 3)  HYDROmorphone   Tablet 4 milliGRAM(s) Oral every 4 hours PRN Severe Pain (7 - 10)  melatonin 3 milliGRAM(s) Oral at bedtime PRN Insomnia      Allergies    sulfa drugs (Unknown)    Intolerances        LABS:                        RADIOLOGY & ADDITIONAL TESTS:  Reviewed

## 2024-12-18 NOTE — CHART NOTE - NSCHARTNOTEFT_GEN_A_CORE
Called patient's emergency contact Anna to discuss patient's discharge. Explained that patient has capacity to refuse [facility] and that medical team has encouraged patient to go to [facility] as this is the safest disposition for him. Explained that patient is able to refuse [facility] (he is alert, oriented, answering questions appropriately) and therefore is planned for discharge to [facility]. CAMACHO Canales and Dr. Roger Beverly met yesterday with patient and Anna and explained that patient is planned for discharge today now that apt improvements are done. Discussed with CAMACHO Canales who submitted APS referral due to safety concerns, as requested by Anna. Called patient's emergency contact Anna to discuss patient's discharge. Explained that patient has capacity to refuse [facility] and that medical team has encouraged patient to go to [facility] as this is the safest disposition for him. Explained that patient is able to refuse [facility] (he is alert, oriented, answering questions appropriately) and therefore is planned for discharge home today. CAMACHO Canales and Dr. Roger Beverly met yesterday with patient and Anna and explained that patient is planned for discharge today now that apt improvements are done. Discussed with CAMACHO Canales who submitted APS referral due to safety concerns of patient at home, as requested by Anna.

## 2024-12-18 NOTE — PROGRESS NOTE ADULT - ATTENDING COMMENTS
Mr. Blair is a 80yo man with PMHx osteoporosis c/b multiple fractures, AAA s/p EVAR (11/12/24), bladder cancer in remission, depression who presented after a mechanical fall, seen by PT recommending [facility] but patient prefers to return home.    Patient continues on losartan, BP improved. Will f/u as an outpatient for further titration. Patient confirmed again that he does not want to go to [facility], wants to go home. Discussed plan of care with patient's friend Anna (see event note from 12/18). Patient stable for discharge home.    **note is delayed entry as patient was planned for discharge 12/18 but did not leave due to transportation issues. Note corresponds to examination from 12/18.
79 year old man , living alone at home , has part time HHA , HCP - Brother Osvaldo Blair ( Cardiologist ) being helped by Friend Anna  ( to transition to [facility] ) admitted to the hospital after a fall at home without LOC , Prodromal symptoms   On Admission was hypertensive  and Retaining urine ( hx of Urinary Retention On Flomax and Finasteride )   Hx of AAA s/p EVAR and Right Renal Artery Stent in 11/24     on exam   aaox3  lungs clear   regular heart sounds , no murmurs   abd soft   no abd , suprapubic tenderness  Moving all 4 extremities spontaneously , no dysmetria     Labs, Imaging Reviewed   Discussed with Vascular Surgery     Impression and plan   # HTN uncontrolled , Did not improve with Resolution of urinary retention , start Nifedipine   # Urinary Retention , s/p 2 straight caths , now urinating spontaneously , continue flomax, Finasteride   # AAA s/p EVAR and Right Renal Artery stent, CTA to r/o Stent Thrombosis , was not taking Aspirin , will start during this admission   # Normocytic Anemia   # Chronic Pain Due to Multiple Back surgery , Continue Tramadol , Dilaudid, ISTOP in a separate note     Dispo : PT tameka ANDRES , SW working on placement , needs 3 midnights
Mr. Blair is a 80yo man with PMHx osteoporosis c/b multiple fractures, AAA s/p EVAR (11/12/24), bladder cancer in remission, depression who presented after a mechanical fall, seen by PT recommending [facility] but patient prefers to return home.    Patient with elevated BP this admission. Discussed with vascular (Dr. Coribn) given recent EVAR and renal artery stent, recommending medical management. Starting losartan today, will uptitrate as needed.    Patient seen this morning, sitting upright in bed. Discussed PT recommendations for [facility] and patient states he prefers to return home. Sodium 131 <-- 137 this morning, patient declining repeat labs. Will obtain urine studies. Repleting KAVITHA SAUER assisting with safe dispo home.

## 2024-12-18 NOTE — PROGRESS NOTE ADULT - PROBLEM SELECTOR PLAN 4
hx of AAA, s/p EVAR in 11/2024 with R renal stent placement. No current abdominal / back pain.     - vascular consulted, recs appreciated  -- no interventions at this time, trial BP medical management as above.

## 2024-12-18 NOTE — PROGRESS NOTE ADULT - PROBLEM SELECTOR PLAN 9
Pt on home duloxetine 60mg PO BID. Pt has clonazepam 2mg BID PRN for anxiety and ambien 5mg PRN for insomnia. Last dose klonopin 12/14 PM.     - con't home duloxetine  - hold home clonazepam 2mg BID PRN  - can offer melatonin for insomnia.

## 2024-12-18 NOTE — PROGRESS NOTE ADULT - PROBLEM SELECTOR PLAN 7
Prior RUE DVT, provoked, completed treatment    - dvt ppx

## 2024-12-18 NOTE — PROGRESS NOTE ADULT - PROBLEM SELECTOR PLAN 10
F: none   E: replete K<3.5, Mg <2  N: regular diet  DVT: lovenox 40mg q24h  GI: omeprazole 20mg

## 2024-12-18 NOTE — PROGRESS NOTE ADULT - PROBLEM SELECTOR PLAN 8
Pt has prescription for omeprazole 20mg QD, however per pt's pharmacy pt has not filled prescription since May 2024    - con't omeprazole.

## 2024-12-18 NOTE — PROGRESS NOTE ADULT - PROBLEM SELECTOR PLAN 1
Pt presenting with fall at home. Likely weakness related vs coordination.   Unlikely to have syncopal event as pt denies LOC. Unlikely seizure as no incontinence, no post-ictal state. Unlikely cardiac etiology as pt denies palpitations, chest pain, EKG similar to prior  PT evaluation in ED, recommended [facility]    - admit for [facility] placement -- pt currently refusing to go to [facility]  - fall precautions  - f/u orthostatic vitals Pt presenting with fall at home. Likely weakness related vs coordination.   Unlikely to have syncopal event as pt denies LOC. Unlikely seizure as no incontinence, no post-ictal state. Unlikely cardiac etiology as pt denies palpitations, chest pain, EKG similar to prior  PT evaluation in ED, recommended [facility]    - admit for [facility] placement -- pt currently refusing to go to [facility]  - fall precautions

## 2024-12-18 NOTE — PROGRESS NOTE ADULT - ASSESSMENT
Patient is a 80 y/o male w/ hx osteoporosis, Kyphoscoliosis, s/p c sacrum fusion at [facility] 10/2020, Depression, Bladder CA in remission (s/p resection localized chemo) Multiple Fragility fractures, Gerd, Lymphedema, Prior RUE DVT, BPH, recent infrarenal AAA s/p EVAR in 11/12/24 p/w worsening of his chronic lower back pain s/p fall admitted for further workup and management of mechanical fall pending [facility] ccb hypertensive emergency.

## 2024-12-18 NOTE — PROGRESS NOTE ADULT - PROBLEM SELECTOR PLAN 3
Pt with multiple fragility fractures in past. Follows with endocrine outpatient    - vit D level >30  - c/w endocrine f/u

## 2024-12-18 NOTE — PROGRESS NOTE ADULT - PROBLEM SELECTOR PLAN 5
Pt has history of chronic vertebral compression fractures and kyphoscoliosis s/p C-sacrum fusion. Pt on Tramadol 100mg ER QD and Dilaudid 4mg q4 PRN.     - Con't tramadol 50mg bid   - holding Dilaudid 4mg for now as pt appears to be comfortable in bed  - monitor for back pain symptoms.

## 2024-12-19 ENCOUNTER — TRANSCRIPTION ENCOUNTER (OUTPATIENT)
Age: 79
End: 2024-12-19

## 2024-12-19 VITALS
RESPIRATION RATE: 18 BRPM | OXYGEN SATURATION: 94 % | TEMPERATURE: 98 F | SYSTOLIC BLOOD PRESSURE: 153 MMHG | DIASTOLIC BLOOD PRESSURE: 81 MMHG | HEART RATE: 68 BPM

## 2024-12-19 PROCEDURE — 85027 COMPLETE CBC AUTOMATED: CPT

## 2024-12-19 PROCEDURE — 80307 DRUG TEST PRSMV CHEM ANLYZR: CPT

## 2024-12-19 PROCEDURE — 97161 PT EVAL LOW COMPLEX 20 MIN: CPT

## 2024-12-19 PROCEDURE — 72131 CT LUMBAR SPINE W/O DYE: CPT | Mod: MC

## 2024-12-19 PROCEDURE — 73564 X-RAY EXAM KNEE 4 OR MORE: CPT

## 2024-12-19 PROCEDURE — 99285 EMERGENCY DEPT VISIT HI MDM: CPT

## 2024-12-19 PROCEDURE — 36415 COLL VENOUS BLD VENIPUNCTURE: CPT

## 2024-12-19 PROCEDURE — 84100 ASSAY OF PHOSPHORUS: CPT

## 2024-12-19 PROCEDURE — 82306 VITAMIN D 25 HYDROXY: CPT

## 2024-12-19 PROCEDURE — 97165 OT EVAL LOW COMPLEX 30 MIN: CPT

## 2024-12-19 PROCEDURE — 70450 CT HEAD/BRAIN W/O DYE: CPT | Mod: MC

## 2024-12-19 PROCEDURE — 99239 HOSP IP/OBS DSCHRG MGMT >30: CPT

## 2024-12-19 PROCEDURE — 80048 BASIC METABOLIC PNL TOTAL CA: CPT

## 2024-12-19 PROCEDURE — 82550 ASSAY OF CK (CPK): CPT

## 2024-12-19 PROCEDURE — 82962 GLUCOSE BLOOD TEST: CPT

## 2024-12-19 PROCEDURE — 93005 ELECTROCARDIOGRAM TRACING: CPT

## 2024-12-19 PROCEDURE — 83735 ASSAY OF MAGNESIUM: CPT

## 2024-12-19 PROCEDURE — 85025 COMPLETE CBC W/AUTO DIFF WBC: CPT

## 2024-12-19 PROCEDURE — 80053 COMPREHEN METABOLIC PANEL: CPT

## 2024-12-19 PROCEDURE — 81001 URINALYSIS AUTO W/SCOPE: CPT

## 2024-12-19 RX ADMIN — PANTOPRAZOLE SODIUM 40 MILLIGRAM(S): 40 TABLET, DELAYED RELEASE ORAL at 06:34

## 2024-12-19 RX ADMIN — Medication 81 MILLIGRAM(S): at 12:21

## 2024-12-19 RX ADMIN — AMMONIUM LACTATE 1 APPLICATION(S): 120 LOTION TOPICAL at 01:43

## 2024-12-19 RX ADMIN — Medication 5 MILLIGRAM(S): at 10:50

## 2024-12-19 RX ADMIN — HYDROMORPHONE HYDROCHLORIDE 4 MILLIGRAM(S): 2 TABLET ORAL at 10:50

## 2024-12-19 RX ADMIN — ENOXAPARIN SODIUM 40 MILLIGRAM(S): 30 INJECTION SUBCUTANEOUS at 10:50

## 2024-12-19 RX ADMIN — LOSARTAN POTASSIUM 25 MILLIGRAM(S): 100 TABLET, FILM COATED ORAL at 12:22

## 2024-12-19 RX ADMIN — TRAMADOL HYDROCHLORIDE 50 MILLIGRAM(S): 300 CAPSULE ORAL at 06:48

## 2024-12-19 RX ADMIN — Medication 1 TABLET(S): at 12:22

## 2024-12-19 RX ADMIN — TRIAMCINOLONE ACETONIDE 1 APPLICATION(S): 0.15 AEROSOL, SPRAY TOPICAL at 01:43

## 2024-12-19 RX ADMIN — HYDROMORPHONE HYDROCHLORIDE 4 MILLIGRAM(S): 2 TABLET ORAL at 04:34

## 2024-12-19 NOTE — DISCHARGE NOTE NURSING/CASE MANAGEMENT/SOCIAL WORK - PATIENT PORTAL LINK FT
You can access the FollowMyHealth Patient Portal offered by [facility] by registering at the following website: [email or web address]. By joining [employer]’s FollowMyHealth portal, you will also be able to view your health information using other applications (apps) compatible with our system.

## 2024-12-19 NOTE — DISCHARGE NOTE NURSING/CASE MANAGEMENT/SOCIAL WORK - NSDCPEFALRISK_GEN_ALL_CORE
For information on Fall & Injury Prevention, visit: [email or web address] OR  [email or web address] OR  https://www.cdc.gov/steadi/patient.html

## 2024-12-19 NOTE — DISCHARGE NOTE NURSING/CASE MANAGEMENT/SOCIAL WORK - NSDCFUADDAPPT_GEN_ALL_CORE_FT
Please bring your discharge paperwork to the following appointment:  1) Dr. Joseph Rocha ([address] [place] [address]) on 1/6/2025 @ 2:40PM to follow up on your blood pressure.

## 2024-12-20 ENCOUNTER — APPOINTMENT (OUTPATIENT)
Dept: ORTHOPEDIC SURGERY | Facility: CLINIC | Age: 79
End: 2024-12-20

## 2024-12-31 DIAGNOSIS — Z13.1 ENCOUNTER FOR SCREENING FOR DIABETES MELLITUS: ICD-10-CM

## 2025-01-02 DIAGNOSIS — G89.29 OTHER CHRONIC PAIN: ICD-10-CM

## 2025-01-02 DIAGNOSIS — E87.1 HYPO-OSMOLALITY AND HYPONATREMIA: ICD-10-CM

## 2025-01-02 DIAGNOSIS — R29.6 REPEATED FALLS: ICD-10-CM

## 2025-01-02 DIAGNOSIS — E78.00 PURE HYPERCHOLESTEROLEMIA, UNSPECIFIED: ICD-10-CM

## 2025-01-02 DIAGNOSIS — M54.50 LOW BACK PAIN, UNSPECIFIED: ICD-10-CM

## 2025-01-02 DIAGNOSIS — I10 ESSENTIAL (PRIMARY) HYPERTENSION: ICD-10-CM

## 2025-01-02 DIAGNOSIS — R27.9 UNSPECIFIED LACK OF COORDINATION: ICD-10-CM

## 2025-01-02 DIAGNOSIS — R33.8 OTHER RETENTION OF URINE: ICD-10-CM

## 2025-01-02 DIAGNOSIS — Z79.82 LONG TERM (CURRENT) USE OF ASPIRIN: ICD-10-CM

## 2025-01-02 DIAGNOSIS — K21.9 GASTRO-ESOPHAGEAL REFLUX DISEASE WITHOUT ESOPHAGITIS: ICD-10-CM

## 2025-01-02 DIAGNOSIS — Z91.148 PATIENT'S OTHER NONCOMPLIANCE WITH MEDICATION REGIMEN FOR OTHER REASON: ICD-10-CM

## 2025-01-02 DIAGNOSIS — Z96.0 PRESENCE OF UROGENITAL IMPLANTS: ICD-10-CM
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
## 2025-01-02 DIAGNOSIS — Z86.718 PERSONAL HISTORY OF OTHER VENOUS THROMBOSIS AND EMBOLISM: ICD-10-CM

## 2025-01-02 DIAGNOSIS — R53.1 WEAKNESS: ICD-10-CM

## 2025-01-02 DIAGNOSIS — Z87.891 PERSONAL HISTORY OF NICOTINE DEPENDENCE: ICD-10-CM

## 2025-01-02 DIAGNOSIS — R26.81 UNSTEADINESS ON FEET: ICD-10-CM

## 2025-01-02 DIAGNOSIS — G47.00 INSOMNIA, UNSPECIFIED: ICD-10-CM

## 2025-01-02 DIAGNOSIS — G44.89 OTHER HEADACHE SYNDROME: ICD-10-CM

## 2025-01-02 DIAGNOSIS — F32.9 MAJOR DEPRESSIVE DISORDER, SINGLE EPISODE, UNSPECIFIED: ICD-10-CM

## 2025-01-02 DIAGNOSIS — E87.6 HYPOKALEMIA: ICD-10-CM

## 2025-01-02 DIAGNOSIS — Z98.1 ARTHRODESIS STATUS: ICD-10-CM

## 2025-01-02 DIAGNOSIS — I70.1 ATHEROSCLEROSIS OF RENAL ARTERY: ICD-10-CM

## 2025-01-02 DIAGNOSIS — Z85.51 PERSONAL HISTORY OF MALIGNANT NEOPLASM OF BLADDER: ICD-10-CM

## 2025-01-02 DIAGNOSIS — Z66 DO NOT RESUSCITATE: ICD-10-CM

## 2025-01-02 DIAGNOSIS — N40.1 BENIGN PROSTATIC HYPERPLASIA WITH LOWER URINARY TRACT SYMPTOMS: ICD-10-CM

## 2025-01-02 DIAGNOSIS — Z90.6 ACQUIRED ABSENCE OF OTHER PARTS OF URINARY TRACT: ICD-10-CM

## 2025-01-02 DIAGNOSIS — I16.1 HYPERTENSIVE EMERGENCY: ICD-10-CM

## 2025-01-02 DIAGNOSIS — Z88.2 ALLERGY STATUS TO SULFONAMIDES: ICD-10-CM

## 2025-01-02 DIAGNOSIS — T50.916A UNDERDOSING OF MULTIPLE UNSPECIFIED DRUGS, MEDICAMENTS AND BIOLOGICAL SUBSTANCES, INITIAL ENCOUNTER: ICD-10-CM

## 2025-01-02 DIAGNOSIS — M80.08XG AGE-RELATED OSTEOPOROSIS WITH CURRENT PATHOLOGICAL FRACTURE, VERTEBRA(E), SUBSEQUENT ENCOUNTER FOR FRACTURE WITH DELAYED HEALING: ICD-10-CM

## 2025-01-02 DIAGNOSIS — D64.9 ANEMIA, UNSPECIFIED: ICD-10-CM
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
## 2025-01-02 DIAGNOSIS — I89.0 LYMPHEDEMA, NOT ELSEWHERE CLASSIFIED: ICD-10-CM

## 2025-01-06 ENCOUNTER — APPOINTMENT (OUTPATIENT)
Dept: INTERNAL MEDICINE | Facility: CLINIC | Age: 80
End: 2025-01-06

## 2025-01-15 ENCOUNTER — OUTPATIENT (OUTPATIENT)
Dept: OUTPATIENT SERVICES | Facility: HOSPITAL | Age: 80
LOS: 1 days | End: 2025-01-15
Payer: MEDICARE

## 2025-01-15 ENCOUNTER — APPOINTMENT (OUTPATIENT)
Dept: INFUSION THERAPY | Facility: CLINIC | Age: 80
End: 2025-01-15

## 2025-01-15 VITALS
DIASTOLIC BLOOD PRESSURE: 80 MMHG | SYSTOLIC BLOOD PRESSURE: 139 MMHG | TEMPERATURE: 99 F | RESPIRATION RATE: 18 BRPM | OXYGEN SATURATION: 96 % | HEART RATE: 72 BPM

## 2025-01-15 DIAGNOSIS — Z98.890 OTHER SPECIFIED POSTPROCEDURAL STATES: Chronic | ICD-10-CM

## 2025-01-15 DIAGNOSIS — M81.0 AGE-RELATED OSTEOPOROSIS WITHOUT CURRENT PATHOLOGICAL FRACTURE: ICD-10-CM

## 2025-01-15 PROCEDURE — 96372 THER/PROPH/DIAG INJ SC/IM: CPT

## 2025-01-15 RX ORDER — DENOSUMAB 60 MG/ML
60 INJECTION SUBCUTANEOUS ONCE
Refills: 0 | Status: COMPLETED | OUTPATIENT
Start: 2025-01-15 | End: 2025-01-15

## 2025-01-15 RX ADMIN — DENOSUMAB 60 MILLIGRAM(S): 60 INJECTION SUBCUTANEOUS at 17:01

## 2025-02-14 ENCOUNTER — INPATIENT (INPATIENT)
Facility: HOSPITAL | Age: 80
LOS: 3 days | Discharge: HOME HEALTH | End: 2025-02-18
Attending: INTERNAL MEDICINE | Admitting: STUDENT IN AN ORGANIZED HEALTH CARE EDUCATION/TRAINING PROGRAM
Payer: MEDICARE

## 2025-02-14 VITALS
TEMPERATURE: 98 F | OXYGEN SATURATION: 96 % | WEIGHT: 149.91 LBS | SYSTOLIC BLOOD PRESSURE: 179 MMHG | HEART RATE: 65 BPM | RESPIRATION RATE: 20 BRPM | HEIGHT: 68 IN | DIASTOLIC BLOOD PRESSURE: 90 MMHG

## 2025-02-14 DIAGNOSIS — N40.0 BENIGN PROSTATIC HYPERPLASIA WITHOUT LOWER URINARY TRACT SYMPTOMS: ICD-10-CM

## 2025-02-14 DIAGNOSIS — Z98.890 OTHER SPECIFIED POSTPROCEDURAL STATES: ICD-10-CM

## 2025-02-14 DIAGNOSIS — M54.9 DORSALGIA, UNSPECIFIED: ICD-10-CM

## 2025-02-14 DIAGNOSIS — R29.898 OTHER SYMPTOMS AND SIGNS INVOLVING THE MUSCULOSKELETAL SYSTEM: ICD-10-CM

## 2025-02-14 DIAGNOSIS — E78.00 PURE HYPERCHOLESTEROLEMIA, UNSPECIFIED: ICD-10-CM

## 2025-02-14 DIAGNOSIS — Z98.890 OTHER SPECIFIED POSTPROCEDURAL STATES: Chronic | ICD-10-CM

## 2025-02-14 DIAGNOSIS — F32.9 MAJOR DEPRESSIVE DISORDER, SINGLE EPISODE, UNSPECIFIED: ICD-10-CM

## 2025-02-14 DIAGNOSIS — K21.9 GASTRO-ESOPHAGEAL REFLUX DISEASE WITHOUT ESOPHAGITIS: ICD-10-CM

## 2025-02-14 DIAGNOSIS — Z29.9 ENCOUNTER FOR PROPHYLACTIC MEASURES, UNSPECIFIED: ICD-10-CM
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
## 2025-02-14 LAB
ANION GAP SERPL CALC-SCNC: 13 MMOL/L — SIGNIFICANT CHANGE UP (ref 5–17)
APTT BLD: 36.6 SEC — HIGH (ref 24.5–35.6)
BLD GP AB SCN SERPL QL: NEGATIVE — SIGNIFICANT CHANGE UP
BUN SERPL-MCNC: 25 MG/DL — HIGH (ref 7–23)
CALCIUM SERPL-MCNC: 8.4 MG/DL — SIGNIFICANT CHANGE UP (ref 8.4–10.5)
CHLORIDE SERPL-SCNC: 95 MMOL/L — LOW (ref 96–108)
CO2 SERPL-SCNC: 26 MMOL/L — SIGNIFICANT CHANGE UP (ref 22–31)
CREAT SERPL-MCNC: 1.09 MG/DL — SIGNIFICANT CHANGE UP (ref 0.5–1.3)
EGFR: 69 ML/MIN/1.73M2 — SIGNIFICANT CHANGE UP
GLUCOSE SERPL-MCNC: 99 MG/DL — SIGNIFICANT CHANGE UP (ref 70–99)
HCT VFR BLD CALC: 40.3 % — SIGNIFICANT CHANGE UP (ref 39–50)
HGB BLD-MCNC: 13 G/DL — SIGNIFICANT CHANGE UP (ref 13–17)
INR BLD: 1.1 — SIGNIFICANT CHANGE UP (ref 0.85–1.16)
MCHC RBC-ENTMCNC: 28.2 PG — SIGNIFICANT CHANGE UP (ref 27–34)
MCHC RBC-ENTMCNC: 32.3 G/DL — SIGNIFICANT CHANGE UP (ref 32–36)
MCV RBC AUTO: 87.4 FL — SIGNIFICANT CHANGE UP (ref 80–100)
NRBC BLD AUTO-RTO: 0 /100 WBCS — SIGNIFICANT CHANGE UP (ref 0–0)
PLATELET # BLD AUTO: 177 K/UL — SIGNIFICANT CHANGE UP (ref 150–400)
POTASSIUM SERPL-MCNC: 3.7 MMOL/L — SIGNIFICANT CHANGE UP (ref 3.5–5.3)
POTASSIUM SERPL-SCNC: 3.7 MMOL/L — SIGNIFICANT CHANGE UP (ref 3.5–5.3)
PROTHROM AB SERPL-ACNC: 12.8 SEC — SIGNIFICANT CHANGE UP (ref 9.9–13.4)
RBC # BLD: 4.61 M/UL — SIGNIFICANT CHANGE UP (ref 4.2–5.8)
RBC # FLD: 14.1 % — SIGNIFICANT CHANGE UP (ref 10.3–14.5)
RH IG SCN BLD-IMP: POSITIVE — SIGNIFICANT CHANGE UP
SODIUM SERPL-SCNC: 134 MMOL/L — LOW (ref 135–145)
WBC # BLD: 10.11 K/UL — SIGNIFICANT CHANGE UP (ref 3.8–10.5)
WBC # FLD AUTO: 10.11 K/UL — SIGNIFICANT CHANGE UP (ref 3.8–10.5)

## 2025-02-14 PROCEDURE — G0168: CPT

## 2025-02-14 PROCEDURE — 99222 1ST HOSP IP/OBS MODERATE 55: CPT

## 2025-02-14 PROCEDURE — 70450 CT HEAD/BRAIN W/O DYE: CPT | Mod: 26

## 2025-02-14 PROCEDURE — 73502 X-RAY EXAM HIP UNI 2-3 VIEWS: CPT | Mod: 26,LT

## 2025-02-14 PROCEDURE — 73030 X-RAY EXAM OF SHOULDER: CPT | Mod: 26

## 2025-02-14 PROCEDURE — 99223 1ST HOSP IP/OBS HIGH 75: CPT | Mod: GC

## 2025-02-14 PROCEDURE — 72125 CT NECK SPINE W/O DYE: CPT | Mod: 26

## 2025-02-14 PROCEDURE — 99285 EMERGENCY DEPT VISIT HI MDM: CPT | Mod: 25

## 2025-02-14 PROCEDURE — 73060 X-RAY EXAM OF HUMERUS: CPT | Mod: 26,LT

## 2025-02-14 PROCEDURE — 93010 ELECTROCARDIOGRAM REPORT: CPT

## 2025-02-14 PROCEDURE — 72192 CT PELVIS W/O DYE: CPT | Mod: 26

## 2025-02-14 RX ORDER — OXYBUTYNIN CHLORIDE 5 MG/1
1 TABLET, FILM COATED, EXTENDED RELEASE ORAL
Refills: 0 | DISCHARGE

## 2025-02-14 RX ORDER — MELATONIN 5 MG
3 TABLET ORAL AT BEDTIME
Refills: 0 | Status: DISCONTINUED | OUTPATIENT
Start: 2025-02-14 | End: 2025-02-18

## 2025-02-14 RX ORDER — HYDROMORPHONE/SOD CHLOR,ISO/PF 2 MG/10 ML
2 SYRINGE (ML) INJECTION
Refills: 0 | DISCHARGE

## 2025-02-14 RX ORDER — ACETAMINOPHEN 500 MG/5ML
650 LIQUID (ML) ORAL EVERY 6 HOURS
Refills: 0 | Status: DISCONTINUED | OUTPATIENT
Start: 2025-02-14 | End: 2025-02-18

## 2025-02-14 RX ORDER — TAMSULOSIN HYDROCHLORIDE 0.4 MG/1
0.4 CAPSULE ORAL AT BEDTIME
Refills: 0 | Status: DISCONTINUED | OUTPATIENT
Start: 2025-02-14 | End: 2025-02-18

## 2025-02-14 RX ORDER — ROSUVASTATIN CALCIUM 20 MG/1
10 TABLET, FILM COATED ORAL AT BEDTIME
Refills: 0 | Status: DISCONTINUED | OUTPATIENT
Start: 2025-02-14 | End: 2025-02-18

## 2025-02-14 RX ORDER — CLOSTRIDIUM TETANI TOXOID ANTIGEN (FORMALDEHYDE INACTIVATED), CORYNEBACTERIUM DIPHTHERIAE TOXOID ANTIGEN (FORMALDEHYDE INACTIVATED), BORDETELLA PERTUSSIS TOXOID ANTIGEN (GLUTARALDEHYDE INACTIVATED), BORDETELLA PERTUSSIS FILAMENTOUS HEMAGGLUTININ ANTIGEN (FORMALDEHYDE INACTIVATED), BORDETELLA PERTUSSIS PERTACTIN ANTIGEN, AND BORDETELLA PERTUSSIS FIMBRIAE 2/3 ANTIGEN 5; 2; 2.5; 5; 3; 5 [LF]/.5ML; [LF]/.5ML; UG/.5ML; UG/.5ML; UG/.5ML; UG/.5ML
0.5 INJECTION, SUSPENSION INTRAMUSCULAR ONCE
Refills: 0 | Status: COMPLETED | OUTPATIENT
Start: 2025-02-14 | End: 2025-02-14

## 2025-02-14 RX ORDER — ARIPIPRAZOLE 2 MG/1
2 TABLET ORAL DAILY
Refills: 0 | Status: DISCONTINUED | OUTPATIENT
Start: 2025-02-14 | End: 2025-02-18

## 2025-02-14 RX ORDER — ENOXAPARIN SODIUM 100 MG/ML
40 INJECTION SUBCUTANEOUS EVERY 24 HOURS
Refills: 0 | Status: DISCONTINUED | OUTPATIENT
Start: 2025-02-14 | End: 2025-02-18

## 2025-02-14 RX ORDER — ASPIRIN 325 MG
81 TABLET ORAL DAILY
Refills: 0 | Status: DISCONTINUED | OUTPATIENT
Start: 2025-02-14 | End: 2025-02-18

## 2025-02-14 RX ORDER — TRAMADOL HYDROCHLORIDE 50 MG/1
100 TABLET, FILM COATED ORAL DAILY
Refills: 0 | Status: DISCONTINUED | OUTPATIENT
Start: 2025-02-14 | End: 2025-02-15

## 2025-02-14 RX ORDER — INFLUENZA A VIRUS A/IDAHO/07/2018 (H1N1) ANTIGEN (MDCK CELL DERIVED, PROPIOLACTONE INACTIVATED, INFLUENZA A VIRUS A/INDIANA/08/2018 (H3N2) ANTIGEN (MDCK CELL DERIVED, PROPIOLACTONE INACTIVATED), INFLUENZA B VIRUS B/SINGAPORE/INFTT-16-0610/2016 ANTIGEN (MDCK CELL DERIVED, PROPIOLACTONE INACTIVATED), INFLUENZA B VIRUS B/IOWA/06/2017 ANTIGEN (MDCK CELL DERIVED, PROPIOLACTONE INACTIVATED) 15; 15; 15; 15 UG/.5ML; UG/.5ML; UG/.5ML; UG/.5ML
0.5 INJECTION, SUSPENSION INTRAMUSCULAR ONCE
Refills: 0 | Status: DISCONTINUED | OUTPATIENT
Start: 2025-02-14 | End: 2025-02-18

## 2025-02-14 RX ORDER — HYDROMORPHONE/SOD CHLOR,ISO/PF 2 MG/10 ML
2 SYRINGE (ML) INJECTION
Refills: 0 | Status: DISCONTINUED | OUTPATIENT
Start: 2025-02-14 | End: 2025-02-18

## 2025-02-14 RX ORDER — FINASTERIDE 1 MG/1
5 TABLET, FILM COATED ORAL DAILY
Refills: 0 | Status: DISCONTINUED | OUTPATIENT
Start: 2025-02-14 | End: 2025-02-18

## 2025-02-14 RX ORDER — DULOXETINE 20 MG/1
60 CAPSULE, DELAYED RELEASE ORAL DAILY
Refills: 0 | Status: DISCONTINUED | OUTPATIENT
Start: 2025-02-14 | End: 2025-02-18

## 2025-02-14 RX ADMIN — Medication 650 MILLIGRAM(S): at 22:30

## 2025-02-14 RX ADMIN — TAMSULOSIN HYDROCHLORIDE 0.4 MILLIGRAM(S): 0.4 CAPSULE ORAL at 21:52

## 2025-02-14 RX ADMIN — FINASTERIDE 5 MILLIGRAM(S): 1 TABLET, FILM COATED ORAL at 18:32

## 2025-02-14 RX ADMIN — Medication 81 MILLIGRAM(S): at 18:32

## 2025-02-14 RX ADMIN — Medication 650 MILLIGRAM(S): at 21:53

## 2025-02-14 RX ADMIN — Medication 4 MILLIGRAM(S): at 12:46

## 2025-02-14 RX ADMIN — ENOXAPARIN SODIUM 40 MILLIGRAM(S): 100 INJECTION SUBCUTANEOUS at 18:31

## 2025-02-14 RX ADMIN — Medication 2 MILLIGRAM(S): at 20:07

## 2025-02-14 RX ADMIN — Medication 4 MILLIGRAM(S): at 14:05

## 2025-02-14 RX ADMIN — ROSUVASTATIN CALCIUM 10 MILLIGRAM(S): 20 TABLET, FILM COATED ORAL at 21:52

## 2025-02-14 RX ADMIN — Medication 2 MILLIGRAM(S): at 19:53

## 2025-02-14 RX ADMIN — CLOSTRIDIUM TETANI TOXOID ANTIGEN (FORMALDEHYDE INACTIVATED), CORYNEBACTERIUM DIPHTHERIAE TOXOID ANTIGEN (FORMALDEHYDE INACTIVATED), BORDETELLA PERTUSSIS TOXOID ANTIGEN (GLUTARALDEHYDE INACTIVATED), BORDETELLA PERTUSSIS FILAMENTOUS HEMAGGLUTININ ANTIGEN (FORMALDEHYDE INACTIVATED), BORDETELLA PERTUSSIS PERTACTIN ANTIGEN, AND BORDETELLA PERTUSSIS FIMBRIAE 2/3 ANTIGEN 0.5 MILLILITER(S): 5; 2; 2.5; 5; 3; 5 INJECTION, SUSPENSION INTRAMUSCULAR at 09:35

## 2025-02-14 NOTE — H&P ADULT - ASSESSMENT
Mr. Blair is an  79 y/o male w/ hx osteoporosis, Kyphoscoliosis, s/p c sacrum fusion at [facility] 10/2020, Depression, Bladder CA in remission (s/p resection localized chemo), GERD, Lymphedema, Prior DVT, BPH, recent infrarenal AAA s/p EVAR in 11/12/24 here for left shoulder pain s/p mechanical fall this morning found to have a pubic rami fracture, admitted for pain control and physical therapy evalaution.

## 2025-02-14 NOTE — H&P ADULT - NSHPPHYSICALEXAM_GEN_ALL_CORE
General: NAD, appears comfortable    HEENT:  PERRL, anicteric sclera  Cardiovascular:  RRR, no MRG  Respiratory: CTA B/L, normal WOB  Gastrointestinal: soft, NT/ND; +BSx4  Uro: condom cath  Extremities: no edema to LE, RLE wrapped with gauze, mild dried blood on left foot  Vascular: 2+ radial pulses  Neurological: AAOx3; no focal deficits, UE motor and sensation intact, RLE motor and sensation intact, LLE motor reduced d/t pain with movement, sensation intact

## 2025-02-14 NOTE — ED PROVIDER NOTE - OBJECTIVE STATEMENT
81 y/o male w/ hx osteoporosis, Kyphoscoliosis, s/p c sacrum fusion at [facility] 10/2020, Depression, Bladder CA in remission (s/p resection localized chemo) Multiple Fragility fractures, GERD, Lymphedema, Prior RUE DVT, BPH, recent infrarenal AAA s/p EVAR in 11/12/24 here for left shoulder pain s/p mechanical fall this morning. Unsure if he hit his head. Also with contusion right shin. Denies LOC, blood thinner use.

## 2025-02-14 NOTE — CONSULT NOTE ADULT - SUBJECTIVE AND OBJECTIVE BOX
Orthopaedic Surgery Consult Note    CC: Patient is a 80y old  Male who presents with a chief complaint of fall (14 Feb 2025 14:32)      HPI:  80y old Male pmhx osteoporosis, Kyphoscoliosis, s/p sacral fusion at [facility] 10/2020, depression, bladder cancer in remission, multiple fragility fractures, GERD, Lymphedema, Prior RUE DVT, BPH, recent infrarenal AAA s/p EVAR in 11/12/24  , presents to [facility] ED s/p mechanical fall this morning. Pt is known to orthopaedic service s/p left femoral neck fracture in October 2023, treated with hemiarthroplasty by Dr. Mcgovern. Pt last seen outpatient by Dr. Mcgovern in November 2024. Patient states he was ambulating to the bathroom overnight and fell onto his left side. He cannot recall head trauma/LOC however CT head performed in ED negative. Pt reports bilateral hip pain, right greater than left, and left shoulder pain. Pt states he ambulates with a walker in the mornings and then progresses to a cane over the course of the day. Pt has known heterotopic ossification and iliopsoas tendinopathy that is being managed conservatively. He denies numbness/tingling/weakness of bilateral lower extremities.     ROS: Positive for  Denies fevers, chills, headache, dizziness, chest pain, shortness of breath, nausea, vomiting.   All other systems negative, except for above.     Allergies    sulfa drugs (Unknown)    Intolerances      PAST MEDICAL & SURGICAL HISTORY:  High cholesterol      Depression      GERD (gastroesophageal reflux disease)      Bladder cancer      History of back surgery        Social History:    FAMILY HISTORY:  No pertinent family history in first degree relatives      Medications:     Vital Signs Last 24 Hrs  T(C): 36.8 (14 Feb 2025 13:47), Max: 36.9 (14 Feb 2025 07:56)  T(F): 98.2 (14 Feb 2025 13:47), Max: 98.4 (14 Feb 2025 07:56)  HR: 61 (14 Feb 2025 13:47) (61 - 65)  BP: 148/92 (14 Feb 2025 13:47) (148/92 - 179/90)  BP(mean): --  RR: 18 (14 Feb 2025 13:47) (18 - 20)  SpO2: 95% (14 Feb 2025 13:47) (95% - 96%)    Parameters below as of 14 Feb 2025 13:47  Patient On (Oxygen Delivery Method): room air        PE:  General: Laying comfortably in stretcher, NAD   Neuro: Alert, oriented x 3  Psych: Normal mood & affect   Skin: Warm, dry, extremities well perfused  MSK:    -Inspection/palpation: Left anterior shoulder swelling, bruising with superficial abrasion. Left shoulder is tender to palpation; patient tolerates passive internal and external rotation as well as passive flexion. Left elbow and left wrist nontender to palpation. No gross deformities of bilateral lower extremities. Left anterior shin wound from fall covered with gauze and kerlix (per ED team superficial wound irrigated and derma bonded). Bilateral anterior hips tender to palpation. Log roll negative bilaterally.     -Sensation: SLT in tact to distal bilateral upper and lower extremities.     -Motor/ROM: LUE WF/WE//Biceps/Triceps 5/5, fires deltoid. EHL/FHL/TA/GS/Ham/Quad 5/5 motor strength bilateral lower extremities. Patient comfortably straight leg raises bilaterally. + Right hip pain with internal and external rotation.     -Pulses: DP pulses palpable bilaterally                          13.0   10.11 )-----------( 177      ( 14 Feb 2025 10:03 )             40.3     02-14    134[L]  |  95[L]  |  25[H]  ----------------------------<  99  3.7   |  26  |  1.09    Ca    8.4      14 Feb 2025 10:03      PT/INR - ( 14 Feb 2025 10:03 )   PT: 12.8 sec;   INR: 1.10          PTT - ( 14 Feb 2025 10:03 )  PTT:36.6 sec    Imaging:     ACC: [id number] EXAM:  XR HUMERUS MIN 2 VIEWS LT   ORDERED BY: CHECO PAYNE     ACC: [id number] EXAM:  XR SHOULDER W AXILLA MIN 2V LT   ORDERED BY:   CHECO PAYNE     PROCEDURE DATE:  02/14/2025          INTERPRETATION:  CLINICAL INDICATION: Fall. Pain.    TECHNIQUE: 3 views of the left shoulder. 2 views of the left humerus.    COMPARISON: None available.    FINDINGS/  IMPRESSION:  No acute fracture. No dislocation. Mild acromioclavicular joint space   narrowing with osteophytosis. Mild to moderate glenohumeral joint   osteoarthrosis. Partially imaged thoracic spine fixation hardware. Small   external device overlying the left axillary region.    There is marked soft tissue swelling about the left shoulder, may reflect   a soft tissue contusion. Please correlate with physical examination   findings.    The imaged lung is clear.    --- End of Report ---          EDISON MARTINES MD; Resident Radiologist  This document has been electronically signed.  JEYSON JAUREGUI MD; Attending Radiologist  This document has been electronically signed. Feb 14 2025  9:38AM      ACC: [id number] EXAM:  XR HIP WITH PELV 2-3V LT   ORDERED BY: CHECO MEHTAUR     PROCEDURE DATE:  02/14/2025          INTERPRETATION:  Clinical history reason for exam: Trauma.    Frontal pelvis, left hip 2 views    COMPARISON: November 8, 2024.    Findings/  impression: No acute displaced fracture or hip dislocation.   Redemonstration bilateral hip replacements in satisfactory position with   no hardware complication. Bilateral hip heterotopic bone. Lower lumbar,   bilateral sacral and iliac hardware. Vascular stents.    --- End of Report ---            JR SARABJIT FITZGERALD MD; Attending Radiologist  This document has been electronically signed. Feb 14 2025 11:21AM      ACC: [id number] EXAM:  CT PELVIS ONLY   ORDERED BY: CHECO PAYNE     PROCEDURE DATE:  02/14/2025          INTERPRETATION:  HISTORY: Pain.    Helical CT imaging of the bony pelvis was performed without intravenous   contrast. Sagittal and coronal reformats were provided. 3-D reformats   were performed on a separate workstation.    Comparison is made to prior CT of the pelvis from 3/14/2024.    Findings:    There is new mild buckling of the anterior cortex of left superior pubic   ramus at its midportion for a nondisplaced fracture. No definite inferior   pubic ramus fracture or sacral fracture.    Status post bilateral hip hemiarthroplasty. No evidence of osteolysis or   loosening. Heterotopic bone formation about the right greater than left   hip joints. Status post lumbar sacral spinal fusion. Hardware appears   grossly similar compared to the prior examination.    Small hematoma within the left lateral subcutaneous fat overlying the   region of the greater trochanter measuring up to 2.7 x 3.6 x 5.1 cm.    Large fat-containing right inguinal hernia measuring up to 3.3 x 3.6 cm.   Status post interval endovascular repair of an abdominal aortic aneurysm.   Cholelithiasis. Right-sided renal cyst measuring up to 3.9 cm.    IMPRESSION:    New mild buckling of the anterior cortex of left superior pubic ramus   concerning for a nondisplaced fracture.    5.1 cm hematoma within the lateral subcutaneous fat overlying the left   greater trochanter.    --- End of Report ---            AMOR DUMONT MD; Attending Radiologist  This document has been electronically signed. Feb 14 2025  9:55AM    A/P: 80yMale with clinically probable left superior pubic rami fracture s/p mechanical fall   Reviewed imaging and discussed plan with Dr. Mcgovern  No acute orthopaedic intervention at this time  WBAT with assistive device , PT ; left shoulder WBAT  Pain control as needed     Ortho Pager [phone]    40 minutes spent on total encounter, over 50% of the visit was spent counseling and/or coordinating care.    Orthopaedic Surgery Consult Note    CC: Patient is a 80y old  Male who presents with a chief complaint of fall (14 Feb 2025 14:32)      HPI:  80y old Male pmhx osteoporosis, Kyphoscoliosis, s/p sacral fusion at [facility] 10/2020, depression, bladder cancer in remission, multiple fragility fractures, GERD, Lymphedema, Prior RUE DVT, BPH, recent infrarenal AAA s/p EVAR in 11/12/24  , presents to [facility] ED s/p mechanical fall this morning. Pt is known to orthopaedic service s/p left femoral neck fracture in October 2023, treated with hemiarthroplasty by Dr. Mcgovern. Pt last seen outpatient by Dr. Mcgovern in November 2024. Patient states he was ambulating to the bathroom overnight and fell onto his left side. He cannot recall head trauma/LOC however CT head performed in ED negative. Pt reports bilateral hip pain, right greater than left, and left shoulder pain. Pt states he ambulates with a walker in the mornings and then progresses to a cane over the course of the day. Pt has known heterotopic ossification and iliopsoas tendinopathy that is being managed conservatively. He denies numbness/tingling/weakness of bilateral lower extremities.     ROS: Positive for  Denies fevers, chills, headache, dizziness, chest pain, shortness of breath, nausea, vomiting.   All other systems negative, except for above.     Allergies    sulfa drugs (Unknown)    Intolerances      PAST MEDICAL & SURGICAL HISTORY:  High cholesterol      Depression      GERD (gastroesophageal reflux disease)      Bladder cancer      History of back surgery        Social History:    FAMILY HISTORY:  No pertinent family history in first degree relatives      Medications:     Vital Signs Last 24 Hrs  T(C): 36.8 (14 Feb 2025 13:47), Max: 36.9 (14 Feb 2025 07:56)  T(F): 98.2 (14 Feb 2025 13:47), Max: 98.4 (14 Feb 2025 07:56)  HR: 61 (14 Feb 2025 13:47) (61 - 65)  BP: 148/92 (14 Feb 2025 13:47) (148/92 - 179/90)  BP(mean): --  RR: 18 (14 Feb 2025 13:47) (18 - 20)  SpO2: 95% (14 Feb 2025 13:47) (95% - 96%)    Parameters below as of 14 Feb 2025 13:47  Patient On (Oxygen Delivery Method): room air        PE:  General: Laying comfortably in stretcher, NAD   Neuro: Alert, oriented x 3  Psych: Normal mood & affect   Skin: Warm, dry, extremities well perfused  MSK:    -Inspection/palpation: Left anterior shoulder swelling, bruising with superficial abrasion. Left shoulder is tender to palpation; patient tolerates passive internal and external rotation as well as passive flexion. Left elbow and left wrist nontender to palpation. No gross deformities of bilateral lower extremities. Left anterior shin wound from fall covered with gauze and kerlix (per ED team superficial wound irrigated and derma bonded). Bilateral anterior hips tender to palpation. Log roll negative bilaterally.     -Sensation: SLT in tact to distal bilateral upper and lower extremities.     -Motor/ROM: LUE WF/WE//Biceps/Triceps 5/5, fires deltoid. EHL/FHL/TA/GS/Ham/Quad 5/5 motor strength bilateral lower extremities. Patient comfortably straight leg raises bilaterally. + Right hip pain with internal and external rotation.     -Pulses: DP pulses palpable bilaterally                          13.0   10.11 )-----------( 177      ( 14 Feb 2025 10:03 )             40.3     02-14    134[L]  |  95[L]  |  25[H]  ----------------------------<  99  3.7   |  26  |  1.09    Ca    8.4      14 Feb 2025 10:03      PT/INR - ( 14 Feb 2025 10:03 )   PT: 12.8 sec;   INR: 1.10          PTT - ( 14 Feb 2025 10:03 )  PTT:36.6 sec    Imaging:     ACC: [id number] EXAM:  XR HUMERUS MIN 2 VIEWS LT   ORDERED BY: CHECO PAYNE     ACC: [id number] EXAM:  XR SHOULDER W AXILLA MIN 2V LT   ORDERED BY:   CHECO PAYNE     PROCEDURE DATE:  02/14/2025          INTERPRETATION:  CLINICAL INDICATION: Fall. Pain.    TECHNIQUE: 3 views of the left shoulder. 2 views of the left humerus.    COMPARISON: None available.    FINDINGS/  IMPRESSION:  No acute fracture. No dislocation. Mild acromioclavicular joint space   narrowing with osteophytosis. Mild to moderate glenohumeral joint   osteoarthrosis. Partially imaged thoracic spine fixation hardware. Small   external device overlying the left axillary region.    There is marked soft tissue swelling about the left shoulder, may reflect   a soft tissue contusion. Please correlate with physical examination   findings.    The imaged lung is clear.    --- End of Report ---          EDISON MARTINES MD; Resident Radiologist  This document has been electronically signed.  JEYSON JAUREGUI MD; Attending Radiologist  This document has been electronically signed. Feb 14 2025  9:38AM      ACC: [id number] EXAM:  XR HIP WITH PELV 2-3V LT   ORDERED BY: CHECO MEHTAUR     PROCEDURE DATE:  02/14/2025          INTERPRETATION:  Clinical history reason for exam: Trauma.    Frontal pelvis, left hip 2 views    COMPARISON: November 8, 2024.    Findings/  impression: No acute displaced fracture or hip dislocation.   Redemonstration bilateral hip replacements in satisfactory position with   no hardware complication. Bilateral hip heterotopic bone. Lower lumbar,   bilateral sacral and iliac hardware. Vascular stents.    --- End of Report ---            JR SARABJIT FITZGERALD MD; Attending Radiologist  This document has been electronically signed. Feb 14 2025 11:21AM      ACC: [id number] EXAM:  CT PELVIS ONLY   ORDERED BY: CHECO PAYNE     PROCEDURE DATE:  02/14/2025          INTERPRETATION:  HISTORY: Pain.    Helical CT imaging of the bony pelvis was performed without intravenous   contrast. Sagittal and coronal reformats were provided. 3-D reformats   were performed on a separate workstation.    Comparison is made to prior CT of the pelvis from 3/14/2024.    Findings:    There is new mild buckling of the anterior cortex of left superior pubic   ramus at its midportion for a nondisplaced fracture. No definite inferior   pubic ramus fracture or sacral fracture.    Status post bilateral hip hemiarthroplasty. No evidence of osteolysis or   loosening. Heterotopic bone formation about the right greater than left   hip joints. Status post lumbar sacral spinal fusion. Hardware appears   grossly similar compared to the prior examination.    Small hematoma within the left lateral subcutaneous fat overlying the   region of the greater trochanter measuring up to 2.7 x 3.6 x 5.1 cm.    Large fat-containing right inguinal hernia measuring up to 3.3 x 3.6 cm.   Status post interval endovascular repair of an abdominal aortic aneurysm.   Cholelithiasis. Right-sided renal cyst measuring up to 3.9 cm.    IMPRESSION:    New mild buckling of the anterior cortex of left superior pubic ramus   concerning for a nondisplaced fracture.    5.1 cm hematoma within the lateral subcutaneous fat overlying the left   greater trochanter.    --- End of Report ---            AMOR DUMONT MD; Attending Radiologist  This document has been electronically signed. Feb 14 2025  9:55AM    A/P: 80yMale with left superior pubic ramus fracture s/p mechanical fall   Reviewed imaging and discussed plan with Dr. Mcgovern  No acute orthopaedic intervention at this time  WBAT with assistive device , PT ; left shoulder WBAT  Pain control as needed     Ortho Pager [phone]    40 minutes spent on total encounter, over 50% of the visit was spent counseling and/or coordinating care.    Orthopaedic Surgery Consult Note    CC: Patient is a 80y old  Male who presents with a chief complaint of fall (14 Feb 2025 14:32)      HPI:  80y old Male pmhx osteoporosis, Kyphoscoliosis, s/p sacral fusion at [facility] 10/2020, depression, bladder cancer in remission, multiple fragility fractures, GERD, Lymphedema, Prior RUE DVT, BPH, recent infrarenal AAA s/p EVAR in 11/12/24  , presents to [facility] ED s/p mechanical fall this morning. Pt is known to orthopaedic service s/p left femoral neck fracture in October 2023, treated with hemiarthroplasty by Dr. Mcgovern. Pt last seen outpatient by Dr. Mcgovern in November 2024. Patient states he was ambulating to the bathroom overnight and fell onto his left side. He cannot recall head trauma/LOC however CT head performed in ED negative. Pt reports bilateral hip pain, right greater than left, and left shoulder pain. Pt states he ambulates with a walker in the mornings and then progresses to a cane over the course of the day. Pt has known bilateral hip heterotopic ossification and iliopsoas tendinopathy that is being managed conservatively. He denies numbness/tingling/weakness of bilateral lower extremities.     ROS: Positive for  Denies fevers, chills, headache, dizziness, chest pain, shortness of breath, nausea, vomiting.   All other systems negative, except for above.     Allergies    sulfa drugs (Unknown)    Intolerances      PAST MEDICAL & SURGICAL HISTORY:  High cholesterol      Depression      GERD (gastroesophageal reflux disease)      Bladder cancer      History of back surgery        Social History:    FAMILY HISTORY:  No pertinent family history in first degree relatives      Medications:     Vital Signs Last 24 Hrs  T(C): 36.8 (14 Feb 2025 13:47), Max: 36.9 (14 Feb 2025 07:56)  T(F): 98.2 (14 Feb 2025 13:47), Max: 98.4 (14 Feb 2025 07:56)  HR: 61 (14 Feb 2025 13:47) (61 - 65)  BP: 148/92 (14 Feb 2025 13:47) (148/92 - 179/90)  BP(mean): --  RR: 18 (14 Feb 2025 13:47) (18 - 20)  SpO2: 95% (14 Feb 2025 13:47) (95% - 96%)    Parameters below as of 14 Feb 2025 13:47  Patient On (Oxygen Delivery Method): room air        PE:  General: Laying comfortably in stretcher, NAD   Neuro: Alert, oriented x 3  Psych: Normal mood & affect   Skin: Warm, dry, extremities well perfused  MSK:    -Inspection/palpation: Left anterior shoulder swelling, bruising with superficial abrasion. Left shoulder is tender to palpation; patient tolerates passive internal and external rotation as well as passive flexion. Left elbow and left wrist nontender to palpation. No gross deformities of bilateral lower extremities. Left anterior shin wound from fall covered with gauze and kerlix (per ED team superficial wound irrigated and derma bonded). Bilateral anterior hips tender to palpation. Log roll negative bilaterally.     -Sensation: SLT in tact to distal bilateral upper and lower extremities.     -Motor/ROM: LUE WF/WE//Biceps/Triceps 5/5, fires deltoid. EHL/FHL/TA/GS/Ham/Quad 5/5 motor strength bilateral lower extremities. Patient comfortably straight leg raises bilaterally. + Right hip pain with internal and external rotation.     -Pulses: DP pulses palpable bilaterally                          13.0   10.11 )-----------( 177      ( 14 Feb 2025 10:03 )             40.3     02-14    134[L]  |  95[L]  |  25[H]  ----------------------------<  99  3.7   |  26  |  1.09    Ca    8.4      14 Feb 2025 10:03      PT/INR - ( 14 Feb 2025 10:03 )   PT: 12.8 sec;   INR: 1.10          PTT - ( 14 Feb 2025 10:03 )  PTT:36.6 sec    Imaging:     ACC: [id number] EXAM:  XR HUMERUS MIN 2 VIEWS LT   ORDERED BY: CHECO PAYNE     ACC: [id number] EXAM:  XR SHOULDER W AXILLA MIN 2V LT   ORDERED BY:   CHECO PAYNE     PROCEDURE DATE:  02/14/2025          INTERPRETATION:  CLINICAL INDICATION: Fall. Pain.    TECHNIQUE: 3 views of the left shoulder. 2 views of the left humerus.    COMPARISON: None available.    FINDINGS/  IMPRESSION:  No acute fracture. No dislocation. Mild acromioclavicular joint space   narrowing with osteophytosis. Mild to moderate glenohumeral joint   osteoarthrosis. Partially imaged thoracic spine fixation hardware. Small   external device overlying the left axillary region.    There is marked soft tissue swelling about the left shoulder, may reflect   a soft tissue contusion. Please correlate with physical examination   findings.    The imaged lung is clear.    --- End of Report ---          EDISON MARTINES MD; Resident Radiologist  This document has been electronically signed.  JEYSON JAUREGUI MD; Attending Radiologist  This document has been electronically signed. Feb 14 2025  9:38AM      ACC: [id number] EXAM:  XR HIP WITH PELV 2-3V LT   ORDERED BY: CHECO PAYNE     PROCEDURE DATE:  02/14/2025          INTERPRETATION:  Clinical history reason for exam: Trauma.    Frontal pelvis, left hip 2 views    COMPARISON: November 8, 2024.    Findings/  impression: No acute displaced fracture or hip dislocation.   Redemonstration bilateral hip replacements in satisfactory position with   no hardware complication. Bilateral hip heterotopic bone. Lower lumbar,   bilateral sacral and iliac hardware. Vascular stents.    --- End of Report ---            JR SARAJBIT FITZGERALD MD; Attending Radiologist  This document has been electronically signed. Feb 14 2025 11:21AM      ACC: [id number] EXAM:  CT PELVIS ONLY   ORDERED BY: CHECO PAYNE     PROCEDURE DATE:  02/14/2025          INTERPRETATION:  HISTORY: Pain.    Helical CT imaging of the bony pelvis was performed without intravenous   contrast. Sagittal and coronal reformats were provided. 3-D reformats   were performed on a separate workstation.    Comparison is made to prior CT of the pelvis from 3/14/2024.    Findings:    There is new mild buckling of the anterior cortex of left superior pubic   ramus at its midportion for a nondisplaced fracture. No definite inferior   pubic ramus fracture or sacral fracture.    Status post bilateral hip hemiarthroplasty. No evidence of osteolysis or   loosening. Heterotopic bone formation about the right greater than left   hip joints. Status post lumbar sacral spinal fusion. Hardware appears   grossly similar compared to the prior examination.    Small hematoma within the left lateral subcutaneous fat overlying the   region of the greater trochanter measuring up to 2.7 x 3.6 x 5.1 cm.    Large fat-containing right inguinal hernia measuring up to 3.3 x 3.6 cm.   Status post interval endovascular repair of an abdominal aortic aneurysm.   Cholelithiasis. Right-sided renal cyst measuring up to 3.9 cm.    IMPRESSION:    New mild buckling of the anterior cortex of left superior pubic ramus   concerning for a nondisplaced fracture.    5.1 cm hematoma within the lateral subcutaneous fat overlying the left   greater trochanter.    --- End of Report ---            AMOR DUMONT MD; Attending Radiologist  This document has been electronically signed. Feb 14 2025  9:55AM    A/P: 80yMale with left superior pubic ramus fracture s/p mechanical fall   Reviewed imaging and discussed plan with Dr. Mcgovern  No acute orthopaedic intervention at this time  WBAT with assistive device , PT ; left upper extremity WBAT  Pain control as needed     Ortho Pager [phone]    40 minutes spent on total encounter, over 50% of the visit was spent counseling and/or coordinating care.    Orthopaedic Surgery Consult Note    CC: Patient is a 80y old  Male who presents with a chief complaint of fall (14 Feb 2025 14:32)      HPI:  80y old Male pmhx osteoporosis, Kyphoscoliosis, s/p sacral fusion at [facility] 10/2020, depression, bladder cancer in remission, multiple fragility fractures, GERD, Lymphedema, Prior RUE DVT, BPH, recent infrarenal AAA s/p EVAR in 11/12/24  , presents to [facility] ED s/p mechanical fall this morning. Pt is known to orthopaedic service s/p left femoral neck fracture in October 2023, treated with hemiarthroplasty by Dr. Mcgovern. Pt last seen outpatient by Dr. Mcgovern in November 2024. Patient states he was ambulating to the bathroom overnight and fell onto his left side. He cannot recall head trauma/LOC however CT head performed in ED negative. Pt reports bilateral hip pain, right greater than left, and left shoulder pain. Pt states he ambulates with a walker in the mornings and then progresses to a cane over the course of the day. Pt has known bilateral hip heterotopic ossification and iliopsoas tendinopathy that is being managed conservatively. He denies numbness/tingling/weakness of bilateral lower extremities.     ROS: Positive for  Denies fevers, chills, headache, dizziness, chest pain, shortness of breath, nausea, vomiting.   All other systems negative, except for above.     Allergies    sulfa drugs (Unknown)    Intolerances      PAST MEDICAL & SURGICAL HISTORY:  High cholesterol      Depression      GERD (gastroesophageal reflux disease)      Bladder cancer      History of back surgery        Social History:    FAMILY HISTORY:  No pertinent family history in first degree relatives      Medications:     Vital Signs Last 24 Hrs  T(C): 36.8 (14 Feb 2025 13:47), Max: 36.9 (14 Feb 2025 07:56)  T(F): 98.2 (14 Feb 2025 13:47), Max: 98.4 (14 Feb 2025 07:56)  HR: 61 (14 Feb 2025 13:47) (61 - 65)  BP: 148/92 (14 Feb 2025 13:47) (148/92 - 179/90)  BP(mean): --  RR: 18 (14 Feb 2025 13:47) (18 - 20)  SpO2: 95% (14 Feb 2025 13:47) (95% - 96%)    Parameters below as of 14 Feb 2025 13:47  Patient On (Oxygen Delivery Method): room air        PE:  General: Laying comfortably in stretcher, NAD   Neuro: Alert, oriented x 3  Psych: Normal mood & affect   Skin: Warm, dry, extremities well perfused  MSK:    -Inspection/palpation: Left anterior shoulder swelling, bruising with superficial abrasion. Left shoulder is tender to palpation; patient tolerates passive internal and external rotation as well as passive flexion. Left elbow and left wrist nontender to palpation. No gross deformities of bilateral lower extremities. Right anterior shin wound from fall covered with gauze and kerlix (per ED team superficial wound irrigated and derma bonded). Bilateral anterior hips tender to palpation. Log roll negative bilaterally.     -Sensation: SLT in tact to distal bilateral upper and lower extremities.     -Motor/ROM: LUE WF/WE//Biceps/Triceps 5/5, fires deltoid. EHL/FHL/TA/GS/Ham/Quad 5/5 motor strength bilateral lower extremities. Patient comfortably straight leg raises bilaterally. + Right hip pain with internal and external rotation.     -Pulses: DP pulses palpable bilaterally                          13.0   10.11 )-----------( 177      ( 14 Feb 2025 10:03 )             40.3     02-14    134[L]  |  95[L]  |  25[H]  ----------------------------<  99  3.7   |  26  |  1.09    Ca    8.4      14 Feb 2025 10:03      PT/INR - ( 14 Feb 2025 10:03 )   PT: 12.8 sec;   INR: 1.10          PTT - ( 14 Feb 2025 10:03 )  PTT:36.6 sec    Imaging:     ACC: [id number] EXAM:  XR HUMERUS MIN 2 VIEWS LT   ORDERED BY: CHECO PAYNE     ACC: [id number] EXAM:  XR SHOULDER W AXILLA MIN 2V LT   ORDERED BY:   CHECO PAYNE     PROCEDURE DATE:  02/14/2025          INTERPRETATION:  CLINICAL INDICATION: Fall. Pain.    TECHNIQUE: 3 views of the left shoulder. 2 views of the left humerus.    COMPARISON: None available.    FINDINGS/  IMPRESSION:  No acute fracture. No dislocation. Mild acromioclavicular joint space   narrowing with osteophytosis. Mild to moderate glenohumeral joint   osteoarthrosis. Partially imaged thoracic spine fixation hardware. Small   external device overlying the left axillary region.    There is marked soft tissue swelling about the left shoulder, may reflect   a soft tissue contusion. Please correlate with physical examination   findings.    The imaged lung is clear.    --- End of Report ---          EDISON MARTINES MD; Resident Radiologist  This document has been electronically signed.  JEYSON JAUREGUI MD; Attending Radiologist  This document has been electronically signed. Feb 14 2025  9:38AM      ACC: [id number] EXAM:  XR HIP WITH PELV 2-3V LT   ORDERED BY: CHECO PAYNE     PROCEDURE DATE:  02/14/2025          INTERPRETATION:  Clinical history reason for exam: Trauma.    Frontal pelvis, left hip 2 views    COMPARISON: November 8, 2024.    Findings/  impression: No acute displaced fracture or hip dislocation.   Redemonstration bilateral hip replacements in satisfactory position with   no hardware complication. Bilateral hip heterotopic bone. Lower lumbar,   bilateral sacral and iliac hardware. Vascular stents.    --- End of Report ---            JR SARABJIT FITZGERALD MD; Attending Radiologist  This document has been electronically signed. Feb 14 2025 11:21AM      ACC: [id number] EXAM:  CT PELVIS ONLY   ORDERED BY: CHECO PAYNE     PROCEDURE DATE:  02/14/2025          INTERPRETATION:  HISTORY: Pain.    Helical CT imaging of the bony pelvis was performed without intravenous   contrast. Sagittal and coronal reformats were provided. 3-D reformats   were performed on a separate workstation.    Comparison is made to prior CT of the pelvis from 3/14/2024.    Findings:    There is new mild buckling of the anterior cortex of left superior pubic   ramus at its midportion for a nondisplaced fracture. No definite inferior   pubic ramus fracture or sacral fracture.    Status post bilateral hip hemiarthroplasty. No evidence of osteolysis or   loosening. Heterotopic bone formation about the right greater than left   hip joints. Status post lumbar sacral spinal fusion. Hardware appears   grossly similar compared to the prior examination.    Small hematoma within the left lateral subcutaneous fat overlying the   region of the greater trochanter measuring up to 2.7 x 3.6 x 5.1 cm.    Large fat-containing right inguinal hernia measuring up to 3.3 x 3.6 cm.   Status post interval endovascular repair of an abdominal aortic aneurysm.   Cholelithiasis. Right-sided renal cyst measuring up to 3.9 cm.    IMPRESSION:    New mild buckling of the anterior cortex of left superior pubic ramus   concerning for a nondisplaced fracture.    5.1 cm hematoma within the lateral subcutaneous fat overlying the left   greater trochanter.    --- End of Report ---            AMOR DUMONT MD; Attending Radiologist  This document has been electronically signed. Feb 14 2025  9:55AM    A/P: 80yMale with left superior pubic ramus fracture s/p mechanical fall   Reviewed imaging and discussed plan with Dr. Mcgovern  No acute orthopaedic intervention at this time  WBAT with assistive device , PT ; left upper extremity WBAT  Pain control as needed     Ortho Pager [phone]    40 minutes spent on total encounter, over 50% of the visit was spent counseling and/or coordinating care.

## 2025-02-14 NOTE — H&P ADULT - NSHPLABSRESULTS_GEN_ALL_CORE
LABS:                         13.0   10.11 )-----------( 177      ( 14 Feb 2025 10:03 )             40.3     02-14    134[L]  |  95[L]  |  25[H]  ----------------------------<  99  3.7   |  26  |  1.09    Ca    8.4      14 Feb 2025 10:03      PT/INR - ( 14 Feb 2025 10:03 )   PT: 12.8 sec;   INR: 1.10          PTT - ( 14 Feb 2025 10:03 )  PTT:36.6 sec  Urinalysis Basic - ( 14 Feb 2025 10:03 )    Color: x / Appearance: x / SG: x / pH: x  Gluc: 99 mg/dL / Ketone: x  / Bili: x / Urobili: x   Blood: x / Protein: x / Nitrite: x   Leuk Esterase: x / RBC: x / WBC x   Sq Epi: x / Non Sq Epi: x / Bacteria: x                RADIOLOGY, EKG & ADDITIONAL TESTS: Reviewed.

## 2025-02-14 NOTE — ED ADULT NURSE NOTE - NSFALLHARMRISKINTERV_ED_ALL_ED

## 2025-02-14 NOTE — H&P ADULT - PROBLEM SELECTOR PLAN 3
Continue with home PPI. on home aripiprazole 2mg qD, duloxetine 60mg qD    - C/w home meds AAA s/p EVAR (11/12/24) with R renal stent placement. states he takes aspirin 81mg qD but unclear if he is suppose to be on it per HIE.    plan  - c/w aspirin 81mg qD AAA s/p EVAR (11/12/24) with R renal stent placement. states he takes aspirin 81mg qD but unclear if he is suppose to be on it per HIE.    Plan  - start aspirin 81mg qD

## 2025-02-14 NOTE — H&P ADULT - PROBLEM SELECTOR PLAN 2
Continue home antidepressant. on home aripiprazole 2mg qD, duloxetine 60mg qD    - C/w home meds mechanical fall with several falls over past several years. no LOC, seizure like activity, no chest pain, palpitations, dizziness, lightheadedness. manage as above. mechanical fall with several falls over past several years. no LOC, seizure like activity, no chest pain, palpitations, dizziness, lightheadedness. manage as above.    Plan  - order orthostatics

## 2025-02-14 NOTE — H&P ADULT - PROBLEM SELECTOR PROBLEM 3
GERD (gastroesophageal reflux disease) BPH (benign prostatic hyperplasia) Depression Status post AAA (abdominal aortic aneurysm) repair

## 2025-02-14 NOTE — H&P ADULT - HISTORY OF PRESENT ILLNESS
ED Course:    179/90, 65 HR, 20 RR, 98.4 F, 96% on RA    Labs  CBC, BMP unremarkable    Imaging    XRAY L Shoulder/Humerous: No acute fracture. No dislocation. Mild acromioclavicular joint space narrowing with osteophytosis. Mild to moderate glenohumeral joint   osteoarthrosis. Partially imaged thoracic spine fixation hardware. Small external device overlying the left axillary region. There is marked soft tissue swelling about the left shoulder, may reflect a soft tissue contusion. Please correlate with physical examination findings. The imaged lung is clear.    XRAY HIP PELVIS:  No acute displaced fracture or hip dislocation. Redemonstration bilateral hip replacements in satisfactory position with no hardware complication. Bilateral hip heterotopic bone. Lower lumbar, bilateral sacral and iliac hardware. Vascular stents.    CT C SPINE AND HEAD NON CON: No acute intracranial hemorrhage. No acute fracture cervical spine. If pain persists, follow-up MRI exam recommended (if no contraindication).    CT PELVIS NON CON: New mild buckling of the anterior cortex of left superior pubic ramus concerning for a nondisplaced fracture. 5.1 cm hematoma within the lateral subcutaneous fat overlying the left greater trochanter.    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 Patient was in USOH (ambulates with cane at baseline, sometimes walker and wheelchair, HHA) until last night when he got up to use bathroom, was ambulating to bathroom with walker and slowly fell leftwards, hitting left shoulder and left hip on floor, called life alert. Patient was down on floor for about 20 mins before ambulance arrived. Denied LOC, tongue biting, chest pain, dyspnea, dizziness, lightheadedness, remainder of ROS negative.       ED Course:    179/90, 65 HR, 20 RR, 98.4 F, 96% on RA    Labs  CBC, BMP unremarkable    Imaging    XRAY L Shoulder/Humerous: No acute fracture. No dislocation. Mild acromioclavicular joint space narrowing with osteophytosis. Mild to moderate glenohumeral joint   osteoarthrosis. Partially imaged thoracic spine fixation hardware. Small external device overlying the left axillary region. There is marked soft tissue swelling about the left shoulder, may reflect a soft tissue contusion. Please correlate with physical examination findings. The imaged lung is clear.    XRAY HIP PELVIS:  No acute displaced fracture or hip dislocation. Redemonstration bilateral hip replacements in satisfactory position with no hardware complication. Bilateral hip heterotopic bone. Lower lumbar, bilateral sacral and iliac hardware. Vascular stents.    CT C SPINE AND HEAD NON CON: No acute intracranial hemorrhage. No acute fracture cervical spine. If pain persists, follow-up MRI exam recommended (if no contraindication).    CT PELVIS NON CON: New mild buckling of the anterior cortex of left superior pubic ramus concerning for a nondisplaced fracture. 5.1 cm hematoma within the lateral subcutaneous fat overlying the left greater trochanter.    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 Patient was in USOH (ambulates with cane at baseline, sometimes walker and wheelchair, HHA) until last night when he got up to use bathroom, was ambulating to bathroom with walker and slowly fell leftwards, hitting left shoulder and left hip on floor, called life alert. Patient was down on floor for about 20 mins before ambulance arrived. States this is 4th fall over past 2 years. Denied LOC, tongue biting, chest pain, dyspnea, dizziness, lightheadedness, remainder of ROS negative.       ED Course:    179/90, 65 HR, 20 RR, 98.4 F, 96% on RA    Labs  CBC, BMP unremarkable    Imaging    XRAY L Shoulder/Humerous: No acute fracture. No dislocation. Mild acromioclavicular joint space narrowing with osteophytosis. Mild to moderate glenohumeral joint   osteoarthrosis. Partially imaged thoracic spine fixation hardware. Small external device overlying the left axillary region. There is marked soft tissue swelling about the left shoulder, may reflect a soft tissue contusion. Please correlate with physical examination findings. The imaged lung is clear.    XRAY HIP PELVIS:  No acute displaced fracture or hip dislocation. Redemonstration bilateral hip replacements in satisfactory position with no hardware complication. Bilateral hip heterotopic bone. Lower lumbar, bilateral sacral and iliac hardware. Vascular stents.    CT C SPINE AND HEAD NON CON: No acute intracranial hemorrhage. No acute fracture cervical spine. If pain persists, follow-up MRI exam recommended (if no contraindication).    CT PELVIS NON CON: New mild buckling of the anterior cortex of left superior pubic ramus concerning for a nondisplaced fracture. 5.1 cm hematoma within the lateral subcutaneous fat overlying the left greater trochanter.    Interventions: Tdap, morphine 4mg    Consults: ortho 78 y/o male w/ hx osteoporosis, Kyphoscoliosis, s/p c sacrum fusion at 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 Patient was in USOH (ambulates with cane at baseline, sometimes walker and wheelchair, HHA) until last night when he got up to use bathroom, was ambulating to bathroom with walker and slowly fell leftwards, hitting left shoulder and left hip on floor, called life alert. Patient was down on floor for about 20 mins before ambulance arrived. States this is 4th fall over past 2 years. Denied LOC, tongue biting, chest pain, dyspnea, dizziness, lightheadedness, remainder of ROS negative.       ED Course:    179/90, 65 HR, 20 RR, 98.4 F, 96% on RA    Labs  CBC, BMP unremarkable    EKG NSR    Imaging    XRAY L Shoulder/Humerous: No acute fracture. No dislocation. Mild acromioclavicular joint space narrowing with osteophytosis. Mild to moderate glenohumeral joint   osteoarthrosis. Partially imaged thoracic spine fixation hardware. Small external device overlying the left axillary region. There is marked soft tissue swelling about the left shoulder, may reflect a soft tissue contusion. Please correlate with physical examination findings. The imaged lung is clear.    XRAY HIP PELVIS:  No acute displaced fracture or hip dislocation. Redemonstration bilateral hip replacements in satisfactory position with no hardware complication. Bilateral hip heterotopic bone. Lower lumbar, bilateral sacral and iliac hardware. Vascular stents.    CT C SPINE AND HEAD NON CON: No acute intracranial hemorrhage. No acute fracture cervical spine. If pain persists, follow-up MRI exam recommended (if no contraindication).    CT PELVIS NON CON: New mild buckling of the anterior cortex of left superior pubic ramus concerning for a nondisplaced fracture. 5.1 cm hematoma within the lateral subcutaneous fat overlying the left greater trochanter.    Interventions: Tdap, morphine 4mg    Consults: ortho

## 2025-02-14 NOTE — ED ADULT TRIAGE NOTE - CHIEF COMPLAINT QUOTE
Pt c/o left shoulder pain and right hip pain s/p falling. Pt denies AC use. States "I think I hit my head a little." Arriving aox4.

## 2025-02-14 NOTE — PATIENT PROFILE ADULT - NSPROHMSYMPCOND_GEN_A_NUR
INFECTIOUS DISEASES AND INTERNAL MEDICINE at [place]  =======================================================  Eliud Jarvis MD  Diplomates American Board of Internal Medicine and Infectious Diseases  Telephone [phone]  Fax            [phone]  =======================================================    HENRIQUE [id number]      HPI: Patient is a 69 y/o male s/p right orchiectomy on 3/1, admitted 3/8/22     scrotal abscess. Now s/p I&D of scrotal absces 3/10   AND PLACED ON IV ABX   PT NOW WITH INCREASED LEUKOCYTOSIS   ASKED TO EVALUATE FROM ID STANDPOINT   .   -      PAST MEDICAL & SURGICAL HISTORY:  HTN (hypertension)    COPD, mild    Prostate CA  s/p radiation 2013    Epididymo-orchitis    History of appendectomy  2000    History of colon resection  2012        ANTIBIOTICS  cefepime   IVPB 2000 milliGRAM(s) IV Intermittent every 12 hours  vancomycin  IVPB 1000 milliGRAM(s) IV Intermittent every 12 hours      Allergies    penicillins (Unknown)    Intolerances        SOCIAL HISTORY:     FAMILY HX   FAMILY HISTORY:  Family history not known due to adoption (Father, Mother)        Vital Signs Last 24 Hrs  T(C): 36.4 (14 Mar 2022 09:00), Max: 37.3 (14 Mar 2022 06:00)  T(F): 97.5 (14 Mar 2022 09:00), Max: 99.2 (14 Mar 2022 06:00)  HR: 111 (14 Mar 2022 09:00) (95 - 111)  BP: 164/77 (14 Mar 2022 09:00) (136/75 - 164/77)  BP(mean): --  RR: 19 (14 Mar 2022 09:00) (17 - 19)  SpO2: 96% (14 Mar 2022 09:00) (91% - 96%)  Drug Dosing Weight  Height (cm): 167.6 (08 Mar 2022 11:50)  Weight (kg): 126.5 (08 Mar 2022 11:50)  BMI (kg/m2): 45 (08 Mar 2022 11:50)  BSA (m2): 2.3 (08 Mar 2022 11:50)      REVIEW OF SYSTEMS:    CONSTITUTIONAL:  As per HPI.    HEENT:  Eyes:  No diplopia or blurred vision. ENT:  No earache, sore throat or runny nose.    CARDIOVASCULAR:  No pressure, squeezing, strangling, tightness, heaviness or aching about the chest, neck, axilla or epigastrium.    RESPIRATORY:  No cough, shortness of breath, PND or orthopnea.    GASTROINTESTINAL:  No nausea, vomiting or diarrhea.    GENITOURINARY:  AS PER HPI    MUSCULOSKELETAL:  As per HPI.    SKIN:  No change in skin, hair or nails.    NEUROLOGIC:  No paresthesias, fasciculations, seizures or weakness.                  PHYSICAL EXAMINATION:    GENERAL: The patient is a _____in no apparent distress. ___     VITAL SIGNS: T(C): 36.4 (03-14-22 @ 09:00), Max: 37.3 (03-14-22 @ 06:00)  HR: 111 (03-14-22 @ 09:00) (95 - 111)  BP: 164/77 (03-14-22 @ 09:00) (136/75 - 164/77)  RR: 19 (03-14-22 @ 09:00) (17 - 19)  SpO2: 96% (03-14-22 @ 09:00) (91% - 96%)  Wt(kg): --    HEENT: Head is normocephalic and atraumatic.  ANICTERIC  NECK: Supple. No carotid bruits.  No lymphadenopathy or thyromegaly.    LUNGS:COARSE BREATH SOUNDS    HEART: Regular rate and rhythm without murmur.    ABDOMEN: Soft, nontender, and nondistended.  Positive bowel sounds.  No hepatosplenomegaly was noted. NO REBOUND NO GUARDING    EXTREMITIES: NO EDEMA NO ERYTHEMA    NEUROLOGIC: NON FOCAL      SKIN:  PENILE EDEMA   SUTURES IN PLACE        BLOOD CULTURES  Culture Results:   Testing in progress (03-09 @ 21:49)  Culture Results:   Moderate Methicillin Resistant Staphylococcus aureus  Rare Serratia marcescens  Few Bacteroides fragilis "Susceptibilities not performed" (03-09 @ 21:48)  Culture Results:   No growth (03-08 @ 22:16)  Culture Results:   No growth at 5 days. (03-08 @ 12:28)  Culture Results:   No growth at 5 days. (03-08 @ 12:27)       URINE CX          LABS:                        9.8    20.38 )-----------( 395      ( 14 Mar 2022 05:47 )             30.5     03-14    143  |  105  |  16.1  ----------------------------<  102<H>  4.4   |  29.0  |  0.90    Ca    8.9      14 Mar 2022 05:47  Phos  2.9     03-13  Mg     1.8     03-13            RADIOLOGY & ADDITIONAL STUDIES:      ASSESSMENT/PLAN    Patient is a 69 y/o male s/p right orchiectomy on 3/1, admitted 3/8/22     scrotal abscess. Now s/p I&D of scrotal absces 3/10   AND PLACED ON IV ABX   PT NOW WITH INCREASED LEUKOCYTOSIS   PT WITH SERRATIA /MRSA AND BACTEROIDES  PT PLACED ON CEFEPIME VANCOMYCIN  WILL RECOMMEND TO CHANGE TO MERREM/ VANCO FOR  [employer]   WILL FOLLOW UP WITH FURTHER RECOMMENDATIONS   WILL D/W UROLOGY                MARGARET VAZQUEZ MD behavioral health

## 2025-02-14 NOTE — ED PROVIDER NOTE - SKIN, MLM
Skin normal color for race, warm, dry and intact. No evidence of rash. contusion rt shin, skin glue applied and pressure dressing.

## 2025-02-14 NOTE — H&P ADULT - PROBLEM/PLAN-2
Pelvic Health Physical Therapy   Treatment Note     [facility]  [facility] Number: [id number]    Therapy Diagnosis:   Encounter Diagnoses   Name Primary?    Pelvic floor dysfunction Yes    Coordination impairment        Physician: Ting Kingston MD    Visit Date: 1/12/2023    Physician Orders: PT Eval and Treat - Pelvic Floor PT   Medical Diagnosis from Referral: K59.02 (ICD-10-CM) - Constipation by outlet dysfunction  Evaluation Date: 5/3/2022  Authorization Period Expiration: 4/6/2023  Plan of Care Expiration: 1/11/2023 - Extended to 4/12/2023  Visit # / Visits authorized: 1/12    Cancelled Visits: 5   No Show Visits: 0    Time In: 1105   Time Out: 1220  Total Billable Time: 75 minutes    Precautions: Standard    Subjective      Pt reports: Has continued with stress and anxiety. Has been seeing psychologist and plans to begin OT soon. Has not been performing exercises consistently; has been using kegels most often. Frequency and urgency have worsened since anxiety has increased.     She was compliant with home exercise program.   Response to previous treatment: Good   Functional change: Worsened frequency and urgency      Pain: 0/10  Location:   urethra, up back, to shoulder and hands, occasionally at low back, tailbone    Objective       Lucinda received therapeutic exercises to develop  strength, endurance, ROM, flexibility, posture and core stabilization for 00 minutes including:     Lucinda received the following manual therapy techniques: to develop flexibility, extensibility and desensitization for 00 minutes including:     Lucinda participated in neuromuscular re-education activities to develop Coordination, Control and Down training for 45 minutes including: pelvic floor relaxation/bulging training, diaphragmatic breathing and pelvic floor mm contractions focus on activation at 3 layers sequentially in isolation and then sequentially    Review of diaphragmatic breathing    - modifications to  positioning (supine, sitting, forward lean)    - importance of abdominal relaxation   Pelvic floor relaxation    - focus on complete, anterior vs posterior   Bulge vs strain review    Lucinda participated in dynamic functional therapeutic activities to improve functional performance for 30 minutes, including:    Role of stress in symptom presentation   Potential benefits in diaphragmatic breathing in symptom management   Review of daily routine    Home Exercises Provided and Patient Education Provided     Education provided:   - anatomy/physiology of pelvic floor, posture/body mechanices, bladder retraining, diaphragmatic breathing and behavior modifications  Discussed progression of plan of care with patient; educated pt in activity modification; reviewed HEP with pt. Pt demonstrated and verbalized understanding of all instruction and was provided with a handout of HEP (see Patient Instructions).    Written Home Exercises Provided: yes.  Exercises were reviewed and Lucinda was able to demonstrate them prior to the end of the session.  Lucinda demonstrated good  understanding of the education provided.     See EMR under Patient Instructions for exercises provided  10/13/2022 .    Assessment     Lucinda presented with daughter today and complaints of continued difficulty with anxiety management. Based on reports, diaphragmatic breathing and pelvic floor relaxation reviewed again this session. Lucinda continues with significant limitation in rib and abdominal excursion during deep inhalation. Limitation likely secondary to persistent abdominal tension, as positioning to encourage abdominal relaxation allowed for better lower rib and pelvic floor muscle mobility. Most success demonstrated with performance in sitting with forward trunk lean onto table in front of her. Despite this, she continued to require cueing to relax abdominals and limit scalene, platysma, and upper trapezius recruitment. Based on performance, Lucinda  would benefit from continued review next visit prior to progression. To date, progress has been most limited by anxiety and difficulty managing. PT to continue to emphasize not only pelvic floor muscle relaxation, but also abdominal relaxation and diaphragmatic recruitment for improved nervous system regulation.     Lucinda Is progressing well towards her goals.   Pt prognosis is Fair.     Pt will continue to benefit from skilled outpatient physical therapy to address the deficits listed in the problem list box on initial evaluation, provide pt/family education and to maximize pt's level of independence in the home and community environment.     Pt's spiritual, cultural and educational needs considered and pt agreeable to plan of care and goals.     Anticipated barriers to physical therapy: None     Goals:   Short Term Goals: 6 weeks -progressing  Pt to report a decrease in pad usage to no more than 1 a day to demonstrate improving pelvic floor function needed for continence.  Pt to demonstrate being able to correctly and consistently perform a kegel which is needed  to increase pelvic floor muscle coordination and strength needed for continence.  Pt to report a decrease in urinary frequency to no more than once every 1.5 hour(s) to improve ability to participate in social activities.  Pt to voice understanding of the role that diet plays on urinary urgency.  MET 10/11/2022  Pt to demonstrate proper diaphragmatic breathing to help with calming the nervous system in order to decrease pain and to improve abdominal wall musculature extensibility. MET 10/11/2022  Pt to report being able to complete a half day at work without significant increase in pain to demonstrate a return towards prior level of function.  Pt to demonstrate good body mechanics when performing ADLs such as lifting and bending to decrease strain to lumbopelvic structures and reduce risk of further injury.  Pt will report minimal to no pain with single  digit pelvic assessment and display relaxation during this assessment in order to progress to dilator use.  Pt to demonstrate proper positioning on commode with breathing techniques to decrease strain with BM to enable pt to feel empty after BM.   Pt to demonstrate independence with performing bowel massage to help with gut motility.   Pt to be able to bulge pelvic floor which is needed for comfortable BM and complete evacuation.    Pt to report a decrease in pain with urination to no more than 3/10 at its worst needed for more comfortable voiding.         Long Term Goals: 12 weeks -progressing  Pt to report an 90% reduction of urinary incontinence symptoms with ADL participation thereby demonstrating improved pelvic floor muscle control and strength.   Pt to report a decrease in urinary frequency to no more than once every 2 hour(s) to improve ability to participate in social activities.  Pt to be discharged with home plan for carry over after discharge.   Pt will be trained and compliant with postural strategies in sitting and standing to improve alignment and decrease pain and muscle fatigue  Pt to report being able to complete a full day at work without significant increase in pain to demonstrate a return towards prior level of function.  Pt to report being able to have a comfortable vaginal exam without significant increase in pelvic pain.  Pt to increase abdominal wall strength by at least 1 muscle grade to improve lumbopelvic stability with ADLs that would normally increase pain.  Pt to report being able to have a BM without straining 80% of the time to demonstrate improving PF coordination.     Plan     Plan of care Certification: Extended to 4/10/2023    Plan for NV: abdominal MT, review breathing and positions for relaxation    LYDIA GORDON, PT   01/12/2023               DISPLAY PLAN FREE TEXT

## 2025-02-14 NOTE — ED ADULT NURSE NOTE - DOES PATIENT HAVE ADVANCE DIRECTIVE
Called mother.  Vinny has some mild upper respiratory symptoms.  No fever per report.  Otherwise he is doing very well, he is growing.  Growth hormone injections are going well with no side effects.  Discussed with mom that she should double the hydrocortisone dose until symptoms are gone for 24 hours, then she can go back to maintenance dose.  During this time she should continue the same Florinef dose.  If her vomiting/diarrhea she should triple the hydrocortisone dose until symptoms are gone for 24 hours, then she should double the dose for 24 hours, and then she should go back to maintenance hydrocortisone dose.  If at any point she has questions or concerns, she should call us.  Mother expressed understanding.    Eliane Kelly M.D.  Pediatric Endocrinology     
Mom lvm wanting us to know smooth as a little cold going on. Mom states no fever. Mom stated in vm she knows if he gets a fever to up his doses but she would like a call back from you to find out what else to watch for that might cause them to increase the dosing of his meds.     [facility] ( mom)  Ph # [phone]  
Yes

## 2025-02-14 NOTE — H&P ADULT - PROBLEM SELECTOR PLAN 7
F: PO  E: Replete PRN  N: Regular Diet  DVT ppx: Lovenox.   Dispo: RMF c/w home rosuvastatin 10mg qD

## 2025-02-14 NOTE — H&P ADULT - ATTENDING COMMENTS
Agree with plan as outlined above.     Patient seen this evening. Reports had a fall onto his left side while using his walker. Pain currently most severe in left shoulder - on exam ROM normal of hand/wrist/elbow but shoulder limited 2/2 pain with abrasion on posterior aspect. Ortho evaluated patient for pubic rami fracture, no intervention WBAT, hematoma noted (HDS presently with normal Hgb). Multiple recent fractures, started on denosumab outpatient for osteoporosis.  Cont pain control, r/o orthostatics and polypharmacy, PT eval.

## 2025-02-14 NOTE — H&P ADULT - PROBLEM SELECTOR PLAN 1
New mild buckling of the anterior cortex of left superior pubic ramus concerning for a nondisplaced fracture noted on CT scan. Patient was seen and evaluated by orthopedics who recommend medical management at this time.     - Pain control with Tylenol 975 mg PO TID standing, continue with Dilaudid 0.5 mg IVP [id number] PRN, Dilaudid 1 mg IV [id number] PRN.   - PT consult when pain controlled. New mild buckling of the anterior cortex of left superior pubic ramus concerning for a nondisplaced fracture noted on CT scan. Patient was seen and evaluated by orthopedics who recommend medical management at this time.       - Pain control with Tylenol 975 mg PO TID standing, continue with Dilaudid 0.5 mg IVP [id number] PRN, Dilaudid 1 mg IV [id number] PRN.   - PT consult when pain controlled. New mild buckling of the anterior cortex of left superior pubic ramus concerning for a nondisplaced fracture noted on CT scan. Patient was seen and evaluated by orthopedics who recommend medical management at this time. 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     *Right hip fracture in July 2023 in a fall from standing height; left hip fracture in November 2023 in a fall from standing height; morphometric T12 vertebral fracture noted on imaging in July 2024     Plan  - Pain control with Tylenol 975 mg PO TID standing, continue with Dilaudid 0.5 mg IVP [id number] PRN, Dilaudid 1 mg IV [id number] PRN.   - PT consult when pain controlled. New mild buckling of the anterior cortex of left superior pubic ramus concerning for a nondisplaced fracture noted on CT scan. Patient was seen and evaluated by orthopedics who recommend medical management at this time. 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     *Right hip fracture in July 2023 in a fall from standing height; left hip fracture in November 2023 in a fall from standing height; morphometric T12 vertebral fracture noted on imaging in July 2024     Plan  -  c/w home tramadol 100mg, hydromorphone 2mg BID  - PT consult when pain controlled. New mild buckling of the anterior cortex of left superior pubic ramus concerning for a nondisplaced fracture noted on CT scan. Patient was seen and evaluated by orthopedics who recommend medical management at this time. 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     *Right hip fracture in July 2023 in a fall from standing height; left hip fracture in November 2023 in a fall from standing height; morphometric T12 vertebral fracture noted on imaging in July 2024     Plan  - start home tramadol 100mg  - start hydromorphone 2mg BID  - PT consult when pain controlled

## 2025-02-14 NOTE — ED PROVIDER NOTE - CLINICAL SUMMARY MEDICAL DECISION MAKING FREE TEXT BOX
78 y/o male w/ hx osteoporosis, Kyphoscoliosis, s/p c sacrum fusion at [facility] 10/2020, Depression, Bladder CA in remission (s/p resection localized chemo) Multiple Fragility fractures, GERD, Lymphedema, Prior RUE DVT, BPH, recent infrarenal AAA s/p EVAR in 11/12/24 here for left shoulder pain s/p mechanical fall this morning. Unsure if he hit his head. Also with contusion right shin. Denies LOC, blood thinner use.     Contusion repaired with skin glue and dressing, plan for tdap.  C/f left shoulder dislocation vs fx, also with headstrike so must eval for ICH.   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 Unsure if he hit his head. Also with contusion right shin. Denies LOC, blood thinner use.     Contusion repaired with skin glue and dressing, plan for tdap.  C/f left shoulder dislocation vs fx, also with headstrike so must eval for ICH.   Plan for xray shoulder, CTH/c spine    pubic rami fx, seen by ortho - WBAT, non surgical    SW saw pt, plan for admission for social admission, pt/ot

## 2025-02-14 NOTE — ED PROVIDER NOTE - MUSCULOSKELETAL, MLM
left shoulder: ttp with deformity and bruising. Radial pulse palpable, SILT, able to make ok, thumbs up, pincer grasp signs.

## 2025-02-14 NOTE — H&P ADULT - PROBLEM SELECTOR PLAN 4
on home dutasteride 0.5mg qD, tamsulosin 0.4mg qD. also on home oxybutynin 15mg qD.    - c/w home dutasteride 0.5mg qD, tamsulosin 0.4mg qD has chronic back pain s/p multiple ortho interventions, takes tramadol 100mg, hydromorphone 2mg BID (which he has been tapering down recently)    - hold home meds given pain controlled on regimen above has chronic back pain s/p multiple ortho interventions (chronic vertebral compression fractures and kyphoscoliosis s/p C-sacrum fusion), takes tramadol 100mg, hydromorphone 2mg BID (which he has been tapering down recently)        - hold home meds given pain controlled on regimen above on home aripiprazole 2mg qD, duloxetine 60mg qD    - C/w home meds on home aripiprazole 2mg qD, duloxetine 60mg qD    Plan  - C/w home meds

## 2025-02-14 NOTE — H&P ADULT - PROBLEM SELECTOR PLAN 6
F: PO  E: Replete PRN  N: Regular Diet  DVT ppx: Lovenox.   Dispo: RMF c/w home rosuvastatin 10mg qD on home dutasteride 0.5mg qD, tamsulosin 0.4mg qD. also on home oxybutynin 15mg qD.    - c/w home dutasteride 0.5mg qD, tamsulosin 0.4mg qD on home dutasteride 0.5mg qD, tamsulosin 0.4mg qD. also on home oxybutynin 15mg qD.    Plan  - start home dutasteride 0.5mg qD  - start tamsulosin 0.4mg qD  - hold oxybutynin, but ensure he is voiding

## 2025-02-14 NOTE — ED ADULT NURSE NOTE - OBJECTIVE STATEMENT
Pt presents to ed stating mechanical fall - fell onto L side. + head strike, no loc. L shoulder and R hip pain. RLE abrasion/lac noted. PIV to RAC noted. Pt states to pain 9/10. On hydromorphone at home. Pt is aox4. skin warm, dry. decreased ROM to LUE, pt denies L hip pain. moves all extremities. pending ortho/imaging. safety maintained.

## 2025-02-14 NOTE — PATIENT PROFILE ADULT - FALL HARM RISK - HARM RISK INTERVENTIONS

## 2025-02-14 NOTE — H&P ADULT - PROBLEM SELECTOR PLAN 5
F: PO  E: Replete PRN  N: Regular Diet  DVT ppx: Lovenox.   Dispo: RMF c/w home rosuvastatin 10mg qD on home dutasteride 0.5mg qD, tamsulosin 0.4mg qD. also on home oxybutynin 15mg qD.    - c/w home dutasteride 0.5mg qD, tamsulosin 0.4mg qD has chronic back pain s/p multiple ortho interventions (chronic vertebral compression fractures and kyphoscoliosis s/p C-sacrum fusion), takes tramadol 100mg, hydromorphone 2mg BID (which he has been tapering down recently)        - hold home meds given pain controlled on regimen above has chronic back pain s/p multiple ortho interventions (chronic vertebral compression fractures and kyphoscoliosis s/p C-sacrum fusion), takes tramadol 100mg, hydromorphone 2mg BID (which he has been tapering down recently)        - start pain meds as above has chronic back pain s/p multiple ortho interventions (chronic vertebral compression fractures and kyphoscoliosis s/p C-sacrum fusion), takes tramadol 100mg, hydromorphone 2mg BID (which he has been tapering down recently)    Plan  - start pain meds as above

## 2025-02-15 ENCOUNTER — TRANSCRIPTION ENCOUNTER (OUTPATIENT)
Age: 80
End: 2025-02-15

## 2025-02-15 LAB
ANION GAP SERPL CALC-SCNC: 10 MMOL/L — SIGNIFICANT CHANGE UP (ref 5–17)
BUN SERPL-MCNC: 20 MG/DL — SIGNIFICANT CHANGE UP (ref 7–23)
CALCIUM SERPL-MCNC: 8.2 MG/DL — LOW (ref 8.4–10.5)
CHLORIDE SERPL-SCNC: 100 MMOL/L — SIGNIFICANT CHANGE UP (ref 96–108)
CO2 SERPL-SCNC: 26 MMOL/L — SIGNIFICANT CHANGE UP (ref 22–31)
CREAT SERPL-MCNC: 1.06 MG/DL — SIGNIFICANT CHANGE UP (ref 0.5–1.3)
EGFR: 71 ML/MIN/1.73M2 — SIGNIFICANT CHANGE UP
GLUCOSE SERPL-MCNC: 103 MG/DL — HIGH (ref 70–99)
HCT VFR BLD CALC: 38.4 % — LOW (ref 39–50)
HGB BLD-MCNC: 13.1 G/DL — SIGNIFICANT CHANGE UP (ref 13–17)
MAGNESIUM SERPL-MCNC: 2.3 MG/DL — SIGNIFICANT CHANGE UP (ref 1.6–2.6)
MCHC RBC-ENTMCNC: 29.9 PG — SIGNIFICANT CHANGE UP (ref 27–34)
MCHC RBC-ENTMCNC: 34.1 G/DL — SIGNIFICANT CHANGE UP (ref 32–36)
MCV RBC AUTO: 87.7 FL — SIGNIFICANT CHANGE UP (ref 80–100)
NRBC BLD AUTO-RTO: 0 /100 WBCS — SIGNIFICANT CHANGE UP (ref 0–0)
PHOSPHATE SERPL-MCNC: 2.9 MG/DL — SIGNIFICANT CHANGE UP (ref 2.5–4.5)
PLATELET # BLD AUTO: 177 K/UL — SIGNIFICANT CHANGE UP (ref 150–400)
POTASSIUM SERPL-MCNC: 3.6 MMOL/L — SIGNIFICANT CHANGE UP (ref 3.5–5.3)
POTASSIUM SERPL-SCNC: 3.6 MMOL/L — SIGNIFICANT CHANGE UP (ref 3.5–5.3)
RBC # BLD: 4.38 M/UL — SIGNIFICANT CHANGE UP (ref 4.2–5.8)
RBC # FLD: 14.5 % — SIGNIFICANT CHANGE UP (ref 10.3–14.5)
SODIUM SERPL-SCNC: 136 MMOL/L — SIGNIFICANT CHANGE UP (ref 135–145)
WBC # BLD: 6.37 K/UL — SIGNIFICANT CHANGE UP (ref 3.8–10.5)
WBC # FLD AUTO: 6.37 K/UL — SIGNIFICANT CHANGE UP (ref 3.8–10.5)

## 2025-02-15 PROCEDURE — 99233 SBSQ HOSP IP/OBS HIGH 50: CPT | Mod: GC

## 2025-02-15 RX ORDER — SENNA 187 MG
2 TABLET ORAL AT BEDTIME
Refills: 0 | Status: DISCONTINUED | OUTPATIENT
Start: 2025-02-15 | End: 2025-02-18

## 2025-02-15 RX ORDER — FLUTICASONE PROPIONATE 50 UG/1
1 SPRAY, METERED NASAL ONCE
Refills: 0 | Status: COMPLETED | OUTPATIENT
Start: 2025-02-15 | End: 2025-02-15

## 2025-02-15 RX ORDER — LIDOCAINE HYDROCHLORIDE 20 MG/ML
1 JELLY TOPICAL DAILY
Refills: 0 | Status: DISCONTINUED | OUTPATIENT
Start: 2025-02-15 | End: 2025-02-18

## 2025-02-15 RX ORDER — ACETAMINOPHEN 500 MG/5ML
1000 LIQUID (ML) ORAL ONCE
Refills: 0 | Status: DISCONTINUED | OUTPATIENT
Start: 2025-02-15 | End: 2025-02-15

## 2025-02-15 RX ORDER — FLUTICASONE PROPIONATE 50 UG/1
1 SPRAY, METERED NASAL EVERY 12 HOURS
Refills: 0 | Status: DISCONTINUED | OUTPATIENT
Start: 2025-02-15 | End: 2025-02-18

## 2025-02-15 RX ORDER — MELATONIN 5 MG
5 TABLET ORAL AT BEDTIME
Refills: 0 | Status: DISCONTINUED | OUTPATIENT
Start: 2025-02-15 | End: 2025-02-18

## 2025-02-15 RX ORDER — BISACODYL 5 MG
5 TABLET, DELAYED RELEASE (ENTERIC COATED) ORAL ONCE
Refills: 0 | Status: COMPLETED | OUTPATIENT
Start: 2025-02-15 | End: 2025-02-15

## 2025-02-15 RX ORDER — ACETAMINOPHEN 500 MG/5ML
1000 LIQUID (ML) ORAL ONCE
Refills: 0 | Status: COMPLETED | OUTPATIENT
Start: 2025-02-15 | End: 2025-02-15

## 2025-02-15 RX ORDER — POLYETHYLENE GLYCOL 3350 17 G/17G
17 POWDER, FOR SOLUTION ORAL EVERY 12 HOURS
Refills: 0 | Status: DISCONTINUED | OUTPATIENT
Start: 2025-02-15 | End: 2025-02-18

## 2025-02-15 RX ORDER — LIDOCAINE HYDROCHLORIDE 20 MG/ML
1 JELLY TOPICAL ONCE
Refills: 0 | Status: COMPLETED | OUTPATIENT
Start: 2025-02-15 | End: 2025-02-15

## 2025-02-15 RX ADMIN — LIDOCAINE HYDROCHLORIDE 1 PATCH: 20 JELLY TOPICAL at 11:13

## 2025-02-15 RX ADMIN — Medication 2 MILLIGRAM(S): at 22:39

## 2025-02-15 RX ADMIN — ARIPIPRAZOLE 2 MILLIGRAM(S): 2 TABLET ORAL at 22:19

## 2025-02-15 RX ADMIN — Medication 5 MILLIGRAM(S): at 19:56

## 2025-02-15 RX ADMIN — ENOXAPARIN SODIUM 40 MILLIGRAM(S): 100 INJECTION SUBCUTANEOUS at 17:34

## 2025-02-15 RX ADMIN — Medication 400 MILLIGRAM(S): at 11:13

## 2025-02-15 RX ADMIN — LIDOCAINE HYDROCHLORIDE 1 PATCH: 20 JELLY TOPICAL at 22:10

## 2025-02-15 RX ADMIN — FINASTERIDE 5 MILLIGRAM(S): 1 TABLET, FILM COATED ORAL at 13:19

## 2025-02-15 RX ADMIN — TAMSULOSIN HYDROCHLORIDE 0.4 MILLIGRAM(S): 0.4 CAPSULE ORAL at 22:16

## 2025-02-15 RX ADMIN — Medication 2 MILLIGRAM(S): at 23:39

## 2025-02-15 RX ADMIN — ROSUVASTATIN CALCIUM 10 MILLIGRAM(S): 20 TABLET, FILM COATED ORAL at 22:16

## 2025-02-15 RX ADMIN — Medication 3 MILLIGRAM(S): at 22:16

## 2025-02-15 RX ADMIN — DULOXETINE 60 MILLIGRAM(S): 20 CAPSULE, DELAYED RELEASE ORAL at 13:18

## 2025-02-15 RX ADMIN — Medication 40 MILLIEQUIVALENT(S): at 07:52

## 2025-02-15 RX ADMIN — FLUTICASONE PROPIONATE 1 SPRAY(S): 50 SPRAY, METERED NASAL at 13:19

## 2025-02-15 RX ADMIN — Medication 81 MILLIGRAM(S): at 13:18

## 2025-02-15 RX ADMIN — Medication 5 MILLIGRAM(S): at 00:30

## 2025-02-15 NOTE — DISCHARGE NOTE PROVIDER - NSDCCPCAREPLAN_GEN_ALL_CORE_FT
PRINCIPAL DISCHARGE DIAGNOSIS  Diagnosis: Fall  Assessment and Plan of Treatment: You were brought into the hospital after a fall. It appears that the cause is “mechanical”. That means that you slipped, tripped or lost your balance. If your fall had been due to fainting or a seizure, further tests would be required. While in the hospital, imaging showed a left suprapubic rami fracture for which orthopedics was consulted. No interventions were needed. While in the hospital, you were managed with pain medications. Health conditions which change your blood pressure, vision, muscle strength and coordination may increase your risk for falls. Medication can also increase your risk if they make you dizzy, weak, or sleepy. To prevent falls, you can stand or sit up slowly, use assistive devices as directed, pwear shoes that fit well and have soles that , wear a personal alarm, stay active, and manage your medical conditions. Home safety tips include keeping your path clear, removing small rugs, not walking on wet surfaces, installing bright lights at home, and keeping items you use often on shelves within reach.  ----------------------  Stop taking tramadol  Follow up with PCP outpatient

## 2025-02-15 NOTE — DISCHARGE NOTE PROVIDER - HOSPITAL COURSE
Mr. Blair is an  81 y/o male w/ hx osteoporosis, Kyphoscoliosis, s/p c sacrum fusion at [facility] 10/2020, Depression, Bladder CA in remission (s/p resection localized chemo), GERD, Lymphedema, Prior DVT, BPH, recent infrarenal AAA s/p EVAR in 11/12/24 here for left shoulder pain s/p mechanical fall found to have a pubic rami fracture, admitted for pain control and physical therapy evaluation. Painmost severe in left shoulder - on exam ROM normal of hand/wrist/elbow but shoulder limited 2/2 pain with abrasion on posterior aspect. Ortho evaluated patient for left pubic rami fracture, no intervention WBAT, hematoma noted (HDS presently with normal Hgb). Multiple recent fractures, started on denosumab outpatient for osteoporosis. PT recommended MCKENNA but pt refused. Discontinued tramadol given multiple falls.    #fall - mechanical  #Suspected fracture of pelvis.   New mild buckling of the anterior cortex of left superior pubic ramus concerning for a nondisplaced fracture noted on CT scan. Patient was seen and evaluated by orthopedics who recommend medical management at this time. Seen by endo recently for multiple fragility fractures* and deemed to have OP given this hx though had normal DXA last month, planned to start denosumab.   *Right hip fracture in July 2023 in a fall from standing height; left hip fracture in November 2023 in a fall from standing height; morphometric T12 vertebral fracture noted on imaging in July 2024   PT rec MCKENNA but pt refused, has HHA at home  Plan  - discontinue tramadol 100mg  - c/w home hydromorphone 2mg BID  - f/u with PCP outpatient      #Status post AAA (abdominal aortic aneurysm) repair.   AAA s/p EVAR (11/12/24) with R renal stent placement. states he takes aspirin 81mg qD but unclear if he is suppose to be on it per HIE.  Plan  - c/w aspirin 81mg qD.    #Depression.   on home aripiprazole 2mg qD, duloxetine 60mg qD  Plan  - C/w home meds.    #Chronic back pain.   has chronic back pain s/p multiple ortho interventions (chronic vertebral compression fractures and kyphoscoliosis s/p C-sacrum fusion), takes tramadol 100mg, hydromorphone 2mg BID (which he has been tapering down recently)    Plan  - discontinued tramadol   - c/w hydromorphone 2mg    BPH (benign prostatic hyperplasia).   on home dutasteride 0.5mg qD, tamsulosin 0.4mg qD. also on home oxybutynin 15mg qD.  Plan  - c/w home dutasteride 0.5mg qD  -c/w tamsulosin 0.4mg qD  - hold oxybutynin, but ensure he is voiding.    #High cholesterol.   c/w home rosuvastatin 10mg qD.    New medications: None  Changes to old medications: stop tramadol  Items to follow up outpatient: PCP  Physical exam at time of discharge:  General: NAD, appears comfortable    HEENT:  PERRL, anicteric sclera  Cardiovascular:  RRR, no MRG  Respiratory: CTA B/L, normal WOB  Gastrointestinal: soft, NT/ND; +BSx4  Uro: condom cath  Extremities: no edema to LE, RLE wrapped with gauze, mild dried blood on left foot  Vascular: 2+ radial pulses  Neurological: AAOx3; no focal deficits, UE motor and sensation intact, RLE motor and sensation intact, LLE motor reduced d/t pain with movement, sensation intact Mr. Blair is an  79 y/o male w/ hx osteoporosis, Kyphoscoliosis, s/p c sacrum fusion at [facility] 10/2020, Depression, Bladder CA in remission (s/p resection localized chemo), GERD, Lymphedema, Prior DVT, BPH, recent infrarenal AAA s/p EVAR in 11/12/24 here for left shoulder pain s/p mechanical fall found to have a pubic rami fracture, admitted for pain control and physical therapy evaluation. Pain most severe in left shoulder - on exam ROM normal of hand/wrist/elbow but shoulder limited 2/2 pain with abrasion on posterior aspect. Ortho evaluated patient for left pubic rami fracture, no intervention WBAT, hematoma noted (HDS presently with normal Hgb). Multiple recent fractures, started on denosumab outpatient for osteoporosis. PT recommended MCKENNA but pt refused. Discontinued tramadol given multiple falls.    #fall - mechanical  #Suspected fracture of pelvis.   CT Pelvis:  IMPRESSION: New mild buckling of the anterior cortex of left superior pubic ramus concerning for a nondisplaced fracture. 5.1 cm hematoma within the lateral subcutaneous fat overlying the left greater trochanter.  Orthopedic recommended medical management at this time.   Per endo deemed to have osteoporosis given this hx though had normal DXA last month, planned to start denosumab.   *Right hip fracture in July 2023 in a fall from standing height; left hip fracture in November 2023 in a fall from standing height; morphometric T12 vertebral fracture noted on imaging in July 2024   PT rec MCKENNA but pt refused, has HHA at home  Plan  - Please continue to monitor recovery outpatient.    #Status post AAA (abdominal aortic aneurysm) repair.   AAA s/p EVAR (11/12/24) with R renal stent placement. states he takes aspirin 81mg qD but unclear if he is suppose to be on it per HIE.  Plan  - c/w aspirin 81mg qD.    #Depression.   on home aripiprazole 2mg qD, duloxetine 60mg qD  Plan  - C/w home meds.    #Chronic back pain.   has chronic back pain s/p multiple ortho interventions (chronic vertebral compression fractures and kyphoscoliosis s/p C-sacrum fusion), takes tramadol 100mg, hydromorphone 2mg BID (which he has been tapering down recently)  Plan  - c/w home medications    #BPH (benign prostatic hyperplasia).   on home dutasteride 0.5mg qD, tamsulosin 0.4mg qD. also on home oxybutynin 15mg qD.  Plan  - c/w home medications    #High cholesterol.   c/w home rosuvastatin 10mg qD.    Discharge Physical Exam  New medications: None  Changes to old medications: stop tramadol  Items to follow up outpatient: PCP  Physical exam at time of discharge:  General: NAD, appears comfortable    HEENT:  PERRL, anicteric sclera  Cardiovascular:  RRR, no MRG  Respiratory: CTA B/L, normal WOB  Gastrointestinal: soft, NT/ND; +BSx4  Uro: condom cath  Extremities: no edema to LE, RLE wrapped with gauze, mild dried blood on left foot  Vascular: 2+ radial pulses  Neurological: AAOx3; no focal deficits, UE motor and sensation intact, RLE motor and sensation intact, LLE motor reduced d/t pain with movement, sensation intact Mr. Blair is an  79 y/o male w/ hx osteoporosis, Kyphoscoliosis, s/p c sacrum fusion at [facility] 10/2020, Depression, Bladder CA in remission (s/p resection localized chemo), GERD, Lymphedema, Prior DVT, BPH, recent infrarenal AAA s/p EVAR in 11/12/24 here for left shoulder pain s/p mechanical fall found to have a pubic rami fracture, admitted for pain control and physical therapy evaluation. Pain most severe in left shoulder - on exam ROM normal of hand/wrist/elbow but shoulder limited 2/2 pain with abrasion on posterior aspect. Ortho evaluated patient for left pubic rami fracture, no intervention WBAT, hematoma noted (HDS presently with normal Hgb). Multiple recent fractures, started on denosumab outpatient for osteoporosis. PT recommended MCKENNA but pt refused and would prefer to go home.    #fall - mechanical  #Suspected fracture of pelvis.   CT Pelvis:  IMPRESSION: New mild buckling of the anterior cortex of left superior pubic ramus concerning for a nondisplaced fracture. 5.1 cm hematoma within the lateral subcutaneous fat overlying the left greater trochanter.  Orthopedic recommended medical management at this time.   Per endo deemed to have osteoporosis given this hx though had normal DXA last month, planned to start denosumab.   *Right hip fracture in July 2023 in a fall from standing height; left hip fracture in November 2023 in a fall from standing height; morphometric T12 vertebral fracture noted on imaging in July 2024   PT rec MCKENNA but pt refused, has HHA at home  Plan  - Please continue to monitor recovery outpatient.  - For pain please continue with tramadol 25mg q6 hours for moderate pain and continue home hydromorphone 2mg BID    #Status post AAA (abdominal aortic aneurysm) repair.   AAA s/p EVAR (11/12/24) with R renal stent placement. states he takes aspirin 81mg qD but unclear if he is suppose to be on it per HIE.  Plan  - c/w aspirin 81mg qD.    #Depression.   on home aripiprazole 2mg qD, duloxetine 60mg qD  Plan  - C/w home meds.    #Chronic back pain.   has chronic back pain s/p multiple ortho interventions (chronic vertebral compression fractures and kyphoscoliosis s/p C-sacrum fusion), takes tramadol 100mg, hydromorphone 2mg BID (which he has been tapering down recently)  Plan  - c/w home medications    #BPH (benign prostatic hyperplasia).   on home dutasteride 0.5mg qD, tamsulosin 0.4mg qD. also on home oxybutynin 15mg qD.  Plan  - c/w home medications    #High cholesterol.   c/w home rosuvastatin 10mg qD.    New medications: See pain regimen.  Items to follow up outpatient: PCP    Discharge Physical Exam  General: NAD, appears comfortable    HEENT:  PERRL, anicteric sclera  Cardiovascular:  RRR, no MRG  Respiratory: CTA B/L, normal WOB  Gastrointestinal: soft, NT/ND; +BSx4  Uro: condom cath  Extremities: no edema to LE, RLE wrapped with gauze (has bleed through bandage recently)  Vascular: 2+ radial pulses  Neurological: AAOx3; no focal deficits, UE motor and sensation intact, RLE motor and sensation intact, LLE motor reduced d/t pain with movement, sensation intact

## 2025-02-15 NOTE — PHYSICAL THERAPY INITIAL EVALUATION ADULT - IMPAIRMENTS CONTRIBUTING IMPAIRED BED MOBILITY, REHAB EVAL
dec aerobic capacity/endurance/decreased flexibility/pain/impaired postural control/decreased strength

## 2025-02-15 NOTE — DISCHARGE NOTE PROVIDER - NS AS DC PROVIDER CONTACT Y/N MULTI
HISTORY OF PRESENT ILLNESS     Todd Silverman is a 42 year old old male, who presented today to urgent care with a chief complaint of left sided face sinus pain, yellow mucous, teeth pain and congestion. Ongoing for 5 days and worsening. No fevers. Denies ear pain, sore throat, cough, chest pain, palpitations, shortness of breath. Is still drinking fluids and eating with no issue. Is urinating and having normal bowel movements. Attempted ibuprofen, allegra, Claritin, hot steamy showers.     PAST MEDICAL, FAMILY AND SOCIAL HISTORY       Medications and Allergies  Medications:  Current Outpatient Medications   Medication Sig Dispense Refill   • amoxicillin-clavulanate (AUGMENTIN) 875-125 MG per tablet Take 1 tablet by mouth in the morning and 1 tablet in the evening. Do all this for 7 days. 14 tablet 0   • ondansetron (ZOFRAN ODT) 4 MG disintegrating tablet Place 1 tablet onto the tongue every 8 hours as needed for Nausea. (Patient not taking: Reported on 8/8/2024) 30 tablet 0   • ketoconazole (NIZORAL) 2 % shampoo Apply topically as needed (Dandruff). 120 mL 0   • ibuprofen (MOTRIN) 200 MG tablet Take 200 mg by mouth 4 times daily.     • loratadine (CLARITIN) 10 MG tablet Take 10 mg by mouth daily.     • Multiple Vitamins-Minerals (vitamin - therapeutic multivitamins w/minerals) tablet Contains iron (Patient not taking: Reported on 8/8/2024)     • olopatadine (PATANOL) 0.1 % ophthalmic solution 1 drop 2 times daily.       No current facility-administered medications for this visit.       Allergies:  ALLERGIES:   Allergen Reactions   • Bee Sting SWELLING   • Hydrocodone-Acetaminophen PRURITUS and Other (See Comments)     Redness, itchy, alters mood   • Pollen SWELLING, Other (See Comments) and Runny Nose     Swollen eyes, sneezing         Past Medical History  Past Medical History:   Diagnosis Date   • Hyperplastic colonic polyp 05/24/2022    [place]lu[place]; couple       Past Surgical History  Past Surgical History:    Procedure Laterality Date   • Colonoscopy remove lesions hot biopsy forceps  05/24/2022    Deadnre; HP x 2; recheck 10 yrs   • Oral surgery procedure  2009   • Repair of rectum,prolapse mucosa  09/02/2022    Deandre / Kristopher Aaron THD plus Proctoplasty       Social and Family History  Social History:  Social History     Socioeconomic History   • Marital status: /Civil Union     Spouse name: Not on file   • Number of children: Not on file   • Years of education: Not on file   • Highest education level: Not on file   Occupational History   • Not on file   Tobacco Use   • Smoking status: Former     Current packs/day: 0.00     Average packs/day: 0.5 packs/day for 17.0 years (8.5 ttl pk-yrs)     Types: Cigarettes     Start date: 1997     Quit date: 2014     Years since quitting: 10.6     Passive exposure: Past   • Smokeless tobacco: Never   • Tobacco comments:     Father smoked outside the home.   Vaping Use   • Vaping status: never used   Substance and Sexual Activity   • Alcohol use: Yes     Alcohol/week: 6.0 standard drinks of alcohol     Types: 6 Standard drinks or equivalent per week     Comment: socially   • Drug use: Not Currently   • Sexual activity: Not on file   Other Topics Concern   • Not on file   Social History Narrative   • Not on file     Social Determinants of Health     Financial Resource Strain: Not on file   Food Insecurity: Not on file   Transportation Needs: Not on file   Physical Activity: Not on file   Stress: Not on file   Social Connections: Not on file   Interpersonal Safety: Not on file (8/31/2022)       Family History:  Family History   Problem Relation Age of Onset   • Patient is unaware of any medical problems Mother    • Patient is unaware of any medical problems Father    • Patient is unaware of any medical problems Brother    • Thyroid Maternal Grandmother         does know exact issue   • Parkinsonism Maternal Grandfather    • Patient is unaware of any medical problems Paternal  Grandmother    • COPD Paternal Grandfather         hx of tobacco abuse   • Patient is unaware of any medical problems Daughter    • Patient is unaware of any medical problems Son          REVIEW OF SYSTEMS     Pertinent ROS discussed in HPI. The remaining systems have been reviewed and are negative.     PHYSICAL EXAM     Physical Exam  Vitals and nursing note reviewed.   Constitutional:       General: He is awake. He is not in acute distress.     Appearance: He is not ill-appearing.   HENT:      Right Ear: Hearing and external ear normal. A middle ear effusion (clear fluid) is present. Tympanic membrane is not erythematous.      Left Ear: Hearing and external ear normal. A middle ear effusion (suppurative) is present. Tympanic membrane is not erythematous.      Nose: Congestion present.      Right Sinus: Maxillary sinus tenderness present.      Left Sinus: Maxillary sinus tenderness (with erythema) present.      Mouth/Throat:      Mouth: Mucous membranes are moist. No oral lesions.      Dentition: Normal dentition. No dental tenderness or dental abscesses.      Pharynx: Oropharynx is clear. Uvula midline. Posterior oropharyngeal erythema present. No pharyngeal swelling or oropharyngeal exudate.   Eyes:      Conjunctiva/sclera: Conjunctivae normal.   Cardiovascular:      Rate and Rhythm: Normal rate.      Heart sounds: Normal heart sounds.   Pulmonary:      Effort: Pulmonary effort is normal.      Breath sounds: Normal breath sounds.   Lymphadenopathy:      Cervical: No cervical adenopathy.   Skin:     General: Skin is warm.      Findings: No rash.   Neurological:      Mental Status: He is alert and oriented to person, place, and time.         Vitals:  Visit Vitals  Vitals:    08/08/24 1550   BP: (!) 143/92   BP Location: RUE - Right upper extremity   Patient Position: Sitting   Cuff Size: Regular   Pulse: 86   Resp: 16   Temp: 97.3 °F (36.3 °C)   TempSrc: Temporal   SpO2: 100%   Weight: 77.1 kg (170 lb)        Labs:  No laboratory tests were ordered during this visit.    EKG:   No EKGs were ordered during this visit.    Imaging:  No imaging studies were ordered during this visit.    ASSESSMENT/PLAN     1. Acute non-recurrent maxillary sinusitis  - amoxicillin-clavulanate (AUGMENTIN) 875-125 MG per tablet; Take 1 tablet by mouth in the morning and 1 tablet in the evening. Do all this for 7 days.  Dispense: 14 tablet; Refill: 0      Todd Silverman is a 42 year old old male, presenting to the clinic with sinus complaint.  Physical exam reveals maxillary sinus tenderness with left suppurative middle ear effusion present. Noted posterior oropharyngeal erythema. There was no cough during exam, lungs sounds clear to auscultation. Spo2 100%.      Patient is non toxic appearing. Afebrile. Vital signs stable. Exam is consistent with sinusitis. However other differential diagnosis considered include sinusitis, allergic rhinitis, viral URI, covid, influenza, headache syndrome, dental problem, facial cellulitis among others. No other focal signs or symptoms of other acute infectious process at this time.  Plan to treat with antibiotic therapy, Augmentin for management of illness. Supportive cares encouraged at home. All concerns were addressed and questions answered. Patient is agreeable with plan of care and verbalized understanding. Instructed when to seek sooner care if symptoms are not improving or worsening.    FELIZ Lopez  08/08/24       Yes

## 2025-02-15 NOTE — PHYSICAL THERAPY INITIAL EVALUATION ADULT - ADDITIONAL COMMENTS
Pt lives alone in a second floor apartment with ~20steps to enter. He reports being independent for functional mobility at baseline with use of straight cane for steadying. Pt needs assist for ADLs (dressing, bathing, cleaning) for which he has a home health attendant who comes "as needed." He has a tub shower with shower chair. The Pt reports several falls within the past year. He participates in home PT 2x/week. Pt owns a rolling walker, straight cane, elevated toilet seat, and shower chair.

## 2025-02-15 NOTE — PHYSICAL THERAPY INITIAL EVALUATION ADULT - SITTING BALANCE: DYNAMIC
Patient is now rescheduled with DR Mcgarry in [facility] on 10/11/2022 at 9:45 a.m.     Updated instructions sent to patient.   good balance

## 2025-02-15 NOTE — PHYSICAL THERAPY INITIAL EVALUATION ADULT - GENERAL OBSERVATIONS, REHAB EVAL
Pt received semi supine in bed in NAD, +HEP lock, +bed alarm, RN Madison cleared Pt for PT evaluation. Pt is AO x 4 and agreeable to participate in PT evaluation.

## 2025-02-15 NOTE — PHYSICAL THERAPY INITIAL EVALUATION ADULT - PERTINENT HX OF CURRENT PROBLEM, REHAB EVAL
Mr. Blair is an  79 y/o male w/ hx osteoporosis, Kyphoscoliosis, s/p c sacrum fusion at [facility] 10/2020, Depression, Bladder CA in remission (s/p resection localized chemo), GERD, Lymphedema, Prior DVT, BPH, recent infrarenal AAA s/p EVAR in 11/12/24 here for left shoulder pain s/p mechanical fall this morning found to have a pubic rami fracture, admitted for pain control and physical therapy evaluation.

## 2025-02-15 NOTE — DISCHARGE NOTE PROVIDER - NSDCFUSCHEDAPPT_GEN_ALL_CORE_FT
Paulino Centeno  [facility]  HEARTVASC [address]  Scheduled Appointment: 02/18/2025    Yonny Bland  [facility]  INTERVEN  [address]  Scheduled Appointment: 02/20/2025    Estevan Jernigan  [facility]  NEUROLOGY [address]  Scheduled Appointment: 02/21/2025    [facility]  ENDOCRIN [address]  Scheduled Appointment: 05/12/2025

## 2025-02-15 NOTE — PROGRESS NOTE ADULT - ATTENDING COMMENTS
Patient reports left shoulder pain, and right hip pain. Pain management not fully controlling pain.    General: initially sleeping, awakes to voice AOX3, NAD, lying in bed, speaking in full sentences, no labored breathing on RA  HEENT: AT/NC, no facial asymmetry   Lungs: poor inspiration, limited exam, no crackles, no wheezes  Heart: regualr  Abdomen: soft, no tenderness, no distension  Extremities: warm, left shoulder pain limiting ROM, no gross focal deficit       Labs, imaging reviewed    Will optimize pain regimen, attempt to optimize with non-opioids , patient on chronic opioids and sedatives at home, probably increasing risk of fall. Will need to taper once pain better controlled  Appreciate Ortho   PT evaluation   Bowel regimen   DVT ppx

## 2025-02-15 NOTE — PHYSICAL THERAPY INITIAL EVALUATION ADULT - GAIT DEVIATIONS NOTED, PT EVAL
kyphotic posture, increased sway, mild unsteadiness/decreased juve/increased time in double stance/decreased step length/decreased weight-shifting ability

## 2025-02-15 NOTE — PROGRESS NOTE ADULT - TIME BILLING
Bedside exam and interview   Reviewed vitals, labs, imaging   Discussed patient's plan of care with housestaff   Documentation of encounter

## 2025-02-15 NOTE — DISCHARGE NOTE PROVIDER - NSDCCPTREATMENT_GEN_ALL_CORE_FT
PRINCIPAL PROCEDURE  Procedure: CT pelvis  Findings and Treatment: IMPRESSION:  New mild buckling of the anterior cortex of left superior pubic ramus   concerning for a nondisplaced fracture.  5.1 cm hematoma within the lateral subcutaneous fat overlying the left   greater trochanter.

## 2025-02-15 NOTE — PHYSICAL THERAPY INITIAL EVALUATION ADULT - IMPAIRMENTS CONTRIBUTING TO GAIT DEVIATIONS, PT EVAL
dec aerobic capacity/endurance/impaired balance/decreased flexibility/pain/impaired postural control/decreased strength

## 2025-02-15 NOTE — DISCHARGE NOTE PROVIDER - NSDCMRMEDTOKEN_GEN_ALL_CORE_FT
ARIPiprazole 2 mg oral tablet: 1 tab(s) orally once a day  Aspirin Low Dose 81 mg oral tablet, chewable: 1 tab(s) orally once a day  DULoxetine 60 mg oral delayed release capsule: 1 cap(s) orally every 24 hours  dutasteride 0.5 mg oral capsule: 1 cap(s) orally once a day  HYDROmorphone: 2 milligram(s) 2 times a day  oxyBUTYnin 15 mg/24 hr oral tablet, extended release: 1 tab(s) orally once a day  rosuvastatin 10 mg oral capsule: 1 cap(s) orally once a day  tamsulosin 0.4 mg oral capsule: 1 cap(s) orally once a day (at bedtime)  traMADol 100 mg oral tablet: 1 tab(s) orally once a day   ARIPiprazole 2 mg oral tablet: 1 tab(s) orally once a day  Aspirin Low Dose 81 mg oral tablet, chewable: 1 tab(s) orally once a day  DULoxetine 60 mg oral delayed release capsule: 1 cap(s) orally every 24 hours  dutasteride 0.5 mg oral capsule: 1 cap(s) orally once a day  finasteride 5 mg oral tablet: 1 tab(s) orally once a day  HYDROmorphone: 2 milligram(s) 2 times a day  oxyBUTYnin 15 mg/24 hr oral tablet, extended release: 1 tab(s) orally once a day  rosuvastatin 10 mg oral capsule: 1 cap(s) orally once a day  tamsulosin 0.4 mg oral capsule: 1 cap(s) orally once a day (at bedtime)  traMADol 100 mg oral tablet: 1 tab(s) orally once a day

## 2025-02-15 NOTE — PHYSICAL THERAPY INITIAL EVALUATION ADULT - RANGE OF MOTION EXAMINATION, REHAB EVAL
L shoulder forward flexion ROM grossly limited to 90deg due to pain/Right UE ROM was WFL (within functional limits)/bilateral lower extremity ROM was WFL (within functional limits)

## 2025-02-16 PROCEDURE — 99239 HOSP IP/OBS DSCHRG MGMT >30: CPT

## 2025-02-16 RX ORDER — TRAMADOL HYDROCHLORIDE 50 MG/1
25 TABLET, FILM COATED ORAL EVERY 6 HOURS
Refills: 0 | Status: DISCONTINUED | OUTPATIENT
Start: 2025-02-16 | End: 2025-02-18

## 2025-02-16 RX ORDER — KETOROLAC TROMETHAMINE 30 MG/ML
15 INJECTION, SOLUTION INTRAMUSCULAR; INTRAVENOUS ONCE
Refills: 0 | Status: DISCONTINUED | OUTPATIENT
Start: 2025-02-16 | End: 2025-02-16

## 2025-02-16 RX ORDER — TRAMADOL HYDROCHLORIDE 50 MG/1
100 TABLET, FILM COATED ORAL EVERY 24 HOURS
Refills: 0 | Status: DISCONTINUED | OUTPATIENT
Start: 2025-02-16 | End: 2025-02-16

## 2025-02-16 RX ORDER — MAGNESIUM SULFATE 500 MG/ML
2 SYRINGE (ML) INJECTION ONCE
Refills: 0 | Status: COMPLETED | OUTPATIENT
Start: 2025-02-16 | End: 2025-02-16

## 2025-02-16 RX ORDER — METOCLOPRAMIDE HCL 10 MG
10 TABLET ORAL ONCE
Refills: 0 | Status: COMPLETED | OUTPATIENT
Start: 2025-02-16 | End: 2025-02-16

## 2025-02-16 RX ORDER — TRAMADOL HYDROCHLORIDE 50 MG/1
100 TABLET, FILM COATED ORAL DAILY
Refills: 0 | Status: DISCONTINUED | OUTPATIENT
Start: 2025-02-16 | End: 2025-02-16

## 2025-02-16 RX ADMIN — Medication 25 GRAM(S): at 11:15

## 2025-02-16 RX ADMIN — KETOROLAC TROMETHAMINE 15 MILLIGRAM(S): 30 INJECTION, SOLUTION INTRAMUSCULAR; INTRAVENOUS at 10:08

## 2025-02-16 RX ADMIN — TAMSULOSIN HYDROCHLORIDE 0.4 MILLIGRAM(S): 0.4 CAPSULE ORAL at 21:07

## 2025-02-16 RX ADMIN — FINASTERIDE 5 MILLIGRAM(S): 1 TABLET, FILM COATED ORAL at 11:17

## 2025-02-16 RX ADMIN — POLYETHYLENE GLYCOL 3350 17 GRAM(S): 17 POWDER, FOR SOLUTION ORAL at 18:19

## 2025-02-16 RX ADMIN — Medication 10 MILLIGRAM(S): at 11:17

## 2025-02-16 RX ADMIN — LIDOCAINE HYDROCHLORIDE 1 PATCH: 20 JELLY TOPICAL at 23:00

## 2025-02-16 RX ADMIN — LIDOCAINE HYDROCHLORIDE 1 PATCH: 20 JELLY TOPICAL at 19:22

## 2025-02-16 RX ADMIN — POLYETHYLENE GLYCOL 3350 17 GRAM(S): 17 POWDER, FOR SOLUTION ORAL at 09:52

## 2025-02-16 RX ADMIN — ENOXAPARIN SODIUM 40 MILLIGRAM(S): 100 INJECTION SUBCUTANEOUS at 18:19

## 2025-02-16 RX ADMIN — ROSUVASTATIN CALCIUM 10 MILLIGRAM(S): 20 TABLET, FILM COATED ORAL at 21:07

## 2025-02-16 RX ADMIN — LIDOCAINE HYDROCHLORIDE 1 PATCH: 20 JELLY TOPICAL at 11:16

## 2025-02-16 RX ADMIN — DULOXETINE 60 MILLIGRAM(S): 20 CAPSULE, DELAYED RELEASE ORAL at 11:17

## 2025-02-16 RX ADMIN — ARIPIPRAZOLE 2 MILLIGRAM(S): 2 TABLET ORAL at 11:17

## 2025-02-16 RX ADMIN — KETOROLAC TROMETHAMINE 15 MILLIGRAM(S): 30 INJECTION, SOLUTION INTRAMUSCULAR; INTRAVENOUS at 10:38

## 2025-02-16 RX ADMIN — Medication 81 MILLIGRAM(S): at 11:17

## 2025-02-16 RX ADMIN — Medication 40 MILLIEQUIVALENT(S): at 11:16

## 2025-02-16 NOTE — CHART NOTE - NSCHARTNOTEFT_GEN_A_CORE
B	N	Y	O	01/30/2025	02/03/2025	tramadol hcl er 100 mg tablet	30	30	Rho, Jarrell NELSON	[id number]	[facility]  B	N	Y	O	01/30/2025	01/31/2025	hydromorphone 2 mg tablet	180	30	Rho, Jarrell NELSON	[id number]	[facility]  B	N	Y		01/21/2025	01/21/2025	zolpidem tartrate 5 mg tablet	30	30	Marty Weir MD	[id number]	[facility]  B	N	N	O	01/06/2025	01/08/2025	hydromorphone 4 mg tablet	180	30	Rho, Jarrell NELSON	[id number]	[facility]  B	N	N	O	01/06/2025	01/06/2025	tramadol hcl er 100 mg tablet	30	30	Rho, Jarrell NELSON	[id number]	[facility]  B	N	N	O	12/04/2024	12/05/2024	hydromorphone 4 mg tablet	180	30	Rho, Jarrell NELSON	[id number]	[facility]  B	N	N	O	12/04/2024	12/05/2024	tramadol hcl er 100 mg tablet	30	30	Rho, Jarrell NELSON	[id number]	[facility]  B	N	N	O	10/29/2024	10/29/2024	hydromorphone 4 mg tablet	180	30	Rho, Jarrell NELSON	[id number]	[facility]  B	N	N	O	10/29/2024	10/29/2024	tramadol hcl er 100 mg tablet	30	30	Rho, Jarrell NELSON	[id number]	[facility]  B	N	N	B	09/20/2024	10/09/2024	clonazepam 2 mg tablet	60	30	Lamar Vo )	[id number]	[facility]  B	N	N	O	10/01/2024	10/02/2024	tramadol hcl er 100 mg tablet	30	30	Rho, Jarrell NELSON	[id number]	[facility]  B	N	N	O	10/01/2024	10/02/2024	hydromorphone 4 mg tablet	180	30	Rho, Jarrell NELSON	[id number]	[facility]  B	N	N	O	09/20/2024	09/20/2024	hydromorphone 2 mg tablet	120	20	Lamar Vo )	[id number]	[facility]  A	N	N	O	09/10/2024	09/10/2024	hydromorphone 2 mg tablet	60	10	Joe Garg MD	[id number]	Insurance	[facility]  A	N	N	B	09/09/2024	09/09/2024	clonazepam 2 mg tablet	28	14	Lamar Vo (M D )	[id number]	Insurance	[facility]  A	N	N	O	09/05/2024	09/06/2024	hydromorphone 2 mg tablet	1	1	Lamar Vo (M D )	[id number]	Insurance	[facility]  A	N	N	O	09/05/2024	09/05/2024	hydromorphone 2 mg tablet	42	14	Lamar Vo (M D )	[id number]	Insurance	[facility]  A	N	N	O	08/20/2024	08/20/2024	hydromorphone 4 mg tablet	56	14	Violetta Jensen	[id number]	Insurance	[facility]  A	N	N	B	08/20/2024	08/20/2024	clonazepam 2 mg tablet	28	14	Violetta Jensen	[id number]	Insurance	[facility]  B	N	N	O	08/06/2024	08/07/2024	hydromorphone 4 mg tablet	180	30	Rho, Jarrell NELSON	[id number]	Insurance	[facility]  B	N	N	O	08/06/2024	08/07/2024	tramadol hcl er 100 mg tablet	30	30	Rho, Jarrell NELSON	[id number]	Insurance	[facility]  B	N	N	O	07/09/2024	07/10/2024	hydromorphone 4 mg tablet	180	30	Rho, Jarrell NELSON	[id number]	Insurance	[facility]  B	N	N	O	07/09/2024	07/10/2024	tramadol hcl er 100 mg tablet	30	30	RhoJarrell	[id number]	Insurance	[facility]  B	N	N	B	06/28/2024	06/28/2024	clonazepam 2 mg tablet	60	30	Teresita Torres	[id number]	Insurance	[facility]  B	N	N	O	06/11/2024	06/11/2024	hydromorphone 4 mg tablet	120	30	Rho, Jarrell NELSON	[id number]	Insurance	[facility]  B	N	N	B	05/22/2024	05/23/2024	diazepam 5 mg tablet	30	30	Dianelys Garcia MD	[id number]	[facility]  B	N	N	O	05/13/2024	05/13/2024	hydromorphone 4 mg tablet	120	24	Jarrell oGnzales	[id number]	Insurance	[facility]  B	N	N	O	05/02/2024	05/03/2024	hydromorphone 4 mg tablet	28	7	Adis Iyer	[id number]	Insurance	[facility]  B	N	N	B	05/03/2024	05/03/2024	clonazepam 2 mg tablet	60	30	TorresTeresita sánchez	[id number]	[facility]  B	N	N	O	04/19/2024	04/19/2024	hydromorphone 4 mg tablet	28	7	Adis Iyer	[id number]	[facility]  B	N	N	O	04/11/2024	04/11/2024	hydromorphone 4 mg tablet	28	7	Adis Iyer	[id number]	Insurance	[facility]  B	N	N	O	04/04/2024	04/05/2024	hydromorphone 4 mg tablet	28	7	Adis Iyer	[id number]	Insurance	[facility]  B	N	N	B	03/27/2024	03/28/2024	diazepam 5 mg tablet	30	30	Dianelys Garcia MD	[id number]	[facility]  B	N	N	O	03/22/2024	03/23/2024	hydromorphone 4 mg tablet	28	7	Adis Iyer	[id number]	Insurance	[facility]  B	N	N	O	03/15/2024	03/15/2024	hydromorphone 4 mg tablet	28	7	Adis Iyer	[id number]	Insurance	[facility]  B	N	N	B	02/27/2024	02/27/2024	clonazepam 2 mg tablet	60	30	Torres, Teresita	[id number]	[facility]
Per chart review, patient had previously signed MOLST form indicating DNR/trial of NIV, otherwise DNI. Discussed with patient his code status, he confirmed both DNR and trial of NIV, otherwise DNI. Prior MOLST placed into chart.

## 2025-02-17 PROCEDURE — 99232 SBSQ HOSP IP/OBS MODERATE 35: CPT | Mod: GC

## 2025-02-17 RX ORDER — BISACODYL 5 MG
10 TABLET, DELAYED RELEASE (ENTERIC COATED) ORAL ONCE
Refills: 0 | Status: COMPLETED | OUTPATIENT
Start: 2025-02-17 | End: 2025-02-17

## 2025-02-17 RX ORDER — BISACODYL 5 MG
5 TABLET, DELAYED RELEASE (ENTERIC COATED) ORAL ONCE
Refills: 0 | Status: COMPLETED | OUTPATIENT
Start: 2025-02-17 | End: 2025-02-17

## 2025-02-17 RX ORDER — FINASTERIDE 1 MG/1
1 TABLET, FILM COATED ORAL
Qty: 0 | Refills: 0 | DISCHARGE
Start: 2025-02-17

## 2025-02-17 RX ADMIN — TAMSULOSIN HYDROCHLORIDE 0.4 MILLIGRAM(S): 0.4 CAPSULE ORAL at 21:31

## 2025-02-17 RX ADMIN — Medication 10 MILLIGRAM(S): at 10:05

## 2025-02-17 RX ADMIN — ROSUVASTATIN CALCIUM 10 MILLIGRAM(S): 20 TABLET, FILM COATED ORAL at 21:31

## 2025-02-17 RX ADMIN — LIDOCAINE HYDROCHLORIDE 1 PATCH: 20 JELLY TOPICAL at 18:17

## 2025-02-17 RX ADMIN — ARIPIPRAZOLE 2 MILLIGRAM(S): 2 TABLET ORAL at 11:12

## 2025-02-17 RX ADMIN — ENOXAPARIN SODIUM 40 MILLIGRAM(S): 100 INJECTION SUBCUTANEOUS at 17:35

## 2025-02-17 RX ADMIN — FINASTERIDE 5 MILLIGRAM(S): 1 TABLET, FILM COATED ORAL at 11:12

## 2025-02-17 RX ADMIN — Medication 5 MILLIGRAM(S): at 05:55

## 2025-02-17 RX ADMIN — DULOXETINE 60 MILLIGRAM(S): 20 CAPSULE, DELAYED RELEASE ORAL at 11:12

## 2025-02-17 RX ADMIN — LIDOCAINE HYDROCHLORIDE 1 PATCH: 20 JELLY TOPICAL at 11:13

## 2025-02-17 RX ADMIN — Medication 81 MILLIGRAM(S): at 11:12

## 2025-02-17 NOTE — PROGRESS NOTE ADULT - ATTENDING COMMENTS
Patient with controlled pain, reports no BM over the last 3 days, wants to have a BM prior to discharge. Discussed again with patient PT recommendation for MCKENNA, he reports going to [facility] 3 times previously and not wanting to go there again. Reports has private hire HHA that can help more if needed. No other complaints or events reported    General: AOX3, NAD, lying in bed, speaking in full sentences, no labored breathing on RA  HEENT: AT/NC, no facial asymmetry   Lungs: no crakles, no wheezes  Abdomen: no distension, soft, no tenderness, + BS  Extremities: warm, no edema, no tenderness, ROM WNL, strength 5/5 symmetric, no focal deficit     Patient presenting after fall, pain controlled. PT recommending MCKENNA, patient opting for DC home  Continue bowel regimen, patient with no N.V, no abdominal pain, passing flatus. Agreeable to bisacodyl suppository.  DVT ppx  SW evaluation

## 2025-02-18 ENCOUNTER — TRANSCRIPTION ENCOUNTER (OUTPATIENT)
Age: 80
End: 2025-02-18

## 2025-02-18 ENCOUNTER — APPOINTMENT (OUTPATIENT)
Dept: HEART AND VASCULAR | Facility: CLINIC | Age: 80
End: 2025-02-18

## 2025-02-18 VITALS
TEMPERATURE: 98 F | DIASTOLIC BLOOD PRESSURE: 97 MMHG | SYSTOLIC BLOOD PRESSURE: 167 MMHG | HEART RATE: 70 BPM | OXYGEN SATURATION: 95 % | RESPIRATION RATE: 18 BRPM

## 2025-02-18 PROCEDURE — 80048 BASIC METABOLIC PNL TOTAL CA: CPT

## 2025-02-18 PROCEDURE — 86850 RBC ANTIBODY SCREEN: CPT

## 2025-02-18 PROCEDURE — 83735 ASSAY OF MAGNESIUM: CPT

## 2025-02-18 PROCEDURE — 70450 CT HEAD/BRAIN W/O DYE: CPT | Mod: MC

## 2025-02-18 PROCEDURE — 93005 ELECTROCARDIOGRAM TRACING: CPT

## 2025-02-18 PROCEDURE — 99285 EMERGENCY DEPT VISIT HI MDM: CPT

## 2025-02-18 PROCEDURE — 36415 COLL VENOUS BLD VENIPUNCTURE: CPT

## 2025-02-18 PROCEDURE — 97110 THERAPEUTIC EXERCISES: CPT

## 2025-02-18 PROCEDURE — 73060 X-RAY EXAM OF HUMERUS: CPT

## 2025-02-18 PROCEDURE — 90715 TDAP VACCINE 7 YRS/> IM: CPT

## 2025-02-18 PROCEDURE — 96374 THER/PROPH/DIAG INJ IV PUSH: CPT

## 2025-02-18 PROCEDURE — 97116 GAIT TRAINING THERAPY: CPT

## 2025-02-18 PROCEDURE — 86900 BLOOD TYPING SEROLOGIC ABO: CPT

## 2025-02-18 PROCEDURE — 85610 PROTHROMBIN TIME: CPT

## 2025-02-18 PROCEDURE — 94640 AIRWAY INHALATION TREATMENT: CPT

## 2025-02-18 PROCEDURE — 73030 X-RAY EXAM OF SHOULDER: CPT

## 2025-02-18 PROCEDURE — 73502 X-RAY EXAM HIP UNI 2-3 VIEWS: CPT

## 2025-02-18 PROCEDURE — 97161 PT EVAL LOW COMPLEX 20 MIN: CPT

## 2025-02-18 PROCEDURE — 72125 CT NECK SPINE W/O DYE: CPT | Mod: MC

## 2025-02-18 PROCEDURE — 72192 CT PELVIS W/O DYE: CPT | Mod: MC

## 2025-02-18 PROCEDURE — 84100 ASSAY OF PHOSPHORUS: CPT

## 2025-02-18 PROCEDURE — 85730 THROMBOPLASTIN TIME PARTIAL: CPT

## 2025-02-18 PROCEDURE — 86901 BLOOD TYPING SEROLOGIC RH(D): CPT

## 2025-02-18 PROCEDURE — 90471 IMMUNIZATION ADMIN: CPT

## 2025-02-18 PROCEDURE — 99232 SBSQ HOSP IP/OBS MODERATE 35: CPT | Mod: GC

## 2025-02-18 PROCEDURE — 85027 COMPLETE CBC AUTOMATED: CPT

## 2025-02-18 RX ADMIN — LIDOCAINE HYDROCHLORIDE 1 PATCH: 20 JELLY TOPICAL at 11:59

## 2025-02-18 RX ADMIN — LIDOCAINE HYDROCHLORIDE 1 PATCH: 20 JELLY TOPICAL at 10:18

## 2025-02-18 RX ADMIN — Medication 81 MILLIGRAM(S): at 12:00

## 2025-02-18 RX ADMIN — FINASTERIDE 5 MILLIGRAM(S): 1 TABLET, FILM COATED ORAL at 12:00

## 2025-02-18 RX ADMIN — Medication 650 MILLIGRAM(S): at 06:29

## 2025-02-18 RX ADMIN — DULOXETINE 60 MILLIGRAM(S): 20 CAPSULE, DELAYED RELEASE ORAL at 12:01

## 2025-02-18 RX ADMIN — Medication 650 MILLIGRAM(S): at 15:47

## 2025-02-18 NOTE — DISCHARGE NOTE NURSING/CASE MANAGEMENT/SOCIAL WORK - NSDCVIVACCINE_GEN_ALL_CORE_FT
Tdap; 14-Feb-2025 09:35; Anuja Vega (RN); [employer]; [id number] (Exp. Date: 01-Aug-2026); IntraMuscular; Dorsogluteal Right.; 0.5 milliLiter(s); VIS (VIS Published: 09-May-2013, VIS Presented: 14-Feb-2025);

## 2025-02-18 NOTE — DISCHARGE NOTE NURSING/CASE MANAGEMENT/SOCIAL WORK - NSDCPEFALRISK_GEN_ALL_CORE
For information on Fall & Injury Prevention, visit: [email or web address] OR  [email or web address] OR  https://www.cdc.gov/steadi/patient.html

## 2025-02-18 NOTE — PROGRESS NOTE ADULT - PROBLEM SELECTOR PLAN 2
mechanical fall with several falls over past several years. no LOC, seizure like activity, no chest pain, palpitations, dizziness, lightheadedness. manage as above.    Plan  - order orthostatics
mechanical fall with several falls over past several years. no LOC, seizure like activity, no chest pain, palpitations, dizziness, lightheadedness. manage as above.    Plan  - order orthostatics
mechanical fall with several falls over past several years. no LOC, seizure like activity, no chest pain, palpitations, dizziness, lightheadedness. manage as above.    ctm

## 2025-02-18 NOTE — DISCHARGE NOTE NURSING/CASE MANAGEMENT/SOCIAL WORK - PATIENT PORTAL LINK FT
You can access the FollowMyHealth Patient Portal offered by [facility] by registering at the following website: [email or web address]. By joining [email or web address]’s FollowMyHealth portal, you will also be able to view your health information using other applications (apps) compatible with our system.

## 2025-02-18 NOTE — PROGRESS NOTE ADULT - PROBLEM SELECTOR PLAN 8
F: PO  E: Replete PRN  N: Regular Diet  DVT ppx: Lovenox.   Dispo: RMF

## 2025-02-18 NOTE — PROGRESS NOTE ADULT - PROBLEM SELECTOR PROBLEM 3
Status post AAA (abdominal aortic aneurysm) repair

## 2025-02-18 NOTE — PROGRESS NOTE ADULT - SUBJECTIVE AND OBJECTIVE BOX
SUBJECTIVE:  Patient was seen and examined at bedside. Patient with better pain control, evaluated by PT yesterday, refused MCKENNA then. This morning, patient continue to refuse MCKENNA, wants discharge home with HHA. No other complaints or events reported.    Review of systems: No fever, chills, dizziness, HA, Changes in vision, CP, dyspnea, nausea or vomiting, dysuria, changes in bowel movements, LE edema. Rest of 12 point Review of systems negative unless otherwise documented elsewhere in note.     Diet, Regular (02-14-25 @ 15:54) [Active]      MEDICATIONS:  MEDICATIONS  (STANDING):  ARIPiprazole 2 milliGRAM(s) Oral daily  aspirin  chewable 81 milliGRAM(s) Oral daily  DULoxetine 60 milliGRAM(s) Oral daily  enoxaparin Injectable 40 milliGRAM(s) SubCutaneous every 24 hours  finasteride 5 milliGRAM(s) Oral daily  influenza  Vaccine (HIGH DOSE) 0.5 milliLiter(s) IntraMuscular once  lidocaine   4% Patch 1 Patch Transdermal daily  magnesium sulfate  IVPB 2 Gram(s) IV Intermittent once  metoclopramide Injectable 10 milliGRAM(s) IV Push once  polyethylene glycol 3350 17 Gram(s) Oral every 12 hours  potassium chloride   Powder 40 milliEquivalent(s) Oral once  rosuvastatin 10 milliGRAM(s) Oral at bedtime  senna 2 Tablet(s) Oral at bedtime  tamsulosin 0.4 milliGRAM(s) Oral at bedtime    MEDICATIONS  (PRN):  acetaminophen     Tablet .. 650 milliGRAM(s) Oral every 6 hours PRN Temp greater or equal to 38C (100.4F), Mild Pain (1 - 3)  fluticasone propionate 50 MICROgram(s)/spray Nasal Spray 1 Spray(s) Both Nostrils every 12 hours PRN post nasal drip  HYDROmorphone   Tablet 2 milliGRAM(s) Oral two times a day PRN Severe Pain (7 - 10)  melatonin 3 milliGRAM(s) Oral at bedtime PRN Insomnia  melatonin 5 milliGRAM(s) Oral at bedtime PRN Insomnia      Allergies    sulfa drugs (Unknown)    Intolerances        OBJECTIVE:  Vital Signs Last 24 Hrs  T(C): 36.9 (15 Feb 2025 23:05), Max: 36.9 (15 Feb 2025 23:05)  T(F): 98.4 (15 Feb 2025 23:05), Max: 98.4 (15 Feb 2025 23:05)  HR: 78 (15 Feb 2025 23:05) (74 - 78)  BP: 154/88 (15 Feb 2025 23:05) (150/87 - 154/88)  BP(mean): --  RR: 17 (15 Feb 2025 23:05) (17 - 18)  SpO2: 95% (15 Feb 2025 23:05) (95% - 95%)    Parameters below as of 15 Feb 2025 23:05  Patient On (Oxygen Delivery Method): room air      I&O's Summary    15 Feb 2025 07:01  -  16 Feb 2025 07:00  --------------------------------------------------------  IN: 0 mL / OUT: 600 mL / NET: -600 mL        PHYSICAL EXAM:  General: awake, alert, NAD, lying flat, speaking in full sentences, no labored breathing on RA  HEENT" AT/NC, no visible facial asymmetry  Abdomen: soft, no tenderness  Extremities:  mild right hip tenderness, no tenderness to left shoulder palpation, no gross focal deficit     LABS:                        13.1   6.37  )-----------( 177      ( 15 Feb 2025 06:38 )             38.4     02-15    136  |  100  |  20  ----------------------------<  103[H]  3.6   |  26  |  1.06    Ca    8.2[L]      15 Feb 2025 06:38  Phos  2.9     02-15  Mg     2.3     02-15          CAPILLARY BLOOD GLUCOSE        Urinalysis Basic - ( 15 Feb 2025 06:38 )    Color: x / Appearance: x / SG: x / pH: x  Gluc: 103 mg/dL / Ketone: x  / Bili: x / Urobili: x   Blood: x / Protein: x / Nitrite: x   Leuk Esterase: x / RBC: x / WBC x   Sq Epi: x / Non Sq Epi: x / Bacteria: x        MICRODATA:      RADIOLOGY/OTHER STUDIES:  
INTERVAL/OVERNIGHT EVENTS: None    SUBJECTIVE:  Patient seen and examined at bedside, comfortable. Endorses L shoulder pain and R hip pain, not well-controlled with current pain regimen.  Denied fever, chest pain, dyspnea, abdominal pain.       PHYSICAL EXAM:  General: NAD  HEENT: PERRL, EOM intact, sclera anicteric, MMM  Cardiovascular: RRR; no MRG;   Respiratory: CTAB; no WRR  GI/: soft; NTND; BS x4  Extremities: WWP; 2+ peripheral pulses bilaterally; L shoulder ROM limited with pain  Skin: normal color & turgor; no rash  Neurologic: aox3; no focal deficits    LABS:                        13.1   6.37  )-----------( 177      ( 15 Feb 2025 06:38 )             38.4     02-15    136  |  100  |  20  ----------------------------<  103[H]  3.6   |  26  |  1.06    Ca    8.2[L]      15 Feb 2025 06:38  Phos  2.9     02-15  Mg     2.3     02-15      PT/INR - ( 14 Feb 2025 10:03 )   PT: 12.8 sec;   INR: 1.10          PTT - ( 14 Feb 2025 10:03 )  PTT:36.6 sec  Urinalysis Basic - ( 15 Feb 2025 06:38 )    Color: x / Appearance: x / SG: x / pH: x  Gluc: 103 mg/dL / Ketone: x  / Bili: x / Urobili: x   Blood: x / Protein: x / Nitrite: x   Leuk Esterase: x / RBC: x / WBC x   Sq Epi: x / Non Sq Epi: x / Bacteria: x      RADIOLOGY & ADDITIONAL TESTS:    MEDICATIONS  (STANDING):  ARIPiprazole 2 milliGRAM(s) Oral daily  aspirin  chewable 81 milliGRAM(s) Oral daily  DULoxetine 60 milliGRAM(s) Oral daily  enoxaparin Injectable 40 milliGRAM(s) SubCutaneous every 24 hours  finasteride 5 milliGRAM(s) Oral daily  fluticasone propionate 50 MICROgram(s)/spray Nasal Spray 1 Spray(s) Both Nostrils once  influenza  Vaccine (HIGH DOSE) 0.5 milliLiter(s) IntraMuscular once  rosuvastatin 10 milliGRAM(s) Oral at bedtime  tamsulosin 0.4 milliGRAM(s) Oral at bedtime    MEDICATIONS  (PRN):  acetaminophen     Tablet .. 650 milliGRAM(s) Oral every 6 hours PRN Temp greater or equal to 38C (100.4F), Mild Pain (1 - 3)  HYDROmorphone   Tablet 2 milliGRAM(s) Oral two times a day PRN Severe Pain (7 - 10)  melatonin 3 milliGRAM(s) Oral at bedtime PRN Insomnia  melatonin 5 milliGRAM(s) Oral at bedtime PRN Insomnia  traMADol  milliGRAM(s) Oral daily PRN Moderate Pain (4 - 6)  
Internal Medicine Progress Note  Michelle Barros PGY1      OVERNIGHT EVENTS/INTERVAL HPI:  With bleeding through guaze at site of fall.     Subjective:   Slept horrible, feels horrible. Consistent complain.     OBJECTIVE:  Vital Signs Last 24 Hrs  T(C): 36.8 (18 Feb 2025 06:35), Max: 37.1 (17 Feb 2025 11:16)  T(F): 98.2 (18 Feb 2025 06:35), Max: 98.8 (17 Feb 2025 11:16)  HR: 73 (18 Feb 2025 06:35) (69 - 79)  BP: 177/96 (18 Feb 2025 06:35) (159/85 - 177/96)  BP(mean): --  RR: 18 (18 Feb 2025 06:35) (18 - 19)  SpO2: 96% (18 Feb 2025 06:35) (95% - 96%)    Parameters below as of 18 Feb 2025 06:35  Patient On (Oxygen Delivery Method): room air      I&O's Detail    17 Feb 2025 07:01  -  18 Feb 2025 07:00  --------------------------------------------------------  IN:  Total IN: 0 mL    OUT:    Voided (mL): 400 mL  Total OUT: 400 mL    Total NET: -400 mL      PHYSICAL EXAM:  General: NAD  HEENT: PERRL, EOM intact, sclera anicteric, MMM  Cardiovascular: RRR; no MRG  Respiratory: CTAB; no WRR  GI/: soft; NTND; BS x4  Extremities: WWP; 2+ peripheral pulses bilaterally; L shoulder ROM limited with pain  Skin: with bleeding at site of R LE through guaze   Neurologic: aox3; no focal deficits      Medications:  MEDICATIONS  (STANDING):  ARIPiprazole 2 milliGRAM(s) Oral daily  aspirin  chewable 81 milliGRAM(s) Oral daily  DULoxetine 60 milliGRAM(s) Oral daily  enoxaparin Injectable 40 milliGRAM(s) SubCutaneous every 24 hours  finasteride 5 milliGRAM(s) Oral daily  influenza  Vaccine (HIGH DOSE) 0.5 milliLiter(s) IntraMuscular once  lidocaine   4% Patch 1 Patch Transdermal daily  polyethylene glycol 3350 17 Gram(s) Oral every 12 hours  rosuvastatin 10 milliGRAM(s) Oral at bedtime  senna 2 Tablet(s) Oral at bedtime  tamsulosin 0.4 milliGRAM(s) Oral at bedtime    MEDICATIONS  (PRN):  acetaminophen     Tablet .. 650 milliGRAM(s) Oral every 6 hours PRN Temp greater or equal to 38C (100.4F), Mild Pain (1 - 3)  fluticasone propionate 50 MICROgram(s)/spray Nasal Spray 1 Spray(s) Both Nostrils every 12 hours PRN post nasal drip  HYDROmorphone   Tablet 2 milliGRAM(s) Oral two times a day PRN Severe Pain (7 - 10)  melatonin 3 milliGRAM(s) Oral at bedtime PRN Insomnia  melatonin 5 milliGRAM(s) Oral at bedtime PRN Insomnia  traMADol 25 milliGRAM(s) Oral every 6 hours PRN Moderate Pain (4 - 6)      Labs:                    Radiology: Reviewed
Internal Medicine Progress Note  Sunnikelli Fiorella PGY1     OVERNIGHT EVENTS/INTERVAL HPI:  No acute events overnight.     Subjective:   Denies any acute complaints.    OBJECTIVE:  Vital Signs Last 24 Hrs  T(C): 36.7 (17 Feb 2025 05:52), Max: 37.3 (16 Feb 2025 11:00)  T(F): 98 (17 Feb 2025 05:52), Max: 99.1 (16 Feb 2025 11:00)  HR: 71 (17 Feb 2025 05:52) (61 - 74)  BP: 160/87 (17 Feb 2025 05:52) (138/84 - 177/78)  BP(mean): --  RR: 18 (17 Feb 2025 05:52) (17 - 18)  SpO2: 95% (17 Feb 2025 05:52) (95% - 96%)    Parameters below as of 17 Feb 2025 05:52  Patient On (Oxygen Delivery Method): room air      I&O's Detail    16 Feb 2025 07:01  -  17 Feb 2025 07:00  --------------------------------------------------------  IN:  Total IN: 0 mL    OUT:    Voided (mL): 200 mL  Total OUT: 200 mL    Total NET: -200 mL      PHYSICAL EXAM:  General: NAD  HEENT: PERRL, EOM intact, sclera anicteric, MMM  Cardiovascular: RRR; no MRG;   Respiratory: CTAB; no WRR  GI/: soft; NTND; BS x4  Extremities: WWP; 2+ peripheral pulses bilaterally; L shoulder ROM limited with pain  Skin: normal color & turgor; no rash  Neurologic: aox3; no focal deficits    Medications:  MEDICATIONS  (STANDING):  ARIPiprazole 2 milliGRAM(s) Oral daily  aspirin  chewable 81 milliGRAM(s) Oral daily  DULoxetine 60 milliGRAM(s) Oral daily  enoxaparin Injectable 40 milliGRAM(s) SubCutaneous every 24 hours  finasteride 5 milliGRAM(s) Oral daily  influenza  Vaccine (HIGH DOSE) 0.5 milliLiter(s) IntraMuscular once  lidocaine   4% Patch 1 Patch Transdermal daily  polyethylene glycol 3350 17 Gram(s) Oral every 12 hours  rosuvastatin 10 milliGRAM(s) Oral at bedtime  senna 2 Tablet(s) Oral at bedtime  tamsulosin 0.4 milliGRAM(s) Oral at bedtime    MEDICATIONS  (PRN):  acetaminophen     Tablet .. 650 milliGRAM(s) Oral every 6 hours PRN Temp greater or equal to 38C (100.4F), Mild Pain (1 - 3)  fluticasone propionate 50 MICROgram(s)/spray Nasal Spray 1 Spray(s) Both Nostrils every 12 hours PRN post nasal drip  HYDROmorphone   Tablet 2 milliGRAM(s) Oral two times a day PRN Severe Pain (7 - 10)  melatonin 3 milliGRAM(s) Oral at bedtime PRN Insomnia  melatonin 5 milliGRAM(s) Oral at bedtime PRN Insomnia  traMADol 25 milliGRAM(s) Oral every 6 hours PRN Moderate Pain (4 - 6)      Labs:                    Radiology: Reviewed

## 2025-02-18 NOTE — PROGRESS NOTE ADULT - PROBLEM SELECTOR PLAN 5
has chronic back pain s/p multiple ortho interventions (chronic vertebral compression fractures and kyphoscoliosis s/p C-sacrum fusion), takes tramadol 100mg, hydromorphone 2mg BID (which he has been tapering down recently)    Plan  - start pain meds as above

## 2025-02-18 NOTE — PROGRESS NOTE ADULT - PROBLEM SELECTOR PLAN 3
AAA s/p EVAR (11/12/24) with R renal stent placement. states he takes aspirin 81mg qD but unclear if he is suppose to be on it per HIE.    Plan  - c/w aspirin 81mg qD

## 2025-02-18 NOTE — PROGRESS NOTE ADULT - ATTENDING COMMENTS
Patient seen earlier, reports pain ok, had some bleeding in the right leg this morning, resolved. BP elevated, asymptomatic, denies urinary retention, having BMs. Refusing discharge to [facility], wants to be discharged home, has private HHA picking him up today. No other complaints or events reported    General: AOX3, NAD, lying in bed, speaking in full sentences, no labored breathing on RA  HEENT: AT/NC, no facial asymmetry  Lungs: no crackles, no wheezes  Abdomen: soft, no tenderness  Extremities: right leg with laceration, no bleeding noted, no edema, no tenderness, no focal deficit    Patient presenting after mechanical fall, appreciate Ortho  Refusing [facility], will be discharged home with A Patient seen earlier, reports pain ok, had some bleeding in the right leg this morning, resolved. BP elevated, asymptomatic, denies urinary retention, having BMs. Refusing discharge to [facility], wants to be discharged home, has private HHA picking him up today. No other complaints or events reported    General: AOX3, NAD, lying in bed, speaking in full sentences, no labored breathing on RA  HEENT: AT/NC, no facial asymmetry  Lungs: no crackles, no wheezes  Abdomen: soft, no tenderness  Extremities: right leg with laceration, no bleeding noted, no edema, no tenderness, no focal deficit    Patient presenting after mechanical fall, appreciate Ortho  Refusing [facility], will be discharged home with HHA  Ideally would want to wean patient's home pain regimen as might be contributing to falls. However in setting of fracture with pain, will defer to outpatient provider, discussed with patient.

## 2025-02-18 NOTE — PROGRESS NOTE ADULT - PROBLEM SELECTOR PLAN 4
on home aripiprazole 2mg qD, duloxetine 60mg qD    Plan  - C/w home meds

## 2025-02-18 NOTE — DISCHARGE NOTE NURSING/CASE MANAGEMENT/SOCIAL WORK - FINANCIAL ASSISTANCE
[facility] provides services at a reduced cost to those who are determined to be eligible through [facility]’s financial assistance program. Information regarding [facility]’s financial assistance program can be found by going to [email or web address] or by calling [phone].

## 2025-02-18 NOTE — PROGRESS NOTE ADULT - PROBLEM SELECTOR PLAN 1
New mild buckling of the anterior cortex of left superior pubic ramus concerning for a nondisplaced fracture noted on CT scan. Patient was seen and evaluated by orthopedics who recommend medical management at this time. 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
New mild buckling of the anterior cortex of left superior pubic ramus concerning for a nondisplaced fracture noted on CT scan. Patient was seen and evaluated by orthopedics who recommend medical management at this time. 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
New mild buckling of the anterior cortex of left superior pubic ramus concerning for a nondisplaced fracture noted on CT scan. Patient was seen and evaluated by orthopedics who recommend medical management at this time. 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

## 2025-02-18 NOTE — PROGRESS NOTE ADULT - PROBLEM SELECTOR PLAN 6
on home dutasteride 0.5mg qD, tamsulosin 0.4mg qD. also on home oxybutynin 15mg qD.    Plan  - c/w home dutasteride 0.5mg qD  - c/w tamsulosin 0.4mg qD  - hold oxybutynin, but ensure he is voiding

## 2025-02-18 NOTE — PROGRESS NOTE ADULT - ASSESSMENT
79 y/o male w/ hx osteoporosis, Kyphoscoliosis, s/p c sacrum fusion at [facility] 10/2020, Depression, Bladder CA in remission (s/p resection localized chemo), GERD, Lymphedema, Prior DVT, BPH, recent infrarenal AAA s/p EVAR in 11/12/24 here for left shoulder pain s/p mechanical fall this morning found to have a pubic rami fracture, admitted for pain control and physical therapy evalaution.      Problem/Plan - 1:  ·  Problem: Suspected fracture of pelvis.   ·  Plan: New mild buckling of the anterior cortex of left superior pubic ramus concerning for a nondisplaced fracture noted on CT scan. Patient was seen and evaluated by orthopedics who recommend medical management at this time. 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     *Right hip fracture in July 2023 in a fall from standing height; left hip fracture in November 2023 in a fall from standing height; morphometric T12 vertebral fracture noted on imaging in July 2024     Plan  - consider discontinuing tramadol 100mg given recurrent falls  - c/w home hydromorphone 2mg BID  - lidocaine patch as needed for shoulder pain  - Trial of NSAIDs  - Patient refusing M[facility]ENNA, requesting discharge home. SW follow-up     Problem/Plan - 2:  ·  Problem: Fall.   ·  Plan: mechanical fall with several falls over past several years. no LOC, seizure like activity, no chest pain, palpitations, dizziness, lightheadedness. manage as above.    Plan  - will need t o taper down chronic pain regimen, however in setting of above, will defer to outpatient provider      Problem/Plan - 3:  ·  Problem: Status post AAA (abdominal aortic aneurysm) repair.   ·  Plan: AAA s/p EVAR (11/12/24) with R renal stent placement. states he takes aspirin 81mg qD but unclear if he is suppose to be on it per HIE.    Plan  - c/w aspirin 81mg qD.     Problem/Plan - 4:  ·  Problem: Depression.   ·  Plan: on home aripiprazole 2mg qD, duloxetine 60mg qD    Plan  - C/w home meds.     Problem/Plan - 5:  ·  Problem: Chronic back pain.   ·  Plan: has chronic back pain s/p multiple ortho interventions (chronic vertebral compression fractures and kyphoscoliosis s/p C-sacrum fusion), takes tramadol 100mg, hydromorphone 2mg BID (which he has been tapering down recently)    Plan  - start pain meds as above.     Problem/Plan - 6:  ·  Problem: BPH (benign prostatic hyperplasia).   ·  Plan: on home dutasteride 0.5mg qD, tamsulosin 0.4mg qD. also on home oxybutynin 15mg qD.    Plan  - c/w home dutasteride 0.5mg qD  - c/w tamsulosin 0.4mg qD  - hold oxybutynin, but ensure he is voiding.     Problem/Plan - 7:  ·  Problem: High cholesterol.   ·  Plan: c/w home rosuvastatin 10mg qD.    DVT ppx" SQH  
Mr. Blair is an  79 y/o male w/ hx osteoporosis, Kyphoscoliosis, s/p c sacrum fusion at [facility] 10/2020, Depression, Bladder CA in remission (s/p resection localized chemo), GERD, Lymphedema, Prior DVT, BPH, recent infrarenal AAA s/p EVAR in 11/12/24 here for left shoulder pain s/p mechanical fall this morning found to have a pubic rami fracture, admitted for pain control and physical therapy evalaution. 
Mr. Blair is an  79 y/o male w/ hx osteoporosis, Kyphoscoliosis, s/p c sacrum fusion at [facility] 10/2020, Depression, Bladder CA in remission (s/p resection localized chemo), GERD, Lymphedema, Prior DVT, BPH, recent infrarenal AAA s/p EVAR in 11/12/24 here for left shoulder pain s/p mechanical fall this morning found to have a pubic rami fracture, admitted for pain control and physical therapy evaluation.    Now medically ready for discharge and provided a DC notice on 2/17/2025.
Mr. Blair is an  79 y/o male w/ hx osteoporosis, Kyphoscoliosis, s/p c sacrum fusion at [facility] 10/2020, Depression, Bladder CA in remission (s/p resection localized chemo), GERD, Lymphedema, Prior DVT, BPH, recent infrarenal AAA s/p EVAR in 11/12/24 here for left shoulder pain s/p mechanical fall this morning found to have a pubic rami fracture, admitted for pain control and physical therapy evaluation

## 2025-02-21 ENCOUNTER — APPOINTMENT (OUTPATIENT)
Dept: NEUROLOGY | Age: 80
End: 2025-02-21

## 2025-02-21 DIAGNOSIS — Z88.2 ALLERGY STATUS TO SULFONAMIDES: ICD-10-CM

## 2025-02-21 DIAGNOSIS — Z86.718 PERSONAL HISTORY OF OTHER VENOUS THROMBOSIS AND EMBOLISM: ICD-10-CM

## 2025-02-21 DIAGNOSIS — Z96.643 PRESENCE OF ARTIFICIAL HIP JOINT, BILATERAL: ICD-10-CM

## 2025-02-21 DIAGNOSIS — S32.502A UNSPECIFIED FRACTURE OF LEFT PUBIS, INITIAL ENCOUNTER FOR CLOSED FRACTURE: ICD-10-CM

## 2025-02-21 DIAGNOSIS — M81.0 AGE-RELATED OSTEOPOROSIS WITHOUT CURRENT PATHOLOGICAL FRACTURE: ICD-10-CM

## 2025-02-21 DIAGNOSIS — R29.6 REPEATED FALLS: ICD-10-CM

## 2025-02-21 DIAGNOSIS — Z66 DO NOT RESUSCITATE: ICD-10-CM
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
## 2025-02-21 DIAGNOSIS — Z23 ENCOUNTER FOR IMMUNIZATION: ICD-10-CM

## 2025-02-21 DIAGNOSIS — G89.29 OTHER CHRONIC PAIN: ICD-10-CM

## 2025-02-21 DIAGNOSIS — N40.0 BENIGN PROSTATIC HYPERPLASIA WITHOUT LOWER URINARY TRACT SYMPTOMS: ICD-10-CM

## 2025-02-21 DIAGNOSIS — Z85.51 PERSONAL HISTORY OF MALIGNANT NEOPLASM OF BLADDER: ICD-10-CM

## 2025-02-21 DIAGNOSIS — E78.00 PURE HYPERCHOLESTEROLEMIA, UNSPECIFIED: ICD-10-CM

## 2025-02-21 DIAGNOSIS — K21.9 GASTRO-ESOPHAGEAL REFLUX DISEASE WITHOUT ESOPHAGITIS: ICD-10-CM

## 2025-02-21 DIAGNOSIS — F32.A DEPRESSION, UNSPECIFIED: ICD-10-CM

## 2025-02-21 DIAGNOSIS — M25.512 PAIN IN LEFT SHOULDER: ICD-10-CM

## 2025-02-21 DIAGNOSIS — Z79.82 LONG TERM (CURRENT) USE OF ASPIRIN: ICD-10-CM

## 2025-02-21 DIAGNOSIS — Z98.1 ARTHRODESIS STATUS: ICD-10-CM
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
## 2025-02-21 DIAGNOSIS — Z92.21 PERSONAL HISTORY OF ANTINEOPLASTIC CHEMOTHERAPY: ICD-10-CM

## 2025-02-21 DIAGNOSIS — S81.811A LACERATION WITHOUT FOREIGN BODY, RIGHT LOWER LEG, INITIAL ENCOUNTER: ICD-10-CM

## 2025-03-04 ENCOUNTER — OUTPATIENT (OUTPATIENT)
Dept: OUTPATIENT SERVICES | Facility: HOSPITAL | Age: 80
LOS: 1 days | End: 2025-03-04

## 2025-03-04 ENCOUNTER — APPOINTMENT (OUTPATIENT)
Dept: INTERVENTIONAL RADIOLOGY/VASCULAR | Facility: HOSPITAL | Age: 80
End: 2025-03-04

## 2025-03-04 ENCOUNTER — RESULT REVIEW (OUTPATIENT)
Age: 80
End: 2025-03-04

## 2025-03-04 DIAGNOSIS — Z98.890 OTHER SPECIFIED POSTPROCEDURAL STATES: Chronic | ICD-10-CM

## 2025-03-04 PROCEDURE — 20550 NJX 1 TENDON SHEATH/LIGAMENT: CPT | Mod: RT

## 2025-03-04 PROCEDURE — 20550 NJX 1 TENDON SHEATH/LIGAMENT: CPT

## 2025-03-04 PROCEDURE — 76942 ECHO GUIDE FOR BIOPSY: CPT

## 2025-03-04 PROCEDURE — 76942 ECHO GUIDE FOR BIOPSY: CPT | Mod: 26

## 2025-04-02 PROCEDURE — C1887: CPT

## 2025-04-02 PROCEDURE — C1876: CPT

## 2025-04-02 PROCEDURE — 37236 OPEN/PERQ PLACE STENT 1ST: CPT

## 2025-04-02 PROCEDURE — C1894: CPT

## 2025-04-02 PROCEDURE — 85347 COAGULATION TIME ACTIVATED: CPT

## 2025-04-02 PROCEDURE — 73521 X-RAY EXAM HIPS BI 2 VIEWS: CPT

## 2025-04-02 PROCEDURE — 34705 EVAC RPR A-BIILIAC NDGFT: CPT

## 2025-04-02 PROCEDURE — 85610 PROTHROMBIN TIME: CPT

## 2025-04-02 PROCEDURE — C1769: CPT

## 2025-04-02 PROCEDURE — C1725: CPT

## 2025-04-02 PROCEDURE — 76000 FLUOROSCOPY <1 HR PHYS/QHP: CPT

## 2025-04-02 PROCEDURE — 85730 THROMBOPLASTIN TIME PARTIAL: CPT

## 2025-04-02 PROCEDURE — C1874: CPT

## 2025-04-02 PROCEDURE — C1889: CPT

## 2025-04-02 PROCEDURE — 83735 ASSAY OF MAGNESIUM: CPT

## 2025-04-02 PROCEDURE — 85027 COMPLETE CBC AUTOMATED: CPT

## 2025-04-02 PROCEDURE — C1760: CPT

## 2025-04-02 PROCEDURE — 84100 ASSAY OF PHOSPHORUS: CPT

## 2025-04-02 PROCEDURE — 82803 BLOOD GASES ANY COMBINATION: CPT

## 2025-04-02 PROCEDURE — 36415 COLL VENOUS BLD VENIPUNCTURE: CPT

## 2025-04-02 PROCEDURE — C1766: CPT

## 2025-04-02 PROCEDURE — 80048 BASIC METABOLIC PNL TOTAL CA: CPT

## 2025-04-28 ENCOUNTER — APPOINTMENT (OUTPATIENT)
Dept: NEUROLOGY | Facility: CLINIC | Age: 80
End: 2025-04-28
Payer: MEDICARE

## 2025-04-28 VITALS
BODY MASS INDEX: 22.13 KG/M2 | SYSTOLIC BLOOD PRESSURE: 129 MMHG | HEART RATE: 73 BPM | OXYGEN SATURATION: 96 % | HEIGHT: 68 IN | DIASTOLIC BLOOD PRESSURE: 80 MMHG | WEIGHT: 146 LBS

## 2025-04-28 DIAGNOSIS — M43.6 TORTICOLLIS: ICD-10-CM

## 2025-04-28 DIAGNOSIS — R51.9 HEADACHE, UNSPECIFIED: ICD-10-CM

## 2025-04-28 PROCEDURE — 99204 OFFICE O/P NEW MOD 45 MIN: CPT

## 2025-04-28 PROCEDURE — 99214 OFFICE O/P EST MOD 30 MIN: CPT

## 2025-04-28 RX ORDER — GABAPENTIN 600 MG/1
TABLET ORAL
Refills: 0 | Status: ACTIVE | COMMUNITY

## 2025-04-28 RX ORDER — BACLOFEN 15 MG/1
TABLET ORAL
Refills: 0 | Status: ACTIVE | COMMUNITY

## 2025-05-08 ENCOUNTER — NON-APPOINTMENT (OUTPATIENT)
Age: 80
End: 2025-05-08

## 2025-05-09 ENCOUNTER — NON-APPOINTMENT (OUTPATIENT)
Age: 80
End: 2025-05-09

## 2025-05-12 ENCOUNTER — APPOINTMENT (OUTPATIENT)
Dept: ENDOCRINOLOGY | Facility: CLINIC | Age: 80
End: 2025-05-12
Payer: MEDICARE

## 2025-05-12 DIAGNOSIS — M81.8 OTHER OSTEOPOROSIS W/OUT CURRENT PATHOLOGICAL FRACTURE: ICD-10-CM

## 2025-05-12 DIAGNOSIS — E03.8 OTHER SPECIFIED HYPOTHYROIDISM: ICD-10-CM

## 2025-05-12 PROCEDURE — 99212 OFFICE O/P EST SF 10 MIN: CPT

## 2025-05-12 PROCEDURE — G2211 COMPLEX E/M VISIT ADD ON: CPT

## 2025-05-19 ENCOUNTER — APPOINTMENT (OUTPATIENT)
Dept: SURGERY | Facility: CLINIC | Age: 80
End: 2025-05-19
Payer: MEDICARE

## 2025-05-19 VITALS
DIASTOLIC BLOOD PRESSURE: 76 MMHG | HEIGHT: 68 IN | WEIGHT: 143 LBS | TEMPERATURE: 97.2 F | HEART RATE: 70 BPM | SYSTOLIC BLOOD PRESSURE: 150 MMHG | BODY MASS INDEX: 21.67 KG/M2 | OXYGEN SATURATION: 95 %

## 2025-05-19 DIAGNOSIS — K46.9 UNSPECIFIED ABDOMINAL HERNIA W/OUT OBSTRUCTION OR GANGRENE: ICD-10-CM

## 2025-05-19 PROCEDURE — 99214 OFFICE O/P EST MOD 30 MIN: CPT

## 2025-05-20 ENCOUNTER — OUTPATIENT (OUTPATIENT)
Dept: OUTPATIENT SERVICES | Facility: HOSPITAL | Age: 80
LOS: 1 days | End: 2025-05-20
Payer: MEDICARE

## 2025-05-20 ENCOUNTER — RESULT REVIEW (OUTPATIENT)
Age: 80
End: 2025-05-20

## 2025-05-20 ENCOUNTER — APPOINTMENT (OUTPATIENT)
Dept: ORTHOPEDIC SURGERY | Facility: CLINIC | Age: 80
End: 2025-05-20
Payer: MEDICARE

## 2025-05-20 VITALS
SYSTOLIC BLOOD PRESSURE: 157 MMHG | HEIGHT: 68 IN | BODY MASS INDEX: 21.67 KG/M2 | OXYGEN SATURATION: 95 % | HEART RATE: 69 BPM | DIASTOLIC BLOOD PRESSURE: 84 MMHG | TEMPERATURE: 98.1 F | WEIGHT: 143 LBS

## 2025-05-20 DIAGNOSIS — Z47.1 AFTERCARE FOLLOWING JOINT REPLACEMENT SURGERY: ICD-10-CM

## 2025-05-20 DIAGNOSIS — Z98.890 OTHER SPECIFIED POSTPROCEDURAL STATES: Chronic | ICD-10-CM

## 2025-05-20 DIAGNOSIS — Z96.643 AFTERCARE FOLLOWING JOINT REPLACEMENT SURGERY: ICD-10-CM

## 2025-05-20 PROCEDURE — 99214 OFFICE O/P EST MOD 30 MIN: CPT

## 2025-05-20 PROCEDURE — 73521 X-RAY EXAM HIPS BI 2 VIEWS: CPT

## 2025-05-20 PROCEDURE — 73521 X-RAY EXAM HIPS BI 2 VIEWS: CPT | Mod: 26

## 2025-05-27 ENCOUNTER — APPOINTMENT (OUTPATIENT)
Dept: HEART AND VASCULAR | Facility: CLINIC | Age: 80
End: 2025-05-27

## 2025-06-03 ENCOUNTER — NON-APPOINTMENT (OUTPATIENT)
Age: 80
End: 2025-06-03

## 2025-06-24 ENCOUNTER — NON-APPOINTMENT (OUTPATIENT)
Age: 80
End: 2025-06-24

## 2025-07-02 ENCOUNTER — NON-APPOINTMENT (OUTPATIENT)
Age: 80
End: 2025-07-02

## 2025-07-15 ENCOUNTER — APPOINTMENT (OUTPATIENT)
Dept: INFUSION THERAPY | Facility: CLINIC | Age: 80
End: 2025-07-15

## 2025-07-15 ENCOUNTER — OUTPATIENT (OUTPATIENT)
Dept: OUTPATIENT SERVICES | Facility: HOSPITAL | Age: 80
LOS: 1 days | End: 2025-07-15
Payer: MEDICARE

## 2025-07-15 VITALS
SYSTOLIC BLOOD PRESSURE: 157 MMHG | OXYGEN SATURATION: 96 % | HEART RATE: 72 BPM | TEMPERATURE: 99 F | WEIGHT: 141.98 LBS | RESPIRATION RATE: 20 BRPM | DIASTOLIC BLOOD PRESSURE: 91 MMHG | HEIGHT: 63 IN

## 2025-07-15 DIAGNOSIS — Z98.890 OTHER SPECIFIED POSTPROCEDURAL STATES: Chronic | ICD-10-CM

## 2025-07-15 DIAGNOSIS — M81.0 AGE-RELATED OSTEOPOROSIS WITHOUT CURRENT PATHOLOGICAL FRACTURE: ICD-10-CM

## 2025-07-15 PROCEDURE — 96372 THER/PROPH/DIAG INJ SC/IM: CPT

## 2025-07-15 RX ORDER — DENOSUMAB 60 MG/ML
60 INJECTION SUBCUTANEOUS ONCE
Refills: 0 | Status: COMPLETED | OUTPATIENT
Start: 2025-07-15 | End: 2025-07-15

## 2025-07-15 RX ADMIN — DENOSUMAB 60 MILLIGRAM(S): 60 INJECTION SUBCUTANEOUS at 16:25

## 2025-07-15 NOTE — DISCHARGE INSTRUCTIONS: GENERAL THERAPY - DC SYMPTOM 2
Episodes of uncontrolled nausea or vomiting not relieved by anti-nausea medication
30-Jun-2025 06:42

## 2025-07-15 NOTE — DISCHARGE INSTRUCTIONS: GENERAL THERAPY - DC SYMPTOM 4
Episodes of diarrhea (more than 3 times a day), or if you are unable to tolerate food or fluids Initiate Treatment: Clindamycin 1% wipes, Tretinoin 0.1% cream Plan: Continue taking minocycline, and consider tapering off in the fall Otc Regimen: Cetaphil Detail Level: Zone Continue Regimen: Minocycline

## 2025-07-23 ENCOUNTER — APPOINTMENT (OUTPATIENT)
Dept: NEUROLOGY | Facility: CLINIC | Age: 80
End: 2025-07-23

## (undated) DEVICE — DRAPE 3/4 SHEET 52X76"

## (undated) DEVICE — DRSG STOCKINETTE IMPERVIOUS XL

## (undated) DEVICE — DRSG PICO NPWT 4X8"

## (undated) DEVICE — DRAPE LIGHT HANDLE COVER (BLUE)

## (undated) DEVICE — PACK TOTAL HIP

## (undated) DEVICE — HOOD FLYTE STRYKER HELMET SHIELD

## (undated) DEVICE — GOWN TRIMAX XXL

## (undated) DEVICE — DRSG COBAN 6"

## (undated) DEVICE — MARKING PEN W RULER

## (undated) DEVICE — VENODYNE/SCD SLEEVE CALF MEDIUM

## (undated) DEVICE — WARMING BLANKET UPPER ADULT

## (undated) DEVICE — SYR ASEPTO

## (undated) DEVICE — DRAPE TOWEL BLUE 17" X 24"

## (undated) DEVICE — SUT ETHIBOND 1 30" OS6

## (undated) DEVICE — SUT HEWSON RETRIEVER

## (undated) DEVICE — SUT QUILL MONODERM 0 20CM 26MM

## (undated) DEVICE — GLV 8.5 PROTEXIS (WHITE)